# Patient Record
Sex: FEMALE | Race: BLACK OR AFRICAN AMERICAN | NOT HISPANIC OR LATINO | Employment: OTHER | URBAN - METROPOLITAN AREA
[De-identification: names, ages, dates, MRNs, and addresses within clinical notes are randomized per-mention and may not be internally consistent; named-entity substitution may affect disease eponyms.]

---

## 2017-01-11 DIAGNOSIS — R52 PAIN: ICD-10-CM

## 2017-01-11 RX ORDER — TRAMADOL HYDROCHLORIDE 50 MG/1
50 TABLET ORAL EVERY 6 HOURS PRN
Qty: 60 TABLET | Refills: 0 | Status: SHIPPED | OUTPATIENT
Start: 2017-01-11 | End: 2017-03-15 | Stop reason: SDUPTHER

## 2017-01-11 NOTE — TELEPHONE ENCOUNTER
----- Message from Liset Del Rio sent at 1/9/2017  1:33 PM CST -----  Contact: [phone] or [phone]  Pt is requesting a refill on tramadol (ULTRAM) 50 mg tablet Please call pt at your earliest convenience.  Thanks !

## 2017-01-20 DIAGNOSIS — I10 ESSENTIAL HYPERTENSION: ICD-10-CM

## 2017-01-20 RX ORDER — OLMESARTAN MEDOXOMIL, AMLODIPINE AND HYDROCHLOROTHIAZIDE TABLET 20/5/12.5 MG 20; 5; 12.5 MG/1; MG/1; MG/1
TABLET ORAL
Qty: 90 TABLET | Refills: 0 | Status: SHIPPED | OUTPATIENT
Start: 2017-01-20 | End: 2017-02-01

## 2017-01-20 RX ORDER — OLMESARTAN MEDOXOMIL / AMLODIPINE BESYLATE / HYDROCHLOROTHIAZIDE 20; 5; 12.5 MG/1; MG/1; MG/1
1 TABLET, FILM COATED ORAL DAILY
Qty: 90 TABLET | Refills: 3 | Status: SHIPPED | OUTPATIENT
Start: 2017-01-20 | End: 2017-02-01 | Stop reason: SDUPTHER

## 2017-01-20 NOTE — TELEPHONE ENCOUNTER
----- Message from Charleen Deng sent at 1/20/2017 10:39 AM CST -----  Refill: TRIBENZOR 20-5-12.5 mg Tab    Patient has an appt on February 1st. She needs a refill before then. Please send to [facility]. Thank you!

## 2017-02-01 ENCOUNTER — OFFICE VISIT (OUTPATIENT)
Dept: FAMILY MEDICINE | Facility: CLINIC | Age: 67
End: 2017-02-01
Payer: MEDICARE

## 2017-02-01 VITALS
BODY MASS INDEX: 32.94 KG/M2 | TEMPERATURE: 99 F | HEART RATE: 95 BPM | DIASTOLIC BLOOD PRESSURE: 72 MMHG | HEIGHT: 62 IN | WEIGHT: 179 LBS | SYSTOLIC BLOOD PRESSURE: 128 MMHG | OXYGEN SATURATION: 96 %

## 2017-02-01 DIAGNOSIS — F17.200 TOBACCO USE DISORDER: ICD-10-CM

## 2017-02-01 DIAGNOSIS — E21.0 PRIMARY HYPERPARATHYROIDISM: ICD-10-CM

## 2017-02-01 DIAGNOSIS — Z86.010 HISTORY OF COLONIC POLYPS: ICD-10-CM

## 2017-02-01 DIAGNOSIS — E78.5 HYPERLIPIDEMIA, UNSPECIFIED HYPERLIPIDEMIA TYPE: ICD-10-CM

## 2017-02-01 DIAGNOSIS — Z00.00 ROUTINE MEDICAL EXAM: Primary | ICD-10-CM

## 2017-02-01 DIAGNOSIS — E78.2 COMBINED HYPERLIPIDEMIA ASSOCIATED WITH TYPE 2 DIABETES MELLITUS: ICD-10-CM

## 2017-02-01 DIAGNOSIS — I10 ESSENTIAL HYPERTENSION: ICD-10-CM

## 2017-02-01 DIAGNOSIS — M81.0 OSTEOPOROSIS, POST-MENOPAUSAL: ICD-10-CM

## 2017-02-01 DIAGNOSIS — K21.9 GASTROESOPHAGEAL REFLUX DISEASE, ESOPHAGITIS PRESENCE NOT SPECIFIED: ICD-10-CM

## 2017-02-01 DIAGNOSIS — E11.9 DM TYPE 2 WITHOUT RETINOPATHY: ICD-10-CM

## 2017-02-01 DIAGNOSIS — E11.69 COMBINED HYPERLIPIDEMIA ASSOCIATED WITH TYPE 2 DIABETES MELLITUS: ICD-10-CM

## 2017-02-01 DIAGNOSIS — N18.30 CKD (CHRONIC KIDNEY DISEASE) STAGE 3, GFR 30-59 ML/MIN: ICD-10-CM

## 2017-02-01 DIAGNOSIS — J44.9 CHRONIC OBSTRUCTIVE PULMONARY DISEASE, UNSPECIFIED COPD TYPE: ICD-10-CM

## 2017-02-01 DIAGNOSIS — Z79.4 LONG-TERM INSULIN USE: ICD-10-CM

## 2017-02-01 PROBLEM — Z86.0100 HISTORY OF COLONIC POLYPS: Status: ACTIVE | Noted: 2017-02-01

## 2017-02-01 PROBLEM — K22.2 SCHATZKI'S RING: Status: ACTIVE | Noted: 2017-02-01

## 2017-02-01 PROCEDURE — 3044F HG A1C LEVEL LT 7.0%: CPT | Mod: S$GLB,,, | Performed by: INTERNAL MEDICINE

## 2017-02-01 PROCEDURE — 1157F ADVNC CARE PLAN IN RCRD: CPT | Mod: S$GLB,,, | Performed by: INTERNAL MEDICINE

## 2017-02-01 PROCEDURE — G0009 ADMIN PNEUMOCOCCAL VACCINE: HCPCS | Mod: S$GLB,,, | Performed by: INTERNAL MEDICINE

## 2017-02-01 PROCEDURE — 1159F MED LIST DOCD IN RCRD: CPT | Mod: S$GLB,,, | Performed by: INTERNAL MEDICINE

## 2017-02-01 PROCEDURE — 99214 OFFICE O/P EST MOD 30 MIN: CPT | Mod: 25,S$GLB,, | Performed by: INTERNAL MEDICINE

## 2017-02-01 PROCEDURE — 1160F RVW MEDS BY RX/DR IN RCRD: CPT | Mod: S$GLB,,, | Performed by: INTERNAL MEDICINE

## 2017-02-01 PROCEDURE — 90670 PCV13 VACCINE IM: CPT | Mod: S$GLB,,, | Performed by: INTERNAL MEDICINE

## 2017-02-01 PROCEDURE — 1126F AMNT PAIN NOTED NONE PRSNT: CPT | Mod: S$GLB,,, | Performed by: INTERNAL MEDICINE

## 2017-02-01 PROCEDURE — 3066F NEPHROPATHY DOC TX: CPT | Mod: S$GLB,,, | Performed by: INTERNAL MEDICINE

## 2017-02-01 PROCEDURE — 99397 PER PM REEVAL EST PAT 65+ YR: CPT | Mod: 25,S$GLB,, | Performed by: INTERNAL MEDICINE

## 2017-02-01 PROCEDURE — 3074F SYST BP LT 130 MM HG: CPT | Mod: S$GLB,,, | Performed by: INTERNAL MEDICINE

## 2017-02-01 PROCEDURE — 99499 UNLISTED E&M SERVICE: CPT | Mod: S$GLB,,, | Performed by: INTERNAL MEDICINE

## 2017-02-01 PROCEDURE — 99999 PR PBB SHADOW E&M-EST. PATIENT-LVL III: CPT | Mod: PBBFAC,,, | Performed by: INTERNAL MEDICINE

## 2017-02-01 PROCEDURE — 3078F DIAST BP <80 MM HG: CPT | Mod: S$GLB,,, | Performed by: INTERNAL MEDICINE

## 2017-02-01 RX ORDER — OLMESARTAN MEDOXOMIL / AMLODIPINE BESYLATE / HYDROCHLOROTHIAZIDE 20; 5; 12.5 MG/1; MG/1; MG/1
1 TABLET, FILM COATED ORAL DAILY
Qty: 90 TABLET | Refills: 3 | Status: SHIPPED | OUTPATIENT
Start: 2017-02-01 | End: 2017-08-08

## 2017-02-01 RX ORDER — OMEPRAZOLE 40 MG/1
40 CAPSULE, DELAYED RELEASE ORAL DAILY
Qty: 90 CAPSULE | Refills: 4 | Status: SHIPPED | OUTPATIENT
Start: 2017-02-01 | End: 2017-11-28 | Stop reason: SDUPTHER

## 2017-02-01 NOTE — PROGRESS NOTES
Chief complaint: Physical exam    66-year-old black female new to me whose PCP is retired.  Regarding health maintenance she is up-to-date on mammogram.  It looks like she had colon polyps in 2013 with a 5 year follow-up most likely recommended.  She continues to smoke with no plans to quit.    ROS:   CONST: weight stable. EYES: no vision change. ENT: no sore throat. CV: no chest pain w/ exertion. RESP: no shortness of breath. GI: no nausea, vomiting, diarrhea. No dysphagia. : no urinary issues. MUSCULOSKELETAL: no new myalgias or arthralgias. SKIN: no new changes. NEURO: no focal deficits. PSYCH: no new issues. ENDOCRINE: no polyuria. HEME: no lymph nodes. ALLERGY: no general pruritis.    Past Medical History   Diagnosis Date    Cataracts, bilateral     CKD (chronic kidney disease) stage 3, GFR 30-59 ml/min 12/15/2014    Combined hyperlipidemia associated with type 2 diabetes mellitus 6/7/2013    COPD (chronic obstructive pulmonary disease) 12/15/2014    Diabetes mellitus type II----with chronic kidney disease           Diabetes mellitus with renal manifestations, uncontrolled 2/1/2017    Diabetic retinopathy     Family history of stomach cancer 12/5/2013    H/O renal cell carcinoma 12/15/2015    History of colonic polyps 2/1/2017     3 polyps 7/13 ---5 yrs    History of gastroesophageal reflux (GERD)     History of urinary incontinence      s/p bladder lift-october, 2011 (at [facility])    Hyperlipidemia     Hypertension      120s/70s-80s    Nephrolithiasis     Osteoporosis, post-menopausal 3/17/2014    Primary hyperparathyroidism 10/20/2016    Schatzki's ring 2/1/2017     Dilated 2014   Prevnar 2017    Past Surgical History   Procedure Laterality Date    Bladder stone removal  2011     before bladder lift    Bladder lift  2011     done at [facility]    Cystoscopy      Nephrectomy       partial right    Cataract extraction w/  intraocular lens implant Left 06/07/2016     Dr. Vásquez     Cataract extraction w/  intraocular lens implant Right 06/21/2016     Dr. Vásquez     Social History     Social History    Marital status:      Spouse name: N/A    Number of children: N/A    Years of education: N/A     Occupational History    retired [employer]      Social History Main Topics    Smoking status: Current Every Day Smoker     Packs/day: 0.25     Years: 30.00     Types: Cigarettes    Smokeless tobacco: Never Used      Comment: 4-5 cigs/day    Alcohol use No    Drug use: Not on file    Sexual activity: Not on file     Other Topics Concern    Not on file     Social History Narrative    Lives on [facility]    Here with sister Kate [phone]             family history includes Cataracts in her mother; Colon cancer in her brother; Glaucoma in her mother; Nephrolithiasis in her sister; No Known Problems in her father, maternal aunt, maternal grandfather, maternal grandmother, maternal uncle, paternal aunt, paternal grandfather, paternal grandmother, and paternal uncle. There is no history of Anesthesia problems, Kidney cancer, Amblyopia, Blindness, Cancer, Diabetes, Hypertension, Macular degeneration, Retinal detachment, Strabismus, Stroke, or Thyroid disease.     Gen: no distress  EYES: conjunctiva clear, non-icteric, PERRL  ENT: nose clear, nasal mucosa normal, oropharynx clear and moist, teeth good  NECK:supple, thyroid non-palpable  RESP: effort is good, lungs clear  CV: heart RRR w/o murmur, gallops or rubs; no carotid bruits, no edema  GI: abdomen soft, non-distended, non-tender, no hepatosplenomegaly  MS: gait normal, no clubbing or cyanosis of the digits  SKIN: no rashes, warm to touch    Sarina was seen today for establish care.    Diagnoses and all orders for this visit:    Routine medical exam, new to me, up-to-date on mammogram and colonoscopy, work on smoking cessation                          Additional evaluation and management issues:    Additionally, patient has  numerous other medical issues to address today.  She has hypertension which appears to be under good control and needs refills of her medication.  She has diabetes which is still uncontrolled and apparently was seeing endocrine but now will be seeing me.  Her A1c in October was 6.8.  She does have chronic renal insufficiency and follows with nephrology.  We will reassess on her current medications.  She has a lot of reflux lately.  She's been taking some fish all.  Her reflux is worse at night despite use of Zantac.  We discussed reflux precautions at great length and also will try omeprazole 40 mg.  She apparently was given Protonix in the past and had sugars go over 500 and I reassured them I cannot explain how that would have worsened her sugar would apparently might of been a listed side effect and her family member present insists it was probably that medicine.  They will watch for any sugar related issues with the start of omeprazole but also reassess diet which may have changed with prior reflux issues.  She apparently is primary hyperparathyroidism and a history of hypercalcemia but all recent labs appear to be normal as well as PTH.  Vitamin D level also normal.  She apparently is on a weekly medication for many can for osteoporosis.  She has a history of COPD but no active symptoms lately.  She has hyperlipidemia and may been on Lipitor in the past with an unremembered intolerance.  We discussed benefits of statin appendectomy and she is overdue for assessment.  Her LDL is way above 100 and we might need to rediscuss statin therapy in the near future.  All these issues reviewed and patient counseled an evaluation and management of all the separate issues we based upon time counselingTotal time over 25 minutes with over 50% counseling.            Assessment and plan:    Essential hypertension, chronic and stable  -     TRIBENZOR 20-5-12.5 mg Tab; Take 1 tablet by mouth once daily.    Uncontrolled type 2  diabetes mellitus with stage 3 chronic kidney disease, with long-term current use of insulin, overdue for assessment  -     Hemoglobin A1c; Future  -     Basic metabolic panel; Future    CKD (chronic kidney disease) stage 3, GFR 30-59 ml/min, reassess and followed by nephrology, some of it may be due to the partial nephrectomy for which she is followed by urology    Combined hyperlipidemia associated with type 2 diabetes mellitus, reassess and possibly retry statin    Primary hyperparathyroidism, normal PTH    History of colonic polyps, up-to-date    Osteoporosis, post-menopausal, apparently on treatment by endocrine    DM type 2 without retinopathy    Chronic obstructive pulmonary disease, unspecified COPD type, continues to smoke    Tobacco use disorder    Gastroesophageal reflux disease, esophagitis presence not specified, uncontrolled, reflux precautions and try omeprazole    Hyperlipidemia, unspecified hyperlipidemia type  -     Lipid panel; Future    Other orders  -     Cancel: Lipid panel; Future  -     Pneumococcal Conjugate Vaccine (13 Valent) (IM)  -     Cancel: Hemoglobin A1c; Future  -     omeprazole (PRILOSEC) 40 MG capsule; Take 1 capsule (40 mg total) by mouth once daily.

## 2017-02-08 ENCOUNTER — TELEPHONE (OUTPATIENT)
Dept: FAMILY MEDICINE | Facility: CLINIC | Age: 67
End: 2017-02-08

## 2017-02-08 ENCOUNTER — LAB VISIT (OUTPATIENT)
Dept: LAB | Facility: HOSPITAL | Age: 67
End: 2017-02-08
Attending: INTERNAL MEDICINE
Payer: MEDICARE

## 2017-02-08 DIAGNOSIS — I10 ESSENTIAL HYPERTENSION: Primary | ICD-10-CM

## 2017-02-08 DIAGNOSIS — E78.5 HYPERLIPIDEMIA, UNSPECIFIED HYPERLIPIDEMIA TYPE: ICD-10-CM

## 2017-02-08 LAB
ANION GAP SERPL CALC-SCNC: 11 MMOL/L
BUN SERPL-MCNC: 15 MG/DL
CALCIUM SERPL-MCNC: 11 MG/DL
CHLORIDE SERPL-SCNC: 102 MMOL/L
CHOLEST/HDLC SERPL: 5.8 {RATIO}
CO2 SERPL-SCNC: 26 MMOL/L
CREAT SERPL-MCNC: 1.9 MG/DL
EST. GFR  (AFRICAN AMERICAN): 30.8 ML/MIN/1.73 M^2
EST. GFR  (NON AFRICAN AMERICAN): 26.7 ML/MIN/1.73 M^2
GLUCOSE SERPL-MCNC: 153 MG/DL
HDL/CHOLESTEROL RATIO: 17.2 %
HDLC SERPL-MCNC: 256 MG/DL
HDLC SERPL-MCNC: 44 MG/DL
LDLC SERPL CALC-MCNC: 180.6 MG/DL
NONHDLC SERPL-MCNC: 212 MG/DL
POTASSIUM SERPL-SCNC: 4.6 MMOL/L
SODIUM SERPL-SCNC: 139 MMOL/L
TRIGL SERPL-MCNC: 157 MG/DL

## 2017-02-08 PROCEDURE — 36415 COLL VENOUS BLD VENIPUNCTURE: CPT | Mod: PO

## 2017-02-08 PROCEDURE — 80048 BASIC METABOLIC PNL TOTAL CA: CPT

## 2017-02-08 PROCEDURE — 83036 HEMOGLOBIN GLYCOSYLATED A1C: CPT

## 2017-02-08 PROCEDURE — 80061 LIPID PANEL: CPT

## 2017-02-08 NOTE — TELEPHONE ENCOUNTER
"Patient new to me and had some recent labs    "Dr. DUNAWAY says the recent labs show that the kidney unction has really gotten worse since the last blood test.  He would like you to try to drink more water and we need to repeat this test in about 1 week.    The cholesterol also is very much higher than before and you definitely need a cholesterol pill.  If agreeable he will call in Lipitor    Repeat BMP in 1 week  "

## 2017-02-09 ENCOUNTER — OFFICE VISIT (OUTPATIENT)
Dept: OPHTHALMOLOGY | Facility: CLINIC | Age: 67
End: 2017-02-09
Payer: MEDICARE

## 2017-02-09 DIAGNOSIS — Z96.1 PSEUDOPHAKIA: ICD-10-CM

## 2017-02-09 DIAGNOSIS — I10 ESSENTIAL HYPERTENSION: ICD-10-CM

## 2017-02-09 DIAGNOSIS — H52.7 REFRACTIVE ERROR: ICD-10-CM

## 2017-02-09 DIAGNOSIS — E11.9 DM TYPE 2 WITHOUT RETINOPATHY: Primary | ICD-10-CM

## 2017-02-09 LAB
ESTIMATED AVG GLUCOSE: 157 MG/DL
HBA1C MFR BLD HPLC: 7.1 %

## 2017-02-09 PROCEDURE — 99999 PR PBB SHADOW E&M-EST. PATIENT-LVL II: CPT | Mod: PBBFAC,,, | Performed by: OPHTHALMOLOGY

## 2017-02-09 PROCEDURE — 92014 COMPRE OPH EXAM EST PT 1/>: CPT | Mod: S$GLB,,, | Performed by: OPHTHALMOLOGY

## 2017-02-09 NOTE — MR AVS SNAPSHOT
"    [facility]  [address] [place] [address]  Phone: [phone]  Fax: [phone]                  Sarina Genao   2017 1:00 PM   Office Visit    Description:  Female : 1950   Provider:  Xavier Vásquez MD   Department:  [facility]           Reason for Visit     After Cataract           Diagnoses this Visit        Comments    DM type 2 without retinopathy    -  Primary     Essential hypertension         Refractive error         Pseudophakia                To Do List           Future Appointments        Provider Department Dept Phone    3/22/2017 2:00 PM Catarina De La Torre DPM [facility] [phone]    2017 10:00 AM Bryce York II, MD [address] [facility] Endo/Diab/Metab [phone]      Goals (5 Years of Data)     None      Follow-Up and Disposition     Return in about 1 year (around 2018) for 1 yr F/U..      [facility]     [facility] -  Assistance  Registered nurses in the [facility] Center provide clinical advisement, health education, appointment booking, and other advisory services.  Call for this free service at [phone].             Medications           Message regarding Medications     Verify the changes and/or additions to your medication regime listed below are the same as discussed with your clinician today.  If any of these changes or additions are incorrect, please notify your healthcare provider.             Verify that the below list of medications is an accurate representation of the medications you are currently taking.  If none reported, the list may be blank. If incorrect, please contact your healthcare provider. Carry this list with you in case of emergency.           Current Medications     alendronate (FOSAMAX) 70 MG tablet Take 1 tablet (70 mg total) by mouth every 7 days.    BD INSULIN PEN NEEDLE UF SHORT 31 gauge x 5/16" Ndle USE AS DIRECTED    calcium carbonate (OS-LISSETTE) 500 " "mg calcium (1,250 mg) tablet Take 1 tablet (500 mg total) by mouth once daily.    cyanocobalamin, vitamin B-12, (VITAMIN B-12) 1,000 mcg TbSR Take by mouth.    DOCOSAHEXANOIC ACID/EPA (FISH OIL ORAL) Take 1,200 mg by mouth once daily.    glipiZIDE (GLUCOTROL) 10 MG tablet Take 10 mg by mouth daily with breakfast.     insulin aspart (NOVOLOG) 100 unit/mL InPn pen Inject 12 Units into the skin 3 (three) times daily with meals.    insulin glargine (LANTUS SOLOSTAR) 100 unit/mL (3 mL) InPn pen Inject 20 Units into the skin every evening.    omeprazole (PRILOSEC) 40 MG capsule Take 1 capsule (40 mg total) by mouth once daily.    oxybutynin (DITROPAN-XL) 5 MG TR24 TAKE 1 TABLET BY MOUTH ONCE DAILY    pen needle, diabetic (BD INSULIN PEN NEEDLE UF SHORT) 31 gauge x 5/16" Ndle USE AS DIRECTED    ranitidine (ZANTAC) 300 MG tablet Take 1 tablet (300 mg total) by mouth nightly.    tramadol (ULTRAM) 50 mg tablet Take 1 tablet (50 mg total) by mouth every 6 (six) hours as needed. Dr. Rosas has retired. Please make an appointment with a new provider.    TRIBENZOR 20-5-12.5 mg Tab Take 1 tablet by mouth once daily.           Clinical Reference Information           Allergies as of 2/9/2017     Metformin    Pantoprazole      Immunizations Administered on Date of Encounter - 2/9/2017     None      Smoking Cessation     If you would like to quit smoking:   You may be eligible for free services if you are a [place] resident and started smoking cigarettes before September 1, 1988.  Call the [employer]oking [employer]essation [employer] ([employer]) toll free at (399) 261-5077 or (566) 877-0244.   Call 1-800-QUIT-NOW if you do not meet the above criteria.            Language Assistance Services     ATTENTION: Language assistance services are available, free of charge. Please call [phone].      ATENCIÓN: Si habla español, tiene a brooke disposición servicios gratuitos de asistencia lingüística. Llame al [phone].     CHÚ Ý: N?u b?n nói Ti?ng Vi?t, có " các d?ch v? h? tr? ngôn ng? mi?n phí kalynh cho b?n. G?i s? [phone].         [facility] complies with applicable Federal civil rights laws and does not discriminate on the basis of race, color, national origin, age, disability, or sex.

## 2017-02-09 NOTE — PROGRESS NOTES
Subjective:       Patient ID: Sarina Genao is a 66 y.o. female.    Chief Complaint: After Cataract (After Cataract bilateral)    HPI  Review of Systems    Objective:      Physical Exam    Assessment:       1. DM type 2 without retinopathy    2. Essential hypertension    3. Refractive error    4. Pseudophakia        Plan:       DM-No NPDR OU.  HTN-No retinopathy OU.  RE-Pt wants MRx.      Control DM & HTN.  Give MRx.  RTC 1 yr.

## 2017-02-10 ENCOUNTER — TELEPHONE (OUTPATIENT)
Dept: FAMILY MEDICINE | Facility: CLINIC | Age: 67
End: 2017-02-10

## 2017-02-10 NOTE — TELEPHONE ENCOUNTER
----- Message from Charleen Deng sent at 2/9/2017  3:30 PM CST -----  Patient is returning Ms Emani's call. Please call at [phone] Thank you!

## 2017-02-10 NOTE — TELEPHONE ENCOUNTER
"Informed patient of information below. Verbalized understanding.        Venancio Mayer MD        5:49 PM   Note      Patient new to me and had some recent labs     "Dr. DUNAWAY says the recent labs show that the kidney unction has really gotten worse since the last blood test. He would like you to try to drink more water and we need to repeat this test in about 1 week.     The cholesterol also is very much higher than before and you definitely need a cholesterol pill. If agreeable he will call in Lipitor     Repeat BMP in 1 week          "

## 2017-02-20 DIAGNOSIS — N18.30 CHRONIC KIDNEY DISEASE, STAGE III (MODERATE): Primary | ICD-10-CM

## 2017-03-14 ENCOUNTER — TELEPHONE (OUTPATIENT)
Dept: FAMILY MEDICINE | Facility: CLINIC | Age: 67
End: 2017-03-14

## 2017-03-14 DIAGNOSIS — I10 ESSENTIAL HYPERTENSION: ICD-10-CM

## 2017-03-14 RX ORDER — HYDROCHLOROTHIAZIDE 12.5 MG/1
12.5 TABLET ORAL DAILY
Qty: 30 TABLET | Refills: 11 | Status: SHIPPED | OUTPATIENT
Start: 2017-03-14 | End: 2017-11-28 | Stop reason: SDUPTHER

## 2017-03-14 RX ORDER — VALSARTAN 160 MG/1
160 TABLET ORAL DAILY
Qty: 90 TABLET | Refills: 3 | Status: SHIPPED | OUTPATIENT
Start: 2017-03-14 | End: 2017-11-28 | Stop reason: SDUPTHER

## 2017-03-14 RX ORDER — TEMAZEPAM 15 MG/1
CAPSULE ORAL
Refills: 1 | COMMUNITY
Start: 2017-03-03 | End: 2017-08-08

## 2017-03-14 RX ORDER — AMLODIPINE BESYLATE 5 MG/1
5 TABLET ORAL DAILY
Qty: 30 TABLET | Refills: 11 | Status: SHIPPED | OUTPATIENT
Start: 2017-03-14 | End: 2017-11-28 | Stop reason: SDUPTHER

## 2017-03-14 NOTE — TELEPHONE ENCOUNTER
Advise patient that the 3 in one medication is not going to be covered by current insurance and will need to be split into the 3 medications inside of there next    3 separate prescriptions were sent for amlodipine, HCTZ, valsartan     take all 3 at one time and that will be the same thing.  Monitor blood pressure

## 2017-03-14 NOTE — TELEPHONE ENCOUNTER
Received form from pharmacy stating that med tribenzor is not covered please advise form in  Dr. GUME lin

## 2017-03-15 DIAGNOSIS — R52 PAIN: ICD-10-CM

## 2017-03-15 RX ORDER — TRAMADOL HYDROCHLORIDE 50 MG/1
50 TABLET ORAL EVERY 6 HOURS PRN
Qty: 60 TABLET | Refills: 0 | Status: SHIPPED | OUTPATIENT
Start: 2017-03-15 | End: 2017-05-15 | Stop reason: SDUPTHER

## 2017-03-15 NOTE — TELEPHONE ENCOUNTER
----- Message from Dona Pacheco sent at 3/15/2017  8:53 AM CDT -----  Contact: SELF  REFILL: TRAMADOL

## 2017-03-15 NOTE — TELEPHONE ENCOUNTER
Patient advised of message,    Said she know her insurance will not pay for Tribenzor she pay out of pocket for it and wishes to stay on this medication.

## 2017-03-15 NOTE — TELEPHONE ENCOUNTER
Okay but call back and advised that the 3 generic medications may be much cheaper than paying for the one med

## 2017-03-15 NOTE — TELEPHONE ENCOUNTER
Patient advised.    Wishes to stay on Tribenzor she do not to mess with her blood pressure she knows this medication works for her.

## 2017-03-20 NOTE — TELEPHONE ENCOUNTER
----- Message from Venecia Deng sent at 3/17/2017  4:17 PM CDT -----  Contact: self  Patient called to check on script please call at [phone]

## 2017-03-21 NOTE — TELEPHONE ENCOUNTER
Spoke with pharmacy was told that [facility] is not cover under patient plan medication need a PA I spoke with patient was told that a letter from you is needed patient wishes to stay on this medication don not want to split medication. Forms is on desk.

## 2017-03-21 NOTE — TELEPHONE ENCOUNTER
----- Message from Joanie Schmitt sent at 3/21/2017 10:08 AM CDT -----  Contact: Pharmacy  Pharmacy states that forms for medication have to be sent back by today.

## 2017-03-22 ENCOUNTER — OFFICE VISIT (OUTPATIENT)
Dept: PODIATRY | Facility: CLINIC | Age: 67
End: 2017-03-22
Payer: MEDICARE

## 2017-03-22 ENCOUNTER — TELEPHONE (OUTPATIENT)
Dept: FAMILY MEDICINE | Facility: CLINIC | Age: 67
End: 2017-03-22

## 2017-03-22 VITALS
DIASTOLIC BLOOD PRESSURE: 60 MMHG | WEIGHT: 179 LBS | HEIGHT: 62 IN | SYSTOLIC BLOOD PRESSURE: 120 MMHG | BODY MASS INDEX: 32.94 KG/M2

## 2017-03-22 DIAGNOSIS — B35.1 NAIL DERMATOPHYTOSIS: ICD-10-CM

## 2017-03-22 DIAGNOSIS — E11.9 COMPREHENSIVE DIABETIC FOOT EXAMINATION, TYPE 2 DM, ENCOUNTER FOR: Primary | ICD-10-CM

## 2017-03-22 DIAGNOSIS — N18.30 CHRONIC KIDNEY DISEASE, STAGE 3: ICD-10-CM

## 2017-03-22 PROCEDURE — 99214 OFFICE O/P EST MOD 30 MIN: CPT | Mod: S$GLB,,, | Performed by: PODIATRIST

## 2017-03-22 PROCEDURE — 3066F NEPHROPATHY DOC TX: CPT | Mod: S$GLB,,, | Performed by: PODIATRIST

## 2017-03-22 PROCEDURE — 3074F SYST BP LT 130 MM HG: CPT | Mod: S$GLB,,, | Performed by: PODIATRIST

## 2017-03-22 PROCEDURE — 1159F MED LIST DOCD IN RCRD: CPT | Mod: S$GLB,,, | Performed by: PODIATRIST

## 2017-03-22 PROCEDURE — 1126F AMNT PAIN NOTED NONE PRSNT: CPT | Mod: S$GLB,,, | Performed by: PODIATRIST

## 2017-03-22 PROCEDURE — 99999 PR PBB SHADOW E&M-EST. PATIENT-LVL II: CPT | Mod: PBBFAC,,, | Performed by: PODIATRIST

## 2017-03-22 PROCEDURE — 1160F RVW MEDS BY RX/DR IN RCRD: CPT | Mod: S$GLB,,, | Performed by: PODIATRIST

## 2017-03-22 PROCEDURE — 1157F ADVNC CARE PLAN IN RCRD: CPT | Mod: S$GLB,,, | Performed by: PODIATRIST

## 2017-03-22 PROCEDURE — 3078F DIAST BP <80 MM HG: CPT | Mod: S$GLB,,, | Performed by: PODIATRIST

## 2017-03-22 PROCEDURE — 3045F PR MOST RECENT HEMOGLOBIN A1C LEVEL 7.0-9.0%: CPT | Mod: S$GLB,,, | Performed by: PODIATRIST

## 2017-03-22 RX ORDER — INSULIN GLARGINE 100 [IU]/ML
INJECTION, SOLUTION SUBCUTANEOUS
Refills: 6 | COMMUNITY
Start: 2017-03-01 | End: 2017-06-01 | Stop reason: SDUPTHER

## 2017-03-22 NOTE — TELEPHONE ENCOUNTER
----- Message from Az Rodriguez sent at 3/22/2017 11:24 AM CDT -----  Contact: Mayelin/MIKE Dick states form for formula exception needs to be faxed back by Friday Mar 24th. Please contact her at [phone].    Thanks

## 2017-03-23 NOTE — PROGRESS NOTES
Subjective:      Patient ID: Sarina Genao is a 66 y.o. female.    Chief Complaint: Diabetes Mellitus (pcp Dr. Mayer 2-1-17); Diabetic Foot Exam; and Nail Care    Sarina is a 66 y.o. female who presents to the clinic for evaluation and treatment of high risk feet. Sarina has a past medical history of Cataracts, bilateral; CKD (chronic kidney disease) stage 3, GFR 30-59 ml/min (12/15/2014); Combined hyperlipidemia associated with type 2 diabetes mellitus (6/7/2013); COPD (chronic obstructive pulmonary disease) (12/15/2014); Diabetes mellitus type II; Diabetes mellitus with renal manifestations, uncontrolled (2/1/2017); Diabetic retinopathy; Family history of stomach cancer (12/5/2013); H/O renal cell carcinoma (12/15/2015); History of colonic polyps (2/1/2017); History of gastroesophageal reflux (GERD); History of urinary incontinence; Hyperlipidemia; Hypertension; Nephrolithiasis; Osteoporosis, post-menopausal (3/17/2014); Primary hyperparathyroidism (10/20/2016); and Schatzki's ring (2/1/2017). The patient's chief complaint is long, thick toenails. This patient has documented high risk feet requiring routine maintenance secondary to diabetes mellitis and those secondary complications of diabetes, as mentioned..    PCP: Venancio Mayer MD    Date Last Seen by PCP:   Chief Complaint   Patient presents with    Diabetes Mellitus     pcp Dr. Mayer 2-1-17    Diabetic Foot Exam    Nail Care     Current shoe gear: clogs     Hemoglobin A1C   Date Value Ref Range Status   02/08/2017 7.1 (H) 4.5 - 6.2 % Final     Comment:     According to ADA guidelines, hemoglobin A1C <7.0% represents  optimal control in non-pregnant diabetic patients.  Different  metrics may apply to specific populations.   Standards of Medical Care in Diabetes - 2016.  For the purpose of screening for the presence of diabetes:  <5.7%     Consistent with the absence of diabetes  5.7-6.4%  Consistent with increasing risk for  diabetes   (prediabetes)  >or=6.5%  Consistent with diabetes  Currently no consensus exists for use of hemoglobin A1C  for diagnosis of diabetes for children.     10/20/2016 6.8 (H) 4.5 - 6.2 % Final     Comment:     According to ADA guidelines, hemoglobin A1C <7.0% represents  optimal control in non-pregnant diabetic patients.  Different  metrics may apply to specific populations.   Standards of Medical Care in Diabetes - 2016.  For the purpose of screening for the presence of diabetes:  <5.7%     Consistent with the absence of diabetes  5.7-6.4%  Consistent with increasing risk for diabetes   (prediabetes)  >or=6.5%  Consistent with diabetes  Currently no consensus exists for use of hemoglobin A1C  for diagnosis of diabetes for children.     06/20/2016 7.1 (H) 4.5 - 6.2 % Final     Patient Active Problem List   Diagnosis    Diabetes mellitus type II, non insulin dependent    Combined hyperlipidemia associated with type 2 diabetes mellitus    Hypertension, benign    Urinary incontinence, urge    History of kidney cancer    Family history of stomach cancer    GERD (gastroesophageal reflux disease)    Osteoporosis, post-menopausal    Tobacco dependence    COPD (chronic obstructive pulmonary disease)    CKD (chronic kidney disease) stage 3, GFR 30-59 ml/min    H/O renal cell carcinoma    Nuclear sclerosis of both eyes    DM type 2 without retinopathy    Essential hypertension    Refractive error    Senile nuclear sclerosis    Post-operative state    Nuclear sclerosis of right eye    Primary hyperparathyroidism    Diabetes mellitus with renal manifestations, uncontrolled    History of colonic polyps    Schatzki's ring    Pseudophakia     Current Outpatient Prescriptions on File Prior to Visit   Medication Sig Dispense Refill    alendronate (FOSAMAX) 70 MG tablet Take 1 tablet (70 mg total) by mouth every 7 days. 4 tablet 11    amlodipine (NORVASC) 5 MG tablet Take 1 tablet (5 mg total) by  "mouth once daily. 30 tablet 11    BD INSULIN PEN NEEDLE UF SHORT 31 gauge x 5/16" Ndle USE AS DIRECTED 100 each 0    calcium carbonate (OS-LISSETTE) 500 mg calcium (1,250 mg) tablet Take 1 tablet (500 mg total) by mouth once daily. 30 tablet 5    cyanocobalamin, vitamin B-12, (VITAMIN B-12) 1,000 mcg TbSR Take by mouth.      DOCOSAHEXANOIC ACID/EPA (FISH OIL ORAL) Take 1,200 mg by mouth once daily.      glipiZIDE (GLUCOTROL) 10 MG tablet Take 10 mg by mouth daily with breakfast.       hydrochlorothiazide (HYDRODIURIL) 12.5 MG Tab Take 1 tablet (12.5 mg total) by mouth once daily. 30 tablet 11    insulin aspart (NOVOLOG) 100 unit/mL InPn pen Inject 12 Units into the skin 3 (three) times daily with meals. 1 Box 6    oxybutynin (DITROPAN-XL) 5 MG TR24 TAKE 1 TABLET BY MOUTH ONCE DAILY 90 tablet 0    pen needle, diabetic (BD INSULIN PEN NEEDLE UF SHORT) 31 gauge x 5/16" Ndle USE AS DIRECTED 100 each 6    ranitidine (ZANTAC) 300 MG tablet Take 1 tablet (300 mg total) by mouth nightly. 90 tablet 0    temazepam (RESTORIL) 15 mg Cap TK ONE C PO  NIGHTLY PRN  1    tramadol (ULTRAM) 50 mg tablet Take 1 tablet (50 mg total) by mouth every 6 (six) hours as needed. Dr. Rosas has retired. Please make an appointment with a new provider. 60 tablet 0    TRIBENZOR 20-5-12.5 mg Tab Take 1 tablet by mouth once daily. 90 tablet 3    valsartan (DIOVAN) 160 MG tablet Take 1 tablet (160 mg total) by mouth once daily. 90 tablet 3    omeprazole (PRILOSEC) 40 MG capsule Take 1 capsule (40 mg total) by mouth once daily. 90 capsule 4     No current facility-administered medications on file prior to visit.      Review of patient's allergies indicates:   Allergen Reactions    Metformin Diarrhea    Pantoprazole      elevated blood sugars     Past Surgical History:   Procedure Laterality Date    bladder lift  2011    [facility]    BLADDER STONE REMOVAL  2011    before bladder lift    CATARACT EXTRACTION W/  INTRAOCULAR LENS " IMPLANT Left 06/07/2016    Dr. Vásquez    CATARACT EXTRACTION W/  INTRAOCULAR LENS IMPLANT Right 06/21/2016    Dr. Vásquez    CYSTOSCOPY      NEPHRECTOMY      partial right     Family History   Problem Relation Age of Onset    Glaucoma Mother     Cataracts Mother     No Known Problems Father     Colon cancer Brother     Nephrolithiasis Sister     No Known Problems Maternal Aunt     No Known Problems Maternal Uncle     No Known Problems Paternal Aunt     No Known Problems Paternal Uncle     No Known Problems Maternal Grandmother     No Known Problems Maternal Grandfather     No Known Problems Paternal Grandmother     No Known Problems Paternal Grandfather     Anesthesia problems Neg Hx     Kidney cancer Neg Hx     Amblyopia Neg Hx     Blindness Neg Hx     Cancer Neg Hx     Diabetes Neg Hx     Hypertension Neg Hx     Macular degeneration Neg Hx     Retinal detachment Neg Hx     Strabismus Neg Hx     Stroke Neg Hx     Thyroid disease Neg Hx      Social History     Social History    Marital status:      Spouse name: N/A    Number of children: N/A    Years of education: N/A     Occupational History    retired x ray tech      Social History Main Topics    Smoking status: Current Every Day Smoker     Packs/day: 0.25     Years: 30.00     Types: Cigarettes    Smokeless tobacco: Never Used      Comment: 4-5 cigs/day    Alcohol use No    Drug use: Not on file    Sexual activity: Not on file     Other Topics Concern    Not on file     Social History Narrative    Lives on [facility]    Here with sister Kate [phone]               Review of Systems   Constitution: Negative for chills, fever and weakness.   Cardiovascular: Negative for chest pain, claudication and leg swelling.   Respiratory: Positive for cough. Negative for shortness of breath.    Skin: Positive for dry skin and nail changes. Negative for itching and rash.   Musculoskeletal: Positive for arthritis, joint  "pain and muscle cramps. Negative for falls, joint swelling and muscle weakness.   Gastrointestinal: Negative for diarrhea, nausea and vomiting.   Neurological: Negative for numbness, paresthesias and tremors.   Psychiatric/Behavioral: Negative for altered mental status and hallucinations.           Objective:       Vitals:    03/22/17 1415   BP: 120/60   Weight: 81.2 kg (179 lb)   Height: 5' 2" (1.575 m)   PainSc: 0-No pain       Physical Exam   Constitutional:   General: Pt. is well-developed, well-nourished, appears stated age, in no acute distress, alert and oriented x 3. No evidence of depression, anxiety, or agitation. Calm, cooperative, and communicative. Appropriate interactions and affect.       Cardiovascular:   Pulses:       Dorsalis pedis pulses are 1+ on the right side, and 1+ on the left side.        Posterior tibial pulses are 1+ on the right side, and 1+ on the left side.   Dorsalis pedis and posterior tibial pulses are diminished Bilaterally. Skin is atrophic   Musculoskeletal:        Right ankle: She exhibits normal range of motion and no swelling. No tenderness. Achilles tendon exhibits no pain and no defect.        Left ankle: She exhibits normal range of motion and no swelling. No tenderness. Achilles tendon exhibits no pain and no defect.        Right foot: There is normal range of motion and no tenderness.        Left foot: There is normal range of motion and no tenderness.   Adequate joint range of motion without pain, limitation, nor crepitation Bilateral feet and ankle joints. Muscle strength is 5/5 in all groups bilaterally.    Patient has hammertoes of digits 2-5 bilateral partially reducible without symptom today.    Visible and palpable bunion without pain at dorsomedial 1st metatarsal head right and left.  Hallux abducted right and left partially reducible, tracks laterally without being track bound.  No ecchymosis, erythema, edema, or cardinal signs infection or signs of trauma same " foot.     Neurological: She displays no atrophy and no tremor. No sensory deficit. She exhibits normal muscle tone.   Jennings-Peggy 5.07 monofilament is intact bilateral feet. Sharp/dull sensation is also intact Bilateral feet.     Skin: Skin is warm, dry and intact. No abrasion, no ecchymosis, no lesion and no rash noted. She is not diaphoretic. No cyanosis or erythema. No pallor. Nails show no clubbing.   Toenails 1-5 bilaterally are elongated by 2-3 mm, thickened by 2-3 mm, discolored/yellowed, dystrophic, brittle with subungual debris.      Interdigital Spaces clean, dry and without evidence of break in skin integrity   Psychiatric: She has a normal mood and affect. Her speech is normal.   Nursing note and vitals reviewed.          Assessment:       No diagnosis found.      Plan:       There are no diagnoses linked to this encounter.  I counseled the patient on her conditions, their implications and medical management.     - Shoe inspection. Diabetic Foot Education. Patient reminded of the importance of good nutrition and blood sugar control to help prevent podiatric complications of diabetes. Patient instructed on proper foot hygeine. We discussed wearing proper shoe gear, daily foot inspections, never walking without protective shoe gear.    - Discussed condition and treatment options with pt, as well as poor results with most treatments. Discussed filing nails, using antifungal topical ointment vs oral treatment options. Instructed patient on the importance of keeping fungus off of skin while treating nails. Patient instructed to use absorbent cotton socks and change them if they become sweaty; or wear an open-toe shoe or sandal. Wash the feet at least once a day with soap and water. Patient wants to try home remedies. Instructed patient that it takes time for symptoms to completely dissipate. Patient instructed to use lysol or over-the-counter antifungal powders or sprays to shoes daily and allow them to  air dry, switching shoes from every other day would be optimal. Patient is to avoid barefoot walking in  high-risk environments (public showers, gyms and locker rooms) may prevent future infections.     -  She will continue to monitor the areas daily, inspect her feet, wear protective shoe gear when ambulatory, moisturizer to maintain skin integrity and follow in this office in approximately 12 months, sooner p.r.n or as Procedure B.

## 2017-04-10 ENCOUNTER — LAB VISIT (OUTPATIENT)
Dept: LAB | Facility: HOSPITAL | Age: 67
End: 2017-04-10
Attending: INTERNAL MEDICINE
Payer: MEDICARE

## 2017-04-10 DIAGNOSIS — M81.0 OSTEOPOROSIS: ICD-10-CM

## 2017-04-10 DIAGNOSIS — N18.30 CKD (CHRONIC KIDNEY DISEASE) STAGE 3, GFR 30-59 ML/MIN: ICD-10-CM

## 2017-04-10 LAB — 25(OH)D3+25(OH)D2 SERPL-MCNC: 45 NG/ML

## 2017-04-10 PROCEDURE — 36415 COLL VENOUS BLD VENIPUNCTURE: CPT | Mod: PO

## 2017-04-10 PROCEDURE — 82306 VITAMIN D 25 HYDROXY: CPT

## 2017-04-13 ENCOUNTER — TELEPHONE (OUTPATIENT)
Dept: FAMILY MEDICINE | Facility: CLINIC | Age: 67
End: 2017-04-13

## 2017-04-13 NOTE — TELEPHONE ENCOUNTER
----- Message from Mariana Holm sent at 4/13/2017 12:50 PM CDT -----  Contact: Chary Leblanc called concerning handicap paperwork. Please advise. [phone]

## 2017-04-13 NOTE — TELEPHONE ENCOUNTER
Patient requests a handicap next    Please call and inform patient that in review of the chart we cannot find a specific reason that qualifies for the handicap tag according to the listed reasons.  What particular medical issue do you feel would qualify you.?

## 2017-04-17 ENCOUNTER — OFFICE VISIT (OUTPATIENT)
Dept: ENDOCRINOLOGY | Facility: CLINIC | Age: 67
End: 2017-04-17
Payer: MEDICARE

## 2017-04-17 VITALS
HEART RATE: 76 BPM | SYSTOLIC BLOOD PRESSURE: 130 MMHG | BODY MASS INDEX: 32.86 KG/M2 | WEIGHT: 178.56 LBS | DIASTOLIC BLOOD PRESSURE: 82 MMHG | HEIGHT: 62 IN

## 2017-04-17 DIAGNOSIS — E21.0 PRIMARY HYPERPARATHYROIDISM: ICD-10-CM

## 2017-04-17 DIAGNOSIS — M81.0 OSTEOPOROSIS, POST-MENOPAUSAL: ICD-10-CM

## 2017-04-17 DIAGNOSIS — E11.9 DIABETES MELLITUS TYPE II, NON INSULIN DEPENDENT: Primary | ICD-10-CM

## 2017-04-17 PROCEDURE — 3066F NEPHROPATHY DOC TX: CPT | Mod: S$GLB,,, | Performed by: INTERNAL MEDICINE

## 2017-04-17 PROCEDURE — 1126F AMNT PAIN NOTED NONE PRSNT: CPT | Mod: S$GLB,,, | Performed by: INTERNAL MEDICINE

## 2017-04-17 PROCEDURE — 99999 PR PBB SHADOW E&M-EST. PATIENT-LVL III: CPT | Mod: PBBFAC,,, | Performed by: INTERNAL MEDICINE

## 2017-04-17 PROCEDURE — 3045F PR MOST RECENT HEMOGLOBIN A1C LEVEL 7.0-9.0%: CPT | Mod: S$GLB,,, | Performed by: INTERNAL MEDICINE

## 2017-04-17 PROCEDURE — 99214 OFFICE O/P EST MOD 30 MIN: CPT | Mod: S$GLB,,, | Performed by: INTERNAL MEDICINE

## 2017-04-17 PROCEDURE — 3079F DIAST BP 80-89 MM HG: CPT | Mod: S$GLB,,, | Performed by: INTERNAL MEDICINE

## 2017-04-17 PROCEDURE — 1160F RVW MEDS BY RX/DR IN RCRD: CPT | Mod: S$GLB,,, | Performed by: INTERNAL MEDICINE

## 2017-04-17 PROCEDURE — 1159F MED LIST DOCD IN RCRD: CPT | Mod: S$GLB,,, | Performed by: INTERNAL MEDICINE

## 2017-04-17 PROCEDURE — 3075F SYST BP GE 130 - 139MM HG: CPT | Mod: S$GLB,,, | Performed by: INTERNAL MEDICINE

## 2017-04-17 PROCEDURE — 1157F ADVNC CARE PLAN IN RCRD: CPT | Mod: S$GLB,,, | Performed by: INTERNAL MEDICINE

## 2017-04-17 NOTE — TELEPHONE ENCOUNTER
Informed patient of information below. Verbalized understanding. Patient did not report any medical condition that would qualify her for a handicap tag.

## 2017-04-19 ENCOUNTER — TELEPHONE (OUTPATIENT)
Dept: FAMILY MEDICINE | Facility: CLINIC | Age: 67
End: 2017-04-19

## 2017-04-19 NOTE — TELEPHONE ENCOUNTER
----- Message from Jailene Brito sent at 4/19/2017  1:43 PM CDT -----  Contact: self   Pt is returning your office call. Pls call pt. Thanks...............Shraon

## 2017-04-19 NOTE — TELEPHONE ENCOUNTER
----- Message from Venecia Deng sent at 4/19/2017 11:42 AM CDT -----  Contact: Self   Patient states she saw her Endocrinologist and she would like to know if he has communicated with Dr. Mayer. Please call [phone]

## 2017-04-19 NOTE — TELEPHONE ENCOUNTER
----- Message from Joanie Hansen MA sent at 4/19/2017  2:36 PM CDT -----  Mrs. Genao called and asked us to see if you will sign handicap plate form. We do not.

## 2017-04-19 NOTE — PROGRESS NOTES
CHIEF COMPLAINT:  Diabetes.    HISTORY OF PRESENT ILLNESS:  The patient self-referred for diabetes.  She was   diagnosed with having diabetes in 2013.  She was fairly well controlled until   about a year ago.  She was put on glipizide and then over the past few months,   her diabetes has been very uncontrolled with an A1c of 14.  She was started on   NovoLog 12 units three times a day before meals and Lantus 20 units at night.    Her blood sugars are running  morning, around 170 before lunch and 170   before supper.  She denies having any significant lows.  She did have polyuria,   polydipsia, nocturia three months ago but after starting insulin, the symptoms   have subsided.  She also has osteoporosis, but denies any fractures or renal   stones and the patient does have on reviewing chemistries, she has history of   mild hypercalcemia with her most recent calcium today of 10.5 and PTH of 91.    She is due for cataract surgery tomorrow.    REVIEW OF SYSTEMS:  HEENT:  Fair eyesight.  No evidence of retinopathy on eye exam.  CARDIOPULMONARY:  Denies chest pain, cough or shortness of breath.  GASTROINTESTINAL:  Denies nausea, vomiting, diarrhea, constipation or abdominal   pain.  GENITOURINARY:  Denies dysuria, but has had some increased urination and   nocturia.  NEUROMUSCULAR:  Denies numbness, tingling or paresthesias.  All other systems reviewed and are negative.    PHYSICAL EXAMINATION:  GENERAL:  The patient is alert and cooperative, in no acute distress.  VITAL SIGNS:  Blood pressure 126/72, heart rate 78, BMI 31.46 with a weight of   172 pounds, 78 kg.  EYES:  No eye findings of Graves' disease.  No scleral icterus.  ENT:  No lesion on tongue, hard palate, soft palate or posterior pharynx.  NECK:  No thyromegaly.  No neck masses.  No tracheal deviation.  No neck vein   distention.  LYMPHATICS:  No anterior or posterior cervical adenopathy.  No supraclavicular   adenopathy.  HEART:  Normal sinus rhythm.   No murmurs.  No rubs.  No carotid bruits.  LUNGS:  Clear.  Percussion normal.  No respiratory difficulty.  ABDOMEN:  No masses, tenderness or organomegaly.  EXTREMITIES:  No clubbing, cyanosis or edema.  Monofilament is 50% intact in the   lower extremities.  MUSCULOSKELETAL:  Gait normal, stance normal.  Good muscle strength in all four   extremities.  Results for orders placed or performed in visit on 04/10/17   Vitamin D   Result Value Ref Range    Vit D, 25-Hydroxy 45 30 - 96 ng/mL     IMPRESSION:  1.  Type 2 diabetes, appears to be in better control now.  Current A1c today is 7.1.     2.  The patient appears to have primary hyperparathyroidism however she takes HCTZ and is reluctant to stop because of severe hypertension.  Also  and she has a low urine calcium may be secondary to low vit D or HCTZ. Prob not [facility].   3.osteoporosis for which she is currently taking alendronate 70 mg every week; May need to switch to Prolia if GFR worsens.  however, she does miss doses frequently.    RECOMMENDATIONS:  Continue current diabetic medication.  Nuc med parathyroid    Repeat bone density and check vitamin D level.  Treat with vit D

## 2017-04-24 ENCOUNTER — TELEPHONE (OUTPATIENT)
Dept: FAMILY MEDICINE | Facility: CLINIC | Age: 67
End: 2017-04-24

## 2017-04-24 DIAGNOSIS — E78.5 HYPERLIPIDEMIA, UNSPECIFIED HYPERLIPIDEMIA TYPE: ICD-10-CM

## 2017-04-24 DIAGNOSIS — N18.30 CKD (CHRONIC KIDNEY DISEASE) STAGE 3, GFR 30-59 ML/MIN: ICD-10-CM

## 2017-04-24 DIAGNOSIS — E21.0 PRIMARY HYPERPARATHYROIDISM: ICD-10-CM

## 2017-04-24 NOTE — TELEPHONE ENCOUNTER
Looks like there was a prior message on April 13 with this was addressed.  Please ask patient if she remembers the conversation              Note      Informed patient of information below. Verbalized understanding. Patient did not report any medical condition that would qualify her for a handicap tag.        Patient requests a handicap next     Please call and inform patient that in review of the chart we cannot find a specific reason that qualifies for the handicap tag according to the listed reasons. What particular medical issue do you feel would qualify you.?

## 2017-04-24 NOTE — TELEPHONE ENCOUNTER
Patient advised of message below     Said she saw Dr Senior(Endocrinologist) he was suppose to send you a message he told her she has COPD that qualify her for a handicap sticker patient said she need a permanent sticker also said she will only be seeing Dr Senior once a year said diabetes is under control she need you to order her A1c. Please advised.

## 2017-04-25 NOTE — TELEPHONE ENCOUNTER
Looks like needs repeat labs then f/u w me in May and we can discuss the hadicapp tag-- just having copd is not a reason for the tag. We have to document an accurate reason      Labs then appt and bring all meds. No fast but urine needed

## 2017-04-27 ENCOUNTER — LAB VISIT (OUTPATIENT)
Dept: LAB | Facility: HOSPITAL | Age: 67
End: 2017-04-27
Attending: INTERNAL MEDICINE
Payer: MEDICARE

## 2017-04-27 DIAGNOSIS — E21.0 PRIMARY HYPERPARATHYROIDISM: ICD-10-CM

## 2017-04-27 DIAGNOSIS — E78.5 HYPERLIPIDEMIA, UNSPECIFIED HYPERLIPIDEMIA TYPE: ICD-10-CM

## 2017-04-27 DIAGNOSIS — N18.30 CKD (CHRONIC KIDNEY DISEASE) STAGE 3, GFR 30-59 ML/MIN: ICD-10-CM

## 2017-04-27 LAB
ALBUMIN SERPL BCP-MCNC: 3.4 G/DL
ALP SERPL-CCNC: 119 U/L
ALT SERPL W/O P-5'-P-CCNC: 15 U/L
ANION GAP SERPL CALC-SCNC: 11 MMOL/L
AST SERPL-CCNC: 17 U/L
BASOPHILS # BLD AUTO: 0.02 K/UL
BASOPHILS NFR BLD: 0.2 %
BILIRUB SERPL-MCNC: 0.4 MG/DL
BUN SERPL-MCNC: 14 MG/DL
CALCIUM SERPL-MCNC: 10.2 MG/DL
CHLORIDE SERPL-SCNC: 107 MMOL/L
CO2 SERPL-SCNC: 25 MMOL/L
CREAT SERPL-MCNC: 1.3 MG/DL
DIFFERENTIAL METHOD: NORMAL
EOSINOPHIL # BLD AUTO: 0.1 K/UL
EOSINOPHIL NFR BLD: 1 %
ERYTHROCYTE [DISTWIDTH] IN BLOOD BY AUTOMATED COUNT: 13.7 %
EST. GFR  (AFRICAN AMERICAN): 48.7 ML/MIN/1.73 M^2
EST. GFR  (NON AFRICAN AMERICAN): 42.3 ML/MIN/1.73 M^2
GLUCOSE SERPL-MCNC: 82 MG/DL
HCT VFR BLD AUTO: 40.6 %
HGB BLD-MCNC: 13.7 G/DL
LYMPHOCYTES # BLD AUTO: 2 K/UL
LYMPHOCYTES NFR BLD: 19 %
MCH RBC QN AUTO: 28.4 PG
MCHC RBC AUTO-ENTMCNC: 33.7 %
MCV RBC AUTO: 84 FL
MONOCYTES # BLD AUTO: 0.9 K/UL
MONOCYTES NFR BLD: 8.8 %
NEUTROPHILS # BLD AUTO: 7.6 K/UL
NEUTROPHILS NFR BLD: 70.7 %
PLATELET # BLD AUTO: 274 K/UL
PMV BLD AUTO: 9.2 FL
POTASSIUM SERPL-SCNC: 3.7 MMOL/L
PROT SERPL-MCNC: 7.5 G/DL
RBC # BLD AUTO: 4.82 M/UL
SODIUM SERPL-SCNC: 143 MMOL/L
TSH SERPL DL<=0.005 MIU/L-ACNC: 0.75 UIU/ML
WBC # BLD AUTO: 10.68 K/UL

## 2017-04-27 PROCEDURE — 36415 COLL VENOUS BLD VENIPUNCTURE: CPT | Mod: PO

## 2017-04-27 PROCEDURE — 83036 HEMOGLOBIN GLYCOSYLATED A1C: CPT

## 2017-04-27 PROCEDURE — 80053 COMPREHEN METABOLIC PANEL: CPT

## 2017-04-27 PROCEDURE — 84443 ASSAY THYROID STIM HORMONE: CPT

## 2017-04-27 PROCEDURE — 85025 COMPLETE CBC W/AUTO DIFF WBC: CPT

## 2017-04-28 LAB
ESTIMATED AVG GLUCOSE: 151 MG/DL
HBA1C MFR BLD HPLC: 6.9 %

## 2017-05-05 ENCOUNTER — OFFICE VISIT (OUTPATIENT)
Dept: FAMILY MEDICINE | Facility: CLINIC | Age: 67
End: 2017-05-05
Payer: MEDICARE

## 2017-05-05 VITALS
DIASTOLIC BLOOD PRESSURE: 86 MMHG | SYSTOLIC BLOOD PRESSURE: 134 MMHG | TEMPERATURE: 98 F | OXYGEN SATURATION: 96 % | WEIGHT: 177.5 LBS | HEART RATE: 102 BPM | BODY MASS INDEX: 32.66 KG/M2 | HEIGHT: 62 IN

## 2017-05-05 DIAGNOSIS — E78.5 HYPERLIPIDEMIA, UNSPECIFIED HYPERLIPIDEMIA TYPE: ICD-10-CM

## 2017-05-05 DIAGNOSIS — I10 ESSENTIAL HYPERTENSION: ICD-10-CM

## 2017-05-05 DIAGNOSIS — L30.9 DERMATITIS: ICD-10-CM

## 2017-05-05 PROCEDURE — 3066F NEPHROPATHY DOC TX: CPT | Mod: S$GLB,,, | Performed by: INTERNAL MEDICINE

## 2017-05-05 PROCEDURE — 99214 OFFICE O/P EST MOD 30 MIN: CPT | Mod: S$GLB,,, | Performed by: INTERNAL MEDICINE

## 2017-05-05 PROCEDURE — 1157F ADVNC CARE PLAN IN RCRD: CPT | Mod: S$GLB,,, | Performed by: INTERNAL MEDICINE

## 2017-05-05 PROCEDURE — 3079F DIAST BP 80-89 MM HG: CPT | Mod: S$GLB,,, | Performed by: INTERNAL MEDICINE

## 2017-05-05 PROCEDURE — 3077F SYST BP >= 140 MM HG: CPT | Mod: S$GLB,,, | Performed by: INTERNAL MEDICINE

## 2017-05-05 PROCEDURE — 99999 PR PBB SHADOW E&M-EST. PATIENT-LVL III: CPT | Mod: PBBFAC,,, | Performed by: INTERNAL MEDICINE

## 2017-05-05 PROCEDURE — 1126F AMNT PAIN NOTED NONE PRSNT: CPT | Mod: S$GLB,,, | Performed by: INTERNAL MEDICINE

## 2017-05-05 PROCEDURE — 1160F RVW MEDS BY RX/DR IN RCRD: CPT | Mod: S$GLB,,, | Performed by: INTERNAL MEDICINE

## 2017-05-05 PROCEDURE — 1159F MED LIST DOCD IN RCRD: CPT | Mod: S$GLB,,, | Performed by: INTERNAL MEDICINE

## 2017-05-05 PROCEDURE — 3044F HG A1C LEVEL LT 7.0%: CPT | Mod: S$GLB,,, | Performed by: INTERNAL MEDICINE

## 2017-05-05 RX ORDER — KETOCONAZOLE 20 MG/G
CREAM TOPICAL DAILY
Qty: 60 G | Refills: 12 | Status: SHIPPED | OUTPATIENT
Start: 2017-05-05 | End: 2017-12-11 | Stop reason: ALTCHOICE

## 2017-05-05 RX ORDER — ROSUVASTATIN CALCIUM 20 MG/1
20 TABLET, COATED ORAL DAILY
Qty: 90 TABLET | Refills: 3 | Status: SHIPPED | OUTPATIENT
Start: 2017-05-05 | End: 2017-11-28 | Stop reason: SDUPTHER

## 2017-05-05 NOTE — PROGRESS NOTES
Chief complaint: Follow-up on labs    66-year-old black female checking in 4 minutes late despite having been called yesterday to confirm and arrive early.  She did have labs prior to this visit.  Her A1c improved from 7.1 down to 6.9.  Renal insufficiency remains about the same with a GFR of 48.  Previous LDL was 180 back February and that was not repeated.  Her prior visit we discussed that she was on Lipitor in the past and can't remember any particular intolerance.  We discussed today need to restart that.  She also had called for handicap tag.  We called not knowing her having any stated reason her to get the tag.  Apparently she has a diagnosis of COPD but does not have any limiting shortness of breath.  She has a lot of ankle and knee pain that does stop or not allowing her to walk 200 feet without stopping.  I explained this to her.  She does have some hypopigmentation on her left cheek which is different than the vitiligo that occurs in her family to a minimal degree.  She also has a red patch that slightly raised on the left deltoid that does not itch.  There is no central clearing but could well be fungal and does not appear to be a contact reaction.  We discussed application of Nizoral cream to all these areas and it may take weeks to resolve.  Counseled at length regarding all these issues as well as insulin adjustmentsTotal time over 25 minutes with over 50% counseling.    ROS:   CONST: weight stable. EYES: no vision change. ENT: no sore throat. CV: no chest pain w/ exertion. RESP: no shortness of breath. GI: no nausea, vomiting, diarrhea. No dysphagia. : no urinary issues. MUSCULOSKELETAL: no new myalgias or arthralgias. SKIN: no other new changes. NEURO: no focal deficits. PSYCH: no new issues. ENDOCRINE: no polyuria. HEME: no lymph nodes. ALLERGY: no general pruritis.    Past Medical History   Diagnosis Date    Cataracts, bilateral     CKD (chronic kidney disease) stage 3, GFR 30-59 ml/min  12/15/2014    Combined hyperlipidemia associated with type 2 diabetes mellitus 6/7/2013    COPD (chronic obstructive pulmonary disease) 12/15/2014    Diabetes mellitus type II----with chronic kidney disease           Diabetes mellitus with renal manifestations, uncontrolled 2/1/2017    Diabetic retinopathy     Family history of stomach cancer 12/5/2013    H/O renal cell carcinoma 12/15/2015    History of colonic polyps 2/1/2017     3 polyps 7/13 ---5 yrs    History of gastroesophageal reflux (GERD)     History of urinary incontinence      s/p bladder lift-october, 2011 (at [facility])    Hyperlipidemia     Hypertension      120s/70s-80s    Nephrolithiasis     Osteoporosis, post-menopausal 3/17/2014    Primary hyperparathyroidism 10/20/2016    Schatzki's ring 2/1/2017     Dilated 2014   Prevnar 2017    Past Surgical History:   Procedure Laterality Date    bladder lift  2011    done at [facility]    BLADDER STONE REMOVAL  2011    before bladder lift    CATARACT EXTRACTION W/  INTRAOCULAR LENS IMPLANT Left 06/07/2016    Dr. Vásquez    CATARACT EXTRACTION W/  INTRAOCULAR LENS IMPLANT Right 06/21/2016    Dr. Vásquez    CYSTOSCOPY      NEPHRECTOMY      partial right     Social History     Social History    Marital status:      Spouse name: N/A    Number of children: N/A    Years of education: N/A     Occupational History    retired x ray tech      Social History Main Topics    Smoking status: Current Every Day Smoker     Packs/day: 0.25     Years: 30.00     Types: Cigarettes    Smokeless tobacco: Never Used      Comment: 4-5 cigs/day    Alcohol use No    Drug use: Not on file    Sexual activity: Not on file     Other Topics Concern    Not on file     Social History Narrative    Lives on [facility]    Here with sister Kate [phone]             family history includes Cataracts in her mother; Colon cancer in her brother; Glaucoma in her mother; Nephrolithiasis in her sister; No  Known Problems in her father, maternal aunt, maternal grandfather, maternal grandmother, maternal uncle, paternal aunt, paternal grandfather, paternal grandmother, and paternal uncle. There is no history of Anesthesia problems, Kidney cancer, Amblyopia, Blindness, Cancer, Diabetes, Hypertension, Macular degeneration, Retinal detachment, Strabismus, Stroke, or Thyroid disease.     Gen: no distress  Skin examined as above.  She does not appear to have any hypopigmentation over the back or chest or abdomen    Sarina was seen today for diabetes.    Diagnoses and all orders for this visit:    Uncontrolled type 2 diabetes mellitus with stage 3 chronic kidney disease, without long-term current use of insulin, A1c improving, she is on Lantus 20 as well as 12 units with meals which she does not take if her sugars are normal.  We discussed titrating up on the Lantus 2 units per week and this may reduce the amount of mealtime shots she needs to take.  We discussed how insulin 10 promote weight gain    Essential hypertension, chronic and stable    Hyperlipidemia, unspecified hyperlipidemia type, although she does not remember having any problems with Lipitor, she may well have had some issue and so we will try Crestor instead, labs in 3 months    Dermatitis, probably fungal, Nizoral cream    Other orders  -     rosuvastatin (CRESTOR) 20 MG tablet; Take 1 tablet (20 mg total) by mouth once daily.  -     ketoconazole (NIZORAL) 2 % cream; Apply topically once daily.

## 2017-05-15 DIAGNOSIS — R52 PAIN: ICD-10-CM

## 2017-05-15 DIAGNOSIS — K21.9 GASTROESOPHAGEAL REFLUX DISEASE, ESOPHAGITIS PRESENCE NOT SPECIFIED: ICD-10-CM

## 2017-05-15 NOTE — TELEPHONE ENCOUNTER
----- Message from Leticia Kelly sent at 5/15/2017  3:51 PM CDT -----  Contact: SELF  Refill request for Tramadol  & ranitidine (ZANTAC) 300 MG tablet .     [phone]   LL

## 2017-05-16 RX ORDER — TRAMADOL HYDROCHLORIDE 50 MG/1
50 TABLET ORAL EVERY 6 HOURS PRN
Qty: 60 TABLET | Refills: 3 | Status: SHIPPED | OUTPATIENT
Start: 2017-05-16 | End: 2017-11-19 | Stop reason: SDUPTHER

## 2017-06-01 DIAGNOSIS — N39.41 URINARY INCONTINENCE, URGE: ICD-10-CM

## 2017-06-01 RX ORDER — INSULIN GLARGINE 100 [IU]/ML
INJECTION, SOLUTION SUBCUTANEOUS
Qty: 15 ML | Refills: 6 | Status: SHIPPED | OUTPATIENT
Start: 2017-06-01 | End: 2017-11-28 | Stop reason: SDUPTHER

## 2017-06-01 RX ORDER — OXYBUTYNIN CHLORIDE 5 MG/1
5 TABLET, EXTENDED RELEASE ORAL DAILY
Qty: 90 TABLET | Refills: 0 | Status: SHIPPED | OUTPATIENT
Start: 2017-06-01 | End: 2017-09-05 | Stop reason: SDUPTHER

## 2017-06-05 ENCOUNTER — LAB VISIT (OUTPATIENT)
Dept: LAB | Facility: HOSPITAL | Age: 67
End: 2017-06-05
Payer: MEDICARE

## 2017-06-05 DIAGNOSIS — N18.30 CHRONIC KIDNEY DISEASE, STAGE III (MODERATE): ICD-10-CM

## 2017-06-05 LAB
ALBUMIN SERPL BCP-MCNC: 3.4 G/DL
ANION GAP SERPL CALC-SCNC: 12 MMOL/L
BUN SERPL-MCNC: 24 MG/DL
CALCIUM SERPL-MCNC: 10.2 MG/DL
CHLORIDE SERPL-SCNC: 101 MMOL/L
CO2 SERPL-SCNC: 25 MMOL/L
CREAT SERPL-MCNC: 1.9 MG/DL
EST. GFR  (AFRICAN AMERICAN): 30.8 ML/MIN/1.73 M^2
EST. GFR  (NON AFRICAN AMERICAN): 26.7 ML/MIN/1.73 M^2
GLUCOSE SERPL-MCNC: 270 MG/DL
PHOSPHATE SERPL-MCNC: 3.9 MG/DL
POTASSIUM SERPL-SCNC: 4.1 MMOL/L
SODIUM SERPL-SCNC: 138 MMOL/L

## 2017-06-05 PROCEDURE — 36415 COLL VENOUS BLD VENIPUNCTURE: CPT | Mod: PO

## 2017-06-05 PROCEDURE — 80069 RENAL FUNCTION PANEL: CPT

## 2017-06-08 ENCOUNTER — OFFICE VISIT (OUTPATIENT)
Dept: NEPHROLOGY | Facility: CLINIC | Age: 67
End: 2017-06-08
Payer: MEDICARE

## 2017-06-08 VITALS
HEART RATE: 94 BPM | SYSTOLIC BLOOD PRESSURE: 120 MMHG | BODY MASS INDEX: 32.94 KG/M2 | DIASTOLIC BLOOD PRESSURE: 80 MMHG | WEIGHT: 179 LBS | OXYGEN SATURATION: 96 % | HEIGHT: 62 IN

## 2017-06-08 DIAGNOSIS — N18.4 CKD (CHRONIC KIDNEY DISEASE), STAGE IV: Primary | ICD-10-CM

## 2017-06-08 DIAGNOSIS — I10 ESSENTIAL HYPERTENSION: ICD-10-CM

## 2017-06-08 DIAGNOSIS — Z85.528 HX OF RENAL CELL CANCER: ICD-10-CM

## 2017-06-08 DIAGNOSIS — E11.21 DIABETIC NEPHROPATHY ASSOCIATED WITH TYPE 2 DIABETES MELLITUS: ICD-10-CM

## 2017-06-08 PROCEDURE — 99499 UNLISTED E&M SERVICE: CPT | Mod: S$GLB,,, | Performed by: INTERNAL MEDICINE

## 2017-06-08 PROCEDURE — 99999 PR PBB SHADOW E&M-EST. PATIENT-LVL IV: CPT | Mod: PBBFAC,,, | Performed by: INTERNAL MEDICINE

## 2017-06-25 RX ORDER — INSULIN ASPART 100 [IU]/ML
INJECTION, SOLUTION INTRAVENOUS; SUBCUTANEOUS
Qty: 15 ML | Refills: 0 | Status: SHIPPED | OUTPATIENT
Start: 2017-06-25 | End: 2017-08-04 | Stop reason: SDUPTHER

## 2017-06-26 RX ORDER — GLIPIZIDE 10 MG/1
TABLET ORAL
Qty: 180 TABLET | Refills: 0 | Status: SHIPPED | OUTPATIENT
Start: 2017-06-26 | End: 2017-06-27 | Stop reason: SDUPTHER

## 2017-06-28 RX ORDER — GLIPIZIDE 10 MG/1
TABLET ORAL
Qty: 180 TABLET | Refills: 0 | Status: SHIPPED | OUTPATIENT
Start: 2017-06-28 | End: 2017-08-08 | Stop reason: SDUPTHER

## 2017-07-19 ENCOUNTER — TELEPHONE (OUTPATIENT)
Dept: FAMILY MEDICINE | Facility: CLINIC | Age: 67
End: 2017-07-19

## 2017-07-19 DIAGNOSIS — N18.30 CKD (CHRONIC KIDNEY DISEASE) STAGE 3, GFR 30-59 ML/MIN: Primary | ICD-10-CM

## 2017-07-19 NOTE — TELEPHONE ENCOUNTER
----- Message from Jonas Cobb sent at 7/19/2017 10:06 AM CDT -----  Contact: self  Pt calling to request A1c lab orders.  Please call pt at  once entered.    Thanks

## 2017-08-01 ENCOUNTER — LAB VISIT (OUTPATIENT)
Dept: LAB | Facility: HOSPITAL | Age: 67
End: 2017-08-01
Attending: INTERNAL MEDICINE
Payer: MEDICARE

## 2017-08-01 DIAGNOSIS — N18.30 CKD (CHRONIC KIDNEY DISEASE) STAGE 3, GFR 30-59 ML/MIN: ICD-10-CM

## 2017-08-01 LAB
ALBUMIN SERPL BCP-MCNC: 3.6 G/DL
ALP SERPL-CCNC: 110 U/L
ALT SERPL W/O P-5'-P-CCNC: 14 U/L
ANION GAP SERPL CALC-SCNC: 10 MMOL/L
AST SERPL-CCNC: 17 U/L
BILIRUB SERPL-MCNC: 0.6 MG/DL
BUN SERPL-MCNC: 13 MG/DL
CALCIUM SERPL-MCNC: 10.3 MG/DL
CHLORIDE SERPL-SCNC: 104 MMOL/L
CHOLEST/HDLC SERPL: 3.3 {RATIO}
CO2 SERPL-SCNC: 27 MMOL/L
CREAT SERPL-MCNC: 1.5 MG/DL
EST. GFR  (AFRICAN AMERICAN): 40.7 ML/MIN/1.73 M^2
EST. GFR  (NON AFRICAN AMERICAN): 35.3 ML/MIN/1.73 M^2
ESTIMATED AVG GLUCOSE: 154 MG/DL
GLUCOSE SERPL-MCNC: 86 MG/DL
HBA1C MFR BLD HPLC: 7 %
HDL/CHOLESTEROL RATIO: 29.9 %
HDLC SERPL-MCNC: 147 MG/DL
HDLC SERPL-MCNC: 44 MG/DL
LDLC SERPL CALC-MCNC: 77.4 MG/DL
NONHDLC SERPL-MCNC: 103 MG/DL
POTASSIUM SERPL-SCNC: 4.2 MMOL/L
PROT SERPL-MCNC: 7.6 G/DL
SODIUM SERPL-SCNC: 141 MMOL/L
TRIGL SERPL-MCNC: 128 MG/DL

## 2017-08-01 PROCEDURE — 80061 LIPID PANEL: CPT

## 2017-08-01 PROCEDURE — 83036 HEMOGLOBIN GLYCOSYLATED A1C: CPT

## 2017-08-01 PROCEDURE — 80053 COMPREHEN METABOLIC PANEL: CPT

## 2017-08-01 PROCEDURE — 36415 COLL VENOUS BLD VENIPUNCTURE: CPT | Mod: PO

## 2017-08-04 RX ORDER — INSULIN ASPART 100 [IU]/ML
INJECTION, SOLUTION INTRAVENOUS; SUBCUTANEOUS
Qty: 15 ML | Refills: 0 | Status: SHIPPED | OUTPATIENT
Start: 2017-08-04 | End: 2017-09-11 | Stop reason: SDUPTHER

## 2017-08-08 ENCOUNTER — OFFICE VISIT (OUTPATIENT)
Dept: FAMILY MEDICINE | Facility: CLINIC | Age: 67
End: 2017-08-08
Payer: MEDICARE

## 2017-08-08 VITALS — WEIGHT: 180.75 LBS | BODY MASS INDEX: 33.26 KG/M2 | HEIGHT: 62 IN

## 2017-08-08 DIAGNOSIS — Z85.528 H/O RENAL CELL CARCINOMA: ICD-10-CM

## 2017-08-08 DIAGNOSIS — M79.605 PAIN IN BOTH LOWER EXTREMITIES: ICD-10-CM

## 2017-08-08 DIAGNOSIS — N18.30 CKD (CHRONIC KIDNEY DISEASE) STAGE 3, GFR 30-59 ML/MIN: ICD-10-CM

## 2017-08-08 DIAGNOSIS — R06.02 SOB (SHORTNESS OF BREATH): ICD-10-CM

## 2017-08-08 DIAGNOSIS — M47.816 LUMBAR ARTHROPATHY: ICD-10-CM

## 2017-08-08 DIAGNOSIS — M79.604 PAIN IN BOTH LOWER EXTREMITIES: ICD-10-CM

## 2017-08-08 DIAGNOSIS — I10 ESSENTIAL HYPERTENSION: ICD-10-CM

## 2017-08-08 DIAGNOSIS — D64.9 ANEMIA, UNSPECIFIED: ICD-10-CM

## 2017-08-08 DIAGNOSIS — D22.72 MELANOCYTIC NEVUS OF LEFT LOWER EXTREMITY: ICD-10-CM

## 2017-08-08 DIAGNOSIS — Z79.4 LONG-TERM INSULIN USE: ICD-10-CM

## 2017-08-08 PROCEDURE — 1159F MED LIST DOCD IN RCRD: CPT | Mod: S$GLB,,, | Performed by: INTERNAL MEDICINE

## 2017-08-08 PROCEDURE — 3045F PR MOST RECENT HEMOGLOBIN A1C LEVEL 7.0-9.0%: CPT | Mod: S$GLB,,, | Performed by: INTERNAL MEDICINE

## 2017-08-08 PROCEDURE — 3008F BODY MASS INDEX DOCD: CPT | Mod: S$GLB,,, | Performed by: INTERNAL MEDICINE

## 2017-08-08 PROCEDURE — 99499 UNLISTED E&M SERVICE: CPT | Mod: S$GLB,,, | Performed by: INTERNAL MEDICINE

## 2017-08-08 PROCEDURE — 1126F AMNT PAIN NOTED NONE PRSNT: CPT | Mod: S$GLB,,, | Performed by: INTERNAL MEDICINE

## 2017-08-08 PROCEDURE — 99999 PR PBB SHADOW E&M-EST. PATIENT-LVL III: CPT | Mod: PBBFAC,,, | Performed by: INTERNAL MEDICINE

## 2017-08-08 PROCEDURE — 3066F NEPHROPATHY DOC TX: CPT | Mod: S$GLB,,, | Performed by: INTERNAL MEDICINE

## 2017-08-08 PROCEDURE — 99215 OFFICE O/P EST HI 40 MIN: CPT | Mod: S$GLB,,, | Performed by: INTERNAL MEDICINE

## 2017-08-08 NOTE — PROGRESS NOTES
Chief complaint: Follow-up on labs    67-year-old black female here with her daughter.  Her A1c is about the same at 7.0.  Still taking the Lantus 20.  When she increased it to 25 she had some new symptoms and I reassured her none of them are associated with the Lantus.  Her legs been hurting the longer she walks.  His been ongoing a long time wasn't here be getting worse.  Further she walks of the longer she stands they will hurt.  When she stops and/or lays down it goes away.  We discussed the possibility of spinal stenosis and the need to workup and she is willing.  We discussed the benefits of treatment.  She also has libido gurgling to do seems to be more upper airway ALLERGIES and she is producing more mucus and she started the Lantus but again reassured her it is not.  This.  Continues with NovoLog 12 units 3 times per day.  She doesn't exactly describe dyspnea on exertion and definitely no chest pain with walking.  She was concerned about cardiac issues.  She has significant risk factors and is never had any cardiac testing.  It's time for an assessment of ischemia she does not feel she can walk on a treadmill due to the leg pains as above.  She does have a dark mole on the left inner leg has been there for years but would like to see dermatology for removal.  She also has a rash that still itches on the left upper deltoid and she can have dermatology assess that as well.  She was counseled at great length regarding the multitude of these issuesTotal time over 45 minutes with over 50% counseling.    ROS:   CONST: weight stable. EYES: no vision change. ENT: no sore throat. CV: no chest pain w/ exertion. RESP: no shortness of breath. GI: no nausea, vomiting, diarrhea. No dysphagia. : no urinary issues. MUSCULOSKELETAL: no new myalgias or arthralgias. SKIN: no other new changes. NEURO: no focal deficits. PSYCH: no new issues. ENDOCRINE: no polyuria. HEME: no lymph nodes. ALLERGY: no general pruritis.    Past  Medical History   Diagnosis Date    Cataracts, bilateral     CKD (chronic kidney disease) stage 3, GFR 30-59 ml/min 12/15/2014    Combined hyperlipidemia associated with type 2 diabetes mellitus 6/7/2013    COPD (chronic obstructive pulmonary disease) 12/15/2014    Diabetes mellitus type II----with chronic kidney disease           Diabetes mellitus with renal manifestations, uncontrolled 2/1/2017    Diabetic retinopathy     Family history of stomach cancer 12/5/2013    H/O renal cell carcinoma 12/15/2015    History of colonic polyps 2/1/2017     3 polyps 7/13 ---5 yrs    History of gastroesophageal reflux (GERD)     History of urinary incontinence      s/p bladder lift-october, 2011 (at [facility])    Hyperlipidemia     Hypertension      120s/70s-80s    Nephrolithiasis     Osteoporosis, post-menopausal 3/17/2014    Primary hyperparathyroidism 10/20/2016    Schatzki's ring 2/1/2017     Dilated 2014   Prevnar 2017    Past Surgical History:   Procedure Laterality Date    bladder lift  2011    done at [facility]    BLADDER STONE REMOVAL  2011    before bladder lift    CATARACT EXTRACTION W/  INTRAOCULAR LENS IMPLANT Left 06/07/2016    Dr. Vásquez    CATARACT EXTRACTION W/  INTRAOCULAR LENS IMPLANT Right 06/21/2016    Dr. Vásquez    CYSTOSCOPY      NEPHRECTOMY      partial right     Social History     Social History    Marital status:      Spouse name: N/A    Number of children: N/A    Years of education: N/A     Occupational History    retired [employer] tech      Social History Main Topics    Smoking status: Current Every Day Smoker     Packs/day: 0.25     Years: 30.00     Types: Cigarettes    Smokeless tobacco: Never Used      Comment: 4-5 cigs/day    Alcohol use No    Drug use: Unknown    Sexual activity: Not on file     Other Topics Concern    Not on file     Social History Narrative    Lives on [facility]    Here with sister Kate [phone]             family history includes  Cataracts in her mother; Colon cancer in her brother; Glaucoma in her mother; Nephrolithiasis in her sister; No Known Problems in her father, maternal aunt, maternal grandfather, maternal grandmother, maternal uncle, paternal aunt, paternal grandfather, paternal grandmother, and paternal uncle.     Gen: no distress  EYES: conjunctiva clear, non-icteric, PERRL  ENT: nose clear, nasal mucosa normal, oropharynx clear and moist, teeth good  NECK:supple, thyroid non-palpable  RESP: effort is good, lungs clear  CV: heart RRR w/o murmur, gallops or rubs; no carotid bruits, no edema  GI: abdomen soft, non-distended, non-tender, no hepatosplenomegaly  MS: gait normal, no clubbing or cyanosis of the digits  SKIN: no rashes, warm to touch      Sarina was seen today for diabetes, leg pain, mole and rash.    Diagnoses and all orders for this visit:    Uncontrolled type 2 diabetes mellitus with stage 3 chronic kidney disease, without long-term current use of insulin, again encouraged patient to increase her Lantus to 2426 units  -     NM Myocardial Perfusion Spect Multi Pharmacologic; Future  -     NM Multi Pharm Stress Cardiac Component; Future    CKD (chronic kidney disease) stage 3, GFR 30-59 ml/min, fairly stable    Essential hypertension, chronic and stable, multitude of risk factors and now having some slight cardiac symptomatology and so will work up  -     NM Myocardial Perfusion Spect Multi Pharmacologic; Future  -     NM Multi Pharm Stress Cardiac Component; Future    Anemia, unspecified, stable    Long-term insulin use    Pain in both lower extremities, suspicious for neurogenic claudication, pedal pulses +1-2 today  -     MRI Lumbar Spine Without Contrast; Future    Lumbar arthropathy  -     MRI Lumbar Spine Without Contrast; Future    H/O renal cell carcinoma, some reduced renal function    SOB (shortness of breath) slight suggestive symptoms of shortness of breath  -     NM Myocardial Perfusion Spect Multi  Pharmacologic; Future  -     NM Multi Pharm Stress Cardiac Component; Future    Melanocytic nevus of left lower extremity, appears benign and raised in could well be hyperpigmented scar tissue but also could possibly be a mole  -     Ambulatory referral to Dermatology

## 2017-08-09 ENCOUNTER — TELEPHONE (OUTPATIENT)
Dept: FAMILY MEDICINE | Facility: CLINIC | Age: 67
End: 2017-08-09

## 2017-08-15 ENCOUNTER — HOSPITAL ENCOUNTER (OUTPATIENT)
Dept: CARDIOLOGY | Facility: HOSPITAL | Age: 67
Discharge: HOME | End: 2017-08-15
Attending: INTERNAL MEDICINE
Payer: MEDICARE

## 2017-08-15 ENCOUNTER — HOSPITAL ENCOUNTER (OUTPATIENT)
Dept: RADIOLOGY | Facility: HOSPITAL | Age: 67
Discharge: HOME | End: 2017-08-15
Attending: INTERNAL MEDICINE
Payer: MEDICARE

## 2017-08-15 DIAGNOSIS — R06.02 SOB (SHORTNESS OF BREATH): ICD-10-CM

## 2017-08-15 DIAGNOSIS — I10 ESSENTIAL HYPERTENSION: ICD-10-CM

## 2017-08-15 LAB — DIASTOLIC DYSFUNCTION: NO

## 2017-08-15 PROCEDURE — 78452 HT MUSCLE IMAGE SPECT MULT: CPT | Mod: TC

## 2017-08-15 PROCEDURE — 78452 HT MUSCLE IMAGE SPECT MULT: CPT | Mod: 26,,, | Performed by: INTERNAL MEDICINE

## 2017-08-15 PROCEDURE — 93016 CV STRESS TEST SUPVJ ONLY: CPT | Mod: ,,, | Performed by: INTERNAL MEDICINE

## 2017-08-15 PROCEDURE — 63600175 PHARM REV CODE 636 W HCPCS

## 2017-08-15 PROCEDURE — 93017 CV STRESS TEST TRACING ONLY: CPT

## 2017-08-15 PROCEDURE — A9502 TC99M TETROFOSMIN: HCPCS

## 2017-08-15 PROCEDURE — 93018 CV STRESS TEST I&R ONLY: CPT | Mod: ,,, | Performed by: INTERNAL MEDICINE

## 2017-08-15 RX ORDER — REGADENOSON 0.08 MG/ML
INJECTION, SOLUTION INTRAVENOUS
Status: DISPENSED
Start: 2017-08-15 | End: 2017-08-15

## 2017-08-18 ENCOUNTER — HOSPITAL ENCOUNTER (OUTPATIENT)
Dept: RADIOLOGY | Facility: HOSPITAL | Age: 67
Discharge: HOME | End: 2017-08-18
Attending: INTERNAL MEDICINE
Payer: MEDICARE

## 2017-08-18 DIAGNOSIS — M79.605 PAIN IN BOTH LOWER EXTREMITIES: ICD-10-CM

## 2017-08-18 DIAGNOSIS — M79.604 PAIN IN BOTH LOWER EXTREMITIES: ICD-10-CM

## 2017-08-18 DIAGNOSIS — M47.816 LUMBAR ARTHROPATHY: ICD-10-CM

## 2017-08-18 PROCEDURE — 72148 MRI LUMBAR SPINE W/O DYE: CPT | Mod: TC

## 2017-08-18 PROCEDURE — 72148 MRI LUMBAR SPINE W/O DYE: CPT | Mod: 26,,, | Performed by: RADIOLOGY

## 2017-08-22 ENCOUNTER — TELEPHONE (OUTPATIENT)
Dept: FAMILY MEDICINE | Facility: CLINIC | Age: 67
End: 2017-08-22

## 2017-08-22 NOTE — TELEPHONE ENCOUNTER
Patient had a stress test and an MRI    Call    The recent nuclear stress test was all normal    The spine MRI only showed mild arthritis

## 2017-09-05 DIAGNOSIS — N39.41 URINARY INCONTINENCE, URGE: ICD-10-CM

## 2017-09-06 RX ORDER — OXYBUTYNIN CHLORIDE 5 MG/1
TABLET, EXTENDED RELEASE ORAL
Qty: 90 TABLET | Refills: 0 | Status: SHIPPED | OUTPATIENT
Start: 2017-09-06 | End: 2017-11-28 | Stop reason: SDUPTHER

## 2017-09-11 NOTE — TELEPHONE ENCOUNTER
----- Message from Joanie Schmitt sent at 9/11/2017  1:23 PM CDT -----  Contact: self  Pt calling to request a 90 day supply of NOVOLOG FLEXPEN 100 unit/mL InPn pen. Please call [phone].

## 2017-09-12 RX ORDER — INSULIN ASPART 100 [IU]/ML
INJECTION, SOLUTION INTRAVENOUS; SUBCUTANEOUS
Qty: 45 ML | Refills: 0 | Status: SHIPPED | OUTPATIENT
Start: 2017-09-12 | End: 2017-11-28 | Stop reason: SDUPTHER

## 2017-10-17 DIAGNOSIS — K21.9 GASTROESOPHAGEAL REFLUX DISEASE, ESOPHAGITIS PRESENCE NOT SPECIFIED: ICD-10-CM

## 2017-10-17 NOTE — TELEPHONE ENCOUNTER
----- Message from Jailene Brito sent at 10/17/2017  1:06 PM CDT -----  Contact: self  [phone]  Pt is requesting a refill on Ranitidine 300 mg. Pls call [facility] [phone]. Thanks......Sharon

## 2017-11-13 ENCOUNTER — TELEPHONE (OUTPATIENT)
Dept: FAMILY MEDICINE | Facility: CLINIC | Age: 67
End: 2017-11-13

## 2017-11-19 DIAGNOSIS — R52 PAIN: ICD-10-CM

## 2017-11-20 ENCOUNTER — LAB VISIT (OUTPATIENT)
Dept: LAB | Facility: HOSPITAL | Age: 67
End: 2017-11-20
Attending: INTERNAL MEDICINE
Payer: MEDICARE

## 2017-11-20 LAB
ANION GAP SERPL CALC-SCNC: 7 MMOL/L
BUN SERPL-MCNC: 16 MG/DL
CALCIUM SERPL-MCNC: 10.9 MG/DL
CHLORIDE SERPL-SCNC: 104 MMOL/L
CO2 SERPL-SCNC: 30 MMOL/L
CREAT SERPL-MCNC: 1.5 MG/DL
EST. GFR  (AFRICAN AMERICAN): 40.7 ML/MIN/1.73 M^2
EST. GFR  (NON AFRICAN AMERICAN): 35.3 ML/MIN/1.73 M^2
ESTIMATED AVG GLUCOSE: 212 MG/DL
GLUCOSE SERPL-MCNC: 87 MG/DL
HBA1C MFR BLD HPLC: 9 %
POTASSIUM SERPL-SCNC: 4 MMOL/L
SODIUM SERPL-SCNC: 141 MMOL/L

## 2017-11-20 PROCEDURE — 36415 COLL VENOUS BLD VENIPUNCTURE: CPT | Mod: PO

## 2017-11-20 PROCEDURE — 83036 HEMOGLOBIN GLYCOSYLATED A1C: CPT

## 2017-11-20 PROCEDURE — 80048 BASIC METABOLIC PNL TOTAL CA: CPT

## 2017-11-20 NOTE — TELEPHONE ENCOUNTER
----- Message from Az Rodriguez sent at 11/20/2017 12:09 PM CST -----  Contact: self  Refill request: tramadol (ULTRAM) 50 mg tablet    Sent to [facility] on [address]    Sarina [phone]    Thanks

## 2017-11-21 RX ORDER — TRAMADOL HYDROCHLORIDE 50 MG/1
TABLET ORAL
Qty: 60 TABLET | Refills: 5 | Status: SHIPPED | OUTPATIENT
Start: 2017-11-21 | End: 2018-07-30 | Stop reason: SDUPTHER

## 2017-11-28 ENCOUNTER — OFFICE VISIT (OUTPATIENT)
Dept: FAMILY MEDICINE | Facility: CLINIC | Age: 67
End: 2017-11-28
Payer: MEDICARE

## 2017-11-28 VITALS
WEIGHT: 183.63 LBS | DIASTOLIC BLOOD PRESSURE: 58 MMHG | HEIGHT: 63 IN | OXYGEN SATURATION: 95 % | SYSTOLIC BLOOD PRESSURE: 122 MMHG | TEMPERATURE: 98 F | BODY MASS INDEX: 32.54 KG/M2 | HEART RATE: 95 BPM

## 2017-11-28 DIAGNOSIS — I10 ESSENTIAL HYPERTENSION: ICD-10-CM

## 2017-11-28 DIAGNOSIS — N39.41 URINARY INCONTINENCE, URGE: ICD-10-CM

## 2017-11-28 DIAGNOSIS — I73.9 CLAUDICATION: ICD-10-CM

## 2017-11-28 DIAGNOSIS — K21.9 GASTROESOPHAGEAL REFLUX DISEASE, ESOPHAGITIS PRESENCE NOT SPECIFIED: ICD-10-CM

## 2017-11-28 DIAGNOSIS — N18.30 CKD (CHRONIC KIDNEY DISEASE) STAGE 3, GFR 30-59 ML/MIN: ICD-10-CM

## 2017-11-28 DIAGNOSIS — Z12.31 ENCOUNTER FOR SCREENING MAMMOGRAM FOR BREAST CANCER: ICD-10-CM

## 2017-11-28 DIAGNOSIS — R55 SYNCOPE, UNSPECIFIED SYNCOPE TYPE: ICD-10-CM

## 2017-11-28 PROCEDURE — 99999 PR PBB SHADOW E&M-EST. PATIENT-LVL IV: CPT | Mod: PBBFAC,,, | Performed by: INTERNAL MEDICINE

## 2017-11-28 PROCEDURE — 99215 OFFICE O/P EST HI 40 MIN: CPT | Mod: S$GLB,,, | Performed by: INTERNAL MEDICINE

## 2017-11-28 PROCEDURE — G0008 ADMIN INFLUENZA VIRUS VAC: HCPCS | Mod: S$GLB,,, | Performed by: INTERNAL MEDICINE

## 2017-11-28 PROCEDURE — 90662 IIV NO PRSV INCREASED AG IM: CPT | Mod: S$GLB,,, | Performed by: INTERNAL MEDICINE

## 2017-11-28 RX ORDER — INSULIN ASPART 100 [IU]/ML
INJECTION, SOLUTION INTRAVENOUS; SUBCUTANEOUS
Qty: 45 ML | Refills: 12 | Status: SHIPPED | OUTPATIENT
Start: 2017-11-28 | End: 2017-11-28 | Stop reason: SDUPTHER

## 2017-11-28 RX ORDER — OMEPRAZOLE 40 MG/1
40 CAPSULE, DELAYED RELEASE ORAL DAILY
Qty: 90 CAPSULE | Refills: 4 | Status: SHIPPED | OUTPATIENT
Start: 2017-11-28 | End: 2018-03-26

## 2017-11-28 RX ORDER — HYDROCHLOROTHIAZIDE 12.5 MG/1
12.5 TABLET ORAL DAILY
Qty: 90 TABLET | Refills: 3 | Status: SHIPPED | OUTPATIENT
Start: 2017-11-28 | End: 2019-01-19 | Stop reason: SDUPTHER

## 2017-11-28 RX ORDER — INSULIN GLARGINE 100 [IU]/ML
INJECTION, SOLUTION SUBCUTANEOUS
Qty: 100 ML | Refills: 6 | Status: SHIPPED | OUTPATIENT
Start: 2017-11-28 | End: 2018-10-03

## 2017-11-28 RX ORDER — ROSUVASTATIN CALCIUM 20 MG/1
20 TABLET, COATED ORAL DAILY
Qty: 90 TABLET | Refills: 3 | Status: SHIPPED | OUTPATIENT
Start: 2017-11-28 | End: 2019-01-19 | Stop reason: SDUPTHER

## 2017-11-28 RX ORDER — OXYBUTYNIN CHLORIDE 5 MG/1
TABLET, EXTENDED RELEASE ORAL
Qty: 90 TABLET | Refills: 3 | Status: SHIPPED | OUTPATIENT
Start: 2017-11-28 | End: 2019-01-19 | Stop reason: SDUPTHER

## 2017-11-28 RX ORDER — INSULIN ASPART 100 [IU]/ML
INJECTION, SOLUTION INTRAVENOUS; SUBCUTANEOUS
Qty: 45 ML | Refills: 12 | Status: SHIPPED | OUTPATIENT
Start: 2017-11-28 | End: 2018-02-07 | Stop reason: SDUPTHER

## 2017-11-28 RX ORDER — INSULIN GLARGINE 100 [IU]/ML
INJECTION, SOLUTION SUBCUTANEOUS
Qty: 100 ML | Refills: 6 | Status: SHIPPED | OUTPATIENT
Start: 2017-11-28 | End: 2017-11-28 | Stop reason: SDUPTHER

## 2017-11-28 RX ORDER — AMLODIPINE BESYLATE 5 MG/1
5 TABLET ORAL DAILY
Qty: 90 TABLET | Refills: 3 | Status: SHIPPED | OUTPATIENT
Start: 2017-11-28 | End: 2018-07-13 | Stop reason: SDUPTHER

## 2017-11-28 RX ORDER — VALSARTAN 160 MG/1
160 TABLET ORAL DAILY
Qty: 90 TABLET | Refills: 3 | Status: SHIPPED | OUTPATIENT
Start: 2017-11-28 | End: 2018-10-13

## 2017-11-28 RX ORDER — PEN NEEDLE, DIABETIC 30 GX3/16"
NEEDLE, DISPOSABLE MISCELLANEOUS
Qty: 100 EACH | Refills: 12 | Status: SHIPPED | OUTPATIENT
Start: 2017-11-28

## 2017-11-28 RX ORDER — GLIPIZIDE 10 MG/1
10 TABLET ORAL
Qty: 90 TABLET | Refills: 3 | Status: SHIPPED | OUTPATIENT
Start: 2017-11-28 | End: 2018-11-05

## 2017-11-28 NOTE — PROGRESS NOTES
Chief complaint: Follow-up on labs    67-year-old black female here with her daughter.  Her A1c back in August was 7.0 but then increased to 9.0 earlier this month.  She is on Lantus   And glipizide. Renal insufficiency is about the same.  She does report being off her diet will get back on of the we discussed increasing Lantus to 30 or 35 units.  Back in August she was eating breakfast it up and then apparently passed out.  No urinary incontinence or biting of her tongue.  It was not witnessed.  She's had no recurrence.  She did bump her head but no subsequent problems.  A nuclear stress test just on right before that in August was negative.  I reassured the family member present, possibly her niece that less likely to be cardiac.  It could well be low sugars, vagal and so forth.  We will monitor for recurrence.    Remain problem is her lower leg pain.  Very classic for claudication either neurogenic or vascular.  At the last visit pedal pulses were good but today they're very weak and nonpalpable.  We reviewed her MRI which only showed mild arthritis of this now brings us back to vascular claudication which I explained at great length of them.  We will order ABIs.      she would like her flu shot and we discussed a shingles shot which I recommend she get later.      She also wants a mammogram    All these issues reviewed at great length, numerous studies reviewed patient counseled at great length regarding all the issues below, diabetes managementTotal time over 45 minutes with over 50% counseling.    ROS:   CONST: weight stable. EYES: no vision change. ENT: no sore throat. CV: no chest pain w/ exertion. RESP: no shortness of breath. GI: no nausea, vomiting, diarrhea. No dysphagia. : no urinary issues. MUSCULOSKELETAL: no new myalgias or arthralgias. SKIN: no other new changes. NEURO: no focal deficits. PSYCH: no new issues. ENDOCRINE: no polyuria. HEME: no lymph nodes. ALLERGY: no general pruritis.    Past  Medical History   Diagnosis Date    Cataracts, bilateral     CKD (chronic kidney disease) stage 3, GFR 30-59 ml/min 12/15/2014    Combined hyperlipidemia associated with type 2 diabetes mellitus 6/7/2013    COPD (chronic obstructive pulmonary disease) 12/15/2014    Diabetes mellitus type II----with chronic kidney disease           Diabetes mellitus with renal manifestations, uncontrolled 2/1/2017    Diabetic retinopathy     Family history of stomach cancer 12/5/2013    H/O renal cell carcinoma 12/15/2015    History of colonic polyps 2/1/2017     3 polyps 7/13 ---5 yrs    History of gastroesophageal reflux (GERD)     History of urinary incontinence      s/p bladder lift-october, 2011 (at [facility])    Hyperlipidemia     Hypertension      120s/70s-80s    Nephrolithiasis     Osteoporosis, post-menopausal 3/17/2014    Primary hyperparathyroidism 10/20/2016    Schatzki's ring 2/1/2017     Dilated 2014   Prevnar 2017  Claudication, mild arthritis on MRI of the lumbar spine, possibly vascular  Syncope, August 2017  Negative nuclear stress test 8/17    Past Surgical History:   Procedure Laterality Date    bladder lift  2011    done at [facility]    BLADDER STONE REMOVAL  2011    before bladder lift    CATARACT EXTRACTION W/  INTRAOCULAR LENS IMPLANT Left 06/07/2016    Dr. Vásquez    CATARACT EXTRACTION W/  INTRAOCULAR LENS IMPLANT Right 06/21/2016    Dr. Vásquez    CYSTOSCOPY      NEPHRECTOMY      partial right     Social History     Social History    Marital status:      Spouse name: N/A    Number of children: N/A    Years of education: N/A     Occupational History    retired [employer] tech      Social History Main Topics    Smoking status: Current Every Day Smoker     Packs/day: 0.25     Years: 30.00     Types: Cigarettes    Smokeless tobacco: Never Used      Comment: 4-5 cigs/day    Alcohol use No    Drug use: Unknown    Sexual activity: Not on file     Other Topics Concern    Not on  file     Social History Narrative    Lives on [facility]    Here with sister Kate [phone]             family history includes Cataracts in her mother; Colon cancer in her brother; Glaucoma in her mother; Nephrolithiasis in her sister; No Known Problems in her father, maternal aunt, maternal grandfather, maternal grandmother, maternal uncle, paternal aunt, paternal grandfather, paternal grandmother, and paternal uncle.     Gen: no distress, pedal pulses difficult to appreciate today, no cyanosis, no pretibial edema, only slight ankle edema      Sarina was seen today for results.    Diagnoses and all orders for this visit:    Uncontrolled type 2 diabetes mellitus with stage 3 chronic kidney disease, without long-term current use of insulin, improve diet and increase insulin    CKD (chronic kidney disease) stage 3, GFR 30-59 ml/min, chronic and stable    Essential hypertension, chronic and stable, monitor for orthostasis no recurrence    Uncontrolled type 2 diabetes mellitus with stage 3 chronic kidney disease, with long-term current use of insulin  -     Discontinue: insulin aspart (NOVOLOG FLEXPEN) 100 unit/mL InPn pen; ADMINISTER 12 UNITS UNDER THE SKIN THREE TIMES DAILY WITH MEALS  -     insulin aspart (NOVOLOG FLEXPEN) 100 unit/mL InPn pen; ADMINISTER 12 UNITS UNDER THE SKIN THREE TIMES DAILY WITH MEALS    Syncope, unspecified syncope type, isolated, no history to suggest seizures, monitor for recurrence, recent ischemic assessment was negative    Urinary incontinence, urge  -     oxybutynin (DITROPAN-XL) 5 MG TR24; TAKE 1 TABLET(5 MG) BY MOUTH EVERY DAY    Gastroesophageal reflux disease, esophagitis presence not specified  -     ranitidine (ZANTAC) 300 MG tablet; Take 1 tablet (300 mg total) by mouth nightly.    Encounter for screening mammogram for breast cancer  -     Mammo Digital Screening Bilat with CAD; Future    Claudication, vascular versus neurogenic  -     US Ankle Brachial Indices Ext LTD  "WO Str; Future    Other orders  -     Influenza - High Dose (65+) (PF) (IM)  -     pen needle, diabetic (BD INSULIN PEN NEEDLE UF SHORT) 31 gauge x 5/16" Ndle; USE AS DIRECTED  -     Discontinue: LANTUS SOLOSTAR 100 unit/mL (3 mL) InPn pen; ADM 50 UNITS SC SKIN QPM  -     amLODIPine (NORVASC) 5 MG tablet; Take 1 tablet (5 mg total) by mouth once daily.  -     glipiZIDE (GLUCOTROL) 10 MG tablet; Take 1 tablet (10 mg total) by mouth daily with breakfast.  -     hydroCHLOROthiazide (HYDRODIURIL) 12.5 MG Tab; Take 1 tablet (12.5 mg total) by mouth once daily.  -     LANTUS SOLOSTAR 100 unit/mL (3 mL) InPn pen; ADM 50 UNITS SC SKIN QPM  -     omeprazole (PRILOSEC) 40 MG capsule; Take 1 capsule (40 mg total) by mouth once daily.  -     rosuvastatin (CRESTOR) 20 MG tablet; Take 1 tablet (20 mg total) by mouth once daily.  -     valsartan (DIOVAN) 160 MG tablet; Take 1 tablet (160 mg total) by mouth once daily.       "

## 2017-12-04 ENCOUNTER — HOSPITAL ENCOUNTER (OUTPATIENT)
Dept: RADIOLOGY | Facility: HOSPITAL | Age: 67
Discharge: HOME | End: 2017-12-04
Attending: INTERNAL MEDICINE
Payer: MEDICARE

## 2017-12-04 VITALS — HEIGHT: 63 IN | BODY MASS INDEX: 32.43 KG/M2 | WEIGHT: 183 LBS

## 2017-12-04 DIAGNOSIS — Z12.31 ENCOUNTER FOR SCREENING MAMMOGRAM FOR BREAST CANCER: ICD-10-CM

## 2017-12-04 PROCEDURE — 77067 SCR MAMMO BI INCL CAD: CPT | Mod: 26,,, | Performed by: RADIOLOGY

## 2017-12-04 PROCEDURE — 77063 BREAST TOMOSYNTHESIS BI: CPT | Mod: 26,,, | Performed by: RADIOLOGY

## 2017-12-04 PROCEDURE — 77067 SCR MAMMO BI INCL CAD: CPT | Mod: TC,PO

## 2017-12-05 ENCOUNTER — OFFICE VISIT (OUTPATIENT)
Dept: UROLOGY | Facility: CLINIC | Age: 67
End: 2017-12-05
Payer: MEDICARE

## 2017-12-05 VITALS
BODY MASS INDEX: 32.66 KG/M2 | DIASTOLIC BLOOD PRESSURE: 73 MMHG | SYSTOLIC BLOOD PRESSURE: 119 MMHG | HEIGHT: 63 IN | WEIGHT: 184.31 LBS | HEART RATE: 77 BPM

## 2017-12-05 DIAGNOSIS — E11.69 COMBINED HYPERLIPIDEMIA ASSOCIATED WITH TYPE 2 DIABETES MELLITUS: ICD-10-CM

## 2017-12-05 DIAGNOSIS — N39.41 URINARY INCONTINENCE, URGE: ICD-10-CM

## 2017-12-05 DIAGNOSIS — F17.200 TOBACCO DEPENDENCE: ICD-10-CM

## 2017-12-05 DIAGNOSIS — E11.9 DIABETES MELLITUS TYPE II, NON INSULIN DEPENDENT: ICD-10-CM

## 2017-12-05 DIAGNOSIS — N18.30 CKD (CHRONIC KIDNEY DISEASE) STAGE 3, GFR 30-59 ML/MIN: Primary | ICD-10-CM

## 2017-12-05 DIAGNOSIS — I10 HYPERTENSION, BENIGN: ICD-10-CM

## 2017-12-05 DIAGNOSIS — Z85.528 HISTORY OF KIDNEY CANCER: ICD-10-CM

## 2017-12-05 DIAGNOSIS — J44.9 CHRONIC OBSTRUCTIVE PULMONARY DISEASE, UNSPECIFIED COPD TYPE: ICD-10-CM

## 2017-12-05 DIAGNOSIS — E78.2 COMBINED HYPERLIPIDEMIA ASSOCIATED WITH TYPE 2 DIABETES MELLITUS: ICD-10-CM

## 2017-12-05 DIAGNOSIS — I10 ESSENTIAL HYPERTENSION: ICD-10-CM

## 2017-12-05 PROCEDURE — 99214 OFFICE O/P EST MOD 30 MIN: CPT | Mod: S$GLB,,, | Performed by: UROLOGY

## 2017-12-05 PROCEDURE — 99499 UNLISTED E&M SERVICE: CPT | Mod: S$GLB,,, | Performed by: UROLOGY

## 2017-12-05 PROCEDURE — 99999 PR PBB SHADOW E&M-EST. PATIENT-LVL III: CPT | Mod: PBBFAC,,, | Performed by: UROLOGY

## 2017-12-05 NOTE — PROGRESS NOTES
Subjective:       Patient ID: Sarina Genao is a 67 y.o. female.    Chief Complaint: Follow-up    Sarina Genao is a 67 y.o. Female with a history of T1a kidney cancer clear cell, grade 2, s/p 10/13 lap partial right.  Here for follow up.  Former [facility] patient.  Has CKD, stage 3. She has diabetes and is followed by nephrology.    CXR 11/16.  Renal ultrasound 11/16 stable.   Renal u/s showed a slight irregularity at previous resection site.  Has CKD. Has appt coming this month with Dr. Mitchell.     Has h/o of urinary urge incontinence on oxybutynin XR 5mg.     Past Medical History:    Hyperlipidemia                                                Visual impairment                                               Comment:wears glass    Diabetic retinopathy                                          Diabetes mellitus type II                                       Comment:NIDDM, a.m. glucose-120s-130s-last                A1c-7.1-6/7/13    Snoring                                                       Hypertension                                                    Comment:120s/70s-80s    History of gastroesophageal reflux (GERD)                     Renal mass, right                                             History of urinary incontinence                                 Comment:s/p bladder lift-october, 2011 (at [facility])    Nephrolithiasis                                               Cataracts, bilateral                                          Urinary tract infection                                       Vaginal infection                                               Comment:yeast    Osteoporosis, post-menopausal                   3/17/2014     Past Surgical History:    BLADDER STONE REMOVAL                            2011            Comment:before bladder lift    bladder lift                                     2011            Comment:done at [facility]    CYSTOSCOPY                                                      NEPHRECTOMY                                                      Comment:partial right    Review of patient's family history indicates:    Colon cancer                   Brother                   Anesthesia problems            Neg Hx                    Kidney cancer                  Neg Hx                    Amblyopia                      Neg Hx                    Blindness                      Neg Hx                    Cancer                         Neg Hx                    Diabetes                       Neg Hx                    Hypertension                   Neg Hx                    Macular degeneration           Neg Hx                    Retinal detachment             Neg Hx                    Strabismus                     Neg Hx                    Stroke                         Neg Hx                    Thyroid disease                Neg Hx                    Nephrolithiasis                Sister                    Glaucoma                       Mother                    Cataracts                      Mother                      Social History    Marital Status:              Spouse Name:                       Years of Education:                 Number of children:               Occupational History  Occupation          Employer            Comment               retired [employer]                          Social History Main Topics    Smoking Status: Current Every Day Smoker        Packs/Day: 1.00  Years: 30         Types: Cigarettes    Smokeless Status: Never Used                        Alcohol Use: No              Drug Use: Not on file     Sexual Activity: Not on file          Other Topics            Concern    None on file    Social History Narrative    Lives on [facility]    Here with sister Kate [phone]            Allergies:  Metformin and Pantoprazole    Medications:  Current outpatient prescriptions: blood sugar diagnostic (BLOOD GLUCOSE TEST) Strp, by Misc.(Non-Drug; Combo Route)  "route., Disp: , Rfl: ;  blood sugar diagnostic (TRUETEST TEST STRIPS) Strp, True test strips and lancets. Check blood sugar daily, Disp: 100 each, Rfl: 3;  cyanocobalamin, vitamin B-12, (VITAMIN B-12) 1,000 mcg TbSR, Take by mouth., Disp: , Rfl:   DOCOSAHEXANOIC ACID/EPA (FISH OIL ORAL), Take 1,200 mg by mouth once daily., Disp: , Rfl: ;  glipiZIDE (GLUCOTROL) 10 MG tablet, Take 1 tablet (10 mg total) by mouth daily with breakfast., Disp: 90 tablet, Rfl: 1;  insulin glargine (LANTUS SOLOSTAR) 100 unit/mL (3 mL) InPn pen, Inject 18 Units into the skin every evening., Disp: 1 Box, Rfl: 0  insulin needles, disposable, (BD INSULIN PEN NEEDLE UF SHORT) 31 X 5/16 " Ndle, Inject 1 Units into the skin once daily., Disp: 100 each, Rfl: 1;  lancets 32 gauge Misc, 1 lancet by Misc.(Non-Drug; Combo Route) route once daily., Disp: 100 each, Rfl: 1;  oxybutynin (DITROPAN-XL) 5 MG TR24, Take 1 tablet (5 mg total) by mouth once daily., Disp: 90 tablet, Rfl: 1  pravastatin (PRAVACHOL) 40 MG tablet, Take 1 tablet (40 mg total) by mouth once daily., Disp: 90 tablet, Rfl: 3;  ranitidine (ZANTAC) 300 MG tablet, Take 1 tablet (300 mg total) by mouth every evening., Disp: 90 tablet, Rfl: 1;  tramadol (ULTRAM) 50 mg tablet, Take 1 tablet (50 mg total) by mouth every 6 (six) hours as needed., Disp: 120 tablet, Rfl: 0;  TRIBENZOR 20-5-12.5 mg Tab, Take 1 tablet by mouth once daily., Disp: 90 tablet, Rfl: 3  albuterol 90 mcg/actuation inhaler, Inhale 2 puffs into the lungs every 4 (four) hours as needed for Wheezing., Disp: 1 each, Rfl: 2;  alendronate (FOSAMAX) 70 MG tablet, Take 1 tablet (70 mg total) by mouth every 7 days., Disp: 13 tablet, Rfl: 3;  calcium carbonate (OS-LISSETTE) 500 mg calcium (1,250 mg) tablet, Take 1 tablet (500 mg total) by mouth once daily., Disp: 30 tablet, Rfl: 5  fluconazole (DIFLUCAN) 150 MG Tab, One tablet day 1 and one tablet day 4, Disp: 2 tablet, Rfl: 0          Review of Systems   Constitutional: Negative for " chills, fever and unexpected weight change.   HENT: Negative for nosebleeds.    Eyes: Negative for visual disturbance.   Respiratory: Negative for chest tightness.    Cardiovascular: Negative for chest pain.   Gastrointestinal: Negative for diarrhea.   Genitourinary: Negative for dysuria, frequency, hematuria and urgency.   Musculoskeletal: Negative for myalgias.        Swelling in ankles, right greater than left.    Skin: Negative for rash.   Neurological: Negative for seizures.   Hematological: Does not bruise/bleed easily.   Psychiatric/Behavioral: Negative for behavioral problems.       Objective:      Physical Exam   Vitals reviewed.  Constitutional: She is oriented to person, place, and time. She appears well-developed and well-nourished.   HENT:   Head: Normocephalic and atraumatic.   Eyes: No scleral icterus.   Cardiovascular: Normal rate and regular rhythm.    Pulmonary/Chest: Effort normal. No respiratory distress.   Abdominal: Soft. She exhibits no mass.   Musculoskeletal: She exhibits no tenderness.   Lymphadenopathy:     She has no cervical adenopathy.   Neurological: She is alert and oriented to person, place, and time.   Skin: Skin is warm and dry. No rash noted.     Psychiatric: She has a normal mood and affect.       Assessment:       1. CKD (chronic kidney disease) stage 3, GFR 30-59 ml/min    2. Chronic obstructive pulmonary disease, unspecified COPD type    3. Tobacco dependence    4. History of kidney cancer    5. Urinary incontinence, urge    6. Hypertension, benign    7. Combined hyperlipidemia associated with type 2 diabetes mellitus    8. Diabetes mellitus type II, non insulin dependent    9. Essential hypertension        Plan:     ultrasound, cxr.   Keep follow up with Dr. Mitchell.   F/u 1 year with ultrasound, cxr, cmp.   Continue oxybutynin XR.  Discussed tobacco cessation.    I spent 25 minutes with the patient of which more than half was spent in direct consultation with the patient in  regards to our treatment and plan.

## 2017-12-07 ENCOUNTER — HOSPITAL ENCOUNTER (OUTPATIENT)
Dept: RADIOLOGY | Facility: HOSPITAL | Age: 67
Discharge: HOME | End: 2017-12-07
Attending: INTERNAL MEDICINE
Payer: MEDICARE

## 2017-12-07 DIAGNOSIS — I73.9 CLAUDICATION: ICD-10-CM

## 2017-12-07 PROCEDURE — 93922 UPR/L XTREMITY ART 2 LEVELS: CPT | Mod: 26,,, | Performed by: RADIOLOGY

## 2017-12-07 PROCEDURE — 93922 UPR/L XTREMITY ART 2 LEVELS: CPT | Mod: TC

## 2017-12-08 ENCOUNTER — TELEPHONE (OUTPATIENT)
Dept: ENDOCRINOLOGY | Facility: CLINIC | Age: 67
End: 2017-12-08

## 2017-12-08 NOTE — TELEPHONE ENCOUNTER
Called patient to schedule diabetes management appointment with Tiesha Wen NP due to high A1c. Patient stated they were not interested and will follow up with their PCP.

## 2017-12-11 ENCOUNTER — HOSPITAL ENCOUNTER (OUTPATIENT)
Dept: RADIOLOGY | Facility: HOSPITAL | Age: 67
Discharge: HOME | End: 2017-12-11
Attending: UROLOGY
Payer: MEDICARE

## 2017-12-11 ENCOUNTER — TELEPHONE (OUTPATIENT)
Dept: FAMILY MEDICINE | Facility: CLINIC | Age: 67
End: 2017-12-11

## 2017-12-11 ENCOUNTER — OFFICE VISIT (OUTPATIENT)
Dept: NEPHROLOGY | Facility: CLINIC | Age: 67
End: 2017-12-11
Payer: MEDICARE

## 2017-12-11 VITALS
DIASTOLIC BLOOD PRESSURE: 70 MMHG | HEART RATE: 85 BPM | WEIGHT: 185.19 LBS | HEIGHT: 63 IN | OXYGEN SATURATION: 98 % | BODY MASS INDEX: 32.81 KG/M2 | SYSTOLIC BLOOD PRESSURE: 122 MMHG

## 2017-12-11 DIAGNOSIS — R80.9 PROTEINURIA, UNSPECIFIED TYPE: ICD-10-CM

## 2017-12-11 DIAGNOSIS — N18.30 CHRONIC KIDNEY DISEASE (CKD), STAGE III (MODERATE): Primary | ICD-10-CM

## 2017-12-11 DIAGNOSIS — Z85.528 HISTORY OF KIDNEY CANCER: ICD-10-CM

## 2017-12-11 DIAGNOSIS — E11.21 DIABETIC NEPHROPATHY ASSOCIATED WITH TYPE 2 DIABETES MELLITUS: ICD-10-CM

## 2017-12-11 DIAGNOSIS — I10 ESSENTIAL HYPERTENSION: ICD-10-CM

## 2017-12-11 DIAGNOSIS — C64.9 MALIGNANT NEOPLASM OF KIDNEY, UNSPECIFIED LATERALITY: ICD-10-CM

## 2017-12-11 PROCEDURE — 71020 XR CHEST PA AND LATERAL: CPT | Mod: TC

## 2017-12-11 PROCEDURE — 71020 XR CHEST PA AND LATERAL: CPT | Mod: 26,,, | Performed by: RADIOLOGY

## 2017-12-11 PROCEDURE — 76770 US EXAM ABDO BACK WALL COMP: CPT | Mod: 26,,, | Performed by: INTERNAL MEDICINE

## 2017-12-11 PROCEDURE — 99213 OFFICE O/P EST LOW 20 MIN: CPT | Mod: S$GLB,,, | Performed by: INTERNAL MEDICINE

## 2017-12-11 PROCEDURE — 99999 PR PBB SHADOW E&M-EST. PATIENT-LVL IV: CPT | Mod: PBBFAC,,, | Performed by: INTERNAL MEDICINE

## 2017-12-11 PROCEDURE — 76770 US EXAM ABDO BACK WALL COMP: CPT | Mod: TC

## 2017-12-11 NOTE — TELEPHONE ENCOUNTER
----- Message from Richard Carolina sent at 12/11/2017 10:30 AM CST -----  Contact: Self/[phone]  Patient would like to speak to the staff regarding a Mammogram Order.  She states that she received a letter stating that she needed additional testing and the order wasn't placed.  Thank you.

## 2017-12-11 NOTE — PROGRESS NOTES
Patient is here today for follow up evaluation of CKD III. Last seen in renal office 6/8/17 Her most recent lab (11/10/17) Cr 1.5 mg/dl; eGFR 41 ml/min; potassium 4.0 mmol/L There is a hx of diabetes; complicated by nephropathy ; last A1c; 9.0%. She is s/p partial R nephectomy; Renal CA; Renal US today no sign of recurrence Today she c/o of ankle edema late in day; that resolves overnight    ROS;  Pleasant woman; no acute distress; oriented x3  Mood and Affect;; Appropriate  Otherwise non-contributory  Exam  HEENT Grossly Intact  CHEST; Clear P&A; no rales orrhochi  HEART; RR; S1&S2; no murmur rub gallop  ABD; BS(+) non-tender; (-)CVAT  EXT; (+) Edema; bilateral lower extremity     Impression  CKD III Stable  Hypertension Satisfactory control  ? CCB-induced edema She will discuss with Dr Mayer    Plan  Return Visit; 6 mo

## 2017-12-11 NOTE — TELEPHONE ENCOUNTER
Screening Mammogram on 12/4/17:    Impression:  No mammographic evidence of malignancy.     BI-RADS Category 1: Negative     Recommendation:  Repeat imaging is recommended for technical reasons.    Called patient.  No answer.  Left message for the patient to call the clinic.  Radiology report does not indicate if patient needs a repeat screening mammogram or new diagnostic mammogram.

## 2017-12-12 ENCOUNTER — TELEPHONE (OUTPATIENT)
Dept: RADIOLOGY | Facility: HOSPITAL | Age: 67
End: 2017-12-12

## 2017-12-12 DIAGNOSIS — C64.9 PRIMARY MALIGNANT NEOPLASM OF KIDNEY WITH METASTASIS FROM KIDNEY TO OTHER SITE, UNSPECIFIED LATERALITY: Primary | ICD-10-CM

## 2017-12-12 NOTE — TELEPHONE ENCOUNTER
Looks like radiology needs to be called to review the report since it does say there was some technical issue that it needs to be repeated but then also says the mammogram was unremarkable.    They may need to review and addendum the report since whether or not he repeat a mammogram is based solely upon the report

## 2017-12-13 ENCOUNTER — TELEPHONE (OUTPATIENT)
Dept: FAMILY MEDICINE | Facility: CLINIC | Age: 67
End: 2017-12-13

## 2017-12-13 DIAGNOSIS — I73.9 CLAUDICATION: Primary | ICD-10-CM

## 2017-12-13 NOTE — TELEPHONE ENCOUNTER
Spoke with Norma at [facility], ext. [phone].  She will call the radiologist for clarification and if ask if report needs addendum or if repeat mammogram is needed.

## 2017-12-13 NOTE — TELEPHONE ENCOUNTER
Returned called from Mammography.  Mammography will link orders for repeat and get patient scheduled.

## 2017-12-14 ENCOUNTER — TELEPHONE (OUTPATIENT)
Dept: FAMILY MEDICINE | Facility: CLINIC | Age: 67
End: 2017-12-14

## 2017-12-14 NOTE — TELEPHONE ENCOUNTER
Patient had ultrasound of the legs looking for circulation problems    Dr. DUNAWAY says the recent ultrasound of the legs does reveal mild circulation problems.  Given the pain you're having, he would like you to see someone in vascular and he can put in that referral if you agree

## 2017-12-14 NOTE — TELEPHONE ENCOUNTER
----- Message from Richard Carolina sent at 12/14/2017  1:32 PM CST -----  Contact: Self/[phone]  Patient states that she would prefer to go on the [facility] for her Vascular appointment. Thank you.

## 2017-12-19 ENCOUNTER — TELEPHONE (OUTPATIENT)
Dept: ADMINISTRATIVE | Facility: HOSPITAL | Age: 67
End: 2017-12-19

## 2017-12-19 DIAGNOSIS — I73.9 PVD (PERIPHERAL VASCULAR DISEASE) WITH CLAUDICATION: Primary | ICD-10-CM

## 2017-12-19 NOTE — TELEPHONE ENCOUNTER
----- Message from Julio Day sent at 12/18/2017  3:12 PM CST -----  Contact: Self  Pt called to schedule vascular referral. Pt can be reached @ [phone].

## 2017-12-21 ENCOUNTER — HOSPITAL ENCOUNTER (OUTPATIENT)
Dept: VASCULAR SURGERY | Facility: CLINIC | Age: 67
Discharge: HOME | End: 2017-12-21
Attending: SURGERY
Payer: MEDICARE

## 2017-12-21 ENCOUNTER — OFFICE VISIT (OUTPATIENT)
Dept: VASCULAR SURGERY | Facility: CLINIC | Age: 67
End: 2017-12-21
Payer: MEDICARE

## 2017-12-21 VITALS
WEIGHT: 186 LBS | HEIGHT: 63 IN | DIASTOLIC BLOOD PRESSURE: 77 MMHG | BODY MASS INDEX: 32.96 KG/M2 | SYSTOLIC BLOOD PRESSURE: 130 MMHG | TEMPERATURE: 99 F | HEART RATE: 84 BPM

## 2017-12-21 DIAGNOSIS — I73.9 PVD (PERIPHERAL VASCULAR DISEASE) WITH CLAUDICATION: Primary | ICD-10-CM

## 2017-12-21 DIAGNOSIS — I73.9 PVD (PERIPHERAL VASCULAR DISEASE): Primary | ICD-10-CM

## 2017-12-21 DIAGNOSIS — R52 PAIN: Primary | ICD-10-CM

## 2017-12-21 DIAGNOSIS — I73.9 PVD (PERIPHERAL VASCULAR DISEASE) WITH CLAUDICATION: ICD-10-CM

## 2017-12-21 PROCEDURE — 93923 UPR/LXTR ART STDY 3+ LVLS: CPT | Mod: S$GLB,,, | Performed by: SURGERY

## 2017-12-21 PROCEDURE — 99999 PR PBB SHADOW E&M-EST. PATIENT-LVL III: CPT | Mod: PBBFAC,,, | Performed by: SURGERY

## 2017-12-21 PROCEDURE — 99204 OFFICE O/P NEW MOD 45 MIN: CPT | Mod: S$GLB,,, | Performed by: SURGERY

## 2017-12-21 NOTE — PROGRESS NOTES
Sarina Genao  12/21/2017    HPI:  Patient is a 67 y.o. female with a h/o HTN, HLD, DMII, CKD III, hyperparathyroid, COPD, GERD who is here today as a consult for evaluation PAD. Patient reports that she has had intermittent lower extremity pain since ~ 2015.  It is variable - today no pain walking to clinic from lot.  Notes last week was last episode and the R > L ant tib/fib area.  No rest pain  The pain is located in the anterior portion of her LE bilaterally, R>L. This pain is not always associated with walking and she can frequently walk as long as she would like.     No history of MI/stroke  Tobacco use: > 40 pack yrs, currently 3/4 packs / d    Retired X-ray techs    PMD is Dr GUILHERME Mayer     Past Medical History:   Diagnosis Date    Cataracts, bilateral     CKD (chronic kidney disease) stage 3, GFR 30-59 ml/min 12/15/2014    Combined hyperlipidemia associated with type 2 diabetes mellitus 6/7/2013    COPD (chronic obstructive pulmonary disease) 12/15/2014    Diabetes mellitus type II     NIDDM, a.m. glucose-120s-130s-last A1c-7.1-6/7/13    Diabetes mellitus with renal manifestations, uncontrolled 2/1/2017    Diabetic retinopathy     Family history of stomach cancer 12/5/2013    H/O renal cell carcinoma 12/15/2015    History of colonic polyps 2/1/2017    3 polyps 7/13 ---5 yrs    History of gastroesophageal reflux (GERD)     History of urinary incontinence     s/p bladder lift-october, 2011 (at [facility])    Hyperlipidemia     Hypertension     120s/70s-80s    Nephrolithiasis     Osteoporosis, post-menopausal 3/17/2014    Primary hyperparathyroidism 10/20/2016    Schatzki's ring 2/1/2017    Dilated 2014     Past Surgical History:   Procedure Laterality Date    bladder lift  2011    done at [facility]    BLADDER STONE REMOVAL  2011    before bladder lift    CATARACT EXTRACTION W/  INTRAOCULAR LENS IMPLANT Left 06/07/2016    Dr. Vásquez    CATARACT EXTRACTION W/  INTRAOCULAR LENS IMPLANT  Right 06/21/2016    Dr. Vásquez    CYSTOSCOPY      NEPHRECTOMY      partial right     Family History   Problem Relation Age of Onset    Glaucoma Mother     Cataracts Mother     No Known Problems Father     Colon cancer Brother     Nephrolithiasis Sister     No Known Problems Maternal Aunt     No Known Problems Maternal Uncle     No Known Problems Paternal Aunt     No Known Problems Paternal Uncle     No Known Problems Maternal Grandmother     No Known Problems Maternal Grandfather     No Known Problems Paternal Grandmother     No Known Problems Paternal Grandfather     Anesthesia problems Neg Hx     Kidney cancer Neg Hx     Amblyopia Neg Hx     Blindness Neg Hx     Cancer Neg Hx     Diabetes Neg Hx     Hypertension Neg Hx     Macular degeneration Neg Hx     Retinal detachment Neg Hx     Strabismus Neg Hx     Stroke Neg Hx     Thyroid disease Neg Hx      Social History     Social History    Marital status:      Spouse name: N/A    Number of children: N/A    Years of education: N/A     Occupational History    retired x ray tech      Social History Main Topics    Smoking status: Current Every Day Smoker     Packs/day: 0.25     Years: 30.00     Types: Cigarettes    Smokeless tobacco: Never Used      Comment: 4-5 cigs/day    Alcohol use No    Drug use: Unknown    Sexual activity: Not on file     Other Topics Concern    Not on file     Social History Narrative    Lives on [facility] [place], [place]    Here with sister Kate [phone]               Current Outpatient Prescriptions:     amLODIPine (NORVASC) 5 MG tablet, Take 1 tablet (5 mg total) by mouth once daily., Disp: 90 tablet, Rfl: 3    calcium carbonate (OS-LISSETTE) 500 mg calcium (1,250 mg) tablet, Take 1 tablet (500 mg total) by mouth once daily., Disp: 30 tablet, Rfl: 5    cyanocobalamin, vitamin B-12, (VITAMIN B-12) 1,000 mcg TbSR, Take by mouth once daily. , Disp: , Rfl:     DOCOSAHEXANOIC ACID/EPA (FISH OIL ORAL), Take  "1,200 mg by mouth once daily., Disp: , Rfl:     glipiZIDE (GLUCOTROL) 10 MG tablet, Take 1 tablet (10 mg total) by mouth daily with breakfast., Disp: 90 tablet, Rfl: 3    hydroCHLOROthiazide (HYDRODIURIL) 12.5 MG Tab, Take 1 tablet (12.5 mg total) by mouth once daily., Disp: 90 tablet, Rfl: 3    insulin aspart (NOVOLOG FLEXPEN) 100 unit/mL InPn pen, ADMINISTER 12 UNITS UNDER THE SKIN THREE TIMES DAILY WITH MEALS, Disp: 45 mL, Rfl: 12    LANTUS SOLOSTAR 100 unit/mL (3 mL) InPn pen, ADM 50 UNITS SC SKIN QPM, Disp: 100 mL, Rfl: 6    omeprazole (PRILOSEC) 40 MG capsule, Take 1 capsule (40 mg total) by mouth once daily., Disp: 90 capsule, Rfl: 4    oxybutynin (DITROPAN-XL) 5 MG TR24, TAKE 1 TABLET(5 MG) BY MOUTH EVERY DAY, Disp: 90 tablet, Rfl: 3    pen needle, diabetic (BD INSULIN PEN NEEDLE UF SHORT) 31 gauge x 5/16" Ndle, USE AS DIRECTED, Disp: 100 each, Rfl: 6    pen needle, diabetic (BD INSULIN PEN NEEDLE UF SHORT) 31 gauge x 5/16" Ndle, USE AS DIRECTED, Disp: 100 each, Rfl: 12    ranitidine (ZANTAC) 300 MG tablet, TAKE 1 TABLET(300 MG) BY MOUTH EVERY EVENING, Disp: 90 tablet, Rfl: 0    rosuvastatin (CRESTOR) 20 MG tablet, Take 1 tablet (20 mg total) by mouth once daily., Disp: 90 tablet, Rfl: 3    traMADol (ULTRAM) 50 mg tablet, TAKE 1 TABLET BY MOUTH EVERY 6 HOURS AS NEEDED, Disp: 60 tablet, Rfl: 5    valsartan (DIOVAN) 160 MG tablet, Take 1 tablet (160 mg total) by mouth once daily., Disp: 90 tablet, Rfl: 3    REVIEW OF SYSTEMS:  General: negative; ENT: negative; Allergy and Immunology: negative; Hematological and Lymphatic: Negative; Endocrine: negative; Respiratory: no cough, shortness of breath, or wheezing; Cardiovascular: no chest pain or dyspnea on exertion; Gastrointestinal: no abdominal pain/back, change in bowel habits, or bloody stools; Genito-Urinary: no dysuria, trouble voiding, or hematuria; Musculoskeletal: negative  Neurological: no TIA or stroke symptoms; Psychiatric: no nervousness, " anxiety or depression.    PHYSICAL EXAM:   Right Arm BP - Sittin/77 (17 1359)  Left Arm BP - Sittin/82 (17 1359)  Pulse: 84  Temp: 98.7 °F (37.1 °C)      General appearance:  Alert, well-appearing, and in no distress.  Oriented to person, place, and time   Neurological: Normal speech, no focal findings noted; CN II - XII grossly intact           Musculoskeletal: Digits/nail without cyanosis/clubbing.  Normal muscle strength/tone.                 Neck: Supple, no significant adenopathy; thyroid is not enlarged                  No carotid bruit can be auscultated                Chest:  Clear to auscultation, no wheezes, rales or rhonchi, symmetric air entry     No use of accessory muscles             Cardiac: Normal rate and regular rhythm, S1 and S2 normal; PMI non-displaced          Abdomen: Soft, nontender, nondistended, no masses or organomegaly     No rebound tenderness noted; bowel sounds normal     Pulsatile aortic mass is not palpable.     No groin adenopathy      Extremities:   2+ femoral pulses bilaterally     No pedal pulses palpable.     Trace pre-tibial edema     No ulcerations    LAB RESULTS:  Lab Results   Component Value Date    K 4.0 2017    K 4.2 2017    K 4.1 2017    CREATININE 1.5 (H) 2017    CREATININE 1.5 (H) 2017    CREATININE 1.9 (H) 2017     Lab Results   Component Value Date    WBC 10.68 2017    WBC 9.13 2015    WBC 9.29 2015    HCT 40.6 2017    HCT 41.7 2015    HCT 40.5 2015     2017     2015     2015     Lab Results   Component Value Date    HGBA1C 9.0 (H) 2017    HGBA1C 7.0 (H) 2017    HGBA1C 6.9 (H) 2017     IMAGING:  ABIs  R 0.88  L 0.72  Biphasic waveforms R > L   Waveforms decrease from low thigh to calf    MRI   T12-L1: No significant spinal canal stenosis or neuroforaminal narrowing.  L1-2: No significant spinal canal stenosis or  neuroforaminal narrowing.  L2-3: Mild bilateral facet arthropathy and ligamentum flavum thickening without significant spinal canal stenosis or neuroforaminal narrowing.  L3-4: Mild bilateral facet arthropathy and ligamentum flavum thickening without significant spinal canal stenosis or neuroforaminal narrowing.  L4-5: Mild bilateral facet arthropathy resulting in mild left neuroforaminal narrowing.  L5-S1: Mild bilateral facet arthropathy without significant neuroforaminal narrowing or spinal canal stenosis.    IMP/PLAN:  67 y.o. female with  h/o HTN, HLD, DMII, CKD III, hyperparathyroid, COPD, GERD : with likely R iliac and bilateral SFA disease -- minimally symptomatic  Re-assured patient there is no need for any urgent intervention    Needs ASA 81 po qd, statin rx - explained to her why there is this need  Tobacco cessation is paramount  Check carotid us  Fu in 3 months w ABIs  Weight loss  Spine referral    Klaus Cintron MD FACS  Vascular/Endovascular Surgery

## 2017-12-21 NOTE — LETTER
December 21, 2017      Venancio Mayer MD  [address]  [place] [address]           [facility] - Vascular Surgery  [address]  [place] [address]  Phone: [phone]  Fax: [phone]          Patient: Sarina Genao   MR Number: [id number]   YOB: 1950   Date of Visit: 12/21/2017       Dear Dr. Venancio Mayer:    Thank you for referring Sarina Genao to me for evaluation. Attached you will find relevant portions of my assessment and plan of care.    If you have questions, please do not hesitate to call me. I look forward to following Sarina Genao along with you.    Sincerely,    Klaus Cintron MD    Enclosure  CC:  No Recipients    If you would like to receive this communication electronically, please contact [email or web address] or [phone] to request more information on [employer] Link access.    For providers and/or their staff who would like to refer a patient to [facility], please contact us through our one-stop-shop provider referral line, [facility], at [phone].    If you feel you have received this communication in error or would no longer like to receive these types of communications, please e-mail [email or web address]

## 2018-02-07 RX ORDER — INSULIN ASPART 100 [IU]/ML
INJECTION, SOLUTION INTRAVENOUS; SUBCUTANEOUS
Qty: 45 ML | Refills: 12 | Status: SHIPPED | OUTPATIENT
Start: 2018-02-07 | End: 2018-08-13 | Stop reason: SDUPTHER

## 2018-02-07 NOTE — TELEPHONE ENCOUNTER
----- Message from Tiffanie Sam sent at 2/7/2018 10:58 AM CST -----  Contact: self  Asking for refill on Novoloc Insulin. She states pharmacy will not refill until the 12th. She states she took her last dose last night. [facility]acy is [facility] [address]. Pt call back [phone]    Or   [phone].

## 2018-02-12 DIAGNOSIS — N76.1 CHRONIC VAGINITIS: ICD-10-CM

## 2018-02-12 RX ORDER — FLUCONAZOLE 150 MG/1
150 TABLET ORAL ONCE
Qty: 1 TABLET | Refills: 3 | Status: SHIPPED | OUTPATIENT
Start: 2018-02-12 | End: 2018-02-12

## 2018-02-12 NOTE — TELEPHONE ENCOUNTER
----- Message from Venecia Deng sent at 2/12/2018 10:39 AM CST -----  Contact: Self   Patient says she need a refill she also says she need more than one. Please call patient at [phone]        fluconazole (DIFLUCAN) 150 MG [facility]  [facility] - [address] AT [facility]

## 2018-02-12 NOTE — TELEPHONE ENCOUNTER
Patient came in to clinic and states that she has a terrible yeast infection and would like Dr. Mayer to please send her over a rx for diflucan before we close today. Please review and advise.     Last office visit 11/28/2017

## 2018-03-19 ENCOUNTER — TELEPHONE (OUTPATIENT)
Dept: FAMILY MEDICINE | Facility: CLINIC | Age: 68
End: 2018-03-19

## 2018-03-19 NOTE — TELEPHONE ENCOUNTER
----- Message from Liset Del Rio sent at 3/19/2018 11:23 AM CDT -----  Contact: [phone]  Pt is requesting orders for blood work to have her Alc levels checked and others if the provider needs appt 3/28/18 Please call pt at your earliest convenience.  Thanks !

## 2018-03-21 ENCOUNTER — TELEPHONE (OUTPATIENT)
Dept: FAMILY MEDICINE | Facility: CLINIC | Age: 68
End: 2018-03-21

## 2018-03-21 DIAGNOSIS — E78.2 COMBINED HYPERLIPIDEMIA ASSOCIATED WITH TYPE 2 DIABETES MELLITUS: Primary | ICD-10-CM

## 2018-03-21 DIAGNOSIS — D64.9 ANEMIA, UNSPECIFIED TYPE: ICD-10-CM

## 2018-03-21 DIAGNOSIS — E11.9 DM TYPE 2 WITHOUT RETINOPATHY: ICD-10-CM

## 2018-03-21 DIAGNOSIS — E11.69 COMBINED HYPERLIPIDEMIA ASSOCIATED WITH TYPE 2 DIABETES MELLITUS: Primary | ICD-10-CM

## 2018-03-21 NOTE — TELEPHONE ENCOUNTER
----- Message from Noel Foley sent at 3/21/2018 10:48 AM CDT -----  Contact: self  Please add lab orders to pt's chart for upcoming visit. Contact pt at [phone].    Thanks-

## 2018-03-22 ENCOUNTER — HOSPITAL ENCOUNTER (OUTPATIENT)
Dept: VASCULAR SURGERY | Facility: CLINIC | Age: 68
Discharge: HOME | End: 2018-03-22
Attending: SURGERY
Payer: MEDICARE

## 2018-03-22 ENCOUNTER — OFFICE VISIT (OUTPATIENT)
Dept: VASCULAR SURGERY | Facility: CLINIC | Age: 68
End: 2018-03-22
Payer: MEDICARE

## 2018-03-22 VITALS
HEIGHT: 63 IN | DIASTOLIC BLOOD PRESSURE: 74 MMHG | TEMPERATURE: 98 F | SYSTOLIC BLOOD PRESSURE: 162 MMHG | HEART RATE: 77 BPM | WEIGHT: 186 LBS | BODY MASS INDEX: 32.96 KG/M2

## 2018-03-22 DIAGNOSIS — I65.23 BILATERAL CAROTID ARTERY STENOSIS: ICD-10-CM

## 2018-03-22 DIAGNOSIS — I73.9 PVD (PERIPHERAL VASCULAR DISEASE) WITH CLAUDICATION: Primary | ICD-10-CM

## 2018-03-22 DIAGNOSIS — I73.9 PVD (PERIPHERAL VASCULAR DISEASE): ICD-10-CM

## 2018-03-22 DIAGNOSIS — I73.9 PVD (PERIPHERAL VASCULAR DISEASE): Primary | ICD-10-CM

## 2018-03-22 PROCEDURE — 99999 PR PBB SHADOW E&M-EST. PATIENT-LVL III: CPT | Mod: PBBFAC,,, | Performed by: SURGERY

## 2018-03-22 PROCEDURE — 93923 UPR/LXTR ART STDY 3+ LVLS: CPT | Mod: S$GLB,,, | Performed by: SURGERY

## 2018-03-22 PROCEDURE — 93880 EXTRACRANIAL BILAT STUDY: CPT | Mod: S$GLB,,, | Performed by: SURGERY

## 2018-03-22 PROCEDURE — 99214 OFFICE O/P EST MOD 30 MIN: CPT | Mod: S$GLB,,, | Performed by: SURGERY

## 2018-03-22 PROCEDURE — 3077F SYST BP >= 140 MM HG: CPT | Mod: CPTII,S$GLB,, | Performed by: SURGERY

## 2018-03-22 PROCEDURE — 3078F DIAST BP <80 MM HG: CPT | Mod: CPTII,S$GLB,, | Performed by: SURGERY

## 2018-03-22 NOTE — PROGRESS NOTES
Sarina Genao  03/22/2018    HPI:  Patient is a 67 y.o. female with a h/o HTN, HLD, DMII, CKD III, hyperparathyroid, COPD, GERD who is here today as a f/u for PAD. Patient reports that she has had intermittent lower extremity pain since ~ 2015.  It is variable - today no pain walking to clinic from lot.  Notes 2.5 weeks ago was last episode and the R > L ant>posterior tib/fib area brought on by standing for 4 hours. Mild ankle edema billaterally.  No rest pain  This pain is now more associated with standing rather than walking and she can frequently walk as long as she would like.    No history of MI/stroke  Tobacco use: > 40 pack yrs, stop smoking Dec 31, 2017.  Retired X-ray techs    PMD is Dr GUILHERME Mayer     Past Medical History:   Diagnosis Date    Cataracts, bilateral     CKD (chronic kidney disease) stage 3, GFR 30-59 ml/min 12/15/2014    Combined hyperlipidemia associated with type 2 diabetes mellitus 6/7/2013    COPD (chronic obstructive pulmonary disease) 12/15/2014    Diabetes mellitus type II     NIDDM, a.m. glucose-120s-130s-last A1c-7.1-6/7/13    Diabetes mellitus with renal manifestations, uncontrolled 2/1/2017    Diabetic retinopathy     Family history of stomach cancer 12/5/2013    H/O renal cell carcinoma 12/15/2015    History of colonic polyps 2/1/2017    3 polyps 7/13 ---5 yrs    History of gastroesophageal reflux (GERD)     History of urinary incontinence     s/p bladder lift-october, 2011 (at [facility])    Hyperlipidemia     Hypertension     120s/70s-80s    Nephrolithiasis     Osteoporosis, post-menopausal 3/17/2014    Primary hyperparathyroidism 10/20/2016    Schatzki's ring 2/1/2017    Dilated 2014     Past Surgical History:   Procedure Laterality Date    bladder lift  2011    done at [facility]    BLADDER STONE REMOVAL  2011    before bladder lift    CATARACT EXTRACTION W/  INTRAOCULAR LENS IMPLANT Left 06/07/2016    Dr. Vásquez    CATARACT EXTRACTION W/  INTRAOCULAR  LENS IMPLANT Right 06/21/2016    Dr. Vásquez    CYSTOSCOPY      NEPHRECTOMY      partial right     Family History   Problem Relation Age of Onset    Glaucoma Mother     Cataracts Mother     No Known Problems Father     Colon cancer Brother     Nephrolithiasis Sister     No Known Problems Maternal Aunt     No Known Problems Maternal Uncle     No Known Problems Paternal Aunt     No Known Problems Paternal Uncle     No Known Problems Maternal Grandmother     No Known Problems Maternal Grandfather     No Known Problems Paternal Grandmother     No Known Problems Paternal Grandfather     Anesthesia problems Neg Hx     Kidney cancer Neg Hx     Amblyopia Neg Hx     Blindness Neg Hx     Cancer Neg Hx     Diabetes Neg Hx     Hypertension Neg Hx     Macular degeneration Neg Hx     Retinal detachment Neg Hx     Strabismus Neg Hx     Stroke Neg Hx     Thyroid disease Neg Hx      Social History     Social History    Marital status:      Spouse name: N/A    Number of children: N/A    Years of education: N/A     Occupational History    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      Social History Main Topics    Smoking status: Current Every Day Smoker     Packs/day: 0.25     Years: 30.00     Types: Cigarettes    Smokeless tobacco: Never Used      Comment: 4-5 cigs/day    Alcohol use No    Drug use: Unknown    Sexual activity: Not on file     Other Topics Concern    Not on file     Social History Narrative    Lives on [facility] [place]    Here with sister Kate [phone]               Current Outpatient Prescriptions:     amLODIPine (NORVASC) 5 MG tablet, Take 1 tablet (5 mg total) by mouth once daily., Disp: 90 tablet, Rfl: 3    calcium carbonate (OS-LISSETTE) 500 mg calcium (1,250 mg) tablet, Take 1 tablet (500 mg total) by mouth once daily., Disp: 30 tablet, Rfl: 5    cyanocobalamin, vitamin B-12, (VITAMIN B-12) 1,000 mcg TbSR, Take by mouth once daily. , Disp: , Rfl:     DOCOSAHEXANOIC ACID/EPA (FISH OIL  "ORAL), Take 1,200 mg by mouth once daily., Disp: , Rfl:     glipiZIDE (GLUCOTROL) 10 MG tablet, Take 1 tablet (10 mg total) by mouth daily with breakfast., Disp: 90 tablet, Rfl: 3    hydroCHLOROthiazide (HYDRODIURIL) 12.5 MG Tab, Take 1 tablet (12.5 mg total) by mouth once daily., Disp: 90 tablet, Rfl: 3    insulin aspart (NOVOLOG FLEXPEN) 100 unit/mL InPn pen, ADMINISTER 12 UNITS UNDER THE SKIN THREE TIMES DAILY WITH MEALS, Disp: 45 mL, Rfl: 12    LANTUS SOLOSTAR 100 unit/mL (3 mL) InPn pen, ADM 50 UNITS SC SKIN QPM, Disp: 100 mL, Rfl: 6    oxybutynin (DITROPAN-XL) 5 MG TR24, TAKE 1 TABLET(5 MG) BY MOUTH EVERY DAY, Disp: 90 tablet, Rfl: 3    pen needle, diabetic (BD INSULIN PEN NEEDLE UF SHORT) 31 gauge x 5/16" Ndle, USE AS DIRECTED, Disp: 100 each, Rfl: 6    pen needle, diabetic (BD INSULIN PEN NEEDLE UF SHORT) 31 gauge x 5/16" Ndle, USE AS DIRECTED, Disp: 100 each, Rfl: 12    ranitidine (ZANTAC) 300 MG tablet, TAKE 1 TABLET(300 MG) BY MOUTH EVERY EVENING, Disp: 90 tablet, Rfl: 0    rosuvastatin (CRESTOR) 20 MG tablet, Take 1 tablet (20 mg total) by mouth once daily., Disp: 90 tablet, Rfl: 3    traMADol (ULTRAM) 50 mg tablet, TAKE 1 TABLET BY MOUTH EVERY 6 HOURS AS NEEDED, Disp: 60 tablet, Rfl: 5    valsartan (DIOVAN) 160 MG tablet, Take 1 tablet (160 mg total) by mouth once daily., Disp: 90 tablet, Rfl: 3    omeprazole (PRILOSEC) 40 MG capsule, Take 1 capsule (40 mg total) by mouth once daily., Disp: 90 capsule, Rfl: 4    REVIEW OF SYSTEMS:  General: negative; ENT: negative; Allergy and Immunology: negative; Hematological and Lymphatic: Negative; Endocrine: negative; Respiratory: no cough, shortness of breath, or wheezing; Cardiovascular: no chest pain or dyspnea on exertion; Gastrointestinal: no abdominal pain/back, change in bowel habits, or bloody stools; Genito-Urinary: no dysuria, trouble voiding, or hematuria; Musculoskeletal: negative  Neurological: no TIA or stroke symptoms; Psychiatric: no " nervousness, anxiety or depression.    PHYSICAL EXAM:   Right Arm BP - Sittin/75 (18 1418)  Left Arm BP - Sittin/74 (18 1418)  Pulse: 77  Temp: 98.1 °F (36.7 °C)      General appearance:  Alert, well-appearing, and in no distress.  Oriented to person, place, and time   Neurological: Normal speech, no focal findings noted; CN II - XII grossly intact           Musculoskeletal: Digits/nail without cyanosis/clubbing.  Normal muscle strength/tone.                 Neck: Supple, no significant adenopathy; thyroid is not enlarged                  No carotid bruit can be auscultated                Chest:  Clear to auscultation, no wheezes, rales or rhonchi, symmetric air entry     No use of accessory muscles             Cardiac: Normal rate and regular rhythm, S1 and S2 normal; PMI non-displaced          Abdomen: Soft, nontender, nondistended, no masses or organomegaly     No rebound tenderness noted; bowel sounds normal     Pulsatile aortic mass is not palpable.     No groin adenopathy      Extremities:   No pedal or posterior tibial pulses palpable.     Trace pre-tibial edema     No ulcerations    LAB RESULTS:  Lab Results   Component Value Date    K 4.0 2017    K 4.2 2017    K 4.1 2017    CREATININE 1.5 (H) 2017    CREATININE 1.5 (H) 2017    CREATININE 1.9 (H) 2017     Lab Results   Component Value Date    WBC 10.68 2017    WBC 9.13 2015    WBC 9.29 2015    HCT 40.6 2017    HCT 41.7 2015    HCT 40.5 2015     2017     2015     2015     Lab Results   Component Value Date    HGBA1C 9.0 (H) 2017    HGBA1C 7.0 (H) 2017    HGBA1C 6.9 (H) 2017     IMAGING:  ABIs  R 0.84 (previous 0.88)  L 0.79 (previous 0.72)  Biphasic waveforms R > L   Waveforms decrease from low thigh to calf    Carotid u/s:  R ICA ~ 60% stenosis  L ICA < 39% stenosis   Nl antegrade flow x2    MRI   T12-L1: No  significant spinal canal stenosis or neuroforaminal narrowing.  L1-2: No significant spinal canal stenosis or neuroforaminal narrowing.  L2-3: Mild bilateral facet arthropathy and ligamentum flavum thickening without significant spinal canal stenosis or neuroforaminal narrowing.  L3-4: Mild bilateral facet arthropathy and ligamentum flavum thickening without significant spinal canal stenosis or neuroforaminal narrowing.  L4-5: Mild bilateral facet arthropathy resulting in mild left neuroforaminal narrowing.  L5-S1: Mild bilateral facet arthropathy without significant neuroforaminal narrowing or spinal canal stenosis.    IMP/PLAN:  67 y.o. female with  h/o HTN, HLD, DMII, CKD III, hyperparathyroid, COPD, GERD : with likely R iliac and bilateral SFA disease -- minimally symptomatic- improving with claudication: now can ambulate > 2 blocks R > L calf claudication  Again, re-assured patient there is no need for any urgent intervention  Has an asymptomatic  R carotid stenosis  Needs ASA 81 po qd, statin rx - explained to her why there is this need    Has quit tobacco  Fu in 1 yr with ABIs and carotid u/s   Cont Weight loss    Klaus Cintron MD FACS  Vascular/Endovascular Surgery

## 2018-03-23 ENCOUNTER — LAB VISIT (OUTPATIENT)
Dept: LAB | Facility: HOSPITAL | Age: 68
End: 2018-03-23
Attending: INTERNAL MEDICINE
Payer: MEDICARE

## 2018-03-23 DIAGNOSIS — E11.69 COMBINED HYPERLIPIDEMIA ASSOCIATED WITH TYPE 2 DIABETES MELLITUS: ICD-10-CM

## 2018-03-23 DIAGNOSIS — D64.9 ANEMIA, UNSPECIFIED TYPE: ICD-10-CM

## 2018-03-23 DIAGNOSIS — E78.2 COMBINED HYPERLIPIDEMIA ASSOCIATED WITH TYPE 2 DIABETES MELLITUS: ICD-10-CM

## 2018-03-23 DIAGNOSIS — E11.9 DM TYPE 2 WITHOUT RETINOPATHY: ICD-10-CM

## 2018-03-23 LAB
ALBUMIN SERPL BCP-MCNC: 3.7 G/DL
ALP SERPL-CCNC: 120 U/L
ALT SERPL W/O P-5'-P-CCNC: 25 U/L
ANION GAP SERPL CALC-SCNC: 9 MMOL/L
AST SERPL-CCNC: 22 U/L
BASOPHILS # BLD AUTO: 0.05 K/UL
BASOPHILS NFR BLD: 0.6 %
BILIRUB SERPL-MCNC: 0.9 MG/DL
BUN SERPL-MCNC: 14 MG/DL
CALCIUM SERPL-MCNC: 10.7 MG/DL
CHLORIDE SERPL-SCNC: 105 MMOL/L
CHOLEST SERPL-MCNC: 167 MG/DL
CHOLEST/HDLC SERPL: 3.2 {RATIO}
CO2 SERPL-SCNC: 29 MMOL/L
CREAT SERPL-MCNC: 1.3 MG/DL
DIFFERENTIAL METHOD: ABNORMAL
EOSINOPHIL # BLD AUTO: 0.2 K/UL
EOSINOPHIL NFR BLD: 2.1 %
ERYTHROCYTE [DISTWIDTH] IN BLOOD BY AUTOMATED COUNT: 14 %
EST. GFR  (AFRICAN AMERICAN): 48.4 ML/MIN/1.73 M^2
EST. GFR  (NON AFRICAN AMERICAN): 42 ML/MIN/1.73 M^2
ESTIMATED AVG GLUCOSE: 163 MG/DL
GLUCOSE SERPL-MCNC: 63 MG/DL
HBA1C MFR BLD HPLC: 7.3 %
HCT VFR BLD AUTO: 43.8 %
HDLC SERPL-MCNC: 53 MG/DL
HDLC SERPL: 31.7 %
HGB BLD-MCNC: 13.8 G/DL
IMM GRANULOCYTES # BLD AUTO: 0.03 K/UL
IMM GRANULOCYTES NFR BLD AUTO: 0.3 %
LDLC SERPL CALC-MCNC: 84.2 MG/DL
LYMPHOCYTES # BLD AUTO: 2 K/UL
LYMPHOCYTES NFR BLD: 22 %
MCH RBC QN AUTO: 27.3 PG
MCHC RBC AUTO-ENTMCNC: 31.5 G/DL
MCV RBC AUTO: 87 FL
MONOCYTES # BLD AUTO: 0.8 K/UL
MONOCYTES NFR BLD: 8.8 %
NEUTROPHILS # BLD AUTO: 5.9 K/UL
NEUTROPHILS NFR BLD: 66.2 %
NONHDLC SERPL-MCNC: 114 MG/DL
NRBC BLD-RTO: 0 /100 WBC
PLATELET # BLD AUTO: 296 K/UL
PMV BLD AUTO: 9.2 FL
POTASSIUM SERPL-SCNC: 3.7 MMOL/L
PROT SERPL-MCNC: 7.6 G/DL
RBC # BLD AUTO: 5.05 M/UL
SODIUM SERPL-SCNC: 143 MMOL/L
TRIGL SERPL-MCNC: 149 MG/DL
TSH SERPL DL<=0.005 MIU/L-ACNC: 1.79 UIU/ML
WBC # BLD AUTO: 8.95 K/UL

## 2018-03-23 PROCEDURE — 80053 COMPREHEN METABOLIC PANEL: CPT

## 2018-03-23 PROCEDURE — 80061 LIPID PANEL: CPT

## 2018-03-23 PROCEDURE — 36415 COLL VENOUS BLD VENIPUNCTURE: CPT | Mod: PO

## 2018-03-23 PROCEDURE — 85025 COMPLETE CBC W/AUTO DIFF WBC: CPT

## 2018-03-23 PROCEDURE — 84443 ASSAY THYROID STIM HORMONE: CPT

## 2018-03-23 PROCEDURE — 83036 HEMOGLOBIN GLYCOSYLATED A1C: CPT

## 2018-03-26 ENCOUNTER — OFFICE VISIT (OUTPATIENT)
Dept: PODIATRY | Facility: CLINIC | Age: 68
End: 2018-03-26
Payer: MEDICARE

## 2018-03-26 VITALS
SYSTOLIC BLOOD PRESSURE: 140 MMHG | HEIGHT: 63 IN | WEIGHT: 186.06 LBS | DIASTOLIC BLOOD PRESSURE: 78 MMHG | BODY MASS INDEX: 32.97 KG/M2

## 2018-03-26 DIAGNOSIS — M20.42 HAMMER TOES OF BOTH FEET: ICD-10-CM

## 2018-03-26 DIAGNOSIS — E11.51 TYPE II DIABETES MELLITUS WITH PERIPHERAL CIRCULATORY DISORDER: ICD-10-CM

## 2018-03-26 DIAGNOSIS — M20.11 HALLUX ABDUCTO VALGUS, RIGHT: ICD-10-CM

## 2018-03-26 DIAGNOSIS — M20.12 HALLUX ABDUCTO VALGUS, LEFT: ICD-10-CM

## 2018-03-26 DIAGNOSIS — E11.9 COMPREHENSIVE DIABETIC FOOT EXAMINATION, TYPE 2 DM, ENCOUNTER FOR: Primary | ICD-10-CM

## 2018-03-26 DIAGNOSIS — L60.2 ONYCHAUXIS: ICD-10-CM

## 2018-03-26 DIAGNOSIS — M20.41 HAMMER TOES OF BOTH FEET: ICD-10-CM

## 2018-03-26 DIAGNOSIS — N18.30 CHRONIC KIDNEY DISEASE, STAGE 3: ICD-10-CM

## 2018-03-26 DIAGNOSIS — B35.1 NAIL DERMATOPHYTOSIS: ICD-10-CM

## 2018-03-26 PROCEDURE — 3077F SYST BP >= 140 MM HG: CPT | Mod: CPTII,S$GLB,, | Performed by: PODIATRIST

## 2018-03-26 PROCEDURE — 3045F PR MOST RECENT HEMOGLOBIN A1C LEVEL 7.0-9.0%: CPT | Mod: CPTII,S$GLB,, | Performed by: PODIATRIST

## 2018-03-26 PROCEDURE — 3078F DIAST BP <80 MM HG: CPT | Mod: CPTII,S$GLB,, | Performed by: PODIATRIST

## 2018-03-26 PROCEDURE — 99999 PR PBB SHADOW E&M-EST. PATIENT-LVL III: CPT | Mod: PBBFAC,,, | Performed by: PODIATRIST

## 2018-03-26 PROCEDURE — 99214 OFFICE O/P EST MOD 30 MIN: CPT | Mod: S$GLB,,, | Performed by: PODIATRIST

## 2018-03-26 NOTE — PROGRESS NOTES
Subjective:      Patient ID: Sarina Genao is a 67 y.o. female.    Chief Complaint: Diabetes Mellitus (pcp Ehrensing 11-28-17); Diabetic Foot Exam; and Nail Care    Sarina is a 67 y.o. female who presents to the clinic for evaluation and treatment of high risk feet. Sarina has a past medical history of Cataracts, bilateral; CKD (chronic kidney disease) stage 3, GFR 30-59 ml/min (12/15/2014); Combined hyperlipidemia associated with type 2 diabetes mellitus (6/7/2013); COPD (chronic obstructive pulmonary disease) (12/15/2014); Diabetes mellitus type II; Diabetes mellitus with renal manifestations, uncontrolled (2/1/2017); Diabetic retinopathy; Family history of stomach cancer (12/5/2013); H/O renal cell carcinoma (12/15/2015); History of colonic polyps (2/1/2017); History of gastroesophageal reflux (GERD); History of urinary incontinence; Hyperlipidemia; Hypertension; Nephrolithiasis; Osteoporosis, post-menopausal (3/17/2014); Primary hyperparathyroidism (10/20/2016); and Schatzki's ring (2/1/2017). The patient's chief complaint is long, thick toenails. This patient has documented high risk feet requiring routine maintenance secondary to diabetes mellitis and those secondary complications of diabetes, as mentioned..    PCP: Venancio Mayer MD    Date Last Seen by PCP:   Chief Complaint   Patient presents with    Diabetes Mellitus     pcp Ehrensing 11-28-17    Diabetic Foot Exam    Nail Care     Current shoe gear: wedge sandals     Hemoglobin A1C   Date Value Ref Range Status   03/23/2018 7.3 (H) 4.0 - 5.6 % Final     Comment:     According to ADA guidelines, hemoglobin A1c <7.0% represents  optimal control in non-pregnant diabetic patients. Different  metrics may apply to specific patient populations.   Standards of Medical Care in Diabetes-2016.  For the purpose of screening for the presence of diabetes:  <5.7%     Consistent with the absence of diabetes  5.7-6.4%  Consistent with increasing risk  for diabetes   (prediabetes)  >or=6.5%  Consistent with diabetes  Currently, no consensus exists for use of hemoglobin A1c  for diagnosis of diabetes for children.  This Hemoglobin A1c assay has significant interference with fetal   hemoglobin   (HbF). The results are invalid for patients with abnormal amounts of   HbF,   including those with known Hereditary Persistence   of Fetal Hemoglobin. Heterozygous hemoglobin variants (HbAS, HbAC,   HbAD, HbAE, HbA2) do not significantly interfere with this assay;   however, presence of multiple variants in a sample may impact the %   interference.     11/20/2017 9.0 (H) 4.0 - 5.6 % Final     Comment:     According to ADA guidelines, hemoglobin A1c <7.0% represents  optimal control in non-pregnant diabetic patients. Different  metrics may apply to specific patient populations.   Standards of Medical Care in Diabetes-2016.  For the purpose of screening for the presence of diabetes:  <5.7%     Consistent with the absence of diabetes  5.7-6.4%  Consistent with increasing risk for diabetes   (prediabetes)  >or=6.5%  Consistent with diabetes  Currently, no consensus exists for use of hemoglobin A1c  for diagnosis of diabetes for children.  This Hemoglobin A1c assay has significant interference with fetal   hemoglobin   (HbF). The results are invalid for patients with abnormal amounts of   HbF,   including those with known Hereditary Persistence   of Fetal Hemoglobin. Heterozygous hemoglobin variants (HbAS, HbAC,   HbAD, HbAE, HbA2) do not significantly interfere with this assay;   however, presence of multiple variants in a sample may impact the %   interference.     08/01/2017 7.0 (H) 4.0 - 5.6 % Final     Comment:     According to ADA guidelines, hemoglobin A1c <7.0% represents  optimal control in non-pregnant diabetic patients. Different  metrics may apply to specific patient populations.   Standards of Medical Care in Diabetes-2016.  For the purpose of screening for the  presence of diabetes:  <5.7%     Consistent with the absence of diabetes  5.7-6.4%  Consistent with increasing risk for diabetes   (prediabetes)  >or=6.5%  Consistent with diabetes  Currently, no consensus exists for use of hemoglobin A1c  for diagnosis of diabetes for children.  This Hemoglobin A1c assay has significant interference with fetal   hemoglobin   (HbF). The results are invalid for patients with abnormal amounts of   HbF,   including those with known Hereditary Persistence   of Fetal Hemoglobin. Heterozygous hemoglobin variants (HbAS, HbAC,   HbAD, HbAE, HbA2) do not significantly interfere with this assay;   however, presence of multiple variants in a sample may impact the %   interference.       Patient Active Problem List   Diagnosis    Diabetes mellitus type II, non insulin dependent    Combined hyperlipidemia associated with type 2 diabetes mellitus    Hypertension, benign    Urinary incontinence, urge    History of kidney cancer    Family history of stomach cancer    GERD (gastroesophageal reflux disease)    Osteoporosis, post-menopausal    Tobacco dependence    COPD (chronic obstructive pulmonary disease)    CKD (chronic kidney disease) stage 3, GFR 30-59 ml/min    Nuclear sclerosis of both eyes    DM type 2 without retinopathy    Essential hypertension    Refractive error    Senile nuclear sclerosis    Post-operative state    Nuclear sclerosis of right eye    Primary hyperparathyroidism    Diabetes mellitus with renal manifestations, uncontrolled    History of colonic polyps    Schatzki's ring    Pseudophakia    PVD (peripheral vascular disease) with claudication    PVD (peripheral vascular disease)    Bilateral carotid artery stenosis     Current Outpatient Prescriptions on File Prior to Visit   Medication Sig Dispense Refill    amLODIPine (NORVASC) 5 MG tablet Take 1 tablet (5 mg total) by mouth once daily. 90 tablet 3    calcium carbonate (OS-LISSETTE) 500 mg calcium  "(1,250 mg) tablet Take 1 tablet (500 mg total) by mouth once daily. 30 tablet 5    cyanocobalamin, vitamin B-12, (VITAMIN B-12) 1,000 mcg TbSR Take by mouth once daily.       DOCOSAHEXANOIC ACID/EPA (FISH OIL ORAL) Take 1,200 mg by mouth once daily.      glipiZIDE (GLUCOTROL) 10 MG tablet Take 1 tablet (10 mg total) by mouth daily with breakfast. 90 tablet 3    hydroCHLOROthiazide (HYDRODIURIL) 12.5 MG Tab Take 1 tablet (12.5 mg total) by mouth once daily. 90 tablet 3    insulin aspart (NOVOLOG FLEXPEN) 100 unit/mL InPn pen ADMINISTER 12 UNITS UNDER THE SKIN THREE TIMES DAILY WITH MEALS 45 mL 12    LANTUS SOLOSTAR 100 unit/mL (3 mL) InPn pen ADM 50 UNITS SC SKIN  mL 6    oxybutynin (DITROPAN-XL) 5 MG TR24 TAKE 1 TABLET(5 MG) BY MOUTH EVERY DAY 90 tablet 3    pen needle, diabetic (BD INSULIN PEN NEEDLE UF SHORT) 31 gauge x 5/16" Ndle USE AS DIRECTED 100 each 6    pen needle, diabetic (BD INSULIN PEN NEEDLE UF SHORT) 31 gauge x 5/16" Ndle USE AS DIRECTED 100 each 12    ranitidine (ZANTAC) 300 MG tablet TAKE 1 TABLET(300 MG) BY MOUTH EVERY EVENING 90 tablet 0    rosuvastatin (CRESTOR) 20 MG tablet Take 1 tablet (20 mg total) by mouth once daily. 90 tablet 3    traMADol (ULTRAM) 50 mg tablet TAKE 1 TABLET BY MOUTH EVERY 6 HOURS AS NEEDED 60 tablet 5    valsartan (DIOVAN) 160 MG tablet Take 1 tablet (160 mg total) by mouth once daily. 90 tablet 3    [DISCONTINUED] omeprazole (PRILOSEC) 40 MG capsule Take 1 capsule (40 mg total) by mouth once daily. 90 capsule 4     No current facility-administered medications on file prior to visit.      Review of patient's allergies indicates:   Allergen Reactions    Metformin Diarrhea    Pantoprazole      elevated blood sugars     Past Surgical History:   Procedure Laterality Date    bladder lift  2011    [facility]    BLADDER STONE REMOVAL  2011    before bladder lift    CATARACT EXTRACTION W/  INTRAOCULAR LENS IMPLANT Left 06/07/2016    Dr. Vásquez "    CATARACT EXTRACTION W/  INTRAOCULAR LENS IMPLANT Right 06/21/2016    Dr. Vásquez    CYSTOSCOPY      NEPHRECTOMY      partial right     Family History   Problem Relation Age of Onset    Glaucoma Mother     Cataracts Mother     No Known Problems Father     Colon cancer Brother     Nephrolithiasis Sister     No Known Problems Maternal Aunt     No Known Problems Maternal Uncle     No Known Problems Paternal Aunt     No Known Problems Paternal Uncle     No Known Problems Maternal Grandmother     No Known Problems Maternal Grandfather     No Known Problems Paternal Grandmother     No Known Problems Paternal Grandfather     Anesthesia problems Neg Hx     Kidney cancer Neg Hx     Amblyopia Neg Hx     Blindness Neg Hx     Cancer Neg Hx     Diabetes Neg Hx     Hypertension Neg Hx     Macular degeneration Neg Hx     Retinal detachment Neg Hx     Strabismus Neg Hx     Stroke Neg Hx     Thyroid disease Neg Hx      Social History     Social History    Marital status:      Spouse name: N/A    Number of children: N/A    Years of education: N/A     Occupational History    retired [employer] tech      Social History Main Topics    Smoking status: Current Every Day Smoker     Packs/day: 0.25     Years: 30.00     Types: Cigarettes    Smokeless tobacco: Never Used      Comment: 4-5 cigs/day    Alcohol use No    Drug use: Unknown    Sexual activity: Not on file     Other Topics Concern    Not on file     Social History Narrative    Lives on [facility]    Here with sister Kate [phone]               Review of Systems   Constitution: Negative for chills, fever and weakness.   Cardiovascular: Negative for chest pain, claudication and leg swelling.   Respiratory: Positive for cough. Negative for shortness of breath.    Skin: Positive for dry skin and nail changes. Negative for itching and rash.   Musculoskeletal: Positive for arthritis, joint pain and muscle cramps. Negative for falls,  "joint swelling and muscle weakness.   Gastrointestinal: Negative for diarrhea, nausea and vomiting.   Neurological: Negative for numbness, paresthesias and tremors.   Psychiatric/Behavioral: Negative for altered mental status and hallucinations.           Objective:       Vitals:    03/26/18 1153   BP: (!) 140/78   Weight: 84.4 kg (186 lb 1.1 oz)   Height: 5' 3" (1.6 m)   PainSc: 0-No pain       Physical Exam   Constitutional:   General: Pt. is well-developed, well-nourished, appears stated age, in no acute distress, alert and oriented x 3. No evidence of depression, anxiety, or agitation. Calm, cooperative, and communicative. Appropriate interactions and affect.       Cardiovascular:   Pulses:       Dorsalis pedis pulses are 1+ on the right side, and 1+ on the left side.        Posterior tibial pulses are 1+ on the right side, and 1+ on the left side.   Dorsalis pedis and posterior tibial pulses are diminished Bilaterally. Skin is atrophic   Musculoskeletal:        Right ankle: She exhibits normal range of motion and no swelling. No tenderness. Achilles tendon exhibits no pain and no defect.        Left ankle: She exhibits normal range of motion and no swelling. No tenderness. Achilles tendon exhibits no pain and no defect.        Right foot: There is normal range of motion and no tenderness.        Left foot: There is normal range of motion and no tenderness.   Adequate joint range of motion without pain, limitation, nor crepitation Bilateral feet and ankle joints. Muscle strength is 5/5 in all groups bilaterally.    Patient has hammertoes of digits 2-5 bilateral partially reducible without symptom today.    Visible and palpable bunion without pain at dorsomedial 1st metatarsal head right and left.  Hallux abducted right and left partially reducible, tracks laterally without being track bound.  No ecchymosis, erythema, edema, or cardinal signs infection or signs of trauma same foot.     Neurological: She displays " no atrophy and no tremor. No sensory deficit. She exhibits normal muscle tone.   [place]-Peggy 5.07 monofilament is intact bilateral feet. Sharp/dull sensation is also intact Bilateral feet.     Skin: Skin is warm, dry and intact. No abrasion, no ecchymosis, no lesion and no rash noted. She is not diaphoretic. No cyanosis or erythema. No pallor. Nails show no clubbing.   Toenails 1-5 bilaterally are elongated by 2-3 mm, thickened by 2-3 mm, discolored/yellowed, dystrophic, brittle with subungual debris.      Interdigital Spaces clean, dry and without evidence of break in skin integrity   Psychiatric: She has a normal mood and affect. Her speech is normal.   Nursing note and vitals reviewed.          Assessment:       Encounter Diagnoses   Name Primary?    Comprehensive diabetic foot examination, type 2 DM, encounter for Yes    Chronic kidney disease, stage 3     Nail dermatophytosis     Onychauxis     Hammer toes of both feet     Hallux abducto valgus, right     Hallux abducto valgus, left     Type II diabetes mellitus with peripheral circulatory disorder          Plan:       Sarina was seen today for diabetes mellitus, diabetic foot exam and nail care.    Diagnoses and all orders for this visit:    Comprehensive diabetic foot examination, type 2 DM, encounter for  -     DIABETIC SHOES FOR HOME USE    Chronic kidney disease, stage 3  -     DIABETIC SHOES FOR HOME USE    Nail dermatophytosis    Onychauxis    Hammer toes of both feet  -     DIABETIC SHOES FOR HOME USE    Hallux abducto valgus, right  -     DIABETIC SHOES FOR HOME USE    Hallux abducto valgus, left  -     DIABETIC SHOES FOR HOME USE    Type II diabetes mellitus with peripheral circulatory disorder      I counseled the patient on her conditions, their implications and medical management.     - Shoe inspection. Diabetic Foot Education. Patient reminded of the importance of good nutrition and blood sugar control to help prevent podiatric  complications of diabetes. Patient instructed on proper foot hygeine. We discussed wearing proper shoe gear, daily foot inspections, never walking without protective shoe gear.    - Rx diabetic shoes for protection and support    - Discussed condition and treatment options with pt, as well as poor results with most treatments. Discussed filing nails, using antifungal topical ointment vs oral treatment options. Instructed patient on the importance of keeping fungus off of skin while treating nails. Patient instructed to use absorbent cotton socks and change them if they become sweaty; or wear an open-toe shoe or sandal. Wash the feet at least once a day with soap and water. Patient wants to try home remedies. Instructed patient that it takes time for symptoms to completely dissipate. Patient instructed to use lysol or over-the-counter antifungal powders or sprays to shoes daily and allow them to air dry, switching shoes from every other day would be optimal. Patient is to avoid barefoot walking in  high-risk environments (public showers, gyms and locker rooms) may prevent future infections.     -  She will continue to monitor the areas daily, inspect her feet, wear protective shoe gear when ambulatory, moisturizer to maintain skin integrity and follow in this office in approximately 12 months, sooner p.r.n or as Procedure B.

## 2018-03-26 NOTE — PATIENT INSTRUCTIONS
Recommend lotions: eucerin, eucerin for diabetics, aquaphor, A&D ointment, gold bond for diabetics, sween, [place]'s Bees all purpose baby ointment,  urea 40 with aloe (found on amazon.com)    Shoe recommendations: (try 6pm.com, zappos.com , nordstromrack.Runivermag, or shoes.Runivermag for discounted prices) you can visit DSW shoes in [place]  or "[employer] [facility] in the [facility] (there are also several shoe brand outlets in the [facility])    Asics (GT 2000 or gel foundations), new balance stability type shoes, saucony (stabil c3),  Barry (GTS or Beast or transcend), vionic, propet (tennis shoe)    sofft brand, clarks, crocs, aerosoles, naturalizers, SAS, ecco, born, alok russo, rockports (dress shoes)    Vionic, burkenstocks, fitflops, propet (sandals)  Nike comfort thong sandals, crocs, propet (house shoes)    Nail Home remedy:  Vicks Vapor rub to nails for easier managability    Occasional soaks for 15-20 mins in luke warm water with 1 cup of listerine and 1 cup of apple cider vinegar are ok You may add several drops of oil of oregano or tea tree oil as well        Diabetes: Inspecting Your Feet  Diabetes increases your chances of developing foot problems. So inspect your feet every day. This helps you find small skin irritations before they become serious infections. If you have trouble seeing the bottoms of your feet, use a mirror or ask a family member or friend to help.     Pressure spots on the bottom of the foot are common areas where problems develop.   How to check your feet  Below are tips to help you look for foot problems. Try to check your feet at the same time each day, such as when you get out of bed in the morning:  · Check the top of each foot. The tops of toes, back of the heel, and outer edge of the foot can get a lot of rubbing from poor-fitting shoes.  · Check the bottom of each foot. Daily wear and tear often leads to problems at pressure spots.  · Check the toes and nails. Fungal infections often occur  between toes. Toenail problems can also be a sign of fungal infections or lead to breaks in the skin.  · Check your shoes, too. Loose objects inside a shoe can injure the foot. Use your hand to feel inside your shoes for things like miguel, loose stitching, or rough areas that could irritate your skin.  Warning signs  Look for any color changes in the foot. Redness with streaks can signal a severe infection, which needs immediate medical attention. Tell your doctor right away if you have any of these problems:  · Swelling, sometimes with color changes, may be a sign of poor blood flow or infection. Symptoms include tenderness and an increase in the size of your foot.  · Warm or hot areas on your feet may be signs of infection. A foot that is cold may not be getting enough blood.  · Sensations such as burning, tingling, or pins and needles can be signs of a problem. Also check for areas that may be numb.  · Hot spots are caused by friction or pressure. Look for hot spots in areas that get a lot of rubbing. Hot spots can turn into blisters, calluses, or sores.  · Cracks and sores are caused by dry or irritated skin. They are a sign that the skin is breaking down, which can lead to infection.  · Toenail problems to watch for include nails growing into the skin (ingrown toenail) and causing redness or pain. Thick, yellow, or discolored nails can signal a fungal infection.  · Drainage and odor can develop from untreated sores and ulcers. Call your doctor right away if you notice white or yellow drainage, bleeding, or unpleasant odor.   © 0638-4584 [employer]. [address] [place] [address]. All rights reserved. This information is not intended as a substitute for professional medical care. Always follow your healthcare professional's instructions.        Step-by-Step:  Inspecting Your Feet (Diabetes)    Date Last Reviewed: 10/1/2016  © 6150-2164 [employer]. [address] [place] [address]  Road, MARIELA James [address]. All rights reserved. This information is not intended as a substitute for professional medical care. Always follow your healthcare professional's instructions.

## 2018-03-28 ENCOUNTER — OFFICE VISIT (OUTPATIENT)
Dept: FAMILY MEDICINE | Facility: CLINIC | Age: 68
End: 2018-03-28
Payer: MEDICARE

## 2018-03-28 VITALS
HEART RATE: 90 BPM | BODY MASS INDEX: 34.34 KG/M2 | DIASTOLIC BLOOD PRESSURE: 70 MMHG | SYSTOLIC BLOOD PRESSURE: 124 MMHG | TEMPERATURE: 97 F | WEIGHT: 193.81 LBS | OXYGEN SATURATION: 95 % | HEIGHT: 63 IN

## 2018-03-28 DIAGNOSIS — M81.0 OSTEOPOROSIS, POST-MENOPAUSAL: ICD-10-CM

## 2018-03-28 DIAGNOSIS — N18.30 CKD (CHRONIC KIDNEY DISEASE) STAGE 3, GFR 30-59 ML/MIN: ICD-10-CM

## 2018-03-28 DIAGNOSIS — Z00.00 ROUTINE MEDICAL EXAM: Primary | ICD-10-CM

## 2018-03-28 DIAGNOSIS — I65.23 BILATERAL CAROTID ARTERY STENOSIS: ICD-10-CM

## 2018-03-28 DIAGNOSIS — E11.9 DM TYPE 2 WITHOUT RETINOPATHY: ICD-10-CM

## 2018-03-28 DIAGNOSIS — E21.0 PRIMARY HYPERPARATHYROIDISM: ICD-10-CM

## 2018-03-28 DIAGNOSIS — I73.9 PVD (PERIPHERAL VASCULAR DISEASE) WITH CLAUDICATION: ICD-10-CM

## 2018-03-28 DIAGNOSIS — E78.5 HYPERLIPIDEMIA, UNSPECIFIED HYPERLIPIDEMIA TYPE: ICD-10-CM

## 2018-03-28 DIAGNOSIS — F17.200 TOBACCO USE DISORDER: ICD-10-CM

## 2018-03-28 DIAGNOSIS — D64.9 ANEMIA, UNSPECIFIED TYPE: ICD-10-CM

## 2018-03-28 DIAGNOSIS — Z79.4 LONG-TERM INSULIN USE: ICD-10-CM

## 2018-03-28 DIAGNOSIS — I10 ESSENTIAL HYPERTENSION: ICD-10-CM

## 2018-03-28 DIAGNOSIS — K21.9 GASTROESOPHAGEAL REFLUX DISEASE, ESOPHAGITIS PRESENCE NOT SPECIFIED: ICD-10-CM

## 2018-03-28 DIAGNOSIS — M47.816 LUMBAR ARTHROPATHY: ICD-10-CM

## 2018-03-28 DIAGNOSIS — J44.9 CHRONIC OBSTRUCTIVE PULMONARY DISEASE, UNSPECIFIED COPD TYPE: ICD-10-CM

## 2018-03-28 DIAGNOSIS — Z86.010 HISTORY OF COLONIC POLYPS: ICD-10-CM

## 2018-03-28 PROCEDURE — 99397 PER PM REEVAL EST PAT 65+ YR: CPT | Mod: S$GLB,,, | Performed by: INTERNAL MEDICINE

## 2018-03-28 PROCEDURE — 3078F DIAST BP <80 MM HG: CPT | Mod: CPTII,S$GLB,, | Performed by: INTERNAL MEDICINE

## 2018-03-28 PROCEDURE — 3074F SYST BP LT 130 MM HG: CPT | Mod: CPTII,S$GLB,, | Performed by: INTERNAL MEDICINE

## 2018-03-28 PROCEDURE — 99214 OFFICE O/P EST MOD 30 MIN: CPT | Mod: 25,S$GLB,, | Performed by: INTERNAL MEDICINE

## 2018-03-28 PROCEDURE — 99999 PR PBB SHADOW E&M-EST. PATIENT-LVL III: CPT | Mod: PBBFAC,,, | Performed by: INTERNAL MEDICINE

## 2018-03-28 PROCEDURE — 99499 UNLISTED E&M SERVICE: CPT | Mod: S$GLB,,, | Performed by: INTERNAL MEDICINE

## 2018-03-28 PROCEDURE — 3045F PR MOST RECENT HEMOGLOBIN A1C LEVEL 7.0-9.0%: CPT | Mod: CPTII,S$GLB,, | Performed by: INTERNAL MEDICINE

## 2018-04-04 DIAGNOSIS — Z12.11 SCREEN FOR COLON CANCER: Primary | ICD-10-CM

## 2018-04-05 ENCOUNTER — HOSPITAL ENCOUNTER (OUTPATIENT)
Dept: RADIOLOGY | Facility: CLINIC | Age: 68
Discharge: HOME | End: 2018-04-05
Attending: INTERNAL MEDICINE
Payer: MEDICARE

## 2018-04-05 DIAGNOSIS — M81.0 OSTEOPOROSIS, POST-MENOPAUSAL: ICD-10-CM

## 2018-04-05 PROCEDURE — 77080 DXA BONE DENSITY AXIAL: CPT | Mod: TC,PO

## 2018-04-05 PROCEDURE — 77080 DXA BONE DENSITY AXIAL: CPT | Mod: 26,,, | Performed by: INTERNAL MEDICINE

## 2018-04-23 ENCOUNTER — TELEPHONE (OUTPATIENT)
Dept: FAMILY MEDICINE | Facility: CLINIC | Age: 68
End: 2018-04-23

## 2018-04-23 NOTE — TELEPHONE ENCOUNTER
Received fax from pharmacy stating that the pt stated that the  Changed her insulin Novolog from 12 units tid to 20 units tid, please advise if this is correct.

## 2018-04-24 NOTE — TELEPHONE ENCOUNTER
Last note reviewed and looks like we did NOT discuss any changed in the novolog- ask what dose she HAS been taking and just continue--we can change the script to reflect that BUT DR DUNAWAY did not make adjustments- script might have been lagging    Last A1c was better going from 9 down to 7.3 so keep doing what you have been doing and repeat labs in June- call us then to set PRIOR to an appt

## 2018-04-26 ENCOUNTER — HOSPITAL ENCOUNTER (OUTPATIENT)
Facility: HOSPITAL | Age: 68
Discharge: HOME | End: 2018-04-26
Attending: INTERNAL MEDICINE | Admitting: INTERNAL MEDICINE
Payer: MEDICARE

## 2018-04-26 ENCOUNTER — ANESTHESIA EVENT (OUTPATIENT)
Dept: ENDOSCOPY | Facility: HOSPITAL | Age: 68
End: 2018-04-26
Payer: MEDICARE

## 2018-04-26 ENCOUNTER — ANESTHESIA (OUTPATIENT)
Dept: ENDOSCOPY | Facility: HOSPITAL | Age: 68
End: 2018-04-26
Payer: MEDICARE

## 2018-04-26 ENCOUNTER — SURGERY (OUTPATIENT)
Age: 68
End: 2018-04-26

## 2018-04-26 VITALS
HEIGHT: 63 IN | WEIGHT: 192 LBS | TEMPERATURE: 98 F | SYSTOLIC BLOOD PRESSURE: 132 MMHG | DIASTOLIC BLOOD PRESSURE: 63 MMHG | RESPIRATION RATE: 18 BRPM | OXYGEN SATURATION: 98 % | HEART RATE: 76 BPM | BODY MASS INDEX: 34.02 KG/M2

## 2018-04-26 DIAGNOSIS — Z12.11 SCREEN FOR COLON CANCER: ICD-10-CM

## 2018-04-26 LAB — POCT GLUCOSE: 183 MG/DL (ref 70–110)

## 2018-04-26 PROCEDURE — D9220A PRA ANESTHESIA: Mod: CRNA,,, | Performed by: NURSE ANESTHETIST, CERTIFIED REGISTERED

## 2018-04-26 PROCEDURE — 37000009 HC ANESTHESIA EA ADD 15 MINS: Performed by: INTERNAL MEDICINE

## 2018-04-26 PROCEDURE — 25000003 PHARM REV CODE 250: Performed by: ANESTHESIOLOGY

## 2018-04-26 PROCEDURE — D9220A PRA ANESTHESIA: Mod: ANES,,, | Performed by: ANESTHESIOLOGY

## 2018-04-26 PROCEDURE — 88305 TISSUE EXAM BY PATHOLOGIST: CPT | Mod: 26,,, | Performed by: PATHOLOGY

## 2018-04-26 PROCEDURE — 27201012 HC FORCEPS, HOT/COLD, DISP: Performed by: INTERNAL MEDICINE

## 2018-04-26 PROCEDURE — 88305 TISSUE EXAM BY PATHOLOGIST: CPT | Performed by: PATHOLOGY

## 2018-04-26 PROCEDURE — 63600175 PHARM REV CODE 636 W HCPCS: Performed by: NURSE ANESTHETIST, CERTIFIED REGISTERED

## 2018-04-26 PROCEDURE — 37000008 HC ANESTHESIA 1ST 15 MINUTES: Performed by: INTERNAL MEDICINE

## 2018-04-26 PROCEDURE — 45380 COLONOSCOPY AND BIOPSY: CPT | Performed by: INTERNAL MEDICINE

## 2018-04-26 RX ORDER — SODIUM CHLORIDE 9 MG/ML
INJECTION, SOLUTION INTRAVENOUS CONTINUOUS
Status: DISCONTINUED | OUTPATIENT
Start: 2018-04-26 | End: 2018-04-26 | Stop reason: HOSPADM

## 2018-04-26 RX ORDER — LIDOCAINE HYDROCHLORIDE 10 MG/ML
1 INJECTION, SOLUTION EPIDURAL; INFILTRATION; INTRACAUDAL; PERINEURAL ONCE
Status: DISCONTINUED | OUTPATIENT
Start: 2018-04-26 | End: 2018-04-26 | Stop reason: HOSPADM

## 2018-04-26 RX ORDER — PROPOFOL 10 MG/ML
VIAL (ML) INTRAVENOUS
Status: DISCONTINUED
Start: 2018-04-26 | End: 2018-04-26 | Stop reason: HOSPADM

## 2018-04-26 RX ORDER — LIDOCAINE HCL/PF 100 MG/5ML
SYRINGE (ML) INTRAVENOUS
Status: DISCONTINUED | OUTPATIENT
Start: 2018-04-26 | End: 2018-04-26

## 2018-04-26 RX ORDER — PROPOFOL 10 MG/ML
VIAL (ML) INTRAVENOUS
Status: DISCONTINUED | OUTPATIENT
Start: 2018-04-26 | End: 2018-04-26

## 2018-04-26 RX ORDER — LIDOCAINE HYDROCHLORIDE 20 MG/ML
INJECTION, SOLUTION EPIDURAL; INFILTRATION; INTRACAUDAL; PERINEURAL
Status: DISCONTINUED
Start: 2018-04-26 | End: 2018-04-26 | Stop reason: HOSPADM

## 2018-04-26 RX ADMIN — PROPOFOL 40 MG: 10 INJECTION, EMULSION INTRAVENOUS at 09:04

## 2018-04-26 RX ADMIN — SODIUM CHLORIDE: 0.9 INJECTION, SOLUTION INTRAVENOUS at 08:04

## 2018-04-26 RX ADMIN — LIDOCAINE HYDROCHLORIDE 100 MG: 20 INJECTION, SOLUTION INTRAVENOUS at 08:04

## 2018-04-26 RX ADMIN — PROPOFOL 80 MG: 10 INJECTION, EMULSION INTRAVENOUS at 08:04

## 2018-04-26 NOTE — ANESTHESIA POSTPROCEDURE EVALUATION
"Anesthesia Post Evaluation    Patient: Sarina Genao    Procedure(s) Performed: Procedure(s) (LRB):  COLONOSCOPY (N/A)    Final Anesthesia Type: general  Patient location during evaluation: GI PACU  Patient participation: Yes- Able to Participate  Level of consciousness: awake and alert, awake and oriented  Post-procedure vital signs: reviewed and stable  Pain management: adequate  Airway patency: patent  PONV status at discharge: No PONV  Anesthetic complications: no      Cardiovascular status: blood pressure returned to baseline and hemodynamically stable  Respiratory status: unassisted, spontaneous ventilation and room air  Hydration status: euvolemic  Follow-up not needed.        Visit Vitals  BP (!) 116/59 (BP Location: Left arm, Patient Position: Lying)   Pulse 80   Temp 36.8 °C (98.2 °F) (Oral)   Resp 12   Ht 5' 3" (1.6 m)   Wt 87.1 kg (192 lb)   SpO2 100%   Breastfeeding? No   BMI 34.01 kg/m²       Pain/Graham Score: Pain Assessment Performed: Yes (4/26/2018  9:19 AM)  Presence of Pain: denies (4/26/2018  9:19 AM)  Graham Score: 10 (4/26/2018  9:19 AM)      "

## 2018-04-26 NOTE — TRANSFER OF CARE
"Anesthesia Transfer of Care Note    Patient: Sarina Genao    Procedure(s) Performed: Procedure(s) (LRB):  COLONOSCOPY (N/A)    Patient location: PACU    Anesthesia Type: general    Transport from OR: Transported from OR on room air with adequate spontaneous ventilation    Post pain: adequate analgesia    Post assessment: no apparent anesthetic complications    Post vital signs: stable    Level of consciousness: awake    Nausea/Vomiting: no nausea/vomiting    Complications: none    Transfer of care protocol was followed      Last vitals:   Visit Vitals  BP (!) 116/59 (BP Location: Left arm, Patient Position: Lying)   Pulse 80   Temp 36.8 °C (98.2 °F) (Oral)   Resp 12   Ht 5' 3" (1.6 m)   Wt 87.1 kg (192 lb)   SpO2 100%   Breastfeeding? No   BMI 34.01 kg/m²     "

## 2018-04-26 NOTE — PROVATION PATIENT INSTRUCTIONS
Discharge Summary/Instructions after an Endoscopic Procedure  Patient Name: Sarina Genao  Patient MRN: [id number]  Patient YOB: 1950  Thursday, April 26, 2018  Benjamin Zamora MD  RESTRICTIONS:  During your procedure today, you received medications for sedation.  These   medications may affect your judgment, balance and coordination.  Therefore,   for 24 hours, you have the following restrictions:   - DO NOT drive a car, operate machinery, make legal/financial decisions,   sign important papers or drink alcohol.    ACTIVITY:  The following day: return to full activity including work, except no heavy   lifting, straining or running for 3 days if polyps were removed.  DIET:  Eat and drink normally unless instructed otherwise.     TREATMENT FOR COMMON SIDE EFFECTS:  - Mild abdominal pain, nausea, belching, bloating or excessive gas:  rest,   eat lightly and use a heating pad.  - Sore Throat: treat with throat lozenges and/or gargle with warm salt   water.  - Because air was used during the procedure, expelling large amounts of air   from your rectum or belching is normal.  - If a bowel prep was taken, you may not have a bowel movement for 1-3 days.    This is normal.  SYMPTOMS TO WATCH FOR AND REPORT TO YOUR PHYSICIAN:  1. Abdominal pain or bloating, other than gas cramps.  2. Chest pain.  3. Back pain.  4. Signs of infection such as: chills or fever occurring within 24 hours   after the procedure.  5. Rectal bleeding, which would show as bright red, maroon, or black stools.   (A tablespoon of blood from the rectum is not serious, especially if   hemorrhoids are present.)  6. Vomiting.  7. Weakness or dizziness.  GO DIRECTLY TO THE NEAREST EMERGENCY ROOM IF YOU HAVE ANY OF THE FOLLOWING:      Difficulty breathing              Chills and/or fever over 101 F   Persistent vomiting and/or vomiting blood   Severe abdominal pain   Severe chest pain   Black, tarry stools   Bleeding- more than one  tablespoon   Any other symptom or condition that you feel may need urgent attention  Your doctor recommends these additional instructions:  If any biopsies were taken, your doctors clinic will contact you in 1 to 2   weeks with any results.  - Discharge patient to home.   - High fiber diet.   - Continue present medications.   - Await pathology results.   - Repeat colonoscopy in 5 years for surveillance based on pathology results.     - Return to primary care physician as previously scheduled.   - Return to GI clinic PRN.   - The findings and recommendations were discussed with the patient's family.     - Patient has a contact number available for emergencies.  The signs and   symptoms of potential delayed complications were discussed with the   patient.  Return to normal activities tomorrow.  Written discharge   instructions were provided to the patient.  For questions, problems or results please call your physician - Benjamin Zamora MD at Work:  [phone].  [facility] can be reached at [phone]     IF A COMPLICATION OR EMERGENCY SITUATION ARISES AND YOU ARE UNABLE TO REACH   YOUR PHYSICIAN - GO DIRECTLY TO THE EMERGENCY ROOM.  Benjamin Zamora MD  4/26/2018 9:19:35 AM  This report has been verified and signed electronically.

## 2018-04-26 NOTE — ANESTHESIA PREPROCEDURE EVALUATION
04/26/2018  Sarina Genao is a 67 y.o., female.    Anesthesia Evaluation    I have reviewed the Patient Summary Reports.    I have reviewed the Nursing Notes.   I have reviewed the Medications.     Review of Systems  Anesthesia Hx:  No problems with previous Anesthesia  History of prior surgery of interest to airway management or planning: (s/p colonoscopy/bladder lift/bladder stone removal) Previous anesthesia: General Denies Family Hx of Anesthesia complications.   Denies Personal Hx of Anesthesia complications.   Social:  Smoker, No Alcohol Use  Patient's occupation is retired. Tobacco Use: Active smoker of cigarette for 30 years, < 1/2 a pack per day, Smoking Cessation discussion. Denies Alcohol Use.   Hematology/Oncology:         -- Anemia: -- Denies Leukemia:   Renal Cell (Kidney)   -- Coag Disorders: Denies Bleeding Disorder:  Current/Recent Cancer. (possibly) Oncology Comments: Hx renal cell carcinoma     EENT/Dental:   Full upper/lower dentures; cataract Eyes: Visual Impairment Eye Disease: Diabetic Retinopathy    Denies ear symptoms  Denies Nasal Symptoms.  Denies Throat Symptoms  Active Dental Problems (full dentures)  Denies Jaw Problems   Cardiovascular:   Exercise tolerance: poor Hypertension hyperlipidemia  Functional Capacity good / => 4 METS, jumping jacks/walk x1 mile/day/upper arm exercises/housework/yardwork  Denies Cardiovascular Symptoms.  Denies Coronary Artery Disease.   Denies Hx of Heart Murmur.   CHD Symptoms/Signs:  Murmur As a child  Denies Deep Venous Thrombosis (DVT)  HypertensionPre-Eclampsia: 0. , Recent typical home B/P of 120/70s-80s   Pulmonary:   COPD Smoker Denies Pulmonary Symptoms.  Denies Asthma.   Acute Bronchitis:  Denies Obstructive Sleep Apnea (HERON) Possible Obstructive Sleep Apnea , (STOP/BANG) Symptoms S - Snoring (loud)  Denies Pulmonary Infection.     Renal/:   Chronic Renal Disease, CRI renal calculi  Renal Symptoms/Infections/Stones: (history of urinary incontinence)  Neoplasm/Tumor, Renal Neoplasm, Suspected Renal Cell CA.  Devices/Procedures/Surgery (s/p bladder lift/bladder stone removal). Other Renal / Gu Conditions:   Hepatic/GI:   GERD, well controlled  Esophageal / Stomach Disorders Gerd  Denies Liver Disease    Musculoskeletal:  Musculoskeletal Normal  Denies Musculoskeletal General/Symptoms  Denies Joint Disease     Neurological:  Neurology Normal   no Neuro Symptoms Denies Seizure Disorder  Denies CVA - Cerebrovasular Accident  Denies TIA - Transient Ischemic Attack    Endocrine:   Diabetes, well controlled, type 2  Diabetes, Type 2 Diabetes , Complications include Diabetic Retinopathy , controlled by oral hypoglycemics, diet. Typical AM glucose range: 120s-130s , most recent HgA1c value was 7.1 on 6/7/13.  Denies Acute Complications of Diabetes  Denies Thyroid Disease  Metabolic Disorders, Hyperlipoproteinemia, mixed hyperlipidemia  Dermatological:  Skin Normal    Psych:  Psychiatric Normal   Denies Sleep Disorder.  Denies Anxiety Disorder.   Denies Depression.   Denies Sleep Disorder.        Physical Exam  General:  Well nourished, Obesity    Airway/Jaw/Neck:  Airway Findings: Mouth Opening: Normal Tongue: Normal  Mallampati: III  Improves to II with phonation.  TM Distance: Normal, at least 6 cm  Jaw/Neck Findings:  Neck ROM: Normal ROM      Dental:  Dental Findings: Upper Dentures, Lower Dentures   Chest/Lungs:  Chest/Lungs Findings: Clear to auscultation, Normal Respiratory Rate     Heart/Vascular:  Heart Findings: Rate: Normal  Rhythm: Regular Rhythm  Sounds: Normal        Mental Status:  Mental Status Findings:  Cooperative, Alert and Oriented         Anesthesia Plan  Type of Anesthesia, risks & benefits discussed:  Anesthesia Type:  general  Patient's Preference:   Intra-op Monitoring Plan:   Intra-op Monitoring Plan Comments:   Post Op  Pain Control Plan: multimodal analgesia, IV/PO Opioids PRN and per primary service following discharge from PACU  Post Op Pain Control Plan Comments:   Induction:   IV  Beta Blocker:  Patient is not currently on a Beta-Blocker (No further documentation required).       Informed Consent: Patient understands risks and agrees with Anesthesia plan.  Questions answered. Anesthesia consent signed with patient.  ASA Score: 3     Day of Surgery Review of History & Physical:    H&P update referred to the surgeon.         Ready For Surgery From Anesthesia Perspective.

## 2018-04-26 NOTE — H&P (VIEW-ONLY)
Chief complaint: Physical exam    67-year-old black female  whose PCP is retired.  Regarding health maintenance she is up-to-date on mammogram 12/17.  It looks like she had colon polyps in 2013 with a 5 year follow-up most likely recommended.  She did quit smoking in his care and about 10 pounds over the last 6 months.  Discussed calorie intake and expenditure as well as means to reduce craving    ROS:   CONST: weight stable. EYES: no vision change. ENT: no sore throat. CV: no chest pain w/ exertion. RESP: no shortness of breath. GI: no nausea, vomiting, diarrhea. No dysphagia. : no urinary issues. MUSCULOSKELETAL: no new myalgias or arthralgias. SKIN: no new changes. NEURO: no focal deficits. PSYCH: no new issues. ENDOCRINE: no polyuria. HEME: no lymph nodes. ALLERGY: no general pruritis.    Past Medical History   Diagnosis Date    Cataracts, bilateral     CKD (chronic kidney disease) stage 3, GFR 30-59 ml/min 12/15/2014    Combined hyperlipidemia associated with type 2 diabetes mellitus 6/7/2013    COPD (chronic obstructive pulmonary disease) 12/15/2014    Diabetes mellitus type II----with chronic kidney disease           Diabetes mellitus with renal manifestations, uncontrolled 2/1/2017    Diabetic retinopathy     Family history of stomach cancer 12/5/2013    H/O renal cell carcinoma 12/15/2015    History of colonic polyps 2/1/2017     3 polyps 7/13 ---5 yrs    History of gastroesophageal reflux (GERD)     History of urinary incontinence      s/p bladder lift-october, 2011 (at [facility])    Hyperlipidemia     Hypertension      120s/70s-80s    Nephrolithiasis     Osteoporosis, post-menopausal, evaluated by Dr. York, quit Fosamax due to CKD early 2017  3/17/2014    Primary hyperparathyroidism 10/20/2016    Schatzki's ring 2/1/2017     Dilated 2014   Prevnar 2017    Past Surgical History:   Procedure Laterality Date    bladder lift  2011    done at [facility]    BLADDER STONE REMOVAL  2011     before bladder lift    CATARACT EXTRACTION W/  INTRAOCULAR LENS IMPLANT Left 06/07/2016    Dr. Vásquez    CATARACT EXTRACTION W/  INTRAOCULAR LENS IMPLANT Right 06/21/2016    Dr. Vásquez    CYSTOSCOPY      NEPHRECTOMY      partial right     Social History     Social History    Marital status:      Spouse name: N/A    Number of children: N/A    Years of education: N/A     Occupational History    retired x ray tech      Social History Main Topics    Smoking status: Current Every Day Smoker     Packs/day: 0.25     Years: 30.00     Types: Cigarettes    Smokeless tobacco: Never Used      Comment: 4-5 cigs/day    Alcohol use No    Drug use: Unknown    Sexual activity: Not on file     Other Topics Concern    Not on file     Social History Narrative    [place] [facility]    Here with sister Kate [phone]             family history includes Cataracts in her mother; Colon cancer in her brother; Glaucoma in her mother; Nephrolithiasis in her sister; No Known Problems in her father, maternal aunt, maternal grandfather, maternal grandmother, maternal uncle, paternal aunt, paternal grandfather, paternal grandmother, and paternal uncle.     Gen: no distress  EYES: conjunctiva clear, non-icteric, PERRL  ENT: nose clear, nasal mucosa normal, oropharynx clear and moist, teeth good  NECK:supple, thyroid non-palpable  RESP: effort is good, lungs clear  CV: heart RRR w/o murmur, gallops or rubs; no carotid bruits, no edema at all today even at the ankles  GI: abdomen soft, non-distended, non-tender, no hepatosplenomegaly  MS: gait normal, no clubbing or cyanosis of the digits  SKIN: no rashes, warm to touch    Sarina was seen today for establish care.    Diagnoses and all orders for this visit:    Routine medical exam, new to me, up-to-date on mammogram and due for colonoscopy, continue smoking cessation                          Additional evaluation and management issues:    Additionally, patient  has numerous other medical issues to address today.  She has hypertension which appears to be under good control and needs refills of her medication.  She does get some edema at the end of the day that is gone in the morning and I reassured her about that but she is concerned and on Norvasc 5 mg.  Blood pressure is excellent and we will discontinue and have her monitor.  She could have hurt Diovan increased if needed.  She has diabetes which is still uncontrolled and apparently was seeing endocrine but now will be seeing me.  Her A1c is 7.3.  She does have chronic renal insufficiency and follows with nephrology.  I explained her chronic kidney disease.  Her GFR is actually better.  She is on Lantus 40 units and needs to switch to Levemir due to insurance.  We will reassess on her current medications.  She has a lot of reflux lately.  She's been taking some fish all.  Her reflux is worse at night despite use of Zantac.  We discussed reflux precautions at great length and also will try omeprazole 40 mg.  She apparently was given Protonix in the past and had sugars go over 500 and I reassured them I cannot explain how that would have worsened her sugar would apparently might of been a listed side effect and her family member present insists it was probably that medicine.  They will watch for any sugar related issues with the start of omeprazole but also reassess diet which may have changed with prior reflux issues.  She apparently is primary hyperparathyroidism and a history of hypercalcemia but all recent labs appear to be normal as well as PTH.  Vitamin D level also normal.  She apparently is on a weekly medication for many can for osteoporosis.  She has a history of COPD but no active symptoms lately.  She has hyperlipidemia and may been on Lipitor in the past with an unremembered intolerance.  We discussed benefits of statin and her LDL has responded  .  All these issues reviewed and patient counseled an evaluation  and management of all the separate issues we based upon time counselingTotal time over 25 minutes with over 50% counseling.            Assessment and plan:      Essential hypertension, chronic and stable but she is concerned about the edema which I reassured her about the we will discontinue Norvasc 5 mg    Uncontrolled type 2 diabetes mellitus with stage 3 chronic kidney disease, with long-term current use of insulin, may need to increase insulin, change insulin to Levemir, discussed low sugars with glipizide etc.    CKD (chronic kidney disease) stage 3, GFR 30-59 ml/min, followed by nephrology but improving    Hyperlipidemia, unspecified hyperlipidemia type, better LDL    Long-term insulin use    Anemia, unspecified type, chronic and stable    Primary hyperparathyroidism, followed by nephrology    History of colonic polyps, due for 5 year follow-up  -     Case request GI: COLONOSCOPY    Gastroesophageal reflux disease, esophagitis presence not specified, chronic and stable    Bilateral carotid artery stenosis    PVD (peripheral vascular disease) with claudication, reviewed ABIs which have improved with aspirin and smoking cessation apparently    Tobacco use disorder    Chronic obstructive pulmonary disease, unspecified COPD type    Lumbar arthropathy    DM type 2 without retinopathy    Osteoporosis, post-menopausal, due for two-year follow-up and she has been off Fosamax for about 1 year, may be a candidate for Prolia may refer back to Dr. York after bone density  -     DXA Bone Density Spine And Hip; Future    Other orders  -     insulin detemir U-100 (LEVEMIR FLEXTOUCH) 100 unit/mL (3 mL) SubQ InPn pen; Inject 50 Units into the skin every evening.

## 2018-05-07 ENCOUNTER — OFFICE VISIT (OUTPATIENT)
Dept: OPHTHALMOLOGY | Facility: CLINIC | Age: 68
End: 2018-05-07
Payer: MEDICARE

## 2018-05-07 DIAGNOSIS — Z96.1 PSEUDOPHAKIA: ICD-10-CM

## 2018-05-07 DIAGNOSIS — H26.491 PCO (POSTERIOR CAPSULAR OPACIFICATION), RIGHT: Primary | ICD-10-CM

## 2018-05-07 DIAGNOSIS — I10 ESSENTIAL HYPERTENSION: ICD-10-CM

## 2018-05-07 DIAGNOSIS — H52.7 REFRACTIVE ERROR: ICD-10-CM

## 2018-05-07 DIAGNOSIS — E11.9 DM TYPE 2 WITHOUT RETINOPATHY: ICD-10-CM

## 2018-05-07 PROCEDURE — 92014 COMPRE OPH EXAM EST PT 1/>: CPT | Mod: S$GLB,,, | Performed by: OPHTHALMOLOGY

## 2018-05-07 PROCEDURE — 99999 PR PBB SHADOW E&M-EST. PATIENT-LVL II: CPT | Mod: PBBFAC,,, | Performed by: OPHTHALMOLOGY

## 2018-05-07 RX ORDER — ASPIRIN 81 MG/1
81 TABLET ORAL DAILY
COMMUNITY

## 2018-05-07 NOTE — PROGRESS NOTES
Subjective:       Patient ID: Sarina Genao is a 67 y.o. female.    Chief Complaint: Diabetic Eye Exam    HPI     DSL- 2/9/17     Pt states no Va change. Pt denies eye allergies, floaters, and flashes.   BSL was 132 this morning.     Eyemeds  No gtt    Hemoglobin A1C       Date                     Value               Ref Range             Status                03/23/2018               7.3 (H)             4.0 - 5.6 %           Final              Comment:    According to ADA guidelines, hemoglobin A1c <7.0% represents  optimal   control in non-pregnant diabetic patients. Different  metrics may apply to   specific patient populations.   Standards of Medical Care in   Diabetes-2016.  For the purpose of screening for the presence of   diabetes:  <5.7%     Consistent with the absence of diabetes  5.7-6.4%    Consistent with increasing risk for diabetes   (prediabetes)  >or=6.5%    Consistent with diabetes  Currently, no consensus exists for use of   hemoglobin A1c  for diagnosis of diabetes for children.  This Hemoglobin   A1c assay has significant interference with fetal   hemoglobin   (HbF).   The results are invalid for patients with abnormal amounts of   HbF,     including those with known Hereditary Persistence   of Fetal Hemoglobin.   Heterozygous hemoglobin variants (HbAS, HbAC,   HbAD, HbAE, HbA2) do not   significantly interfere with this assay;   however, presence of multiple   variants in a sample may impact the %   interference.         11/20/2017               9.0 (H)             4.0 - 5.6 %           Final              Comment:    According to ADA guidelines, hemoglobin A1c <7.0% represents  optimal   control in non-pregnant diabetic patients. Different  metrics may apply to   specific patient populations.   Standards of Medical Care in   Diabetes-2016.  For the purpose of screening for the presence of   diabetes:  <5.7%     Consistent with the absence of diabetes  5.7-6.4%    Consistent  with increasing risk for diabetes   (prediabetes)  >or=6.5%    Consistent with diabetes  Currently, no consensus exists for use of   hemoglobin A1c  for diagnosis of diabetes for children.  This Hemoglobin   A1c assay has significant interference with fetal   hemoglobin   (HbF).   The results are invalid for patients with abnormal amounts of   HbF,     including those with known Hereditary Persistence   of Fetal Hemoglobin.   Heterozygous hemoglobin variants (HbAS, HbAC,   HbAD, HbAE, HbA2) do not   significantly interfere with this assay;   however, presence of multiple   variants in a sample may impact the %   interference.         08/01/2017               7.0 (H)             4.0 - 5.6 %           Final              Comment:    According to ADA guidelines, hemoglobin A1c <7.0% represents  optimal   control in non-pregnant diabetic patients. Different  metrics may apply to   specific patient populations.   Standards of Medical Care in   Diabetes-2016.  For the purpose of screening for the presence of   diabetes:  <5.7%     Consistent with the absence of diabetes  5.7-6.4%    Consistent with increasing risk for diabetes   (prediabetes)  >or=6.5%    Consistent with diabetes  Currently, no consensus exists for use of   hemoglobin A1c  for diagnosis of diabetes for children.  This Hemoglobin   A1c assay has significant interference with fetal   hemoglobin   (HbF).   The results are invalid for patients with abnormal amounts of   HbF,     including those with known Hereditary Persistence   of Fetal Hemoglobin.   Heterozygous hemoglobin variants (HbAS, HbAC,   HbAD, HbAE, HbA2) do not   significantly interfere with this assay;   however, presence of multiple   variants in a sample may impact the %   interference.    ----------        Last edited by Dragan Colon on 5/7/2018 10:40 AM. (History)             Assessment:       1. PCO (posterior capsular opacification), right    2. DM type 2 without retinopathy    3.  Essential hypertension    4. Refractive error    5. Pseudophakia        Plan:    Early PCO OD- Not Visually Significant.   DM-No NPDR OU.  HTN-No retinopathy OU.          Control DM & HTN.  Give MRx.  RTC 1 yr.

## 2018-06-05 RX ORDER — INSULIN ASPART 100 [IU]/ML
INJECTION, SOLUTION INTRAVENOUS; SUBCUTANEOUS
Qty: 45 ML | Refills: 0 | Status: SHIPPED | OUTPATIENT
Start: 2018-06-05 | End: 2018-10-03

## 2018-06-07 ENCOUNTER — TELEPHONE (OUTPATIENT)
Dept: FAMILY MEDICINE | Facility: CLINIC | Age: 68
End: 2018-06-07

## 2018-06-07 NOTE — TELEPHONE ENCOUNTER
----- Message from Jailene Brito sent at 6/5/2018 12:24 PM CDT -----  Contact: self  [phone]  Pt is requesting a refill on Novalog insulin, she also wants the dosage changed, Pls call [facility] [phone]. Thanks............Sharon

## 2018-07-09 ENCOUNTER — TELEPHONE (OUTPATIENT)
Dept: FAMILY MEDICINE | Facility: CLINIC | Age: 68
End: 2018-07-09

## 2018-07-09 NOTE — TELEPHONE ENCOUNTER
----- Message from Tiffanie Sam sent at 7/9/2018  2:52 PM CDT -----  Contact: self  Pt is asking for orders for an A1C. Appointment 7/13/18 Please call back at [phone].

## 2018-07-10 ENCOUNTER — LAB VISIT (OUTPATIENT)
Dept: LAB | Facility: HOSPITAL | Age: 68
End: 2018-07-10
Attending: INTERNAL MEDICINE
Payer: MEDICARE

## 2018-07-10 ENCOUNTER — TELEPHONE (OUTPATIENT)
Dept: NEPHROLOGY | Facility: CLINIC | Age: 68
End: 2018-07-10

## 2018-07-10 DIAGNOSIS — N18.30 CHRONIC KIDNEY DISEASE, STAGE III (MODERATE): Primary | ICD-10-CM

## 2018-07-10 LAB
ANION GAP SERPL CALC-SCNC: 10 MMOL/L
BUN SERPL-MCNC: 14 MG/DL
CALCIUM SERPL-MCNC: 10.5 MG/DL
CHLORIDE SERPL-SCNC: 104 MMOL/L
CO2 SERPL-SCNC: 26 MMOL/L
CREAT SERPL-MCNC: 1.5 MG/DL
EST. GFR  (AFRICAN AMERICAN): 40.4 ML/MIN/1.73 M^2
EST. GFR  (NON AFRICAN AMERICAN): 35 ML/MIN/1.73 M^2
ESTIMATED AVG GLUCOSE: 186 MG/DL
GLUCOSE SERPL-MCNC: 137 MG/DL
HBA1C MFR BLD HPLC: 8.1 %
POTASSIUM SERPL-SCNC: 4 MMOL/L
SODIUM SERPL-SCNC: 140 MMOL/L

## 2018-07-10 PROCEDURE — 36415 COLL VENOUS BLD VENIPUNCTURE: CPT | Mod: PO

## 2018-07-10 PROCEDURE — 80048 BASIC METABOLIC PNL TOTAL CA: CPT

## 2018-07-10 PROCEDURE — 83036 HEMOGLOBIN GLYCOSYLATED A1C: CPT

## 2018-07-10 NOTE — TELEPHONE ENCOUNTER
Called pt to schedule her f/u appt  ----- Message from Ana Sanford sent at 7/10/2018 10:45 AM CDT -----  Contact: pt  Pt returning call  - to schedule appt-     Call  [phone]   First      If no answer try cell [phone] (M)    Thanks

## 2018-07-13 ENCOUNTER — OFFICE VISIT (OUTPATIENT)
Dept: FAMILY MEDICINE | Facility: CLINIC | Age: 68
End: 2018-07-13
Payer: MEDICARE

## 2018-07-13 VITALS
HEART RATE: 77 BPM | SYSTOLIC BLOOD PRESSURE: 124 MMHG | HEIGHT: 63 IN | DIASTOLIC BLOOD PRESSURE: 70 MMHG | OXYGEN SATURATION: 95 % | BODY MASS INDEX: 33.79 KG/M2 | TEMPERATURE: 98 F | WEIGHT: 190.69 LBS

## 2018-07-13 DIAGNOSIS — J44.9 CHRONIC OBSTRUCTIVE PULMONARY DISEASE, UNSPECIFIED COPD TYPE: ICD-10-CM

## 2018-07-13 DIAGNOSIS — I10 ESSENTIAL HYPERTENSION: ICD-10-CM

## 2018-07-13 DIAGNOSIS — N18.30 CKD (CHRONIC KIDNEY DISEASE) STAGE 3, GFR 30-59 ML/MIN: ICD-10-CM

## 2018-07-13 DIAGNOSIS — K21.9 GASTROESOPHAGEAL REFLUX DISEASE, ESOPHAGITIS PRESENCE NOT SPECIFIED: ICD-10-CM

## 2018-07-13 DIAGNOSIS — M81.0 OSTEOPOROSIS, POST-MENOPAUSAL: ICD-10-CM

## 2018-07-13 PROCEDURE — 99214 OFFICE O/P EST MOD 30 MIN: CPT | Mod: S$GLB,,, | Performed by: INTERNAL MEDICINE

## 2018-07-13 PROCEDURE — 99999 PR PBB SHADOW E&M-EST. PATIENT-LVL III: CPT | Mod: PBBFAC,,, | Performed by: INTERNAL MEDICINE

## 2018-07-13 PROCEDURE — 3074F SYST BP LT 130 MM HG: CPT | Mod: CPTII,S$GLB,, | Performed by: INTERNAL MEDICINE

## 2018-07-13 PROCEDURE — 99499 UNLISTED E&M SERVICE: CPT | Mod: S$GLB,,, | Performed by: INTERNAL MEDICINE

## 2018-07-13 PROCEDURE — 3045F PR MOST RECENT HEMOGLOBIN A1C LEVEL 7.0-9.0%: CPT | Mod: CPTII,S$GLB,, | Performed by: INTERNAL MEDICINE

## 2018-07-13 PROCEDURE — 3078F DIAST BP <80 MM HG: CPT | Mod: CPTII,S$GLB,, | Performed by: INTERNAL MEDICINE

## 2018-07-13 RX ORDER — AMLODIPINE BESYLATE 5 MG/1
5 TABLET ORAL DAILY
Qty: 90 TABLET | Refills: 3 | Status: SHIPPED | OUTPATIENT
Start: 2018-07-13 | End: 2020-03-04

## 2018-07-13 NOTE — PROGRESS NOTES
Chief complaint:discuss results    68-year-old black female  whose PCP is retired. Here with her sister.    .  She has hypertension which appears to be under good control and needs refills of her medication.  She does get some edema at the end of the day that is gone in the morning and I reassured her about that but she is concerned and on Norvasc 5 mg.  Blood pressure is excellent and we will discontinue and have her monitor.  She could have  Diovan increased if needed.  She has diabetes which is still uncontrolled and apparently was seeing endocrine but now will be seeing me.  Her A1c was 7.3 and now 8.1.  She reports taking her Lantus insulin to change to Levemir in the evening.  On her sugars are normal or below 100 she will skip the insulin altogether.  We discussed switching to morning dosing of Levemir so that she will not be fearful of taking her insulin and she should always take her insulin regardless of the sugar.  She also switched all her medications to p.m. Dosing based on recommendation of the pharmacist.  Since that time she's had some low sugars and shaking overnight and in the morning.  This is likely due to the glipizide as wellbut also consider if she takes too much NovoLog with her evening meal.  We will have her switch the glipizide to the morning and even cut the dose in halfif she continues to have issues.     She does have chronic renal insufficiency and follows with nephrology.  I explained her chronic kidney disease.  Her GFR is worse.  She is on  40 units  Levemir due to insurance.we made the switch off of Lantus at last visit.  .           She apparently is on a weekly medication  for osteoporosis??   recent done density shows worsening osteoporosis.apparently she was given a weekly medication but does not sound like she was too compliant with it.  She does have a degree of renal insufficiency which could precludeoral bisphosphonates and possibly intravenous bisphosphonate.  Perhaps she  is a candidate for prolia.  She would like to go back and see her endocrinologist Dr. York at the main campus to discuss options     She has a history of COPD but no active symptoms lately.  She has hyperlipidemia and may been on Lipitor in the past with an unremembered intolerance.  We discussed benefits of statin and her LDL has responded  .  All these issues reviewed and patient counseled an evaluation and management of all the separate issues we based upon time counselingTotal time over 25 minutes with over 50% counseling.    ROS:   CONST: weight stable. EYES: no vision change. ENT: no sore throat. CV: no chest pain w/ exertion. RESP: no shortness of breath. GI: no nausea, vomiting, diarrhea. No dysphagia. : no urinary issues. MUSCULOSKELETAL: no new myalgias or arthralgias. SKIN: no new changes. NEURO: no focal deficits. PSYCH: no new issues. ENDOCRINE: no polyuria. HEME: no lymph nodes. ALLERGY: no general pruritis.    Past Medical History   Diagnosis Date    Cataracts, bilateral     CKD (chronic kidney disease) stage 3, GFR 30-59 ml/min 12/15/2014    Combined hyperlipidemia associated with type 2 diabetes mellitus 6/7/2013    COPD (chronic obstructive pulmonary disease) 12/15/2014    Diabetes mellitus type II----with chronic kidney disease           Diabetes mellitus with renal manifestations, uncontrolled 2/1/2017    Diabetic retinopathy     Family history of stomach cancer 12/5/2013    H/O renal cell carcinoma 12/15/2015    History of colonic polyps 2/1/2017     3 polyps 7/13 ---5 yrs    History of gastroesophageal reflux (GERD)     History of urinary incontinence      s/p bladder lift-october, 2011 (at [facility])    Hyperlipidemia     Hypertension      120s/70s-80s    Nephrolithiasis     Osteoporosis, post-menopausal, evaluated by Dr. York, quit Fosamax due to CKD early 2017  3/17/2014    Primary hyperparathyroidism 10/20/2016    Schatzki's ring 2/1/2017     Dilated 2014   Prevnar  2017    Past Surgical History:   Procedure Laterality Date    [place]    BLADDER STONE REMOVAL  2011    before bladder lift    CATARACT EXTRACTION W/  INTRAOCULAR LENS IMPLANT Left 06/07/2016    Dr. Vásquez    CATARACT EXTRACTION W/  INTRAOCULAR LENS IMPLANT Right 06/21/2016    Dr. Vásquez    COLONOSCOPY N/A 4/26/2018    Procedure: COLONOSCOPY;  Surgeon: Benjamin Zamora MD;  Location: [facility];  Service: Endoscopy;  Laterality: N/A;  confirmed ss    CYSTOSCOPY      NEPHRECTOMY      partial right     Social History     Social History    Marital status:      Spouse name: N/A    Number of children: N/A    Years of education: N/A     Occupational History    retired x ray tech      Social History Main Topics    Smoking status: Current Every Day Smoker     Packs/day: 0.25     Years: 30.00     Types: Cigarettes    Smokeless tobacco: Never Used      Comment: 4-5 cigs/day    Alcohol use No    Drug use: Unknown    Sexual activity: Not on file     Other Topics Concern    Not on file     Social History Narrative    L[address] on [facility] [address]    Here with sister Kate [phone]             family history includes Cataracts in her mother; Colon cancer in her brother; Glaucoma in her mother; Nephrolithiasis in her sister; No Known Problems in her father, maternal aunt, maternal grandfather, maternal grandmother, maternal uncle, paternal aunt, paternal grandfather, paternal grandmother, and paternal uncle.     Gen: no distress   no edema today        Assessment and plan:      Sarina was seen today for results.    Diagnoses and all orders for this visit:    Uncontrolled type 2 diabetes mellitus with stage 3 chronic kidney disease, without long-term current use of insulin, uncontrolled probably due to skipping doses of basal insulin.  Plan as above with switching basal insulin to the morning and have her take it consistently.  Switch the glipizide back to the morning and  cut the dose in half if necessary and be careful not to take too much NovoLog withcertain meals.    Essential hypertension, chronic and stable    Osteoporosis, post-menopausal, worsening in probably contraindicated to take bisphosphonate  -     Cancel: Ambulatory referral to Endocrinology  -     Ambulatory referral to Endocrinology    CKD (chronic kidney disease) stage 3, GFR 30-59 ml/min    Chronic obstructive pulmonary disease, unspecified COPD type stable at present    Gastroesophageal reflux disease, esophagitis presence not specified stable at present    Other orders  -     amLODIPine (NORVASC) 5 MG tablet; Take 1 tablet (5 mg total) by mouth once daily.

## 2018-07-16 ENCOUNTER — TELEPHONE (OUTPATIENT)
Dept: FAMILY MEDICINE | Facility: CLINIC | Age: 68
End: 2018-07-16

## 2018-07-16 NOTE — TELEPHONE ENCOUNTER
I believe there was a recall from certain manufactures.    Please call the pharmacy and find out and also ask pharmacy what  she was getting and if they have supply of Diovan from other

## 2018-07-16 NOTE — TELEPHONE ENCOUNTER
----- Message from Janki Moser LPN sent at 7/16/2018  3:56 PM CDT -----  Contact: self      ----- Message -----  From: Noel Foley  Sent: 7/16/2018   3:53 PM  To: Leyla JACKMAN Staff    Pt states per pharmacy valsartan (DIOVAN) 160 MG tablet is no longer available. Please send alternate medication to [facility].

## 2018-07-17 ENCOUNTER — TELEPHONE (OUTPATIENT)
Dept: ENDOCRINOLOGY | Facility: CLINIC | Age: 68
End: 2018-07-17

## 2018-07-17 NOTE — TELEPHONE ENCOUNTER
Appointment with Dr. York scheduled as requested.  Appointment reminder letter mailed to address on file.

## 2018-07-17 NOTE — TELEPHONE ENCOUNTER
Pharmacy does have Diovan 160 mg. medication did indeed come from the manufacture that supply has a recall on. Suggested changing medication to another sartan.

## 2018-07-17 NOTE — TELEPHONE ENCOUNTER
----- Message from Margarette Joya sent at 7/17/2018 11:43 AM CDT -----  Contact: Self [phone]  PT PCP wants her to schedule with Dr. York. He wants Dr. York to prescribe an injectable medication for her bones. PT has been on the wait list since December.

## 2018-07-18 ENCOUNTER — TELEPHONE (OUTPATIENT)
Dept: FAMILY MEDICINE | Facility: CLINIC | Age: 68
End: 2018-07-18

## 2018-07-18 RX ORDER — IRBESARTAN 150 MG/1
150 TABLET ORAL DAILY
Qty: 90 TABLET | Refills: 3 | Status: SHIPPED | OUTPATIENT
Start: 2018-07-18 | End: 2019-07-17 | Stop reason: SDUPTHER

## 2018-07-18 NOTE — TELEPHONE ENCOUNTER
3rd request from pharmacy. They are having a hard time getting medication from another manufacture.      ----- Message from Jailene Brito sent at 7/18/2018  1:08 PM CDT -----  Contact: [phone]  Pt is to speak to you regarding her BP meds, I was trying to ask pt is this was a recall, pt yelled and said to tell the nurse to call her and pharmacy. Thanks.......Sharon    Spoke with patient and she reported not having any medication for 2 days. Please send new medication to pharmacy as requested.

## 2018-07-18 NOTE — TELEPHONE ENCOUNTER
----- Message from Jailene Brito sent at 7/18/2018  1:08 PM CDT -----  Contact: [phone]  Pt is to speak to you regarding her BP meds, I was trying to ask pt is this was a recall, pt yelled and said to tell the nurse to call her and pharmacy. Thanks.......Sharon

## 2018-07-18 NOTE — TELEPHONE ENCOUNTER
----- Message from Jailene Brito sent at 7/18/2018  1:45 PM CDT -----  Contact: [facility] [phone]  Pt medicine Valsartan was been recalled, requesting a script for Losartan, Irbesartan or Olmesartan, Pls call [facility] [phone]. Thanks......Sharon

## 2018-07-30 DIAGNOSIS — R52 PAIN: ICD-10-CM

## 2018-07-31 ENCOUNTER — TELEPHONE (OUTPATIENT)
Dept: FAMILY MEDICINE | Facility: CLINIC | Age: 68
End: 2018-07-31

## 2018-07-31 NOTE — TELEPHONE ENCOUNTER
----- Message from Goldie Grace sent at 7/31/2018  9:59 AM CDT -----  Contact: Self/ [phone]  Refill: [traMADol (ULTRAM) 50 mg tablet]. Please call pt with status. Thank you.    [facility] [id number] - [place]AR  [address] AT [facility] [address] [facility]  [address] [facility]  [place] [address]  Phone: [phone] Fax: [phone]

## 2018-08-01 RX ORDER — TRAMADOL HYDROCHLORIDE 50 MG/1
TABLET ORAL
Qty: 50 TABLET | Refills: 0 | Status: SHIPPED | OUTPATIENT
Start: 2018-08-01 | End: 2018-09-09 | Stop reason: SDUPTHER

## 2018-08-08 ENCOUNTER — LAB VISIT (OUTPATIENT)
Dept: LAB | Facility: HOSPITAL | Age: 68
End: 2018-08-08
Attending: INTERNAL MEDICINE
Payer: MEDICARE

## 2018-08-08 ENCOUNTER — TELEPHONE (OUTPATIENT)
Dept: FAMILY MEDICINE | Facility: CLINIC | Age: 68
End: 2018-08-08

## 2018-08-08 DIAGNOSIS — N18.30 CHRONIC KIDNEY DISEASE, STAGE III (MODERATE): ICD-10-CM

## 2018-08-08 LAB
ALBUMIN SERPL BCP-MCNC: 3.7 G/DL
ANION GAP SERPL CALC-SCNC: 9 MMOL/L
BUN SERPL-MCNC: 14 MG/DL
CALCIUM SERPL-MCNC: 10.3 MG/DL
CHLORIDE SERPL-SCNC: 100 MMOL/L
CO2 SERPL-SCNC: 27 MMOL/L
CREAT SERPL-MCNC: 1.4 MG/DL
EST. GFR  (AFRICAN AMERICAN): 43.9 ML/MIN/1.73 M^2
EST. GFR  (NON AFRICAN AMERICAN): 38.1 ML/MIN/1.73 M^2
GLUCOSE SERPL-MCNC: 220 MG/DL
PHOSPHATE SERPL-MCNC: 3 MG/DL
POTASSIUM SERPL-SCNC: 4.3 MMOL/L
SODIUM SERPL-SCNC: 136 MMOL/L

## 2018-08-08 PROCEDURE — 36415 COLL VENOUS BLD VENIPUNCTURE: CPT | Mod: PO

## 2018-08-08 PROCEDURE — 80069 RENAL FUNCTION PANEL: CPT

## 2018-08-08 NOTE — TELEPHONE ENCOUNTER
Realize patient obviously has a handicap tag but we need documentation of the specific reason and qualification for a handicap tag at this time    Please ask patient to what her perceived need for the handicap tag as and we will need to document that.    Inform patient there are only specific criteria now that qualify people for the tag.  Simply having had a handicap tag previously does not qualify 1 for renewal.    Also inform that Dr. DUNAWAY will be out until Monday

## 2018-08-08 NOTE — TELEPHONE ENCOUNTER
----- Message from Boyd Knox sent at 8/8/2018 10:40 AM CDT -----  PT STATES SHE NEED A FORM FOR HER TO RENEW HER HANDICAP LICENSE PLATE. SHE WOULD LIKE TO BE CALLED -[phone] or [phone]

## 2018-08-09 ENCOUNTER — TELEPHONE (OUTPATIENT)
Dept: FAMILY MEDICINE | Facility: CLINIC | Age: 68
End: 2018-08-09

## 2018-08-09 NOTE — TELEPHONE ENCOUNTER
Venancio Mayer MD          5:48 PM   Note      Realize patient obviously has a handicap tag but we need documentation of the specific reason and qualification for a handicap tag at this time     Please ask patient to what her perceived need for the handicap tag as and we will need to document that.     Inform patient there are only specific criteria now that qualify people for the tag.  Simply having had a handicap tag previously does not qualify 1 for renewal.     Also inform that Dr. DUNAWAY will be out until Monday

## 2018-08-09 NOTE — TELEPHONE ENCOUNTER
----- Message from Byron Jang sent at 8/9/2018  2:00 PM CDT -----  Contact: Self/[phone]  Patient returned staff call.    Thank you

## 2018-08-09 NOTE — TELEPHONE ENCOUNTER
Stated, she can not walk far distance and/or for a short period of time. Also, has a history of COPD. she said, you must remember speaking with her. You know about all her health issues. During her last visit, she requested a form for the license plate. Patient dropped form off to office.

## 2018-08-13 NOTE — TELEPHONE ENCOUNTER
----- Message from Byron Jang sent at 8/13/2018  1:40 PM CDT -----  Contact: Self/[phone]  Patient stating she take 15 units not 12 anymore    Refill:insulin aspart (NOVOLOG FLEXPEN) 100 unit/mL InPn pen.     [facility] [address] [place] [address] TIA [address]ING[address] AT [facility] [phone] (Phone)  [phone] (Fax)      Thank you

## 2018-08-14 RX ORDER — INSULIN ASPART 100 [IU]/ML
INJECTION, SOLUTION INTRAVENOUS; SUBCUTANEOUS
Qty: 45 ML | Refills: 12 | Status: SHIPPED | OUTPATIENT
Start: 2018-08-14 | End: 2019-05-16

## 2018-08-15 ENCOUNTER — OFFICE VISIT (OUTPATIENT)
Dept: NEPHROLOGY | Facility: CLINIC | Age: 68
End: 2018-08-15
Payer: MEDICARE

## 2018-08-15 ENCOUNTER — TELEPHONE (OUTPATIENT)
Dept: FAMILY MEDICINE | Facility: CLINIC | Age: 68
End: 2018-08-15

## 2018-08-15 VITALS
SYSTOLIC BLOOD PRESSURE: 130 MMHG | DIASTOLIC BLOOD PRESSURE: 70 MMHG | HEART RATE: 94 BPM | OXYGEN SATURATION: 97 % | WEIGHT: 185.63 LBS | HEIGHT: 63 IN | BODY MASS INDEX: 32.89 KG/M2

## 2018-08-15 DIAGNOSIS — C64.9 MALIGNANT NEOPLASM OF KIDNEY, UNSPECIFIED LATERALITY: ICD-10-CM

## 2018-08-15 DIAGNOSIS — N18.30 CHRONIC KIDNEY DISEASE (CKD), STAGE III (MODERATE): Primary | ICD-10-CM

## 2018-08-15 DIAGNOSIS — E11.21 DIABETIC NEPHROPATHY ASSOCIATED WITH TYPE 2 DIABETES MELLITUS: ICD-10-CM

## 2018-08-15 DIAGNOSIS — I73.9 PVD (PERIPHERAL VASCULAR DISEASE): ICD-10-CM

## 2018-08-15 DIAGNOSIS — I10 ESSENTIAL HYPERTENSION: ICD-10-CM

## 2018-08-15 PROCEDURE — 3045F PR MOST RECENT HEMOGLOBIN A1C LEVEL 7.0-9.0%: CPT | Mod: CPTII,S$GLB,, | Performed by: INTERNAL MEDICINE

## 2018-08-15 PROCEDURE — 3075F SYST BP GE 130 - 139MM HG: CPT | Mod: CPTII,S$GLB,, | Performed by: INTERNAL MEDICINE

## 2018-08-15 PROCEDURE — 3078F DIAST BP <80 MM HG: CPT | Mod: CPTII,S$GLB,, | Performed by: INTERNAL MEDICINE

## 2018-08-15 PROCEDURE — 99999 PR PBB SHADOW E&M-EST. PATIENT-LVL IV: CPT | Mod: PBBFAC,,, | Performed by: INTERNAL MEDICINE

## 2018-08-15 PROCEDURE — 99499 UNLISTED E&M SERVICE: CPT | Mod: S$GLB,,, | Performed by: INTERNAL MEDICINE

## 2018-08-15 PROCEDURE — 99213 OFFICE O/P EST LOW 20 MIN: CPT | Mod: S$GLB,,, | Performed by: INTERNAL MEDICINE

## 2018-08-15 RX ORDER — INSULIN ASPART 100 [IU]/ML
15 INJECTION, SOLUTION INTRAVENOUS; SUBCUTANEOUS
Qty: 50 ML | Refills: 12 | Status: CANCELLED | OUTPATIENT
Start: 2018-08-15

## 2018-08-15 NOTE — TELEPHONE ENCOUNTER
----- Message from Suad Retana sent at 8/15/2018  9:56 AM CDT -----  Contact: self  Pt returning call,regarding her handicap tag. Please call at [phone]

## 2018-08-16 ENCOUNTER — TELEPHONE (OUTPATIENT)
Dept: FAMILY MEDICINE | Facility: CLINIC | Age: 68
End: 2018-08-16

## 2018-08-16 NOTE — PROGRESS NOTES
Patient is sabino today for follow up evaluation of CKD III. Last seen in renal office 12/11/17. Baseline Cr 1.4-1.5 mg/dl. Her most recent lab is presented below. She is s/p partial nephrectomy (Renal Cancer) ; hypertensive and diabetic; last A1c; 8.1%. She does not have clinically significant proteinuria.  Today she has no new complaints  Lab Results   Component Value Date    K 4.3 08/08/2018    CREATININE 1.4 08/08/2018    ESTGFRAFRICA 43.9 (A) 08/08/2018    EGFRNONAA 38.1 (A) 08/08/2018       Physical Exam   Obese woman; no acute distress; oriented x 3  BP; 130/70    HEENT; Grossly Intact  CHEST; Clear P&A; no rales or rhonchi  HEART; RR; S1&S2 no murmur rub gallop  ABSD; BS(+); non-tender; (-) CVAT  EXT; (-)Edema        Impression:  1. Chronic kidney disease (CKD), stage III (moderate)    2. Diabetic nephropathy associated with type 2 diabetes mellitus    3. Essential hypertension    4. PVD (peripheral vascular disease)    5. Malignant neoplasm of kidney, unspecified laterality       Plan:  CKD III (solitary kidney) At baseline    Hypertension Satisfactory control    Diabetes At or near goal A1c    Plan  Return Visit; 6 mo

## 2018-08-16 NOTE — TELEPHONE ENCOUNTER
----- Message from Noel Foley sent at 8/15/2018  4:39 PM CDT -----  Contact: self  Pt called requesting directions changed regarding insulin aspart U-100 (NOVOLOG FLEXPEN U-100 INSULIN) 100 unit/mL InPn pen. She is requesting 15 units instead of 12 units. Luis [phone]. Pt [phone].

## 2018-09-09 DIAGNOSIS — R52 PAIN: ICD-10-CM

## 2018-09-12 RX ORDER — TRAMADOL HYDROCHLORIDE 50 MG/1
TABLET ORAL
Qty: 50 TABLET | Refills: 0 | Status: SHIPPED | OUTPATIENT
Start: 2018-09-12 | End: 2018-10-25 | Stop reason: SDUPTHER

## 2018-09-17 ENCOUNTER — INFUSION (OUTPATIENT)
Dept: INFUSION THERAPY | Facility: HOSPITAL | Age: 68
End: 2018-09-17
Attending: INTERNAL MEDICINE
Payer: MEDICARE

## 2018-09-17 VITALS
TEMPERATURE: 98 F | SYSTOLIC BLOOD PRESSURE: 129 MMHG | HEART RATE: 83 BPM | DIASTOLIC BLOOD PRESSURE: 59 MMHG | RESPIRATION RATE: 17 BRPM | OXYGEN SATURATION: 96 %

## 2018-09-17 DIAGNOSIS — M81.0 OSTEOPOROSIS, POST-MENOPAUSAL: Primary | ICD-10-CM

## 2018-09-17 PROCEDURE — 63600175 PHARM REV CODE 636 W HCPCS: Mod: JG | Performed by: INTERNAL MEDICINE

## 2018-09-17 PROCEDURE — 96372 THER/PROPH/DIAG INJ SC/IM: CPT

## 2018-09-17 RX ADMIN — DENOSUMAB 60 MG: 60 INJECTION SUBCUTANEOUS at 11:09

## 2018-09-17 NOTE — PLAN OF CARE
Problem: Patient Care Overview  Goal: Plan of Care Review  Outcome: Ongoing (interventions implemented as appropriate)  Patient received Prolia injection. Tolerated well. VSS. Received discharge instructions and verbalized understanding.

## 2018-10-01 ENCOUNTER — OFFICE VISIT (OUTPATIENT)
Dept: FAMILY MEDICINE | Facility: CLINIC | Age: 68
End: 2018-10-01
Payer: MEDICARE

## 2018-10-01 ENCOUNTER — HOSPITAL ENCOUNTER (EMERGENCY)
Facility: HOSPITAL | Age: 68
Discharge: HOME | End: 2018-10-01
Attending: EMERGENCY MEDICINE
Payer: MEDICARE

## 2018-10-01 VITALS
WEIGHT: 187.75 LBS | HEART RATE: 79 BPM | OXYGEN SATURATION: 97 % | BODY MASS INDEX: 33.27 KG/M2 | HEIGHT: 63 IN | DIASTOLIC BLOOD PRESSURE: 86 MMHG | SYSTOLIC BLOOD PRESSURE: 120 MMHG | TEMPERATURE: 98 F

## 2018-10-01 VITALS
WEIGHT: 185 LBS | RESPIRATION RATE: 17 BRPM | DIASTOLIC BLOOD PRESSURE: 78 MMHG | BODY MASS INDEX: 32.78 KG/M2 | HEART RATE: 94 BPM | TEMPERATURE: 99 F | SYSTOLIC BLOOD PRESSURE: 188 MMHG | OXYGEN SATURATION: 97 % | HEIGHT: 63 IN

## 2018-10-01 DIAGNOSIS — Z86.39 HISTORY OF DIABETES MELLITUS: ICD-10-CM

## 2018-10-01 DIAGNOSIS — L02.219 CELLULITIS AND ABSCESS OF TRUNK: Primary | ICD-10-CM

## 2018-10-01 DIAGNOSIS — I10 HYPERTENSION, BENIGN: ICD-10-CM

## 2018-10-01 DIAGNOSIS — L03.319 CELLULITIS AND ABSCESS OF TRUNK: Primary | ICD-10-CM

## 2018-10-01 DIAGNOSIS — R73.9 HYPERGLYCEMIA: ICD-10-CM

## 2018-10-01 DIAGNOSIS — N18.30 CKD (CHRONIC KIDNEY DISEASE) STAGE 3, GFR 30-59 ML/MIN: ICD-10-CM

## 2018-10-01 LAB — POCT GLUCOSE: 304 MG/DL (ref 70–110)

## 2018-10-01 PROCEDURE — 82962 GLUCOSE BLOOD TEST: CPT

## 2018-10-01 PROCEDURE — 3079F DIAST BP 80-89 MM HG: CPT | Mod: CPTII,,, | Performed by: FAMILY MEDICINE

## 2018-10-01 PROCEDURE — 1101F PT FALLS ASSESS-DOCD LE1/YR: CPT | Mod: CPTII,,, | Performed by: FAMILY MEDICINE

## 2018-10-01 PROCEDURE — 25000003 PHARM REV CODE 250: Performed by: PHYSICIAN ASSISTANT

## 2018-10-01 PROCEDURE — 90471 IMMUNIZATION ADMIN: CPT | Performed by: PHYSICIAN ASSISTANT

## 2018-10-01 PROCEDURE — 3045F PR MOST RECENT HEMOGLOBIN A1C LEVEL 7.0-9.0%: CPT | Mod: CPTII,,, | Performed by: FAMILY MEDICINE

## 2018-10-01 PROCEDURE — 3074F SYST BP LT 130 MM HG: CPT | Mod: CPTII,,, | Performed by: FAMILY MEDICINE

## 2018-10-01 PROCEDURE — 10061 I&D ABSCESS COMP/MULTIPLE: CPT

## 2018-10-01 PROCEDURE — 99999 PR PBB SHADOW E&M-EST. PATIENT-LVL III: CPT | Mod: PBBFAC,,, | Performed by: FAMILY MEDICINE

## 2018-10-01 PROCEDURE — 99214 OFFICE O/P EST MOD 30 MIN: CPT | Mod: S$PBB,,, | Performed by: FAMILY MEDICINE

## 2018-10-01 PROCEDURE — 99283 EMERGENCY DEPT VISIT LOW MDM: CPT | Mod: 25,27

## 2018-10-01 PROCEDURE — 63600175 PHARM REV CODE 636 W HCPCS: Performed by: PHYSICIAN ASSISTANT

## 2018-10-01 PROCEDURE — 99213 OFFICE O/P EST LOW 20 MIN: CPT | Mod: PBBFAC,PO,25 | Performed by: FAMILY MEDICINE

## 2018-10-01 PROCEDURE — 90715 TDAP VACCINE 7 YRS/> IM: CPT | Performed by: PHYSICIAN ASSISTANT

## 2018-10-01 RX ORDER — ACETAMINOPHEN AND CODEINE PHOSPHATE 300; 30 MG/1; MG/1
1 TABLET ORAL
Qty: 12 TABLET | Refills: 0 | Status: SHIPPED | OUTPATIENT
Start: 2018-10-01 | End: 2018-10-16

## 2018-10-01 RX ORDER — ACETAMINOPHEN 325 MG/1
650 TABLET ORAL
Status: COMPLETED | OUTPATIENT
Start: 2018-10-01 | End: 2018-10-01

## 2018-10-01 RX ORDER — CLINDAMYCIN HYDROCHLORIDE 150 MG/1
300 CAPSULE ORAL 4 TIMES DAILY
Qty: 80 CAPSULE | Refills: 0 | OUTPATIENT
Start: 2018-10-01 | End: 2018-10-15 | Stop reason: HOSPADM

## 2018-10-01 RX ORDER — CLINDAMYCIN HYDROCHLORIDE 150 MG/1
300 CAPSULE ORAL
Status: COMPLETED | OUTPATIENT
Start: 2018-10-01 | End: 2018-10-01

## 2018-10-01 RX ORDER — LIDOCAINE HYDROCHLORIDE 10 MG/ML
10 INJECTION INFILTRATION; PERINEURAL
Status: COMPLETED | OUTPATIENT
Start: 2018-10-01 | End: 2018-10-01

## 2018-10-01 RX ORDER — CEPHALEXIN 500 MG/1
500 CAPSULE ORAL EVERY 8 HOURS
Qty: 30 CAPSULE | Refills: 0 | Status: SHIPPED | OUTPATIENT
Start: 2018-10-01 | End: 2018-10-03

## 2018-10-01 RX ADMIN — LIDOCAINE HYDROCHLORIDE 10 ML: 10 INJECTION, SOLUTION INFILTRATION; PERINEURAL at 12:10

## 2018-10-01 RX ADMIN — CLINDAMYCIN HYDROCHLORIDE 300 MG: 150 CAPSULE ORAL at 12:10

## 2018-10-01 RX ADMIN — CLOSTRIDIUM TETANI TOXOID ANTIGEN (FORMALDEHYDE INACTIVATED), CORYNEBACTERIUM DIPHTHERIAE TOXOID ANTIGEN (FORMALDEHYDE INACTIVATED), BORDETELLA PERTUSSIS TOXOID ANTIGEN (GLUTARALDEHYDE INACTIVATED), BORDETELLA PERTUSSIS FILAMENTOUS HEMAGGLUTININ ANTIGEN (FORMALDEHYDE INACTIVATED), BORDETELLA PERTUSSIS PERTACTIN ANTIGEN, AND BORDETELLA PERTUSSIS FIMBRIAE 2/3 ANTIGEN 0.5 ML: 5; 2; 2.5; 5; 3; 5 INJECTION, SUSPENSION INTRAMUSCULAR at 12:10

## 2018-10-01 RX ADMIN — ACETAMINOPHEN 650 MG: 325 TABLET ORAL at 12:10

## 2018-10-01 NOTE — PROGRESS NOTES
Office Visit    Patient Name: Sarina Genao    : 1950  MRN: [id number]      Assessment/Plan:  Sarina Genao is a 68 y.o. female who presents today for :    Cellulitis and abscess of trunk  -     Ambulatory referral to General Surgery  -     cephALEXin (KEFLEX) 500 MG capsule; Take 1 capsule (500 mg total) by mouth every 8 (eight) hours.  Dispense: 30 capsule; Refill: 0  -     acetaminophen-codeine 300-30mg (TYLENOL #3) 300-30 mg Tab; Take 1 tablet by mouth every 4 to 6 hours as needed (pain).  Dispense: 12 tablet; Refill: 0  -AFVSS - no systemic Sx   -Advised keeping area dry and clean with good skin hygiene, wash all sheets/towels/clothes after each episode, dispose of razors used in area, avoid scratching affected area, may do intermittent warm water soaks for additional comfort   -Start Antibiotic, may use Tylenol PRN for pain.   -Will have pt see Gen Surge for I&D    -Advised patient to call clinic if pt has any concerns, ER precautions also provided to patient, especially if infection appears worse, or patient develops systemic Sx such as F/C/malaise/N/V.     Diabetes mellitus with renal manifestations, uncontrolled  -she has upcoming f/u appointment with Endocrine for continued management    Hypertension, benign  CKD (chronic kidney disease) stage 3, GFR 30-59 ml/min  - continue current medications   - ?advised DASH diet, portion control, regular cardiovascular exercises  - ?counseled on weight loss      Follow-up for worsening Sx. Urgent care/ED precautions provided.     This note was created by combination of typed  and Dragon dictation.  Transcription errors may be present.  If there are any questions, please contact me.      ----------------------------------------------------------------------------------------------------------------------      HPI:  Sarina is a 68 y.o. female who presents today for:    Recurrent Skin Infections        Patient Care Team:  Venancio SANTIAGO  MD Leyla as PCP - General (Internal Medicine)  Santos Murry MD (Inactive) as Consulting Physician (Urology)  This patient has multiple medical diagnoses as noted below.    This patient is new to me     Patient is here today for a skin boil underneath her L breast that has progressive gotten worse during the past week. Has become more swollen and tender to touch, associated with subjective fever. She has a h/o uncontrolled diabetes and has had a prior history of abscess requiring drainage many years ago. She denies any injury/trauma to the area.  Denies n/V/malaise/fatigue    DM - doing well on her insulin current insulin regimen, no side effects - 90-120s per daily FBG check - she has f/u with Endocrine next week for continued management of her diabetes.   No polyuria/polydipsia. No foot numbness/tingling/vision changes/hypoglycemia      Hemoglobin A1C   Date Value Ref Range Status   07/10/2018 8.1 (H) 4.0 - 5.6 % Final     Comment:     ADA Screening Guidelines:  5.7-6.4%  Consistent with prediabetes  >or=6.5%  Consistent with diabetes  High levels of fetal hemoglobin interfere with the HbA1C  assay. Heterozygous hemoglobin variants (HbS, HgC, etc)do  not significantly interfere with this assay.   However, presence of multiple variants may affect accuracy.     03/23/2018 7.3 (H) 4.0 - 5.6 % Final     Comment:     According to ADA guidelines, hemoglobin A1c <7.0% represents  optimal control in non-pregnant diabetic patients. Different  metrics may apply to specific patient populations.   Standards of Medical Care in Diabetes-2016.  For the purpose of screening for the presence of diabetes:  <5.7%     Consistent with the absence of diabetes  5.7-6.4%  Consistent with increasing risk for diabetes   (prediabetes)  >or=6.5%  Consistent with diabetes  Currently, no consensus exists for use of hemoglobin A1c  for diagnosis of diabetes for children.  This Hemoglobin A1c assay has significant interference with  fetal   hemoglobin   (HbF). The results are invalid for patients with abnormal amounts of   HbF,   including those with known Hereditary Persistence   of Fetal Hemoglobin. Heterozygous hemoglobin variants (HbAS, HbAC,   HbAD, HbAE, HbA2) do not significantly interfere with this assay;   however, presence of multiple variants in a sample may impact the %   interference.     11/20/2017 9.0 (H) 4.0 - 5.6 % Final     Comment:     According to ADA guidelines, hemoglobin A1c <7.0% represents  optimal control in non-pregnant diabetic patients. Different  metrics may apply to specific patient populations.   Standards of Medical Care in Diabetes-2016.  For the purpose of screening for the presence of diabetes:  <5.7%     Consistent with the absence of diabetes  5.7-6.4%  Consistent with increasing risk for diabetes   (prediabetes)  >or=6.5%  Consistent with diabetes  Currently, no consensus exists for use of hemoglobin A1c  for diagnosis of diabetes for children.  This Hemoglobin A1c assay has significant interference with fetal   hemoglobin   (HbF). The results are invalid for patients with abnormal amounts of   HbF,   including those with known Hereditary Persistence   of Fetal Hemoglobin. Heterozygous hemoglobin variants (HbAS, HbAC,   HbAD, HbAE, HbA2) do not significantly interfere with this assay;   however, presence of multiple variants in a sample may impact the %   interference.                   Additional ROS    No F/C/wt changes/fatigue  No dysphagia/sore throat  No CP/SOB/palpitations/swelling  No cough/wheezing  No nausea/vomiting/abd pain  No muscle aches/joint pain  No rashes  No weakness/HA/tingling/numbness  No anxiety/depression  No dysuria/hematuria      Patient Active Problem List   Diagnosis    Diabetes mellitus type II, non insulin dependent    Combined hyperlipidemia associated with type 2 diabetes mellitus    Hypertension, benign    Urinary incontinence, urge    History of kidney cancer     Family history of stomach cancer    GERD (gastroesophageal reflux disease)    Osteoporosis, post-menopausal    Tobacco dependence    COPD (chronic obstructive pulmonary disease)    CKD (chronic kidney disease) stage 3, GFR 30-59 ml/min    Nuclear sclerosis of both eyes    DM type 2 without retinopathy    Essential hypertension    Refractive error    Senile nuclear sclerosis    Post-operative state    Nuclear sclerosis of right eye    Primary hyperparathyroidism    Diabetes mellitus with renal manifestations, uncontrolled    History of colonic polyps    Schatzki's ring    Pseudophakia    PVD (peripheral vascular disease) with claudication    PVD (peripheral vascular disease)    Bilateral carotid artery stenosis    PCO (posterior capsular opacification), right       Current Medications  Medications reviewed/updated.     Current Outpatient Medications on File Prior to Visit   Medication Sig Dispense Refill    amLODIPine (NORVASC) 5 MG tablet Take 1 tablet (5 mg total) by mouth once daily. 90 tablet 3    aspirin (ECOTRIN) 81 MG EC tablet Take 81 mg by mouth once daily.      calcium carbonate (OS-LISSETTE) 500 mg calcium (1,250 mg) tablet Take 1 tablet (500 mg total) by mouth once daily. 30 tablet 5    cyanocobalamin, vitamin B-12, (VITAMIN B-12) 1,000 mcg TbSR Take by mouth once daily.       DOCOSAHEXANOIC ACID/EPA (FISH OIL ORAL) Take 1,200 mg by mouth once daily.      glipiZIDE (GLUCOTROL) 10 MG tablet Take 1 tablet (10 mg total) by mouth daily with breakfast. 90 tablet 3    hydroCHLOROthiazide (HYDRODIURIL) 12.5 MG Tab Take 1 tablet (12.5 mg total) by mouth once daily. 90 tablet 3    insulin aspart U-100 (NOVOLOG FLEXPEN U-100 INSULIN) 100 unit/mL InPn pen ADMINISTER 12 UNITS UNDER THE SKIN THREE TIMES DAILY WITH MEALS 45 mL 12    insulin detemir U-100 (LEVEMIR FLEXTOUCH) 100 unit/mL (3 mL) SubQ InPn pen Inject 50 Units into the skin every evening. 100 mL 12    irbesartan (AVAPRO) 150 MG  "tablet Take 1 tablet (150 mg total) by mouth once daily. 90 tablet 3    LANTUS SOLOSTAR 100 unit/mL (3 mL) InPn pen ADM 50 UNITS SC SKIN  mL 6    NOVOLOG FLEXPEN U-100 INSULIN 100 unit/mL InPn pen INJECT 12 UNITS UNDER THE SKIN THREE TIMES DAILY WITH MEALS 45 mL 0    oxybutynin (DITROPAN-XL) 5 MG TR24 TAKE 1 TABLET(5 MG) BY MOUTH EVERY DAY 90 tablet 3    pen needle, diabetic (BD INSULIN PEN NEEDLE UF SHORT) 31 gauge x 5/16" Ndle USE AS DIRECTED 100 each 6    pen needle, diabetic (BD INSULIN PEN NEEDLE UF SHORT) 31 gauge x 5/16" Ndle USE AS DIRECTED 100 each 12    ranitidine (ZANTAC) 300 MG tablet TAKE 1 TABLET(300 MG) BY MOUTH EVERY EVENING 90 tablet 0    rosuvastatin (CRESTOR) 20 MG tablet Take 1 tablet (20 mg total) by mouth once daily. 90 tablet 3    traMADol (ULTRAM) 50 mg tablet TAKE 1 TABLET BY MOUTH EVERY 6 HOURS AS NEEDED 50 tablet 0    valsartan (DIOVAN) 160 MG tablet Take 1 tablet (160 mg total) by mouth once daily. 90 tablet 3     No current facility-administered medications on file prior to visit.            Past Surgical History:   Procedure Laterality Date    bladder lift  2011    [facility] at [facility]    BLADDER STONE REMOVAL  2011    before bladder lift    CATARACT EXTRACTION W/  INTRAOCULAR LENS IMPLANT Left 06/07/2016    Dr. Vásquez    CATARACT EXTRACTION W/  INTRAOCULAR LENS IMPLANT Right 06/21/2016    Dr. Vásquez    COLONOSCOPY N/A 4/26/2018    Procedure: COLONOSCOPY;  Surgeon: Benjamin Zamora MD;  Location: [facility];  Service: Endoscopy;  Laterality: N/A;  confirmed ss    COLONOSCOPY N/A 4/26/2018    [facility]O    COLONOSCOPY N/A 7/12/2013    Performed by Mariposa Shah MD at [facility] ENDO    CYSTOSCOPY      EGD (ESOPHAGOGASTRODUODENOSCOPY) N/A 1/8/2014    Performed by Isaias Marinelli MD at [facility] ENDO    INSERTION-INTRAOCULAR LENS (IOL) Right 6/21/2016    [facility] by [facility]    " INSERTION-INTRAOCULAR LENS (IOL) Left 6/7/2016    [facility]    NEPHRECTOMY      partial right    NEPHRECTOMY, RADICAL Right 8/6/2013    Performed by [facility] at [facility]    PHACOEMULSIFICATION-ASPIRATION-CATARACT Right 6/21/2016    [facility]    PHACOEMULSIFICATION-ASPIRATION-CATARACT Left 6/7/2016    [facility] at [facility]    ROBOTIC NEPHRECTOMY, PARTIAL Right 8/6/2013    Performed by [facility] at [facility]       Family History   Problem Relation Age of Onset    Glaucoma Mother     Cataracts Mother     No Known Problems Father     Colon cancer Brother     Nephrolithiasis Sister     No Known Problems Maternal Aunt     No Known Problems Maternal Uncle     No Known Problems Paternal Aunt     No Known Problems Paternal Uncle     No Known Problems Maternal Grandmother     No Known Problems Maternal Grandfather     No Known Problems Paternal Grandmother     No Known Problems Paternal Grandfather     Anesthesia problems Neg Hx     Kidney cancer Neg Hx     Amblyopia Neg Hx     Blindness Neg Hx     Cancer Neg Hx     Diabetes Neg Hx     Hypertension Neg Hx     Macular degeneration Neg Hx     Retinal detachment Neg Hx     Strabismus Neg Hx     Stroke Neg Hx     Thyroid disease Neg Hx        Social History     Socioeconomic History    Marital status:      Spouse name: Not on file    Number of children: Not on file    Years of education: Not on file    Highest education level: Not on file   Social Needs    Financial resource strain: Not on file    Food insecurity - worry: Not on file    Food insecurity - inability: Not on file    Transportation needs - medical: Not on file    Transportation needs - non-medical: Not on file   Occupational History    Occupation: [employer]   Tobacco Use    Smoking status:  "Current Every Day Smoker     Packs/day: 0.25     Years: 30.00     Pack years: 7.50     Types: Cigarettes    Smokeless tobacco: Never Used    Tobacco comment: 4-5 cigs/day   Substance and Sexual Activity    Alcohol use: No     Alcohol/week: 0.0 oz    Drug use: Not on file    Sexual activity: Not on file   Other Topics Concern    Not on file   Social History Narrative    Lives on [facility]    Here with sister Kate [phone]                 Allergies   Review of patient's allergies indicates:   Allergen Reactions    Metformin Diarrhea    Pantoprazole      elevated blood sugars             Review of Systems  See HPI      Physical Exam  /86   Pulse 79   Temp 97.8 °F (36.6 °C) (Oral)   Ht 5' 3" (1.6 m)   Wt 85.2 kg (187 lb 11.6 oz)   SpO2 97%   BMI 33.25 kg/m²     GEN: NAD, well developed, pleasant, well nourished  HEENT: NCAT, PERRLA, EOMI, sclera clear, anicteric, O/P clear, MMM with no lesions  NECK: normal, supple with midline trachea, no LAD, no thyromegaly  LUNGS: CTAB, no w/r/r, no increased work of breathing   HEART: RRR, normal S1 and S2, no m/r/g, no edema  ABD: s/nt/nd, NABS  SKIN:7x5cm oval-shaped induration with central erythema located underneath the L breast, slightly warm to touch, moderately TTP.  +fluctuance, no discharge or drainage on close inspection.   PSYCH: AOx3, appropriate mood and affect  MSK: warm/well perfused, normal ROM in all extremities, no c/c/e.            "

## 2018-10-01 NOTE — ED PROVIDER NOTES
"Encounter Date: 10/1/2018    SCRIBE #1 NOTE: I, EmaniMateoCassy Geller , am scribing for, and in the presence of,  Jordi Galindo PA-C. I have scribed the following portions of the note - Other sections scribed: HPI, ROS.       History     Chief Complaint   Patient presents with    Mass     pt stated "I have a boil under my left breast." pt stated she was seen before and told she had to see a surgeon; pt stated that she has not seen surgery for it; reports its getting bigger and more painful     CC: Abscess     67 y/o female with CKD stage 3, HLD, DM type II with renal manifestations, HTN, hx of renal cell carcinoma, and primary hyperparathyroidism presents to the ED c/o x1 wk hx of acute onset abscess under L breast. The pain is severe (10/10), and symptoms have gradually worsened. The patient presented to family medicine physician, Dr. Maxx Wen, [facility] for her symptoms, where she was advised to present to the ER for an I&D. The patient states that Dr. Wen did not feel comfortable doing the I&D himself due to her DM. She was prescribed Keflex and Tylenol Codeine. The patient reports associated subjective fever 1-2 days ago. The patient reports hx of abscess to her buttocks in the past. The patient's CBG was 269 after eating lunch today. The patient's PCP is Dr. Mayer. The patient denies abdominal pain, rhinorrhea, or cough. No attempted treatment reported. No other symptoms reported.      The history is provided by the patient. No  was used.     Review of patient's allergies indicates:   Allergen Reactions    Metformin Diarrhea    Pantoprazole      elevated blood sugars     Past Medical History:   Diagnosis Date    Cataracts, bilateral     CKD (chronic kidney disease) stage 3, GFR 30-59 ml/min 12/15/2014    Combined hyperlipidemia associated with type 2 diabetes mellitus 6/7/2013    COPD (chronic obstructive pulmonary disease) 12/15/2014    Diabetes mellitus type II     NIDDM, a.m. " glucose-120s-130s-last A1c-7.1-6/7/13    Diabetes mellitus with renal manifestations, uncontrolled 2/1/2017    Diabetic retinopathy     Family history of stomach cancer 12/5/2013    H/O renal cell carcinoma 12/15/2015    History of colonic polyps 2/1/2017    3 polyps 7/13 ---5 yrs    History of gastroesophageal reflux (GERD)     History of urinary incontinence     s/[place] bladder [place]october[place]    Hyperlipidemia     Hypertension     120s/70s-80s    Nephrolithiasis     Osteoporosis, post-menopausal 3/17/2014    Primary hyperparathyroidism 10/20/2016    Schatzki's ring 2/1/2017    Dilated 2014     Past Surgical History:   Procedure Laterality Date    bladder lift  2011    [place]    BLADDER STONE REMOVAL  2011    before bladder lift    CATARACT EXTRACTION W/  INTRAOCULAR LENS IMPLANT Left 06/07/2016    Dr. Vásquez    CATARACT EXTRACTION W/  INTRAOCULAR LENS IMPLANT Right 06/21/2016    Dr. Vásquez    COLONOSCOPY N/A 4/26/2018    Procedure: COLONOSCOPY;  Surgeon: Benjamin Zamora MD;  Location: [facility];  Service: Endoscopy;  Laterality: N/A;  confirmed ss    COLONOSCOPY N/A 4/26/2018    [facility] at [facility] ENDO    COLONOSCOPY N/A 7/12/2013    Performed by Mariposa Shah MD at [facility] ENDO    CYSTOSCOPY      EGD (ESOPHAGOGASTRODUODENOSCOPY) N/A 1/8/2014    Performed by [facility]as Marinelli MD at [facility] ENDO    INSERTION-INTRAOCULAR LENS (IOL) Right 6/21/2016    [facility]LR    INSERTION-INTRAOCULAR LENS (IOL) Left 6/7/2016    Per[facility] by [facility] at [facility]    NEPHRECTOMY      partial right    NEPHRECTOMY, RADICAL Right 8/6/2013    Performed by Santos Murry MD at [facility]LR    PHACOEMULSIFICATION-ASPIRATION-CATARACT Right 6/21/2016    Performed by [facility] at [facility]    PHACOEMULSIFICATION-ASPIRATION-CATARACT Left 6/7/2016    Performed by  [facility] at [facility]    ROBOTIC NEPHRECTOMY, PARTIAL Right 8/6/2013    Performed by Santos [facility]ry MD at [facility]     Family History   Problem Relation Age of Onset    Glaucoma Mother     Cataracts Mother     No Known Problems Father     Colon cancer Brother     Nephrolithiasis Sister     No Known Problems Maternal Aunt     No Known Problems Maternal Uncle     No Known Problems Paternal Aunt     No Known Problems Paternal Uncle     No Known Problems Maternal Grandmother     No Known Problems Maternal Grandfather     No Known Problems Paternal Grandmother     No Known Problems Paternal Grandfather     Anesthesia problems Neg Hx     Kidney cancer Neg Hx     Amblyopia Neg Hx     Blindness Neg Hx     Cancer Neg Hx     Diabetes Neg Hx     Hypertension Neg Hx     Macular degeneration Neg Hx     Retinal detachment Neg Hx     Strabismus Neg Hx     Stroke Neg Hx     Thyroid disease Neg Hx      Social History     Tobacco Use    Smoking status: Current Every Day Smoker     Packs/day: 0.25     Years: 30.00     Pack years: 7.50     Types: Cigarettes    Smokeless tobacco: Never Used    Tobacco comment: 4-5 cigs/day   Substance Use Topics    Alcohol use: No     Alcohol/week: 0.0 oz    Drug use: Not on file     Review of Systems   Constitutional: Positive for fever (subjective 1-2 days ago). Negative for chills.   HENT: Negative for congestion, ear pain, rhinorrhea and sore throat.    Eyes: Negative for redness.   Respiratory: Negative for cough and shortness of breath.    Cardiovascular: Negative for chest pain.   Gastrointestinal: Negative for abdominal pain, diarrhea, nausea and vomiting.   Genitourinary: Negative for decreased urine volume, difficulty urinating, dysuria, frequency, hematuria and urgency.   Musculoskeletal: Negative for back pain and neck pain.   Skin: Negative for rash.        (+) abscess under L breast   Neurological: Negative for headaches.        Physical Exam     Initial Vitals [10/01/18 1144]   BP Pulse Resp Temp SpO2   (!) 159/77 93 17 98.3 °F (36.8 °C) 97 %      MAP       --         Physical Exam    Nursing note and vitals reviewed.  Constitutional: She appears well-developed and well-nourished. She is not diaphoretic. No distress.   HENT:   Head: Normocephalic and atraumatic.   Nose: Nose normal.   Eyes: Conjunctivae and EOM are normal. Right eye exhibits no discharge. Left eye exhibits no discharge.   Neck: Normal range of motion. No tracheal deviation present. No JVD present.   Cardiovascular: Normal rate, regular rhythm and normal heart sounds. Exam reveals no friction rub.    No murmur heard.  Pulmonary/Chest: Breath sounds normal. No stridor. No respiratory distress. She has no wheezes. She has no rhonchi. She has no rales. She exhibits no tenderness.   Abdominal: Soft. She exhibits no distension. There is no tenderness. There is no rigidity, no rebound, no guarding, no CVA tenderness, no tenderness at McBurney's point and negative Griffith's sign.       2cm fluctuant mass to the L breast crease with associated erythema. Actively draining purulent material. Focal TTP. No involvement of the breast.    Musculoskeletal: Normal range of motion.   Neurological: She is alert and oriented to person, place, and time.   Skin: Skin is warm and dry. No pallor.         ED Course   I & D - Incision and Drainage  Date/Time: 10/1/2018 3:55 PM  Location procedure was performed: [facility] EMERGENCY DEPARTMENT  Performed by: Jordi Deshpande PA-C  Authorized by: Shira Bedolla MD   Consent Done: Yes  Consent: Verbal consent obtained.  Consent given by: patient  Type: abscess  Location: L inframammary crease.  Anesthesia: local infiltration    Anesthesia:  Local Anesthetic: lidocaine 1% without epinephrine  Anesthetic total: 4 mL  Patient sedated: no  Scalpel size: 11  Incision type: single straight  Complexity: complex  Drainage: pus  Drainage amount:  copious  Wound treatment: incision,  wound left open,  deloculation,  expression of material and  wound packed  Packing material: 1/4 in gauze  Complications: No  Specimens: No  Implants: No  Patient tolerance: Patient tolerated the procedure well with no immediate complications        Labs Reviewed   POCT GLUCOSE - Abnormal; Notable for the following components:       Result Value    POCT Glucose 304 (*)     All other components within normal limits          Imaging Results    None          Medical Decision Making:   History:   Old Medical Records: I decided to obtain old medical records.  Initial Assessment:   69 yo F with abscess  ED Management:  Abscess drained in the emergency department without complication.  There is overlying cellulitis.  I doubt deep space infection.  Not septic.  No necrotizing fasciitis or involvement of the breast.  Tetanus updated. Will advise patient not  Keflex today and take clindamycin instead for better staph coverage. Pain controlled. Glucose 304 without signs or symptoms of DKA. Advising PCP follow up and packing removal in 2 days. Strict return precautions discussed. Agreeable to plan.   Other:   I have discussed this case with another health care provider.       <> Summary of the Discussion: Discussed with attending            Scribe Attestation:   Scribe #1: I performed the above scribed service and the documentation accurately describes the services I performed. I attest to the accuracy of the note.    Attending Attestation:           Physician Attestation for Scribe:  Physician Attestation Statement for Scribe #1: I, Jordi Galindo PA-C, reviewed documentation, as scribed by Blayne Geller in my presence, and it is both accurate and complete.                    Clinical Impression:   The primary encounter diagnosis was Cellulitis and abscess of trunk. Diagnoses of Hyperglycemia and History of diabetes mellitus were also pertinent to this visit.      Disposition:    Disposition: Discharged  Condition: Stable                        Jordi Deshpande PA-C  10/01/18 1553

## 2018-10-03 ENCOUNTER — OFFICE VISIT (OUTPATIENT)
Dept: FAMILY MEDICINE | Facility: CLINIC | Age: 68
End: 2018-10-03
Payer: MEDICARE

## 2018-10-03 VITALS
HEART RATE: 99 BPM | BODY MASS INDEX: 33.2 KG/M2 | SYSTOLIC BLOOD PRESSURE: 130 MMHG | DIASTOLIC BLOOD PRESSURE: 60 MMHG | TEMPERATURE: 98 F | OXYGEN SATURATION: 99 % | WEIGHT: 187.38 LBS | HEIGHT: 63 IN

## 2018-10-03 DIAGNOSIS — L02.219 CELLULITIS AND ABSCESS OF TRUNK: Primary | ICD-10-CM

## 2018-10-03 DIAGNOSIS — L03.319 CELLULITIS AND ABSCESS OF TRUNK: Primary | ICD-10-CM

## 2018-10-03 PROCEDURE — 3075F SYST BP GE 130 - 139MM HG: CPT | Mod: CPTII,,, | Performed by: NURSE PRACTITIONER

## 2018-10-03 PROCEDURE — 99999 PR PBB SHADOW E&M-EST. PATIENT-LVL V: CPT | Mod: PBBFAC,,, | Performed by: NURSE PRACTITIONER

## 2018-10-03 PROCEDURE — 1101F PT FALLS ASSESS-DOCD LE1/YR: CPT | Mod: CPTII,,, | Performed by: NURSE PRACTITIONER

## 2018-10-03 PROCEDURE — 3078F DIAST BP <80 MM HG: CPT | Mod: CPTII,,, | Performed by: NURSE PRACTITIONER

## 2018-10-03 PROCEDURE — 87070 CULTURE OTHR SPECIMN AEROBIC: CPT

## 2018-10-03 PROCEDURE — 3045F PR MOST RECENT HEMOGLOBIN A1C LEVEL 7.0-9.0%: CPT | Mod: CPTII,,, | Performed by: NURSE PRACTITIONER

## 2018-10-03 PROCEDURE — 99214 OFFICE O/P EST MOD 30 MIN: CPT | Mod: S$PBB,,, | Performed by: NURSE PRACTITIONER

## 2018-10-03 PROCEDURE — 99215 OFFICE O/P EST HI 40 MIN: CPT | Mod: PBBFAC,PO | Performed by: NURSE PRACTITIONER

## 2018-10-03 RX ORDER — HYDROCODONE BITARTRATE AND ACETAMINOPHEN 10; 325 MG/1; MG/1
1 TABLET ORAL EVERY 8 HOURS PRN
Qty: 10 TABLET | Refills: 0 | Status: SHIPPED | OUTPATIENT
Start: 2018-10-03 | End: 2018-10-16

## 2018-10-03 NOTE — PATIENT INSTRUCTIONS
Abscess (Incision & Drainage)  An abscess is sometimes called a boil. It happens when bacteria get trapped under the skin and start to grow. Pus forms inside the abscess as the body responds to the bacteria. An abscess can happen with an insect bite, ingrown hair, blocked oil gland, pimple, cyst, or puncture wound.  Your healthcare provider has drained the pus from your abscess. If the abscess pocket was large, your healthcare provider may have put in gauze packing. Your provider will need to remove it on your next visit. He or she may also replace it at that time. You may not need antibiotics to treat a simple abscess, unless the infection is spreading into the skin around the wound (cellulitis).  The wound will take about 1 to 2 weeks to heal, depending on the size of the abscess. Healthy tissue will grow from the bottom and sides of the opening until it seals over.  Home care  These tips can help your wound heal:  · The wound may drain for the first 2 days. Cover the wound with a clean dry dressing. Change the dressing if it becomes soaked with blood or pus.  · If a gauze packing was placed inside the abscess pocket, you may be told to remove it yourself. You may do this in the shower. Once the packing is removed, you should wash the area in the shower, or clean the area as directed by your provider. Continue to do this until the skin opening has closed. Make sure you wash your hands after changing the packing or cleaning the wound.  · If you were prescribed antibiotics, take them as directed until they are all gone.  · You may use acetaminophen or ibuprofen to control pain, unless another pain medicine was prescribed. If you have liver disease or ever had a stomach ulcer, talk with your doctor before using these medicines.  Follow-up care  Follow up with your healthcare provider, or as advised. If a gauze packing was put in your wound, it should be removed in 1 to 2 days. Check your wound every day for any  signs that the infection is getting worse. The signs are listed below.  When to seek medical advice  Call your healthcare provider right away if any of these occur:  · Increasing redness or swelling  · Red streaks in the skin leading away from the wound  · Increasing local pain or swelling  · Continued pus draining from the wound 2 days after treatment  · Fever of 100.4ºF (38ºC) or higher, or as directed by your healthcare provider  · Boil returns when you are at home  Date Last Reviewed: 9/1/2016  © 3641-2392 [employer]. [address] [place] [address]. All rights reserved. This information is not intended as a substitute for professional medical care. Always follow your healthcare professional's instructions.

## 2018-10-03 NOTE — PROGRESS NOTES
Subjective:       Patient ID: Sarina Genao is a 68 y.o. female.    Chief Complaint: Hospital Follow Up (ED F/U recheck packing)    Patient is a 68  year old  female new to me who presented to clinic today for ED follow up from 10/01/2018. Patient was seen in for abscess under her left breast x 1 week. Patient had an I & D and the wound was packed. Patient is here to for packing change. No culture was done at the ED so I will obtain culture today. She was prescribed Keflex and Tylenol Codeine.    Wound Check   She was originally treated 2 to 3 days ago. Previous treatment included I&D of abscess and oral antibiotics. The maximum temperature noted was less than 100.4 F. There has been colored discharge from the wound. The redness has not changed. The swelling has improved. The pain has not changed.     Review of Systems   Constitutional: Negative for chills, diaphoresis and fever.   Gastrointestinal: Negative for anorexia and change in bowel habit.   Musculoskeletal: Negative for arthralgias, joint swelling and myalgias.   Skin: Positive for color change and wound. Negative for rash.   Neurological: Negative for headaches.       Objective:      Physical Exam   Constitutional: She is oriented to person, place, and time. Vital signs are normal. She appears well-developed and well-nourished.   HENT:   Head: Normocephalic and atraumatic.   Right Ear: External ear normal.   Left Ear: External ear normal.   Nose: Nose normal.   Mouth/Throat: Oropharynx is clear and moist. No oropharyngeal exudate.   Cardiovascular: Normal rate, regular rhythm and normal heart sounds.   Pulmonary/Chest: Effort normal and breath sounds normal.   Neurological: She is alert and oriented to person, place, and time.   Skin: Skin is warm, dry and intact. Capillary refill takes less than 2 seconds. Lesion noted. There is erythema.        Psychiatric: She has a normal mood and affect.       Assessment:       1. Cellulitis and abscess of  trunk    2. Diabetes mellitus with renal manifestations, uncontrolled        Plan:       Sarina was seen today for hospital follow up.    Diagnoses and all orders for this visit:    Cellulitis and abscess of trunk  -     HYDROcodone-acetaminophen (NORCO)  mg per tablet; Take 1 tablet by mouth every 8 (eight) hours as needed for Pain.  -     CULTURE, AEROBIC  (SPECIFY SOURCE)  Wound cleaned and repacked with 1/4 guaze. Patient tolerated well.     Diabetes mellitus with renal manifestations, uncontrolled  The current medical regimen is effective;  continue present plan and medications.

## 2018-10-05 ENCOUNTER — OFFICE VISIT (OUTPATIENT)
Dept: FAMILY MEDICINE | Facility: CLINIC | Age: 68
End: 2018-10-05
Payer: MEDICARE

## 2018-10-05 VITALS
SYSTOLIC BLOOD PRESSURE: 118 MMHG | WEIGHT: 184.94 LBS | HEIGHT: 63 IN | TEMPERATURE: 98 F | DIASTOLIC BLOOD PRESSURE: 62 MMHG | HEART RATE: 79 BPM | BODY MASS INDEX: 32.77 KG/M2 | OXYGEN SATURATION: 94 %

## 2018-10-05 DIAGNOSIS — E11.9 DIABETES MELLITUS WITHOUT COMPLICATION: Primary | ICD-10-CM

## 2018-10-05 PROCEDURE — 99214 OFFICE O/P EST MOD 30 MIN: CPT | Mod: PBBFAC,PO | Performed by: NURSE PRACTITIONER

## 2018-10-05 PROCEDURE — 3078F DIAST BP <80 MM HG: CPT | Mod: CPTII,,, | Performed by: NURSE PRACTITIONER

## 2018-10-05 PROCEDURE — 3074F SYST BP LT 130 MM HG: CPT | Mod: CPTII,,, | Performed by: NURSE PRACTITIONER

## 2018-10-05 PROCEDURE — 99213 OFFICE O/P EST LOW 20 MIN: CPT | Mod: S$PBB,,, | Performed by: NURSE PRACTITIONER

## 2018-10-05 PROCEDURE — 99999 PR PBB SHADOW E&M-EST. PATIENT-LVL IV: CPT | Mod: PBBFAC,,, | Performed by: NURSE PRACTITIONER

## 2018-10-05 PROCEDURE — 1101F PT FALLS ASSESS-DOCD LE1/YR: CPT | Mod: CPTII,,, | Performed by: NURSE PRACTITIONER

## 2018-10-05 PROCEDURE — 3045F PR MOST RECENT HEMOGLOBIN A1C LEVEL 7.0-9.0%: CPT | Mod: CPTII,,, | Performed by: NURSE PRACTITIONER

## 2018-10-06 LAB — BACTERIA SPEC AEROBE CULT: NO GROWTH

## 2018-10-08 ENCOUNTER — HOSPITAL ENCOUNTER (EMERGENCY)
Facility: HOSPITAL | Age: 68
Discharge: HOME | End: 2018-10-08
Attending: EMERGENCY MEDICINE
Payer: MEDICARE

## 2018-10-08 VITALS
BODY MASS INDEX: 32.78 KG/M2 | WEIGHT: 185 LBS | TEMPERATURE: 99 F | DIASTOLIC BLOOD PRESSURE: 81 MMHG | OXYGEN SATURATION: 100 % | HEART RATE: 105 BPM | SYSTOLIC BLOOD PRESSURE: 172 MMHG | HEIGHT: 63 IN | RESPIRATION RATE: 22 BRPM

## 2018-10-08 DIAGNOSIS — R05.9 COUGH: ICD-10-CM

## 2018-10-08 DIAGNOSIS — R06.02 SOB (SHORTNESS OF BREATH): ICD-10-CM

## 2018-10-08 DIAGNOSIS — R07.9 CHEST PAIN: ICD-10-CM

## 2018-10-08 DIAGNOSIS — J44.1 ACUTE EXACERBATION OF CHRONIC OBSTRUCTIVE PULMONARY DISEASE (COPD): Primary | ICD-10-CM

## 2018-10-08 LAB
ALBUMIN SERPL BCP-MCNC: 2.9 G/DL
ALP SERPL-CCNC: 174 U/L
ALT SERPL W/O P-5'-P-CCNC: 18 U/L
ANION GAP SERPL CALC-SCNC: 10 MMOL/L
AST SERPL-CCNC: 17 U/L
B-OH-BUTYR BLD STRIP-SCNC: 0.2 MMOL/L
BASOPHILS # BLD AUTO: 0.02 K/UL
BASOPHILS NFR BLD: 0.2 %
BILIRUB SERPL-MCNC: 0.8 MG/DL
BNP SERPL-MCNC: 23 PG/ML
BUN SERPL-MCNC: 12 MG/DL
CALCIUM SERPL-MCNC: 9.8 MG/DL
CHLORIDE SERPL-SCNC: 102 MMOL/L
CO2 SERPL-SCNC: 25 MMOL/L
CREAT SERPL-MCNC: 1.3 MG/DL
DIFFERENTIAL METHOD: ABNORMAL
EOSINOPHIL # BLD AUTO: 0.1 K/UL
EOSINOPHIL NFR BLD: 0.5 %
ERYTHROCYTE [DISTWIDTH] IN BLOOD BY AUTOMATED COUNT: 13.8 %
EST. GFR  (AFRICAN AMERICAN): 48 ML/MIN/1.73 M^2
EST. GFR  (NON AFRICAN AMERICAN): 42 ML/MIN/1.73 M^2
GLUCOSE SERPL-MCNC: 372 MG/DL
HCT VFR BLD AUTO: 35.3 %
HGB BLD-MCNC: 12.1 G/DL
LYMPHOCYTES # BLD AUTO: 1 K/UL
LYMPHOCYTES NFR BLD: 7.8 %
MCH RBC QN AUTO: 28.5 PG
MCHC RBC AUTO-ENTMCNC: 34.3 G/DL
MCV RBC AUTO: 83 FL
MONOCYTES # BLD AUTO: 0.9 K/UL
MONOCYTES NFR BLD: 6.8 %
NEUTROPHILS # BLD AUTO: 10.8 K/UL
NEUTROPHILS NFR BLD: 84.7 %
PLATELET # BLD AUTO: 359 K/UL
PMV BLD AUTO: 8.6 FL
POCT GLUCOSE: 303 MG/DL (ref 70–110)
POTASSIUM SERPL-SCNC: 4.1 MMOL/L
PROT SERPL-MCNC: 8.2 G/DL
RBC # BLD AUTO: 4.24 M/UL
SODIUM SERPL-SCNC: 137 MMOL/L
TROPONIN I SERPL DL<=0.01 NG/ML-MCNC: <0.006 NG/ML
WBC # BLD AUTO: 12.75 K/UL

## 2018-10-08 PROCEDURE — 94640 AIRWAY INHALATION TREATMENT: CPT

## 2018-10-08 PROCEDURE — 99285 EMERGENCY DEPT VISIT HI MDM: CPT | Mod: 25

## 2018-10-08 PROCEDURE — 83880 ASSAY OF NATRIURETIC PEPTIDE: CPT

## 2018-10-08 PROCEDURE — 82962 GLUCOSE BLOOD TEST: CPT

## 2018-10-08 PROCEDURE — 25000242 PHARM REV CODE 250 ALT 637 W/ HCPCS: Performed by: EMERGENCY MEDICINE

## 2018-10-08 PROCEDURE — 94761 N-INVAS EAR/PLS OXIMETRY MLT: CPT

## 2018-10-08 PROCEDURE — 93005 ELECTROCARDIOGRAM TRACING: CPT

## 2018-10-08 PROCEDURE — 93010 ELECTROCARDIOGRAM REPORT: CPT | Mod: ,,, | Performed by: INTERNAL MEDICINE

## 2018-10-08 PROCEDURE — 82010 KETONE BODYS QUAN: CPT

## 2018-10-08 PROCEDURE — 84484 ASSAY OF TROPONIN QUANT: CPT

## 2018-10-08 PROCEDURE — 25000003 PHARM REV CODE 250: Performed by: EMERGENCY MEDICINE

## 2018-10-08 PROCEDURE — 85025 COMPLETE CBC W/AUTO DIFF WBC: CPT

## 2018-10-08 PROCEDURE — 80053 COMPREHEN METABOLIC PANEL: CPT

## 2018-10-08 RX ORDER — ALBUTEROL SULFATE 90 UG/1
1-2 AEROSOL, METERED RESPIRATORY (INHALATION) EVERY 6 HOURS PRN
Qty: 1 INHALER | Refills: 0 | Status: ON HOLD | OUTPATIENT
Start: 2018-10-08 | End: 2018-10-15 | Stop reason: SDUPTHER

## 2018-10-08 RX ORDER — ASPIRIN 325 MG
325 TABLET ORAL
Status: COMPLETED | OUTPATIENT
Start: 2018-10-08 | End: 2018-10-08

## 2018-10-08 RX ORDER — ALBUTEROL SULFATE 90 UG/1
1-2 AEROSOL, METERED RESPIRATORY (INHALATION) EVERY 6 HOURS PRN
Qty: 1 INHALER | Refills: 0 | Status: ON HOLD | OUTPATIENT
Start: 2018-10-08 | End: 2018-10-15 | Stop reason: HOSPADM

## 2018-10-08 RX ORDER — ALBUTEROL SULFATE 2.5 MG/.5ML
2.5 SOLUTION RESPIRATORY (INHALATION)
Status: COMPLETED | OUTPATIENT
Start: 2018-10-08 | End: 2018-10-08

## 2018-10-08 RX ORDER — PREDNISONE 20 MG/1
60 TABLET ORAL
Status: DISCONTINUED | OUTPATIENT
Start: 2018-10-08 | End: 2018-10-08 | Stop reason: HOSPADM

## 2018-10-08 RX ADMIN — ASPIRIN 325 MG ORAL TABLET 325 MG: 325 PILL ORAL at 02:10

## 2018-10-08 RX ADMIN — ALBUTEROL SULFATE 2.5 MG: 2.5 SOLUTION RESPIRATORY (INHALATION) at 02:10

## 2018-10-08 NOTE — ED TRIAGE NOTES
Pt arrived to the ED due to a fever that has been going on for the past week and increased wheezing that started today. Pt has a hx of COPD.

## 2018-10-08 NOTE — ED PROVIDER NOTES
Encounter Date: 10/8/2018     This is a 68 y.o. female complaining of cough and fever. Cough began 5 days ago. Has been on Clindamycin for the last 7 days for an abscess.    I have evaluated and conducted a medical screening exam with initial orders entered, if indicated, to expedite care. The patient will be placed in a treatment area when one is available. Care will be transferred to an alternate provider for a full assessment including but not limited to: history, physical exam, additional orders, and final disposition.    Sami Dye NP      SCRIBE #1 NOTE: IRajendra, ayad scribing for, and in the presence of,  Arnaldo Segovia MD. I have scribed the following portions of the note - Other sections scribed: HPI, ROS, PE.       History   No chief complaint on file.    CC: Chest Congestion ; Shortness of Breath    HPI:    The patient joss  67 y/o female with an extensive pmhx that's significant for COPD and DM type 2 that presents for emergent consideration of productive cough with assicated chest pain and SOB that began x2-3 days ago. She describes her pain as being R sided and also around the L side of her rib cage. The pt states that she believes there is fluid in her lungs. She is also c/o subjective fever.    PMHx: bialteral cataracts, CKD, COPD, DM type 2, diabetic retinopathy, renal cell carcinoma, colonic polyps, GERD, urinary incontinence, hyperlipidemia, HTN, nephrolithiasis, hyperparathyroidism, Schatzki's ring.       The history is provided by the patient. No  was used.     Review of patient's allergies indicates:   Allergen Reactions    Metformin Diarrhea    Pantoprazole      elevated blood sugars     Past Medical History:   Diagnosis Date    Cataracts, bilateral     CKD (chronic kidney disease) stage 3, GFR 30-59 ml/min 12/15/2014    Combined hyperlipidemia associated with type 2 diabetes mellitus 6/7/2013    COPD (chronic obstructive pulmonary disease) 12/15/2014     Diabetes mellitus type II     NIDDM, a.m. glucose-120s-130s-last A1c-7.1-6/7/13    Diabetes mellitus with renal manifestations, uncontrolled 2/1/2017    Diabetic retinopathy     Family history of stomach cancer 12/5/2013    H/O renal cell carcinoma 12/15/2015    History of colonic polyps 2/1/2017    3 polyps 7/13 ---5 yrs    History of gastroesophageal reflux (GERD)     History of urinary incontinence     s/p bladder lift-october, 2011 (at [facility])    Hyperlipidemia     Hypertension     120s/70s-80s    Nephrolithiasis     Osteoporosis, post-menopausal 3/17/2014    Primary hyperparathyroidism 10/20/2016    Schatzki's ring 2/1/2017    Dilated 2014     Past Surgical History:   Procedure Laterality Date    bladder lift  2011    done at [facility]    BLADDER STONE REMOVAL  2011    before bladder lift    CATARACT EXTRACTION W/  INTRAOCULAR LENS IMPLANT Left 06/07/2016    Dr. Vásquez    CATARACT EXTRACTION W/  INTRAOCULAR LENS IMPLANT Right 06/21/2016    Dr. Vásquez    COLONOSCOPY N/A 4/26/2018    Procedure: COLONOSCOPY;  Surgeon: Benjamin Zamora MD;  Location: [facility];  Service: Endoscopy;  Laterality: N/A;  confirmed ss    COLONOSCOPY N/A 4/26/2018    [facility]formed by [facility] at [facility]    COLONOSCOPY N/A 7/12/2013    Performed by Mariposa Shah MD at [facility]    CYSTOSCOPY      EGD (ESOPHAGOGASTRODUODENOSCOPY) N/A 1/8/2014    [facility] by [facility]aias Marinelli [facility] at [facility]    INSERTION-INTRAOCULAR LENS (IOL) Right 6/21/2016    [facility]    INSERTION-INTRAOCULAR LENS (IOL) Left 6/7/2016    [facility]    NEPHRECTOMY      partial right    NEPHRECTOMY, RADICAL Right 8/6/2013    [facility] at [facility]    PHACOEMULSIFICATION-ASPIRATION-CATARACT Right 6/21/2016    [facility]     PHACOEMULSIFICATION-ASPIRATION-CATARACT Left 6/7/2016    [facility]    ROBOTIC NEPHRECTOMY, PARTIAL Right 8/6/2013    [facility] at [facility]     Family History   Problem Relation Age of Onset    Glaucoma Mother     Cataracts Mother     No Known Problems Father     Colon cancer Brother     Nephrolithiasis Sister     No Known Problems Maternal Aunt     No Known Problems Maternal Uncle     No Known Problems Paternal Aunt     No Known Problems Paternal Uncle     No Known Problems Maternal Grandmother     No Known Problems Maternal Grandfather     No Known Problems Paternal Grandmother     No Known Problems Paternal Grandfather     Anesthesia problems Neg Hx     Kidney cancer Neg Hx     Amblyopia Neg Hx     Blindness Neg Hx     Cancer Neg Hx     Diabetes Neg Hx     Hypertension Neg Hx     Macular degeneration Neg Hx     Retinal detachment Neg Hx     Strabismus Neg Hx     Stroke Neg Hx     Thyroid disease Neg Hx      Social History     Tobacco Use    Smoking status: Current Every Day Smoker     Packs/day: 0.25     Years: 30.00     Pack years: 7.50     Types: Cigarettes    Smokeless tobacco: Never Used    Tobacco comment: 4-5 cigs/day   Substance Use Topics    Alcohol use: No     Alcohol/week: 0.0 oz    Drug use: No     Review of Systems   Constitutional: Positive for fever (subjective). Negative for appetite change.   HENT: Negative for rhinorrhea and sore throat.    Eyes: Negative for visual disturbance.   Respiratory: Positive for cough and shortness of breath.    Cardiovascular: Positive for chest pain.   Gastrointestinal: Negative for abdominal pain.   Genitourinary: Negative for dysuria.   Musculoskeletal: Negative for gait problem.   Skin: Negative for rash.   Neurological: Negative for syncope.   All other systems reviewed and are negative.      Physical Exam     Initial Vitals   BP Pulse Resp Temp SpO2   --  -- -- -- --      MAP       --         Physical Exam    Constitutional: She appears well-developed and well-nourished. She is not diaphoretic. No distress.   Cardiovascular: Normal rate, regular rhythm, normal heart sounds and intact distal pulses. Exam reveals no gallop and no friction rub.    No murmur heard.  Pulmonary/Chest: No respiratory distress. She has wheezes. She has no rhonchi. She has no rales.   Abdominal: Soft. Bowel sounds are normal. She exhibits no distension. There is no tenderness. There is no rebound and no guarding.   Psychiatric: She has a normal mood and affect. Her behavior is normal.         ED Course   Procedures  Labs Reviewed   POCT GLUCOSE MONITORING CONTINUOUS          Imaging Results    None                     Scribe Attestation:   Scribe #1: I performed the above scribed service and the documentation accurately describes the services I performed. I attest to the accuracy of the note.    Attending Attestation:           Physician Attestation for Scribe:  Physician Attestation Statement for Scribe #1: I, Arnaldo Segovia MD, reviewed documentation, as scribed by Rajendra Roland in my presence, and it is both accurate and complete.                 ED Course as of Oct 08 1555   Mon Oct 08, 2018   1553 This is Dr. Arnaldo Segovia, the attending provider  Patient comes in complaining of a cough a little bit of pain without cough and productive sputum x-ray maybe shows a little bit of a pleural effusion and this was ordered before I examined the patient on triage my exam of the patient showed that she had wheezing cardiac workup was normal EKG did not show any gross abnormalities I think she is having had a COPD exacerbation and I will send her home with some albuterol she does not want to take any steroids because of her diabetes there is no evidence of any cardiac disease  [SA]      ED Course User Index  [SA] Arnaldo Segovia MD     Clinical Impression:   Diagnoses of Cough and SOB  (shortness of breath) were pertinent to this visit.                             Arnaldo Segovia MD  10/08/18 [id number]

## 2018-10-08 NOTE — PROGRESS NOTES
This dictation has been generated using [employer] Dictation some phonetic errors may occur. Please contact author for clarification if needed.     Problem List Items Addressed This Visit     None      Visit Diagnoses     Diabetes mellitus without complication    -  Primary        Abscess continues to improve. Packed d/c sunday    No Follow-up on file.    ________________________________________________________________  ________________________________________________________________      Chief Complaint   Patient presents with    cyst under arm     History of present illness  This 68 y.o. presents today for complaint of abscess and diabetes.  Cyst on the left breast.  She did repack.  Denies any fever or chills.  Notes continued improvement.  Review of systems  Complete review of systems otherwise negative.    Past Medical History:   Diagnosis Date    Cataracts, bilateral     CKD (chronic kidney disease) stage 3, GFR 30-59 ml/min 12/15/2014    Combined hyperlipidemia associated with type 2 diabetes mellitus 6/7/2013    COPD (chronic obstructive pulmonary disease) 12/15/2014    Diabetes mellitus type II     NIDDM, a.m. glucose-120s-130s-last A1c-7.1-6/7/13    Diabetes mellitus with renal manifestations, uncontrolled 2/1/2017    Diabetic retinopathy     Family history of stomach cancer 12/5/2013    H/O renal cell carcinoma 12/15/2015    History of colonic polyps 2/1/2017    3 polyps 7/13 ---5 yrs    History of gastroesophageal reflux (GERD)     History of urinary incontinence     s/p bladder lift-october, 2011 (at [facility])    Hyperlipidemia     Hypertension     120s/70s-80s    Nephrolithiasis     Osteoporosis, post-menopausal 3/17/2014    Primary hyperparathyroidism 10/20/2016    Schatzki's ring 2/1/2017    Dilated 2014       Past Surgical History:   Procedure Laterality Date    bladder lift  2011    done at [facility]    BLADDER STONE REMOVAL  2011    before bladder lift    CATARACT EXTRACTION W/   INTRAOCULAR LENS IMPLANT Left 06/07/2016    Dr. Vásquez    CATARACT EXTRACTION W/  INTRAOCULAR LENS IMPLANT Right 06/21/2016    Dr. Vásquez    COLONOSCOPY N/A 4/26/2018    Procedure: COLONOSCOPY;  Surgeon: Benjamin Zamora MD;  Location[facility];  Service: Endoscopy;  Laterality: N/A;  confirmed ss    COLONOSCOPY N/A 4/26/2018    [facility]    COLONOSCOPY N/A 7/12/2013    [facility] by Mariposa Shah [facility] at [facility]    CYSTOSCOPY      EGD (ESOPHAGOGASTRODUODENOSCOPY) N/A 1/8/2014    [facility] by [facility]as Marinelli [facility] at [facility]    INSERTION-INTRAOCULAR LENS (IOL) Right 6/21/2016    [facility]    INSERTION-INTRAOCULAR LENS (IOL) Left 6/7/2016    [facility] at [facility]    NEPHRECTOMY      partial right    NEPHRECTOMY, RADICAL Right 8/6/2013    Performed [facility] at [facility]    PHACOEMULSIFICATION-ASPIRATION-CATARACT Right 6/21/2016    [facility] at [facility]    PHACOEMULSIFICATION-ASPIRATION-CATARACT Left 6/7/2016    [facility] at [facility]    ROBOTIC NEPHRECTOMY, PARTIAL Right 8/6/2013    Per[facility] at [facility]       Family History   Problem Relation Age of Onset    Glaucoma Mother     Cataracts Mother     No Known Problems Father     Colon cancer Brother     Nephrolithiasis Sister     No Known Problems Maternal Aunt     No Known Problems Maternal Uncle     No Known Problems Paternal Aunt     No Known Problems Paternal Uncle     No Known Problems Maternal Grandmother     No Known Problems Maternal Grandfather     No Known Problems Paternal Grandmother     No Known Problems Paternal Grandfather     Anesthesia problems Neg Hx     Kidney cancer Neg Hx     Amblyopia Neg Hx     Blindness Neg Hx     Cancer Neg Hx     Diabetes Neg Hx      Hypertension Neg Hx     Macular degeneration Neg Hx     Retinal detachment Neg Hx     Strabismus Neg Hx     Stroke Neg Hx     Thyroid disease Neg Hx        Social History     Socioeconomic History    Marital status:      Spouse name: None    Number of children: None    Years of education: None    Highest education level: None   Social Needs    Financial resource strain: None    Food insecurity - worry: None    Food insecurity - inability: None    Transportation needs - medical: None    Transportation needs - non-medical: None   Occupational History    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   Tobacco Use    Smoking status: Current Every Day Smoker     Packs/day: 0.25     Years: 30.00     Pack years: 7.50     Types: Cigarettes    Smokeless tobacco: Never Used    Tobacco comment: 4-5 cigs/day   Substance and Sexual Activity    Alcohol use: No     Alcohol/week: 0.0 oz    Drug use: No    Sexual activity: None   Other Topics Concern    None   Social History Narrative    Lives on [facility]    Here with sister Kate [phone]           Current Outpatient Medications   Medication Sig Dispense Refill    acetaminophen-codeine 300-30mg (TYLENOL #3) 300-30 mg Tab Take 1 tablet by mouth every 4 to 6 hours as needed (pain). 12 tablet 0    amLODIPine (NORVASC) 5 MG tablet Take 1 tablet (5 mg total) by mouth once daily. 90 tablet 3    aspirin (ECOTRIN) 81 MG EC tablet Take 81 mg by mouth once daily.      calcium carbonate (OS-LISSETTE) 500 mg calcium (1,250 mg) tablet Take 1 tablet (500 mg total) by mouth once daily. 30 tablet 5    clindamycin (CLEOCIN) 150 MG capsule Take 2 capsules (300 mg total) by mouth 4 (four) times daily. for 10 days 80 capsule 0    cyanocobalamin, vitamin B-12, (VITAMIN B-12) 1,000 mcg TbSR Take by mouth once daily.       DOCOSAHEXANOIC ACID/EPA (FISH OIL ORAL) Take 1,200 mg by mouth once daily.      glipiZIDE (GLUCOTROL) 10 MG tablet Take 1 tablet (10 mg total) by mouth daily  "with breakfast. 90 tablet 3    hydroCHLOROthiazide (HYDRODIURIL) 12.5 MG Tab Take 1 tablet (12.5 mg total) by mouth once daily. 90 tablet 3    HYDROcodone-acetaminophen (NORCO)  mg per tablet Take 1 tablet by mouth every 8 (eight) hours as needed for Pain. 10 tablet 0    insulin aspart U-100 (NOVOLOG FLEXPEN U-100 INSULIN) 100 unit/mL InPn pen ADMINISTER 12 UNITS UNDER THE SKIN THREE TIMES DAILY WITH MEALS 45 mL 12    insulin detemir U-100 (LEVEMIR FLEXTOUCH) 100 unit/mL (3 mL) SubQ InPn pen Inject 50 Units into the skin every evening. 100 mL 12    irbesartan (AVAPRO) 150 MG tablet Take 1 tablet (150 mg total) by mouth once daily. 90 tablet 3    oxybutynin (DITROPAN-XL) 5 MG TR24 TAKE 1 TABLET(5 MG) BY MOUTH EVERY DAY 90 tablet 3    pen needle, diabetic (BD INSULIN PEN NEEDLE UF SHORT) 31 gauge x 5/16" Ndle USE AS DIRECTED 100 each 12    ranitidine (ZANTAC) 300 MG tablet TAKE 1 TABLET(300 MG) BY MOUTH EVERY EVENING 90 tablet 0    rosuvastatin (CRESTOR) 20 MG tablet Take 1 tablet (20 mg total) by mouth once daily. 90 tablet 3    traMADol (ULTRAM) 50 mg tablet TAKE 1 TABLET BY MOUTH EVERY 6 HOURS AS NEEDED 50 tablet 0    valsartan (DIOVAN) 160 MG tablet Take 1 tablet (160 mg total) by mouth once daily. 90 tablet 3     No current facility-administered medications for this visit.        Review of patient's allergies indicates:   Allergen Reactions    Metformin Diarrhea    Pantoprazole      elevated blood sugars       Physical examination  Vitals Reviewed  Gen. Well-dressed well-nourished   Skin warm dry and intact.  No rashes noted.  Abscess on the left breast is healing nicely.  The edges of the wound are clean.  No active drainage noted.  Chest.  Respirations are even unlabored.  Lungs are clear to auscultation.  Cardiac regular rate and rhythm.  No chest wall adenopathy noted.  Neuro. Awake alert oriented x4.  Normal judgment and cognition noted.  Extremities no clubbing cyanosis or edema noted. "     Call or return to clinic prn if these symptoms worsen or fail to improve as anticipated.

## 2018-10-12 ENCOUNTER — OFFICE VISIT (OUTPATIENT)
Dept: FAMILY MEDICINE | Facility: CLINIC | Age: 68
DRG: 189 | End: 2018-10-12
Payer: MEDICARE

## 2018-10-12 ENCOUNTER — HOSPITAL ENCOUNTER (INPATIENT)
Facility: HOSPITAL | Age: 68
LOS: 3 days | Discharge: HOME | DRG: 189 | End: 2018-10-15
Attending: EMERGENCY MEDICINE | Admitting: HOSPITALIST
Payer: MEDICARE

## 2018-10-12 VITALS
HEART RATE: 100 BPM | HEIGHT: 63 IN | BODY MASS INDEX: 32.43 KG/M2 | TEMPERATURE: 100 F | OXYGEN SATURATION: 95 % | DIASTOLIC BLOOD PRESSURE: 80 MMHG | SYSTOLIC BLOOD PRESSURE: 140 MMHG | WEIGHT: 183 LBS

## 2018-10-12 DIAGNOSIS — E11.9 DM TYPE 2 WITHOUT RETINOPATHY: ICD-10-CM

## 2018-10-12 DIAGNOSIS — J81.1 PULMONARY EDEMA: ICD-10-CM

## 2018-10-12 DIAGNOSIS — R09.02 HYPOXIA: ICD-10-CM

## 2018-10-12 DIAGNOSIS — Z98.890 POST-OPERATIVE STATE: ICD-10-CM

## 2018-10-12 DIAGNOSIS — Z96.1 PSEUDOPHAKIA: ICD-10-CM

## 2018-10-12 DIAGNOSIS — J44.1 COPD EXACERBATION: ICD-10-CM

## 2018-10-12 DIAGNOSIS — N18.30 CKD (CHRONIC KIDNEY DISEASE) STAGE 3, GFR 30-59 ML/MIN: ICD-10-CM

## 2018-10-12 DIAGNOSIS — R79.81 LOW O2 SATURATION: ICD-10-CM

## 2018-10-12 DIAGNOSIS — I10 ESSENTIAL HYPERTENSION: ICD-10-CM

## 2018-10-12 DIAGNOSIS — H25.13 NUCLEAR SCLEROSIS OF BOTH EYES: ICD-10-CM

## 2018-10-12 DIAGNOSIS — R09.89 PULMONARY VASCULAR CONGESTION: Primary | ICD-10-CM

## 2018-10-12 DIAGNOSIS — I73.9 PVD (PERIPHERAL VASCULAR DISEASE): ICD-10-CM

## 2018-10-12 DIAGNOSIS — R07.1 CHEST PAIN ON BREATHING: ICD-10-CM

## 2018-10-12 DIAGNOSIS — R06.02 SHORTNESS OF BREATH: ICD-10-CM

## 2018-10-12 DIAGNOSIS — I65.23 BILATERAL CAROTID ARTERY STENOSIS: ICD-10-CM

## 2018-10-12 DIAGNOSIS — H52.7 REFRACTIVE ERROR: ICD-10-CM

## 2018-10-12 DIAGNOSIS — J18.9 COMMUNITY ACQUIRED PNEUMONIA, UNSPECIFIED LATERALITY: ICD-10-CM

## 2018-10-12 DIAGNOSIS — Z80.0 FAMILY HISTORY OF STOMACH CANCER: ICD-10-CM

## 2018-10-12 DIAGNOSIS — J96.01 ACUTE RESPIRATORY FAILURE WITH HYPOXIA: ICD-10-CM

## 2018-10-12 DIAGNOSIS — Z86.010 HISTORY OF COLONIC POLYPS: ICD-10-CM

## 2018-10-12 DIAGNOSIS — K22.2 SCHATZKI'S RING: ICD-10-CM

## 2018-10-12 DIAGNOSIS — Z79.4 TYPE 2 DIABETES MELLITUS WITH HYPERGLYCEMIA, WITH LONG-TERM CURRENT USE OF INSULIN: ICD-10-CM

## 2018-10-12 DIAGNOSIS — E11.69 COMBINED HYPERLIPIDEMIA ASSOCIATED WITH TYPE 2 DIABETES MELLITUS: ICD-10-CM

## 2018-10-12 DIAGNOSIS — R23.1 PALE: ICD-10-CM

## 2018-10-12 DIAGNOSIS — N18.30 TYPE 2 DIABETES MELLITUS WITH STAGE 3 CHRONIC KIDNEY DISEASE, WITH LONG-TERM CURRENT USE OF INSULIN: ICD-10-CM

## 2018-10-12 DIAGNOSIS — I35.9 NONRHEUMATIC AORTIC VALVE DISORDER: ICD-10-CM

## 2018-10-12 DIAGNOSIS — R07.9 RIGHT-SIDED CHEST PAIN: ICD-10-CM

## 2018-10-12 DIAGNOSIS — Z85.528 HISTORY OF KIDNEY CANCER: ICD-10-CM

## 2018-10-12 DIAGNOSIS — E11.65 TYPE 2 DIABETES MELLITUS WITH HYPERGLYCEMIA, WITH LONG-TERM CURRENT USE OF INSULIN: ICD-10-CM

## 2018-10-12 DIAGNOSIS — F17.200 TOBACCO DEPENDENCE: ICD-10-CM

## 2018-10-12 DIAGNOSIS — E11.22 TYPE 2 DIABETES MELLITUS WITH STAGE 3 CHRONIC KIDNEY DISEASE, WITH LONG-TERM CURRENT USE OF INSULIN: ICD-10-CM

## 2018-10-12 DIAGNOSIS — E78.2 COMBINED HYPERLIPIDEMIA ASSOCIATED WITH TYPE 2 DIABETES MELLITUS: ICD-10-CM

## 2018-10-12 DIAGNOSIS — Z79.4 TYPE 2 DIABETES MELLITUS WITH STAGE 3 CHRONIC KIDNEY DISEASE, WITH LONG-TERM CURRENT USE OF INSULIN: ICD-10-CM

## 2018-10-12 DIAGNOSIS — I73.9 PVD (PERIPHERAL VASCULAR DISEASE) WITH CLAUDICATION: ICD-10-CM

## 2018-10-12 DIAGNOSIS — H25.11 NUCLEAR SCLEROSIS OF RIGHT EYE: ICD-10-CM

## 2018-10-12 DIAGNOSIS — R00.0 TACHYCARDIA: ICD-10-CM

## 2018-10-12 DIAGNOSIS — H26.491 PCO (POSTERIOR CAPSULAR OPACIFICATION), RIGHT: ICD-10-CM

## 2018-10-12 DIAGNOSIS — I10 HYPERTENSION, BENIGN: ICD-10-CM

## 2018-10-12 DIAGNOSIS — M81.0 OSTEOPOROSIS, POST-MENOPAUSAL: ICD-10-CM

## 2018-10-12 DIAGNOSIS — E21.0 PRIMARY HYPERPARATHYROIDISM: ICD-10-CM

## 2018-10-12 DIAGNOSIS — N39.41 URINARY INCONTINENCE, URGE: ICD-10-CM

## 2018-10-12 DIAGNOSIS — R06.02 SHORTNESS OF BREATH: Primary | ICD-10-CM

## 2018-10-12 LAB
ALBUMIN SERPL BCP-MCNC: 2.9 G/DL
ALLENS TEST: ABNORMAL
ALLENS TEST: ABNORMAL
ALP SERPL-CCNC: 169 U/L
ALT SERPL W/O P-5'-P-CCNC: 24 U/L
ANION GAP SERPL CALC-SCNC: 13 MMOL/L
AST SERPL-CCNC: 25 U/L
BASOPHILS # BLD AUTO: 0.04 K/UL
BASOPHILS NFR BLD: 0.4 %
BILIRUB SERPL-MCNC: 0.8 MG/DL
BNP SERPL-MCNC: <10 PG/ML
BUN SERPL-MCNC: 13 MG/DL
CALCIUM SERPL-MCNC: 9.6 MG/DL
CHLORIDE SERPL-SCNC: 102 MMOL/L
CO2 SERPL-SCNC: 22 MMOL/L
CREAT SERPL-MCNC: 1.3 MG/DL
D DIMER PPP IA.FEU-MCNC: 3.83 MG/L FEU
D DIMER PPP IA.FEU-MCNC: 4.01 MG/L FEU
DIFFERENTIAL METHOD: ABNORMAL
EOSINOPHIL # BLD AUTO: 0.1 K/UL
EOSINOPHIL NFR BLD: 1.3 %
ERYTHROCYTE [DISTWIDTH] IN BLOOD BY AUTOMATED COUNT: 13.8 %
EST. GFR  (AFRICAN AMERICAN): 48 ML/MIN/1.73 M^2
EST. GFR  (NON AFRICAN AMERICAN): 41.7 ML/MIN/1.73 M^2
FLUAV AG SPEC QL IA: NEGATIVE
FLUBV AG SPEC QL IA: NEGATIVE
GLUCOSE SERPL-MCNC: 363 MG/DL
HCO3 UR-SCNC: 24.1 MMOL/L (ref 24–28)
HCO3 UR-SCNC: 29.7 MMOL/L (ref 24–28)
HCT VFR BLD AUTO: 39.5 %
HGB BLD-MCNC: 12.4 G/DL
IMM GRANULOCYTES # BLD AUTO: 0.13 K/UL
IMM GRANULOCYTES NFR BLD AUTO: 1.2 %
LACTATE SERPL-SCNC: 2.7 MMOL/L
LYMPHOCYTES # BLD AUTO: 1.1 K/UL
LYMPHOCYTES NFR BLD: 10.1 %
MCH RBC QN AUTO: 27 PG
MCHC RBC AUTO-ENTMCNC: 31.4 G/DL
MCV RBC AUTO: 86 FL
MONOCYTES # BLD AUTO: 0.6 K/UL
MONOCYTES NFR BLD: 6 %
NEUTROPHILS # BLD AUTO: 8.6 K/UL
NEUTROPHILS NFR BLD: 81 %
NRBC BLD-RTO: 0 /100 WBC
PCO2 BLDA: 28.6 MMHG (ref 35–45)
PCO2 BLDA: 42.7 MMHG (ref 35–45)
PH SMN: 7.45 [PH] (ref 7.35–7.45)
PH SMN: 7.53 [PH] (ref 7.35–7.45)
PLATELET # BLD AUTO: 341 K/UL
PMV BLD AUTO: 9.2 FL
PO2 BLDA: 28 MMHG (ref 40–60)
PO2 BLDA: 80 MMHG (ref 40–60)
POC BE: 1 MMOL/L
POC BE: 6 MMOL/L
POC SATURATED O2: 56 % (ref 95–100)
POC SATURATED O2: 97 % (ref 95–100)
POC TCO2: 25 MMOL/L (ref 24–29)
POC TCO2: 31 MMOL/L (ref 24–29)
POCT GLUCOSE: 385 MG/DL (ref 70–110)
POTASSIUM SERPL-SCNC: 4.2 MMOL/L
PROT SERPL-MCNC: 8.4 G/DL
RBC # BLD AUTO: 4.59 M/UL
SAMPLE: ABNORMAL
SAMPLE: ABNORMAL
SITE: ABNORMAL
SITE: ABNORMAL
SODIUM SERPL-SCNC: 137 MMOL/L
SPECIMEN SOURCE: NORMAL
TROPONIN I SERPL DL<=0.01 NG/ML-MCNC: <0.006 NG/ML
WBC # BLD AUTO: 10.63 K/UL

## 2018-10-12 PROCEDURE — 84484 ASSAY OF TROPONIN QUANT: CPT

## 2018-10-12 PROCEDURE — 83605 ASSAY OF LACTIC ACID: CPT

## 2018-10-12 PROCEDURE — 83036 HEMOGLOBIN GLYCOSYLATED A1C: CPT

## 2018-10-12 PROCEDURE — 80053 COMPREHEN METABOLIC PANEL: CPT

## 2018-10-12 PROCEDURE — 85379 FIBRIN DEGRADATION QUANT: CPT

## 2018-10-12 PROCEDURE — 84145 PROCALCITONIN (PCT): CPT

## 2018-10-12 PROCEDURE — 82803 BLOOD GASES ANY COMBINATION: CPT

## 2018-10-12 PROCEDURE — 87400 INFLUENZA A/B EACH AG IA: CPT

## 2018-10-12 PROCEDURE — 87040 BLOOD CULTURE FOR BACTERIA: CPT | Mod: 59

## 2018-10-12 PROCEDURE — 27000190 HC CPAP FULL FACE MASK W/VALVE

## 2018-10-12 PROCEDURE — 85379 FIBRIN DEGRADATION QUANT: CPT | Mod: 91

## 2018-10-12 PROCEDURE — 25000003 PHARM REV CODE 250: Performed by: PHYSICIAN ASSISTANT

## 2018-10-12 PROCEDURE — 63600175 PHARM REV CODE 636 W HCPCS: Performed by: PHYSICIAN ASSISTANT

## 2018-10-12 PROCEDURE — 99211 OFF/OP EST MAY X REQ PHY/QHP: CPT | Mod: S$PBB,,, | Performed by: NURSE PRACTITIONER

## 2018-10-12 PROCEDURE — 94660 CPAP INITIATION&MGMT: CPT

## 2018-10-12 PROCEDURE — 93005 ELECTROCARDIOGRAM TRACING: CPT

## 2018-10-12 PROCEDURE — 96361 HYDRATE IV INFUSION ADD-ON: CPT

## 2018-10-12 PROCEDURE — 99999 PR PBB SHADOW E&M-EST. PATIENT-LVL IV: CPT | Mod: PBBFAC,,, | Performed by: NURSE PRACTITIONER

## 2018-10-12 PROCEDURE — 82962 GLUCOSE BLOOD TEST: CPT

## 2018-10-12 PROCEDURE — 83880 ASSAY OF NATRIURETIC PEPTIDE: CPT

## 2018-10-12 PROCEDURE — 99285 EMERGENCY DEPT VISIT HI MDM: CPT | Mod: 25,27

## 2018-10-12 PROCEDURE — 85025 COMPLETE CBC W/AUTO DIFF WBC: CPT

## 2018-10-12 PROCEDURE — 25000242 PHARM REV CODE 250 ALT 637 W/ HCPCS: Performed by: PHYSICIAN ASSISTANT

## 2018-10-12 PROCEDURE — 3077F SYST BP >= 140 MM HG: CPT | Mod: CPTII,,, | Performed by: NURSE PRACTITIONER

## 2018-10-12 PROCEDURE — 96375 TX/PRO/DX INJ NEW DRUG ADDON: CPT

## 2018-10-12 PROCEDURE — 94640 AIRWAY INHALATION TREATMENT: CPT

## 2018-10-12 PROCEDURE — 96365 THER/PROPH/DIAG IV INF INIT: CPT

## 2018-10-12 PROCEDURE — 99214 OFFICE O/P EST MOD 30 MIN: CPT | Mod: PBBFAC,PO,25 | Performed by: NURSE PRACTITIONER

## 2018-10-12 PROCEDURE — 99285 EMERGENCY DEPT VISIT HI MDM: CPT | Mod: ,,, | Performed by: PHYSICIAN ASSISTANT

## 2018-10-12 PROCEDURE — 12000002 HC ACUTE/MED SURGE SEMI-PRIVATE ROOM

## 2018-10-12 PROCEDURE — 3079F DIAST BP 80-89 MM HG: CPT | Mod: CPTII,,, | Performed by: NURSE PRACTITIONER

## 2018-10-12 PROCEDURE — 25500020 PHARM REV CODE 255: Performed by: PHYSICIAN ASSISTANT

## 2018-10-12 PROCEDURE — 27000221 HC OXYGEN, UP TO 24 HOURS

## 2018-10-12 PROCEDURE — 99900035 HC TECH TIME PER 15 MIN (STAT)

## 2018-10-12 RX ORDER — IPRATROPIUM BROMIDE AND ALBUTEROL SULFATE 2.5; .5 MG/3ML; MG/3ML
3 SOLUTION RESPIRATORY (INHALATION)
Status: COMPLETED | OUTPATIENT
Start: 2018-10-12 | End: 2018-10-12

## 2018-10-12 RX ORDER — CEFTRIAXONE 1 G/1
1 INJECTION, POWDER, FOR SOLUTION INTRAMUSCULAR; INTRAVENOUS
Status: COMPLETED | OUTPATIENT
Start: 2018-10-12 | End: 2018-10-12

## 2018-10-12 RX ORDER — ACETAMINOPHEN 325 MG/1
650 TABLET ORAL
Status: COMPLETED | OUTPATIENT
Start: 2018-10-12 | End: 2018-10-12

## 2018-10-12 RX ORDER — METHYLPREDNISOLONE SOD SUCC 125 MG
125 VIAL (EA) INJECTION
Status: COMPLETED | OUTPATIENT
Start: 2018-10-12 | End: 2018-10-12

## 2018-10-12 RX ORDER — FUROSEMIDE 10 MG/ML
20 INJECTION INTRAMUSCULAR; INTRAVENOUS
Status: COMPLETED | OUTPATIENT
Start: 2018-10-12 | End: 2018-10-12

## 2018-10-12 RX ADMIN — IPRATROPIUM BROMIDE AND ALBUTEROL SULFATE 3 ML: .5; 3 SOLUTION RESPIRATORY (INHALATION) at 03:10

## 2018-10-12 RX ADMIN — METHYLPREDNISOLONE SODIUM SUCCINATE 125 MG: 125 INJECTION, POWDER, FOR SOLUTION INTRAMUSCULAR; INTRAVENOUS at 06:10

## 2018-10-12 RX ADMIN — AZITHROMYCIN MONOHYDRATE 500 MG: 500 INJECTION, POWDER, LYOPHILIZED, FOR SOLUTION INTRAVENOUS at 04:10

## 2018-10-12 RX ADMIN — CEFTRIAXONE SODIUM 1 G: 1 INJECTION, POWDER, FOR SOLUTION INTRAMUSCULAR; INTRAVENOUS at 04:10

## 2018-10-12 RX ADMIN — IPRATROPIUM BROMIDE AND ALBUTEROL SULFATE 3 ML: .5; 3 SOLUTION RESPIRATORY (INHALATION) at 02:10

## 2018-10-12 RX ADMIN — SODIUM CHLORIDE 1000 ML: 0.9 INJECTION, SOLUTION INTRAVENOUS at 03:10

## 2018-10-12 RX ADMIN — ACETAMINOPHEN 650 MG: 325 TABLET ORAL at 04:10

## 2018-10-12 RX ADMIN — IOHEXOL 100 ML: 350 INJECTION, SOLUTION INTRAVENOUS at 10:10

## 2018-10-12 RX ADMIN — FUROSEMIDE 20 MG: 10 INJECTION, SOLUTION INTRAMUSCULAR; INTRAVENOUS at 07:10

## 2018-10-12 NOTE — PROGRESS NOTES
67 yo female presents for shortness of breath. She is pale and O2 sats are 86% on room air. She reports shortness of breath has not improved since ED visit on 10/08/2018. She was given Albuterol inhaler. She received a breathing treatment in the ER. Her sister reports elevated blood glucose of over 300 and elevated temperature. Xray in the ED showed a small pleural effusion. Patient sister reports fever in ED, but there are no vitals recorded. Sister will take her to ED on [facility]. Patient in wheelchair because shortness of breath worsens with ambulating. She does have a history of COPD on problem list, but patient reports not being told about this diagnosis.

## 2018-10-12 NOTE — ED NOTES
Shortness of breath and chest pain since Monday;     Sister present.    Pain:  Rated 7/10.    Psychosocial:  Patient is calm and cooperative.  Patients insight and judgement are appropriate to situation.  Appears clean, well maintained, with clothing appropriate to environment.  No evidence of hallucinations, delusions, or psychosis.    Neuro:  Eyes open spontaneously.  Awake, alert.  Oriented x 4.  Speech clear and appropriate.  Tolerating saliva secretions well.  Able to follow commands, demonstrating ability to actively and appropriately communicate within context of current conversation.  Symmetrical facial muscles.    Airway:  Bilateral chest rise and fall.  RR labored.  Air entry patent. Hx of COPD    Circulatory:  Skin warm, dry.  Apical and radial pulses strong and regular.      Abdomen:  Abdomen soft and non distended.      Urinary:  Patient reports routine urination without pain, frequency, or urgency.

## 2018-10-12 NOTE — ED PROVIDER NOTES
Encounter Date: 10/12/2018       History     Chief Complaint   Patient presents with    Shortness of Breath     sent from [facility], last monday temp 105, states was on oxygen at clinic, placed on 2lnc in triage     67 y/o F with history of hyperlipidemia, DM2, HTN, COPD not on daily inhaler presents to the ED c/o cough, SOB, fever.  She was seen at the  ED with same complaints on 10/8 and was discharged with an albuterol inhaler.  Since then, she has been progressively worsening. She felt very SOB this morning so made an urgent care appt. In the clinic, pulse ox 86% so patient sent to the ED for evaluation. She is currently on clindamycin for cellulitis and her last dose is today. She reports fever at home of 105 F on Monday - has been having subjective fevers since but has not taken her temperature. Cough productive of white sputum with associated chest congestion. She has been having constant R sided chest pain x 2 weeks that is worse with coughing or movement. She endorses generalized myalgias. Symptoms not improved with albuterol inhaler. She has not had a flu vaccine yet this year. No sick contacts, recent travel, recent hospitalization. She denies sore throat, rhinorrhea, ear pain, abdominal pain, n/v, dysuria, lightheadedness. Quit smoking January 1, 2018.      The history is provided by the patient.     Review of patient's allergies indicates:   Allergen Reactions    Metformin Diarrhea    Pantoprazole      elevated blood sugars     Past Medical History:   Diagnosis Date    Cataracts, bilateral     CKD (chronic kidney disease) stage 3, GFR 30-59 ml/min 12/15/2014    Combined hyperlipidemia associated with type 2 diabetes mellitus 6/7/2013    COPD (chronic obstructive pulmonary disease) 12/15/2014    Diabetes mellitus type II     NIDDM, a.m. glucose-120s-130s-last A1c-7.1-6/7/13    Diabetes mellitus with renal manifestations, uncontrolled 2/1/2017    Diabetic retinopathy     Family history  of stomach cancer 12/5/2013    H/O renal cell carcinoma 12/15/2015    History of colonic polyps 2/1/2017    3 polyps 7/13 ---5 yrs    History of gastroesophageal reflux (GERD)     History of urinary incontinence     s/p bladder lift-october, 2011 (at [facility])    Hyperlipidemia     Hypertension     120s/70s-80s    Nephrolithiasis     Osteoporosis, post-menopausal 3/17/2014    Primary hyperparathyroidism 10/20/2016    Schatzki's ring 2/1/2017    Dilated 2014     Past Surgical History:   Procedure Laterality Date    bladder lift  2011    done at [facility]    BLADDER STONE REMOVAL  2011    before bladder lift    CATARACT EXTRACTION W/  INTRAOCULAR LENS IMPLANT Left 06/07/2016    Dr. Vásquez    CATARACT EXTRACTION W/  INTRAOCULAR LENS IMPLANT Right 06/21/2016    Dr. Vásquez    COLONOSCOPY N/A 4/26/2018    Procedure: COLONOSCOPY;  Surgeon: Benjamin Zamora MD;  Location: [facility];  Service: Endoscopy;  Laterality: N/A;  confirmed ss    COLONOSCOPY N/A 4/26/2018    Performed by [facility] at [facility]    COLONOSCOPY N/A 7/12/2013    Performed by Mariposa Shah MD at [facility] ENDO    CYSTOSCOPY      EGD (ESOPHAGOGASTRODUODENOSCOPY) N/A 1/8/2014    Performed by Isaias Marinelli MD at [facility] ENDO    INSERTION-INTRAOCULAR LENS (IOL) Right 6/21/2016    Performed by [facility] at [facility]    INSERTION-INTRAOCULAR LENS (IOL) Left 6/7/2016    Performed by [facility] at [facility] OR [facility]    NEPHRECTOMY      partial right    NEPHRECTOMY, RADICAL Right 8/6/2013    Performed by Santos Murry MD at [facility] OR [facility]LR    PHACOEMULSIFICATION-ASPIRATION-CATARACT Right 6/21/2016    Performed by [facility] at [facility]    PHACOEMULSIFICATION-ASPIRATION-CATARACT Left 6/7/2016    Performed [facility] at [facility]    ROBOTIC NEPHRECTOMY, PARTIAL Right 8/6/2013    Performed by [facility]ry [facility] at [facility] OR [facility]ND [facility]LR      Family History   Problem Relation Age of Onset    Glaucoma Mother     Cataracts Mother     No Known Problems Father     Colon cancer Brother     Nephrolithiasis Sister     No Known Problems Maternal Aunt     No Known Problems Maternal Uncle     No Known Problems Paternal Aunt     No Known Problems Paternal Uncle     No Known Problems Maternal Grandmother     No Known Problems Maternal Grandfather     No Known Problems Paternal Grandmother     No Known Problems Paternal Grandfather     Anesthesia problems Neg Hx     Kidney cancer Neg Hx     Amblyopia Neg Hx     Blindness Neg Hx     Cancer Neg Hx     Diabetes Neg Hx     Hypertension Neg Hx     Macular degeneration Neg Hx     Retinal detachment Neg Hx     Strabismus Neg Hx     Stroke Neg Hx     Thyroid disease Neg Hx      Social History     Tobacco Use    Smoking status: Former Smoker     Packs/day: 0.25     Years: 30.00     Pack years: 7.50     Types: Cigarettes    Smokeless tobacco: Never Used    Tobacco comment: pt. reports quitting January 2018   Substance Use Topics    Alcohol use: No     Alcohol/week: 0.0 oz    Drug use: No     Review of Systems   Constitutional: Positive for chills and fever.   HENT: Negative for congestion, ear pain, rhinorrhea, sinus pressure and sore throat.    Eyes: Negative for photophobia and visual disturbance.   Respiratory: Positive for cough, shortness of breath and wheezing.    Cardiovascular: Positive for chest pain and leg swelling.   Gastrointestinal: Positive for diarrhea. Negative for abdominal pain, constipation, nausea and vomiting.   Genitourinary: Negative for dysuria and hematuria.   Musculoskeletal: Positive for myalgias.   Skin: Negative for rash and wound.   Neurological: Negative for light-headedness, numbness and headaches.   Psychiatric/Behavioral: Negative for confusion.       Physical Exam     Initial Vitals [10/12/18 1317]   BP Pulse Resp Temp SpO2   (!) 141/87 100 (!) 24 99.5 °F  (37.5 °C) (!) 87 %      MAP       --         Physical Exam    Nursing note and vitals reviewed.  Constitutional: She appears well-developed and well-nourished. She is not diaphoretic. No distress.   HENT:   Head: Normocephalic and atraumatic.   Neck: Normal range of motion. Neck supple.   Cardiovascular: Regular rhythm and normal heart sounds. Tachycardia present.  Exam reveals no gallop and no friction rub.    No murmur heard.  No LE edema   Pulmonary/Chest: Tachypnea noted. She has wheezes. She has no rhonchi. She has no rales.   Abdominal: Soft. Bowel sounds are normal. There is no tenderness. There is no rebound and no guarding.   Musculoskeletal: Normal range of motion.   Neurological: She is alert and oriented to person, place, and time.   Skin: Skin is warm and dry. No rash noted. No erythema.   Psychiatric: She has a normal mood and affect.         ED Course   Procedures  Labs Reviewed   CBC W/ AUTO DIFFERENTIAL - Abnormal; Notable for the following components:       Result Value    MCHC 31.4 (*)     Immature Granulocytes 1.2 (*)     Gran # (ANC) 8.6 (*)     Immature Grans (Abs) 0.13 (*)     Gran% 81.0 (*)     Lymph% 10.1 (*)     All other components within normal limits    Narrative:     shared lavender tube   COMPREHENSIVE METABOLIC PANEL - Abnormal; Notable for the following components:    CO2 22 (*)     Glucose 363 (*)     Albumin 2.9 (*)     Alkaline Phosphatase 169 (*)     eGFR if  48.0 (*)     eGFR if non  41.7 (*)     All other components within normal limits    Narrative:     shared lavender tube   LACTIC ACID, PLASMA - Abnormal; Notable for the following components:    Lactate (Lactic Acid) 2.7 (*)     All other components within normal limits    Narrative:     shared lavender tube   CULTURE, BLOOD   CULTURE, BLOOD   TROPONIN I    Narrative:     shared lavender tube   B-TYPE NATRIURETIC PEPTIDE    Narrative:     shared lavender tube   INFLUENZA A AND B ANTIGEN           Imaging Results          X-Ray Chest PA And Lateral (Final result)  Result time 10/12/18 14:39:19    Final result by Paxton Morris MD (10/12/18 14:39:19)                 Impression:      Findings suggesting pulmonary edema/CHF pattern with small bilateral pleural effusions and left basilar platelike scarring versus atelectasis.      Electronically signed by: Paxton Morris MD  Date:    10/12/2018  Time:    14:39             Narrative:    EXAMINATION:  XR CHEST PA AND LATERAL    CLINICAL HISTORY:  cough, SOB, hypoxia;    TECHNIQUE:  PA and lateral views of the chest were performed.    COMPARISON:  Chest radiograph 10/08/2018    FINDINGS:  Monitoring leads overlie the chest.  Resolution is limited by body habitus with underpenetration.  Patient is slightly rotated.    Cardiac silhouette is mildly enlarged with prominence of the central pulmonary vasculature and bilateral diffuse interstitial coarsening with a perihilar and basilar predominance suggesting pulmonary edema/CHF pattern.  There are small bilateral pleural effusions.  Left basilar platelike scarring versus atelectasis.  No pneumothorax or large consolidation.  Grossly stable mediastinal contours.  No acute osseous process seen.                                 Medical Decision Making:   History:   Old Medical Records: I decided to obtain old medical records.  Old Records Summarized: records from clinic visits and records from previous admission(s).       <> Summary of Records: 10/1:  ED for cellulitis - started on clindamycin at that time (last dose today)  10/8:  ED c/o SOB, cough fever - CXR showed Patchy areas of increased attenuation at the lung bases left more than right concerning for subsegmental atelectasis or airspace disease. Discharged with albuterol inhaler.   10/12: Clinic Urgent [facility] c/o SOB, cough; pulse ox 86% on RA. Sent to the ED.       APC / Resident Notes:   69 y/o F with history of hyperlipidemia, DM2, HTN, COPD not on daily  inhaler presents to the ED c/o cough, SOB, fever. Tachycardic. Hypoxic at 87% on RA. Placed on 2L NC. Wheezes noted to lungs. No LE edema. Abdomen soft. DDx includes but is not limited to pneumonia, influenza, sepsis, fluid overload, ACS. Will get labs, CXR.     No leukocytosis. Hyperglycemia noted with glucose 363. Troponin and BNP normal. Lactic acid elevated at 2.7. Blood culture x 2 pending.    CXR shows pulmonary edema/CHF. She has never had an echo. No known history of CHF.    4:03 PM  Pulse ox 87% on 3L oxygen via nasal cannula. Increased to 5L with no improvement of sats. Placed on non-rebreather and oxygen saturation improved to 96%.     4:17 PM  Complaining of R sided chest pain that radiates to the back. Worse with deep breath. She is tachycardic and tachypneic. She states she does not feel SOB anymore and that she is taking shallow breaths because of the pain. Concern for PE. Patient is worried about getting contrast dye - was told by her nephrologist not to have contrast. GFR 48. Will order CT chest non-contrast and DVT study. D-Dimer added.    6:58 PM  Influenza in process. CT chest has been done, awaiting radiology read. Ultrasound is coming down to the ED to perform DVT study so patient can remain on NRB.     She has been given IV rocephin, azithromycin, solumedrol. Resting comfortably. SOB has improved. Requesting to eat.    Patient signed out to Caitlin Jensen PA-C pending DVT study, CT chest result, influenza. Plan to admit to internal medicine.                     Clinical Impression:   The primary encounter diagnosis was Hypoxia. Diagnoses of Tachycardia, Shortness of breath, and Right-sided chest pain were also pertinent to this visit.                             Ave Burton PA-C  10/12/18 [id number]

## 2018-10-13 PROBLEM — E11.29 TYPE 2 DIABETES MELLITUS WITH KIDNEY COMPLICATION, WITH LONG-TERM CURRENT USE OF INSULIN: Status: ACTIVE | Noted: 2018-10-13

## 2018-10-13 PROBLEM — J96.01 ACUTE RESPIRATORY FAILURE WITH HYPOXIA: Status: ACTIVE | Noted: 2018-10-13

## 2018-10-13 PROBLEM — R07.1 CHEST PAIN ON BREATHING: Status: ACTIVE | Noted: 2018-10-13

## 2018-10-13 PROBLEM — E11.65 TYPE 2 DIABETES MELLITUS WITH HYPERGLYCEMIA, WITH LONG-TERM CURRENT USE OF INSULIN: Status: ACTIVE | Noted: 2018-10-13

## 2018-10-13 PROBLEM — J44.1 COPD EXACERBATION: Status: ACTIVE | Noted: 2018-10-13

## 2018-10-13 PROBLEM — Z79.4 TYPE 2 DIABETES MELLITUS WITH KIDNEY COMPLICATION, WITH LONG-TERM CURRENT USE OF INSULIN: Status: ACTIVE | Noted: 2018-10-13

## 2018-10-13 PROBLEM — Z79.4 TYPE 2 DIABETES MELLITUS WITH HYPERGLYCEMIA, WITH LONG-TERM CURRENT USE OF INSULIN: Status: ACTIVE | Noted: 2018-10-13

## 2018-10-13 PROBLEM — J18.9 CAP (COMMUNITY ACQUIRED PNEUMONIA): Status: ACTIVE | Noted: 2018-10-13

## 2018-10-13 LAB
ANION GAP SERPL CALC-SCNC: 13 MMOL/L
ANION GAP SERPL CALC-SCNC: 14 MMOL/L
ANION GAP SERPL CALC-SCNC: 14 MMOL/L
ANION GAP SERPL CALC-SCNC: 17 MMOL/L
B-OH-BUTYR BLD STRIP-SCNC: 0 MMOL/L
BASOPHILS # BLD AUTO: 0.02 K/UL
BASOPHILS NFR BLD: 0.2 %
BUN SERPL-MCNC: 19 MG/DL
BUN SERPL-MCNC: 24 MG/DL
BUN SERPL-MCNC: 26 MG/DL
BUN SERPL-MCNC: 28 MG/DL
CALCIUM SERPL-MCNC: 9 MG/DL
CALCIUM SERPL-MCNC: 9.1 MG/DL
CALCIUM SERPL-MCNC: 9.3 MG/DL
CALCIUM SERPL-MCNC: 9.4 MG/DL
CHLORIDE SERPL-SCNC: 100 MMOL/L
CHLORIDE SERPL-SCNC: 100 MMOL/L
CHLORIDE SERPL-SCNC: 98 MMOL/L
CHLORIDE SERPL-SCNC: 99 MMOL/L
CO2 SERPL-SCNC: 21 MMOL/L
CO2 SERPL-SCNC: 22 MMOL/L
CO2 SERPL-SCNC: 24 MMOL/L
CO2 SERPL-SCNC: 26 MMOL/L
CREAT SERPL-MCNC: 1.5 MG/DL
CREAT SERPL-MCNC: 1.7 MG/DL
CREAT SERPL-MCNC: 1.8 MG/DL
CREAT SERPL-MCNC: 1.8 MG/DL
DIASTOLIC DYSFUNCTION: YES
DIFFERENTIAL METHOD: ABNORMAL
EOSINOPHIL # BLD AUTO: 0 K/UL
EOSINOPHIL NFR BLD: 0.1 %
ERYTHROCYTE [DISTWIDTH] IN BLOOD BY AUTOMATED COUNT: 14 %
EST. GFR  (AFRICAN AMERICAN): 32.4 ML/MIN/1.73 M^2
EST. GFR  (AFRICAN AMERICAN): 32.4 ML/MIN/1.73 M^2
EST. GFR  (AFRICAN AMERICAN): 34.7 ML/MIN/1.73 M^2
EST. GFR  (AFRICAN AMERICAN): 40.4 ML/MIN/1.73 M^2
EST. GFR  (NON AFRICAN AMERICAN): 28.1 ML/MIN/1.73 M^2
EST. GFR  (NON AFRICAN AMERICAN): 28.1 ML/MIN/1.73 M^2
EST. GFR  (NON AFRICAN AMERICAN): 30.1 ML/MIN/1.73 M^2
EST. GFR  (NON AFRICAN AMERICAN): 35 ML/MIN/1.73 M^2
ESTIMATED AVG GLUCOSE: 237 MG/DL
GLUCOSE SERPL-MCNC: 484 MG/DL
GLUCOSE SERPL-MCNC: 619 MG/DL
GLUCOSE SERPL-MCNC: 664 MG/DL
GLUCOSE SERPL-MCNC: 708 MG/DL
GLUCOSE SERPL-MCNC: >500 MG/DL (ref 70–110)
HBA1C MFR BLD HPLC: 9.9 %
HCT VFR BLD AUTO: 40 %
HGB BLD-MCNC: 12.7 G/DL
IMM GRANULOCYTES # BLD AUTO: 0.14 K/UL
IMM GRANULOCYTES NFR BLD AUTO: 1.2 %
LYMPHOCYTES # BLD AUTO: 0.6 K/UL
LYMPHOCYTES NFR BLD: 5.2 %
MCH RBC QN AUTO: 27.9 PG
MCHC RBC AUTO-ENTMCNC: 31.8 G/DL
MCV RBC AUTO: 88 FL
MONOCYTES # BLD AUTO: 0.3 K/UL
MONOCYTES NFR BLD: 2.1 %
NEUTROPHILS # BLD AUTO: 10.8 K/UL
NEUTROPHILS NFR BLD: 91.2 %
NRBC BLD-RTO: 0 /100 WBC
PLATELET # BLD AUTO: 381 K/UL
PMV BLD AUTO: 9.4 FL
POCT GLUCOSE: 392 MG/DL (ref 70–110)
POCT GLUCOSE: 469 MG/DL (ref 70–110)
POCT GLUCOSE: 490 MG/DL (ref 70–110)
POTASSIUM SERPL-SCNC: 3.7 MMOL/L
POTASSIUM SERPL-SCNC: 4.2 MMOL/L
POTASSIUM SERPL-SCNC: 4.4 MMOL/L
POTASSIUM SERPL-SCNC: 4.6 MMOL/L
PROCALCITONIN SERPL IA-MCNC: 0.15 NG/ML
RBC # BLD AUTO: 4.56 M/UL
RETIRED EF AND QEF - SEE NOTES: 72 (ref 55–65)
SODIUM SERPL-SCNC: 135 MMOL/L
SODIUM SERPL-SCNC: 137 MMOL/L
SODIUM SERPL-SCNC: 138 MMOL/L
SODIUM SERPL-SCNC: 138 MMOL/L
TROPONIN I SERPL DL<=0.01 NG/ML-MCNC: 0.01 NG/ML
WBC # BLD AUTO: 11.87 K/UL

## 2018-10-13 PROCEDURE — 80048 BASIC METABOLIC PNL TOTAL CA: CPT | Mod: 91

## 2018-10-13 PROCEDURE — 25000242 PHARM REV CODE 250 ALT 637 W/ HCPCS: Performed by: HOSPITALIST

## 2018-10-13 PROCEDURE — G8978 MOBILITY CURRENT STATUS: HCPCS | Mod: CJ

## 2018-10-13 PROCEDURE — 82010 KETONE BODYS QUAN: CPT

## 2018-10-13 PROCEDURE — 99232 SBSQ HOSP IP/OBS MODERATE 35: CPT | Mod: ,,, | Performed by: HOSPITALIST

## 2018-10-13 PROCEDURE — 80048 BASIC METABOLIC PNL TOTAL CA: CPT

## 2018-10-13 PROCEDURE — 25000003 PHARM REV CODE 250: Performed by: HOSPITALIST

## 2018-10-13 PROCEDURE — 20600001 HC STEP DOWN PRIVATE ROOM

## 2018-10-13 PROCEDURE — G8979 MOBILITY GOAL STATUS: HCPCS | Mod: CI

## 2018-10-13 PROCEDURE — 36415 COLL VENOUS BLD VENIPUNCTURE: CPT

## 2018-10-13 PROCEDURE — 99223 1ST HOSP IP/OBS HIGH 75: CPT | Mod: ,,, | Performed by: HOSPITALIST

## 2018-10-13 PROCEDURE — 97161 PT EVAL LOW COMPLEX 20 MIN: CPT

## 2018-10-13 PROCEDURE — 87205 SMEAR GRAM STAIN: CPT

## 2018-10-13 PROCEDURE — 87632 RESP VIRUS 6-11 TARGETS: CPT

## 2018-10-13 PROCEDURE — 94761 N-INVAS EAR/PLS OXIMETRY MLT: CPT

## 2018-10-13 PROCEDURE — 27000221 HC OXYGEN, UP TO 24 HOURS

## 2018-10-13 PROCEDURE — 84484 ASSAY OF TROPONIN QUANT: CPT

## 2018-10-13 PROCEDURE — 63600175 PHARM REV CODE 636 W HCPCS: Performed by: HOSPITALIST

## 2018-10-13 PROCEDURE — 93306 TTE W/DOPPLER COMPLETE: CPT | Mod: 26,,, | Performed by: INTERNAL MEDICINE

## 2018-10-13 PROCEDURE — 93306 TTE W/DOPPLER COMPLETE: CPT

## 2018-10-13 PROCEDURE — 85025 COMPLETE CBC W/AUTO DIFF WBC: CPT

## 2018-10-13 PROCEDURE — 94640 AIRWAY INHALATION TREATMENT: CPT

## 2018-10-13 PROCEDURE — 87070 CULTURE OTHR SPECIMN AEROBIC: CPT

## 2018-10-13 RX ORDER — SODIUM CHLORIDE 0.9 % (FLUSH) 0.9 %
5 SYRINGE (ML) INJECTION
Status: DISCONTINUED | OUTPATIENT
Start: 2018-10-13 | End: 2018-10-15 | Stop reason: HOSPADM

## 2018-10-13 RX ORDER — FUROSEMIDE 10 MG/ML
40 INJECTION INTRAMUSCULAR; INTRAVENOUS 2 TIMES DAILY
Status: DISCONTINUED | OUTPATIENT
Start: 2018-10-13 | End: 2018-10-13

## 2018-10-13 RX ORDER — AMLODIPINE BESYLATE 5 MG/1
5 TABLET ORAL DAILY
Status: DISCONTINUED | OUTPATIENT
Start: 2018-10-13 | End: 2018-10-15 | Stop reason: HOSPADM

## 2018-10-13 RX ORDER — IBUPROFEN 200 MG
24 TABLET ORAL
Status: DISCONTINUED | OUTPATIENT
Start: 2018-10-13 | End: 2018-10-15 | Stop reason: HOSPADM

## 2018-10-13 RX ORDER — INSULIN ASPART 100 [IU]/ML
10 INJECTION, SOLUTION INTRAVENOUS; SUBCUTANEOUS ONCE
Status: DISCONTINUED | OUTPATIENT
Start: 2018-10-13 | End: 2018-10-13

## 2018-10-13 RX ORDER — INSULIN ASPART 100 [IU]/ML
10 INJECTION, SOLUTION INTRAVENOUS; SUBCUTANEOUS
Status: DISCONTINUED | OUTPATIENT
Start: 2018-10-13 | End: 2018-10-14

## 2018-10-13 RX ORDER — INSULIN ASPART 100 [IU]/ML
10 INJECTION, SOLUTION INTRAVENOUS; SUBCUTANEOUS ONCE
Status: COMPLETED | OUTPATIENT
Start: 2018-10-13 | End: 2018-10-13

## 2018-10-13 RX ORDER — ENOXAPARIN SODIUM 100 MG/ML
40 INJECTION SUBCUTANEOUS EVERY 24 HOURS
Status: DISCONTINUED | OUTPATIENT
Start: 2018-10-13 | End: 2018-10-13

## 2018-10-13 RX ORDER — INSULIN ASPART 100 [IU]/ML
15 INJECTION, SOLUTION INTRAVENOUS; SUBCUTANEOUS ONCE
Status: COMPLETED | OUTPATIENT
Start: 2018-10-13 | End: 2018-10-13

## 2018-10-13 RX ORDER — INSULIN ASPART 100 [IU]/ML
7 INJECTION, SOLUTION INTRAVENOUS; SUBCUTANEOUS
Status: DISCONTINUED | OUTPATIENT
Start: 2018-10-13 | End: 2018-10-13

## 2018-10-13 RX ORDER — INSULIN ASPART 100 [IU]/ML
10 INJECTION, SOLUTION INTRAVENOUS; SUBCUTANEOUS
Status: DISCONTINUED | OUTPATIENT
Start: 2018-10-13 | End: 2018-10-13

## 2018-10-13 RX ORDER — OXYBUTYNIN CHLORIDE 5 MG/1
5 TABLET, EXTENDED RELEASE ORAL DAILY
Status: DISCONTINUED | OUTPATIENT
Start: 2018-10-13 | End: 2018-10-15 | Stop reason: HOSPADM

## 2018-10-13 RX ORDER — INSULIN ASPART 100 [IU]/ML
0-5 INJECTION, SOLUTION INTRAVENOUS; SUBCUTANEOUS
Status: DISCONTINUED | OUTPATIENT
Start: 2018-10-13 | End: 2018-10-15 | Stop reason: HOSPADM

## 2018-10-13 RX ORDER — ASPIRIN 81 MG/1
81 TABLET ORAL DAILY
Status: DISCONTINUED | OUTPATIENT
Start: 2018-10-13 | End: 2018-10-15 | Stop reason: HOSPADM

## 2018-10-13 RX ORDER — HEPARIN SODIUM 5000 [USP'U]/ML
5000 INJECTION, SOLUTION INTRAVENOUS; SUBCUTANEOUS EVERY 8 HOURS
Status: DISCONTINUED | OUTPATIENT
Start: 2018-10-13 | End: 2018-10-15 | Stop reason: HOSPADM

## 2018-10-13 RX ORDER — ONDANSETRON 4 MG/1
4 TABLET, ORALLY DISINTEGRATING ORAL EVERY 8 HOURS PRN
Status: DISCONTINUED | OUTPATIENT
Start: 2018-10-13 | End: 2018-10-15 | Stop reason: HOSPADM

## 2018-10-13 RX ORDER — INSULIN ASPART 100 [IU]/ML
5 INJECTION, SOLUTION INTRAVENOUS; SUBCUTANEOUS
Status: DISCONTINUED | OUTPATIENT
Start: 2018-10-13 | End: 2018-10-13

## 2018-10-13 RX ORDER — MOXIFLOXACIN HYDROCHLORIDE 400 MG/1
400 TABLET ORAL DAILY
Status: DISCONTINUED | OUTPATIENT
Start: 2018-10-14 | End: 2018-10-15 | Stop reason: HOSPADM

## 2018-10-13 RX ORDER — PREDNISONE 20 MG/1
40 TABLET ORAL DAILY
Status: DISCONTINUED | OUTPATIENT
Start: 2018-10-13 | End: 2018-10-13

## 2018-10-13 RX ORDER — FAMOTIDINE 20 MG/1
20 TABLET, FILM COATED ORAL EVERY 24 HOURS
Status: DISCONTINUED | OUTPATIENT
Start: 2018-10-13 | End: 2018-10-15 | Stop reason: HOSPADM

## 2018-10-13 RX ORDER — IBUPROFEN 200 MG
16 TABLET ORAL
Status: DISCONTINUED | OUTPATIENT
Start: 2018-10-13 | End: 2018-10-15 | Stop reason: HOSPADM

## 2018-10-13 RX ORDER — PREDNISONE 20 MG/1
40 TABLET ORAL DAILY
Status: DISCONTINUED | OUTPATIENT
Start: 2018-10-14 | End: 2018-10-15 | Stop reason: HOSPADM

## 2018-10-13 RX ORDER — ROSUVASTATIN CALCIUM 20 MG/1
20 TABLET, COATED ORAL DAILY
Status: DISCONTINUED | OUTPATIENT
Start: 2018-10-13 | End: 2018-10-15 | Stop reason: HOSPADM

## 2018-10-13 RX ORDER — MOXIFLOXACIN HYDROCHLORIDE 400 MG/1
400 TABLET ORAL DAILY
Status: DISCONTINUED | OUTPATIENT
Start: 2018-10-13 | End: 2018-10-13

## 2018-10-13 RX ORDER — IPRATROPIUM BROMIDE AND ALBUTEROL SULFATE 2.5; .5 MG/3ML; MG/3ML
3 SOLUTION RESPIRATORY (INHALATION) EVERY 6 HOURS
Status: DISCONTINUED | OUTPATIENT
Start: 2018-10-13 | End: 2018-10-15 | Stop reason: HOSPADM

## 2018-10-13 RX ORDER — ACETAMINOPHEN 325 MG/1
650 TABLET ORAL EVERY 4 HOURS PRN
Status: DISCONTINUED | OUTPATIENT
Start: 2018-10-13 | End: 2018-10-15 | Stop reason: HOSPADM

## 2018-10-13 RX ORDER — AMOXICILLIN 250 MG
1 CAPSULE ORAL 2 TIMES DAILY PRN
Status: DISCONTINUED | OUTPATIENT
Start: 2018-10-13 | End: 2018-10-15 | Stop reason: HOSPADM

## 2018-10-13 RX ORDER — GLUCAGON 1 MG
1 KIT INJECTION
Status: DISCONTINUED | OUTPATIENT
Start: 2018-10-13 | End: 2018-10-15 | Stop reason: HOSPADM

## 2018-10-13 RX ORDER — LABETALOL HYDROCHLORIDE 5 MG/ML
10 INJECTION, SOLUTION INTRAVENOUS EVERY 4 HOURS PRN
Status: DISCONTINUED | OUTPATIENT
Start: 2018-10-13 | End: 2018-10-15 | Stop reason: HOSPADM

## 2018-10-13 RX ORDER — RAMELTEON 8 MG/1
8 TABLET ORAL NIGHTLY PRN
Status: DISCONTINUED | OUTPATIENT
Start: 2018-10-13 | End: 2018-10-15 | Stop reason: HOSPADM

## 2018-10-13 RX ORDER — AMLODIPINE BESYLATE 5 MG/1
5 TABLET ORAL DAILY
Status: DISCONTINUED | OUTPATIENT
Start: 2018-10-13 | End: 2018-10-13

## 2018-10-13 RX ORDER — IRBESARTAN 75 MG/1
150 TABLET ORAL DAILY
Status: DISCONTINUED | OUTPATIENT
Start: 2018-10-13 | End: 2018-10-13

## 2018-10-13 RX ADMIN — INSULIN ASPART 10 UNITS: 100 INJECTION, SOLUTION INTRAVENOUS; SUBCUTANEOUS at 08:10

## 2018-10-13 RX ADMIN — SODIUM CHLORIDE 5.6 UNITS/HR: 9 INJECTION, SOLUTION INTRAVENOUS at 09:10

## 2018-10-13 RX ADMIN — IPRATROPIUM BROMIDE AND ALBUTEROL SULFATE 3 ML: .5; 3 SOLUTION RESPIRATORY (INHALATION) at 01:10

## 2018-10-13 RX ADMIN — AMLODIPINE BESYLATE 5 MG: 5 TABLET ORAL at 08:10

## 2018-10-13 RX ADMIN — INSULIN ASPART 15 UNITS: 100 INJECTION, SOLUTION INTRAVENOUS; SUBCUTANEOUS at 02:10

## 2018-10-13 RX ADMIN — IRBESARTAN 150 MG: 75 TABLET ORAL at 08:10

## 2018-10-13 RX ADMIN — PREDNISONE 40 MG: 20 TABLET ORAL at 08:10

## 2018-10-13 RX ADMIN — SODIUM CHLORIDE 2.6 UNITS/HR: 9 INJECTION, SOLUTION INTRAVENOUS at 07:10

## 2018-10-13 RX ADMIN — AMLODIPINE BESYLATE 5 MG: 5 TABLET ORAL at 01:10

## 2018-10-13 RX ADMIN — IPRATROPIUM BROMIDE AND ALBUTEROL SULFATE 3 ML: .5; 3 SOLUTION RESPIRATORY (INHALATION) at 07:10

## 2018-10-13 RX ADMIN — ROSUVASTATIN CALCIUM 20 MG: 20 TABLET, FILM COATED ORAL at 08:10

## 2018-10-13 RX ADMIN — FAMOTIDINE 20 MG: 20 TABLET ORAL at 08:10

## 2018-10-13 RX ADMIN — ASPIRIN 81 MG: 81 TABLET, COATED ORAL at 08:10

## 2018-10-13 RX ADMIN — HEPARIN SODIUM 5000 UNITS: 5000 INJECTION, SOLUTION INTRAVENOUS; SUBCUTANEOUS at 09:10

## 2018-10-13 RX ADMIN — SODIUM CHLORIDE 3.6 UNITS/HR: 9 INJECTION, SOLUTION INTRAVENOUS at 08:10

## 2018-10-13 RX ADMIN — INSULIN ASPART 15 UNITS: 100 INJECTION, SOLUTION INTRAVENOUS; SUBCUTANEOUS at 12:10

## 2018-10-13 RX ADMIN — INSULIN DETEMIR 15 UNITS: 100 INJECTION, SOLUTION SUBCUTANEOUS at 02:10

## 2018-10-13 RX ADMIN — IPRATROPIUM BROMIDE AND ALBUTEROL SULFATE 3 ML: .5; 3 SOLUTION RESPIRATORY (INHALATION) at 06:10

## 2018-10-13 RX ADMIN — SODIUM CHLORIDE 2 UNITS/HR: 9 INJECTION, SOLUTION INTRAVENOUS at 06:10

## 2018-10-13 RX ADMIN — INSULIN ASPART 15 UNITS: 100 INJECTION, SOLUTION INTRAVENOUS; SUBCUTANEOUS at 11:10

## 2018-10-13 RX ADMIN — INSULIN ASPART 7 UNITS: 100 INJECTION, SOLUTION INTRAVENOUS; SUBCUTANEOUS at 08:10

## 2018-10-13 RX ADMIN — OXYBUTYNIN CHLORIDE 5 MG: 5 TABLET, EXTENDED RELEASE ORAL at 08:10

## 2018-10-13 RX ADMIN — MOXIFLOXACIN HYDROCHLORIDE 400 MG: 400 TABLET, FILM COATED ORAL at 08:10

## 2018-10-13 RX ADMIN — INSULIN ASPART 15 UNITS: 100 INJECTION, SOLUTION INTRAVENOUS; SUBCUTANEOUS at 09:10

## 2018-10-13 RX ADMIN — INSULIN DETEMIR 35 UNITS: 100 INJECTION, SOLUTION SUBCUTANEOUS at 08:10

## 2018-10-13 RX ADMIN — FUROSEMIDE 40 MG: 10 INJECTION, SOLUTION INTRAMUSCULAR; INTRAVENOUS at 08:10

## 2018-10-13 RX ADMIN — HEPARIN SODIUM 5000 UNITS: 5000 INJECTION, SOLUTION INTRAVENOUS; SUBCUTANEOUS at 03:10

## 2018-10-13 NOTE — PROGRESS NOTES
MD Elsa notified of CBG >500. BMP pending. MD agreed to insulin gtt. Pt w/o complaint or acute distress noted. Awaiting MD orders. Will continue to monitor.

## 2018-10-13 NOTE — PLAN OF CARE
Problem: Patient Care Overview  Goal: Plan of Care Review  Outcome: Ongoing (interventions implemented as appropriate)  Plan of care reviewed. Pt remained hyperglycemic during shift. Insulin gtt initiated. CBG Q1H. PO abx and steroids continue. O2 3L NC continues sats low 90's.

## 2018-10-13 NOTE — PROGRESS NOTES
MD Elsa notified of pt's . Pt w/o complaints or acute distress. New orders noted and carried out. Will continue to monitor.

## 2018-10-13 NOTE — PROGRESS NOTES
MD Elsa notified of pt's CBG >500. Pt w/o any complaints. No acute distress noted. New orders noted and carried out. Will recheck CBG x 1 hr.

## 2018-10-13 NOTE — PLAN OF CARE
Problem: Physical Therapy Goal  Goal: Physical Therapy Goal  Goals to be met by: 10/30/2018    Patient will increase functional independence with mobility by performing:    Supine <> sit with Modified [place].  Sit <> stand transfer with Modified [place] using No Assistive Device.  Bed <> chair transfer via Stand Pivot with Modified [place] using No Assistive Device.  Gait  x 300 feet with Modified [place] using No Assistive Device to prepare for community ambulation and endurance activities.  Dynamic standing for 8 minutes with Modified [place] using No Assistive Device to prepare for functional tasks in standing.  Able to tolerate exercise for 15-20 reps with independence.      Outcome: Ongoing (interventions implemented as appropriate)    PT eval completed.  Appropriate transfer level with nursing staff: Bed <> Chair:  Stand Pivot with Supervision with 1 person.    Mary Jane Kc PT, DPT  10/13/2018  Pager: [phone]

## 2018-10-13 NOTE — PROGRESS NOTES
MD Elsa notified of pt's repeat CBG >500. Pt w/o complaints or acute distress. New orders noted and carried out. Will recheck CBG x 1 hr.

## 2018-10-13 NOTE — PLAN OF CARE
Problem: Patient Care Overview  Goal: Plan of Care Review  Outcome: Ongoing (interventions implemented as appropriate)  AAOx4. No signs of distress, unlabored breathing. Safety/ medical/ medication protocol initiated.  Today's plan of care explained. Pt has no questions/ concerns at this time. Will continue to monitor.

## 2018-10-13 NOTE — NURSING
Admit Note      10/13/2018     Patient admitted to room 351. Patient admitted to telemetry. Patient AAOx4. VSS. 3L NC. Transported via stretcher. Oriented to room. Call bell within reach. Side rails up x2. Bed lowered into lowest position.  Non skid socks on. Instructed to call for assistance getting out of bed. Will continue to monitor.

## 2018-10-13 NOTE — H&P
History and Physical   Hospital Medicine     Patient Name: Sarina Genao  MRN:  [id number]  Hospital Medicine Team: Networked reference to record PCT  Kalyani Medina MD  Date of Admission:  10/12/2018     Principal Problem:  Acute respiratory failure with hypoxia   Primary Care Physician: Venancio Mayer MD      History of Present Illness:     Ms. Sarina Genao is a 68 y.o. female with hx of COPD, former smoker with 50 pack year hx, HTN, IDT2DM uncontrolled, and CKD st 3, presented to the ED on 10/12 with worsening SOB associated with a productive cough. Symptoms first started 10/8 with Fever of 105 F with wheezing. At that time e pt was seen at [facility] ED, she receive Duo Neb and tylenol and discharged home. Since then, her SOB , BLISS, and cough have been worsening. Productive cough with white sputum. + subjective fevers.  She felt very SOB on AM of 10/13 and was seen at urgent care where pulse ox was 86% so patient was sent to the ED for evaluation. In addition, pt has been having constant R sided chest pain x 2 weeks that is worse with coughing or movement. She endorsed generalized myalgias. No sick contacts, recent travel, recent hospitalization. She denied sore throat, rhinorrhea, ear pain, chest pain, palpitations, LE edema, weight gain,lightheadedness,  abdominal pain, n/v, dysuria, lightheadedness. Quit smoking January 1, 2018    In the ED, pt was found to be afebrile, with elevated BP. No leukocytosis. Elevated D dimer. CTA negative for PE, however, imaging showing small bilateral pleural effusions and left basilar platelike scarring versus atelectasis, with pulm edema. procal negative, BNP <50. VBG with resp alkalosis. She was placed on BIPAP for a short time but have trouble tolerating. Transitioned to 6L NC --> 4L O2 NC, pH improved. Flu negative     Review of Systems   Constitutional: per HPI.   HENT: Negative for sore throat, trouble swallowing.    Eyes: Negative for  photophobia, visual disturbance.   Respiratory: Negative for cough, shortness of breath.    Cardiovascular: per HPI. .   Gastrointestinal: Negative for abdominal pain, constipation, diarrhea, nausea, vomiting.   Endocrine: Negative for cold intolerance, heat intolerance.   Genitourinary: Negative for dysuria, frequency.   Musculoskeletal: Negative for arthralgias, myalgias.   Skin: Negative for rash, wound, erythema   Neurological: Negative for dizziness, syncope, weakness, light-headedness.   Psychiatric/Behavioral: Negative for confusion, hallucinations, anxiety  All other systems reviewed and are negative.      Past Medical History:   Past Medical History:   Diagnosis Date    Cataracts, bilateral     CKD (chronic kidney disease) stage 3, GFR 30-59 ml/min 12/15/2014    Combined hyperlipidemia associated with type 2 diabetes mellitus 6/7/2013    COPD (chronic obstructive pulmonary disease) 12/15/2014    Diabetes mellitus type II     NIDDM, a.m. glucose-120s-130s-last A1c-7.1-6/7/13    Diabetes mellitus with renal manifestations, uncontrolled 2/1/2017    Diabetic retinopathy     Family history of stomach cancer 12/5/2013    Former smoker     H/O renal cell carcinoma 12/15/2015    History of colonic polyps 2/1/2017    3 polyps 7/13 ---5 yrs    History of gastroesophageal reflux (GERD)     History of urinary incontinence     s/p bladder lift-october, 2011 (at [facility])    Hyperlipidemia     Hypertension     120s/70s-80s    Nephrolithiasis     Osteoporosis, post-menopausal 3/17/2014    Primary hyperparathyroidism 10/20/2016    Schatzki's ring 2/1/2017    Dilated 2014       Past Surgical History:   Past Surgical History:   Procedure Laterality Date    bladder lift  2011    done at [facility]    BLADDER STONE REMOVAL  2011    before bladder lift    CATARACT EXTRACTION W/  INTRAOCULAR LENS IMPLANT Left 06/07/2016    Dr. Vásquez    CATARACT EXTRACTION W/  INTRAOCULAR LENS IMPLANT Right 06/21/2016      Fahad    COLONOSCOPY N/A 4/26/2018    Procedure: COLONOSCOPY;  Surgeon: Benjamin Zamora MD;  Location: [facility];  Service: Endoscopy;  Laterality: N/A;  confirmed ss    COLONOSCOPY N/A 4/26/2018    Performed by Benjamin [facility] MD at [facility] ENDO    COLONOSCOPY N/A 7/12/2013    Performed by Mariposa Shah MD at [facility] ENDO    CYSTOSCOPY      EGD (ESOPHAGOGASTRODUODENOSCOPY) N/A 1/8/2014    Performed by Isaias Marinelli MD at [facility] ENDO    INSERTION-INTRAOCULAR LENS (IOL) Right 6/21/2016    [facility]formed by [facility] at [facility]    INSERTION-INTRAOCULAR LENS (IOL) Left 6/7/2016    Performed by [facility] at [facility]    NEPHRECTOMY      partial right    NEPHRECTOMY, RADICAL Right 8/6/2013    Performed by Santos Murry [facility] at [facility]    PHACOEMULSIFICATION-ASPIRATION-CATARACT Right 6/21/2016    Performed by [facility] at [facility]    PHACOEMULSIFICATION-ASPIRATION-CATARACT Left 6/7/2016    Performed by [facility] at [facility]    ROBOTIC NEPHRECTOMY, PARTIAL Right 8/6/2013    Performed by Santos Murry MD at [facility] OR 2ND [facility]       Social History:   Social History     Tobacco Use    Smoking status: Former Smoker     Packs/day: 0.25     Years: 30.00     Pack years: 7.50     Types: Cigarettes    Smokeless tobacco: Never Used    Tobacco comment: pt. reports quitting January 2018   Substance Use Topics    Alcohol use: No     Alcohol/week: 0.0 oz    Drug use: No       Family History:   Family History   Problem Relation Age of Onset    Glaucoma Mother     Cataracts Mother     No Known Problems Father     Colon cancer Brother     Nephrolithiasis Sister     No Known Problems Maternal Aunt     No Known Problems Maternal Uncle     No Known Problems Paternal Aunt     No Known Problems Paternal Uncle     No Known Problems Maternal Grandmother     No Known Problems Maternal Grandfather     No  Known Problems Paternal Grandmother     No Known Problems Paternal Grandfather     Anesthesia problems Neg Hx     Kidney cancer Neg Hx     Amblyopia Neg Hx     Blindness Neg Hx     Cancer Neg Hx     Diabetes Neg Hx     Hypertension Neg Hx     Macular degeneration Neg Hx     Retinal detachment Neg Hx     Strabismus Neg Hx     Stroke Neg Hx     Thyroid disease Neg Hx          Medications: Scheduled Meds:   albuterol-ipratropium  3 mL Nebulization Q6H    amLODIPine  5 mg Oral Daily    aspirin  81 mg Oral Daily    enoxaparin  40 mg Subcutaneous Daily    famotidine  20 mg Oral Daily    furosemide  40 mg Intravenous BID    irbesartan  150 mg Oral Daily    moxifloxacin  400 mg Oral Daily    oxybutynin  5 mg Oral Daily    predniSONE  40 mg Oral Daily    rosuvastatin  20 mg Oral Daily     Continuous Infusions:  PRN Meds:.acetaminophen, ondansetron, ramelteon, senna-docusate 8.6-50 mg, sodium chloride 0.9%    Allergies: Patient is allergic to metformin and pantoprazole.    Physical Exam:     Vital Signs (Most Recent):  Temp: 98 °F (36.7 °C) (10/12/18 1857)  Pulse: 107 (10/13/18 0205)  Resp: (!) 25 (10/13/18 0137)  BP: (!) 143/72 (10/13/18 0112)  SpO2: 97 % (10/13/18 0137) Vital Signs Range (Last 24H):  Temp:  [98 °F (36.7 °C)-99.5 °F (37.5 °C)]   Pulse:  []   Resp:  [24-52]   BP: (124-197)/(64-97)   SpO2:  [87 %-100 %]    Body mass index is 32.42 kg/m².     Physical Exam:  Constitutional: + resp distressed,tachypnic, coughing but able to speak in full sentences with tachypnea    HENT: NC/AT, external ears normal, oropharynx clear, MMM w/o exudates.   Eyes: PERRL, EOMI, conjunctiva normal, no discharge b/l, no scleral icterus   Neck: normal ROM, supple   CV: RRR, no m/r/g, no carotid bruits, +2 peripheral pulses.  Pulmonary/Chest wall:  + resp distressed,tachypnic, coughing but able to speak in full sentences with tachypnea, + diffuse wheezes and some crackles at the bases  GI: Soft, non-tender,  non-distended, (+) BS, (+) BM   Musculoskeletal: Normal ROM, no edema, no atrophy, no tenderness throughout   Neurological: AAO x 4, CN II-XI in tact,  Skin: warm, dry, (-) erythema, (-) rash, (-)pallor  Psych: normal mood and affect, normal behavior, thought content and judgement.  Vitals reviewed.    Labs:    Cardiac Enzymes: Ejection Fractions:    Recent Labs      10/12/18   1549   TROPONINI  <0.006   BNP 23 No results found for: EF       Vent: ABG:      Oxygen Concentration (%):  [28] 28 Recent Labs   Lab  10/12/18   1958  10/12/18   2309   PH  7.534*  7.451*   PCO2  28.6*  42.7   PO2  80*  28*   HCO3  24.1  29.7*   POCSATURATED  97  56*   BE  1  6        Chemistries:   Recent Labs   Lab  10/08/18   1400  10/12/18   1549   NA  137  137   K  4.1  4.2   CL  102  102   CO2  25  22*   BUN  12  13   CREATININE  1.3  1.3   CALCIUM  9.8  9.6   PROT  8.2  8.4   BILITOT  0.8  0.8   ALKPHOS  174*  169*   ALT  18  24   AST  17  25        WBC:   Recent Labs   Lab  10/08/18   1400  10/12/18   1549   WBC  12.75*  10.63     Bands:     CBC/Anemia Labs: Coags:    Recent Labs   Lab  10/08/18   1400  10/12/18   1549   WBC  12.75*  10.63   HGB  12.1  12.4   HCT  35.3*  39.5   PLT  359*  341   MCV  83  86   RDW  13.8  13.8    No results for input(s): PT, INR, APTT in the last 168 hours.     POCT Glucose: HbA1c:    Recent Labs   Lab  10/08/18   1232  10/12/18   2141   POCTGLUCOSE  303*  385*    Hemoglobin A1C   Date Value Ref Range Status   10/12/2018 9.9 (H) 4.0 - 5.6 % Final     Comment:   07/10/2018 8.1 (H) 4.0 - 5.6 % Final     Comment:   03/23/2018 7.3 (H) 4.0 - 5.6 % Final     Comment:        Diagnostic Results:    CTA  Noting slightly limited study secondary to patient respiratory motion artifact as well as improper timing of contrast there is no filling defect within the pulmonary vascular to indicate an acute pulmonary thromboembolism.    Redemonstration of scattered band like densities, interlobular septal thickening, and  ground-glass attenuation, all with a lower lobe predominance.  These findings can be seen in pulmonary vascular congestion and subsegmental atelectasis.  Infectious/inflammatory etiology not definitively excluded.    Small bilateral pleural effusions.    Incompletely visualized postoperative changes of partial right nephrectomy      Assessment and Plan:     Ms. Sarina Genao is a 68 y.o. female hx of COPD, former smoker with 50 pack year hx, HTN, IDT2DM uncontrolled, and CKD st 3, presented to the ED on 10/12 with worsening SOB associated with a productive cough and fevers, and was found to be in hypoxic resp failure initially satting 86% on RA.    Active Hospital Problems    Diagnosis  POA    *Acute respiratory failure with hypoxia [J96.01]  Yes     Priority: 1 - High    CAP (community acquired pneumonia) [J18.9]  Yes     Priority: 1 - High    COPD exacerbation [J44.1]  Yes     Priority: 1 - High    Pulmonary vascular congestion [R09.89]  Yes     Priority: 1 - High    Chest pain on breathing [R07.1]  Yes     Priority: 3     Type 2 diabetes mellitus with kidney complication, with long-term current use of insulin [E11.29, Z79.4]  Not Applicable     Priority: 5     Type 2 diabetes mellitus with hyperglycemia, with long-term current use of insulin [E11.65, Z79.4]  Not Applicable     Priority: 5     Essential hypertension [I10]  Yes    CKD (chronic kidney disease) stage 3, GFR 30-59 ml/min [N18.3]  Yes      Resolved Hospital Problems   No resolved problems to display.     Acute respiratory failure with hypoxia  CAP   COPD exacerbation  Pulmonary vascular congestion  - ddx possibly due to several etiologies such as viral (more likely ) vs bacterial PNA/ CAP. With COPD exacerbation. With pulm edema.   - flu negative. procal negative. WBC of 10. BNP of 23. PE ruled out  - on 4L NC  - s/p Solumedrol 125mg IV x 1 in the ED. Prednisone 40mg PO daily to complete a 5 day course  - Start Moxi 400mg PO daily to  complete a 5 day course  - Duonebs q4h hrs  - Incentive spirometry  - mucinex  - check sputum cx  - check resp viral panel   - f/u blood cx  - s/p 20 IV lasix in the ED. Cont with 40 mg IV lasix BID and monitor UOP and Cr  - check 2D echo with CFD  - Titrate down supplementary O2    Chest pain on breathing  - PE ruled out, no evidence of ASC. R sided  - likely 2/2 PNA/ and lung scarring as seen on CT  - cont to monitor  - re check trop in the AM  - tele    Type 2 diabetes mellitus with hyperglycemia, with long-term current use of insulin  - A1C of 10  - inpatient start 10 u determir and 5 u aspart, + SSI    Essential hypertension  - home ome HCTZ given diuresis  - cont home irbesartan 150 mg  - cont home amlodipine 5  - as above    CKD (chronic kidney disease) stage  - monitor Cr given diuresis      Diet:  Cardiac 1500cc fl rest  GI PPx:  famotidine  DVT PPx:  lovenox  Goals of Care:  admitted    High Risk Conditions:  Patient has a condition that poses threat to life and bodily function: Severe Respiratory Distress     Signing Physician:     Kalyani Medina MD  Department of Hospital Medicine   [facility]  Pager [phone]  Spectra [phone]  10/13/2018

## 2018-10-13 NOTE — PT/OT/SLP EVAL
Physical Therapy Evaluation    Patient Name:  Sarina Genao   MRN:  [id number]  Admitting Diagnosis:  Acute respiratory failure with hypoxia   Recent Surgery: * No surgery found *      Recommendations:     Discharge Recommendations:  home   Discharge Equipment Recommendations: none   Barriers to discharge: None    Plan:     During this hospitalization, patient to be seen 3 x/week to address the above listed problems via gait training, therapeutic activities, therapeutic exercises, neuromuscular re-education  · Plan of Care Expires:  11/12/18   Plan of Care Reviewed with: patient, sibling    This Plan of care has been discussed with the patient who was involved in its development and understands and is in agreement with the identified goals and treatment plan    History:     Patient lives alone in [place], [place] in a first floor apartment with no JOSH.  Patient reports being independent mobility & with ADLs.  Patient uses DME as follows: none.    DME owned (not currently used): none.  Hand Dominance: right    Roles/Repsonsibilities:   Work: no.    Drive: yes.    Managing Medicines/Managing Home: yes.    Hobbies: none stated.  Upon discharge, patient will have assistance from sister.    Past Medical History:   Diagnosis Date    Anticoagulant long-term use     Cancer     Kidney (Right)    Cataracts, bilateral     CKD (chronic kidney disease) stage 3, GFR 30-59 ml/min 12/15/2014    Combined hyperlipidemia associated with type 2 diabetes mellitus 6/7/2013    COPD (chronic obstructive pulmonary disease) 12/15/2014    Diabetes mellitus type II     NIDDM, a.m. glucose-120s-130s-last A1c-7.1-6/7/13    Diabetes mellitus with renal manifestations, uncontrolled 2/1/2017    Diabetic retinopathy     Family history of stomach cancer 12/5/2013    Former smoker     H/O renal cell carcinoma 12/15/2015    History of colonic polyps 2/1/2017    3 polyps 7/13 ---5 yrs    History of gastroesophageal reflux (GERD)      "History of urinary incontinence     s/p bladder lift-october, 2011 (at [facility])    Hyperlipidemia     Hypertension     120s/70s-80s    Nephrolithiasis     Osteoporosis, post-menopausal 3/17/2014    Primary hyperparathyroidism 10/20/2016    Schatzki's ring 2/1/2017    Dilated 2014       Past Surgical History:   Procedure Laterality Date    bladder lift  2011    done at [facility]    BLADDER STONE REMOVAL  2011    before bladder lift    CATARACT EXTRACTION W/  INTRAOCULAR LENS IMPLANT Left 06/07/2016    Dr. Vásquez    CATARACT EXTRACTION W/  INTRAOCULAR LENS IMPLANT Right 06/21/2016    Dr. Vásquez    COLONOSCOPY N/A 4/26/2018    Procedure: COLONOSCOPY;  Surgeon: Benjamin Zamora MD;  Location: [facility];  Service: Endoscopy;  Laterality: N/A;  confirmed ss    COLONOSCOPY N/A 4/26/2018    Performed by [facility] MD at [facility] ENDO    COLONOSCOPY N/A 7/12/2013    Performed by Mariposa Shah MD at [facility] ENDO    CYSTOSCOPY      EGD (ESOPHAGOGASTRODUODENOSCOPY) N/A 1/8/2014    Performed by Isaias Marinelli MD at [facility] ENDO    INSERTION-INTRAOCULAR LENS (IOL) Right 6/21/2016    [facility]formed by [facility] at [facility]    INSERTION-INTRAOCULAR LENS (IOL) Left 6/7/2016    Performed by [facility]quez MD at [facility] OR [facility]    NEPHRECTOMY      partial right    NEPHRECTOMY, RADICAL Right 8/6/2013    Performed by Santos Murry MD at [facility] OR [facility]ND [facility]    PHACOEMULSIFICATION-ASPIRATION-CATARACT Right 6/21/2016    Performed by [facility]quez MD at [facility] OR [facility]ST [facility]    PHACOEMULSIFICATION-ASPIRATION-CATARACT Left 6/7/2016    Performed by Xavier [facility]quez MD at [facility] OR [facility]ST [facility]    ROBOTIC NEPHRECTOMY, PARTIAL Right 8/6/2013    Performed by Santos Murry MD at [facility] OR 2ND [facility]       Subjective     Communicated with RN prior to session.  Patient found seated at EOB upon PT entry to room, agreeable to evaluation.  Pt's sister present throughout session.  "I've " "never felt SOB, but I soon as I got here they put this oxygen on me."  Chief Complaint: none stated  Patient comments/goals: to return home  Pain/Comfort:  · Pain Rating 1: 0/10  · Pain Rating Post-Intervention 1: 0/10    Objective:     Patient found with: telemetry, oxygen, PureWick     General Precautions: Standard, Cardiac     Orthopedic Precautions:N/A   Braces: N/A   Respiratory Status: nasal cannula 3L  Vital Signs (Most Recent):    Temp: 98.1 °F (36.7 °C) (10/13/18 0800)  Pulse: 102 (10/13/18 1316)  Resp: 18 (10/13/18 1316)  BP: (!) 151/82 (10/13/18 1300)  SpO2: (!) 94 % (10/13/18 1316)    Exam:  · Mental Status: Patient is AxOx4 and follows all multi-step verbal commands. Pt is Alert and Cooperative during session.  · Skin Integrity: Visible skin intact  · Edema: none noted  · Sensation: Intact  · Hearing: Intact  · Vision:  Intact  · Range of Motion:  · RUE: WFL  · LUE: WFL  · RLE: WFL  · LLE: WFL  · Strength Exam:  · Upper Extremity Strength: grossly WFL  · Lower Extremity Strength: grossly WFL    Functional Mobility:  Bed Mobility:   · Scooting to HOB via supine bridge: independence  · Sit to Supine: modified independence; to right side of bed    Transfers:   · Sit <> Stand Transfer: supervision with no assistive device   · Stand <> Sit Transfer: supervision with no assistive device       Gait:  · Patient ambulated: 200ft   · Patient required: supervision  · Patient used:  No Assistive Device  · Gait Pattern observed: reciprocal gait  · Gait Deviation(s): decreased kandice  · Comments: pt with mild SOB during ambulation trial, increased RR at end of trial.    Therapeutic Activities & Exercises:   Pt requesting to use bathroom at end of session. Pt able to participate in toileting and hygiene with no assistance.    Education:  Patient provided with daily orientation and goals of this PT session. Patient and family agreed to participate in session. Patient and family aware of patient's deficits and therapy " progression. They were educated to transfer to bedside chair/bedside commode/bathroom with RN/PCT present. Encouraged patient to perform daily exercises & mobility to increase endurance and decrease effects of bedrest. Time provided for therapeutic counseling and discussion of health disposition. All questions answered to patient's and family's satisfaction, within scope of PT practice; voiced no other concerns. White board updated in patient's room, RN notified of session.    Patient left supine, with head in midline, neutral pelvis & heels floated for skin protection with all lines intact, call button in reach and RN notified.    AM-PAC 6 CLICK MOBILITY  Total Score:22     Assessment:     Sarina Genao is a 68 y.o. female admitted with a medical diagnosis of Acute respiratory failure with hypoxia.  She presents with the following impairments/functional limitations: decreased cardiorespiratory endurance.. Sarina Genao's deficits effect their ability to safely and independently participate in self-care tasks and functional mobility which increases their caregiver burden. Due to her physical therapy diagnosis of debility and deconditioning, they continue to benefit from acute PT services to address the following limitations: high fall risk, weakness, instability, and the need for supervised instruction of exercise. Sarina Genao's deficits effect their roles and responsibilities in which they were able to complete prior to admit. Education was provided to patient & family regarding importance of continued participation in therapy, patient's progress and discharge disposition. Pt would benefit from an intensive and collaborative PT/OT/SLP therapy program to improve quality of life and focus on recovery of impairments.     Problem List: impaired cardiopulmonary response to activity  Rehab Prognosis: good; patient would benefit from acute skilled PT services to address these deficits and reach  maximum level of function.      GOALS:   Multidisciplinary Problems     Physical Therapy Goals        Problem: Physical Therapy Goal    Goal Priority Disciplines Outcome Goal Variances Interventions   Physical Therapy Goal     PT, PT/OT Ongoing (interventions implemented as appropriate)     Description:  Goals to be met by: 10/30/2018    Patient will increase functional independence with mobility by performing:    Supine <> sit with Modified [place].  Sit <> stand transfer with Modified [place] using No Assistive Device.  Bed <> chair transfer via Stand Pivot with Modified [place] using No Assistive Device.  Gait  x 300 feet with Modified [place] using No Assistive Device to prepare for community ambulation and endurance activities.  Dynamic standing for 8 minutes with Modified [place] using No Assistive Device to prepare for functional tasks in standing.  Able to tolerate exercise for 15-20 reps with independence.                         Clinical Decision Making:   On examination of body system using standardized tests and measures patient presents with 1-2 elements from any of the following: body structures and functions, activity limitations, and/or participation restrictions.  Leading to a clinical presentation that is considered stable and/or uncomplicated  Evaluation Complexity:  Low- [id number]      Time Tracking:     PT Received On: 10/13/18  PT Start Time: 1418     PT Stop Time: 1439  PT Total Time (min): 21 min     Billable Minutes: Evaluation 21    Mary Jane Kc PT, DPT  10/13/2018  Pager:[phone]

## 2018-10-13 NOTE — PROGRESS NOTES
MD Elsa notified of BMP results - glucose 664 Creat 1.8. New orders noted. Will carry out and continue to monitor.

## 2018-10-13 NOTE — PROGRESS NOTES
MD Elsa notified of . New orders noted and carried out. Pt w/o complaints or acute distress noted. Will continue to monitor.

## 2018-10-13 NOTE — PROGRESS NOTES
Hospital Medicine   Progress note      Team: [facility] HOSP MED R Sharee Hunter MD   Admit Date: 10/12/2018   Hospital Day: 1  MACRINA:    Code status: Full Code   Principal Problem: Acute respiratory failure with hypoxia     Summary:  Sarina Genao is a 68 y.o female with PMHx of COPD, former smoker with 50 pack year hx, HTN, IDT2DM uncontrolled, and CKD st 3 who presented to [facility] on 10/12/2018 for worsening SOB associated with a productive cough with white sputum, subjective fevers, and generalized myalgias. Symptoms first started 10/8 with Fever of 105 F with wheezing. Pt felt very SOB on AM of 10/13 and was seen at urgent care where pulse ox was 86% so patient was sent to the ED for evaluation. In addition, pt has been having constant R sided chest pain x 2 weeks that is worse with coughing or movement. While in the ED, pt was found to be afebrile, with elevated BP. No leukocytosis. Elevated D dimer. Flu negative. CTA negative for PE, however, imaging showing small bilateral pleural effusions and left basilar platelike scarring versus atelectasis, with pulm edema. procal negative, BNP <50. VBG with resp alkalosis. She was placed on BIPAP for a short time but have trouble tolerating. Transitioned to 6L NC --> 4L O2 NC, pH improved. Pt admitted for acute respiratory failure with hypoxia from CAP (likely viral) vs COPD exacerbation vs Pulmonary vascular congestion.     Interval hx:   Pt was seen and examined at bedside. Pt continues to be on 2 L O2 via NC. HTN improved and she remained afebrile. Pt reports her SOB and cough have improved. Pt has been tolerating PO intake well without any nausea, vomiting, diarrhea, constipation, blood in stools or trouble urinating. Pt denies any fevers, chills, malaise, headaches, chest pain.    ROS (Positive in Bold, otherwise negative)  Constitutional: fever, chills, night sweats  CV: chest pain, edema, palpitations  Resp: SOB, cough, sputum production  GI: changes in appetite,  NVDC, pain, melena, hematochezia, GERD, hematemesis  : Dysuria, hematuria, urinary urgency, frequency  MSK: arthralgia/myalgia, joint swelling  Neuro/Psych: anxiety, depression    PEx   Temp:  [98 °F (36.7 °C)-99.5 °F (37.5 °C)]   Pulse:  []   Resp:  [18-52]   BP: (124-197)/(64-97)   SpO2:  [87 %-100 %]      I & O (Last 24H):     Intake/Output Summary (Last 24 hours) at 10/13/2018 0857  Last data filed at 10/13/2018 0500  Gross per 24 hour   Intake 0 ml   Output 1600 ml   Net -1600 ml       General:  female  in no acute distress. Nontoxic. Resting in bed. Cooperative.  HEENT: NCAT. PERRL. EOMI. Sclera Anicteric.  CVS: RRR. Normal S1 S2. No murmurs  Pulm: Normal respiratory effort. B/L wheezes, no rhonchi, or crackles.  Abdomen: Soft. Non-distended. No tenderness to palpation. No rebound or guarding. +BS.  Extremities: No edema. No cyanosis. Full ROM.  Neuro: Alert, oriented x 4, Spont mvt of all extremities with no focal deficits noted.  Skin: <5mm ulcer under left breast, no erythema, no drainage noted    Recent Results (from the past 24 hour(s))   Blood Culture #1 **CANNOT BE ORDERED STAT**    Collection Time: 10/12/18  3:41 PM   Result Value Ref Range    Blood Culture, Routine No Growth to date    CBC auto differential    Collection Time: 10/12/18  3:49 PM   Result Value Ref Range    WBC 10.63 3.90 - 12.70 K/uL    RBC 4.59 4.00 - 5.40 M/uL    Hemoglobin 12.4 12.0 - 16.0 g/dL    Hematocrit 39.5 37.0 - 48.5 %    MCV 86 82 - 98 fL    MCH 27.0 27.0 - 31.0 pg    MCHC 31.4 (L) 32.0 - 36.0 g/dL    RDW 13.8 11.5 - 14.5 %    Platelets 341 150 - 350 K/uL    MPV 9.2 9.2 - 12.9 fL    Immature Granulocytes 1.2 (H) 0.0 - 0.5 %    Gran # (ANC) 8.6 (H) 1.8 - 7.7 K/uL    Immature Grans (Abs) 0.13 (H) 0.00 - 0.04 K/uL    Lymph # 1.1 1.0 - 4.8 K/uL    Mono # 0.6 0.3 - 1.0 K/uL    Eos # 0.1 0.0 - 0.5 K/uL    Baso # 0.04 0.00 - 0.20 K/uL    nRBC 0 0 /100 WBC    Gran% 81.0 (H) 38.0 - 73.0 %    Lymph% 10.1 (L)  18.0 - 48.0 %    Mono% 6.0 4.0 - 15.0 %    Eosinophil% 1.3 0.0 - 8.0 %    Basophil% 0.4 0.0 - 1.9 %    Differential Method Automated    Comprehensive metabolic panel    Collection Time: 10/12/18  3:49 PM   Result Value Ref Range    Sodium 137 136 - 145 mmol/L    Potassium 4.2 3.5 - 5.1 mmol/L    Chloride 102 95 - 110 mmol/L    CO2 22 (L) 23 - 29 mmol/L    Glucose 363 (H) 70 - 110 mg/dL    BUN, Bld 13 8 - 23 mg/dL    Creatinine 1.3 0.5 - 1.4 mg/dL    Calcium 9.6 8.7 - 10.5 mg/dL    Total Protein 8.4 6.0 - 8.4 g/dL    Albumin 2.9 (L) 3.5 - 5.2 g/dL    Total Bilirubin 0.8 0.1 - 1.0 mg/dL    Alkaline Phosphatase 169 (H) 55 - 135 U/L    AST 25 10 - 40 U/L    ALT 24 10 - 44 U/L    Anion Gap 13 8 - 16 mmol/L    eGFR if African American 48.0 (A) >60 mL/min/1.73 m^2    eGFR if non  41.7 (A) >60 mL/min/1.73 m^2   Troponin I    Collection Time: 10/12/18  3:49 PM   Result Value Ref Range    Troponin I <0.006 0.000 - 0.026 ng/mL   Brain natriuretic peptide    Collection Time: 10/12/18  3:49 PM   Result Value Ref Range    BNP <10 0 - 99 pg/mL   Lactic acid, plasma    Collection Time: 10/12/18  3:49 PM   Result Value Ref Range    Lactate (Lactic Acid) 2.7 (H) 0.5 - 2.2 mmol/L   D dimer, quantitative    Collection Time: 10/12/18  3:49 PM   Result Value Ref Range    D-Dimer 3.83 (H) <0.50 mg/L FEU   Procalcitonin    Collection Time: 10/12/18  3:49 PM   Result Value Ref Range    Procalcitonin 0.15 <0.25 ng/mL   Hemoglobin A1c    Collection Time: 10/12/18  3:49 PM   Result Value Ref Range    Hemoglobin A1C 9.9 (H) 4.0 - 5.6 %    Estimated Avg Glucose 237 (H) 68 - 131 mg/dL   Blood Culture #2 **CANNOT BE ORDERED STAT**    Collection Time: 10/12/18  3:50 PM   Result Value Ref Range    Blood Culture, Routine No Growth to date    Influenza antigen    Collection Time: 10/12/18  6:39 PM   Result Value Ref Range    Influenza A Ag, EIA Negative Negative    Influenza B Ag, EIA Negative Negative    Flu A & B Source NP    ISTAT  PROCEDURE    Collection Time: 10/12/18  7:58 PM   Result Value Ref Range    POC PH 7.534 (H) 7.35 - 7.45    POC PCO2 28.6 (L) 35 - 45 mmHg    POC PO2 80 (HH) 40 - 60 mmHg    POC HCO3 24.1 24 - 28 mmol/L    POC BE 1 -2 to 2 mmol/L    POC SATURATED O2 97 95 - 100 %    POC TCO2 25 24 - 29 mmol/L    Sample VENOUS     Site Other     Allens Test N/A    D dimer, quantitative    Collection Time: 10/12/18  8:04 PM   Result Value Ref Range    D-Dimer 4.01 (H) <0.50 mg/L FEU   POCT glucose    Collection Time: 10/12/18  9:41 PM   Result Value Ref Range    POCT Glucose 385 (H) 70 - 110 mg/dL   ISTAT PROCEDURE    Collection Time: 10/12/18 11:09 PM   Result Value Ref Range    POC PH 7.451 (H) 7.35 - 7.45    POC PCO2 42.7 35 - 45 mmHg    POC PO2 28 (LL) 40 - 60 mmHg    POC HCO3 29.7 (H) 24 - 28 mmol/L    POC BE 6 -2 to 2 mmol/L    POC SATURATED O2 56 (L) 95 - 100 %    POC TCO2 31 (H) 24 - 29 mmol/L    Sample VENOUS     Site Other     Allens Test N/A    Basic Metabolic Panel (BMP)    Collection Time: 10/13/18  4:43 AM   Result Value Ref Range    Sodium 138 136 - 145 mmol/L    Potassium 4.6 3.5 - 5.1 mmol/L    Chloride 100 95 - 110 mmol/L    CO2 21 (L) 23 - 29 mmol/L    Glucose 619 (HH) 70 - 110 mg/dL    BUN, Bld 19 8 - 23 mg/dL    Creatinine 1.7 (H) 0.5 - 1.4 mg/dL    Calcium 9.4 8.7 - 10.5 mg/dL    Anion Gap 17 (H) 8 - 16 mmol/L    eGFR if African American 34.7 (A) >60 mL/min/1.73 m^2    eGFR if non  30.1 (A) >60 mL/min/1.73 m^2   CBC with Automated Differential    Collection Time: 10/13/18  4:43 AM   Result Value Ref Range    WBC 11.87 3.90 - 12.70 K/uL    RBC 4.56 4.00 - 5.40 M/uL    Hemoglobin 12.7 12.0 - 16.0 g/dL    Hematocrit 40.0 37.0 - 48.5 %    MCV 88 82 - 98 fL    MCH 27.9 27.0 - 31.0 pg    MCHC 31.8 (L) 32.0 - 36.0 g/dL    RDW 14.0 11.5 - 14.5 %    Platelets 381 (H) 150 - 350 K/uL    MPV 9.4 9.2 - 12.9 fL    Immature Granulocytes 1.2 (H) 0.0 - 0.5 %    Gran # (ANC) 10.8 (H) 1.8 - 7.7 K/uL    Immature  Grans (Abs) 0.14 (H) 0.00 - 0.04 K/uL    Lymph # 0.6 (L) 1.0 - 4.8 K/uL    Mono # 0.3 0.3 - 1.0 K/uL    Eos # 0.0 0.0 - 0.5 K/uL    Baso # 0.02 0.00 - 0.20 K/uL    nRBC 0 0 /100 WBC    Gran% 91.2 (H) 38.0 - 73.0 %    Lymph% 5.2 (L) 18.0 - 48.0 %    Mono% 2.1 (L) 4.0 - 15.0 %    Eosinophil% 0.1 0.0 - 8.0 %    Basophil% 0.2 0.0 - 1.9 %    Differential Method Automated    Troponin I    Collection Time: 10/13/18  4:43 AM   Result Value Ref Range    Troponin I 0.011 0.000 - 0.026 ng/mL       Recent Labs   Lab  10/08/18   1232  10/12/18   2141   POCTGLUCOSE  303*  385*       Hemoglobin A1C   Date Value Ref Range Status   10/12/2018 9.9 (H) 4.0 - 5.6 % Final     Comment:     ADA Screening Guidelines:  5.7-6.4%  Consistent with prediabetes  >or=6.5%  Consistent with diabetes  High levels of fetal hemoglobin interfere with the HbA1C  assay. Heterozygous hemoglobin variants (HbS, HgC, etc)do  not significantly interfere with this assay.   However, presence of multiple variants may affect accuracy.     07/10/2018 8.1 (H) 4.0 - 5.6 % Final     Comment:     ADA Screening Guidelines:  5.7-6.4%  Consistent with prediabetes  >or=6.5%  Consistent with diabetes  High levels of fetal hemoglobin interfere with the HbA1C  assay. Heterozygous hemoglobin variants (HbS, HgC, etc)do  not significantly interfere with this assay.   However, presence of multiple variants may affect accuracy.     03/23/2018 7.3 (H) 4.0 - 5.6 % Final     Comment:     According to ADA guidelines, hemoglobin A1c <7.0% represents  optimal control in non-pregnant diabetic patients. Different  metrics may apply to specific patient populations.   Standards of Medical Care in Diabetes-2016.  For the purpose of screening for the presence of diabetes:  <5.7%     Consistent with the absence of diabetes  5.7-6.4%  Consistent with increasing risk for diabetes   (prediabetes)  >or=6.5%  Consistent with diabetes  Currently, no consensus exists for use of hemoglobin A1c  for  diagnosis of diabetes for children.  This Hemoglobin A1c assay has significant interference with fetal   hemoglobin   (HbF). The results are invalid for patients with abnormal amounts of   HbF,   including those with known Hereditary Persistence   of Fetal Hemoglobin. Heterozygous hemoglobin variants (HbAS, HbAC,   HbAD, HbAE, HbA2) do not significantly interfere with this assay;   however, presence of multiple variants in a sample may impact the %   interference.          Active Hospital Problems    Diagnosis  POA    *Acute respiratory failure with hypoxia [J96.01]  Yes    CAP (community acquired pneumonia) [J18.9]  Yes    Type 2 diabetes mellitus with kidney complication, with long-term current use of insulin [E11.29, Z79.4]  Not Applicable    Type 2 diabetes mellitus with hyperglycemia, with long-term current use of insulin [E11.65, Z79.4]  Not Applicable    COPD exacerbation [J44.1]  Yes    Chest pain on breathing [R07.1]  Yes    Pulmonary vascular congestion [R09.89]  Yes    Essential hypertension [I10]  Yes    CKD (chronic kidney disease) stage 3, GFR 30-59 ml/min [N18.3]  Yes      Resolved Hospital Problems   No resolved problems to display.          Assessment and Plan for problems addressed today:      albuterol-ipratropium  3 mL Nebulization Q6H    amLODIPine  5 mg Oral Daily    aspirin  81 mg Oral Daily    enoxaparin  40 mg Subcutaneous Daily    famotidine  20 mg Oral Daily    furosemide  40 mg Intravenous BID    insulin aspart U-100  7 Units Subcutaneous TIDWM    insulin detemir U-100  35 Units Subcutaneous Daily    irbesartan  150 mg Oral Daily    moxifloxacin  400 mg Oral Daily    oxybutynin  5 mg Oral Daily    predniSONE  40 mg Oral Daily    rosuvastatin  20 mg Oral Daily     acetaminophen, dextrose 50%, dextrose 50%, glucagon (human recombinant), glucose, glucose, influenza, insulin aspart U-100, ondansetron, ramelteon, senna-docusate 8.6-50 mg, sodium chloride 0.9%    Acute  respiratory failure with hypoxia likely multifactorial from COPD exacerbation vs Pulmonary vascular congestion vs Community Acquired Pneumonia (likley viral etiology):  -CXR shows Findings suggesting pulmonary edema/CHF pattern with small bilateral pleural effusions and left basilar platelike scarring versus atelectasis.  CTA chest done, shows Small bilateral pleural effusions with mild interlobular septal thickening which can be seen in the setting of vascular congestion; additional areas of linear opacity seen predominate in the left lung which is nonspecific but can be seen in the setting of scarring or pneumonia.  -flu negative. procal negative. WBC of 10. BNP of 23. PE ruled out  -blood cultures show NGTD  -f/u sputum culture, viral PCR panel  -antibiotics with Moxifloxacin for 5 day course   -Received 125 Solumedrol in ED, will give Prednisone 40mg Daily x5d  -s/p 20 IV lasix in the ED and 40 mg IV lasix in AM  -f/u 2D echo with CFD  -Continuous pulse oximetry; titrate oxygen to keep sats above 90%  -duonebs QID  -robitussin for cough  -tylenol PRN for fevers  -IS and flutter valve    Acute Kidney Injury on CKD (chronic kidney disease) stage 3:  -Pt's creatinine today 1.7 and baseline creatinine around 1.4  -Pt did get contrast for CTA and pt reprots she has hx of contrast induced nephropathy in the past  -will hold further diuresis for now  -check BMP at 4pm today  -if Cr continues to trend up, consider nephrology consult   -avoid nephrotoxic insults, will hold lasix, ACE/ARB  -monitor I/Os  -continue to monitor BMP, replete lytes if necessary     Type 2 diabetes mellitus with hyperglycemia, with long-term current use of insulin  Steroid Induced Hyperglycemia:  -A1C of 9.9 on 10/12/18  -SSI with accuchecks qACHS  -scheduled PTA levemir at reduced dose of 45 units, novolog 10 units TIDAC  -diabetic diet    Essential hypertension  -hold PTA HCTZ and  irbesartan 150 mg due to GILLES  -cont home amlodipine  5  -labetalol IV PRN for SBP > 160    Breast Lesion:  -pt reports she has an open wound under her left breast for 2 weeks  -s/p course of antibiotics, now appears better, no erythema or drainage from it  -consult in patient wound care to properly dress wound under left breast    DVT PPx: heparin     Discharge plan and follow up: DC home once medically stable     Sharee Hunter MD  Hospital Medicine Staff  [phone] pager

## 2018-10-13 NOTE — ED PROVIDER NOTES
7:00 PM  ED Course: I, Amelia Jensen PA-C, have assumed care of this patient from Ave Burton PA-C. Patient is a 68 year old female with PMHX of HTN, HLD, DM2, GERD with Schatzki ring, CKD stage 3, COPD, hyperparathyroidism, and hx of renal cell carcinoma s/p partial nephrectomy in 2015. She presents to the ED for SOB.     At the time of signout plan was pending d-dimer, CT chest, and US lower extremity bilaterally. Anticipate admission.     Medications given in the ED:    Medications   sodium chloride 0.9% flush 5 mL (not administered)   enoxaparin injection 40 mg (not administered)   acetaminophen tablet 650 mg (not administered)   ondansetron disintegrating tablet 4 mg (not administered)   senna-docusate 8.6-50 mg per tablet 1 tablet (not administered)   ramelteon tablet 8 mg (not administered)   albuterol-ipratropium 2.5 mg-0.5 mg/3 mL nebulizer solution 3 mL (3 mLs Nebulization Given 10/13/18 0659)   moxifloxacin tablet 400 mg (not administered)   predniSONE tablet 40 mg (not administered)   aspirin EC tablet 81 mg (not administered)   rosuvastatin tablet 20 mg (not administered)   famotidine tablet 20 mg (not administered)   oxybutynin 24 hr tablet 5 mg (not administered)   furosemide injection 40 mg (not administered)   amLODIPine tablet 5 mg (5 mg Oral Given 10/13/18 0114)   irbesartan tablet 150 mg (not administered)   glucose chewable tablet 16 g (not administered)   glucose chewable tablet 24 g (not administered)   dextrose 50% injection 12.5 g (not administered)   dextrose 50% injection 25 g (not administered)   glucagon (human recombinant) injection 1 mg (not administered)   insulin aspart U-100 pen 0-5 Units (not administered)   influenza (FLUZONE HIGH-DOSE) vaccine 0.5 mL (not administered)   insulin aspart U-100 pen 7 Units (not administered)   insulin detemir U-100 pen 35 Units (not administered)   albuterol-ipratropium 2.5 mg-0.5 mg/3 mL nebulizer solution 3 mL (3 mLs Nebulization Given  10/12/18 1506)   cefTRIAXone injection 1 g (1 g Intravenous Given 10/12/18 1608)   azithromycin 500 mg in dextrose 5 % 250 mL IVPB (ready to mix system) (0 mg Intravenous Stopped 10/12/18 1710)   sodium chloride 0.9% bolus 1,000 mL (0 mLs Intravenous Stopped 10/12/18 2136)   acetaminophen tablet 650 mg (650 mg Oral Given 10/12/18 1614)   methylPREDNISolone sodium succinate injection 125 mg (125 mg Intravenous Given 10/12/18 1849)   furosemide injection 20 mg (20 mg Intravenous Given 10/12/18 1957)   omnipaque 350 iohexol 100 mL (100 mLs Intravenous Given 10/12/18 2249)     CT chest found to have Small bilateral pleural effusions with mild interlobular septal thickening which can be seen in the setting of vascular congestion.     Patient reassessed, patient is tachypnic on 15 L via non-re breather with rhonchi and wheezing. RRR. ABD soft, NTND. No edema noted. Will transition to Bipap. Will order lasix 20 mg IV. Will continue to monitor.     8:24 PM  Patient reassessed, patient resting comfortably on BiPAP in NAD with improved work of breathing. US technician at bedside performing US lower extremity veins bilaterally.     Influenza negative. US lower extremity veins bilaterally found to have No evidence of deep venous thrombosis in either lower extremity. Will continue to monitor.     9:52 PM  D-dimer results pending discussed with , Gama, whom informs their equipment is currently malfunctioning, awaiting pending results.     Elevated d-dimer 4.01. Readdressed concern of IV contrast load with patient. Patient verbalizes understanding of plan and agrees to complete CTA chest (PE study) for further evaluation of acute pulmonary embolism.     CTA chest (PE study) found to have Noting slightly limited study secondary to patient respiratory motion artifact as well as improper timing of contrast there is no filling defect within the pulmonary vascular to indicate an acute pulmonary thromboembolism.  pulmonary vascular congestion and subsegmental atelectasis.     Will admit to medicine.     Disposition: Admit    Impression: CHF/Pneumonia.     I have discussed and reviewed with my supervising physician.       mAelia Jensen PA-C  10/13/18 0760

## 2018-10-14 LAB
ANION GAP SERPL CALC-SCNC: 11 MMOL/L
BASOPHILS # BLD AUTO: 0.04 K/UL
BASOPHILS NFR BLD: 0.3 %
BUN SERPL-MCNC: 28 MG/DL
CALCIUM SERPL-MCNC: 9.3 MG/DL
CHLORIDE SERPL-SCNC: 103 MMOL/L
CO2 SERPL-SCNC: 27 MMOL/L
CREAT SERPL-MCNC: 1.4 MG/DL
DIFFERENTIAL METHOD: ABNORMAL
EOSINOPHIL # BLD AUTO: 0 K/UL
EOSINOPHIL NFR BLD: 0.3 %
ERYTHROCYTE [DISTWIDTH] IN BLOOD BY AUTOMATED COUNT: 14.1 %
EST. GFR  (AFRICAN AMERICAN): 43.9 ML/MIN/1.73 M^2
EST. GFR  (NON AFRICAN AMERICAN): 38.1 ML/MIN/1.73 M^2
GLUCOSE SERPL-MCNC: 111 MG/DL
HCT VFR BLD AUTO: 38.6 %
HGB BLD-MCNC: 11.8 G/DL
IMM GRANULOCYTES # BLD AUTO: 0.25 K/UL
IMM GRANULOCYTES NFR BLD AUTO: 1.8 %
LYMPHOCYTES # BLD AUTO: 1.5 K/UL
LYMPHOCYTES NFR BLD: 10.9 %
MCH RBC QN AUTO: 26.9 PG
MCHC RBC AUTO-ENTMCNC: 30.6 G/DL
MCV RBC AUTO: 88 FL
MONOCYTES # BLD AUTO: 0.8 K/UL
MONOCYTES NFR BLD: 5.5 %
NEUTROPHILS # BLD AUTO: 11.2 K/UL
NEUTROPHILS NFR BLD: 81.2 %
NRBC BLD-RTO: 0 /100 WBC
PLATELET # BLD AUTO: 371 K/UL
PMV BLD AUTO: 9.4 FL
POCT GLUCOSE: 120 MG/DL (ref 70–110)
POCT GLUCOSE: 132 MG/DL (ref 70–110)
POCT GLUCOSE: 145 MG/DL (ref 70–110)
POCT GLUCOSE: 156 MG/DL (ref 70–110)
POCT GLUCOSE: 182 MG/DL (ref 70–110)
POCT GLUCOSE: 196 MG/DL (ref 70–110)
POCT GLUCOSE: 215 MG/DL (ref 70–110)
POCT GLUCOSE: 225 MG/DL (ref 70–110)
POCT GLUCOSE: 237 MG/DL (ref 70–110)
POCT GLUCOSE: 248 MG/DL (ref 70–110)
POCT GLUCOSE: 252 MG/DL (ref 70–110)
POCT GLUCOSE: 344 MG/DL (ref 70–110)
POCT GLUCOSE: 356 MG/DL (ref 70–110)
POTASSIUM SERPL-SCNC: 3.1 MMOL/L
RBC # BLD AUTO: 4.39 M/UL
SODIUM SERPL-SCNC: 141 MMOL/L
WBC # BLD AUTO: 13.81 K/UL

## 2018-10-14 PROCEDURE — 85025 COMPLETE CBC W/AUTO DIFF WBC: CPT

## 2018-10-14 PROCEDURE — 94664 DEMO&/EVAL PT USE INHALER: CPT

## 2018-10-14 PROCEDURE — 25000242 PHARM REV CODE 250 ALT 637 W/ HCPCS: Performed by: HOSPITALIST

## 2018-10-14 PROCEDURE — 94640 AIRWAY INHALATION TREATMENT: CPT

## 2018-10-14 PROCEDURE — 63600175 PHARM REV CODE 636 W HCPCS: Performed by: HOSPITALIST

## 2018-10-14 PROCEDURE — 36415 COLL VENOUS BLD VENIPUNCTURE: CPT

## 2018-10-14 PROCEDURE — 25000003 PHARM REV CODE 250: Performed by: HOSPITALIST

## 2018-10-14 PROCEDURE — 27000646 HC AEROBIKA DEVICE

## 2018-10-14 PROCEDURE — 80048 BASIC METABOLIC PNL TOTAL CA: CPT

## 2018-10-14 PROCEDURE — 27000221 HC OXYGEN, UP TO 24 HOURS

## 2018-10-14 PROCEDURE — 20600001 HC STEP DOWN PRIVATE ROOM

## 2018-10-14 PROCEDURE — 99900035 HC TECH TIME PER 15 MIN (STAT)

## 2018-10-14 PROCEDURE — 99232 SBSQ HOSP IP/OBS MODERATE 35: CPT | Mod: ,,, | Performed by: HOSPITALIST

## 2018-10-14 PROCEDURE — 94799 UNLISTED PULMONARY SVC/PX: CPT

## 2018-10-14 PROCEDURE — 94761 N-INVAS EAR/PLS OXIMETRY MLT: CPT

## 2018-10-14 RX ORDER — INSULIN ASPART 100 [IU]/ML
12 INJECTION, SOLUTION INTRAVENOUS; SUBCUTANEOUS
Status: DISCONTINUED | OUTPATIENT
Start: 2018-10-14 | End: 2018-10-15 | Stop reason: HOSPADM

## 2018-10-14 RX ORDER — POTASSIUM CHLORIDE 20 MEQ/1
40 TABLET, EXTENDED RELEASE ORAL 2 TIMES DAILY
Status: COMPLETED | OUTPATIENT
Start: 2018-10-14 | End: 2018-10-15

## 2018-10-14 RX ADMIN — INSULIN ASPART 10 UNITS: 100 INJECTION, SOLUTION INTRAVENOUS; SUBCUTANEOUS at 12:10

## 2018-10-14 RX ADMIN — INSULIN ASPART 10 UNITS: 100 INJECTION, SOLUTION INTRAVENOUS; SUBCUTANEOUS at 08:10

## 2018-10-14 RX ADMIN — INSULIN DETEMIR 45 UNITS: 100 INJECTION, SOLUTION SUBCUTANEOUS at 08:10

## 2018-10-14 RX ADMIN — ROSUVASTATIN CALCIUM 20 MG: 20 TABLET, FILM COATED ORAL at 08:10

## 2018-10-14 RX ADMIN — SODIUM CHLORIDE 1.9 UNITS/HR: 9 INJECTION, SOLUTION INTRAVENOUS at 02:10

## 2018-10-14 RX ADMIN — ASPIRIN 81 MG: 81 TABLET, COATED ORAL at 08:10

## 2018-10-14 RX ADMIN — AMLODIPINE BESYLATE 5 MG: 5 TABLET ORAL at 08:10

## 2018-10-14 RX ADMIN — SODIUM CHLORIDE 2.2 UNITS/HR: 9 INJECTION, SOLUTION INTRAVENOUS at 03:10

## 2018-10-14 RX ADMIN — IPRATROPIUM BROMIDE AND ALBUTEROL SULFATE 3 ML: .5; 3 SOLUTION RESPIRATORY (INHALATION) at 12:10

## 2018-10-14 RX ADMIN — OXYBUTYNIN CHLORIDE 5 MG: 5 TABLET, EXTENDED RELEASE ORAL at 08:10

## 2018-10-14 RX ADMIN — HEPARIN SODIUM 5000 UNITS: 5000 INJECTION, SOLUTION INTRAVENOUS; SUBCUTANEOUS at 09:10

## 2018-10-14 RX ADMIN — FAMOTIDINE 20 MG: 20 TABLET ORAL at 08:10

## 2018-10-14 RX ADMIN — POTASSIUM CHLORIDE 40 MEQ: 1500 TABLET, EXTENDED RELEASE ORAL at 09:10

## 2018-10-14 RX ADMIN — SODIUM CHLORIDE 3.8 UNITS/HR: 9 INJECTION, SOLUTION INTRAVENOUS at 01:10

## 2018-10-14 RX ADMIN — PREDNISONE 40 MG: 20 TABLET ORAL at 08:10

## 2018-10-14 RX ADMIN — IPRATROPIUM BROMIDE AND ALBUTEROL SULFATE 3 ML: .5; 3 SOLUTION RESPIRATORY (INHALATION) at 07:10

## 2018-10-14 RX ADMIN — INSULIN ASPART 2 UNITS: 100 INJECTION, SOLUTION INTRAVENOUS; SUBCUTANEOUS at 04:10

## 2018-10-14 RX ADMIN — HEPARIN SODIUM 5000 UNITS: 5000 INJECTION, SOLUTION INTRAVENOUS; SUBCUTANEOUS at 05:10

## 2018-10-14 RX ADMIN — SODIUM CHLORIDE 2.8 UNITS/HR: 9 INJECTION, SOLUTION INTRAVENOUS at 12:10

## 2018-10-14 RX ADMIN — INSULIN ASPART 12 UNITS: 100 INJECTION, SOLUTION INTRAVENOUS; SUBCUTANEOUS at 04:10

## 2018-10-14 RX ADMIN — MOXIFLOXACIN HYDROCHLORIDE 400 MG: 400 TABLET, FILM COATED ORAL at 08:10

## 2018-10-14 RX ADMIN — SODIUM CHLORIDE 1.6 UNITS/HR: 9 INJECTION, SOLUTION INTRAVENOUS at 05:10

## 2018-10-14 RX ADMIN — INSULIN ASPART 2 UNITS: 100 INJECTION, SOLUTION INTRAVENOUS; SUBCUTANEOUS at 12:10

## 2018-10-14 RX ADMIN — HEPARIN SODIUM 5000 UNITS: 5000 INJECTION, SOLUTION INTRAVENOUS; SUBCUTANEOUS at 01:10

## 2018-10-14 RX ADMIN — SODIUM CHLORIDE 1 UNITS/HR: 9 INJECTION, SOLUTION INTRAVENOUS at 07:10

## 2018-10-14 RX ADMIN — IPRATROPIUM BROMIDE AND ALBUTEROL SULFATE 3 ML: .5; 3 SOLUTION RESPIRATORY (INHALATION) at 05:10

## 2018-10-14 NOTE — PROGRESS NOTES
MD notified of CBG >500. Insulin gtt adjusted per algorithm. Pt w/o complaints or acute distress. MD ordered lab draw verification. MD stated to following algorithm moving forward.

## 2018-10-14 NOTE — PLAN OF CARE
Problem: Patient Care Overview  Goal: Plan of Care Review  Outcome: Ongoing (interventions implemented as appropriate)  Plan of care reviewed with patient and family. Insulin drip discontinued per MD order, ACHS accuchecks, wean oxygen to keep sats >90%,  Pt verbalized understanding. All questions and concerns addressed today.  Pt remains free from injury and fall.  No acute events today.  See flowsheets for full assessment and vitals throughout shift

## 2018-10-14 NOTE — PLAN OF CARE
Problem: Patient Care Overview  Goal: Plan of Care Review  Outcome: Ongoing (interventions implemented as appropriate)  Pt free of falls, injury this shift. POC reviewed with pt at bedside, verbalized understanding. Insulin gtt continued, titrating with nomogram, BG checks Q1H. Cr at 2304 of 1.5. Attempted to wean O2, unsuccessful; pt remains on 3L, sating 91-92%. PO Avalox and Prednisone continued for PNA. VSS, NAD noted. Will continue to monitor.

## 2018-10-14 NOTE — NURSING
CBG > 500 with 2215 check. MARIELA Jurado notified. Ordered STAT BMP, Beta-hydroxybutrate. MARIELA Jurado stated to keep Insulin gtt infusing at current rate, 5.6 units/hr, pending BMP glucose results, follow up with results. Will implement. Will continue to monitor.

## 2018-10-14 NOTE — NURSING
Glucose 708 from BMP drawn at 1929. CBG taken at 2014 still > 500. Oliver Cisneros NP notified. Stated to follow Insulin gtt nomogram, no lab verification at this time. Stated to notify in 2 hrs if BG > 500. Will implement. Will continue to monitor.

## 2018-10-14 NOTE — NURSING
on BMP drawn at 2305. G decreased but not evident by how much d/t CBG > 500 on last 2 checks. Unable to follow nomogram. MARIELA Jurado notified, stated to continue at current rate of 5.6 units/hr, recheck finger stick at 0005 and continue from there. Will implement. Will continue to monitor.

## 2018-10-14 NOTE — PROGRESS NOTES
Hospital Medicine   Progress note      Team: [facility] HOSP MED R Sharee Hunter MD   Admit Date: 10/12/2018   Hospital Day: 2  MACRINA:    Code status: Full Code   Principal Problem: Acute respiratory failure with hypoxia     Summary:  Sarina Genao is a 68 y.o female with PMHx of COPD, former smoker with 50 pack year hx, HTN, IDT2DM uncontrolled, and CKD st 3 who presented to [facility] on 10/12/2018 for worsening SOB associated with a productive cough with white sputum, subjective fevers, and generalized myalgias. Symptoms first started 10/8 with Fever of 105 F with wheezing. Pt felt very SOB on AM of 10/13 and was seen at urgent care where pulse ox was 86% so patient was sent to the ED for evaluation. In addition, pt has been having constant R sided chest pain x 2 weeks that is worse with coughing or movement. While in the ED, pt was found to be afebrile, with elevated BP. No leukocytosis. Elevated D dimer. Flu negative. CTA negative for PE, however, imaging showing small bilateral pleural effusions and left basilar platelike scarring versus atelectasis, with pulm edema. procal negative, BNP <50. VBG with resp alkalosis. She was placed on BIPAP for a short time but have trouble tolerating. Transitioned to 6L NC --> 4L O2 NC, pH improved. Pt admitted for acute respiratory failure with hypoxia from CAP (likely viral) vs COPD exacerbation vs Pulmonary vascular congestion.     Interval hx:   Pt was seen and examined at bedside. Attempted to wean off O2 yesterday and O2 decreased from 3L to 2L overnight. Pt was also noted to be hyperglycemic with BG of 500-700 and started on insulin drip. Her BG normalized to <180. Pt was noted to be asymptomatic during this time. This morning pt reports she is feeling much better, her SOB, cough, sputum production have all improved. She was able to ambulate around the room iwthout any distress.     ROS (Positive in Bold, otherwise negative)  Constitutional: fever, chills, night sweats  CV:  chest pain, edema, palpitations  Resp: SOB, cough, sputum production  GI: changes in appetite, NVDC, pain, melena, hematochezia, GERD, hematemesis  : Dysuria, hematuria, urinary urgency, frequency  MSK: arthralgia/myalgia, joint swelling  Neuro/Psych: anxiety, depression    PEx   Temp:  [98 °F (36.7 °C)-98.6 °F (37 °C)]   Pulse:  [86-98]   Resp:  [16-20]   BP: (117-139)/(58-91)   SpO2:  [90 %-97 %]      I & O (Last 24H):     Intake/Output Summary (Last 24 hours) at 10/14/2018 1357  Last data filed at 10/14/2018 0600  Gross per 24 hour   Intake 360 ml   Output 225 ml   Net 135 ml       General:  female  in no acute distress. Nontoxic. Resting in bed. Cooperative.  HEENT: NCAT. PERRL. EOMI. Sclera Anicteric.  CVS: RRR. Normal S1 S2. No murmurs  Pulm: Normal respiratory effort. improved wheezes, no rhonchi, or crackles.  Abdomen: Soft. Non-distended. No tenderness to palpation. No rebound or guarding. +BS.  Extremities: No edema. No cyanosis. Full ROM.  Neuro: Alert, oriented x 4, Spont mvt of all extremities with no focal deficits noted.  Skin: <5mm ulcer under left breast, no erythema, no drainage noted    Recent Results (from the past 24 hour(s))   POCT glucose    Collection Time: 10/13/18  1:58 PM   Result Value Ref Range    POCT Glucose 469 (HH) 70 - 110 mg/dL   Basic metabolic panel    Collection Time: 10/13/18  3:35 PM   Result Value Ref Range    Sodium 137 136 - 145 mmol/L    Potassium 4.4 3.5 - 5.1 mmol/L    Chloride 98 95 - 110 mmol/L    CO2 26 23 - 29 mmol/L    Glucose 664 (HH) 70 - 110 mg/dL    BUN, Bld 24 (H) 8 - 23 mg/dL    Creatinine 1.8 (H) 0.5 - 1.4 mg/dL    Calcium 9.3 8.7 - 10.5 mg/dL    Anion Gap 13 8 - 16 mmol/L    eGFR if African American 32.4 (A) >60 mL/min/1.73 m^2    eGFR if non  28.1 (A) >60 mL/min/1.73 m^2   POCT glucose    Collection Time: 10/13/18  4:15 PM   Result Value Ref Range    POC Glucose >500 70 - 110 MG/DL   Basic metabolic panel    Collection  Time: 10/13/18  7:29 PM   Result Value Ref Range    Sodium 135 (L) 136 - 145 mmol/L    Potassium 4.2 3.5 - 5.1 mmol/L    Chloride 99 95 - 110 mmol/L    CO2 22 (L) 23 - 29 mmol/L    Glucose 708 (HH) 70 - 110 mg/dL    BUN, Bld 26 (H) 8 - 23 mg/dL    Creatinine 1.8 (H) 0.5 - 1.4 mg/dL    Calcium 9.0 8.7 - 10.5 mg/dL    Anion Gap 14 8 - 16 mmol/L    eGFR if African American 32.4 (A) >60 mL/min/1.73 m^2    eGFR if non  28.1 (A) >60 mL/min/1.73 m^2   Basic metabolic panel    Collection Time: 10/13/18 11:04 PM   Result Value Ref Range    Sodium 138 136 - 145 mmol/L    Potassium 3.7 3.5 - 5.1 mmol/L    Chloride 100 95 - 110 mmol/L    CO2 24 23 - 29 mmol/L    Glucose 484 (HH) 70 - 110 mg/dL    BUN, Bld 28 (H) 8 - 23 mg/dL    Creatinine 1.5 (H) 0.5 - 1.4 mg/dL    Calcium 9.1 8.7 - 10.5 mg/dL    Anion Gap 14 8 - 16 mmol/L    eGFR if African American 40.4 (A) >60 mL/min/1.73 m^2    eGFR if non African American 35.0 (A) >60 mL/min/1.73 m^2   Beta - Hydroxybutyrate, Serum    Collection Time: 10/13/18 11:05 PM   Result Value Ref Range    Beta-Hydroxybutyrate 0.0 0.0 - 0.5 mmol/L   POCT glucose    Collection Time: 10/14/18 12:09 AM   Result Value Ref Range    POCT Glucose 356 (H) 70 - 110 mg/dL   POCT glucose    Collection Time: 10/14/18  1:07 AM   Result Value Ref Range    POCT Glucose 344 (H) 70 - 110 mg/dL   POCT glucose    Collection Time: 10/14/18  2:15 AM   Result Value Ref Range    POCT Glucose 237 (H) 70 - 110 mg/dL   POCT glucose    Collection Time: 10/14/18  3:17 AM   Result Value Ref Range    POCT Glucose 225 (H) 70 - 110 mg/dL   POCT glucose    Collection Time: 10/14/18  4:19 AM   Result Value Ref Range    POCT Glucose 196 (H) 70 - 110 mg/dL   POCT glucose    Collection Time: 10/14/18  5:17 AM   Result Value Ref Range    POCT Glucose 156 (H) 70 - 110 mg/dL   POCT glucose    Collection Time: 10/14/18  6:18 AM   Result Value Ref Range    POCT Glucose 145 (H) 70 - 110 mg/dL   Basic Metabolic Panel (BMP)     Collection Time: 10/14/18  6:49 AM   Result Value Ref Range    Sodium 141 136 - 145 mmol/L    Potassium 3.1 (L) 3.5 - 5.1 mmol/L    Chloride 103 95 - 110 mmol/L    CO2 27 23 - 29 mmol/L    Glucose 111 (H) 70 - 110 mg/dL    BUN, Bld 28 (H) 8 - 23 mg/dL    Creatinine 1.4 0.5 - 1.4 mg/dL    Calcium 9.3 8.7 - 10.5 mg/dL    Anion Gap 11 8 - 16 mmol/L    eGFR if African American 43.9 (A) >60 mL/min/1.73 m^2    eGFR if non  38.1 (A) >60 mL/min/1.73 m^2   CBC with Automated Differential    Collection Time: 10/14/18  6:49 AM   Result Value Ref Range    WBC 13.81 (H) 3.90 - 12.70 K/uL    RBC 4.39 4.00 - 5.40 M/uL    Hemoglobin 11.8 (L) 12.0 - 16.0 g/dL    Hematocrit 38.6 37.0 - 48.5 %    MCV 88 82 - 98 fL    MCH 26.9 (L) 27.0 - 31.0 pg    MCHC 30.6 (L) 32.0 - 36.0 g/dL    RDW 14.1 11.5 - 14.5 %    Platelets 371 (H) 150 - 350 K/uL    MPV 9.4 9.2 - 12.9 fL    Immature Granulocytes 1.8 (H) 0.0 - 0.5 %    Gran # (ANC) 11.2 (H) 1.8 - 7.7 K/uL    Immature Grans (Abs) 0.25 (H) 0.00 - 0.04 K/uL    Lymph # 1.5 1.0 - 4.8 K/uL    Mono # 0.8 0.3 - 1.0 K/uL    Eos # 0.0 0.0 - 0.5 K/uL    Baso # 0.04 0.00 - 0.20 K/uL    nRBC 0 0 /100 WBC    Gran% 81.2 (H) 38.0 - 73.0 %    Lymph% 10.9 (L) 18.0 - 48.0 %    Mono% 5.5 4.0 - 15.0 %    Eosinophil% 0.3 0.0 - 8.0 %    Basophil% 0.3 0.0 - 1.9 %    Differential Method Automated    POCT glucose    Collection Time: 10/14/18  7:14 AM   Result Value Ref Range    POCT Glucose 120 (H) 70 - 110 mg/dL   POCT glucose    Collection Time: 10/14/18  8:54 AM   Result Value Ref Range    POCT Glucose 132 (H) 70 - 110 mg/dL   POCT glucose    Collection Time: 10/14/18 11:06 AM   Result Value Ref Range    POCT Glucose 252 (H) 70 - 110 mg/dL   POCT glucose    Collection Time: 10/14/18 12:01 PM   Result Value Ref Range    POCT Glucose 215 (H) 70 - 110 mg/dL       Recent Labs   Lab  10/14/18   0517  10/14/18   0618  10/14/18   0714  10/14/18   0854  10/14/18   1106  10/14/18   1201   POCTGLUCOSE  156*   145*  120*  132*  252*  215*       Hemoglobin A1C   Date Value Ref Range Status   10/12/2018 9.9 (H) 4.0 - 5.6 % Final     Comment:     ADA Screening Guidelines:  5.7-6.4%  Consistent with prediabetes  >or=6.5%  Consistent with diabetes  High levels of fetal hemoglobin interfere with the HbA1C  assay. Heterozygous hemoglobin variants (HbS, HgC, etc)do  not significantly interfere with this assay.   However, presence of multiple variants may affect accuracy.     07/10/2018 8.1 (H) 4.0 - 5.6 % Final     Comment:     ADA Screening Guidelines:  5.7-6.4%  Consistent with prediabetes  >or=6.5%  Consistent with diabetes  High levels of fetal hemoglobin interfere with the HbA1C  assay. Heterozygous hemoglobin variants (HbS, HgC, etc)do  not significantly interfere with this assay.   However, presence of multiple variants may affect accuracy.     03/23/2018 7.3 (H) 4.0 - 5.6 % Final     Comment:     According to ADA guidelines, hemoglobin A1c <7.0% represents  optimal control in non-pregnant diabetic patients. Different  metrics may apply to specific patient populations.   Standards of Medical Care in Diabetes-2016.  For the purpose of screening for the presence of diabetes:  <5.7%     Consistent with the absence of diabetes  5.7-6.4%  Consistent with increasing risk for diabetes   (prediabetes)  >or=6.5%  Consistent with diabetes  Currently, no consensus exists for use of hemoglobin A1c  for diagnosis of diabetes for children.  This Hemoglobin A1c assay has significant interference with fetal   hemoglobin   (HbF). The results are invalid for patients with abnormal amounts of   HbF,   including those with known Hereditary Persistence   of Fetal Hemoglobin. Heterozygous hemoglobin variants (HbAS, HbAC,   HbAD, HbAE, HbA2) do not significantly interfere with this assay;   however, presence of multiple variants in a sample may impact the %   interference.          Active Hospital Problems    Diagnosis  POA    *Acute  respiratory failure with hypoxia [J96.01]  Yes    CAP (community acquired pneumonia) [J18.9]  Yes    Type 2 diabetes mellitus with kidney complication, with long-term current use of insulin [E11.29, Z79.4]  Not Applicable    Type 2 diabetes mellitus with hyperglycemia, with long-term current use of insulin [E11.65, Z79.4]  Not Applicable    COPD exacerbation [J44.1]  Yes    Chest pain on breathing [R07.1]  Yes    Pulmonary vascular congestion [R09.89]  Yes    Essential hypertension [I10]  Yes    CKD (chronic kidney disease) stage 3, GFR 30-59 ml/min [N18.3]  Yes      Resolved Hospital Problems   No resolved problems to display.          Assessment and Plan for problems addressed today:      albuterol-ipratropium  3 mL Nebulization Q6H    amLODIPine  5 mg Oral Daily    aspirin  81 mg Oral Daily    famotidine  20 mg Oral Daily    heparin (porcine)  5,000 Units Subcutaneous Q8H    insulin aspart U-100  10 Units Subcutaneous TIDWM    insulin detemir U-100  45 Units Subcutaneous Daily    moxifloxacin  400 mg Oral Daily    oxybutynin  5 mg Oral Daily    potassium chloride  40 mEq Oral BID    predniSONE  40 mg Oral Daily    rosuvastatin  20 mg Oral Daily     acetaminophen, dextrose 50%, dextrose 50%, glucagon (human recombinant), glucose, glucose, influenza, insulin aspart U-100, labetalol, ondansetron, ramelteon, senna-docusate 8.6-50 mg, sodium chloride 0.9%    Acute respiratory failure with hypoxia likely multifactorial from COPD exacerbation vs Pulmonary vascular congestion vs Community Acquired Pneumonia (likley viral etiology):  -CXR shows Findings suggesting pulmonary edema/CHF pattern with small bilateral pleural effusions and left basilar platelike scarring versus atelectasis.  -CTA chest done, shows Small bilateral pleural effusions with mild interlobular septal thickening which can be seen in the setting of vascular congestion; additional areas of linear opacity seen predominate in the left  lung which is nonspecific but can be seen in the setting of scarring or pneumonia.  -flu negative. procal negative. WBC of 10. BNP of 23. PE ruled out  -blood cultures show NGTD  -normal maren on sputum culture  -f/u viral PCR panel  -antibiotics with Moxifloxacin for 5 day course   -Received 125 Solumedrol in ED, will give Prednisone 40mg Daily x5d  -s/p 20 IV lasix in the ED and 40 mg IV lasix in AM, lasix now held due to GILLES  -2D echo with grade 1 diastolic dysfunction, Hyperdynamic left ventricular systolic function (EF >70%).   -Continuous pulse oximetry; titrate oxygen to keep sats above 90%  -duonebs QID  -robitussin for cough  -tylenol PRN for fevers  -IS and flutter valve    Acute Kidney Injury on CKD (chronic kidney disease) stage 3:  -Pt's creatinine on 10/14 was 1.7 and baseline creatinine around 1.4  -Lasix was stopped and pt's creatinine normalized to 1.4 on 10/14/18  -Pt did get contrast for CTA and pt reprots she has hx of contrast induced nephropathy in the past  -will hold further diuresis for now  -avoid nephrotoxic insults, will hold lasix, ACE/ARB  -monitor I/Os  -continue to monitor BMP, replete lytes if necessary     Type 2 diabetes mellitus with hyperglycemia, with long-term current use of insulin  Steroid Induced Hyperglycemia:  -A1C of 9.9 on 10/12/18  -required insulin drip on 10/13 due to hyperglycemia, now resolved   -SSI with accuchecks qACHS  -scheduled PTA levemir at home dose 20 units, novolog 12 units TIDAC  -diabetic diet    Essential hypertension  -hold PTA HCTZ and  irbesartan 150 mg due to GILLES  -consider restarting once renal function normalizes   -cont home amlodipine 5  -labetalol IV PRN for SBP > 160    Breast Lesion:  -pt reports she has an open wound under her left breast for 2 weeks  -s/p course of antibiotics, now appears better, no erythema or drainage from it  -consult in patient wound care to properly dress wound under left breast    DVT PPx: heparin     Discharge plan  and follow up: DC home once medically stable     Sharee Hunter MD  Hospital Medicine Staff  [phone] pager

## 2018-10-15 VITALS
HEART RATE: 88 BPM | TEMPERATURE: 98 F | RESPIRATION RATE: 18 BRPM | SYSTOLIC BLOOD PRESSURE: 126 MMHG | WEIGHT: 176.13 LBS | OXYGEN SATURATION: 91 % | DIASTOLIC BLOOD PRESSURE: 55 MMHG | BODY MASS INDEX: 31.21 KG/M2 | HEIGHT: 63 IN

## 2018-10-15 LAB
ANION GAP SERPL CALC-SCNC: 8 MMOL/L
BACTERIA SPEC AEROBE CULT: NORMAL
BASOPHILS # BLD AUTO: 0.01 K/UL
BASOPHILS NFR BLD: 0.1 %
BUN SERPL-MCNC: 33 MG/DL
CALCIUM SERPL-MCNC: 9.3 MG/DL
CHLORIDE SERPL-SCNC: 105 MMOL/L
CO2 SERPL-SCNC: 27 MMOL/L
CREAT SERPL-MCNC: 1.4 MG/DL
DIFFERENTIAL METHOD: ABNORMAL
EOSINOPHIL # BLD AUTO: 0.1 K/UL
EOSINOPHIL NFR BLD: 0.5 %
ERYTHROCYTE [DISTWIDTH] IN BLOOD BY AUTOMATED COUNT: 14.2 %
EST. GFR  (AFRICAN AMERICAN): 43.9 ML/MIN/1.73 M^2
EST. GFR  (NON AFRICAN AMERICAN): 38.1 ML/MIN/1.73 M^2
GLUCOSE SERPL-MCNC: 185 MG/DL
GRAM STN SPEC: NORMAL
HCT VFR BLD AUTO: 35.6 %
HGB BLD-MCNC: 11.4 G/DL
IMM GRANULOCYTES # BLD AUTO: 0.18 K/UL
IMM GRANULOCYTES NFR BLD AUTO: 1.7 %
LYMPHOCYTES # BLD AUTO: 1.9 K/UL
LYMPHOCYTES NFR BLD: 18.7 %
MCH RBC QN AUTO: 27.5 PG
MCHC RBC AUTO-ENTMCNC: 32 G/DL
MCV RBC AUTO: 86 FL
MONOCYTES # BLD AUTO: 0.9 K/UL
MONOCYTES NFR BLD: 8.3 %
NEUTROPHILS # BLD AUTO: 7.3 K/UL
NEUTROPHILS NFR BLD: 70.7 %
NRBC BLD-RTO: 0 /100 WBC
PLATELET # BLD AUTO: 407 K/UL
PMV BLD AUTO: 9.5 FL
POCT GLUCOSE: 172 MG/DL (ref 70–110)
POCT GLUCOSE: 172 MG/DL (ref 70–110)
POCT GLUCOSE: >500 MG/DL (ref 70–110)
POTASSIUM SERPL-SCNC: 4.1 MMOL/L
RBC # BLD AUTO: 4.15 M/UL
SODIUM SERPL-SCNC: 140 MMOL/L
WBC # BLD AUTO: 10.29 K/UL

## 2018-10-15 PROCEDURE — 94761 N-INVAS EAR/PLS OXIMETRY MLT: CPT

## 2018-10-15 PROCEDURE — 85025 COMPLETE CBC W/AUTO DIFF WBC: CPT

## 2018-10-15 PROCEDURE — 94640 AIRWAY INHALATION TREATMENT: CPT

## 2018-10-15 PROCEDURE — 25000003 PHARM REV CODE 250: Performed by: HOSPITALIST

## 2018-10-15 PROCEDURE — 27000646 HC AEROBIKA DEVICE

## 2018-10-15 PROCEDURE — 27000221 HC OXYGEN, UP TO 24 HOURS

## 2018-10-15 PROCEDURE — 63600175 PHARM REV CODE 636 W HCPCS: Performed by: HOSPITALIST

## 2018-10-15 PROCEDURE — 99238 HOSP IP/OBS DSCHRG MGMT 30/<: CPT | Mod: ,,, | Performed by: HOSPITALIST

## 2018-10-15 PROCEDURE — 94664 DEMO&/EVAL PT USE INHALER: CPT

## 2018-10-15 PROCEDURE — 80048 BASIC METABOLIC PNL TOTAL CA: CPT

## 2018-10-15 PROCEDURE — 97165 OT EVAL LOW COMPLEX 30 MIN: CPT

## 2018-10-15 PROCEDURE — 25000242 PHARM REV CODE 250 ALT 637 W/ HCPCS: Performed by: HOSPITALIST

## 2018-10-15 PROCEDURE — 36415 COLL VENOUS BLD VENIPUNCTURE: CPT

## 2018-10-15 PROCEDURE — 99900035 HC TECH TIME PER 15 MIN (STAT)

## 2018-10-15 RX ORDER — ALBUTEROL SULFATE 90 UG/1
1-2 AEROSOL, METERED RESPIRATORY (INHALATION) EVERY 6 HOURS PRN
Qty: 1 INHALER | Refills: 3 | Status: SHIPPED | OUTPATIENT
Start: 2018-10-15 | End: 2019-11-04 | Stop reason: SDUPTHER

## 2018-10-15 RX ORDER — MOXIFLOXACIN HYDROCHLORIDE 400 MG/1
400 TABLET ORAL DAILY
Qty: 3 TABLET | Refills: 0 | Status: SHIPPED | OUTPATIENT
Start: 2018-10-16 | End: 2018-10-19

## 2018-10-15 RX ORDER — PREDNISONE 20 MG/1
40 TABLET ORAL DAILY
Qty: 6 TABLET | Refills: 0 | Status: SHIPPED | OUTPATIENT
Start: 2018-10-16 | End: 2018-10-19

## 2018-10-15 RX ORDER — IPRATROPIUM BROMIDE AND ALBUTEROL SULFATE 2.5; .5 MG/3ML; MG/3ML
3 SOLUTION RESPIRATORY (INHALATION) EVERY 6 HOURS
Qty: 1 BOX | Refills: 0 | Status: SHIPPED | OUTPATIENT
Start: 2018-10-15 | End: 2018-10-25 | Stop reason: SDUPTHER

## 2018-10-15 RX ADMIN — FAMOTIDINE 20 MG: 20 TABLET ORAL at 09:10

## 2018-10-15 RX ADMIN — PREDNISONE 40 MG: 20 TABLET ORAL at 09:10

## 2018-10-15 RX ADMIN — HEPARIN SODIUM 5000 UNITS: 5000 INJECTION, SOLUTION INTRAVENOUS; SUBCUTANEOUS at 01:10

## 2018-10-15 RX ADMIN — INSULIN DETEMIR 50 UNITS: 100 INJECTION, SOLUTION SUBCUTANEOUS at 09:10

## 2018-10-15 RX ADMIN — ASPIRIN 81 MG: 81 TABLET, COATED ORAL at 09:10

## 2018-10-15 RX ADMIN — AMLODIPINE BESYLATE 5 MG: 5 TABLET ORAL at 09:10

## 2018-10-15 RX ADMIN — INSULIN ASPART 12 UNITS: 100 INJECTION, SOLUTION INTRAVENOUS; SUBCUTANEOUS at 09:10

## 2018-10-15 RX ADMIN — IPRATROPIUM BROMIDE AND ALBUTEROL SULFATE 3 ML: .5; 3 SOLUTION RESPIRATORY (INHALATION) at 12:10

## 2018-10-15 RX ADMIN — HEPARIN SODIUM 5000 UNITS: 5000 INJECTION, SOLUTION INTRAVENOUS; SUBCUTANEOUS at 05:10

## 2018-10-15 RX ADMIN — INSULIN ASPART 12 UNITS: 100 INJECTION, SOLUTION INTRAVENOUS; SUBCUTANEOUS at 12:10

## 2018-10-15 RX ADMIN — POTASSIUM CHLORIDE 40 MEQ: 1500 TABLET, EXTENDED RELEASE ORAL at 09:10

## 2018-10-15 RX ADMIN — MOXIFLOXACIN HYDROCHLORIDE 400 MG: 400 TABLET, FILM COATED ORAL at 09:10

## 2018-10-15 RX ADMIN — ROSUVASTATIN CALCIUM 20 MG: 20 TABLET, FILM COATED ORAL at 09:10

## 2018-10-15 RX ADMIN — OXYBUTYNIN CHLORIDE 5 MG: 5 TABLET, EXTENDED RELEASE ORAL at 09:10

## 2018-10-15 RX ADMIN — IPRATROPIUM BROMIDE AND ALBUTEROL SULFATE 3 ML: .5; 3 SOLUTION RESPIRATORY (INHALATION) at 08:10

## 2018-10-15 NOTE — PLAN OF CARE
Problem: Patient Care Overview  Goal: Plan of Care Review  Outcome: Ongoing (interventions implemented as appropriate)  Pt free of falls, injury this shift. POC reviewed with pt at bedside, verbalized understanding. Weaning O2 to maintain SpO2 > 90%. PO Avalox and Prednisone continued for PNA. Continuing IS, Acapella and breathing treatments. VSS, NAD noted. Will continue to monitor.

## 2018-10-15 NOTE — PROGRESS NOTES
[facility]  Adult Nutrition  Education Note    Diet Education: Diabetes  Time Spent: 15 min  Learners: Pt      Nutrition Education provided with handouts: Meal Planning Using the Plate Method    Diabetes Medications: glipizide, insulin    Comments: Per glycemic index committee protocol, diabetes education provided for HbA1c of 9.9. Pt reports previous diet education (last appointment in May per pt) and following diabetic diet at home. Provided and explained handout detailing sources of carbohydrates, appropriate serving sizes, and the plate method for meal planning. Pt voiced understanding. All questions and concerns answered.      Follow-Up: 1x weekly    Please re-consult as needed.

## 2018-10-15 NOTE — PLAN OF CARE
PCP HANH VELA  [facility] [address]  [address]  [place]       10/15/18 0913   Discharge Assessment   Assessment Type Discharge Planning Assessment   Confirmed/corrected address and phone number on facesheet? Yes   Assessment information obtained from? Patient   Expected Length of Stay (days) 4   Communicated expected length of stay with patient/caregiver no   Prior to hospitilization cognitive status: No Deficits   Prior to hospitalization functional status: Independent   Current cognitive status: No Deficits   Current Functional Status: Independent   Lives With alone   Able to Return to Prior Arrangements yes   Is patient able to care for self after discharge? Yes   Patient's perception of discharge disposition home or selfcare   Patient currently being followed by outpatient case management? No   Patient currently receives any other outside agency services? No   Equipment Currently Used at Home none   Do you have any problems affording any of your prescribed medications? No   Is the patient taking medications as prescribed? yes   Does the patient have transportation home? Yes   Does the patient receive services at the [facility]? No   Discharge Plan A Home;Home with family;Home Health   Discharge Plan B Home;Home with family;Home Health

## 2018-10-15 NOTE — PLAN OF CARE
Problem: Occupational Therapy Goal  Goal: Occupational Therapy Goal  No acute needs, no goals set.       Outcome: Outcome(s) achieved Date Met: 10/15/18  No acute needs. Recommend d/c home once medically stable. Pt requested a TTB to prevent falls in shower being that she lives alone.     Pt mentioned in eval that she will need a scale, BP machine, glucose testing kit. Please follow up prior to d/c on this.     BREE Butcher  10/15/2018  Rehab Services

## 2018-10-15 NOTE — PROGRESS NOTES
"Consulted to see for left breast old abscess site.   Pt related history of frequent "boils" under breasts and axila. Wound had ruptured and has resloving clean red tissue yet to heal. Applied aquacel foam to site.      10/15/18 1300       Wound 10/13/18 1135 Other (comment) anterior Chest   Date First Assessed/Time First Assessed: 10/13/18 1135   Pre-existing: Yes  Primary Wound Type: Other (comment)  Side: Left  Orientation: anterior  Location: Chest   Wound Image    Wound WDL ex   Dressing Appearance Clean;Intact   Drainage Amount Scant   Drainage Characteristics/Odor Serous;No odor   Appearance Red;Smooth   Red (%), Wound Tissue Color 100 %   Periwound Area Intact;Moist   Wound Edges Open   Wound Length (cm) 0.4 cm   Wound Width (cm) 0.8 cm   Wound Depth (cm) 0.2 cm   Wound Volume (cm^3) 0.06 cm^3   Care Cleansed with:;Sterile normal saline   Dressing Applied;Foam   Nutrition   Intake (%) 25%   Safety Interventions   Patient Rounds bed in low position;bed wheels locked;call light in reach;clutter free environment maintained;ID band on;placement of personal items at bedside;toileting offered;visualized patient   Safety Promotion/Fall Prevention assistive device/personal item within reach   Safety Precautions emergency equipment at bedside   Safety Bands on Patient Fall Risk Band;Allergy Band;Limb Alert / Restricted Extremity Band   Daily Care   Activity Management activity encouraged*;activity adjusted per tolerance*;ambulated to bathroom* - L4;ambulated in room* - L4   Activity Assistance Provided independent   Positioning   Body Position positioned/repositioned independently     Arturo Zheng RN CWON  [id number]  "

## 2018-10-15 NOTE — PT/OT/SLP EVAL
Occupational Therapy   Evaluation and Discharge Note    Name: Sarina Genao  MRN: [id number]  Admitting Diagnosis:  Acute respiratory failure with hypoxia      Recommendations:     Discharge Recommendations: home  Discharge Equipment Recommendations:  none  Barriers to discharge:  Pt reports she needs a scale, BP cuff and her current glucose monitor is not working properly.     History:     Occupational Profile:  Living Environment: pt lives alone in 1st floor apartment  Previous level of function: independent with all self care  Equipment Owned:  none  Assistance upon Discharge: friend/family can assist as needed.       Past Medical History:   Diagnosis Date    Anticoagulant long-term use     Cancer     Kidney (Right)    Cataracts, bilateral     CKD (chronic kidney disease) stage 3, GFR 30-59 ml/min 12/15/2014    Combined hyperlipidemia associated with type 2 diabetes mellitus 6/7/2013    COPD (chronic obstructive pulmonary disease) 12/15/2014    Diabetes mellitus type II     NIDDM, a.m. glucose-120s-130s-last A1c-7.1-6/7/13    Diabetes mellitus with renal manifestations, uncontrolled 2/1/2017    Diabetic retinopathy     Family history of stomach cancer 12/5/2013    Former smoker     H/O renal cell carcinoma 12/15/2015    History of colonic polyps 2/1/2017    3 polyps 7/13 ---5 yrs    History of gastroesophageal reflux (GERD)     History of urinary incontinence     s/p bladder lift-october, 2011 (at [facility])    Hyperlipidemia     Hypertension     120s/70s-80s    Nephrolithiasis     Osteoporosis, post-menopausal 3/17/2014    Primary hyperparathyroidism 10/20/2016    Schatzki's ring 2/1/2017    Dilated 2014       Past Surgical History:   Procedure Laterality Date    bladder lift  2011    done at [facility]    BLADDER STONE REMOVAL  2011    before bladder lift    CATARACT EXTRACTION W/  INTRAOCULAR LENS IMPLANT Left 06/07/2016    Dr. Vásquez    CATARACT EXTRACTION W/  INTRAOCULAR LENS  IMPLANT Right 06/21/2016    Dr. Vásquez    COLONOSCOPY N/A 4/26/2018    Procedure: COLONOSCOPY;  Surgeon: Benjamin Zamora MD;  Location: [facility];  Service: Endoscopy;  Laterality: N/A;  confirmed ss    COLONOSCOPY N/A 4/26/2018    Performed by Benjamin [facility]ora [facility] at [facility] ENDO    COLONOSCOPY N/A 7/12/2013    Performed by Mariposa Shah MD at [facility] ENDO    CYSTOSCOPY      EGD (ESOPHAGOGASTRODUODENOSCOPY) N/A 1/8/2014    Performed by Isaias Marinelli MD at [facility] ENDO    INSERTION-INTRAOCULAR LENS (IOL) Right 6/21/2016    Performed by Xavier [facility]quez MD at [facility] OR 1ST FLR    INSERTION-INTRAOCULAR LENS (IOL) Left 6/7/2016    Performed by Xavier [facility]quez MD at [facility] OR 1ST FLR    NEPHRECTOMY      partial right    NEPHRECTOMY, RADICAL Right 8/6/2013    Performed by Santos Murry MD at [facility] OR 2ND FLR    PHACOEMULSIFICATION-ASPIRATION-CATARACT Right 6/21/2016    Performed by Xavier [facility]quez MD at [facility] OR 1ST FLR    PHACOEMULSIFICATION-ASPIRATION-CATARACT Left 6/7/2016    Performed by Xavier V[facility]quez MD at [facility] OR 1ST FLR    ROBOTIC NEPHRECTOMY, PARTIAL Right 8/6/2013    Performed by Santos Murry MD at [facility] OR 2ND FLR       Subjective     Chief Complaint: no complaints  Patient/Family stated goals: Pt is independent, no goals set.  Communicated with: RN prior to session.  Pain/Comfort:  · Pain Rating 1: 0/10  · Pain Rating Post-Intervention 1: 0/10  · Pain Rating Post-Intervention 2: 0/10    Patients cultural, spiritual, Moravian conflicts given the current situation: none    Objective:     Patient found with: telemetry, PureWick    General Precautions: Standard,     Orthopedic Precautions:N/A   Braces: N/A     Occupational Performance:    Bed Mobility:    · Pt found sitting up in chair    Functional Mobility/Transfers:  · Patient completed Sit <> Stand Transfer with independence  with  no assistive device   · Functional Mobility: Pt ambulated ~175 feet in  "hallway    Activities of Daily Living:  · Feeding:  independence    · Grooming: independence    · Upper Body Dressing: independence    · Lower Body Dressing: independence    · Toileting: independence      Cognitive/Visual Perceptual:  Cognitive/Psychosocial Skills:     -       Oriented to: Person, Place, Time and Situation   -       Follows Commands/attention:Follows one-step commands  Visual/Perceptual:      -Intact      Physical Exam:  Balance: -       good  Upper Extremity Range of Motion:     -       Right Upper Extremity: WFL    -       Left Upper Extremity: WFL      Upper Extremity Strength: -       Right Upper Extremity: WFL  -       Left Upper Extremity: WFL    Neurological: -       intact     Patient left up in chair with family/friend present. Pt eating lunch.    [facility] 6 Click:  [facility] Total Score: 24    Treatment & Education:  Educated on role of OT in acute care setting.  Discussed barriers to d/c home.  See top section for details. Recommended tub transfer bench for patient to allow for use of energy conservation techniques and minimize risk for falls while showering.   Education:    Assessment:     Sarina Genao is a 68 y.o. female with a medical diagnosis of Acute respiratory failure with hypoxia. At this time, patient is functioning at their prior level of function and does not require further acute OT services.     Clinical Decision Makin.  OT Low:  "Pt evaluation falls under low complexity for evaluation coding due to performance deficits noted in 1-3 areas as stated above and 0 co-morbities affecting current functional status. Data obtained from problem focused assessments. No modifications or assistance was required for completion of evaluation. Only brief occupational profile and history review completed."     Plan:     During this hospitalization, patient does not require further acute OT services.  Please re-consult if situation changes.    · Plan of Care Reviewed with: " patient    This Plan of care has been discussed with the patient who was involved in its development and understands and is in agreement with the identified goals and treatment plan    GOALS:   Multidisciplinary Problems     Occupational Therapy Goals     Not on file          Multidisciplinary Problems (Resolved)        Problem: Occupational Therapy Goal    Goal Priority Disciplines Outcome Interventions   Occupational Therapy Goal   (Resolved)     OT, PT/OT Outcome(s) achieved    Description:  No acute needs, no goals set.                         Time Tracking:     OT Date of Treatment: 10/15/18  OT Start Time: 1235  OT Stop Time: 1251  OT Total Time (min): 16 min    Billable Minutes:Evaluation 16    BREE Marina  10/15/2018

## 2018-10-15 NOTE — PLAN OF CARE
ERICKA called for Pulmonology appt spoke with Jeanne who stated that he was not able to make the appointment but he would leave a message for the pulmonary clinic to schedule

## 2018-10-16 ENCOUNTER — PATIENT OUTREACH (OUTPATIENT)
Dept: ADMINISTRATIVE | Facility: CLINIC | Age: 68
End: 2018-10-16

## 2018-10-16 NOTE — PT/OT/SLP DISCHARGE
Physical Therapy Discharge Summary    Name: Sarina Genao  MRN: [id number]   Principal Problem: Acute respiratory failure with hypoxia     Patient Discharged from acute Physical Therapy on 10/16/18.  Please refer to prior PT noted date on 10/13/18 for functional status.     Assessment:     Patient was discharged unexpectedly.  Information required to complete an accurate discharge summary is unknown.  Refer to therapy initial evaluation and last progress note for initial and most recent functional status and goal achievement.  Recommendations made may be found in medical record.    Objective:     GOALS:   Multidisciplinary Problems     Physical Therapy Goals        Problem: Physical Therapy Goal    Goal Priority Disciplines Outcome Goal Variances Interventions   Physical Therapy Goal     PT, PT/OT Ongoing (interventions implemented as appropriate)     Description:  Goals to be met by: 10/30/2018    Patient will increase functional independence with mobility by performing:    Supine <> sit with Modified Kemmerer.  Sit <> stand transfer with Modified Kemmerer using No Assistive Device.  Bed <> chair transfer via Stand Pivot with Modified Kemmerer using No Assistive Device.  Gait  x 300 feet with Modified Kemmerer using No Assistive Device to prepare for community ambulation and endurance activities.  Dynamic standing for 8 minutes with Modified Kemmerer using No Assistive Device to prepare for functional tasks in standing.  Able to tolerate exercise for 15-20 reps with independence.                        Reasons for Discontinuation of Therapy Services  Transfer to alternate level of care.      Plan:     Patient Discharged to: Home with Home Health Service.    Mary Jane Kc, PT  10/16/2018

## 2018-10-16 NOTE — PATIENT INSTRUCTIONS

## 2018-10-17 LAB
BACTERIA BLD CULT: NORMAL
BACTERIA BLD CULT: NORMAL
ENTEROVIRUS: NOT DETECTED
HUMAN BOCAVIRUS: NOT DETECTED
HUMAN CORONAVIRUS, COMMON COLD VIRUS: NOT DETECTED
INFLUENZA A - H1N1-09: NOT DETECTED
PARAINFLUENZA: NOT DETECTED
RVP - ADENOVIRUS: NOT DETECTED
RVP - HUMAN METAPNEUMOVIRUS (HMPV): NOT DETECTED
RVP - INFLUENZA A: NOT DETECTED
RVP - INFLUENZA B: NOT DETECTED
RVP - RESPIRATORY SYNCTIAL VIRUS (RSV) A: NOT DETECTED
RVP - RESPIRATORY VIRAL PANEL, SOURCE: NORMAL
RVP - RHINOVIRUS: NOT DETECTED

## 2018-10-17 NOTE — DISCHARGE SUMMARY
Discharge Summary  Hospital Medicine       Name: Sarina Genao  YOB: 1950 (Age: 68 y.o.)  Date of Admission: 10/12/2018  Date of Discharge: 10/15/2018  Attending Provider on Discharge: Sharee Hunter MD  [facility] Medicine Team: [facility] HOSP MED R  Code status: Full Code    Primary Care Provider: Venancio Mayer MD    Discharge Diagnosis:  Active Hospital Problems    Diagnosis  POA    *Acute respiratory failure with hypoxia [J96.01]  Yes    CAP (community acquired pneumonia) [J18.9]  Yes    Type 2 diabetes mellitus with kidney complication, with long-term current use of insulin [E11.29, Z79.4]  Not Applicable    Type 2 diabetes mellitus with hyperglycemia, with long-term current use of insulin [E11.65, Z79.4]  Not Applicable    COPD exacerbation [J44.1]  Yes    Chest pain on breathing [R07.1]  Yes    Pulmonary vascular congestion [R09.89]  Yes    Essential hypertension [I10]  Yes    CKD (chronic kidney disease) stage 3, GFR 30-59 ml/min [N18.3]  Yes      Resolved Hospital Problems   No resolved problems to display.       HPI Per Dr. Medina's H&P:  Ms. Sarina Genao is a 68 y.o. female with hx of COPD, former smoker with 50 pack year hx, HTN, IDT2DM uncontrolled, and CKD st 3, presented to the ED on 10/12 with worsening SOB associated with a productive cough. Symptoms first started 10/8 with Fever of 105 F with wheezing. At that time e pt was seen at [facility] ED, she receive Duo Neb and tylenol and discharged home. Since then, her SOB , BLISS, and cough have been worsening. Productive cough with white sputum. + subjective fevers.  She felt very SOB on AM of 10/13 and was seen at urgent care where pulse ox was 86% so patient was sent to the ED for evaluation. In addition, pt has been having constant R sided chest pain x 2 weeks that is worse with coughing or movement. She endorsed generalized myalgias. No sick contacts, recent travel, recent hospitalization. She denied sore  throat, rhinorrhea, ear pain, chest pain, palpitations, LE edema, weight gain,lightheadedness,  abdominal pain, n/v, dysuria, lightheadedness. Quit smoking January 1, 2018. In the ED, pt was found to be afebrile, with elevated BP. No leukocytosis. Elevated D dimer. CTA negative for PE, however, imaging showing small bilateral pleural effusions and left basilar platelike scarring versus atelectasis, with pulm edema. procal negative, BNP <50. VBG with resp alkalosis. She was placed on BIPAP for a short time but have trouble tolerating. Transitioned to 6L NC --> 4L O2 NC, pH improved. Flu negative     Hospital Course:  Sarina Genao is a 68 y.o female with PMHx of COPD, former smoker with 50 pack year hx, HTN, IDT2DM uncontrolled, and CKD st 3 who [place] [facility] worsening SOB associated with a productive cough with white sputum, subjective fevers, and generalized myalgias. Symptoms first started 10/8 with Fever of 105 F with wheezing. Pt felt very SOB on AM of 10/13 and was seen at urgent care where pulse ox was 86% so patient was sent to the ED for evaluation. In addition, pt has been having constant R sided chest pain x 2 weeks that is worse with coughing or movement. While in the ED, pt was found to be afebrile, with elevated BP. No leukocytosis. Elevated D dimer. Flu negative. CTA negative for PE, however, imaging showing small bilateral pleural effusions and left basilar platelike scarring versus atelectasis, with pulm edema. procal negative, BNP <50. VBG with resp alkalosis. She was placed on BIPAP for a short time but have trouble tolerating. Transitioned to 6L NC --> 4L O2 NC, pH improved. Pt admitted for acute respiratory failure with hypoxia from CAP (likely viral) vs COPD exacerbation vs Pulmonary vascular congestion.     Acute respiratory failure with hypoxia likely multifactorial from COPD exacerbation vs Pulmonary vascular congestion vs Community Acquired Pneumonia (likley viral  etiology):  -CXR shows Findings suggesting pulmonary edema/CHF pattern with small bilateral pleural effusions and left basilar platelike scarring versus atelectasis.  -CTA chest done, shows Small bilateral pleural effusions with mild interlobular septal thickening which can be seen in the setting of vascular congestion; additional areas of linear opacity seen predominate in the left lung which is nonspecific but can be seen in the setting of scarring or pneumonia.  -flu negative. procal negative. WBC of 10. BNP of 23. PE ruled out  -blood cultures show NGTD  -normal maren on sputum culture  -antibiotics with Moxifloxacin for 5 day course   -Received 125 Solumedrol in ED, will give Prednisone 40mg Daily x5d  -s/p 20 IV lasix in the ED and 40 mg IV lasix in AM on the day after admission. Further lasix was held due to GILLES and improvement in respiratory symptoms  -2D echo with grade 1 diastolic dysfunction, Hyperdynamic left ventricular systolic function (EF >70%).   -pt was weaned off O2  -CM on board to arrange nebulizer for home  -pulm referral placed for outpatient f/u     Acute Kidney Injury on CKD (chronic kidney disease) stage 3:  -Pt's creatinine on 10/14 was 1.7 and baseline creatinine around 1.4  -Lasix was stopped and pt's creatinine normalized to 1.4 on 10/14/18  -Pt did get contrast for CTA and pt reprots she has hx of contrast induced nephropathy in the past  -obtain BMP in 1 week after discharge      Type 2 diabetes mellitus with hyperglycemia, with long-term current use of insulin  Steroid Induced Hyperglycemia:  -A1C of 9.9 on 10/12/18  -required insulin drip on 10/13 due to hyperglycemia, which resolved and pt was switched to PTA dose of subq insulin    -diabetic diet     Breast Lesion:  -pt reports she has an open wound under her left breast for 2 weeks  -s/p course of antibiotics, now appears better, no erythema or drainage from it  -consulted in patient wound care to properly dress wound under left  breast    Labs:  Recent Labs   Lab  10/13/18   0443  10/14/18   0649  10/15/18   0453   WBC  11.87  13.81*  10.29   HGB  12.7  11.8*  11.4*   HCT  40.0  38.6  35.6*   PLT  381*  371*  407*     Recent Labs   Lab  10/13/18   2304  10/14/18   0649  10/15/18   0453   NA  138  141  140   K  3.7  3.1*  4.1   CL  100  103  105   CO2  24  27  27   BUN  28*  28*  33*   CREATININE  1.5*  1.4  1.4   GLU  484*  111*  185*   CALCIUM  9.1  9.3  9.3     Recent Labs   Lab  10/12/18   1549   ALKPHOS  169*   ALT  24   AST  25   ALBUMIN  2.9*   PROT  8.4   BILITOT  0.8      Recent Labs   Lab  10/14/18   1106  10/14/18   1201  10/14/18   1654  10/14/18   2132  10/15/18   0742  10/15/18   1225   POCTGLUCOSE  252*  215*  248*  182*  172*  172*     No results for input(s): CPK, CPKMB, MB, TROPONINI in the last 72 hours.    ROS (Positive in Bold, otherwise negative)  Constitutional: fever, chills, night sweats  CV: chest pain, edema, palpitations  Resp: SOB, cough, sputum production  GI: changes in appetite, NVDC, pain, melena, hematochezia, GERD, hematemesis  : Dysuria, hematuria, urinary urgency, frequency  MSK: arthralgia/myalgia, joint swelling  Neuro/Psych: anxiety, depression    PEx   General: no distress   Lungs: clear to ausculation anteriorly and posteriorly   Heart: regular rate and rhythm   Abdomen: normal bowel sounds, soft, no tenderness   Extremities: no edema. No clubbing or cyanosis     Procedures: CXR, CTA    Consultants: OT, PT    Discharge Medication List as of 10/15/2018  2:47 PM      START taking these medications    Details   albuterol-ipratropium (DUO-NEB) 2.5 mg-0.5 mg/3 mL nebulizer solution Take 3 mLs by nebulization every 6 (six) hours. Rescue, Starting Mon 10/15/2018, Until Tue 10/15/2019, Normal      moxifloxacin (AVELOX) 400 mg tablet Take 1 tablet (400 mg total) by mouth once daily. for 3 days, Starting Tue 10/16/2018, Until Fri 10/19/2018, Normal      predniSONE (DELTASONE) 20 MG tablet Take 2 tablets (40  mg total) by mouth once daily. for 3 days, Starting Tue 10/16/2018, Until Fri 10/19/2018, Normal         CONTINUE these medications which have NOT CHANGED    Details   amLODIPine (NORVASC) 5 MG tablet Take 1 tablet (5 mg total) by mouth once daily., Starting Fri 7/13/2018, Until Sat 7/13/2019, Normal      aspirin (ECOTRIN) 81 MG EC tablet Take 81 mg by mouth once daily., Historical Med      calcium carbonate (OS-LISSETTE) 500 mg calcium (1,250 mg) tablet Take 1 tablet (500 mg total) by mouth once daily., Starting 3/17/2014, Until Sun 6/20/21, Normal      cyanocobalamin, vitamin B-12, (VITAMIN B-12) 1,000 mcg TbSR Take by mouth once daily. , Historical Med      DOCOSAHEXANOIC ACID/EPA (FISH OIL ORAL) Take 1,200 mg by mouth once daily., Until Discontinued, Historical Med      glipiZIDE (GLUCOTROL) 10 MG tablet Take 1 tablet (10 mg total) by mouth daily with breakfast., Starting Tue 11/28/2017, Normal      hydroCHLOROthiazide (HYDRODIURIL) 12.5 MG Tab Take 1 tablet (12.5 mg total) by mouth once daily., Starting Tue 11/28/2017, Until Wed 11/28/2018, Normal      insulin detemir U-100 (LEVEMIR FLEXTOUCH) 100 unit/mL (3 mL) SubQ InPn pen Inject 50 Units into the skin every evening., Starting Wed 3/28/2018, Until Thu 3/28/2019, Normal      irbesartan (AVAPRO) 150 MG tablet Take 1 tablet (150 mg total) by mouth once daily., Starting Wed 7/18/2018, Until Thu 7/18/2019, Normal      oxybutynin (DITROPAN-XL) 5 MG TR24 TAKE 1 TABLET(5 MG) BY MOUTH EVERY DAY, Normal      ranitidine (ZANTAC) 300 MG tablet TAKE 1 TABLET(300 MG) BY MOUTH EVERY EVENING, Normal      rosuvastatin (CRESTOR) 20 MG tablet Take 1 tablet (20 mg total) by mouth once daily., Starting Tue 11/28/2017, Until Wed 11/28/2018, Normal      traMADol (ULTRAM) 50 mg tablet TAKE 1 TABLET BY MOUTH EVERY 6 HOURS AS NEEDED, Normal      HYDROcodone-acetaminophen (NORCO)  mg per tablet Take 1 tablet by mouth every 8 (eight) hours as needed for Pain., Starting Wed 10/3/2018,  "Normal      insulin aspart U-100 (NOVOLOG FLEXPEN U-100 INSULIN) 100 unit/mL InPn pen ADMINISTER 12 UNITS UNDER THE SKIN THREE TIMES DAILY WITH MEALS, Normal      pen needle, diabetic (BD INSULIN PEN NEEDLE UF SHORT) 31 gauge x 5/16" Ndle USE AS DIRECTED, Normal      acetaminophen-codeine 300-30mg (TYLENOL #3) 300-30 mg Tab Take 1 tablet by mouth every 4 to 6 hours as needed (pain)., Starting Mon 10/1/2018, Print      albuterol (PROVENTIL/VENTOLIN HFA) 90 mcg/actuation inhaler Inhale 1-2 puffs into the lungs every 6 (six) hours as needed for Wheezing. Rescue, Starting Mon 10/8/2018, Print         STOP taking these medications       clindamycin (CLEOCIN) 150 MG capsule Comments:   Reason for Stopping:               The relevant and important risks, side effects, and benefits of their medications were reviewed with patient during hospitalization and at discharge. The patient was given the opportunity to discuss and ask questions about their medications, including target symptoms, potential risks, side effects and benefits of their medications, as well as their expected prognosis if non-medication treatment options were chosen.  The patient expresses understanding of all these options and information and voluntarily consents to treatment.    Discharge Diet:diabetic diet: 1800 calorie with Fluid restriction 1500 per day    Activity: activity as tolerated    Discharge Condition: Stable    Disposition: Home or Self Care    Tests pending at the time of discharge: none      Time spent  on the discharge of the patient including review of hospital course with the patient. reviewing discharge medications and arranging follow-up care: <28 mins    Discharge examination Patient was seen and examined on the date of discharge and determined to be suitable for discharge.    Discharge plan and follow up:  It is critical that you make your follow-up appointment(s). If you are discharged on the weekend or after business hours, or if we " are unable to schedule these appointments for you for any reason, you or a family member need to call during the next business day to schedule your appointment(s).    -Follow up with you PCP, Venancio Mayer MD within 1-2 weeks as arranged with  and treatment team prior to discharge  -Take all medications as prescribed and listed above.  -Recommended Follow-up Tests: BMP in 1 week, PCP/pulm f/u, consider outpatient sleep study      - Please return to ED or call your physician if you have:         1. Fevers > 101.5 unresponsive to tylenol.       2. Abdominal pain and/or distention       3. Intractable nausea, vomiting or diarrhea       4. Inability to tolerate adequate oral intake of food       5. Neurologic changes, chest pain or shortness of breath         Future Appointments   Date Time Provider Department Center   11/9/2018  1:00 PM Jarrod Gilman MD [facility] [address]   3/18/2019 11:00 AM CHAIR 03 [facility] [place]         Sharee Hunter MD  Hospital Medicine Staff  [phone] pager

## 2018-10-17 NOTE — PHYSICIAN QUERY
PT Name: Sarina Genao  MR #: [id number]     Physician Query Form - Documentation Clarification      CDS/: Abida Mueller RN CDI             Contact information: [email or web address]    This form is a permanent document in the medical record.     Query Date: October 17, 2018    By submitting this query, we are merely seeking further clarification of documentation. Please utilize your independent clinical judgment when addressing the question(s) below.    The Medical record reflects the following:    Supporting Clinical Findings Location in Medical Record   Acute Kidney Injury on CKD (chronic kidney disease) stage 3:  -Pt's creatinine on 10/14 was 1.7 and baseline creatinine around 1.4  -Lasix was stopped and pt's creatinine normalized to 1.4 on 10/14/18  -Pt did get contrast for CTA and pt reprots she has hx of contrast induced nephropathy in the past  -obtain BMP in 1 week after discharge     Discharge summary                   s/p 20 IV lasix in the ED and 40 mg IV lasix in AM, lasix now held due to GILLES    BUN                     CR            eGFR  10/13 - 26            1.8             32.4       10/14 - 28            1.4             43.9  10/15 - 33            1.4             43.9   Hospital Medicine   10/14 207 pm    Lab results                                                                            Doctor, Please specify diagnosis or diagnoses associated with above clinical findings.    Provider Use Only      [ x  ]  Diuretics      [  ] Contrast for radiology procedure      [  ] Diuretics and Contrast for radiology procedure      [  ] Other, please specify____________________.                                                                                                               [  ] Clinically undetermined

## 2018-10-25 ENCOUNTER — OFFICE VISIT (OUTPATIENT)
Dept: FAMILY MEDICINE | Facility: CLINIC | Age: 68
End: 2018-10-25
Payer: MEDICARE

## 2018-10-25 VITALS
WEIGHT: 178.56 LBS | DIASTOLIC BLOOD PRESSURE: 78 MMHG | OXYGEN SATURATION: 92 % | SYSTOLIC BLOOD PRESSURE: 112 MMHG | BODY MASS INDEX: 31.64 KG/M2 | HEART RATE: 84 BPM | HEIGHT: 63 IN | TEMPERATURE: 99 F

## 2018-10-25 DIAGNOSIS — I10 ESSENTIAL HYPERTENSION: ICD-10-CM

## 2018-10-25 DIAGNOSIS — M81.0 OSTEOPOROSIS, POST-MENOPAUSAL: ICD-10-CM

## 2018-10-25 DIAGNOSIS — J18.9 COMMUNITY ACQUIRED PNEUMONIA, UNSPECIFIED LATERALITY: Primary | ICD-10-CM

## 2018-10-25 DIAGNOSIS — L02.219 ABSCESS, TRUNK: ICD-10-CM

## 2018-10-25 DIAGNOSIS — J44.9 CHRONIC OBSTRUCTIVE PULMONARY DISEASE, UNSPECIFIED COPD TYPE: ICD-10-CM

## 2018-10-25 DIAGNOSIS — N18.30 CKD (CHRONIC KIDNEY DISEASE) STAGE 3, GFR 30-59 ML/MIN: ICD-10-CM

## 2018-10-25 DIAGNOSIS — R52 PAIN: ICD-10-CM

## 2018-10-25 DIAGNOSIS — J96.01 ACUTE RESPIRATORY FAILURE WITH HYPOXIA: ICD-10-CM

## 2018-10-25 PROCEDURE — 99999 PR PBB SHADOW E&M-EST. PATIENT-LVL III: CPT | Mod: PBBFAC,,, | Performed by: INTERNAL MEDICINE

## 2018-10-25 PROCEDURE — 90662 IIV NO PRSV INCREASED AG IM: CPT | Mod: PBBFAC,PO

## 2018-10-25 PROCEDURE — 99495 TRANSJ CARE MGMT MOD F2F 14D: CPT | Mod: S$PBB,,, | Performed by: INTERNAL MEDICINE

## 2018-10-25 PROCEDURE — 99495 TRANSJ CARE MGMT MOD F2F 14D: CPT | Mod: PBBFAC,25,PO | Performed by: INTERNAL MEDICINE

## 2018-10-25 PROCEDURE — 99499 UNLISTED E&M SERVICE: CPT | Mod: S$GLB,,, | Performed by: INTERNAL MEDICINE

## 2018-10-25 PROCEDURE — 99213 OFFICE O/P EST LOW 20 MIN: CPT | Mod: PBBFAC,PO | Performed by: INTERNAL MEDICINE

## 2018-10-25 RX ORDER — IPRATROPIUM BROMIDE AND ALBUTEROL SULFATE 2.5; .5 MG/3ML; MG/3ML
3 SOLUTION RESPIRATORY (INHALATION) EVERY 6 HOURS PRN
Qty: 1 BOX | Refills: 12 | Status: SHIPPED | OUTPATIENT
Start: 2018-10-25 | End: 2018-11-09 | Stop reason: SDUPTHER

## 2018-10-25 RX ORDER — TRAMADOL HYDROCHLORIDE 50 MG/1
50 TABLET ORAL EVERY 6 HOURS PRN
Qty: 60 TABLET | Refills: 3 | Status: SHIPPED | OUTPATIENT
Start: 2018-10-25 | End: 2019-06-03 | Stop reason: SDUPTHER

## 2018-10-25 NOTE — PROGRESS NOTES
Chief complaint: tcc, d/c 10/15    68-year-old black female  whose PCP is retired. Here with her sister.  She is doing well.  She has been doing the nebulizer 4 times a day and explained its purpose of bronchodilation.  She is off oxygen.  She quit smoking at  a year.  She has numerous questions regarding all the details of her hospitalization.  Her echo looks normal so I do not think this is cardiac and she does need to follow up with cardiac.  She does have pulmonary follow-up.  Presumably she had pneumonia with COPD and fever.  She needs a shower chair and other equipment for which I wrote prescriptions and sent to the pharmacy..  She had abscess on her left chest.  It was incised by the emergency room.  She has a defect there.  It is about a 1 cm wide and 8 mm deep or so defect in the scan.  No induration or added I palpate around and I can't express any fluid is good granulation tissue within the hole.  We discussed local care.  She has some slight hypopigmentation around the left temple which could be tinea versicolor and we discussed antifungal shampoos.  It was recommended she see Dermatology but she never did            HPI Per Dr. Medina's H&P:  Ms. Sarina Genao is a 68 y.o. female with hx of COPD, former smoker with 50 pack year hx, HTN, IDT2DM uncontrolled, and CKD st 3, presented to the ED on 10/12 with worsening SOB associated with a productive cough. Symptoms first started 10/8 with Fever of 105 F with wheezing. At that time e pt was seen at [facility] ED, she receive Duo Neb and tylenol and discharged home. Since then, her SOB , BLISS, and cough have been worsening. Productive cough with white sputum. + subjective fevers.  She felt very SOB on AM of 10/13 and was seen at urgent care where pulse ox was 86% so patient was sent to the ED for evaluation. In addition, pt has been having constant R sided chest pain x 2 weeks that is worse with coughing or movement. She endorsed generalized myalgias.  No sick contacts, recent travel, recent hospitalization. She denied sore throat, rhinorrhea, ear pain, chest pain, palpitations, LE edema, weight gain,lightheadedness,  abdominal pain, n/v, dysuria, lightheadedness. Quit smoking January 1, 2018. In the ED, pt was found to be afebrile, with elevated BP. No leukocytosis. Elevated D dimer. CTA negative for PE, however, imaging showing small bilateral pleural effusions and left basilar platelike scarring versus atelectasis, with pulm edema. procal negative, BNP <50. VBG with resp alkalosis. She was placed on BIPAP for a short time but have trouble tolerating. Transitioned to 6L NC --> 4L O2 NC, pH improved. Flu negative      Hospital Course:  Sarina Genao is a 68 y.o female with PMHx of COPD, former smoker with 50 pack year hx, HTN, IDT2DM uncontrolled, and CKD st 3 who presented to [facility] on 10/12/2018 for worsening SOB associated with a productive cough with white sputum, subjective fevers, and generalized myalgias. Symptoms first started 10/8 with Fever of 105 F with wheezing. Pt felt very SOB on AM of 10/13 and was seen at urgent care where pulse ox was 86% so patient was sent to the ED for evaluation. In addition, pt has been having constant R sided chest pain x 2 weeks that is worse with coughing or movement. While in the ED, pt was found to be afebrile, with elevated BP. No leukocytosis. Elevated D dimer. Flu negative. CTA negative for PE, however, imaging showing small bilateral pleural effusions and left basilar platelike scarring versus atelectasis, with pulm edema. procal negative, BNP <50. VBG with resp alkalosis. She was placed on BIPAP for a short time but have trouble tolerating. Transitioned to 6L NC --> 4L O2 NC, pH improved. Pt admitted for acute respiratory failure with hypoxia from CAP (likely viral) vs COPD exacerbation vs Pulmonary vascular congestion.      Acute respiratory failure with hypoxia likely multifactorial from COPD exacerbation vs  Pulmonary vascular congestion vs Community Acquired Pneumonia (likley viral etiology):  -CXR shows Findings suggesting pulmonary edema/CHF pattern with small bilateral pleural effusions and left basilar platelike scarring versus atelectasis.  -CTA chest done, shows Small bilateral pleural effusions with mild interlobular septal thickening which can be seen in the setting of vascular congestion; additional areas of linear opacity seen predominate in the left lung which is nonspecific but can be seen in the setting of scarring or pneumonia.  -flu negative. procal negative. WBC of 10. BNP of 23. PE ruled out  -blood cultures show NGTD  -normal maren on sputum culture  -antibiotics with Moxifloxacin for 5 day course   -Received 125 Solumedrol in ED, will give Prednisone 40mg Daily x5d  -s/p 20 IV lasix in the ED and 40 mg IV lasix in AM on the day after admission. Further lasix was held due to GILLES and improvement in respiratory symptoms  -2D echo with grade 1 diastolic dysfunction, Hyperdynamic left ventricular systolic function (EF >70%).   -pt was weaned off O2  -CM on board to arrange nebulizer for home  -pulm referral placed for outpatient f/u     Acute Kidney Injury on CKD (chronic kidney disease) stage 3:  -Pt's creatinine on 10/14 was 1.7 and baseline creatinine around 1.4  -Lasix was stopped and pt's creatinine normalized to 1.4 on 10/14/18  -Pt did get contrast for CTA and pt reprots she has hx of contrast induced nephropathy in the past  -obtain BMP in 1 week after discharge      Type 2 diabetes mellitus with hyperglycemia, with long-term current use of insulin  Steroid Induced Hyperglycemia:  -A1C of 9.9 on 10/12/18  -required insulin drip on 10/13 due to hyperglycemia, which resolved and pt was switched to PTA dose of subq insulin    -diabetic diet     Breast Lesion:  -pt reports she has an open wound under her left breast for 2 weeks  -s/p course of antibiotics, now appears better, no erythema or drainage  from it  -consulted in patient wound care to properly dress wound under left breast    .  She has hypertension which appears to be under good control and needs refills of her medication.  She does get some edema at the end of the day that is gone in the morning and I reassured her about that but she is concerned and on Norvasc 5 mg.  Blood pressure is excellent and we will discontinue and have her monitor.  She could have  Diovan increased if needed.  She has diabetes which is still uncontrolled and apparently was seeing endocrine but now will be seeing me.  Her A1c was 7.3 and now 8.1.  She reports taking her Lantus insulin to change to Levemir in the evening.  On her sugars are normal or below 100 she will skip the insulin altogether.  We discussed switching to morning dosing of Levemir so that she will not be fearful of taking her insulin and she should always take her insulin regardless of the sugar.  She also switched all her medications to p.m. Dosing based on recommendation of the pharmacist.  Since that time she's had some low sugars and shaking overnight and in the morning.  This is likely due to the glipizide as wellbut also consider if she takes too much NovoLog with her evening meal.  We will have her switch the glipizide to the morning and even cut the dose in halfif she continues to have issues.     She does have chronic renal insufficiency and follows with nephrology.  I explained her chronic kidney disease.  Her GFR is worse.  She is on  40 units  Levemir due to insurance.we made the switch off of Lantus at last visit.  .      Patient also needs to make follow-up with outside Endocrine for her osteoporosis and recommend she also follow up there for her diabetes which is very much uncontrolled with an A1c of 9.9.    ROS:   CONST: weight stable. EYES: no vision change. ENT: no sore throat. CV: no chest pain w/ exertion. RESP: no shortness of breath. GI: no nausea, vomiting, diarrhea. No dysphagia. :  no urinary issues. MUSCULOSKELETAL: no new myalgias or arthralgias. SKIN: no new changes. NEURO: no focal deficits. PSYCH: no new issues. ENDOCRINE: no polyuria. HEME: no lymph nodes. ALLERGY: no general pruritis.    Past Medical History   Diagnosis Date    Cataracts, bilateral     CKD (chronic kidney disease) stage 3, GFR 30-59 ml/min 12/15/2014    Combined hyperlipidemia associated with type 2 diabetes mellitus 6/7/2013    COPD (chronic obstructive pulmonary disease) 12/15/2014    Diabetes mellitus type II----with chronic kidney disease           Diabetes mellitus with renal manifestations, uncontrolled 2/1/2017    Diabetic retinopathy     Family history of stomach cancer 12/5/2013    H/O renal cell carcinoma 12/15/2015    History of colonic polyps 2/1/2017     3 polyps 7/13 ---5 yrs    History of gastroesophageal reflux (GERD)     History of urinary incontinence      s/p bladder lift-october, 2011 (at [facility])    Hyperlipidemia     Hypertension      120s/70s-80s    Nephrolithiasis     Osteoporosis, post-menopausal, evaluated by Dr. York, quit Fosamax due to CKD early 2017  3/17/2014    Primary hyperparathyroidism 10/20/2016    Schatzki's ring 2/1/2017     Dilated 2014   Prevnar 2017    Past Surgical History:   Procedure Laterality Date    bladder lift  2011    done at [facility]    BLADDER STONE REMOVAL  2011    before bladder lift    CATARACT EXTRACTION W/  INTRAOCULAR LENS IMPLANT Left 06/07/2016    Dr. Vásquez    CATARACT EXTRACTION W/  INTRAOCULAR LENS IMPLANT Right 06/21/2016    Dr. Vásquez    COLONOSCOPY N/A 4/26/2018    Procedure: COLONOSCOPY;  Surgeon: Benjamin Zamora MD;  Location: [facility];  Service: Endoscopy;  Laterality: N/A;  confirmed ss    COLONOSCOPY N/A 4/26/2018    Performed [facility] at [facility] ENDO    COLONOSCOPY N/A 7/12/2013    Performed by Mariposa Shah MD at [facility] ENDO    CYSTOSCOPY      EGD (ESOPHAGOGASTRODUODENOSCOPY) N/A 1/8/2014     Performed by Isaias Marinelli MD at [facility] ENDO    INSERTION-INTRAOCULAR LENS (IOL) Right 6/21/2016    [facility] by [facility] at [facility] OR 1ST FLR    INSERTION-INTRAOCULAR LENS (IOL) Left 6/7/2016    Performed by Xavier [facility] at [facility] OR 1ST [facility]LR    NEPHRECTOMY      partial right    NEPHRECTOMY, RADICAL Right 8/6/2013    Performed by Santos Murry MD at [facility] OR 2ND FLR    PHACOEMULSIFICATION-ASPIRATION-CATARACT Right 6/21/2016    Performed by Xavier [facility]ásquez MD at [facility]ST [facility]    PHACOEMULSIFICATION-ASPIRATION-CATARACT Left 6/7/2016    Performed by [facility] at [facility] OR 1ST FLR    ROBOTIC NEPHRECTOMY, PARTIAL Right 8/6/2013    Performed by Santos Murry MD at [facility] OR 2ND FLR     Social History     Socioeconomic History    Marital status:      Spouse name: Not on file    Number of children: Not on file    Years of education: Not on file    Highest education level: Not on file   Social Needs    Financial resource strain: Not on file    Food insecurity - worry: Not on file    Food insecurity - inability: Not on file    Transportation needs - medical: Not on file    Transportation needs - non-medical: Not on file   Occupational History    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   Tobacco Use    Smoking status: Former Smoker     Packs/day: 0.25     Years: 30.00     Pack years: 7.50     Types: Cigarettes    Smokeless tobacco: Never Used    Tobacco comment: pt. reports quitting January 2018   Substance and Sexual Activity    Alcohol use: No     Alcohol/week: 0.0 oz    Drug use: No    Sexual activity: Not on file   Other Topics Concern    Not on file   Social History Narrative    Lives on [facility]    Here with sister Kate [phone]         family history includes Cataracts in her mother; Colon cancer in her brother; Glaucoma in her mother; Nephrolithiasis in her sister; No Known Problems in her father, maternal aunt, maternal  grandfather, maternal grandmother, maternal uncle, paternal aunt, paternal grandfather, paternal grandmother, and paternal uncle.     Gen: no distress  Respiratory effort is good lungs are clear heart regular rate and rhythm without murmurs gallops rubs.  Skin examination as above        Family and/or Caretaker present at visit?  Yes  Diagnostic tests reviewed/disposition: No diagnosic tests pending after this hospitalization.  Disease/illness education yes  Home health/community services discussion/referrals: Patient does not have home health established from hospital visit.  They do not need home health.  If needed, we will set up home health for the patient.   Establishment or re-establishment of referral orders for community resources: No other necessary community resources.   Discussion with other health care providers: No discussion with other health care providers necessary.     Assessment and plan:    Sarina was seen today for hospital follow up.    Diagnoses and all orders for this visit:    Community acquired pneumonia, unspecified laterality, doing well clinically    Acute respiratory failure with hypoxia, resolving    Chronic obstructive pulmonary disease, unspecified COPD type, follow-up with Pulmonary to assess the degree of underlying COPD, continue to use the nebulizers as needed    CKD (chronic kidney disease) stage 3, GFR 30-59 ml/min, reassured back to baseline    Uncontrolled type 2 diabetes mellitus with stage 3 chronic kidney disease, without long-term current use of insulin, recommended she at see Endocrine    Essential hypertension, chronic and stable    Abscess, trunk, local care    Pain, patient describes general arthralgias in her hands knees and general parts of her body for which she only takes 1 tramadol a day and I think that is appropriate  -     traMADol (ULTRAM) 50 mg tablet; Take 1 tablet (50 mg total) by mouth every 6 (six) hours as needed.    Osteoporosis,  post-menopausal    Other orders  -     Influenza - High Dose (65+) (PF) (IM)  -     albuterol-ipratropium (DUO-NEB) 2.5 mg-0.5 mg/3 mL nebulizer solution; Take 3 mLs by nebulization every 6 (six) hours as needed for Wheezing or Shortness of Breath. Rescue

## 2018-11-05 ENCOUNTER — LAB VISIT (OUTPATIENT)
Dept: LAB | Facility: HOSPITAL | Age: 68
End: 2018-11-05
Attending: INTERNAL MEDICINE
Payer: MEDICARE

## 2018-11-05 DIAGNOSIS — N18.30 TYPE 2 DIABETES MELLITUS WITH STAGE 3 CHRONIC KIDNEY DISEASE, WITH LONG-TERM CURRENT USE OF INSULIN: ICD-10-CM

## 2018-11-05 DIAGNOSIS — E11.22 TYPE 2 DIABETES MELLITUS WITH STAGE 3 CHRONIC KIDNEY DISEASE, WITH LONG-TERM CURRENT USE OF INSULIN: ICD-10-CM

## 2018-11-05 DIAGNOSIS — Z79.4 TYPE 2 DIABETES MELLITUS WITH STAGE 3 CHRONIC KIDNEY DISEASE, WITH LONG-TERM CURRENT USE OF INSULIN: ICD-10-CM

## 2018-11-05 LAB
ALBUMIN SERPL BCP-MCNC: 3.1 G/DL
ALP SERPL-CCNC: 135 U/L
ALT SERPL W/O P-5'-P-CCNC: 15 U/L
ANION GAP SERPL CALC-SCNC: 9 MMOL/L
AST SERPL-CCNC: 20 U/L
BILIRUB SERPL-MCNC: 0.4 MG/DL
BUN SERPL-MCNC: 10 MG/DL
CALCIUM SERPL-MCNC: 10.5 MG/DL
CHLORIDE SERPL-SCNC: 105 MMOL/L
CO2 SERPL-SCNC: 29 MMOL/L
CREAT SERPL-MCNC: 1.1 MG/DL
EST. GFR  (AFRICAN AMERICAN): 58.8 ML/MIN/1.73 M^2
EST. GFR  (NON AFRICAN AMERICAN): 51 ML/MIN/1.73 M^2
GLUCOSE SERPL-MCNC: 83 MG/DL
POTASSIUM SERPL-SCNC: 3.7 MMOL/L
PROT SERPL-MCNC: 7.4 G/DL
SODIUM SERPL-SCNC: 143 MMOL/L

## 2018-11-05 PROCEDURE — 80053 COMPREHEN METABOLIC PANEL: CPT

## 2018-11-05 PROCEDURE — 36415 COLL VENOUS BLD VENIPUNCTURE: CPT | Mod: PO

## 2018-11-06 DIAGNOSIS — J44.9 CHRONIC OBSTRUCTIVE PULMONARY DISEASE, UNSPECIFIED COPD TYPE: Primary | ICD-10-CM

## 2018-11-09 ENCOUNTER — HOSPITAL ENCOUNTER (OUTPATIENT)
Dept: PULMONOLOGY | Facility: CLINIC | Age: 68
Discharge: HOME | End: 2018-11-09
Payer: MEDICARE

## 2018-11-09 ENCOUNTER — OFFICE VISIT (OUTPATIENT)
Dept: PULMONOLOGY | Facility: CLINIC | Age: 68
End: 2018-11-09
Payer: MEDICARE

## 2018-11-09 VITALS
SYSTOLIC BLOOD PRESSURE: 118 MMHG | WEIGHT: 179 LBS | HEIGHT: 63 IN | BODY MASS INDEX: 31.71 KG/M2 | OXYGEN SATURATION: 93 % | HEART RATE: 89 BPM | DIASTOLIC BLOOD PRESSURE: 76 MMHG

## 2018-11-09 DIAGNOSIS — I50.30 (HFPEF) HEART FAILURE WITH PRESERVED EJECTION FRACTION: ICD-10-CM

## 2018-11-09 DIAGNOSIS — J44.9 CHRONIC OBSTRUCTIVE PULMONARY DISEASE, UNSPECIFIED COPD TYPE: ICD-10-CM

## 2018-11-09 DIAGNOSIS — R59.0 HILAR ADENOPATHY: ICD-10-CM

## 2018-11-09 DIAGNOSIS — J44.9 CHRONIC OBSTRUCTIVE PULMONARY DISEASE, UNSPECIFIED COPD TYPE: Primary | ICD-10-CM

## 2018-11-09 LAB
PRE FEV1 FVC: 66
PRE FEV1: 1.22
PRE FVC: 1.84
PREDICTED FEV1 FVC: 79
PREDICTED FEV1: 2.14
PREDICTED FVC: 2.74

## 2018-11-09 PROCEDURE — 99204 OFFICE O/P NEW MOD 45 MIN: CPT | Mod: GC,S$GLB,, | Performed by: HOSPITALIST

## 2018-11-09 PROCEDURE — 94010 BREATHING CAPACITY TEST: CPT | Mod: S$GLB,,, | Performed by: INTERNAL MEDICINE

## 2018-11-09 PROCEDURE — 1101F PT FALLS ASSESS-DOCD LE1/YR: CPT | Mod: CPTII,GC,S$GLB, | Performed by: HOSPITALIST

## 2018-11-09 PROCEDURE — 3074F SYST BP LT 130 MM HG: CPT | Mod: CPTII,GC,S$GLB, | Performed by: HOSPITALIST

## 2018-11-09 PROCEDURE — 3078F DIAST BP <80 MM HG: CPT | Mod: CPTII,GC,S$GLB, | Performed by: HOSPITALIST

## 2018-11-09 PROCEDURE — 99999 PR PBB SHADOW E&M-EST. PATIENT-LVL IV: CPT | Mod: PBBFAC,GC,, | Performed by: HOSPITALIST

## 2018-11-09 PROCEDURE — 94729 DIFFUSING CAPACITY: CPT | Mod: S$GLB,,, | Performed by: INTERNAL MEDICINE

## 2018-11-09 RX ORDER — IPRATROPIUM BROMIDE AND ALBUTEROL SULFATE 2.5; .5 MG/3ML; MG/3ML
3 SOLUTION RESPIRATORY (INHALATION) EVERY 6 HOURS PRN
Qty: 3 BOX | Refills: 12 | Status: SHIPPED | OUTPATIENT
Start: 2018-11-09 | End: 2019-11-04 | Stop reason: SDUPTHER

## 2018-11-09 NOTE — PROGRESS NOTES
[facility]  Initial Consultation Note    [facility]   [address]   [place] [address]    Reason for Consultation:  COPD  History of Present Illness:  68yoF with hx of COPD, CKD, HLD, DM2 presenting to establish care for COPD.  She was dx with COPD 3 years but never started on any therapy.  She was admitted to [facility] from Oct 12-15 for hypoxic respiratory failure. She reportedly had myalgias, a worsening cough, and fatigue.  She was diuresed and treated for COPD exacerbation and clinically improved.  She feels back to her respiratory baseline now.  Never been hospitalized for her COPD in the past.  Never been on Prednisone prior to this hospitalization.  Has a daily cough with sputum production.  No recent fevers, chills, nausea.  UTD on her flu and pneumonia vaccinations.  Quit smoking 11 months ago.  Does not feel limited by dyspnea, mostly limited by leg/knee pain.  Did have some pleurisy when she initially presented that has largely resolved.  Did have hilar adenopathy on her CT Chest and some portions of the effusions were either loculated or appeared to have pleural thickening.    she has a past medical history of Anticoagulant long-term use, Cancer, Cataracts, bilateral, CKD (chronic kidney disease) stage 3, GFR 30-59 ml/min, Combined hyperlipidemia associated with type 2 diabetes mellitus, COPD (chronic obstructive pulmonary disease), Diabetes mellitus type II, Diabetes mellitus with renal manifestations, uncontrolled, Diabetic retinopathy, Family history of stomach cancer, Former smoker, H/O renal cell carcinoma, History of colonic polyps, History of gastroesophageal reflux (GERD), History of urinary incontinence, Hyperlipidemia, Hypertension, Nephrolithiasis, Osteoporosis, post-menopausal, Primary hyperparathyroidism, and Schatzki's ring.    she has a past surgical history that includes Bladder stone removal (2011); bladder lift (2011); Cystoscopy;  "Nephrectomy; Cataract extraction w/  intraocular lens implant (Left, 06/07/2016); Cataract extraction w/  intraocular lens implant (Right, 06/21/2016); COLONOSCOPY (N/A, 4/26/2018); PHACOEMULSIFICATION-ASPIRATION-CATARACT (Right, 6/21/2016); INSERTION-INTRAOCULAR LENS (IOL) (Right, 6/21/2016); PHACOEMULSIFICATION-ASPIRATION-CATARACT (Left, 6/7/2016); INSERTION-INTRAOCULAR LENS (IOL) (Left, 6/7/2016); EGD (ESOPHAGOGASTRODUODENOSCOPY) (N/A, 1/8/2014); ROBOTIC NEPHRECTOMY, PARTIAL (Right, 8/6/2013); NEPHRECTOMY, RADICAL (Right, 8/6/2013); and COLONOSCOPY (N/A, 7/12/2013).    she reports that she has quit smoking. Her smoking use included cigarettes. She has a 7.50 pack-year smoking history. she has never used smokeless tobacco. She reports that she does not drink alcohol or use drugs.    she family history includes Cataracts in her mother; Colon cancer in her brother; Glaucoma in her mother; Nephrolithiasis in her sister; No Known Problems in her father, maternal aunt, maternal grandfather, maternal grandmother, maternal uncle, paternal aunt, paternal grandfather, paternal grandmother, and paternal uncle.    Allergies: As above    Current medications have been reviewed    Current pulmonary regimen :   Albuterol PRN    On examination:  /76   Pulse 89   Ht 5' 3" (1.6 m)   Wt 81.2 kg (179 lb)   SpO2 (!) 93%   BMI 31.71 kg/m²   Physical Exam  General: Awake, conversant without increased WOB  HEENT: EOMI, PERRL  CV: RRR, no MRG  Pulm: Few basilar crackles, no wheeze  Abd: Soft, NT  Ext: No c/c/e    Recent laboratory and radiographic results have been reviewed  Notable results include:   Most recent PFT: Moderate obstruction, possible restriction, moderately reduced DLCO   Most recent echocardiogram: DD, hyperdynamic  Most recent right heart catheterization: N/A  Previous biopsy results: N/A    I have personally reviewed and interpreted the following studies:  CT Chest - Scattered GGO w/ small effusions and " possible pleural thickening, notable hilar adenoapthy    Assessment and Plan:    Sarina Genao is a 68 y.o. female who was seen today for evaluation of: COPD    .  1. Chronic obstructive pulmonary disease, unspecified COPD type    2. Hilar adenopathy    3. (HFpEF) heart failure with preserved ejection fraction      1.  Appears to have had a viral syndrome possible she was also volume overloaded though the history does correlate w/ her low BNP.  GOLD C-D, difficult to get a good sense of her degree of respiratory symptomatology, has MSK disease and PAD.  Will start on LAMA/LABA therapy.  PRN Albuterol.   Pulmonary Rehab.    2. Will get repeat CT of her chest to follow up this node.  Further decisions to be based on imaging.  High risk given smoking history.    3. On PRN Lasix.  Appears euvolemic.    4. UTD on flu and pneumococcal vaccinations.    Return to clinic: 6 months  Plan was discussed with staff pulmonologist Dr. Salvatore Gilman MD  [employer] [facility] Fellow

## 2018-11-11 NOTE — PROGRESS NOTES
I have reviewed the notes, assessments, and/or procedures performed this visit, and I concur with the documentation.    Leighton Chase MD  Department of Pulmonary & Critical Care  Cell: [phone]

## 2018-11-12 ENCOUNTER — TELEPHONE (OUTPATIENT)
Dept: PULMONOLOGY | Facility: CLINIC | Age: 68
End: 2018-11-12

## 2018-11-12 DIAGNOSIS — J44.9 CHRONIC OBSTRUCTIVE PULMONARY DISEASE, UNSPECIFIED COPD TYPE: Primary | ICD-10-CM

## 2018-11-16 ENCOUNTER — HOSPITAL ENCOUNTER (OUTPATIENT)
Dept: RADIOLOGY | Facility: HOSPITAL | Age: 68
Discharge: HOME | End: 2018-11-16
Attending: HOSPITALIST
Payer: MEDICARE

## 2018-11-16 DIAGNOSIS — R59.0 HILAR ADENOPATHY: ICD-10-CM

## 2018-11-16 PROCEDURE — 71260 CT THORAX DX C+: CPT | Mod: TC

## 2018-11-16 PROCEDURE — 71260 CT THORAX DX C+: CPT | Mod: 26,,, | Performed by: RADIOLOGY

## 2018-11-16 PROCEDURE — 25500020 PHARM REV CODE 255: Performed by: HOSPITALIST

## 2018-11-16 RX ADMIN — IOHEXOL 75 ML: 350 INJECTION, SOLUTION INTRAVENOUS at 11:11

## 2018-11-20 ENCOUNTER — TELEPHONE (OUTPATIENT)
Dept: PULMONOLOGY | Facility: HOSPITAL | Age: 68
End: 2018-11-20

## 2018-11-20 NOTE — TELEPHONE ENCOUNTER
CT results reviewed.  Nodule vs LN persists.  Patient high risk.  Believe EBUS/Biju bronch reasonable and Dr. Ramires agrees.  Have informed patient of the plan going forward and she is amenable to proceeding with the aforementioned procedures.    Jarrod Gilman MD  [facility]

## 2018-11-26 ENCOUNTER — TELEPHONE (OUTPATIENT)
Dept: PHARMACY | Facility: CLINIC | Age: 68
End: 2018-11-26

## 2018-11-27 ENCOUNTER — TELEPHONE (OUTPATIENT)
Dept: PULMONOLOGY | Facility: CLINIC | Age: 68
End: 2018-11-27

## 2018-11-27 DIAGNOSIS — R91.8 HILAR MASS: Primary | ICD-10-CM

## 2018-12-04 ENCOUNTER — TELEPHONE (OUTPATIENT)
Dept: PULMONOLOGY | Facility: CLINIC | Age: 68
End: 2018-12-04

## 2018-12-04 NOTE — TELEPHONE ENCOUNTER
----- Message from Mireya Tiwari sent at 12/4/2018 11:28 AM CST -----  Contact: self [phone]  ..Needs Advice    Reason for call:        Communication Preference:phone    Additional Information:  Patient is calling in regards to what time her surgery will be for please call back to discuss

## 2018-12-04 NOTE — TELEPHONE ENCOUNTER
I spoke to the pt to go over EBUS instructions and answered pt's questions. Pt scheduled for EBUS with Dr Ramires on 12/11/18 arrival at [facility] for 8:15am. Pt verbalized understanding.

## 2018-12-10 ENCOUNTER — ANESTHESIA EVENT (OUTPATIENT)
Dept: SURGERY | Facility: HOSPITAL | Age: 68
End: 2018-12-10
Payer: MEDICARE

## 2018-12-11 ENCOUNTER — ANESTHESIA (OUTPATIENT)
Dept: SURGERY | Facility: HOSPITAL | Age: 68
End: 2018-12-11
Payer: MEDICARE

## 2018-12-11 ENCOUNTER — HOSPITAL ENCOUNTER (OUTPATIENT)
Facility: HOSPITAL | Age: 68
Discharge: HOME | End: 2018-12-11
Attending: INTERNAL MEDICINE | Admitting: INTERNAL MEDICINE
Payer: MEDICARE

## 2018-12-11 ENCOUNTER — HOSPITAL ENCOUNTER (OUTPATIENT)
Dept: RADIOLOGY | Facility: HOSPITAL | Age: 68
Discharge: HOME | End: 2018-12-11
Attending: INTERNAL MEDICINE | Admitting: INTERNAL MEDICINE
Payer: MEDICARE

## 2018-12-11 VITALS
BODY MASS INDEX: 32.78 KG/M2 | HEIGHT: 63 IN | DIASTOLIC BLOOD PRESSURE: 64 MMHG | SYSTOLIC BLOOD PRESSURE: 115 MMHG | OXYGEN SATURATION: 96 % | RESPIRATION RATE: 16 BRPM | HEART RATE: 95 BPM | TEMPERATURE: 98 F | WEIGHT: 185 LBS

## 2018-12-11 DIAGNOSIS — R91.8 HILAR MASS: ICD-10-CM

## 2018-12-11 DIAGNOSIS — R91.1 LEFT LOWER LOBE PULMONARY NODULE: ICD-10-CM

## 2018-12-11 DIAGNOSIS — F17.200 TOBACCO DEPENDENCE: Primary | ICD-10-CM

## 2018-12-11 DIAGNOSIS — R09.89 PULMONARY VASCULAR CONGESTION: ICD-10-CM

## 2018-12-11 LAB
POCT GLUCOSE: 64 MG/DL (ref 70–110)
POCT GLUCOSE: 66 MG/DL (ref 70–110)
POCT GLUCOSE: 81 MG/DL (ref 70–110)

## 2018-12-11 PROCEDURE — 36000706: Performed by: INTERNAL MEDICINE

## 2018-12-11 PROCEDURE — 88172 CYTP DX EVAL FNA 1ST EA SITE: CPT | Mod: 26,,, | Performed by: PATHOLOGY

## 2018-12-11 PROCEDURE — 71250 CT THORAX DX C-: CPT | Mod: 26,,, | Performed by: RADIOLOGY

## 2018-12-11 PROCEDURE — 25000003 PHARM REV CODE 250: Performed by: STUDENT IN AN ORGANIZED HEALTH CARE EDUCATION/TRAINING PROGRAM

## 2018-12-11 PROCEDURE — 71250 CT THORAX DX C-: CPT | Mod: TC

## 2018-12-11 PROCEDURE — 88305 TISSUE EXAM BY PATHOLOGIST: CPT | Mod: 26,,, | Performed by: PATHOLOGY

## 2018-12-11 PROCEDURE — 88173 CYTOPATH EVAL FNA REPORT: CPT | Mod: 26,,, | Performed by: PATHOLOGY

## 2018-12-11 PROCEDURE — 82962 GLUCOSE BLOOD TEST: CPT | Mod: 91 | Performed by: INTERNAL MEDICINE

## 2018-12-11 PROCEDURE — 31627 NAVIGATIONAL BRONCHOSCOPY: CPT | Mod: ,,, | Performed by: INTERNAL MEDICINE

## 2018-12-11 PROCEDURE — 37000009 HC ANESTHESIA EA ADD 15 MINS: Performed by: INTERNAL MEDICINE

## 2018-12-11 PROCEDURE — 88177 CYTP FNA EVAL EA ADDL: CPT | Mod: 59 | Performed by: PATHOLOGY

## 2018-12-11 PROCEDURE — 36000707: Performed by: INTERNAL MEDICINE

## 2018-12-11 PROCEDURE — 63600175 PHARM REV CODE 636 W HCPCS: Performed by: STUDENT IN AN ORGANIZED HEALTH CARE EDUCATION/TRAINING PROGRAM

## 2018-12-11 PROCEDURE — 88177 CYTP FNA EVAL EA ADDL: CPT | Mod: 26,,, | Performed by: PATHOLOGY

## 2018-12-11 PROCEDURE — 88305 TISSUE EXAM BY PATHOLOGIST: CPT | Performed by: PATHOLOGY

## 2018-12-11 PROCEDURE — 27201423 OPTIME MED/SURG SUP & DEVICES STERILE SUPPLY: Performed by: INTERNAL MEDICINE

## 2018-12-11 PROCEDURE — 31629 BRONCHOSCOPY/NEEDLE BX EACH: CPT | Mod: 59,LT,, | Performed by: INTERNAL MEDICINE

## 2018-12-11 PROCEDURE — 25000003 PHARM REV CODE 250: Performed by: ANESTHESIOLOGY

## 2018-12-11 PROCEDURE — 82962 GLUCOSE BLOOD TEST: CPT | Performed by: INTERNAL MEDICINE

## 2018-12-11 PROCEDURE — 71000015 HC POSTOP RECOV 1ST HR: Performed by: INTERNAL MEDICINE

## 2018-12-11 PROCEDURE — 71000044 HC DOSC ROUTINE RECOVERY FIRST HOUR: Performed by: INTERNAL MEDICINE

## 2018-12-11 PROCEDURE — 25000003 PHARM REV CODE 250: Performed by: INTERNAL MEDICINE

## 2018-12-11 PROCEDURE — 31628 BRONCHOSCOPY/LUNG BX EACH: CPT | Mod: 51,LT,, | Performed by: INTERNAL MEDICINE

## 2018-12-11 PROCEDURE — D9220A PRA ANESTHESIA: Mod: ,,, | Performed by: ANESTHESIOLOGY

## 2018-12-11 PROCEDURE — 88172 CYTP DX EVAL FNA 1ST EA SITE: CPT | Performed by: PATHOLOGY

## 2018-12-11 PROCEDURE — 31652 BRONCH EBUS SAMPLNG 1/2 NODE: CPT | Mod: ,,, | Performed by: INTERNAL MEDICINE

## 2018-12-11 PROCEDURE — 37000008 HC ANESTHESIA 1ST 15 MINUTES: Performed by: INTERNAL MEDICINE

## 2018-12-11 RX ORDER — HYDROMORPHONE HYDROCHLORIDE 1 MG/ML
0.2 INJECTION, SOLUTION INTRAMUSCULAR; INTRAVENOUS; SUBCUTANEOUS EVERY 5 MIN PRN
Status: CANCELLED | OUTPATIENT
Start: 2018-12-11

## 2018-12-11 RX ORDER — PROPOFOL 10 MG/ML
VIAL (ML) INTRAVENOUS
Status: DISCONTINUED | OUTPATIENT
Start: 2018-12-11 | End: 2018-12-11

## 2018-12-11 RX ORDER — SODIUM CHLORIDE 0.9 % (FLUSH) 0.9 %
3 SYRINGE (ML) INJECTION
Status: DISCONTINUED | OUTPATIENT
Start: 2018-12-11 | End: 2018-12-11 | Stop reason: HOSPADM

## 2018-12-11 RX ORDER — ROCURONIUM BROMIDE 10 MG/ML
INJECTION, SOLUTION INTRAVENOUS
Status: DISCONTINUED | OUTPATIENT
Start: 2018-12-11 | End: 2018-12-11

## 2018-12-11 RX ORDER — PHENYLEPHRINE HYDROCHLORIDE 10 MG/ML
INJECTION INTRAVENOUS
Status: DISCONTINUED | OUTPATIENT
Start: 2018-12-11 | End: 2018-12-11

## 2018-12-11 RX ORDER — SODIUM CHLORIDE 9 MG/ML
INJECTION, SOLUTION INTRAVENOUS CONTINUOUS PRN
Status: DISCONTINUED | OUTPATIENT
Start: 2018-12-11 | End: 2018-12-11

## 2018-12-11 RX ORDER — LIDOCAINE HYDROCHLORIDE 10 MG/ML
INJECTION, SOLUTION EPIDURAL; INFILTRATION; INTRACAUDAL; PERINEURAL
Status: DISCONTINUED | OUTPATIENT
Start: 2018-12-11 | End: 2018-12-11 | Stop reason: HOSPADM

## 2018-12-11 RX ORDER — PROPOFOL 10 MG/ML
VIAL (ML) INTRAVENOUS CONTINUOUS PRN
Status: DISCONTINUED | OUTPATIENT
Start: 2018-12-11 | End: 2018-12-11

## 2018-12-11 RX ORDER — FENTANYL CITRATE 50 UG/ML
25 INJECTION, SOLUTION INTRAMUSCULAR; INTRAVENOUS EVERY 5 MIN PRN
Status: CANCELLED | OUTPATIENT
Start: 2018-12-11

## 2018-12-11 RX ORDER — FENTANYL CITRATE 50 UG/ML
INJECTION, SOLUTION INTRAMUSCULAR; INTRAVENOUS
Status: DISCONTINUED | OUTPATIENT
Start: 2018-12-11 | End: 2018-12-11

## 2018-12-11 RX ORDER — MIDAZOLAM HYDROCHLORIDE 1 MG/ML
INJECTION, SOLUTION INTRAMUSCULAR; INTRAVENOUS
Status: DISCONTINUED | OUTPATIENT
Start: 2018-12-11 | End: 2018-12-11

## 2018-12-11 RX ADMIN — PROPOFOL 30 MG: 10 INJECTION, EMULSION INTRAVENOUS at 10:12

## 2018-12-11 RX ADMIN — SUGAMMADEX 320 MG: 100 INJECTION, SOLUTION INTRAVENOUS at 11:12

## 2018-12-11 RX ADMIN — ROCURONIUM BROMIDE 30 MG: 10 INJECTION, SOLUTION INTRAVENOUS at 10:12

## 2018-12-11 RX ADMIN — MIDAZOLAM HYDROCHLORIDE 1 MG: 1 INJECTION, SOLUTION INTRAMUSCULAR; INTRAVENOUS at 10:12

## 2018-12-11 RX ADMIN — FENTANYL CITRATE 100 MCG: 50 INJECTION, SOLUTION INTRAMUSCULAR; INTRAVENOUS at 10:12

## 2018-12-11 RX ADMIN — PHENYLEPHRINE HYDROCHLORIDE 100 MCG: 10 INJECTION INTRAVENOUS at 10:12

## 2018-12-11 RX ADMIN — PROPOFOL 100 MG: 10 INJECTION, EMULSION INTRAVENOUS at 10:12

## 2018-12-11 RX ADMIN — PROPOFOL 50 MCG/KG/MIN: 10 INJECTION, EMULSION INTRAVENOUS at 10:12

## 2018-12-11 RX ADMIN — SODIUM CHLORIDE: 0.9 INJECTION, SOLUTION INTRAVENOUS at 10:12

## 2018-12-11 NOTE — H&P
[facility]  Pulmonology  H&P    Patient Name: Sarina Genao  MRN: [id number]  Admission Date: 12/11/2018  Code Status: Full Code  Primary Care Provider: Venancio Mayer MD   Principal Problem: Left lower lobe pulmonary nodule    Subjective:     HPI: 68yoF with hx of COPD, CKD, HLD, DM2 presenting to establish care for COPD.  She was dx with COPD 3 years but never started on any therapy.  She was admitted to [facility] from Oct 12-15 for hypoxic respiratory failure. She reportedly had myalgias, a worsening cough, and fatigue.  She was diuresed and treated for COPD exacerbation and clinically improved.  She feels back to her respiratory baseline now.  Never been hospitalized for her COPD in the past.  Never been on Prednisone prior to this hospitalization.  Has a daily cough with sputum production.  No recent fevers, chills, nausea.  UTD on her flu and pneumonia vaccinations.  Quit smoking 11 months ago.  Does not feel limited by dyspnea, mostly limited by leg/knee pain.  Did have some pleurisy when she initially presented that has largely resolved.  Did have hilar adenopathy on her CT Chest and some portions of the effusions were either loculated or appeared to have pleural thickening.  Had a follow up CT with persistent LLL nodule/left hilar adenopathy.  Here for evaluation of pulmonary nodule.  She remains at her baseline.    History of nephrectomy for renal cell cancer.    Past Medical History:   Diagnosis Date    Anticoagulant long-term use     Cancer     Kidney (Right)    Cataracts, bilateral     CKD (chronic kidney disease) stage 3, GFR 30-59 ml/min 12/15/2014    Combined hyperlipidemia associated with type 2 diabetes mellitus 6/7/2013    COPD (chronic obstructive pulmonary disease) 12/15/2014    Diabetes mellitus type II     NIDDM, a.m. glucose-120s-130s-last A1c-7.1-6/7/13    Diabetes mellitus with renal manifestations, uncontrolled 2/1/2017    Diabetic retinopathy      Family history of stomach cancer 12/5/2013    Former smoker     H/O renal cell carcinoma 12/15/2015    History of colonic polyps 2/1/2017    3 polyps 7/13 ---5 yrs    History of gastroesophageal reflux (GERD)     History of urinary incontinence     s/p bladder lift-october, 2011 (at [facility])    Hyperlipidemia     Hypertension     120s/70s-80s    Nephrolithiasis     Osteoporosis, post-menopausal 3/17/2014    Primary hyperparathyroidism 10/20/2016    Schatzki's ring 2/1/2017    Dilated 2014       Past Surgical History:   Procedure Laterality Date    bladder lift  2011    done at [facility]    BLADDER STONE REMOVAL  2011    before bladder lift    CATARACT EXTRACTION W/  INTRAOCULAR LENS IMPLANT Left 06/07/2016    Dr. Vásquez    CATARACT EXTRACTION W/  INTRAOCULAR LENS IMPLANT Right 06/21/2016    Dr. Vásquez    COLONOSCOPY N/A 4/26/2018    Procedure: COLONOSCOPY;  Surgeon: Benjamin Zamora MD;  Location: [facility];  Service: Endoscopy;  Laterality: N/A;  confirmed ss    COLONOSCOPY N/A 4/26/2018    Performed by [facility]ora MD at [facility]O    COLONOSCOPY N/A 7/12/2013    Performed by Mariposa Shah MD at [facility] ENDO    CYSTOSCOPY      EGD (ESOPHAGOGASTRODUODENOSCOPY) N/A 1/8/2014    Performed by Isaias Marinelli MD at [facility] ENDO    INSERTION-INTRAOCULAR LENS (IOL) Right 6/21/2016    Performed by Xavier [facility]quez [facility] at [facility] OR 1ST FLR    INSERTION-INTRAOCULAR LENS (IOL) Left 6/7/2016    Performed by [facility]quez [facility] at [facility] OR [facility]ST FLR    NEPHRECTOMY      partial right    NEPHRECTOMY, RADICAL Right 8/6/2013    Performed by Santos Murry MD at [facility] OR 2ND FLR    PHACOEMULSIFICATION-ASPIRATION-CATARACT Right 6/21/2016    Performed by [facility]quez MD at [facility] OR 1ST FLR    PHACOEMULSIFICATION-ASPIRATION-CATARACT Left 6/7/2016    Performed by [facility]quez [facility] at [facility] OR 1ST FLR    ROBOTIC NEPHRECTOMY, PARTIAL Right 8/6/2013    Performed by Santos YATES  MD Lovely at [facility] OR [address] [place] [address]       Review of patient's allergies indicates:   Allergen Reactions    Metformin Diarrhea    Pantoprazole      elevated blood sugars       Family History     Problem Relation (Age of Onset)    Cataracts Mother    Colon cancer Brother    Glaucoma Mother    Nephrolithiasis Sister    No Known Problems Father, Maternal Aunt, Maternal Uncle, Paternal Aunt, Paternal Uncle, Maternal Grandmother, Maternal Grandfather, Paternal Grandmother, Paternal Grandfather        Tobacco Use    Smoking status: Former Smoker     Packs/day: 0.25     Years: 30.00     Pack years: 7.50     Types: Cigarettes    Smokeless tobacco: Never Used    Tobacco comment: pt. reports quitting January 2018   Substance and Sexual Activity    Alcohol use: No     Alcohol/week: 0.0 oz    Drug use: No    Sexual activity: Not on file         Review of Systems   No orthopnea.  No exercise intolerance. Mild cough.  All other ROS negative.  Objective:     Vital Signs (Most Recent):  Temp: 97.9 °F (36.6 °C) (12/11/18 0853)  Pulse: 79 (12/11/18 0853)  Resp: 20 (12/11/18 0853)  BP: 124/63 (12/11/18 0853)  SpO2: 96 % (12/11/18 0853) Vital Signs (24h Range):  Temp:  [97.9 °F (36.6 °C)] 97.9 °F (36.6 °C)  Pulse:  [79] 79  Resp:  [20] 20  SpO2:  [96 %] 96 %  BP: (124)/(63) 124/63     Weight: 83.9 kg (185 lb)  Body mass index is 32.77 kg/m².    No intake or output data in the 24 hours ending 12/11/18 0935    Physical Exam    Gen:  A and O x 3  HEENT:  Normocephalic  CV:  Normal Rate and rhythm  Chest:  CTA bilateral  Abdomen:  Soft  EXT:  No clubbing, cyanosis and edema  Vents:       Lines/Drains/Airways     Peripheral Intravenous Line                 Peripheral IV - Single Lumen 10/12/18 1400 Posterior;Right Wrist 59 days         Peripheral IV - Single Lumen 10/12/18 1400 Posterior;Right Wrist 59 days         Peripheral IV - Single Lumen 11/16/18 1116 Right Forearm 24 days         Peripheral IV - Single Lumen 12/11/18 0902 Right  Forearm less than 1 day                Significant Labs:    CBC/Anemia Profile:  No results for input(s): WBC, HGB, HCT, PLT, MCV, RDW, IRON, FERRITIN, RETIC, FOLATE, [id number], OCCULTBLOOD in the last 48 hours.     Chemistries:  No results for input(s): NA, K, CL, CO2, BUN, CREATININE, CALCIUM, ALBUMIN, PROT, BILITOT, ALKPHOS, ALT, AST, GLUCOSE, MG, PHOS in the last 48 hours.    All pertinent labs within the past 24 hours have been reviewed.    Significant Imaging:   I have reviewed all pertinent imaging results/findings within the past 24 hours.  Persistent 2.4 cm LLL nodule in the hilum.    Assessment/Plan:     Active Diagnoses:    Diagnosis Date Noted POA    PRINCIPAL PROBLEM:  Left lower lobe pulmonary nodule [R91.1] 12/11/2018 Yes      Problems Resolved During this Admission:     Planning CT for navigation bronchoscopy confirmed nodule is still present.  Will proceed with EMN bronchoscopy with staging EBUS today  I have explained the risks, benefits and alternatives of the procedure in detail.  The patient voices understanding and all questions have been answered.  The patient agrees to proceed as planned.         Ashtyn Ramires MD  Pulmonology  [facility]

## 2018-12-11 NOTE — TRANSFER OF CARE
"Anesthesia Transfer of Care Note    Patient: Sarina Genao    Procedure(s) Performed: Procedure(s) (LRB):  BRONCHOSCOPY, NAVIGATIONAL (N/A)    Patient location: PACU    Anesthesia Type: general    Transport from OR: Transported from OR on 6-10 L/min O2 by face mask with adequate spontaneous ventilation    Post pain: adequate analgesia    Post assessment: no apparent anesthetic complications    Post vital signs: stable    Level of consciousness: awake    Nausea/Vomiting: no nausea/vomiting    Complications: none    Transfer of care protocol was followed      Last vitals:   Visit Vitals  /64   Pulse 70   Temp 36 °C (96.8 °F) (Skin)   Resp 20   Ht 5' 3" (1.6 m)   Wt 83.9 kg (185 lb)   SpO2 100%   Breastfeeding? No   BMI 32.77 kg/m²     "

## 2018-12-11 NOTE — DISCHARGE INSTRUCTIONS
Discharge Instructions for Bronchoscopy    Chest X-ray completed and MD notified.    ACTIVITY LEVEL:  If you received sedation or an anesthetic, you may feel sleepy for several hours. Do not drive, operate machinery, make critical decisions, or perform activities that require coordination or balance until tomorrow morning. Please have a responsible person stay with you for at least two (2) hours after you leave the hospital.     DIET:  . Once you can drink clear liquids without coughing, you can resume your regular diet.     WHAT you may expect over the next 24 hours:  · You may experience a low grade fever.  · You may cough up streaks of blood.  · Take Tylenol as directed for comfort/fever.    Additional Instructions:  · Do not take Aspirin, ibuprofen, naproxen, or any medications containing these items for *** days after the bronchoscopy.  · If you normally take Coumadin or aspirin, you may restart on ***.     COME TO THE EMERGENCY DEPARTMENT IF:  · You cough up more than one (1) tablespoon of blood.  · You have fever over 101F (38.4C) for more than one evening.  · You experience shortness of breath that is of new onset, or that is increased from your usual baseline.  · You experience chest pain.  · You have chills.    RETURN APPOINTMENT:  Follow up as directed.

## 2018-12-11 NOTE — ANESTHESIA POSTPROCEDURE EVALUATION
"Anesthesia Post Evaluation    Patient: Sarina Genao    Procedure(s) Performed: Procedure(s) (LRB):  BRONCHOSCOPY, NAVIGATIONAL (N/A)    Final Anesthesia Type: general  Patient location during evaluation: PACU  Patient participation: Yes- Able to Participate  Level of consciousness: awake and alert  Post-procedure vital signs: reviewed and stable  Pain management: adequate  Airway patency: patent  PONV status at discharge: No PONV  Anesthetic complications: no      Cardiovascular status: hemodynamically stable  Respiratory status: unassisted  Hydration status: euvolemic  Follow-up not needed.        Visit Vitals  /64 (BP Location: Left arm, Patient Position: Lying)   Pulse 95   Temp 36.4 °C (97.5 °F)   Resp 16   Ht 5' 3" (1.6 m)   Wt 83.9 kg (185 lb)   SpO2 96%   Breastfeeding? No   BMI 32.77 kg/m²       Pain/Graham Score: Pain Rating Prior to Med Admin: 0 (12/11/2018 12:33 PM)  Graham Score: 10 (12/11/2018 12:33 PM)        "

## 2018-12-11 NOTE — DISCHARGE SUMMARY
[facility]  Pulmonology  Discharge Summary      Patient Name: Sarina Genao  MRN: [id number]  Admission Date: 12/11/2018  Hospital Length of Stay: 0 days  Discharge Date and Time:  12/11/2018 11:53 AM  Attending Physician: Ashtyn Ramires MD   Discharging Provider: Ashtyn Ramires MD  Primary Care Provider: Venancio Mayer MD    HPI: Former smoker with a history of RCC and LLL pulmonary nodule    Procedure(s) (LRB):  BRONCHOSCOPY, NAVIGATIONAL (N/A)    Indwelling Lines/Drains at Time of Discharge:   Lines/Drains/Airways          None          Hospital Course: Bronchoscopy complete.  Tolerated well.  See procedure note.  Post bronchoscopy CXR without significant PTX or complications        Significant Labs:  All pertinent labs within the past 24 hours have been reviewed.    Significant Imaging:  I have reviewed all pertinent imaging results/findings within the past 24 hours.    Pending Diagnostic Studies:     Procedure Component Value Units Date/Time    X-Ray Chest AP Single View [[id number]] Resulted:  12/11/18 1146    Order Status:  Sent Lab Status:  In process Updated:  12/11/18 1146        Final Active Diagnoses:    Diagnosis Date Noted POA    PRINCIPAL PROBLEM:  Left lower lobe pulmonary nodule [R91.1] 12/11/2018 Yes      Problems Resolved During this Admission:       Discharged Condition: stable    Disposition: Home or Self Care    Follow Up:  Follow-up Information     Call in 1 week to follow up.           Call in 1 week to follow up.               Patient Instructions:      Diet general     Medications:  Reconciled Home Medications:      Medication List      CHANGE how you take these medications    insulin aspart U-100 100 unit/mL Inpn pen  Commonly known as:  NovoLOG Flexpen U-100 Insulin  ADMINISTER 12 UNITS UNDER THE SKIN THREE TIMES DAILY WITH MEALS  What changed:    · how much to take  · when to take this  · additional instructions        CONTINUE taking these medications   "  albuterol 90 mcg/actuation inhaler  Commonly known as:  PROVENTIL/VENTOLIN HFA  Inhale 1-2 puffs into the lungs every 6 (six) hours as needed for Wheezing. Rescue     albuterol-ipratropium 2.5 mg-0.5 mg/3 mL nebulizer solution  Commonly known as:  DUO-NEB  Take 3 mLs by nebulization every 6 (six) hours as needed for Wheezing or Shortness of Breath. Rescue     amLODIPine 5 MG tablet  Commonly known as:  NORVASC  Take 1 tablet (5 mg total) by mouth once daily.     aspirin 81 MG EC tablet  Commonly known as:  ECOTRIN  Take 81 mg by mouth once daily.     calcium carbonate 500 mg calcium (1,250 mg) tablet  Commonly known as:  OS-LISSETTE  Take 1 tablet (500 mg total) by mouth once daily.     FISH OIL ORAL  Take 1,200 mg by mouth once daily.     furosemide 20 MG tablet  Commonly known as:  LASIX  Take 1 tablet (20 mg total) by mouth once daily.     glipiZIDE 5 MG tablet  Commonly known as:  GLUCOTROL  Take 1 tablet (5 mg total) by mouth 2 (two) times daily with meals.     hydroCHLOROthiazide 12.5 MG Tab  Commonly known as:  HYDRODIURIL  Take 1 tablet (12.5 mg total) by mouth once daily.     insulin degludec 200 unit/mL (3 mL) Inpn  Commonly known as:  TRESIBA FLEXTOUCH U-200  Inject 55 Units into the skin once daily.     insulin detemir U-100 100 unit/mL (3 mL) Inpn pen  Commonly known as:  LEVEMIR FLEXTOUCH  Inject 50 Units into the skin every evening.     irbesartan 150 MG tablet  Commonly known as:  AVAPRO  Take 1 tablet (150 mg total) by mouth once daily.     oxybutynin 5 MG Tr24  Commonly known as:  DITROPAN-XL  TAKE 1 TABLET(5 MG) BY MOUTH EVERY DAY     pen needle, diabetic 31 gauge x 5/16" Ndle  Commonly known as:  BD ULTRA-FINE SHORT PEN NEEDLE  USE AS DIRECTED     ranitidine 300 MG tablet  Commonly known as:  ZANTAC  TAKE 1 TABLET(300 MG) BY MOUTH EVERY EVENING     rosuvastatin 20 MG tablet  Commonly known as:  CRESTOR  Take 1 tablet (20 mg total) by mouth once daily.     semaglutide 0.25 mg or 0.5 mg(2 mg/1.5 mL) " Pnij  Commonly known as:  OZEMPIC  Inject 0.5 mg into the skin once a week.     traMADol 50 mg tablet  Commonly known as:  ULTRAM  Take 1 tablet (50 mg total) by mouth every 6 (six) hours as needed.     umeclidinium-vilanterol 62.5-25 mcg/actuation Dsdv  Commonly known as:  ANORO ELLIPTA  Inhale 1 puff into the lungs once daily. THIS SHOULD BE USED, NOT INCRUSE     VITAMIN B-12 1,000 mcg Tbsr  Generic drug:  cyanocobalamin (vitamin B-12)  Take by mouth once daily.            Ashtyn Ramires MD  Pulmonology  [facility]

## 2018-12-20 ENCOUNTER — TELEPHONE (OUTPATIENT)
Dept: PULMONOLOGY | Facility: CLINIC | Age: 68
End: 2018-12-20

## 2018-12-20 DIAGNOSIS — R91.1 LUNG NODULE: ICD-10-CM

## 2018-12-20 NOTE — TELEPHONE ENCOUNTER
Reviewed path results with patient.  Negative for malignancy.  Punctate calcifications suggestive of benign etiology.  Will follow closely with CT of chest in three months.  Patient verbanlized an understanding of the plan.  Should continue Anoro daily and nebullizer as needed.    ----- Message from Licha Riojas MA sent at 12/20/2018 12:04 PM CST -----  Contact: patient  EBUS results  ----- Message -----  From: Michelle Walsh  Sent: 12/20/2018  11:57 AM  To: [facility] Staff    Patient needs to speak with the nurse to get biopsy results from 12/11/18.  Patient's # is [phone] or cell [phone]

## 2019-01-19 DIAGNOSIS — N39.41 URINARY INCONTINENCE, URGE: ICD-10-CM

## 2019-01-19 DIAGNOSIS — K21.9 GASTROESOPHAGEAL REFLUX DISEASE, ESOPHAGITIS PRESENCE NOT SPECIFIED: ICD-10-CM

## 2019-01-20 RX ORDER — VALSARTAN 160 MG/1
TABLET ORAL
Qty: 90 TABLET | Refills: 12 | Status: SHIPPED | OUTPATIENT
Start: 2019-01-20 | End: 2019-02-07

## 2019-01-20 RX ORDER — HYDROCHLOROTHIAZIDE 12.5 MG/1
TABLET ORAL
Qty: 90 TABLET | Refills: 12 | Status: SHIPPED | OUTPATIENT
Start: 2019-01-20 | End: 2019-11-06

## 2019-01-20 RX ORDER — ROSUVASTATIN CALCIUM 20 MG/1
TABLET, COATED ORAL
Qty: 90 TABLET | Refills: 12 | Status: SHIPPED | OUTPATIENT
Start: 2019-01-20 | End: 2020-03-04

## 2019-01-20 RX ORDER — OXYBUTYNIN CHLORIDE 5 MG/1
TABLET, EXTENDED RELEASE ORAL
Qty: 90 TABLET | Refills: 12 | Status: SHIPPED | OUTPATIENT
Start: 2019-01-20 | End: 2020-03-04

## 2019-01-20 RX ORDER — AMLODIPINE BESYLATE 5 MG/1
TABLET ORAL
Qty: 90 TABLET | Refills: 12 | Status: SHIPPED | OUTPATIENT
Start: 2019-01-20 | End: 2019-02-07 | Stop reason: SDUPTHER

## 2019-01-25 DIAGNOSIS — E11.9 TYPE 2 DIABETES MELLITUS WITHOUT COMPLICATION: ICD-10-CM

## 2019-02-05 ENCOUNTER — TELEPHONE (OUTPATIENT)
Dept: FAMILY MEDICINE | Facility: CLINIC | Age: 69
End: 2019-02-05

## 2019-02-05 ENCOUNTER — LAB VISIT (OUTPATIENT)
Dept: LAB | Facility: HOSPITAL | Age: 69
End: 2019-02-05
Attending: INTERNAL MEDICINE
Payer: MEDICARE

## 2019-02-05 DIAGNOSIS — E11.9 TYPE 2 DIABETES MELLITUS WITHOUT COMPLICATION: ICD-10-CM

## 2019-02-05 DIAGNOSIS — Z12.31 BREAST CANCER SCREENING BY MAMMOGRAM: Primary | ICD-10-CM

## 2019-02-05 LAB
ESTIMATED AVG GLUCOSE: 143 MG/DL
HBA1C MFR BLD HPLC: 6.6 %

## 2019-02-05 PROCEDURE — 83036 HEMOGLOBIN GLYCOSYLATED A1C: CPT

## 2019-02-05 PROCEDURE — 36415 COLL VENOUS BLD VENIPUNCTURE: CPT | Mod: PO

## 2019-02-05 NOTE — TELEPHONE ENCOUNTER
----- Message from Georgina Reyes sent at 2/5/2019 11:36 AM CST -----  Contact: self   Please call pt at [phone] she would like a order to get put in the system for a Mammogram

## 2019-02-07 ENCOUNTER — OFFICE VISIT (OUTPATIENT)
Dept: NEPHROLOGY | Facility: CLINIC | Age: 69
End: 2019-02-07
Payer: MEDICARE

## 2019-02-07 VITALS
HEIGHT: 63 IN | SYSTOLIC BLOOD PRESSURE: 112 MMHG | DIASTOLIC BLOOD PRESSURE: 70 MMHG | HEART RATE: 97 BPM | WEIGHT: 169.06 LBS | OXYGEN SATURATION: 97 % | BODY MASS INDEX: 29.95 KG/M2

## 2019-02-07 DIAGNOSIS — N18.30 CKD (CHRONIC KIDNEY DISEASE) STAGE 3, GFR 30-59 ML/MIN: ICD-10-CM

## 2019-02-07 PROCEDURE — 99499 RISK ADDL DX/OHS AUDIT: ICD-10-PCS | Mod: S$GLB,,, | Performed by: INTERNAL MEDICINE

## 2019-02-07 PROCEDURE — 3074F SYST BP LT 130 MM HG: CPT | Mod: CPTII,GC,S$GLB, | Performed by: INTERNAL MEDICINE

## 2019-02-07 PROCEDURE — 3074F PR MOST RECENT SYSTOLIC BLOOD PRESSURE < 130 MM HG: ICD-10-PCS | Mod: CPTII,GC,S$GLB, | Performed by: INTERNAL MEDICINE

## 2019-02-07 PROCEDURE — 99214 OFFICE O/P EST MOD 30 MIN: CPT | Mod: GC,S$GLB,, | Performed by: INTERNAL MEDICINE

## 2019-02-07 PROCEDURE — 99999 PR PBB SHADOW E&M-EST. PATIENT-LVL IV: ICD-10-PCS | Mod: PBBFAC,GC,, | Performed by: INTERNAL MEDICINE

## 2019-02-07 PROCEDURE — 99499 UNLISTED E&M SERVICE: CPT | Mod: S$GLB,,, | Performed by: INTERNAL MEDICINE

## 2019-02-07 PROCEDURE — 99999 PR PBB SHADOW E&M-EST. PATIENT-LVL IV: CPT | Mod: PBBFAC,GC,, | Performed by: INTERNAL MEDICINE

## 2019-02-07 PROCEDURE — 1101F PT FALLS ASSESS-DOCD LE1/YR: CPT | Mod: CPTII,GC,S$GLB, | Performed by: INTERNAL MEDICINE

## 2019-02-07 PROCEDURE — 1101F PR PT FALLS ASSESS DOC 0-1 FALLS W/OUT INJ PAST YR: ICD-10-PCS | Mod: CPTII,GC,S$GLB, | Performed by: INTERNAL MEDICINE

## 2019-02-07 PROCEDURE — 99214 PR OFFICE/OUTPT VISIT, EST, LEVL IV, 30-39 MIN: ICD-10-PCS | Mod: GC,S$GLB,, | Performed by: INTERNAL MEDICINE

## 2019-02-07 PROCEDURE — 3078F DIAST BP <80 MM HG: CPT | Mod: CPTII,GC,S$GLB, | Performed by: INTERNAL MEDICINE

## 2019-02-07 PROCEDURE — 3078F PR MOST RECENT DIASTOLIC BLOOD PRESSURE < 80 MM HG: ICD-10-PCS | Mod: CPTII,GC,S$GLB, | Performed by: INTERNAL MEDICINE

## 2019-02-07 NOTE — PATIENT INSTRUCTIONS
1) follow up in one year with HANG and labs as ordered  2) no NSAIDs  3) PCP with labs in between hour appointment

## 2019-02-07 NOTE — PROGRESS NOTES
"Subjective:       Patient ID: Sarina Genao is a 68 y.o. Black or  female who presents for follow-up evaluation of Chronic Renal Failure    Patient presents for follow up of CKD, her most recent sCr is 1.1 (November), she has a negative UPC ratio, had a partial nephrectomy in 2016. Has history of DM and HTN, no DM retinopathy, no DM neuropathy, most recent A1c 6.6.  Most recent PTH 66.  Last renal U/S from 2016 revealed "medical renal disease" and evidence for R partial nephrectomy.      Review of Systems   Constitutional: Negative for chills, fatigue and fever.   Respiratory: Negative for chest tightness and shortness of breath.    Cardiovascular: Negative for chest pain and leg swelling.   Gastrointestinal: Negative for abdominal distention, abdominal pain, diarrhea and nausea.   Genitourinary: Negative for decreased urine volume, difficulty urinating, flank pain, frequency, hematuria and urgency.   Skin: Negative for rash.   Neurological: Negative for tremors, speech difficulty and weakness.       Objective:      Physical Exam   Constitutional: She is oriented to person, place, and time.   HENT:   Head: Normocephalic and atraumatic.   Eyes: EOM are normal.   Neck: No JVD present.   Cardiovascular: Normal rate and regular rhythm.   Pulmonary/Chest: Effort normal and breath sounds normal.   Abdominal: Soft. She exhibits no distension.   Musculoskeletal: She exhibits no edema or tenderness.   Neurological: She is alert and oriented to person, place, and time.   Skin: Skin is warm.   Psychiatric: She has a normal mood and affect. Her behavior is normal.       Assessment & Plan:       CKD (chronic kidney disease) stage 3, GFR 30-59 ml/min  1. CKD: has previously been mentioned to be related to diabetes, but think this is probably less likely in the absence of proteinuria/ significant albuminuria, in addition patient cannot recall having been told she has DM retinopathy.  May this is more related " to meds (ACE and HCTZ) and an artificial rise in sCr, also some loss in nephron mass from partial nephrectomy, either way appears to be stable and even improving over the past several labs.     Lab Results   Component Value Date    CREATININE 1.1 11/05/2018     Protein Creatinine Ratios: negative    Prot/Creat Ratio, Ur   Date Value Ref Range Status   08/08/2018 0.07 0.00 - 0.20 Final   06/05/2017 0.07 0.00 - 0.20 Final   10/06/2016 0.04 0.00 - 0.20 Final     ·   ·   Acid-Base: stable  Lab Results   Component Value Date     11/05/2018    K 3.7 11/05/2018    CO2 29 11/05/2018     2. HTN: Blood pressures, excellent control    3. Renal osteodystrophy: last PTH at 66 indicates, probably minimal MBD  Lab Results   Component Value Date    PTH 66.0 10/20/2016    CALCIUM 10.5 11/05/2018    PHOS 3.0 08/08/2018       4. Anemia: checking iron studies  Lab Results   Component Value Date    HGB 11.4 (L) 10/15/2018        5. DM:  Last HbA1C reviewed, PCP managing  Lab Results   Component Value Date    HGBA1C 6.6 (H) 02/05/2019       6. Lipid management: reviewed  Lab Results   Component Value Date    LDLCALC 84.2 03/23/2018       7. ESRD planing: no need for at this time    Follow up in one year    Patient seen with Dr Alvarado

## 2019-02-07 NOTE — ASSESSMENT & PLAN NOTE
1. CKD: has previously been mentioned to be related to diabetes, but think this is probably less likely in the absence of proteinuria/ significant albuminuria, in addition patient cannot recall having been told she has DM retinopathy.  May this is more related to meds (ACE and HCTZ) and an artificial rise in sCr, also some loss in nephron mass from partial nephrectomy, either way appears to be stable and even improving over the past several labs.     Lab Results   Component Value Date    CREATININE 1.1 11/05/2018     Protein Creatinine Ratios: negative    Prot/Creat Ratio, Ur   Date Value Ref Range Status   08/08/2018 0.07 0.00 - 0.20 Final   06/05/2017 0.07 0.00 - 0.20 Final   10/06/2016 0.04 0.00 - 0.20 Final     ·   ·   Acid-Base: stable  Lab Results   Component Value Date     11/05/2018    K 3.7 11/05/2018    CO2 29 11/05/2018     2. HTN: Blood pressures, excellent control    3. Renal osteodystrophy: last PTH at 66 indicates, probably minimal MBD  Lab Results   Component Value Date    PTH 66.0 10/20/2016    CALCIUM 10.5 11/05/2018    PHOS 3.0 08/08/2018       4. Anemia: checking iron studies  Lab Results   Component Value Date    HGB 11.4 (L) 10/15/2018        5. DM:  Last HbA1C reviewed, PCP managing  Lab Results   Component Value Date    HGBA1C 6.6 (H) 02/05/2019       6. Lipid management: reviewed  Lab Results   Component Value Date    LDLCALC 84.2 03/23/2018       7. ESRD planing: no need for at this time    Follow up in one year    Patient seen with Dr Alvarado

## 2019-02-08 DIAGNOSIS — I73.9 PVD (PERIPHERAL VASCULAR DISEASE): Primary | ICD-10-CM

## 2019-02-08 DIAGNOSIS — I65.23 BILATERAL CAROTID ARTERY STENOSIS: ICD-10-CM

## 2019-02-12 NOTE — PROGRESS NOTES
ATTENDING PHYSICIAN ATTESTATION  I have personally interviewed and examined the patient. I thoroughly reviewed the demographic, clinical, laboratorial and imaging information available in medical records. I agree with the assessment and recommendations provided by the subspecialty resident. Dr. Macias was under my supervision.

## 2019-03-18 ENCOUNTER — INFUSION (OUTPATIENT)
Dept: INFUSION THERAPY | Facility: HOSPITAL | Age: 69
End: 2019-03-18
Attending: INTERNAL MEDICINE
Payer: MEDICARE

## 2019-03-18 VITALS — RESPIRATION RATE: 17 BRPM | DIASTOLIC BLOOD PRESSURE: 63 MMHG | HEART RATE: 80 BPM | SYSTOLIC BLOOD PRESSURE: 134 MMHG

## 2019-03-18 DIAGNOSIS — M81.0 OSTEOPOROSIS, POST-MENOPAUSAL: Primary | ICD-10-CM

## 2019-03-18 LAB — CALCIUM SERPL-MCNC: 10.8 MG/DL

## 2019-03-18 PROCEDURE — 96372 THER/PROPH/DIAG INJ SC/IM: CPT

## 2019-03-18 PROCEDURE — 82310 ASSAY OF CALCIUM: CPT

## 2019-03-18 PROCEDURE — 63600175 PHARM REV CODE 636 W HCPCS: Mod: JG | Performed by: INTERNAL MEDICINE

## 2019-03-18 RX ADMIN — DENOSUMAB 60 MG: 60 INJECTION SUBCUTANEOUS at 11:03

## 2019-03-18 NOTE — PLAN OF CARE
Problem: Adult Inpatient Plan of Care  Goal: Plan of Care Review  Outcome: Ongoing (interventions implemented as appropriate)  Pt presented for Prolia injection. Stat calcium drawn; WNL. Pt tolerated injection well to R arm. Pt verified taking calcium and vitamin d supplements and denied any recent invasive dental work. VSS. Pt reported fatigue, but no other symptoms. Ambulatory into and out of unit. Distress screening tool completed. Future appt information reviewed with pt.

## 2019-03-27 ENCOUNTER — OFFICE VISIT (OUTPATIENT)
Dept: VASCULAR SURGERY | Facility: CLINIC | Age: 69
End: 2019-03-27
Payer: MEDICARE

## 2019-03-27 ENCOUNTER — HOSPITAL ENCOUNTER (OUTPATIENT)
Dept: VASCULAR SURGERY | Facility: CLINIC | Age: 69
Discharge: HOME | End: 2019-03-27
Attending: SURGERY
Payer: MEDICARE

## 2019-03-27 VITALS
BODY MASS INDEX: 30.08 KG/M2 | DIASTOLIC BLOOD PRESSURE: 61 MMHG | HEIGHT: 63 IN | TEMPERATURE: 98 F | SYSTOLIC BLOOD PRESSURE: 115 MMHG | HEART RATE: 63 BPM | WEIGHT: 169.75 LBS

## 2019-03-27 DIAGNOSIS — I65.23 BILATERAL CAROTID ARTERY STENOSIS: ICD-10-CM

## 2019-03-27 DIAGNOSIS — F17.200 TOBACCO DEPENDENCE: ICD-10-CM

## 2019-03-27 DIAGNOSIS — I65.23 ASYMPTOMATIC STENOSIS OF BOTH CAROTID ARTERIES WITHOUT INFARCTION: ICD-10-CM

## 2019-03-27 DIAGNOSIS — I73.9 PVD (PERIPHERAL VASCULAR DISEASE): ICD-10-CM

## 2019-03-27 PROCEDURE — 93880 EXTRACRANIAL BILAT STUDY: CPT | Mod: S$GLB,,, | Performed by: SURGERY

## 2019-03-27 PROCEDURE — 3078F DIAST BP <80 MM HG: CPT | Mod: CPTII,S$GLB,, | Performed by: SURGERY

## 2019-03-27 PROCEDURE — 3074F PR MOST RECENT SYSTOLIC BLOOD PRESSURE < 130 MM HG: ICD-10-PCS | Mod: CPTII,S$GLB,, | Performed by: SURGERY

## 2019-03-27 PROCEDURE — 99214 OFFICE O/P EST MOD 30 MIN: CPT | Mod: 25,S$GLB,, | Performed by: SURGERY

## 2019-03-27 PROCEDURE — 1101F PR PT FALLS ASSESS DOC 0-1 FALLS W/OUT INJ PAST YR: ICD-10-PCS | Mod: CPTII,S$GLB,, | Performed by: SURGERY

## 2019-03-27 PROCEDURE — 93923 UPR/LXTR ART STDY 3+ LVLS: CPT | Mod: S$GLB,,, | Performed by: SURGERY

## 2019-03-27 PROCEDURE — 1101F PT FALLS ASSESS-DOCD LE1/YR: CPT | Mod: CPTII,S$GLB,, | Performed by: SURGERY

## 2019-03-27 PROCEDURE — 3074F SYST BP LT 130 MM HG: CPT | Mod: CPTII,S$GLB,, | Performed by: SURGERY

## 2019-03-27 PROCEDURE — 99499 RISK ADDL DX/OHS AUDIT: ICD-10-PCS | Mod: S$GLB,,, | Performed by: SURGERY

## 2019-03-27 PROCEDURE — 93880 PR DUPLEX SCAN EXTRACRANIAL,BILAT: ICD-10-PCS | Mod: S$GLB,,, | Performed by: SURGERY

## 2019-03-27 PROCEDURE — 99999 PR PBB SHADOW E&M-EST. PATIENT-LVL III: CPT | Mod: PBBFAC,,, | Performed by: SURGERY

## 2019-03-27 PROCEDURE — 99407 PR TOBACCO USE CESSATION INTENSIVE >10 MINUTES: ICD-10-PCS | Mod: S$GLB,,, | Performed by: SURGERY

## 2019-03-27 PROCEDURE — 99499 UNLISTED E&M SERVICE: CPT | Mod: S$GLB,,, | Performed by: SURGERY

## 2019-03-27 PROCEDURE — 99999 PR PBB SHADOW E&M-EST. PATIENT-LVL III: ICD-10-PCS | Mod: PBBFAC,,, | Performed by: SURGERY

## 2019-03-27 PROCEDURE — 3078F PR MOST RECENT DIASTOLIC BLOOD PRESSURE < 80 MM HG: ICD-10-PCS | Mod: CPTII,S$GLB,, | Performed by: SURGERY

## 2019-03-27 PROCEDURE — 99214 PR OFFICE/OUTPT VISIT, EST, LEVL IV, 30-39 MIN: ICD-10-PCS | Mod: 25,S$GLB,, | Performed by: SURGERY

## 2019-03-27 PROCEDURE — 93923 PR NON-INVASIVE PHYSIOLOGIC STUDY EXTREMITY 3 LEVELS: ICD-10-PCS | Mod: S$GLB,,, | Performed by: SURGERY

## 2019-03-27 PROCEDURE — 99407 BEHAV CHNG SMOKING > 10 MIN: CPT | Mod: S$GLB,,, | Performed by: SURGERY

## 2019-03-27 NOTE — PROGRESS NOTES
Sarina Newsomealexys  03/27/2019    HPI:  Patient is a 68 y.o. female with a h/o HTN, HLD, DMII, CKD III, hyperparathyroid, COPD, GERD who is here today as a f/u for PAD. Patient reports that she has had intermittent lower extremity pain since ~ 2015.    Hospitalized for pneumonia Oct 2018    No history of MI/stroke  Tobacco use: > 40 pack yrs, ~ 5 cigs/d  Retired X-ray techs    PMD is Dr GUILHERME Mayer   Pulmonary Dr LASHANDA Ramires    Past Medical History:   Diagnosis Date    Anticoagulant long-term use     Cancer     Kidney (Right)    Cataracts, bilateral     CKD (chronic kidney disease) stage 3, GFR 30-59 ml/min 12/15/2014    Combined hyperlipidemia associated with type 2 diabetes mellitus 6/7/2013    COPD (chronic obstructive pulmonary disease) 12/15/2014    Diabetes mellitus type II     NIDDM, a.m. glucose-120s-130s-last A1c-7.1-6/7/13    Diabetes mellitus with renal manifestations, uncontrolled 2/1/2017    Diabetic retinopathy     Family history of stomach cancer 12/5/2013    Former smoker     H/O renal cell carcinoma 12/15/2015    History of colonic polyps 2/1/2017    3 polyps 7/13 ---5 yrs    History of gastroesophageal reflux (GERD)     History of urinary incontinence     s/p bladder lift-october, 2011 (at [facility])    Hyperlipidemia     Hypertension     120s/70s-80s    Nephrolithiasis     Osteoporosis, post-menopausal 3/17/2014    Primary hyperparathyroidism 10/20/2016    Schatzki's ring 2/1/2017    Dilated 2014     Past Surgical History:   Procedure Laterality Date    bladder lift  2011    done at [facility]    BLADDER STONE REMOVAL  2011    before bladder lift    BRONCHOSCOPY, NAVIGATIONAL N/A 12/11/2018    Performed by [facility]yn [facility]ires [facility] at [facility]    CATARACT EXTRACTION W/  INTRAOCULAR LENS IMPLANT Left 06/07/2016    Dr. Vásquez    CATARACT EXTRACTION W/  INTRAOCULAR LENS IMPLANT Right 06/21/2016    Dr. Vásquez    COLONOSCOPY N/A 4/26/2018    Performed by Benjamin Zamora MD at  [facility]    COLONOSCOPY N/A 7/12/2013    Performed by Mariposa Shah MD at [facility]    CYSTOSCOPY      EGD (ESOPHAGOGASTRODUODENOSCOPY) N/A 1/8/2014    Performed by Isaias Marinelli MD at [facility] ENDO    INSERTION-INTRAOCULAR LENS (IOL) Right 6/21/2016    Performed by [facility] at [facility]    INSERTION-INTRAOCULAR LENS (IOL) Left 6/7/2016    Performed by Xavier [facility]quez MD at [facility]    NEPHRECTOMY      partial right    NEPHRECTOMY, RADICAL Right 8/6/2013    Performed by Santos Murry MD at [facility]    PHACOEMULSIFICATION-ASPIRATION-CATARACT Right 6/21/2016    Performed by Xavier [facility]quez MD at [facility]    PHACOEMULSIFICATION-ASPIRATION-CATARACT Left 6/7/2016    Performed by Xavier [facility]quez [facility] at [facility]    ROBOTIC NEPHRECTOMY, PARTIAL Right 8/6/2013    Performed by Santos Murry MD at [facility] OR [facility]     Family History   Problem Relation Age of Onset    Glaucoma Mother     Cataracts Mother     No Known Problems Father     Colon cancer Brother     Nephrolithiasis Sister     No Known Problems Maternal Aunt     No Known Problems Maternal Uncle     No Known Problems Paternal Aunt     No Known Problems Paternal Uncle     No Known Problems Maternal Grandmother     No Known Problems Maternal Grandfather     No Known Problems Paternal Grandmother     No Known Problems Paternal Grandfather     Anesthesia problems Neg Hx     Kidney cancer Neg Hx     Amblyopia Neg Hx     Blindness Neg Hx     Cancer Neg Hx     Diabetes Neg Hx     Hypertension Neg Hx     Macular degeneration Neg Hx     Retinal detachment Neg Hx     Strabismus Neg Hx     Stroke Neg Hx     Thyroid disease Neg Hx      Social History     Socioeconomic History    Marital status:      Spouse name: Not on file    Number of children: Not on file    Years of education: Not on file    Highest education level: Not on file   Occupational  History    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   Social Needs    Financial resource strain: Not on file    Food insecurity:     Worry: Not on file     Inability: Not on file    Transportation needs:     Medical: Not on file     Non-medical: Not on file   Tobacco Use    Smoking status: Former Smoker     Packs/day: 0.25     Years: 30.00     Pack years: 7.50     Types: Cigarettes    Smokeless tobacco: Never Used    Tobacco comment: pt. reports quitting January 2018   Substance and Sexual Activity    Alcohol use: No     Alcohol/week: 0.0 oz    Drug use: No    Sexual activity: Not on file   Lifestyle    Physical activity:     Days per week: Not on file     Minutes per session: Not on file    Stress: Not on file   Relationships    Social connections:     Talks on phone: Not on file     Gets together: Not on file     Attends Yazidi service: Not on file     Active member of club or organization: Not on file     Attends meetings of clubs or organizations: Not on file     Relationship status: Not on file    Intimate partner violence:     Fear of current or ex partner: Not on file     Emotionally abused: Not on file     Physically abused: Not on file     Forced sexual activity: Not on file   Other Topics Concern    Not on file   Social History Narrative    Lives on [facility]    Here with sister Kate [phone]           Current Outpatient Medications:     albuterol (PROVENTIL/VENTOLIN HFA) 90 mcg/actuation inhaler, Inhale 1-2 puffs into the lungs every 6 (six) hours as needed for Wheezing. Rescue, Disp: 1 Inhaler, Rfl: 3    albuterol-ipratropium (DUO-NEB) 2.5 mg-0.5 mg/3 mL nebulizer solution, Take 3 mLs by nebulization every 6 (six) hours as needed for Wheezing or Shortness of Breath. Rescue, Disp: 3 Box, Rfl: 12    amLODIPine (NORVASC) 5 MG tablet, Take 1 tablet (5 mg total) by mouth once daily., Disp: 90 tablet, Rfl: 3    aspirin (ECOTRIN) 81 MG EC tablet, Take 81 mg by mouth once daily., Disp: , Rfl:  "    calcium carbonate (OS-LISSETTE) 500 mg calcium (1,250 mg) tablet, Take 1 tablet (500 mg total) by mouth once daily., Disp: 30 tablet, Rfl: 5    cyanocobalamin, vitamin B-12, (VITAMIN B-12) 1,000 mcg TbSR, Take by mouth once daily. , Disp: , Rfl:     DOCOSAHEXANOIC ACID/EPA (FISH OIL ORAL), Take 1,200 mg by mouth once daily., Disp: , Rfl:     furosemide (LASIX) 20 MG tablet, Take 1 tablet (20 mg total) by mouth once daily., Disp: 30 tablet, Rfl: 11    glipiZIDE (GLUCOTROL) 5 MG tablet, Take 1 tablet (5 mg total) by mouth 2 (two) times daily with meals., Disp: 60 tablet, Rfl: 11    hydroCHLOROthiazide (HYDRODIURIL) 12.5 MG Tab, TAKE 1 TABLET(12.5 MG) BY MOUTH EVERY DAY, Disp: 90 tablet, Rfl: 12    insulin aspart U-100 (NOVOLOG FLEXPEN U-100 INSULIN) 100 unit/mL InPn pen, ADMINISTER 12 UNITS UNDER THE SKIN THREE TIMES DAILY WITH MEALS (Patient taking differently: 10 Units 3 (three) times daily with meals. ADMINISTER 12 UNITS UNDER THE SKIN THREE TIMES DAILY WITH MEALS), Disp: 45 mL, Rfl: 12    insulin degludec (TRESIBA FLEXTOUCH U-200) 200 unit/mL (3 mL) InPn, Inject 55 Units into the skin once daily., Disp: 6 mL, Rfl: 8    insulin detemir U-100 (LEVEMIR FLEXTOUCH) 100 unit/mL (3 mL) SubQ InPn pen, Inject 50 Units into the skin every evening., Disp: 100 mL, Rfl: 12    irbesartan (AVAPRO) 150 MG tablet, Take 1 tablet (150 mg total) by mouth once daily., Disp: 90 tablet, Rfl: 3    oxybutynin (DITROPAN-XL) 5 MG TR24, TAKE 1 TABLET(5 MG) BY MOUTH EVERY DAY, Disp: 90 tablet, Rfl: 12    pen needle, diabetic (BD INSULIN PEN NEEDLE UF SHORT) 31 gauge x 5/16" Ndle, USE AS DIRECTED, Disp: 100 each, Rfl: 12    ranitidine (ZANTAC) 300 MG tablet, TAKE 1 TABLET(300 MG) BY MOUTH EVERY EVENING, Disp: 90 tablet, Rfl: 0    rosuvastatin (CRESTOR) 20 MG tablet, TAKE 1 TABLET(20 MG) BY MOUTH EVERY DAY, Disp: 90 tablet, Rfl: 12    semaglutide (OZEMPIC) 0.25 mg or 0.5 mg(2 mg/1.5 mL) PnJn, Inject 0.5 mg into the skin once a " week., Disp: 3 mL, Rfl: 9    traMADol (ULTRAM) 50 mg tablet, Take 1 tablet (50 mg total) by mouth every 6 (six) hours as needed., Disp: 60 tablet, Rfl: 3    umeclidinium-vilanterol (ANORO ELLIPTA) 62.5-25 mcg/actuation DsDv, Inhale 1 puff into the lungs once daily. THIS SHOULD BE USED, NOT INCRUSE, Disp: 1 each, Rfl: 3    REVIEW OF SYSTEMS:  General: negative; ENT: negative; Allergy and Immunology: negative; Hematological and Lymphatic: Negative; Endocrine: negative; Respiratory: no cough, shortness of breath, or wheezing; Cardiovascular: no chest pain or dyspnea on exertion; Gastrointestinal: no abdominal pain/back, change in bowel habits, or bloody stools; Genito-Urinary: no dysuria, trouble voiding, or hematuria; Musculoskeletal: negative  Neurological: no TIA or stroke symptoms; Psychiatric: no nervousness, anxiety or depression.    PHYSICAL EXAM:   Right Arm BP - Sittin/61 (19 1133)  Left Arm BP - Sittin/67 (19 1133)  Pulse: 63  Temp: 98.4 °F (36.9 °C)      General appearance:  Alert, well-appearing, and in no distress.  Oriented to person, place, and time   Neurological: Normal speech, no focal findings noted; CN II - XII grossly intact           Musculoskeletal: Digits/nail without cyanosis/clubbing.  Normal muscle strength/tone.                 Neck: Supple, no significant adenopathy; thyroid is not enlarged                  No carotid bruit can be auscultated                Chest:  Clear to auscultation, no wheezes, rales or rhonchi, symmetric air entry     No use of accessory muscles             Cardiac: Normal rate and regular rhythm, S1 and S2 normal; PMI non-displaced          Abdomen: Soft, nontender, nondistended, no masses or organomegaly     No rebound tenderness noted; bowel sounds normal     Pulsatile aortic mass is not palpable.     No groin adenopathy      Extremities:   No pedal or posterior tibial pulses palpable.     Trace pre-tibial edema     No ulcerations    LAB  RESULTS:  Lab Results   Component Value Date    K 3.7 11/05/2018    K 4.1 10/15/2018    K 3.1 (L) 10/14/2018    CREATININE 1.1 11/05/2018    CREATININE 1.4 10/15/2018    CREATININE 1.4 10/14/2018     Lab Results   Component Value Date    WBC 10.29 10/15/2018    WBC 13.81 (H) 10/14/2018    WBC 11.87 10/13/2018    HCT 35.6 (L) 10/15/2018    HCT 38.6 10/14/2018    HCT 40.0 10/13/2018     (H) 10/15/2018     (H) 10/14/2018     (H) 10/13/2018     Lab Results   Component Value Date    HGBA1C 6.6 (H) 02/05/2019    HGBA1C 9.9 (H) 10/12/2018    HGBA1C 8.1 (H) 07/10/2018     IMAGING:  ABIs  R 1.02 (previous 0.84; 0.88)  L 0.97 (previous 0.79; 0.72)  Biphasic waveforms R > L   Waveforms decrease from low thigh to calf    Carotid u/s:  R ICA ~ 60% stenosis;  cm/s (previous 158 cm/s); ICA/CCA 2.4 : 1  L ICA < 39% stenosis   Nl antegrade flow x2    Previous:  MRI   T12-L1: No significant spinal canal stenosis or neuroforaminal narrowing.  L1-2: No significant spinal canal stenosis or neuroforaminal narrowing.  L2-3: Mild bilateral facet arthropathy and ligamentum flavum thickening without significant spinal canal stenosis or neuroforaminal narrowing.  L3-4: Mild bilateral facet arthropathy and ligamentum flavum thickening without significant spinal canal stenosis or neuroforaminal narrowing.  L4-5: Mild bilateral facet arthropathy resulting in mild left neuroforaminal narrowing.  L5-S1: Mild bilateral facet arthropathy without significant neuroforaminal narrowing or spinal canal stenosis.    IMP/PLAN:  68 y.o. female with  h/o HTN, HLD, DMII, CKD III, hyperparathyroid, COPD, GERD : with likely R iliac and bilateral SFA disease -- minimally symptomatic- improving with claudication: now can ambulate > 2 blocks R > L calf claudication  Again, re-assured patient there is no need for any urgent intervention    Has an asymptomatic  R carotid stenosis  Stable ~60%  Cont ASA 81 po qd, statin rx - explained to  her why there is this need    Tobacco cessation important  Fu in 1 yr with carotid u/s and AAA u/s    Klaus Cintron MD FACS  Vascular/Endovascular Surgery    We discussed smoking cessation for greater than 10 minutes as well as the impact that continued smoking can have on the progression of Vascular disease. This discussion included the use of medications, patches, nicotine gum, and lifestyle modifications.

## 2019-03-28 ENCOUNTER — HOSPITAL ENCOUNTER (OUTPATIENT)
Dept: RADIOLOGY | Facility: HOSPITAL | Age: 69
Discharge: HOME | End: 2019-03-28
Attending: INTERNAL MEDICINE
Payer: MEDICARE

## 2019-03-28 ENCOUNTER — OFFICE VISIT (OUTPATIENT)
Dept: PULMONOLOGY | Facility: CLINIC | Age: 69
End: 2019-03-28
Payer: MEDICARE

## 2019-03-28 VITALS
BODY MASS INDEX: 30.23 KG/M2 | HEIGHT: 63 IN | HEART RATE: 65 BPM | WEIGHT: 170.63 LBS | OXYGEN SATURATION: 98 % | SYSTOLIC BLOOD PRESSURE: 112 MMHG | DIASTOLIC BLOOD PRESSURE: 65 MMHG

## 2019-03-28 DIAGNOSIS — I70.0 AORTIC ATHEROSCLEROSIS: ICD-10-CM

## 2019-03-28 DIAGNOSIS — J43.2 CENTRILOBULAR EMPHYSEMA: ICD-10-CM

## 2019-03-28 DIAGNOSIS — R91.1 LUNG NODULE: ICD-10-CM

## 2019-03-28 DIAGNOSIS — F17.200 TOBACCO DEPENDENCE: ICD-10-CM

## 2019-03-28 DIAGNOSIS — R91.1 LEFT LOWER LOBE PULMONARY NODULE: ICD-10-CM

## 2019-03-28 PROBLEM — J44.1 COPD EXACERBATION: Status: RESOLVED | Noted: 2018-10-13 | Resolved: 2019-03-28

## 2019-03-28 PROBLEM — J96.01 ACUTE RESPIRATORY FAILURE WITH HYPOXIA: Status: RESOLVED | Noted: 2018-10-13 | Resolved: 2019-03-28

## 2019-03-28 PROBLEM — J18.9 CAP (COMMUNITY ACQUIRED PNEUMONIA): Status: RESOLVED | Noted: 2018-10-13 | Resolved: 2019-03-28

## 2019-03-28 PROCEDURE — 3074F SYST BP LT 130 MM HG: CPT | Mod: CPTII,S$GLB,, | Performed by: INTERNAL MEDICINE

## 2019-03-28 PROCEDURE — 99499 UNLISTED E&M SERVICE: CPT | Mod: S$GLB,,, | Performed by: INTERNAL MEDICINE

## 2019-03-28 PROCEDURE — 99499 RISK ADDL DX/OHS AUDIT: ICD-10-PCS | Mod: S$GLB,,, | Performed by: INTERNAL MEDICINE

## 2019-03-28 PROCEDURE — 3078F PR MOST RECENT DIASTOLIC BLOOD PRESSURE < 80 MM HG: ICD-10-PCS | Mod: CPTII,S$GLB,, | Performed by: INTERNAL MEDICINE

## 2019-03-28 PROCEDURE — 99406 PR TOBACCO USE CESSATION INTERMEDIATE 3-10 MINUTES: ICD-10-PCS | Mod: S$GLB,,, | Performed by: INTERNAL MEDICINE

## 2019-03-28 PROCEDURE — 99999 PR PBB SHADOW E&M-EST. PATIENT-LVL III: CPT | Mod: PBBFAC,,, | Performed by: INTERNAL MEDICINE

## 2019-03-28 PROCEDURE — 3074F PR MOST RECENT SYSTOLIC BLOOD PRESSURE < 130 MM HG: ICD-10-PCS | Mod: CPTII,S$GLB,, | Performed by: INTERNAL MEDICINE

## 2019-03-28 PROCEDURE — 71250 CT THORAX DX C-: CPT | Mod: 26,,, | Performed by: RADIOLOGY

## 2019-03-28 PROCEDURE — 3078F DIAST BP <80 MM HG: CPT | Mod: CPTII,S$GLB,, | Performed by: INTERNAL MEDICINE

## 2019-03-28 PROCEDURE — 1101F PT FALLS ASSESS-DOCD LE1/YR: CPT | Mod: CPTII,S$GLB,, | Performed by: INTERNAL MEDICINE

## 2019-03-28 PROCEDURE — 99999 PR PBB SHADOW E&M-EST. PATIENT-LVL III: ICD-10-PCS | Mod: PBBFAC,,, | Performed by: INTERNAL MEDICINE

## 2019-03-28 PROCEDURE — 71250 CT CHEST WITHOUT CONTRAST: ICD-10-PCS | Mod: 26,,, | Performed by: RADIOLOGY

## 2019-03-28 PROCEDURE — 99406 BEHAV CHNG SMOKING 3-10 MIN: CPT | Mod: S$GLB,,, | Performed by: INTERNAL MEDICINE

## 2019-03-28 PROCEDURE — 99214 OFFICE O/P EST MOD 30 MIN: CPT | Mod: 25,S$GLB,, | Performed by: INTERNAL MEDICINE

## 2019-03-28 PROCEDURE — 71250 CT THORAX DX C-: CPT | Mod: TC

## 2019-03-28 PROCEDURE — 99214 PR OFFICE/OUTPT VISIT, EST, LEVL IV, 30-39 MIN: ICD-10-PCS | Mod: 25,S$GLB,, | Performed by: INTERNAL MEDICINE

## 2019-03-28 PROCEDURE — 1101F PR PT FALLS ASSESS DOC 0-1 FALLS W/OUT INJ PAST YR: ICD-10-PCS | Mod: CPTII,S$GLB,, | Performed by: INTERNAL MEDICINE

## 2019-03-28 NOTE — PROGRESS NOTES
"Subjective:       Patient ID: Sarina Genao is a 68 y.o. female.    Chief Complaint: lung nodule    68 year old current smoker with an incidental finding of a 1.8 cm pulmonary nodule.  Bronchoscopy was negative.  She is here for three month follow up.  Patient states that her BLISS is improved with anoro daily.  Rarely needs albuterol.  No BLISS currently.  No chronic cough, hemoptysis or sputum production.    Review of Systems   Constitutional: Negative for weight loss and weight gain.   HENT: Negative for trouble swallowing.    Cardiovascular: Negative for chest pain and leg swelling.   Gastrointestinal: Negative for acid reflux.   Neurological: Negative for headaches.       Objective:       Vitals:    03/28/19 1356   BP: 112/65   BP Location: Left arm   Patient Position: Sitting   Pulse: 65   SpO2: 98%   Weight: 77.4 kg (170 lb 10.2 oz)   Height: 5' 3" (1.6 m)     Physical Exam   Constitutional: She appears well-developed and well-nourished.   Cardiovascular: Normal rate.   Pulmonary/Chest: She has no wheezes. She has no rales.   Neurological: She is alert.   Psychiatric: She has a normal mood and affect.     Personal Diagnostic Review  CT of chest performed on today as compared to 10/12/2018 without contrast revealed stable 1.8 cm pulmonary nodule, emphysema, aortic atherosclerosis..  No flowsheet data found.      Assessment:       1. Aortic atherosclerosis    2. Centrilobular emphysema    3. Left lower lobe pulmonary nodule    4. Tobacco dependence        Outpatient Encounter Medications as of 3/28/2019   Medication Sig Dispense Refill    amLODIPine (NORVASC) 5 MG tablet Take 1 tablet (5 mg total) by mouth once daily. 90 tablet 3    aspirin (ECOTRIN) 81 MG EC tablet Take 81 mg by mouth once daily.      calcium carbonate (OS-LISSETTE) 500 mg calcium (1,250 mg) tablet Take 1 tablet (500 mg total) by mouth once daily. 30 tablet 5    cyanocobalamin, vitamin B-12, (VITAMIN B-12) 1,000 mcg TbSR Take by mouth once " "daily.       DOCOSAHEXANOIC ACID/EPA (FISH OIL ORAL) Take 1,200 mg by mouth once daily.      furosemide (LASIX) 20 MG tablet Take 1 tablet (20 mg total) by mouth once daily. 30 tablet 11    glipiZIDE (GLUCOTROL) 5 MG tablet Take 1 tablet (5 mg total) by mouth 2 (two) times daily with meals. 60 tablet 11    hydroCHLOROthiazide (HYDRODIURIL) 12.5 MG Tab TAKE 1 TABLET(12.5 MG) BY MOUTH EVERY DAY 90 tablet 12    insulin aspart U-100 (NOVOLOG FLEXPEN U-100 INSULIN) 100 unit/mL InPn pen ADMINISTER 12 UNITS UNDER THE SKIN THREE TIMES DAILY WITH MEALS (Patient taking differently: 10 Units 3 (three) times daily with meals. ADMINISTER 12 UNITS UNDER THE SKIN THREE TIMES DAILY WITH MEALS) 45 mL 12    insulin degludec (TRESIBA FLEXTOUCH U-200) 200 unit/mL (3 mL) InPn Inject 55 Units into the skin once daily. 6 mL 8    insulin detemir U-100 (LEVEMIR FLEXTOUCH) 100 unit/mL (3 mL) SubQ InPn pen Inject 50 Units into the skin every evening. 100 mL 12    irbesartan (AVAPRO) 150 MG tablet Take 1 tablet (150 mg total) by mouth once daily. 90 tablet 3    oxybutynin (DITROPAN-XL) 5 MG TR24 TAKE 1 TABLET(5 MG) BY MOUTH EVERY DAY 90 tablet 12    pen needle, diabetic (BD INSULIN PEN NEEDLE UF SHORT) 31 gauge x 5/16" Ndle USE AS DIRECTED 100 each 12    ranitidine (ZANTAC) 300 MG tablet TAKE 1 TABLET(300 MG) BY MOUTH EVERY EVENING 90 tablet 0    rosuvastatin (CRESTOR) 20 MG tablet TAKE 1 TABLET(20 MG) BY MOUTH EVERY DAY 90 tablet 12    semaglutide (OZEMPIC) 0.25 mg or 0.5 mg(2 mg/1.5 mL) PnIj Inject 0.5 mg into the skin once a week. 3 mL 9    traMADol (ULTRAM) 50 mg tablet Take 1 tablet (50 mg total) by mouth every 6 (six) hours as needed. 60 tablet 3    umeclidinium-vilanterol (ANORO ELLIPTA) 62.5-25 mcg/actuation DsDv Inhale 1 puff into the lungs once daily. THIS SHOULD BE USED, NOT INCRUSE 1 each 3    albuterol (PROVENTIL/VENTOLIN HFA) 90 mcg/actuation inhaler Inhale 1-2 puffs into the lungs every 6 (six) hours as needed " for Wheezing. Rescue 1 Inhaler 3    albuterol-ipratropium (DUO-NEB) 2.5 mg-0.5 mg/3 mL nebulizer solution Take 3 mLs by nebulization every 6 (six) hours as needed for Wheezing or Shortness of Breath. Rescue 3 Box 12     No facility-administered encounter medications on file as of 3/28/2019.      No orders of the defined types were placed in this encounter.    Plan:     Problem List Items Addressed This Visit     Tobacco dependence    Overview     Three minute discussion on cessation.         Centrilobular emphysema    Overview     Smoking cessation discussed.  Continue anoro daily and albuterol for rescue and prevention.         Left lower lobe pulmonary nodule    Overview     1.8 cm LLL, detected 10/2018.  Stable for 6 months.  Follow up in 6 months with CT prior.         Aortic atherosclerosis    Overview     Smoking cessation discussed.  Control DM, lipid and BP per PCP             Counseled patient on the needs and benefits of smoking cessation as related to above problems for 5 minutes.

## 2019-03-29 ENCOUNTER — TELEPHONE (OUTPATIENT)
Dept: PULMONOLOGY | Facility: CLINIC | Age: 69
End: 2019-03-29

## 2019-03-29 DIAGNOSIS — E11.9 TYPE 2 DIABETES MELLITUS WITHOUT COMPLICATION: ICD-10-CM

## 2019-03-29 NOTE — TELEPHONE ENCOUNTER
----- Message from Ashtyn Ramires MD sent at 3/28/2019  2:56 PM CDT -----  Please call Mrs. Genao and let her know that we would like to see her in 6 months with another CT prior.  Thanks,  Ashtyn

## 2019-03-29 NOTE — TELEPHONE ENCOUNTER
----- Message from Allyson De Leon sent at 3/29/2019  3:40 PM CDT -----  Contact: [phone]  Pt missed a call and would like the nurse to return their call.        Thank you!

## 2019-03-29 NOTE — TELEPHONE ENCOUNTER
I left a voicemail for Mrs. Genao to let her know that we would like to see her in 6 months with another CT prior per Dr Ramires.

## 2019-03-29 NOTE — TELEPHONE ENCOUNTER
I spoke to pt to let her know we will see her in 6mths with ct prior per Dr Ramires. Pt verbalized understanding.

## 2019-03-29 NOTE — TELEPHONE ENCOUNTER
----- Message from Allyson De Leon sent at 3/29/2019  3:40 PM CDT -----  Contact: [phone]  Pt missed a call and would like the nurse to return their call.        Thank you!

## 2019-04-17 ENCOUNTER — OFFICE VISIT (OUTPATIENT)
Dept: PODIATRY | Facility: CLINIC | Age: 69
End: 2019-04-17
Payer: MEDICARE

## 2019-04-17 VITALS
WEIGHT: 170 LBS | SYSTOLIC BLOOD PRESSURE: 130 MMHG | DIASTOLIC BLOOD PRESSURE: 72 MMHG | HEIGHT: 63 IN | BODY MASS INDEX: 30.12 KG/M2

## 2019-04-17 DIAGNOSIS — M20.42 HAMMER TOES OF BOTH FEET: ICD-10-CM

## 2019-04-17 DIAGNOSIS — E11.9 COMPREHENSIVE DIABETIC FOOT EXAMINATION, TYPE 2 DM, ENCOUNTER FOR: Primary | ICD-10-CM

## 2019-04-17 DIAGNOSIS — M20.41 HAMMER TOES OF BOTH FEET: ICD-10-CM

## 2019-04-17 DIAGNOSIS — M20.11 HALLUX ABDUCTO VALGUS, RIGHT: ICD-10-CM

## 2019-04-17 DIAGNOSIS — L60.2 ONYCHAUXIS: ICD-10-CM

## 2019-04-17 DIAGNOSIS — N18.30 CHRONIC KIDNEY DISEASE, STAGE 3: ICD-10-CM

## 2019-04-17 DIAGNOSIS — E11.51 TYPE II DIABETES MELLITUS WITH PERIPHERAL CIRCULATORY DISORDER: ICD-10-CM

## 2019-04-17 DIAGNOSIS — M20.12 HALLUX ABDUCTO VALGUS, LEFT: ICD-10-CM

## 2019-04-17 PROCEDURE — 3078F DIAST BP <80 MM HG: CPT | Mod: CPTII,S$GLB,, | Performed by: PODIATRIST

## 2019-04-17 PROCEDURE — 99214 PR OFFICE/OUTPT VISIT, EST, LEVL IV, 30-39 MIN: ICD-10-PCS | Mod: S$GLB,,, | Performed by: PODIATRIST

## 2019-04-17 PROCEDURE — 99999 PR PBB SHADOW E&M-EST. PATIENT-LVL III: ICD-10-PCS | Mod: PBBFAC,,, | Performed by: PODIATRIST

## 2019-04-17 PROCEDURE — 99499 UNLISTED E&M SERVICE: CPT | Mod: S$GLB,,, | Performed by: PODIATRIST

## 2019-04-17 PROCEDURE — 3044F PR MOST RECENT HEMOGLOBIN A1C LEVEL <7.0%: ICD-10-PCS | Mod: CPTII,S$GLB,, | Performed by: PODIATRIST

## 2019-04-17 PROCEDURE — 3075F SYST BP GE 130 - 139MM HG: CPT | Mod: CPTII,S$GLB,, | Performed by: PODIATRIST

## 2019-04-17 PROCEDURE — 3075F PR MOST RECENT SYSTOLIC BLOOD PRESS GE 130-139MM HG: ICD-10-PCS | Mod: CPTII,S$GLB,, | Performed by: PODIATRIST

## 2019-04-17 PROCEDURE — 99214 OFFICE O/P EST MOD 30 MIN: CPT | Mod: S$GLB,,, | Performed by: PODIATRIST

## 2019-04-17 PROCEDURE — 3078F PR MOST RECENT DIASTOLIC BLOOD PRESSURE < 80 MM HG: ICD-10-PCS | Mod: CPTII,S$GLB,, | Performed by: PODIATRIST

## 2019-04-17 PROCEDURE — 99499 RISK ADDL DX/OHS AUDIT: ICD-10-PCS | Mod: S$GLB,,, | Performed by: PODIATRIST

## 2019-04-17 PROCEDURE — 3044F HG A1C LEVEL LT 7.0%: CPT | Mod: CPTII,S$GLB,, | Performed by: PODIATRIST

## 2019-04-17 PROCEDURE — 1101F PR PT FALLS ASSESS DOC 0-1 FALLS W/OUT INJ PAST YR: ICD-10-PCS | Mod: CPTII,S$GLB,, | Performed by: PODIATRIST

## 2019-04-17 PROCEDURE — 99999 PR PBB SHADOW E&M-EST. PATIENT-LVL III: CPT | Mod: PBBFAC,,, | Performed by: PODIATRIST

## 2019-04-17 PROCEDURE — 1101F PT FALLS ASSESS-DOCD LE1/YR: CPT | Mod: CPTII,S$GLB,, | Performed by: PODIATRIST

## 2019-04-17 NOTE — PROGRESS NOTES
Subjective:      Patient ID: Sarina Genao is a 68 y.o. female.    Chief Complaint: Diabetes Mellitus (pcp Leyla 5-16-19); Diabetic Foot Exam; and Nail Problem    Sarina is a 68 y.o. female who presents to the clinic for evaluation and treatment of high risk feet. Sarina has a past medical history of Anticoagulant long-term use, Cancer, Cataracts, bilateral, CKD (chronic kidney disease) stage 3, GFR 30-59 ml/min (12/15/2014), Combined hyperlipidemia associated with type 2 diabetes mellitus (6/7/2013), COPD (chronic obstructive pulmonary disease) (12/15/2014), Diabetes mellitus type II, Diabetes mellitus with renal manifestations, uncontrolled (2/1/2017), Diabetic retinopathy, Family history of stomach cancer (12/5/2013), Former smoker, H/O renal cell carcinoma (12/15/2015), History of colonic polyps (2/1/2017), History of gastroesophageal reflux (GERD), History of urinary incontinence, Hyperlipidemia, Hypertension, Nephrolithiasis, Osteoporosis, post-menopausal (3/17/2014), Primary hyperparathyroidism (10/20/2016), and Schatzki's ring (2/1/2017). The patient's chief complaint is long, thick toenails. This patient has documented high risk feet requiring routine maintenance secondary to diabetes mellitis and those secondary complications of diabetes, as mentioned..    PCP: Venancio Mayer MD    Date Last Seen by PCP:   Chief Complaint   Patient presents with    Diabetes Mellitus     pcp Ehrelizabethg 5-16-19    Diabetic Foot Exam    Nail Problem     Current shoe gear: flexible tennis shoes     Hemoglobin A1C   Date Value Ref Range Status   02/05/2019 6.6 (H) 4.0 - 5.6 % Final     Comment:     ADA Screening Guidelines:  5.7-6.4%  Consistent with prediabetes  >or=6.5%  Consistent with diabetes  High levels of fetal hemoglobin interfere with the HbA1C  assay. Heterozygous hemoglobin variants (HbS, HgC, etc)do  not significantly interfere with this assay.   However, presence of multiple variants may  affect accuracy.     10/12/2018 9.9 (H) 4.0 - 5.6 % Final     Comment:     ADA Screening Guidelines:  5.7-6.4%  Consistent with prediabetes  >or=6.5%  Consistent with diabetes  High levels of fetal hemoglobin interfere with the HbA1C  assay. Heterozygous hemoglobin variants (HbS, HgC, etc)do  not significantly interfere with this assay.   However, presence of multiple variants may affect accuracy.     07/10/2018 8.1 (H) 4.0 - 5.6 % Final     Comment:     ADA Screening Guidelines:  5.7-6.4%  Consistent with prediabetes  >or=6.5%  Consistent with diabetes  High levels of fetal hemoglobin interfere with the HbA1C  assay. Heterozygous hemoglobin variants (HbS, HgC, etc)do  not significantly interfere with this assay.   However, presence of multiple variants may affect accuracy.       Patient Active Problem List   Diagnosis    Combined hyperlipidemia associated with type 2 diabetes mellitus    Hypertension, benign    Urinary incontinence, urge    History of kidney cancer    Family history of stomach cancer    GERD (gastroesophageal reflux disease)    Osteoporosis, post-menopausal    Tobacco dependence    Centrilobular emphysema    CKD (chronic kidney disease) stage 3, GFR 30-59 ml/min    Nuclear sclerosis of both eyes    DM type 2 without retinopathy    Essential hypertension    Refractive error    Senile nuclear sclerosis    Post-operative state    Nuclear sclerosis of right eye    Primary hyperparathyroidism    Diabetes mellitus with renal manifestations, uncontrolled    History of colonic polyps    Schatzki's ring    Pseudophakia    PVD (peripheral vascular disease) with claudication    PVD (peripheral vascular disease)    Bilateral carotid artery stenosis    PCO (posterior capsular opacification), right    Pulmonary vascular congestion    Type 2 diabetes mellitus with kidney complication, with long-term current use of insulin    Type 2 diabetes mellitus with hyperglycemia, with long-term  "current use of insulin    Chest pain on breathing    Left lower lobe pulmonary nodule    Asymptomatic stenosis of both carotid arteries without infarction    Aortic atherosclerosis     Current Outpatient Medications on File Prior to Visit   Medication Sig Dispense Refill    albuterol (PROVENTIL/VENTOLIN HFA) 90 mcg/actuation inhaler Inhale 1-2 puffs into the lungs every 6 (six) hours as needed for Wheezing. Rescue 1 Inhaler 3    albuterol-ipratropium (DUO-NEB) 2.5 mg-0.5 mg/3 mL nebulizer solution Take 3 mLs by nebulization every 6 (six) hours as needed for Wheezing or Shortness of Breath. Rescue 3 Box 12    amLODIPine (NORVASC) 5 MG tablet Take 1 tablet (5 mg total) by mouth once daily. 90 tablet 3    aspirin (ECOTRIN) 81 MG EC tablet Take 81 mg by mouth once daily.      calcium carbonate (OS-LISSETTE) 500 mg calcium (1,250 mg) tablet Take 1 tablet (500 mg total) by mouth once daily. 30 tablet 5    cyanocobalamin, vitamin B-12, (VITAMIN B-12) 1,000 mcg TbSR Take by mouth once daily.       DOCOSAHEXANOIC ACID/EPA (FISH OIL ORAL) Take 1,200 mg by mouth once daily.      furosemide (LASIX) 20 MG tablet Take 1 tablet (20 mg total) by mouth once daily. 30 tablet 11    glipiZIDE (GLUCOTROL) 5 MG tablet Take 1 tablet (5 mg total) by mouth 2 (two) times daily with meals. 60 tablet 11    hydroCHLOROthiazide (HYDRODIURIL) 12.5 MG Tab TAKE 1 TABLET(12.5 MG) BY MOUTH EVERY DAY 90 tablet 12    insulin degludec (TRESIBA FLEXTOUCH U-200) 200 unit/mL (3 mL) InPn Inject 55 Units into the skin once daily. 6 mL 8    irbesartan (AVAPRO) 150 MG tablet Take 1 tablet (150 mg total) by mouth once daily. 90 tablet 3    oxybutynin (DITROPAN-XL) 5 MG TR24 TAKE 1 TABLET(5 MG) BY MOUTH EVERY DAY 90 tablet 12    pen needle, diabetic (BD INSULIN PEN NEEDLE UF SHORT) 31 gauge x 5/16" Ndle USE AS DIRECTED 100 each 12    ranitidine (ZANTAC) 300 MG tablet TAKE 1 TABLET(300 MG) BY MOUTH EVERY EVENING 90 tablet 0    rosuvastatin " (CRESTOR) 20 MG tablet TAKE 1 TABLET(20 MG) BY MOUTH EVERY DAY 90 tablet 12    semaglutide (OZEMPIC) 0.25 mg or 0.5 mg(2 mg/1.5 mL) PnIj Inject 0.5 mg into the skin once a week. 3 mL 9    traMADol (ULTRAM) 50 mg tablet Take 1 tablet (50 mg total) by mouth every 6 (six) hours as needed. 60 tablet 3    umeclidinium-vilanterol (ANORO ELLIPTA) 62.5-25 mcg/actuation DsDv Inhale 1 puff into the lungs once daily. THIS SHOULD BE USED, NOT INCRUSE 1 each 3    insulin aspart U-100 (NOVOLOG FLEXPEN U-100 INSULIN) 100 unit/mL InPn pen ADMINISTER 12 UNITS UNDER THE SKIN THREE TIMES DAILY WITH MEALS (Patient taking differently: 10 Units 3 (three) times daily with meals. ADMINISTER 12 UNITS UNDER THE SKIN THREE TIMES DAILY WITH MEALS) 45 mL 12    insulin detemir U-100 (LEVEMIR FLEXTOUCH) 100 unit/mL (3 mL) SubQ InPn pen Inject 50 Units into the skin every evening. 100 mL 12     No current facility-administered medications on file prior to visit.      Review of patient's allergies indicates:   Allergen Reactions    Metformin Diarrhea    Pantoprazole      elevated blood sugars     Past Surgical History:   Procedure Laterality Date    bladder lift  2011    done at [facility]    BLADDER STONE REMOVAL  2011    before bladder lift    BRONCHOSCOPY, NAVIGATIONAL N/A 12/11/2018    Performed [facility] 2ND FLR    CATARACT EXTRACTION W/  INTRAOCULAR LENS IMPLANT Left 06/07/2016    Dr. Vásquez    CATARACT EXTRACTION W/  INTRAOCULAR LENS IMPLANT Right 06/21/2016    Dr. Vásquez    COLONOSCOPY N/A 4/26/2018    Performed by Benjamin Zamora MD at [facility]O    COLONOSCOPY N/A 7/12/2013    Performed by Mariposa Shah [facility] at [facility] ENDO    CYSTOSCOPY      EGD (ESOPHAGOGASTRODUODENOSCOPY) N/A 1/8/2014    Performed by [facility]aias Marinelli [facility] at [facility] ENDO    INSERTION-INTRAOCULAR LENS (IOL) Right 6/21/2016    [facility] by [facility] at [facility] OR 1ST FLR    INSERTION-INTRAOCULAR LENS (IOL) Left  6/7/2016    [facility]    NEPHRECTOMY      partial right    NEPHRECTOMY, RADICAL Right 8/6/2013    [facility] 2ND FLR    PHACOEMULSIFICATION-ASPIRATION-CATARACT Right 6/21/2016    [facility]ST FLR    PHACOEMULSIFICATION-ASPIRATION-CATARACT Left 6/7/2016    [facility]ST FLR    ROBOTIC NEPHRECTOMY, PARTIAL Right 8/6/2013    [facility] 2ND FLR     Family History   Problem Relation Age of Onset    Glaucoma Mother     Cataracts Mother     No Known Problems Father     Colon cancer Brother     Nephrolithiasis Sister     No Known Problems Maternal Aunt     No Known Problems Maternal Uncle     No Known Problems Paternal Aunt     No Known Problems Paternal Uncle     No Known Problems Maternal Grandmother     No Known Problems Maternal Grandfather     No Known Problems Paternal Grandmother     No Known Problems Paternal Grandfather     Anesthesia problems Neg Hx     Kidney cancer Neg Hx     Amblyopia Neg Hx     Blindness Neg Hx     Cancer Neg Hx     Diabetes Neg Hx     Hypertension Neg Hx     Macular degeneration Neg Hx     Retinal detachment Neg Hx     Strabismus Neg Hx     Stroke Neg Hx     Thyroid disease Neg Hx      Social History     Socioeconomic History    Marital status:      Spouse name: Not on file    Number of children: Not on file    Years of education: Not on file    Highest education level: Not on file   Occupational History    Occupation: [employer]   Social Needs    Financial resource strain: Not on file    Food insecurity:     Worry: Not on file     Inability: Not on file    Transportation needs:     Medical: Not on file     Non-medical: Not on file   Tobacco Use    Smoking status: Former Smoker     Packs/day: 0.25     Years: 30.00     Pack years: 7.50      "Types: Cigarettes    Smokeless tobacco: Never Used    Tobacco comment: pt. reports quitting January 2018   Substance and Sexual Activity    Alcohol use: No     Alcohol/week: 0.0 oz    Drug use: No    Sexual activity: Not on file   Lifestyle    Physical activity:     Days per week: Not on file     Minutes per session: Not on file    Stress: Not on file   Relationships    Social connections:     Talks on phone: Not on file     Gets together: Not on file     Attends Restorationism service: Not on file     Active member of club or organization: Not on file     Attends meetings of clubs or organizations: Not on file     Relationship status: Not on file   Other Topics Concern    Not on file   Social History Narrative    Lives on [facility]    Here with sister Kate [phone]           Review of Systems   Constitution: Negative for chills and fever.   Cardiovascular: Negative for chest pain, claudication and leg swelling.   Respiratory: Positive for cough. Negative for shortness of breath.    Skin: Positive for dry skin and nail changes. Negative for itching and rash.   Musculoskeletal: Positive for arthritis, joint pain and muscle cramps. Negative for falls, joint swelling and muscle weakness.   Gastrointestinal: Negative for diarrhea, nausea and vomiting.   Neurological: Negative for numbness, paresthesias, tremors and weakness.   Psychiatric/Behavioral: Negative for altered mental status and hallucinations.           Objective:       Vitals:    04/17/19 1052   BP: 130/72   Weight: 77.1 kg (170 lb)   Height: 5' 3" (1.6 m)   PainSc: 0-No pain       Physical Exam   Constitutional:   General: Pt. is well-developed, well-nourished, appears stated age, in no acute distress, alert and oriented x 3. No evidence of depression, anxiety, or agitation. Calm, cooperative, and communicative. Appropriate interactions and affect.       Cardiovascular:   Pulses:       Dorsalis pedis pulses are 1+ on the right side, and 1+ on " the left side.        Posterior tibial pulses are 1+ on the right side, and 1+ on the left side.   Dorsalis pedis and posterior tibial pulses are diminished Bilaterally. Skin is atrophic   Musculoskeletal:        Right ankle: She exhibits normal range of motion and no swelling. No tenderness. Achilles tendon exhibits no pain and no defect.        Left ankle: She exhibits normal range of motion and no swelling. No tenderness. Achilles tendon exhibits no pain and no defect.        Right foot: There is normal range of motion and no tenderness.        Left foot: There is normal range of motion and no tenderness.   Adequate joint range of motion without pain, limitation, nor crepitation Bilateral feet and ankle joints. Muscle strength is 5/5 in all groups bilaterally.    Patient has hammertoes of digits 2-5 bilateral partially reducible without symptom today.    Visible and palpable bunion without pain at dorsomedial 1st metatarsal head right and left.  Hallux abducted right and left partially reducible, tracks laterally without being track bound.  No ecchymosis, erythema, edema, or cardinal signs infection or signs of trauma same foot.     Neurological: She displays no atrophy and no tremor. No sensory deficit. She exhibits normal muscle tone.   Clarendon-Peggy 5.07 monofilament is intact bilateral feet. Sharp/dull sensation is also intact Bilateral feet.     Skin: Skin is warm, dry and intact. No abrasion, no ecchymosis, no lesion and no rash noted. She is not diaphoretic. No cyanosis or erythema. No pallor. Nails show no clubbing.   Toenails 1-5 bilaterally are elongated by 2-3 mm, thickened by 2-3 mm, discolored/yellowed, dystrophic, brittle with subungual debris.      Interdigital Spaces clean, dry and without evidence of break in skin integrity   Psychiatric: She has a normal mood and affect. Her speech is normal.   Nursing note and vitals reviewed.          Assessment:       Encounter Diagnoses   Name Primary?     Comprehensive diabetic foot examination, type 2 DM, encounter for Yes    Type II diabetes mellitus with peripheral circulatory disorder     Chronic kidney disease, stage 3     Hammer toes of both feet     Hallux abducto valgus, right     Hallux abducto valgus, left     Onychauxis          Plan:       Sarina was seen today for diabetes mellitus, diabetic foot exam and nail problem.    Diagnoses and all orders for this visit:    Comprehensive diabetic foot examination, type 2 DM, encounter for    Type II diabetes mellitus with peripheral circulatory disorder  -     DIABETIC SHOES FOR HOME USE    Chronic kidney disease, stage 3  -     DIABETIC SHOES FOR HOME USE    Hammer toes of both feet  -     DIABETIC SHOES FOR HOME USE    Hallux abducto valgus, right  -     DIABETIC SHOES FOR HOME USE    Hallux abducto valgus, left  -     DIABETIC SHOES FOR HOME USE    Onychauxis    Other orders  -     Cancel: DIABETIC SHOES FOR HOME USE      I counseled the patient on her conditions, their implications and medical management.     - Shoe inspection. Diabetic Foot Education. Patient reminded of the importance of good nutrition and blood sugar control to help prevent podiatric complications of diabetes. Patient instructed on proper foot hygeine. We discussed wearing proper shoe gear, daily foot inspections, never walking without protective shoe gear.    - Rx diabetic shoes for protection and support    - Discussed condition and treatment options with pt, as well as poor results with most treatments. Discussed filing nails, using antifungal topical ointment vs oral treatment options. Instructed patient on the importance of keeping fungus off of skin while treating nails. Patient instructed to use absorbent cotton socks and change them if they become sweaty; or wear an open-toe shoe or sandal. Wash the feet at least once a day with soap and water. Patient wants to try home remedies. Instructed patient that it takes time  for symptoms to completely dissipate. Patient instructed to use lysol or over-the-counter antifungal powders or sprays to shoes daily and allow them to air dry, switching shoes from every other day would be optimal. Patient is to avoid barefoot walking in  high-risk environments (public showers, gyms and locker rooms) may prevent future infections.     -  She will continue to monitor the areas daily, inspect her feet, wear protective shoe gear when ambulatory, moisturizer to maintain skin integrity and follow in this office in approximately 12 months, sooner p.r.n or as Procedure B.

## 2019-04-17 NOTE — PATIENT INSTRUCTIONS
Recommend lotions: eucerin, eucerin for diabetics, aquaphor, A&D ointment, gold bond for diabetics, sween, Barstow's Bees all purpose baby ointment,  urea 40 with aloe (found on amazon.com)    Shoe recommendations: (try 6pm.com, zappos.com , nordstromrack.Pond5, or shoes.[email or web address] for discounted prices) you can visit DSW shoes in [place]  or [facility] in the [facility] (there are also several shoe brand outlets in the [facility] [place]    Asics (GT 2000 or gel foundations), new balance stability type shoes, saucony (stabil c3),  Barry (GTS or Beast or transcend), propet (tennis shoe)    Sofgunjan Telles (women) Jerry&Nacho (men), clarks, crocs, aerosoles, naturalizers, SAS, ecco, born, alok russo, rockports (dress shoes)    Vionic, burkenstocks, fitflops, propet (sandals)  Nike comfort thong sandals, crocs, propet (house shoes)    Nail Home remedy:  Vicks Vapor rub to nails for easier manageability      Diabetes: Inspecting Your Feet  Diabetes increases your chances of developing foot problems. So inspect your feet every day. This helps you find small skin irritations before they become serious infections. If you have trouble seeing the bottoms of your feet, use a mirror or ask a family member or friend to help.     Pressure spots on the bottom of the foot are common areas where problems develop.   How to check your feet  Below are tips to help you look for foot problems. Try to check your feet at the same time each day, such as when you get out of bed in the morning:  · Check the top of each foot. The tops of toes, back of the heel, and outer edge of the foot can get a lot of rubbing from poor-fitting shoes.  · Check the bottom of each foot. Daily wear and tear often leads to problems at pressure spots.  · Check the toes and nails. Fungal infections often occur between toes. Toenail problems can also be a sign of fungal infections or lead to breaks in the skin.  · Check your shoes, too. Loose objects inside a shoe can injure the  foot. Use your hand to feel inside your shoes for things like miguel, loose stitching, or rough areas that could irritate your skin.  Warning signs  Look for any color changes in the foot. Redness with streaks can signal a severe infection, which needs immediate medical attention. Tell your doctor right away if you have any of these problems:  · Swelling, sometimes with color changes, may be a sign of poor blood flow or infection. Symptoms include tenderness and an increase in the size of your foot.  · Warm or hot areas on your feet may be signs of infection. A foot that is cold may not be getting enough blood.  · Sensations such as burning, tingling, or pins and needles can be signs of a problem. Also check for areas that may be numb.  · Hot spots are caused by friction or pressure. Look for hot spots in areas that get a lot of rubbing. Hot spots can turn into blisters, calluses, or sores.  · Cracks and sores are caused by dry or irritated skin. They are a sign that the skin is breaking down, which can lead to infection.  · Toenail problems to watch for include nails growing into the skin (ingrown toenail) and causing redness or pain. Thick, yellow, or discolored nails can signal a fungal infection.  · Drainage and odor can develop from untreated sores and ulcers. Call your doctor right away if you notice white or yellow drainage, bleeding, or unpleasant odor.   © 1065-1491 [employer]. [address] [place] [address]. All rights reserved. This information is not intended as a substitute for professional medical care. Always follow your healthcare professional's instructions.        Step-by-Step:  Inspecting Your Feet (Diabetes)    Date Last Reviewed: 10/1/2016  © 2092-3441 [employer]. [address] [place] [address]. All rights reserved. This information is not intended as a substitute for professional medical care. Always follow your healthcare professional's  instructions.

## 2019-05-16 ENCOUNTER — OFFICE VISIT (OUTPATIENT)
Dept: FAMILY MEDICINE | Facility: CLINIC | Age: 69
End: 2019-05-16
Payer: MEDICARE

## 2019-05-16 VITALS
OXYGEN SATURATION: 96 % | HEIGHT: 63 IN | HEART RATE: 92 BPM | WEIGHT: 165.38 LBS | TEMPERATURE: 99 F | SYSTOLIC BLOOD PRESSURE: 120 MMHG | DIASTOLIC BLOOD PRESSURE: 62 MMHG | BODY MASS INDEX: 29.3 KG/M2

## 2019-05-16 DIAGNOSIS — N18.30 TYPE 2 DIABETES MELLITUS WITH STAGE 3 CHRONIC KIDNEY DISEASE, WITH LONG-TERM CURRENT USE OF INSULIN: ICD-10-CM

## 2019-05-16 DIAGNOSIS — Z79.4 LONG-TERM INSULIN USE: ICD-10-CM

## 2019-05-16 DIAGNOSIS — E55.9 VITAMIN D DEFICIENCY DISEASE: ICD-10-CM

## 2019-05-16 DIAGNOSIS — E11.9 DM TYPE 2 WITHOUT RETINOPATHY: ICD-10-CM

## 2019-05-16 DIAGNOSIS — I10 ESSENTIAL HYPERTENSION: ICD-10-CM

## 2019-05-16 DIAGNOSIS — M81.0 OSTEOPOROSIS, POST-MENOPAUSAL: ICD-10-CM

## 2019-05-16 DIAGNOSIS — Z12.31 ENCOUNTER FOR SCREENING MAMMOGRAM FOR BREAST CANCER: ICD-10-CM

## 2019-05-16 DIAGNOSIS — Z79.4 TYPE 2 DIABETES MELLITUS WITH HYPERGLYCEMIA, WITH LONG-TERM CURRENT USE OF INSULIN: ICD-10-CM

## 2019-05-16 DIAGNOSIS — J43.2 CENTRILOBULAR EMPHYSEMA: ICD-10-CM

## 2019-05-16 DIAGNOSIS — E11.65 TYPE 2 DIABETES MELLITUS WITH HYPERGLYCEMIA, WITH LONG-TERM CURRENT USE OF INSULIN: ICD-10-CM

## 2019-05-16 DIAGNOSIS — N18.30 CKD (CHRONIC KIDNEY DISEASE) STAGE 3, GFR 30-59 ML/MIN: ICD-10-CM

## 2019-05-16 DIAGNOSIS — E11.22 TYPE 2 DIABETES MELLITUS WITH STAGE 3 CHRONIC KIDNEY DISEASE, WITH LONG-TERM CURRENT USE OF INSULIN: ICD-10-CM

## 2019-05-16 DIAGNOSIS — Z79.4 TYPE 2 DIABETES MELLITUS WITH STAGE 3 CHRONIC KIDNEY DISEASE, WITH LONG-TERM CURRENT USE OF INSULIN: ICD-10-CM

## 2019-05-16 DIAGNOSIS — M47.816 LUMBAR ARTHROPATHY: ICD-10-CM

## 2019-05-16 DIAGNOSIS — I70.0 AORTIC ATHEROSCLEROSIS: ICD-10-CM

## 2019-05-16 DIAGNOSIS — E21.0 PRIMARY HYPERPARATHYROIDISM: ICD-10-CM

## 2019-05-16 DIAGNOSIS — I73.9 PVD (PERIPHERAL VASCULAR DISEASE) WITH CLAUDICATION: ICD-10-CM

## 2019-05-16 DIAGNOSIS — D64.9 ANEMIA, UNSPECIFIED TYPE: ICD-10-CM

## 2019-05-16 DIAGNOSIS — E78.5 HYPERLIPIDEMIA, UNSPECIFIED HYPERLIPIDEMIA TYPE: ICD-10-CM

## 2019-05-16 DIAGNOSIS — K21.9 GASTROESOPHAGEAL REFLUX DISEASE, ESOPHAGITIS PRESENCE NOT SPECIFIED: ICD-10-CM

## 2019-05-16 DIAGNOSIS — Z86.010 HISTORY OF COLONIC POLYPS: ICD-10-CM

## 2019-05-16 DIAGNOSIS — I73.9 PVD (PERIPHERAL VASCULAR DISEASE): ICD-10-CM

## 2019-05-16 DIAGNOSIS — Z00.00 ROUTINE MEDICAL EXAM: Primary | ICD-10-CM

## 2019-05-16 DIAGNOSIS — J44.9 CHRONIC OBSTRUCTIVE PULMONARY DISEASE, UNSPECIFIED COPD TYPE: ICD-10-CM

## 2019-05-16 PROCEDURE — 99499 RISK ADDL DX/OHS AUDIT: ICD-10-PCS | Mod: S$GLB,,, | Performed by: INTERNAL MEDICINE

## 2019-05-16 PROCEDURE — 3074F PR MOST RECENT SYSTOLIC BLOOD PRESSURE < 130 MM HG: ICD-10-PCS | Mod: CPTII,S$GLB,, | Performed by: INTERNAL MEDICINE

## 2019-05-16 PROCEDURE — 99214 OFFICE O/P EST MOD 30 MIN: CPT | Mod: S$GLB,,, | Performed by: INTERNAL MEDICINE

## 2019-05-16 PROCEDURE — 99214 PR OFFICE/OUTPT VISIT, EST, LEVL IV, 30-39 MIN: ICD-10-PCS | Mod: S$GLB,,, | Performed by: INTERNAL MEDICINE

## 2019-05-16 PROCEDURE — 99499 UNLISTED E&M SERVICE: CPT | Mod: S$GLB,,, | Performed by: INTERNAL MEDICINE

## 2019-05-16 PROCEDURE — 3078F PR MOST RECENT DIASTOLIC BLOOD PRESSURE < 80 MM HG: ICD-10-PCS | Mod: CPTII,S$GLB,, | Performed by: INTERNAL MEDICINE

## 2019-05-16 PROCEDURE — 3074F SYST BP LT 130 MM HG: CPT | Mod: CPTII,S$GLB,, | Performed by: INTERNAL MEDICINE

## 2019-05-16 PROCEDURE — 3044F PR MOST RECENT HEMOGLOBIN A1C LEVEL <7.0%: ICD-10-PCS | Mod: CPTII,S$GLB,, | Performed by: INTERNAL MEDICINE

## 2019-05-16 PROCEDURE — 1101F PR PT FALLS ASSESS DOC 0-1 FALLS W/OUT INJ PAST YR: ICD-10-PCS | Mod: CPTII,S$GLB,, | Performed by: INTERNAL MEDICINE

## 2019-05-16 PROCEDURE — 99999 PR PBB SHADOW E&M-EST. PATIENT-LVL IV: CPT | Mod: PBBFAC,,, | Performed by: INTERNAL MEDICINE

## 2019-05-16 PROCEDURE — 1101F PT FALLS ASSESS-DOCD LE1/YR: CPT | Mod: CPTII,S$GLB,, | Performed by: INTERNAL MEDICINE

## 2019-05-16 PROCEDURE — 99999 PR PBB SHADOW E&M-EST. PATIENT-LVL IV: ICD-10-PCS | Mod: PBBFAC,,, | Performed by: INTERNAL MEDICINE

## 2019-05-16 PROCEDURE — 3078F DIAST BP <80 MM HG: CPT | Mod: CPTII,S$GLB,, | Performed by: INTERNAL MEDICINE

## 2019-05-16 PROCEDURE — 3044F HG A1C LEVEL LT 7.0%: CPT | Mod: CPTII,S$GLB,, | Performed by: INTERNAL MEDICINE

## 2019-05-22 ENCOUNTER — HOSPITAL ENCOUNTER (OUTPATIENT)
Dept: RADIOLOGY | Facility: HOSPITAL | Age: 69
Discharge: HOME | End: 2019-05-22
Attending: INTERNAL MEDICINE
Payer: MEDICARE

## 2019-05-22 DIAGNOSIS — Z12.31 BREAST CANCER SCREENING BY MAMMOGRAM: ICD-10-CM

## 2019-05-22 PROCEDURE — 77067 SCR MAMMO BI INCL CAD: CPT | Mod: 26,,, | Performed by: RADIOLOGY

## 2019-05-22 PROCEDURE — 77063 BREAST TOMOSYNTHESIS BI: CPT | Mod: 26,,, | Performed by: RADIOLOGY

## 2019-05-22 PROCEDURE — 77063 MAMMO DIGITAL SCREENING BILAT WITH TOMOSYNTHESIS_CAD: ICD-10-PCS | Mod: 26,,, | Performed by: RADIOLOGY

## 2019-05-22 PROCEDURE — 77067 SCR MAMMO BI INCL CAD: CPT | Mod: TC,PO

## 2019-05-22 PROCEDURE — 77067 MAMMO DIGITAL SCREENING BILAT WITH TOMOSYNTHESIS_CAD: ICD-10-PCS | Mod: 26,,, | Performed by: RADIOLOGY

## 2019-05-23 ENCOUNTER — TELEPHONE (OUTPATIENT)
Dept: FAMILY MEDICINE | Facility: CLINIC | Age: 69
End: 2019-05-23

## 2019-05-23 NOTE — TELEPHONE ENCOUNTER
Please call with lab results.    Dr. DUNAWAY says all the recent lab work looks good.  Vitamin-D level is normal, thyroid is normal.  A1c sugar test is very good at 5.8.

## 2019-06-03 DIAGNOSIS — R52 PAIN: ICD-10-CM

## 2019-06-04 NOTE — TELEPHONE ENCOUNTER
----- Message from Frances Haney sent at 6/4/2019  8:57 AM CDT -----  Contact: pt  .Type: RX Refill Request    Who Called: pt     Refill or New Rx:refill    RX Name and Strength: traMADol (ULTRAM) 50 mg tablet    Preferred Pharmacy with phone number:.  [facility] [id number] [facility] [address] [facility]  [address] [facility] [place] [address]  Phone: [phone] Fax: [phone]    Local or Mail Order:local    Would the patient rather a call back or a response via [facility]? Call back    Best Call Back Number:[phone]    Additional Information:

## 2019-06-04 NOTE — TELEPHONE ENCOUNTER
----- Message from Amie Lorenzana sent at 6/4/2019  4:31 PM CDT -----  Contact: Self   Type: Patient Call Back    Who called: self     What is the request in detail: Patient is asking to speak with the office in ref to her medication not being filled. She wants a call back today.     Can the clinic reply by [facility]?  Call   Would the patient rather a call back or a response via [facility]?  Call     Best call back number: [phone]

## 2019-06-05 NOTE — TELEPHONE ENCOUNTER
----- Message from Sammie Stuart sent at 6/5/2019 10:38 AM CDT -----  Contact: self [phone]  .Type:  Patient Returning Call    Who Called:  Self     Who Left Message for Patient: Lyndsey Byers    Does the patient know what this is regarding?: medication    Would the patient rather a call back or a response via [facility]? Call back    Best Call Back Number: [phone]

## 2019-06-09 RX ORDER — TRAMADOL HYDROCHLORIDE 50 MG/1
TABLET ORAL
Qty: 60 TABLET | Refills: 3 | Status: SHIPPED | OUTPATIENT
Start: 2019-06-09 | End: 2019-10-23 | Stop reason: SDUPTHER

## 2019-06-13 ENCOUNTER — OFFICE VISIT (OUTPATIENT)
Dept: OPHTHALMOLOGY | Facility: CLINIC | Age: 69
End: 2019-06-13
Payer: MEDICARE

## 2019-06-13 DIAGNOSIS — H52.7 REFRACTIVE ERROR: ICD-10-CM

## 2019-06-13 DIAGNOSIS — Z96.1 PSEUDOPHAKIA: ICD-10-CM

## 2019-06-13 DIAGNOSIS — I10 ESSENTIAL HYPERTENSION: ICD-10-CM

## 2019-06-13 DIAGNOSIS — E11.9 DM TYPE 2 WITHOUT RETINOPATHY: ICD-10-CM

## 2019-06-13 DIAGNOSIS — H26.491 PCO (POSTERIOR CAPSULAR OPACIFICATION), RIGHT: Primary | ICD-10-CM

## 2019-06-13 LAB
LEFT EYE DM RETINOPATHY: NEGATIVE
RIGHT EYE DM RETINOPATHY: NEGATIVE

## 2019-06-13 PROCEDURE — 92014 COMPRE OPH EXAM EST PT 1/>: CPT | Mod: S$GLB,,, | Performed by: OPHTHALMOLOGY

## 2019-06-13 PROCEDURE — 99999 PR PBB SHADOW E&M-EST. PATIENT-LVL I: CPT | Mod: PBBFAC,,, | Performed by: OPHTHALMOLOGY

## 2019-06-13 PROCEDURE — 99999 PR PBB SHADOW E&M-EST. PATIENT-LVL I: ICD-10-PCS | Mod: PBBFAC,,, | Performed by: OPHTHALMOLOGY

## 2019-06-13 PROCEDURE — 92014 PR EYE EXAM, EST PATIENT,COMPREHESV: ICD-10-PCS | Mod: S$GLB,,, | Performed by: OPHTHALMOLOGY

## 2019-06-13 NOTE — PROGRESS NOTES
Subjective:       Patient ID: Sarina Genao is a 68 y.o. female.    Chief Complaint: Diabetic Eye Exam    HPI     dls 5-7-18    PCO OD  DM WO RETINOPATHY  ESSENTIAL HYPERTENSION  PCIOL   REFRACTIVE ERROR    Glasses- bifocal  Contacts- none    No noticeable vision change. Only gets blurred vision if she lets her   sugars get to high  No pain   No flashes or floaters    No eye drops    Last edited by Cici Brown on 6/13/2019 10:17 AM. (History)             Assessment:       1. PCO (posterior capsular opacification), right    2. DM type 2 without retinopathy    3. Essential hypertension    4. Refractive error    5. Pseudophakia        Plan:       Early PCO OD- Not Visually Significant.  DM-No NPDR OU.  HTN-No retinopathy OU.  RE-Pt wants MRx.        Control DM & HTN.  Give MRx.  RTC 1 yr.

## 2019-06-29 NOTE — TELEPHONE ENCOUNTER
----- Message from Mireya Tiwari sent at 11/12/2018  8:56 AM CST -----  Contact: self [phone]  ..Needs Advice    Reason for call:        Communication Preference:phone     Additional Information:  Pt states she was suppose to have a ct scan also she states she having a problem getting her medication states she don't know the name of the medication    Paged Dr. Avila regarding patient's uncontrolled pain.

## 2019-07-17 RX ORDER — IRBESARTAN 150 MG/1
TABLET ORAL
Qty: 90 TABLET | Refills: 2 | Status: SHIPPED | OUTPATIENT
Start: 2019-07-17 | End: 2019-11-06

## 2019-07-26 RX ORDER — IRBESARTAN AND HYDROCHLOROTHIAZIDE 150; 12.5 MG/1; MG/1
TABLET, FILM COATED ORAL
Qty: 90 TABLET | Refills: 0 | Status: SHIPPED | OUTPATIENT
Start: 2019-07-26 | End: 2019-10-23 | Stop reason: SDUPTHER

## 2019-07-26 NOTE — TELEPHONE ENCOUNTER
----- Message from Goldie Garcia sent at 7/26/2019 11:47 AM CDT -----  Contact: pt  Type: Patient Call Back    Who called:pt    What is the request in detail:pt is requesting that her pharmacy be changed in the system to [facility] pharmacy [address] [place] [address]. Call pt    Can the clinic reply by [facility]?    Would the patient rather a call back or a response via [facility]? Call back    Best call back number:[phone]    Additional Information:

## 2019-08-08 ENCOUNTER — TELEPHONE (OUTPATIENT)
Dept: PULMONOLOGY | Facility: CLINIC | Age: 69
End: 2019-08-08

## 2019-08-08 DIAGNOSIS — R91.1 LUNG NODULE: Primary | ICD-10-CM

## 2019-08-08 NOTE — TELEPHONE ENCOUNTER
----- Message from Licha Riojas MA sent at 8/8/2019 11:44 AM CDT -----  Contact:    Pt  [phone]  Dr Ramires,  Pt f/u with on 11/4/19. Do you need imaging? If so, can you please put in order?  Thanks, Licha  ----- Message -----  From: Ellie Tang  Sent: 8/8/2019  10:46 AM  To: [facility] Staff    Needs Advice    Reason for call:        Communication Preference:  Phone     Additional Information:   Pt  Requesting orders to have x-ray on her lungs prior to her appt with the .   Give the pt a call back to schedule

## 2019-08-09 NOTE — TELEPHONE ENCOUNTER
Patient was informed that currently at the moment there aren't any appointments available at the moment that I could offer to her to follow up with Dr Ramires before 11/04. Patient was advised that she has also been placed onto Dr. Ramires wait list in case there are any cancellations on her schedule. Patient verbalized that she understand.

## 2019-08-09 NOTE — TELEPHONE ENCOUNTER
----- Message from Mireya Tiwari sent at 8/9/2019  1:54 PM CDT -----  Contact: self [phone]  .Needs Advice    Reason for call:        Communication Preference:phone     Additional Information:pt would like to know if she can see dr javier before November please call back to discuss

## 2019-09-06 DIAGNOSIS — E11.9 TYPE 2 DIABETES MELLITUS WITHOUT COMPLICATION: ICD-10-CM

## 2019-09-19 ENCOUNTER — INFUSION (OUTPATIENT)
Dept: INFUSION THERAPY | Facility: HOSPITAL | Age: 69
End: 2019-09-19
Attending: INTERNAL MEDICINE
Payer: MEDICARE

## 2019-09-19 VITALS
RESPIRATION RATE: 17 BRPM | TEMPERATURE: 98 F | HEART RATE: 84 BPM | SYSTOLIC BLOOD PRESSURE: 129 MMHG | OXYGEN SATURATION: 95 % | DIASTOLIC BLOOD PRESSURE: 60 MMHG

## 2019-09-19 DIAGNOSIS — M81.0 OSTEOPOROSIS, POST-MENOPAUSAL: Primary | ICD-10-CM

## 2019-09-19 LAB
ANION GAP SERPL CALC-SCNC: 10 MMOL/L (ref 8–16)
BUN SERPL-MCNC: 8 MG/DL (ref 8–23)
CALCIUM SERPL-MCNC: 9.6 MG/DL (ref 8.7–10.5)
CHLORIDE SERPL-SCNC: 103 MMOL/L (ref 95–110)
CO2 SERPL-SCNC: 28 MMOL/L (ref 23–29)
CREAT SERPL-MCNC: 0.9 MG/DL (ref 0.5–1.4)
EST. GFR  (AFRICAN AMERICAN): >60 ML/MIN/1.73 M^2
EST. GFR  (NON AFRICAN AMERICAN): >60 ML/MIN/1.73 M^2
GLUCOSE SERPL-MCNC: 105 MG/DL (ref 70–110)
POTASSIUM SERPL-SCNC: 3.3 MMOL/L (ref 3.5–5.1)
SODIUM SERPL-SCNC: 141 MMOL/L (ref 136–145)

## 2019-09-19 PROCEDURE — 96372 THER/PROPH/DIAG INJ SC/IM: CPT

## 2019-09-19 PROCEDURE — 63600175 PHARM REV CODE 636 W HCPCS: Mod: JG | Performed by: INTERNAL MEDICINE

## 2019-09-19 PROCEDURE — 36415 COLL VENOUS BLD VENIPUNCTURE: CPT

## 2019-09-19 PROCEDURE — 80048 BASIC METABOLIC PNL TOTAL CA: CPT

## 2019-09-19 RX ADMIN — DENOSUMAB 60 MG: 60 INJECTION SUBCUTANEOUS at 11:09

## 2019-09-19 NOTE — PLAN OF CARE
Problem: Adult Inpatient Plan of Care  Goal: Patient-Specific Goal (Individualization)  Outcome: Ongoing (interventions implemented as appropriate)  Pt tolerated peripheral lab draw and Prolia injection without issue. VSS. AVS given. Pt d.c home in stable condition with instructions to return 3/20/20. In interim, pt knows to call clinic with any issues.

## 2019-10-23 DIAGNOSIS — R52 PAIN: ICD-10-CM

## 2019-10-25 RX ORDER — TRAMADOL HYDROCHLORIDE 50 MG/1
TABLET ORAL
Qty: 60 TABLET | Refills: 3 | Status: SHIPPED | OUTPATIENT
Start: 2019-10-25 | End: 2020-02-14

## 2019-10-25 RX ORDER — IRBESARTAN AND HYDROCHLOROTHIAZIDE 150; 12.5 MG/1; MG/1
TABLET, FILM COATED ORAL
Qty: 90 TABLET | Refills: 0 | Status: SHIPPED | OUTPATIENT
Start: 2019-10-25 | End: 2020-01-31

## 2019-10-30 ENCOUNTER — PATIENT OUTREACH (OUTPATIENT)
Dept: ADMINISTRATIVE | Facility: OTHER | Age: 69
End: 2019-10-30

## 2019-11-04 ENCOUNTER — TELEPHONE (OUTPATIENT)
Dept: PULMONOLOGY | Facility: CLINIC | Age: 69
End: 2019-11-04

## 2019-11-04 ENCOUNTER — OFFICE VISIT (OUTPATIENT)
Dept: PULMONOLOGY | Facility: CLINIC | Age: 69
End: 2019-11-04
Payer: MEDICARE

## 2019-11-04 ENCOUNTER — HOSPITAL ENCOUNTER (OUTPATIENT)
Dept: RADIOLOGY | Facility: HOSPITAL | Age: 69
Discharge: HOME | End: 2019-11-04
Attending: INTERNAL MEDICINE
Payer: MEDICARE

## 2019-11-04 VITALS
BODY MASS INDEX: 28.95 KG/M2 | WEIGHT: 163.38 LBS | HEART RATE: 79 BPM | OXYGEN SATURATION: 96 % | HEIGHT: 63 IN | SYSTOLIC BLOOD PRESSURE: 132 MMHG | DIASTOLIC BLOOD PRESSURE: 69 MMHG

## 2019-11-04 DIAGNOSIS — F17.200 SMOKER: ICD-10-CM

## 2019-11-04 DIAGNOSIS — J43.2 CENTRILOBULAR EMPHYSEMA: Primary | ICD-10-CM

## 2019-11-04 DIAGNOSIS — R91.1 LEFT LOWER LOBE PULMONARY NODULE: ICD-10-CM

## 2019-11-04 DIAGNOSIS — R91.1 LUNG NODULE: ICD-10-CM

## 2019-11-04 PROCEDURE — 99214 PR OFFICE/OUTPT VISIT, EST, LEVL IV, 30-39 MIN: ICD-10-PCS | Mod: S$GLB,,, | Performed by: INTERNAL MEDICINE

## 2019-11-04 PROCEDURE — 3075F SYST BP GE 130 - 139MM HG: CPT | Mod: CPTII,S$GLB,, | Performed by: INTERNAL MEDICINE

## 2019-11-04 PROCEDURE — 1101F PR PT FALLS ASSESS DOC 0-1 FALLS W/OUT INJ PAST YR: ICD-10-PCS | Mod: CPTII,S$GLB,, | Performed by: INTERNAL MEDICINE

## 2019-11-04 PROCEDURE — 3075F PR MOST RECENT SYSTOLIC BLOOD PRESS GE 130-139MM HG: ICD-10-PCS | Mod: CPTII,S$GLB,, | Performed by: INTERNAL MEDICINE

## 2019-11-04 PROCEDURE — 3078F DIAST BP <80 MM HG: CPT | Mod: CPTII,S$GLB,, | Performed by: INTERNAL MEDICINE

## 2019-11-04 PROCEDURE — 71250 CT THORAX DX C-: CPT | Mod: 26,,, | Performed by: RADIOLOGY

## 2019-11-04 PROCEDURE — 71250 CT CHEST WITHOUT CONTRAST: ICD-10-PCS | Mod: 26,,, | Performed by: RADIOLOGY

## 2019-11-04 PROCEDURE — 99999 PR PBB SHADOW E&M-EST. PATIENT-LVL V: ICD-10-PCS | Mod: PBBFAC,,, | Performed by: INTERNAL MEDICINE

## 2019-11-04 PROCEDURE — 99214 OFFICE O/P EST MOD 30 MIN: CPT | Mod: S$GLB,,, | Performed by: INTERNAL MEDICINE

## 2019-11-04 PROCEDURE — 1101F PT FALLS ASSESS-DOCD LE1/YR: CPT | Mod: CPTII,S$GLB,, | Performed by: INTERNAL MEDICINE

## 2019-11-04 PROCEDURE — 99999 PR PBB SHADOW E&M-EST. PATIENT-LVL V: CPT | Mod: PBBFAC,,, | Performed by: INTERNAL MEDICINE

## 2019-11-04 PROCEDURE — 3078F PR MOST RECENT DIASTOLIC BLOOD PRESSURE < 80 MM HG: ICD-10-PCS | Mod: CPTII,S$GLB,, | Performed by: INTERNAL MEDICINE

## 2019-11-04 PROCEDURE — 71250 CT THORAX DX C-: CPT | Mod: TC

## 2019-11-04 RX ORDER — ALBUTEROL SULFATE 90 UG/1
1-2 AEROSOL, METERED RESPIRATORY (INHALATION) EVERY 6 HOURS PRN
Qty: 1 INHALER | Refills: 3 | Status: SHIPPED | OUTPATIENT
Start: 2019-11-04 | End: 2020-11-05 | Stop reason: SDUPTHER

## 2019-11-04 RX ORDER — IPRATROPIUM BROMIDE AND ALBUTEROL SULFATE 2.5; .5 MG/3ML; MG/3ML
3 SOLUTION RESPIRATORY (INHALATION) EVERY 6 HOURS PRN
Qty: 3 BOX | Refills: 12 | Status: SHIPPED | OUTPATIENT
Start: 2019-11-04 | End: 2021-05-12

## 2019-11-04 NOTE — PROGRESS NOTES
"Subjective:       Patient ID: Sarina Genao is a 69 y.o. female.    Chief Complaint: Pulmonary Nodules and COPD    69 year old with a history of LLL nodule and emphysema.  Doing well.  Chronic cough with mild phlegm.  No hemoptysis.  Does not take anoro daily for control.  Rarely uses albuterol.  Has dysphagia and trouble swallowing.    LLL nodule has had navigation without an alternative diagnosis. HEre today for follow up.  Review of Systems    Objective:       Vitals:    11/04/19 1033   BP: 132/69   BP Location: Right arm   Patient Position: Sitting   Pulse: 79   SpO2: 96%   Weight: 74.1 kg (163 lb 5.8 oz)   Height: 5' 3" (1.6 m)     Physical Exam   Constitutional: She is oriented to person, place, and time. She appears well-developed and well-nourished.   Cardiovascular: Normal rate and regular rhythm.   Pulmonary/Chest: She has no wheezes. She has no rales.   Musculoskeletal: Normal range of motion.   Lymphadenopathy: No supraclavicular adenopathy is present.     She has no cervical adenopathy.   Neurological: She is alert and oriented to person, place, and time.   Psychiatric: She has a normal mood and affect.        Personal Diagnostic Review  CT of chest performed on 11/4/2019 without contrast revealed Stable left hilar soft tissue opacity, similar in size and appearance to multiple prior exams dating back to 10/12/2018.    -Stable 0.5 cm pulmonary nodule within the left upper lobe compared to prior CT 10/12/2018.    We recommend continued surveillance with repeat noncontrast chest CT in October 2020.    2.  Moderate to severe emphysema.  Stable 2.5 cm bulla in the medial aspect of the apical segment of the right upper lobe..  No flowsheet data found.      Assessment:       1. Centrilobular emphysema    2. Smoker    3. Left lower lobe pulmonary nodule        Outpatient Encounter Medications as of 11/4/2019   Medication Sig Dispense Refill    albuterol (PROVENTIL/VENTOLIN HFA) 90 mcg/actuation inhaler " "Inhale 1-2 puffs into the lungs every 6 (six) hours as needed for Wheezing. Rescue 1 Inhaler 3    albuterol-ipratropium (DUO-NEB) 2.5 mg-0.5 mg/3 mL nebulizer solution Take 3 mLs by nebulization every 6 (six) hours as needed for Wheezing or Shortness of Breath. Rescue 3 Box 12    amLODIPine (NORVASC) 5 MG tablet Take 1 tablet (5 mg total) by mouth once daily. 90 tablet 3    aspirin (ECOTRIN) 81 MG EC tablet Take 81 mg by mouth once daily.      calcium carbonate (OS-LISSETTE) 500 mg calcium (1,250 mg) tablet Take 1 tablet (500 mg total) by mouth once daily. 30 tablet 5    cyanocobalamin, vitamin B-12, (VITAMIN B-12) 1,000 mcg TbSR Take by mouth once daily.       DOCOSAHEXANOIC ACID/EPA (FISH OIL ORAL) Take 1,200 mg by mouth once daily.      FLUAD [id number], 65 YR UP,,PF, 45 mcg (15 mcg x 3)/0.5 mL Syrg inject .5 milliliter intramuscularly as directed  0    furosemide (LASIX) 20 MG tablet Take 1 tablet (20 mg total) by mouth once daily. 30 tablet 11    glipiZIDE (GLUCOTROL) 5 MG tablet Take 1 tablet (5 mg total) by mouth 2 (two) times daily with meals. 60 tablet 11    hydroCHLOROthiazide (HYDRODIURIL) 12.5 MG Tab TAKE 1 TABLET(12.5 MG) BY MOUTH EVERY DAY 90 tablet 12    insulin degludec (TRESIBA FLEXTOUCH U-200) 200 unit/mL (3 mL) InPn Inject 55 Units into the skin once daily. 6 mL 8    irbesartan (AVAPRO) 150 MG tablet TAKE 1 TABLET(150 MG) BY MOUTH EVERY DAY 90 tablet 2    irbesartan-hydrochlorothiazide (AVALIDE) 150-12.5 mg per tablet TAKE ONE TABLET BY MOUTH ONCE A DAY 90 tablet 0    oxybutynin (DITROPAN-XL) 5 MG TR24 TAKE 1 TABLET(5 MG) BY MOUTH EVERY DAY 90 tablet 12    pen needle, diabetic (BD INSULIN PEN NEEDLE UF SHORT) 31 gauge x 5/16" Ndle USE AS DIRECTED 100 each 12    ranitidine (ZANTAC) 300 MG tablet TAKE 1 TABLET(300 MG) BY MOUTH EVERY EVENING 90 tablet 0    rosuvastatin (CRESTOR) 20 MG tablet TAKE 1 TABLET(20 MG) BY MOUTH EVERY DAY 90 tablet 12    semaglutide (OZEMPIC) 0.25 mg or 0.5 mg(2 " mg/1.5 mL) PnIj Inject 0.5 mg into the skin once a week. 3 mL 9    traMADol (ULTRAM) 50 mg tablet TAKE ONE TABLET BY MOUTH EVERY 6 HOURS AS NEEDED 60 tablet 3    umeclidinium-vilanterol (ANORO ELLIPTA) 62.5-25 mcg/actuation DsDv Inhale 1 puff into the lungs once daily. THIS SHOULD BE USED, NOT INCRUSE 3 each 3    [DISCONTINUED] albuterol (PROVENTIL/VENTOLIN HFA) 90 mcg/actuation inhaler Inhale 1-2 puffs into the lungs every 6 (six) hours as needed for Wheezing. Rescue (Patient taking differently: Inhale 1-2 puffs into the lungs every 6 (six) hours as needed for Wheezing. Rescue) 1 Inhaler 3    [DISCONTINUED] albuterol-ipratropium (DUO-NEB) 2.5 mg-0.5 mg/3 mL nebulizer solution Take 3 mLs by nebulization every 6 (six) hours as needed for Wheezing or Shortness of Breath. Rescue (Patient taking differently: Take 3 mLs by nebulization every 6 (six) hours as needed for Wheezing or Shortness of Breath. Rescue) 3 Box 12    [DISCONTINUED] umeclidinium-vilanterol (ANORO ELLIPTA) 62.5-25 mcg/actuation DsDv Inhale 1 puff into the lungs once daily. THIS SHOULD BE USED, NOT INCRUSE 1 each 3    insulin detemir U-100 (LEVEMIR FLEXTOUCH) 100 unit/mL (3 mL) SubQ InPn pen Inject 50 Units into the skin every evening. 100 mL 12     No facility-administered encounter medications on file as of 11/4/2019.      No orders of the defined types were placed in this encounter.    Plan:     Problem List Items Addressed This Visit     Centrilobular emphysema - Primary    Overview     Smoking cessation discussed.  Continue anoro daily and albuterol for rescue and prevention.         Relevant Medications    umeclidinium-vilanterol (ANORO ELLIPTA) 62.5-25 mcg/actuation DsDv    albuterol-ipratropium (DUO-NEB) 2.5 mg-0.5 mg/3 mL nebulizer solution    albuterol (PROVENTIL/VENTOLIN HFA) 90 mcg/actuation inhaler    Left lower lobe pulmonary nodule    Overview     1.8 cm LLL, detected 10/2018.  Stable for 12 months.  Follow up in one year with CT  prior.         Smoker    Overview     Has restarted smoking.  Encouraged to quit.

## 2019-11-04 NOTE — TELEPHONE ENCOUNTER
I spoke to Ms Herman to let her know that nodule is stable per Dr Ramires. Patient verbalized understanding.

## 2019-11-04 NOTE — TELEPHONE ENCOUNTER
----- Message from Ashtyn Ramires MD sent at 11/4/2019 12:32 PM CST -----  Please call patient and let her know that nodule is stable  Thanks,  Ashtyn

## 2019-11-06 ENCOUNTER — OFFICE VISIT (OUTPATIENT)
Dept: FAMILY MEDICINE | Facility: CLINIC | Age: 69
End: 2019-11-06
Payer: MEDICARE

## 2019-11-06 VITALS
SYSTOLIC BLOOD PRESSURE: 132 MMHG | OXYGEN SATURATION: 96 % | TEMPERATURE: 99 F | BODY MASS INDEX: 28.64 KG/M2 | WEIGHT: 161.63 LBS | HEIGHT: 63 IN | DIASTOLIC BLOOD PRESSURE: 68 MMHG | HEART RATE: 87 BPM

## 2019-11-06 DIAGNOSIS — M81.0 OSTEOPOROSIS, POST-MENOPAUSAL: ICD-10-CM

## 2019-11-06 DIAGNOSIS — I10 ESSENTIAL HYPERTENSION: ICD-10-CM

## 2019-11-06 DIAGNOSIS — M18.10 OSTEOARTHRITIS OF THUMB, UNSPECIFIED LATERALITY: Primary | ICD-10-CM

## 2019-11-06 DIAGNOSIS — K21.9 GASTROESOPHAGEAL REFLUX DISEASE, ESOPHAGITIS PRESENCE NOT SPECIFIED: ICD-10-CM

## 2019-11-06 DIAGNOSIS — Z79.4 LONG-TERM INSULIN USE: ICD-10-CM

## 2019-11-06 DIAGNOSIS — N18.30 CKD (CHRONIC KIDNEY DISEASE) STAGE 3, GFR 30-59 ML/MIN: ICD-10-CM

## 2019-11-06 PROCEDURE — 1101F PT FALLS ASSESS-DOCD LE1/YR: CPT | Mod: CPTII,S$GLB,, | Performed by: INTERNAL MEDICINE

## 2019-11-06 PROCEDURE — 3075F SYST BP GE 130 - 139MM HG: CPT | Mod: CPTII,S$GLB,, | Performed by: INTERNAL MEDICINE

## 2019-11-06 PROCEDURE — 99499 UNLISTED E&M SERVICE: CPT | Mod: S$GLB,,, | Performed by: INTERNAL MEDICINE

## 2019-11-06 PROCEDURE — 3044F HG A1C LEVEL LT 7.0%: CPT | Mod: CPTII,S$GLB,, | Performed by: INTERNAL MEDICINE

## 2019-11-06 PROCEDURE — 3044F PR MOST RECENT HEMOGLOBIN A1C LEVEL <7.0%: ICD-10-PCS | Mod: CPTII,S$GLB,, | Performed by: INTERNAL MEDICINE

## 2019-11-06 PROCEDURE — 99499 RISK ADDL DX/OHS AUDIT: ICD-10-PCS | Mod: S$GLB,,, | Performed by: INTERNAL MEDICINE

## 2019-11-06 PROCEDURE — 99999 PR PBB SHADOW E&M-EST. PATIENT-LVL IV: CPT | Mod: PBBFAC,,, | Performed by: INTERNAL MEDICINE

## 2019-11-06 PROCEDURE — 99214 OFFICE O/P EST MOD 30 MIN: CPT | Mod: S$GLB,,, | Performed by: INTERNAL MEDICINE

## 2019-11-06 PROCEDURE — 1101F PR PT FALLS ASSESS DOC 0-1 FALLS W/OUT INJ PAST YR: ICD-10-PCS | Mod: CPTII,S$GLB,, | Performed by: INTERNAL MEDICINE

## 2019-11-06 PROCEDURE — 3078F PR MOST RECENT DIASTOLIC BLOOD PRESSURE < 80 MM HG: ICD-10-PCS | Mod: CPTII,S$GLB,, | Performed by: INTERNAL MEDICINE

## 2019-11-06 PROCEDURE — 99214 PR OFFICE/OUTPT VISIT, EST, LEVL IV, 30-39 MIN: ICD-10-PCS | Mod: S$GLB,,, | Performed by: INTERNAL MEDICINE

## 2019-11-06 PROCEDURE — 3078F DIAST BP <80 MM HG: CPT | Mod: CPTII,S$GLB,, | Performed by: INTERNAL MEDICINE

## 2019-11-06 PROCEDURE — 99999 PR PBB SHADOW E&M-EST. PATIENT-LVL IV: ICD-10-PCS | Mod: PBBFAC,,, | Performed by: INTERNAL MEDICINE

## 2019-11-06 PROCEDURE — 3075F PR MOST RECENT SYSTOLIC BLOOD PRESS GE 130-139MM HG: ICD-10-PCS | Mod: CPTII,S$GLB,, | Performed by: INTERNAL MEDICINE

## 2019-11-06 RX ORDER — DICLOFENAC SODIUM 10 MG/G
2 GEL TOPICAL 4 TIMES DAILY
Qty: 4 TUBE | Refills: 12 | Status: SHIPPED | OUTPATIENT
Start: 2019-11-06 | End: 2021-05-12

## 2019-11-06 RX ORDER — FLUCONAZOLE 150 MG/1
150 TABLET ORAL DAILY
Qty: 1 TABLET | Refills: 0 | Status: SHIPPED | OUTPATIENT
Start: 2019-11-06 | End: 2019-11-07

## 2019-11-06 NOTE — PROGRESS NOTES
Chief complaint:  Discussed arthritis    69-year-old black female  whose PCP is retired.  Regarding health maintenance she is up-to-date on mammogram 5/19.  .  It looks like she had colon polyps in 2013, 1 polyp 4/18--5 yrs.  She did quit smoking.  Discussed calorie intake and expenditure as well as means to reduce wt    Patient thought her osteoporosis might be causing a lot of joint pains but reassured her the osteoporosis is under treatment.  Her primary complaint is pain in both thumbs.  She has more pain on the right than the left and rates it moderate with use.  She has visible arthritis of all the joints of the thumb but the rest of her hands actually appears very normal without much significant arthritis in the fingers or wrist.  She use to work at the [employer].  She does not have an established orthopedic.  We did discuss that there various treatments for her thumb arthritis.  If severe she can get cortisone injections and that may be surgical treatment as well in the future.  We discussed possible referral to occupational medicine but she really does not have much stiffness in the morning and she would like to defer.  We discussed using glucosamine and chondroitin.  We reviewed her labs and so forth.Total time over 25 minutes with over 50% counseling.  She is followed by Endocrine and had her labs outside the system but does report significant improvement with an A1c of 5.6.      4/18  Impression      Osteoporosis of total hip, femoral neck and lumbar spine.  Decrease in hip (-4.7%) and increase in lumbar spine (5%) since prior study.         ROS:   CONST: weight stable. EYES: no vision change. ENT: no sore throat. CV: no chest pain w/ exertion. RESP: no shortness of breath. GI: no nausea, vomiting, diarrhea. No dysphagia. : no urinary issues. MUSCULOSKELETAL: no new myalgias or arthralgias. SKIN: no new changes. NEURO: no focal deficits. PSYCH: no new issues. ENDOCRINE: no polyuria. HEME: no  lymph nodes. ALLERGY: no general pruritis.    Past Medical History   Diagnosis Date    Cataracts, bilateral     CKD (chronic kidney disease) stage 3, GFR 30-59 ml/min 12/15/2014    Combined hyperlipidemia associated with type 2 diabetes mellitus 6/7/2013    COPD (chronic obstructive pulmonary disease) 12/15/2014    Diabetes mellitus type II----with chronic kidney disease           Diabetes mellitus with renal manifestations, uncontrolled 2/1/2017    Diabetic retinopathy     Family history of stomach cancer 12/5/2013    H/O renal cell carcinoma 12/15/2015    History of colonic polyps 2/1/2017     3 polyps 7/13 ---1 polyp 4/18--5 yrs    History of gastroesophageal reflux (GERD)     History of urinary incontinence      s/p bladder lift-october, 2011 (at [facility])    Hyperlipidemia     Hypertension      120s/70s-80s    Nephrolithiasis     Osteoporosis, post-menopausal, evaluated by Dr. York, quit Fosamax due to CKD early 2017  3/17/2014    Primary hyperparathyroidism 10/20/2016    Schatzki's ring 2/1/2017     Dilated 2014   Prevnar 2017    Past Surgical History:   Procedure Laterality Date    bladder lift  2011    done at [facility]    BLADDER STONE REMOVAL  2011    before bladder lift    CATARACT EXTRACTION W/  INTRAOCULAR LENS IMPLANT Left 06/07/2016    Dr. Vásquez    CATARACT EXTRACTION W/  INTRAOCULAR LENS IMPLANT Right 06/21/2016    Dr. Vásquez    COLONOSCOPY N/A 4/26/2018    Procedure: COLONOSCOPY;  Surgeon: Benjamin Zamora MD;  Location: [facility];  Service: Endoscopy;  Laterality: N/A;  confirmed ss    CYSTOSCOPY      HYSTERECTOMY      NAVIGATIONAL BRONCHOSCOPY N/A 12/11/2018    Procedure: BRONCHOSCOPY, NAVIGATIONAL;  Surgeon: Ashtyn Ramires MD;  Location: [address];  Service: Pulmonary;  Laterality: N/A;    NEPHRECTOMY      partial right     Social History     Socioeconomic History    Marital status:      Spouse name: Not on file    Number of children: Not on file     Years of education: Not on file    Highest education level: Not on file   Occupational History    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   Social Needs    Financial resource strain: Not on file    Food insecurity:     Worry: Not on file     Inability: Not on file    Transportation needs:     Medical: Not on file     Non-medical: Not on file   Tobacco Use    Smoking status: Former Smoker     Packs/day: 0.25     Years: 30.00     Pack years: 7.50     Types: Cigarettes    Smokeless tobacco: Never Used    Tobacco comment: pt. reports quitting January 2018   Substance and Sexual Activity    Alcohol use: No     Alcohol/week: 0.0 standard drinks    Drug use: No    Sexual activity: Not on file   Lifestyle    Physical activity:     Days per week: Not on file     Minutes per session: Not on file    Stress: Not on file   Relationships    Social connections:     Talks on phone: Not on file     Gets together: Not on file     Attends Restorationism service: Not on file     Active member of club or organization: Not on file     Attends meetings of clubs or organizations: Not on file     Relationship status: Not on file   Other Topics Concern    Not on file   Social History Narrative    [place] [facility] [place] [place]    Here with sister Kate [phone]         family history includes Cataracts in her mother; Colon cancer in her brother; Glaucoma in her mother; Nephrolithiasis in her sister; No Known Problems in her father, maternal aunt, maternal grandfather, maternal grandmother, maternal uncle, paternal aunt, paternal grandfather, paternal grandmother, and paternal uncle.     Gen: no distress  Musculoskeletal:  The thumbs have diffuse arthritis with reproducible crepitus and pain. No tendinitis.  No trigger fingers.  Fingers MCPs and wrists are all full range of motion of both hands without any defects or pain or synovitis.  Skin no rashes, warm to touch.    Sarina was seen today for diabetes.    Diagnoses and all orders for  this visit:    Osteoarthritis of thumb, unspecified laterality, bilateral, we can only expect this to worsen and start to limit her function, likely good time to establish with the hand orthopedic, discussed on a treatment options as above.  Will avoid anti-inflammatories given her history of renal insufficiency we reviewed the Nephrology notes which obviously show avoiding anti-inflammatories pill will try some diclofenac gel if approved by insurance, glucosamine and chondroitin, referral to Orthopedics and so forth.  -     Ambulatory referral/consult to Orthopedics; Future    Uncontrolled type 2 diabetes mellitus with stage 3 chronic kidney disease, without long-term current use of insulin, apparently improving, patient will get us copies of records    CKD (chronic kidney disease) stage 3, GFR 30-59 ml/min, last renal function actually normalized    Gastroesophageal reflux disease, esophagitis presence not specified, chronic and stable    Long-term insulin use, managed by Endocrine and we corrected her medication list    Osteoporosis, post-menopausal, now on Prolia    Essential hypertension, chronic and stable    Other orders  -     diclofenac sodium (VOLTAREN) 1 % Gel; Apply 2 g topically 4 (four) times daily.  -     fluconazole (DIFLUCAN) 150 MG Tab; Take 1 tablet (150 mg total) by mouth once daily. for 1 day, patient request one in case she gets a yeast infection

## 2019-12-11 ENCOUNTER — TELEPHONE (OUTPATIENT)
Dept: NEPHROLOGY | Facility: CLINIC | Age: 69
End: 2019-12-11

## 2019-12-11 NOTE — TELEPHONE ENCOUNTER
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 Please call     Called pt made appt

## 2019-12-17 ENCOUNTER — PATIENT OUTREACH (OUTPATIENT)
Dept: ADMINISTRATIVE | Facility: OTHER | Age: 69
End: 2019-12-17

## 2019-12-19 DIAGNOSIS — M79.642 PAIN IN BOTH HANDS: Primary | ICD-10-CM

## 2019-12-19 DIAGNOSIS — M79.641 PAIN IN BOTH HANDS: Primary | ICD-10-CM

## 2019-12-20 ENCOUNTER — OFFICE VISIT (OUTPATIENT)
Dept: ORTHOPEDICS | Facility: CLINIC | Age: 69
End: 2019-12-20
Payer: MEDICARE

## 2019-12-20 ENCOUNTER — APPOINTMENT (OUTPATIENT)
Dept: RADIOLOGY | Facility: HOSPITAL | Age: 69
End: 2019-12-20
Attending: ORTHOPAEDIC SURGERY
Payer: MEDICARE

## 2019-12-20 VITALS
WEIGHT: 164.25 LBS | DIASTOLIC BLOOD PRESSURE: 79 MMHG | HEART RATE: 84 BPM | SYSTOLIC BLOOD PRESSURE: 124 MMHG | BODY MASS INDEX: 29.1 KG/M2 | HEIGHT: 63 IN

## 2019-12-20 DIAGNOSIS — M18.10 OSTEOARTHRITIS OF THUMB, UNSPECIFIED LATERALITY: ICD-10-CM

## 2019-12-20 DIAGNOSIS — M65.4 DE QUERVAIN'S TENOSYNOVITIS, BILATERAL: ICD-10-CM

## 2019-12-20 DIAGNOSIS — M79.642 PAIN IN BOTH HANDS: ICD-10-CM

## 2019-12-20 DIAGNOSIS — M79.641 PAIN IN BOTH HANDS: ICD-10-CM

## 2019-12-20 PROCEDURE — 99203 OFFICE O/P NEW LOW 30 MIN: CPT | Mod: S$GLB,,, | Performed by: ORTHOPAEDIC SURGERY

## 2019-12-20 PROCEDURE — 99999 PR PBB SHADOW E&M-EST. PATIENT-LVL IV: CPT | Mod: PBBFAC,,, | Performed by: ORTHOPAEDIC SURGERY

## 2019-12-20 PROCEDURE — 1159F PR MEDICATION LIST DOCUMENTED IN MEDICAL RECORD: ICD-10-PCS | Mod: S$GLB,,, | Performed by: ORTHOPAEDIC SURGERY

## 2019-12-20 PROCEDURE — 73130 X-RAY EXAM OF HAND: CPT | Mod: 50,TC,FY,PN

## 2019-12-20 PROCEDURE — 1126F AMNT PAIN NOTED NONE PRSNT: CPT | Mod: S$GLB,,, | Performed by: ORTHOPAEDIC SURGERY

## 2019-12-20 PROCEDURE — 3074F SYST BP LT 130 MM HG: CPT | Mod: CPTII,S$GLB,, | Performed by: ORTHOPAEDIC SURGERY

## 2019-12-20 PROCEDURE — 99203 PR OFFICE/OUTPT VISIT, NEW, LEVL III, 30-44 MIN: ICD-10-PCS | Mod: S$GLB,,, | Performed by: ORTHOPAEDIC SURGERY

## 2019-12-20 PROCEDURE — 73130 X-RAY EXAM OF HAND: CPT | Mod: 26,50,, | Performed by: RADIOLOGY

## 2019-12-20 PROCEDURE — 3078F PR MOST RECENT DIASTOLIC BLOOD PRESSURE < 80 MM HG: ICD-10-PCS | Mod: CPTII,S$GLB,, | Performed by: ORTHOPAEDIC SURGERY

## 2019-12-20 PROCEDURE — 3074F PR MOST RECENT SYSTOLIC BLOOD PRESSURE < 130 MM HG: ICD-10-PCS | Mod: CPTII,S$GLB,, | Performed by: ORTHOPAEDIC SURGERY

## 2019-12-20 PROCEDURE — 1159F MED LIST DOCD IN RCRD: CPT | Mod: S$GLB,,, | Performed by: ORTHOPAEDIC SURGERY

## 2019-12-20 PROCEDURE — 1101F PR PT FALLS ASSESS DOC 0-1 FALLS W/OUT INJ PAST YR: ICD-10-PCS | Mod: CPTII,S$GLB,, | Performed by: ORTHOPAEDIC SURGERY

## 2019-12-20 PROCEDURE — 99999 PR PBB SHADOW E&M-EST. PATIENT-LVL IV: ICD-10-PCS | Mod: PBBFAC,,, | Performed by: ORTHOPAEDIC SURGERY

## 2019-12-20 PROCEDURE — 73130 XR HAND COMPLETE 3 VIEWS BILATERAL: ICD-10-PCS | Mod: 26,50,, | Performed by: RADIOLOGY

## 2019-12-20 PROCEDURE — 1126F PR PAIN SEVERITY QUANTIFIED, NO PAIN PRESENT: ICD-10-PCS | Mod: S$GLB,,, | Performed by: ORTHOPAEDIC SURGERY

## 2019-12-20 PROCEDURE — 3078F DIAST BP <80 MM HG: CPT | Mod: CPTII,S$GLB,, | Performed by: ORTHOPAEDIC SURGERY

## 2019-12-20 PROCEDURE — 1101F PT FALLS ASSESS-DOCD LE1/YR: CPT | Mod: CPTII,S$GLB,, | Performed by: ORTHOPAEDIC SURGERY

## 2019-12-20 NOTE — PROGRESS NOTES
Chief Complaint   Patient presents with    Finger Pain     left thumb, right thumb       This patient was seen in consultation at the request of Dr. Venancio Mayer     HPI: Sarina Genao is a 69 y.o. female who presents today complaining of Finger Pain (left thumb, right thumb)     Duration of symptoms:  About 6 weeks  Trauma or new activity: no  Pain is constant  Aggravating factors: gripping, motion of the hands   Relieving factors: rest, meds as below  Radicular symptoms: no numbness, paresthesias   Associated symptoms:  swelling and limited range of motion.    Prior treatment:  Tylenol arthritis 8 h helps. No help with turmeric tablets.  Dr Mayer prescribed voltaren gel and glucosamine chondrotin  Pain does interfere with activities of daily living .  Localizes pain to first dorsal compartment, thumb IP joints     This is the extent of the patient's complaints at this time.     Hand dominance: Right     Occupation: Retired Radiology tech, worked at [employer]     Review of Systems   All other systems reviewed and are negative.        Review of patient's allergies indicates:   Allergen Reactions    Metformin Diarrhea    Pantoprazole      elevated blood sugars         Current Outpatient Medications:     albuterol (PROVENTIL/VENTOLIN HFA) 90 mcg/actuation inhaler, Inhale 1-2 puffs into the lungs every 6 (six) hours as needed for Wheezing. Rescue, Disp: 1 Inhaler, Rfl: 3    albuterol-ipratropium (DUO-NEB) 2.5 mg-0.5 mg/3 mL nebulizer solution, Take 3 mLs by nebulization every 6 (six) hours as needed for Wheezing or Shortness of Breath. Rescue, Disp: 3 Box, Rfl: 12    aspirin (ECOTRIN) 81 MG EC tablet, Take 81 mg by mouth once daily., Disp: , Rfl:     calcium carbonate (OS-LISSETTE) 500 mg calcium (1,250 mg) tablet, Take 1 tablet (500 mg total) by mouth once daily., Disp: 30 tablet, Rfl: 5    cyanocobalamin, vitamin B-12, (VITAMIN B-12) 1,000 mcg TbSR, Take by mouth once daily. ,  "Disp: , Rfl:     diclofenac sodium (VOLTAREN) 1 % Gel, Apply 2 g topically 4 (four) times daily., Disp: 4 Tube, Rfl: 12    DOCOSAHEXANOIC ACID/EPA (FISH OIL ORAL), Take 1,200 mg by mouth once daily., Disp: , Rfl:     FLUAD [id number], 65 YR UP,,PF, 45 mcg (15 mcg x 3)/0.5 mL Syrg, inject .5 milliliter intramuscularly as directed, Disp: , Rfl: 0    irbesartan-hydrochlorothiazide (AVALIDE) 150-12.5 mg per tablet, TAKE ONE TABLET BY MOUTH ONCE A DAY, Disp: 90 tablet, Rfl: 0    oxybutynin (DITROPAN-XL) 5 MG TR24, TAKE 1 TABLET(5 MG) BY MOUTH EVERY DAY, Disp: 90 tablet, Rfl: 12    pen needle, diabetic (BD INSULIN PEN NEEDLE UF SHORT) 31 gauge x 5/16" Ndle, USE AS DIRECTED, Disp: 100 each, Rfl: 12    ranitidine (ZANTAC) 300 MG tablet, TAKE 1 TABLET(300 MG) BY MOUTH EVERY EVENING, Disp: 90 tablet, Rfl: 0    rosuvastatin (CRESTOR) 20 MG tablet, TAKE 1 TABLET(20 MG) BY MOUTH EVERY DAY, Disp: 90 tablet, Rfl: 12    traMADol (ULTRAM) 50 mg tablet, TAKE ONE TABLET BY MOUTH EVERY 6 HOURS AS NEEDED, Disp: 60 tablet, Rfl: 3    umeclidinium-vilanterol (ANORO ELLIPTA) 62.5-25 mcg/actuation DsDv, Inhale 1 puff into the lungs once daily. THIS SHOULD BE USED, NOT INCRUSE, Disp: 3 each, Rfl: 3    amLODIPine (NORVASC) 5 MG tablet, Take 1 tablet (5 mg total) by mouth once daily., Disp: 90 tablet, Rfl: 3    furosemide (LASIX) 20 MG tablet, Take 1 tablet (20 mg total) by mouth once daily., Disp: 30 tablet, Rfl: 11    glipiZIDE (GLUCOTROL) 5 MG tablet, Take 1 tablet (5 mg total) by mouth 2 (two) times daily with meals., Disp: 60 tablet, Rfl: 11    Past Medical History:   Diagnosis Date    Anticoagulant long-term use     Cancer     Kidney (Right)    Cataracts, bilateral     CKD (chronic kidney disease) stage 3, GFR 30-59 ml/min 12/15/2014    Combined hyperlipidemia associated with type 2 diabetes mellitus 6/7/2013    COPD (chronic obstructive pulmonary disease) 12/15/2014    Diabetes mellitus type II     NIDDM, a.m. " glucose-120s-130s-last A1c-7.1-6/7/13    Diabetes mellitus with renal manifestations, uncontrolled 2/1/2017    Diabetic retinopathy     Family history of stomach cancer 12/5/2013    Former smoker     H/O renal cell carcinoma 12/15/2015    History of colonic polyps 2/1/2017    3 polyps 7/13 ---5 yrs    History of gastroesophageal reflux (GERD)     History of urinary incontinence     s/p bladder lift-october, 2011 (at [facility])    Hyperlipidemia     Hypertension     120s/70s-80s    Nephrolithiasis     Osteoarthritis of thumb 12/20/2019    Osteoporosis, post-menopausal 3/17/2014    Primary hyperparathyroidism 10/20/2016    Schatzki's ring 2/1/2017    Dilated 2014       Patient Active Problem List   Diagnosis    Combined hyperlipidemia associated with type 2 diabetes mellitus    Hypertension, benign    Urinary incontinence, urge    History of kidney cancer    Family history of stomach cancer    GERD (gastroesophageal reflux disease)    Osteoporosis, post-menopausal    Tobacco dependence    Centrilobular emphysema    CKD (chronic kidney disease) stage 3, GFR 30-59 ml/min    Nuclear sclerosis of both eyes    DM type 2 without retinopathy    Essential hypertension    Refractive error    Senile nuclear sclerosis    Post-operative state    Nuclear sclerosis of right eye    Primary hyperparathyroidism    Diabetes mellitus with renal manifestations, uncontrolled    History of colonic polyps    Schatzki's ring    Pseudophakia    PVD (peripheral vascular disease) with claudication    PVD (peripheral vascular disease)    Bilateral carotid artery stenosis    PCO (posterior capsular opacification), right    Pulmonary vascular congestion    Type 2 diabetes mellitus with kidney complication, with long-term current use of insulin    Type 2 diabetes mellitus with hyperglycemia, with long-term current use of insulin    Chest pain on breathing    Left lower lobe pulmonary nodule     Asymptomatic stenosis of both carotid arteries without infarction    Aortic atherosclerosis    Smoker    Osteoarthritis of thumb       Past Surgical History:   Procedure Laterality Date    bladder [facility]  2011    [facility]    BLADDER STONE REMOVAL  2011    before bladder lift    CATARACT EXTRACTION W/  INTRAOCULAR LENS IMPLANT Left 06/07/2016    Dr. Vásquez    CATARACT EXTRACTION W/  INTRAOCULAR LENS IMPLANT Right 06/21/2016    Dr. Vásquez    COLONOSCOPY N/A 4/26/2018    Procedure: COLONOSCOPY;  Surgeon: Benjamin Zamora MD;  Location[facility];  Service: Endoscopy;  Laterality: N/A;  confirmed ss    CYSTOSCOPY      HYSTERECTOMY      NAVIGATIONAL BRONCHOSCOPY N/A 12/11/2018    Procedure: BRONCHOSCOPY, NAVIGATIONAL;  Surgeon: Ashtyn Ramires MD;  Location: [address];  Service: Pulmonary;  Laterality: N/A;    NEPHRECTOMY      partial right       Social History     Tobacco Use    Smoking status: Former Smoker     Packs/day: 0.25     Years: 30.00     Pack years: 7.50     Types: Cigarettes    Smokeless tobacco: Never Used    Tobacco comment: pt. reports quitting January 2018   Substance Use Topics    Alcohol use: No     Alcohol/week: 0.0 standard drinks    Drug use: No       Family History   Problem Relation Age of Onset    Glaucoma Mother     Cataracts Mother     No Known Problems Father     Colon cancer Brother     Nephrolithiasis Sister     No Known Problems Maternal Aunt     No Known Problems Maternal Uncle     No Known Problems Paternal Aunt     No Known Problems Paternal Uncle     No Known Problems Maternal Grandmother     No Known Problems Maternal Grandfather     No Known Problems Paternal Grandmother     No Known Problems Paternal Grandfather     Anesthesia problems Neg Hx     Kidney cancer Neg Hx     Amblyopia Neg Hx     Blindness Neg Hx     Cancer Neg Hx     Diabetes Neg Hx     Hypertension Neg Hx     Macular degeneration Neg Hx     Retinal detachment Neg  "Hx     Strabismus Neg Hx     Stroke Neg Hx     Thyroid disease Neg Hx        Physical Exam:   Vitals:    12/20/19 1001   BP: 124/79   Pulse: 84   Weight: 74.5 kg (164 lb 3.9 oz)   Height: 5' 3" (1.6 m)   PainSc: 0-No pain   PainLoc: Finger       General: Weight: 74.5 kg (164 lb 3.9 oz) Body mass index is 29.09 kg/m².   Patient is alert, awake and oriented to time, place and person. Mood and affect are appropriate.  Patient does not appear to be in any distress, denies any constitutional symptoms and appears stated age.   HEENT:  Pupils are equal and round, sclera are not injected. External examination of ears and nose reveals no abnormalities. Cranial nerves II-X are grossly intact  Skin:  no rashes, abrasions or open wounds on the affected extremity   Resp:  No respiratory distress or audible wheezing   CV: 2+  pulses, all extremities warm and well perfused   Left and Right Hand    Swelling of multiple IP joints consistent with OA  Positive Finkelstein  Negative grind   LTSI m/u/r  2+ RP  + EPL, IO, FDS, FDP     Imaging: 3 views bilateral hands show degenerative changes at multiple IP joints    I personally reviewed and interpreted the patient's imaging obtained today in clinic     Assessment: 69 y.o. female with bilateral hand OA, bilateral deQuervain tenosynovitis     I explained my diagnostic impression and the reasoning behind it in detail, using layman's terms.  Models and/or pictures were used to help in the explanation.    Plan:   - Paraffin bath on low   - thumb spica splints with activity   - Continue voltaren gel per PCP  - Can consider injection in the future If pain worsens     - Return to clinic PRN if symptoms worsen or fail to improve.    All questions were answered in detail. The patient is in full agreement with the treatment plan and will proceed accordingly.    A note notifying Dr. Venancio Mayer of my findings was sent via the electronic medical record     This note was created by " combination of typed  and M-Modal dictation. Transcription and phonetic errors may be present.  If there are any questions, please contact me.

## 2019-12-20 NOTE — LETTER
December 20, 2019      Venancio Mayer MD  [address] [place] [address]           [facility]  [address] [place] [address]  Phone: [phone]          Patient: Sarina Genao   MR Number: [id number]   YOB: 1950   Date of Visit: 12/20/2019       Dear Dr. Venancio Mayer:    Thank you for referring Sarina Genao to me for evaluation. Attached you will find relevant portions of my assessment and plan of care.    If you have questions, please do not hesitate to call me. I look forward to following Sarina Genao along with you.    Sincerely,    Leonor Morelos MD    Enclosure  CC:  No Recipients    If you would like to receive this communication electronically, please contact [email or web address] or [phone] to request more information on [facility] access.    For providers and/or their staff who would like to refer a patient to [facility], please contact us through our one-stop-shop provider referral line, [facility], at [phone].    If you feel you have received this communication in error or would no longer like to receive these types of communications, please e-mail [email or web address]

## 2020-01-30 ENCOUNTER — TELEPHONE (OUTPATIENT)
Dept: FAMILY MEDICINE | Facility: CLINIC | Age: 70
End: 2020-01-30

## 2020-01-30 NOTE — TELEPHONE ENCOUNTER
----- Message from Nuria Rodriguez sent at 1/30/2020 10:35 AM CST -----  Contact: LES VELA  Name of Who is Calling: LES VELA      What is the request in detail: Would like an order for an A1c test, she has an appointment at the lab tomorrow and needs that tested. Please advise.      Can the clinic reply by [facility]: No      What Number to Call Back if not in [facility]: [phone]

## 2020-01-31 ENCOUNTER — LAB VISIT (OUTPATIENT)
Dept: LAB | Facility: HOSPITAL | Age: 70
End: 2020-01-31
Attending: INTERNAL MEDICINE
Payer: MEDICARE

## 2020-01-31 DIAGNOSIS — N18.30 CKD (CHRONIC KIDNEY DISEASE) STAGE 3, GFR 30-59 ML/MIN: ICD-10-CM

## 2020-01-31 LAB
AMORPH CRY UR QL COMP ASSIST: ABNORMAL
BACTERIA #/AREA URNS AUTO: ABNORMAL /HPF
BILIRUB UR QL STRIP: NEGATIVE
CAOX CRY UR QL COMP ASSIST: ABNORMAL
CLARITY UR REFRACT.AUTO: ABNORMAL
COLOR UR AUTO: YELLOW
CREAT UR-MCNC: 319 MG/DL (ref 15–325)
GLUCOSE UR QL STRIP: NEGATIVE
HGB UR QL STRIP: NEGATIVE
HYALINE CASTS UR QL AUTO: 4 /LPF
KETONES UR QL STRIP: NEGATIVE
LEUKOCYTE ESTERASE UR QL STRIP: NEGATIVE
MICROSCOPIC COMMENT: ABNORMAL
NITRITE UR QL STRIP: NEGATIVE
PH UR STRIP: 5 [PH] (ref 5–8)
PROT UR QL STRIP: NEGATIVE
PROT UR-MCNC: 14 MG/DL (ref 0–15)
PROT/CREAT UR: 0.04 MG/G{CREAT} (ref 0–0.2)
RBC #/AREA URNS AUTO: 0 /HPF (ref 0–4)
SP GR UR STRIP: 1.02 (ref 1–1.03)
SQUAMOUS #/AREA URNS AUTO: 2 /HPF
URN SPEC COLLECT METH UR: ABNORMAL
WBC #/AREA URNS AUTO: 0 /HPF (ref 0–5)

## 2020-01-31 PROCEDURE — 81001 URINALYSIS AUTO W/SCOPE: CPT

## 2020-01-31 PROCEDURE — 82570 ASSAY OF URINE CREATININE: CPT

## 2020-01-31 RX ORDER — IRBESARTAN AND HYDROCHLOROTHIAZIDE 150; 12.5 MG/1; MG/1
TABLET, FILM COATED ORAL
Qty: 90 TABLET | Refills: 0 | Status: SHIPPED | OUTPATIENT
Start: 2020-01-31 | End: 2020-05-04 | Stop reason: SDUPTHER

## 2020-02-03 ENCOUNTER — TELEPHONE (OUTPATIENT)
Dept: VASCULAR SURGERY | Facility: CLINIC | Age: 70
End: 2020-02-03

## 2020-02-03 DIAGNOSIS — I65.23 BILATERAL CAROTID ARTERY STENOSIS: Primary | ICD-10-CM

## 2020-02-03 DIAGNOSIS — I70.0 AORTIC ATHEROSCLEROSIS: ICD-10-CM

## 2020-02-03 NOTE — TELEPHONE ENCOUNTER
Spoke with pt and appt scheduled  ----- Message from Hiwot Salgado sent at 2/3/2020 11:06 AM CST -----  Contact: Pt   Reason: Pt calling to schedule appt for march     Communication: [phone]

## 2020-02-04 ENCOUNTER — OFFICE VISIT (OUTPATIENT)
Dept: NEPHROLOGY | Facility: CLINIC | Age: 70
End: 2020-02-04
Payer: MEDICARE

## 2020-02-04 VITALS
WEIGHT: 158.94 LBS | DIASTOLIC BLOOD PRESSURE: 70 MMHG | SYSTOLIC BLOOD PRESSURE: 130 MMHG | HEIGHT: 63 IN | OXYGEN SATURATION: 97 % | HEART RATE: 88 BPM | BODY MASS INDEX: 28.16 KG/M2

## 2020-02-04 DIAGNOSIS — N18.30 CKD (CHRONIC KIDNEY DISEASE) STAGE 3, GFR 30-59 ML/MIN: Primary | ICD-10-CM

## 2020-02-04 PROCEDURE — 99214 PR OFFICE/OUTPT VISIT, EST, LEVL IV, 30-39 MIN: ICD-10-PCS | Mod: S$GLB,,, | Performed by: INTERNAL MEDICINE

## 2020-02-04 PROCEDURE — 3078F DIAST BP <80 MM HG: CPT | Mod: CPTII,S$GLB,, | Performed by: INTERNAL MEDICINE

## 2020-02-04 PROCEDURE — 1159F PR MEDICATION LIST DOCUMENTED IN MEDICAL RECORD: ICD-10-PCS | Mod: S$GLB,,, | Performed by: INTERNAL MEDICINE

## 2020-02-04 PROCEDURE — 1101F PR PT FALLS ASSESS DOC 0-1 FALLS W/OUT INJ PAST YR: ICD-10-PCS | Mod: CPTII,S$GLB,, | Performed by: INTERNAL MEDICINE

## 2020-02-04 PROCEDURE — 1101F PT FALLS ASSESS-DOCD LE1/YR: CPT | Mod: CPTII,S$GLB,, | Performed by: INTERNAL MEDICINE

## 2020-02-04 PROCEDURE — 3078F PR MOST RECENT DIASTOLIC BLOOD PRESSURE < 80 MM HG: ICD-10-PCS | Mod: CPTII,S$GLB,, | Performed by: INTERNAL MEDICINE

## 2020-02-04 PROCEDURE — 1159F MED LIST DOCD IN RCRD: CPT | Mod: S$GLB,,, | Performed by: INTERNAL MEDICINE

## 2020-02-04 PROCEDURE — 99999 PR PBB SHADOW E&M-EST. PATIENT-LVL II: CPT | Mod: PBBFAC,,, | Performed by: INTERNAL MEDICINE

## 2020-02-04 PROCEDURE — 1126F AMNT PAIN NOTED NONE PRSNT: CPT | Mod: S$GLB,,, | Performed by: INTERNAL MEDICINE

## 2020-02-04 PROCEDURE — 3075F PR MOST RECENT SYSTOLIC BLOOD PRESS GE 130-139MM HG: ICD-10-PCS | Mod: CPTII,S$GLB,, | Performed by: INTERNAL MEDICINE

## 2020-02-04 PROCEDURE — 99499 RISK ADDL DX/OHS AUDIT: ICD-10-PCS | Mod: S$GLB,,, | Performed by: INTERNAL MEDICINE

## 2020-02-04 PROCEDURE — 3075F SYST BP GE 130 - 139MM HG: CPT | Mod: CPTII,S$GLB,, | Performed by: INTERNAL MEDICINE

## 2020-02-04 PROCEDURE — 99499 UNLISTED E&M SERVICE: CPT | Mod: S$GLB,,, | Performed by: INTERNAL MEDICINE

## 2020-02-04 PROCEDURE — 99999 PR PBB SHADOW E&M-EST. PATIENT-LVL II: ICD-10-PCS | Mod: PBBFAC,,, | Performed by: INTERNAL MEDICINE

## 2020-02-04 PROCEDURE — 99214 OFFICE O/P EST MOD 30 MIN: CPT | Mod: S$GLB,,, | Performed by: INTERNAL MEDICINE

## 2020-02-04 PROCEDURE — 1126F PR PAIN SEVERITY QUANTIFIED, NO PAIN PRESENT: ICD-10-PCS | Mod: S$GLB,,, | Performed by: INTERNAL MEDICINE

## 2020-02-04 NOTE — PROGRESS NOTES
Subjective:       Patient ID: Sarina Genao is a 69 y.o. Black or  female who presents for re-evaluation of progression of Chronic Kidney Disease    HPI Ms. Genao is a 69 year old woman with medical history of diabetes, hypertension, COPD, partial right nephrectomy (renal cell carcinoma), presenting for evaluation of chronic kidney disease.  Patient last seen in Nephrology clinic February 2019 by Dr. Macias/Jenny (previously followed by Dr. Mitchell since 2015), this is her first visit with me.  Patient reports only moderate fluid intake, denies any NSAID use.  She otherwise denies any fever, chest pain, shortness of breath, abdominal pain, diarrhea, dysuria/hematuria.     Review of Systems   Constitutional: Negative for appetite change, fatigue and fever.   Respiratory: Negative for chest tightness and shortness of breath.    Cardiovascular: Negative for chest pain and leg swelling.   Gastrointestinal: Negative for abdominal pain, constipation, diarrhea, nausea and vomiting.   Genitourinary: Negative for difficulty urinating, dysuria, flank pain, frequency, hematuria and urgency.   Musculoskeletal: Negative for arthralgias, joint swelling and myalgias.   Skin: Negative for rash and wound.   Neurological: Negative for dizziness, weakness and light-headedness.   All other systems reviewed and are negative.      Objective:      Physical Exam   Constitutional: She appears well-developed and well-nourished.   Cardiovascular: Normal rate, regular rhythm and normal heart sounds. Exam reveals no gallop and no friction rub.   No murmur heard.  Pulmonary/Chest: Effort normal and breath sounds normal. No respiratory distress. She has no wheezes. She has no rales.   Abdominal: Soft. Bowel sounds are normal. There is no tenderness.   Musculoskeletal: She exhibits no edema.   Neurological: She is alert.   Skin: Skin is warm and dry. No rash noted. No erythema.   Psychiatric: She has a normal mood and  affect.   Vitals reviewed.      Assessment:       1. CKD (chronic kidney disease) stage 3, GFR 30-59 ml/min        Plan:     Ms. Genao is a 69 year old woman with medical history of diabetes, hypertension, COPD, partial right nephrectomy (renal cell carcinoma), presenting for evaluation of chronic kidney disease.  Patient creatinine has mainly been 1.3-1.4mg/dL since 2013 consistent with CKD stage III; creatinine has been variable, though recently as low as 0.9mg/dL, suspect labile due to inadequate fluid intake.  Encouraged fluid intake, stressed importance of blood pressure/glycemic control to prevent any further progression of kidney disease, patient voiced understanding.    - Anemia: Hgb at goal, no indication for erythropoetin therapy  - Bone/mineral metabolism: patient with secondary hyperparathyroidism, PTH at goal for stage CKD    Return to clinic in 6 months pending renal panel within 3-4months, then with renal/heme panel, iron/TIBC/ferritin, urinalysis/culture, urine protein/creatinine ratio, PTH/VitD prior to next visit

## 2020-02-05 LAB — HBA1C MFR BLD: 6 % (ref 4–5.6)

## 2020-02-14 DIAGNOSIS — R52 PAIN: ICD-10-CM

## 2020-02-14 RX ORDER — TRAMADOL HYDROCHLORIDE 50 MG/1
TABLET ORAL
Qty: 60 TABLET | Refills: 3 | Status: SHIPPED | OUTPATIENT
Start: 2020-02-14 | End: 2020-04-13

## 2020-03-03 DIAGNOSIS — N39.41 URINARY INCONTINENCE, URGE: ICD-10-CM

## 2020-03-04 RX ORDER — ROSUVASTATIN CALCIUM 20 MG/1
TABLET, COATED ORAL
Qty: 90 TABLET | Refills: 12 | Status: SHIPPED | OUTPATIENT
Start: 2020-03-04 | End: 2021-04-26

## 2020-03-04 RX ORDER — OXYBUTYNIN CHLORIDE 5 MG/1
TABLET, EXTENDED RELEASE ORAL
Qty: 90 TABLET | Refills: 10 | Status: SHIPPED | OUTPATIENT
Start: 2020-03-04 | End: 2021-04-26

## 2020-03-04 RX ORDER — AMLODIPINE BESYLATE 5 MG/1
TABLET ORAL
Qty: 90 TABLET | Refills: 3 | Status: SHIPPED | OUTPATIENT
Start: 2020-03-04 | End: 2020-05-18 | Stop reason: SDUPTHER

## 2020-04-11 DIAGNOSIS — R52 PAIN: ICD-10-CM

## 2020-04-13 RX ORDER — TRAMADOL HYDROCHLORIDE 50 MG/1
TABLET ORAL
Qty: 60 TABLET | Refills: 3 | Status: SHIPPED | OUTPATIENT
Start: 2020-04-13 | End: 2020-05-18 | Stop reason: SDUPTHER

## 2020-04-23 DIAGNOSIS — Z13.9 SCREENING FOR CONDITION: Primary | ICD-10-CM

## 2020-04-23 NOTE — PROGRESS NOTES
04/23/2020      In an effort to protect our immunocompromised patients from potential exposure to COVID-19, [facility] will now require all patients receiving an infusion, an injection, and/or radiation therapy to be tested for COVID-19 prior to their appointment.  All patients currently under treatment will be tested immediately, and patients initiating new treatment cycles or with one-time appointments (injections, transfusions, etc.) must be tested within 72 hours of their appointment.     Placed COVID-19 test order for patient.  A member of our team is to contact the patient in the near future to explain this process and the rationale behind it, to ask the COVID-19 screening questions, and to get the patient scheduled for their COVID-19 test.     The above was completed in accordance with instructions and guidelines set forth by [facility].     Signed,    Lorenzo Dong, GLORY     Date:  04/23/2020

## 2020-04-27 ENCOUNTER — LAB VISIT (OUTPATIENT)
Dept: INFUSION THERAPY | Facility: HOSPITAL | Age: 70
End: 2020-04-27
Attending: INTERNAL MEDICINE
Payer: MEDICARE

## 2020-04-27 DIAGNOSIS — Z13.9 SCREENING FOR CONDITION: ICD-10-CM

## 2020-04-27 PROCEDURE — U0002 COVID-19 LAB TEST NON-CDC: HCPCS

## 2020-04-28 LAB — SARS-COV-2 RNA RESP QL NAA+PROBE: NOT DETECTED

## 2020-04-28 NOTE — PROGRESS NOTES
To:  Nancy Regalado, RN, Ca Love, RN, & Tori Ribeiro, RN:    Please phone this patient to let her know that her COVID-19 test came back negative and that, based on that result, she can proceed with her appointment(s) as indicated by her treatment provider(s).  If my assistance is needed, don't hesitate to reach out.      Thanks,  Lorenzo Dong, DNP, APRN, FNP-BC, AOCNP  [facility], 3rd Floor  [facility] [address], [place]  [address]  [phone]

## 2020-04-29 ENCOUNTER — INFUSION (OUTPATIENT)
Dept: INFUSION THERAPY | Facility: HOSPITAL | Age: 70
End: 2020-04-29
Attending: INTERNAL MEDICINE
Payer: MEDICARE

## 2020-04-29 VITALS
SYSTOLIC BLOOD PRESSURE: 129 MMHG | TEMPERATURE: 99 F | OXYGEN SATURATION: 99 % | DIASTOLIC BLOOD PRESSURE: 71 MMHG | HEART RATE: 96 BPM | RESPIRATION RATE: 18 BRPM

## 2020-04-29 DIAGNOSIS — M81.0 OSTEOPOROSIS, POST-MENOPAUSAL: Primary | ICD-10-CM

## 2020-04-29 PROCEDURE — 63600175 PHARM REV CODE 636 W HCPCS: Mod: JG | Performed by: INTERNAL MEDICINE

## 2020-04-29 PROCEDURE — 96372 THER/PROPH/DIAG INJ SC/IM: CPT

## 2020-04-29 RX ADMIN — DENOSUMAB 60 MG: 60 INJECTION SUBCUTANEOUS at 12:04

## 2020-05-04 RX ORDER — IRBESARTAN AND HYDROCHLOROTHIAZIDE 150; 12.5 MG/1; MG/1
1 TABLET, FILM COATED ORAL DAILY
Qty: 90 TABLET | Refills: 0 | Status: SHIPPED | OUTPATIENT
Start: 2020-05-04 | End: 2020-05-18 | Stop reason: SDUPTHER

## 2020-05-04 NOTE — TELEPHONE ENCOUNTER
----- Message from Sammie Sade sent at 5/4/2020  1:46 PM CDT -----  Contact: self : [phone]  .Type: RX Refill Request    Who Called:  Self     Have you contacted your pharmacy: yes     Refill or New Rx: refill     RX Name and Strength:irbesartan-hydrochlorothiazide (AVALIDE) 150-12.5 mg per tablet    Is this a 30 day or 90 day RX: 90 day    Preferred Pharmacy with phone number: [facility] - [place] [address] [address] [facility] [place] [address]  Phone: [phone] Fax: [phone]    Local or Mail Order: local    Would the patient rather a call back or a response via [facility]?  Call back     Best Call Back Number: [phone]

## 2020-05-06 DIAGNOSIS — M81.0 OSTEOPOROSIS, POST-MENOPAUSAL: Primary | ICD-10-CM

## 2020-05-13 ENCOUNTER — LAB VISIT (OUTPATIENT)
Dept: LAB | Facility: HOSPITAL | Age: 70
End: 2020-05-13
Attending: INTERNAL MEDICINE
Payer: MEDICARE

## 2020-05-13 DIAGNOSIS — N18.30 CKD (CHRONIC KIDNEY DISEASE) STAGE 3, GFR 30-59 ML/MIN: ICD-10-CM

## 2020-05-13 LAB
ALBUMIN SERPL BCP-MCNC: 3.4 G/DL (ref 3.5–5.2)
ANION GAP SERPL CALC-SCNC: 8 MMOL/L (ref 8–16)
BUN SERPL-MCNC: 13 MG/DL (ref 8–23)
CALCIUM SERPL-MCNC: 10.4 MG/DL (ref 8.7–10.5)
CHLORIDE SERPL-SCNC: 104 MMOL/L (ref 95–110)
CO2 SERPL-SCNC: 27 MMOL/L (ref 23–29)
CREAT SERPL-MCNC: 1.2 MG/DL (ref 0.5–1.4)
EST. GFR  (AFRICAN AMERICAN): 52.5 ML/MIN/1.73 M^2
EST. GFR  (NON AFRICAN AMERICAN): 45.6 ML/MIN/1.73 M^2
GLUCOSE SERPL-MCNC: 140 MG/DL (ref 70–110)
PHOSPHATE SERPL-MCNC: 3 MG/DL (ref 2.7–4.5)
POTASSIUM SERPL-SCNC: 4 MMOL/L (ref 3.5–5.1)
SODIUM SERPL-SCNC: 139 MMOL/L (ref 136–145)

## 2020-05-13 PROCEDURE — 36415 COLL VENOUS BLD VENIPUNCTURE: CPT | Mod: PO

## 2020-05-13 PROCEDURE — 80069 RENAL FUNCTION PANEL: CPT

## 2020-05-18 ENCOUNTER — OFFICE VISIT (OUTPATIENT)
Dept: FAMILY MEDICINE | Facility: CLINIC | Age: 70
End: 2020-05-18
Payer: MEDICARE

## 2020-05-18 VITALS
SYSTOLIC BLOOD PRESSURE: 132 MMHG | OXYGEN SATURATION: 97 % | HEIGHT: 63 IN | DIASTOLIC BLOOD PRESSURE: 70 MMHG | HEART RATE: 87 BPM | WEIGHT: 156.94 LBS | BODY MASS INDEX: 27.81 KG/M2 | TEMPERATURE: 98 F

## 2020-05-18 DIAGNOSIS — E78.5 HYPERLIPIDEMIA, UNSPECIFIED HYPERLIPIDEMIA TYPE: ICD-10-CM

## 2020-05-18 DIAGNOSIS — I50.30 HEART FAILURE WITH PRESERVED EJECTION FRACTION, UNSPECIFIED HF CHRONICITY: ICD-10-CM

## 2020-05-18 DIAGNOSIS — E11.22 TYPE 2 DIABETES MELLITUS WITH STAGE 3 CHRONIC KIDNEY DISEASE, WITH LONG-TERM CURRENT USE OF INSULIN: ICD-10-CM

## 2020-05-18 DIAGNOSIS — E55.9 VITAMIN D DEFICIENCY DISEASE: ICD-10-CM

## 2020-05-18 DIAGNOSIS — J44.9 CHRONIC OBSTRUCTIVE PULMONARY DISEASE, UNSPECIFIED COPD TYPE: ICD-10-CM

## 2020-05-18 DIAGNOSIS — N18.4 CKD (CHRONIC KIDNEY DISEASE), STAGE IV: ICD-10-CM

## 2020-05-18 DIAGNOSIS — R52 PAIN: ICD-10-CM

## 2020-05-18 DIAGNOSIS — I70.0 AORTIC ATHEROSCLEROSIS: ICD-10-CM

## 2020-05-18 DIAGNOSIS — C64.9 MALIGNANT NEOPLASM OF KIDNEY, UNSPECIFIED LATERALITY: ICD-10-CM

## 2020-05-18 DIAGNOSIS — M46.99 UNSPECIFIED INFLAMMATORY SPONDYLOPATHY, MULTIPLE SITES IN SPINE: ICD-10-CM

## 2020-05-18 DIAGNOSIS — M47.816 LUMBAR ARTHROPATHY: ICD-10-CM

## 2020-05-18 DIAGNOSIS — K21.9 GASTROESOPHAGEAL REFLUX DISEASE, ESOPHAGITIS PRESENCE NOT SPECIFIED: ICD-10-CM

## 2020-05-18 DIAGNOSIS — Z12.31 ENCOUNTER FOR SCREENING MAMMOGRAM FOR BREAST CANCER: ICD-10-CM

## 2020-05-18 DIAGNOSIS — I73.9 PVD (PERIPHERAL VASCULAR DISEASE): ICD-10-CM

## 2020-05-18 DIAGNOSIS — I73.9 PVD (PERIPHERAL VASCULAR DISEASE) WITH CLAUDICATION: ICD-10-CM

## 2020-05-18 DIAGNOSIS — E11.65 TYPE 2 DIABETES MELLITUS WITH HYPERGLYCEMIA, WITH LONG-TERM CURRENT USE OF INSULIN: ICD-10-CM

## 2020-05-18 DIAGNOSIS — I10 ESSENTIAL HYPERTENSION: ICD-10-CM

## 2020-05-18 DIAGNOSIS — D64.9 ANEMIA, UNSPECIFIED TYPE: ICD-10-CM

## 2020-05-18 DIAGNOSIS — M81.0 OSTEOPOROSIS, POST-MENOPAUSAL: ICD-10-CM

## 2020-05-18 DIAGNOSIS — E21.0 PRIMARY HYPERPARATHYROIDISM: ICD-10-CM

## 2020-05-18 DIAGNOSIS — I65.23 BILATERAL CAROTID ARTERY STENOSIS: ICD-10-CM

## 2020-05-18 DIAGNOSIS — Z79.4 TYPE 2 DIABETES MELLITUS WITH STAGE 3 CHRONIC KIDNEY DISEASE, WITH LONG-TERM CURRENT USE OF INSULIN: ICD-10-CM

## 2020-05-18 DIAGNOSIS — Z86.010 HISTORY OF COLONIC POLYPS: ICD-10-CM

## 2020-05-18 DIAGNOSIS — Z79.4 TYPE 2 DIABETES MELLITUS WITH HYPERGLYCEMIA, WITH LONG-TERM CURRENT USE OF INSULIN: ICD-10-CM

## 2020-05-18 DIAGNOSIS — Z79.4 LONG-TERM INSULIN USE: ICD-10-CM

## 2020-05-18 DIAGNOSIS — N18.30 TYPE 2 DIABETES MELLITUS WITH STAGE 3 CHRONIC KIDNEY DISEASE, WITH LONG-TERM CURRENT USE OF INSULIN: ICD-10-CM

## 2020-05-18 DIAGNOSIS — Z00.00 ROUTINE MEDICAL EXAM: Primary | ICD-10-CM

## 2020-05-18 DIAGNOSIS — J43.2 CENTRILOBULAR EMPHYSEMA: ICD-10-CM

## 2020-05-18 DIAGNOSIS — E11.9 DM TYPE 2 WITHOUT RETINOPATHY: ICD-10-CM

## 2020-05-18 DIAGNOSIS — N18.30 CKD (CHRONIC KIDNEY DISEASE) STAGE 3, GFR 30-59 ML/MIN: ICD-10-CM

## 2020-05-18 PROCEDURE — 99499 RISK ADDL DX/OHS AUDIT: ICD-10-PCS | Mod: S$GLB,,, | Performed by: INTERNAL MEDICINE

## 2020-05-18 PROCEDURE — 3075F SYST BP GE 130 - 139MM HG: CPT | Mod: CPTII,S$GLB,, | Performed by: INTERNAL MEDICINE

## 2020-05-18 PROCEDURE — 1159F PR MEDICATION LIST DOCUMENTED IN MEDICAL RECORD: ICD-10-PCS | Mod: S$GLB,,, | Performed by: INTERNAL MEDICINE

## 2020-05-18 PROCEDURE — 1101F PT FALLS ASSESS-DOCD LE1/YR: CPT | Mod: CPTII,S$GLB,, | Performed by: INTERNAL MEDICINE

## 2020-05-18 PROCEDURE — 1126F PR PAIN SEVERITY QUANTIFIED, NO PAIN PRESENT: ICD-10-PCS | Mod: S$GLB,,, | Performed by: INTERNAL MEDICINE

## 2020-05-18 PROCEDURE — 3044F HG A1C LEVEL LT 7.0%: CPT | Mod: CPTII,S$GLB,, | Performed by: INTERNAL MEDICINE

## 2020-05-18 PROCEDURE — 3078F DIAST BP <80 MM HG: CPT | Mod: CPTII,S$GLB,, | Performed by: INTERNAL MEDICINE

## 2020-05-18 PROCEDURE — 99214 OFFICE O/P EST MOD 30 MIN: CPT | Mod: 25,S$GLB,, | Performed by: INTERNAL MEDICINE

## 2020-05-18 PROCEDURE — 99999 PR PBB SHADOW E&M-EST. PATIENT-LVL IV: ICD-10-PCS | Mod: PBBFAC,,, | Performed by: INTERNAL MEDICINE

## 2020-05-18 PROCEDURE — 3075F PR MOST RECENT SYSTOLIC BLOOD PRESS GE 130-139MM HG: ICD-10-PCS | Mod: CPTII,S$GLB,, | Performed by: INTERNAL MEDICINE

## 2020-05-18 PROCEDURE — 99499 UNLISTED E&M SERVICE: CPT | Mod: S$GLB,,, | Performed by: INTERNAL MEDICINE

## 2020-05-18 PROCEDURE — 3078F PR MOST RECENT DIASTOLIC BLOOD PRESSURE < 80 MM HG: ICD-10-PCS | Mod: CPTII,S$GLB,, | Performed by: INTERNAL MEDICINE

## 2020-05-18 PROCEDURE — 1159F MED LIST DOCD IN RCRD: CPT | Mod: S$GLB,,, | Performed by: INTERNAL MEDICINE

## 2020-05-18 PROCEDURE — 99999 PR PBB SHADOW E&M-EST. PATIENT-LVL IV: CPT | Mod: PBBFAC,,, | Performed by: INTERNAL MEDICINE

## 2020-05-18 PROCEDURE — 1101F PR PT FALLS ASSESS DOC 0-1 FALLS W/OUT INJ PAST YR: ICD-10-PCS | Mod: CPTII,S$GLB,, | Performed by: INTERNAL MEDICINE

## 2020-05-18 PROCEDURE — 1126F AMNT PAIN NOTED NONE PRSNT: CPT | Mod: S$GLB,,, | Performed by: INTERNAL MEDICINE

## 2020-05-18 PROCEDURE — 3044F PR MOST RECENT HEMOGLOBIN A1C LEVEL <7.0%: ICD-10-PCS | Mod: CPTII,S$GLB,, | Performed by: INTERNAL MEDICINE

## 2020-05-18 PROCEDURE — 99214 PR OFFICE/OUTPT VISIT, EST, LEVL IV, 30-39 MIN: ICD-10-PCS | Mod: 25,S$GLB,, | Performed by: INTERNAL MEDICINE

## 2020-05-18 RX ORDER — TRAMADOL HYDROCHLORIDE 50 MG/1
50 TABLET ORAL EVERY 6 HOURS PRN
Qty: 120 TABLET | Refills: 3 | Status: SHIPPED | OUTPATIENT
Start: 2020-05-18 | End: 2020-09-10

## 2020-05-18 RX ORDER — AMLODIPINE BESYLATE 5 MG/1
5 TABLET ORAL DAILY
Qty: 90 TABLET | Refills: 3 | Status: SHIPPED | OUTPATIENT
Start: 2020-05-18 | End: 2021-08-01

## 2020-05-18 RX ORDER — FAMOTIDINE 40 MG/1
40 TABLET, FILM COATED ORAL DAILY
Qty: 30 TABLET | Refills: 11 | Status: SHIPPED | OUTPATIENT
Start: 2020-05-18 | End: 2020-11-05 | Stop reason: ALTCHOICE

## 2020-05-18 RX ORDER — SEMAGLUTIDE 1.34 MG/ML
INJECTION, SOLUTION SUBCUTANEOUS
COMMUNITY
Start: 2020-04-06 | End: 2021-05-12

## 2020-05-18 RX ORDER — INSULIN DEGLUDEC 200 U/ML
INJECTION, SOLUTION SUBCUTANEOUS
COMMUNITY
Start: 2020-03-03 | End: 2021-05-04

## 2020-05-18 RX ORDER — IRBESARTAN AND HYDROCHLOROTHIAZIDE 150; 12.5 MG/1; MG/1
1 TABLET, FILM COATED ORAL DAILY
Qty: 90 TABLET | Refills: 0 | Status: SHIPPED | OUTPATIENT
Start: 2020-05-18 | End: 2020-11-15

## 2020-05-18 NOTE — PROGRESS NOTES
Chief complaint: Physical exam, patient checked in but then could not be found for over 40 min    69-year-old black female  whose PCP is retired.  Regarding health maintenance she is up-to-date on mammogram 5/19. .  It looks like she had colon polyps in 2013, 1 polyp 4/18--5 yrs.  She did quit smoking.  Discussed calorie intake and expenditure as well as means to reduce wt    ROS:   CONST: weight stable. EYES: no vision change. ENT: no sore throat. CV: no chest pain w/ exertion. RESP: no shortness of breath. GI: no nausea, vomiting, diarrhea. No dysphagia. : no urinary issues. MUSCULOSKELETAL: no new myalgias or arthralgias. SKIN: no new changes. NEURO: no focal deficits. PSYCH: no new issues. ENDOCRINE: no polyuria. HEME: no lymph nodes. ALLERGY: no general pruritis.    Past Medical History   Diagnosis Date    Cataracts, bilateral     CKD (chronic kidney disease) stage 3, GFR 30-59 ml/min 12/15/2014    Combined hyperlipidemia associated with type 2 diabetes mellitus 6/7/2013    COPD (chronic obstructive pulmonary disease) 12/15/2014    Diabetes mellitus type II----with chronic kidney disease           Diabetes mellitus with renal manifestations, uncontrolled 2/1/2017    Diabetic retinopathy     Family history of stomach cancer 12/5/2013    H/O renal cell carcinoma 12/15/2015    History of colonic polyps 2/1/2017     3 polyps 7/13 ---1 polyp 4/18--5 yrs    History of gastroesophageal reflux (GERD)     History of urinary incontinence      s/p bladder lift-october, 2011 (at [facility])    Hyperlipidemia     Hypertension      120s/70s-80s    Nephrolithiasis     Osteoporosis, post-menopausal, evaluated by Dr. York, quit Fosamax due to CKD early 2017  3/17/2014    Primary hyperparathyroidism 10/20/2016    Schatzki's ring 2/1/2017     Dilated 2014   Prevnar 2017    Past Surgical History:   Procedure Laterality Date    bladder lift  2011    done at [facility]    BLADDER STONE REMOVAL  2011    before  bladder lift    CATARACT EXTRACTION W/  INTRAOCULAR LENS IMPLANT Left 06/07/2016    Dr. Vásquez    CATARACT EXTRACTION W/  INTRAOCULAR LENS IMPLANT Right 06/21/2016    Dr. Vásquez    COLONOSCOPY N/A 4/26/2018    Procedure: COLONOSCOPY;  Surgeon: Benjamin Zamora MD;  Location: [facility];  Service: Endoscopy;  Laterality: N/A;  confirmed ss    CYSTOSCOPY      HYSTERECTOMY      NAVIGATIONAL BRONCHOSCOPY N/A 12/11/2018    Procedure: BRONCHOSCOPY, NAVIGATIONAL;  Surgeon: Ashtyn Ramires MD;  Location: [address];  Service: Pulmonary;  Laterality: N/A;    NEPHRECTOMY      partial right     Social History     Socioeconomic History    Marital status:      Spouse name: Not on file    Number of children: Not on file    Years of education: Not on file    Highest education level: Not on file   Occupational History    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   Social Needs    Financial resource strain: Not on file    Food insecurity:     Worry: Not on file     Inability: Not on file    Transportation needs:     Medical: Not on file     Non-medical: Not on file   Tobacco Use    Smoking status: Former Smoker     Packs/day: 0.25     Years: 30.00     Pack years: 7.50     Types: Cigarettes    Smokeless tobacco: Never Used    Tobacco comment: pt. reports quitting January 2018   Substance and Sexual Activity    Alcohol use: No     Alcohol/week: 0.0 standard drinks    Drug use: No    Sexual activity: Not on file   Lifestyle    Physical activity:     Days per week: Not on file     Minutes per session: Not on file    Stress: Not on file   Relationships    Social connections:     Talks on phone: Not on file     Gets together: Not on file     Attends Advent service: Not on file     Active member of club or organization: Not on file     Attends meetings of clubs or organizations: Not on file     Relationship status: Not on file   Other Topics Concern    Not on file   Social History Narrative    Lives on  [facility]    Here with sister Kate [phone]         family history includes Cataracts in her mother; Colon cancer in her brother; Glaucoma in her mother; Nephrolithiasis in her sister; No Known Problems in her father, maternal aunt, maternal grandfather, maternal grandmother, maternal uncle, paternal aunt, paternal grandfather, paternal grandmother, and paternal uncle.     Gen: no distress  EYES: conjunctiva clear, non-icteric, PERRL  ENT: nose clear, nasal mucosa normal, oropharynx clear and moist, teeth good  NECK:supple, thyroid non-palpable  RESP: effort is good, lungs clear  CV: heart RRR w/o murmur, gallops or rubs; no carotid bruits, no edema at all today even at the ankles  GI: abdomen soft, non-distended, non-tender, no hepatosplenomegaly  MS: gait normal, no clubbing or cyanosis of the digits  SKIN: no rashes, warm to touch    Sarina was seen today for establish care.    Diagnoses and all orders for this visit:    Routine medical exam,due for mammogram and UTD on colonoscopy, continue smoking cessation                          Additional evaluation and management issues:    Additionally, patient has numerous other medical issues to address today.  She has hypertension which appears to be under good control and needs refills of her medication.  She does get some edema at the end of the day that is gone in the morning and I reassured her about that but she is concerned and on Norvasc 5 mg.  Blood pressure is excellent and we will discontinue and have her monitor.  She was on Diovan but due to availability issues we had switched her to irbesartan..  She has diabetes which is still uncontrolled and apparently back to seeing Endocrine and her most recent A1c she says was 5.6.  She still on insulin and the Ozempic injection but off of the glyburide which I would agree with based upon her age and overall good control..  Her A1c was 7.3 then 6.6 3 months ago..  She does have chronic renal insufficiency  and follows with nephrology.  I explained her chronic kidney disease.  Her GFR is actually better.       We discussed new ways of more frequent monitoring without finger checked and she had initially wanted that.  Her diabetes is now under control.  She still follows with Endocrine.  She initially wanted the new meter was then said she would go back to her regular meter and I will send that prescription to her insurance company     She gets occasional reflux and was occasionally using Zantac 300.  She would like a change to Pepcid to use on occasion and that was sent.    She apparently is primary hyperparathyroidism and a history of hypercalcemia but all recent labs appear to be normal as well as PTH.  Vitamin D level also normal.  She apparently was on a weekly medication  for osteoporosis but d/c 2017 for CKD?  Bone density from 1 year ago reviewed noting slight decline in the hip     She has a history of COPD but no active symptoms lately.  She has hyperlipidemia and may been on Lipitor in the past with an unremembered intolerance.  We discussed benefits of statin and her LDL has responded to crestor .  All these issues reviewed and patient counseled an evaluation and management of all the separate issues we based upon time counselingTotal time over 25 minutes with over 50% counseling.    4/18  Impression      Osteoporosis of total hip, femoral neck and lumbar spine.  Decrease in hip (-4.7%) and increase in lumbar spine (5%) since prior study.           Assessment and plan:              Uncontrolled type 2 diabetes mellitus with stage 3 chronic kidney disease, without long-term current use of insulin, apparently under treatment of Endocrine and improved    CKD (chronic kidney disease) stage 3, GFR 30-59 ml/min, chronic and stable    Gastroesophageal reflux disease, esophagitis presence not specified, change Zantac to Pepcid    Long-term insulin use    Osteoporosis, post-menopausal, followed by  Endocrine    Essential hypertension, chronic and stable    History of colonic polyps, up-to-date    Chronic obstructive pulmonary disease, unspecified COPD type, chronic and stable    Diabetes mellitus with renal manifestations, uncontrolled    Uncontrolled type 2 diabetes mellitus with stage 3 chronic kidney disease, with long-term current use of insulin    Primary hyperparathyroidism, followed by Endocrine    Anemia, unspecified type, chronic and stable    Hyperlipidemia, unspecified hyperlipidemia type    PVD (peripheral vascular disease) with claudication    Lumbar arthropathy    DM type 2 without retinopathy    Type 2 diabetes mellitus with hyperglycemia, with long-term current use of insulin    Type 2 diabetes mellitus with stage 3 chronic kidney disease, with long-term current use of insulin    Aortic atherosclerosis    PVD (peripheral vascular disease)    Centrilobular emphysema    Vitamin D deficiency disease    Bilateral carotid artery stenosis    Malignant neoplasm of kidney, unspecified laterality    Unspecified inflammatory spondylopathy, multiple sites in spine    Heart failure with preserved ejection fraction, unspecified HF chronicity    CKD (chronic kidney disease), stage IV    Pain, patient reports that due to house work she does have to take 3-4 tramadol per day.  Her son use to do a lot of the housework but now she is isolated.  We discussed potential addiction of tramadol and she should try to use it the least possible and also possibly supplement some Tylenol.  She also needs to apply heat and do some stretching prior to house work  -     traMADoL (ULTRAM) 50 mg tablet; Take 1 tablet (50 mg total) by mouth every 6 (six) hours as needed.    Other orders  -     Cancel: Hemoglobin A1C; Future  -     Cancel: Lipid Panel; Future  -     famotidine (PEPCID) 40 MG tablet; Take 1 tablet (40 mg total) by mouth once daily.  -     irbesartan-hydrochlorothiazide (AVALIDE) 150-12.5 mg per tablet; Take 1  tablet by mouth once daily.  -     amLODIPine (NORVASC) 5 MG tablet; Take 1 tablet (5 mg total) by mouth once daily.

## 2020-05-19 ENCOUNTER — TELEPHONE (OUTPATIENT)
Dept: FAMILY MEDICINE | Facility: CLINIC | Age: 70
End: 2020-05-19

## 2020-05-19 DIAGNOSIS — R52 PAIN: ICD-10-CM

## 2020-05-19 NOTE — TELEPHONE ENCOUNTER
"Spoke with pt. Regarding medication refills sent to Pharmacy om 5/18/2020. Also spoke with Amalia at the pharmacy and she stated, " pt. Picked up 90 day supply of all medication on May 9/2020, these new prescriptions will sit on her profile until ready to be refilled.  "

## 2020-05-19 NOTE — TELEPHONE ENCOUNTER
----- Message from Dallas Hammond sent at 5/19/2020  1:00 PM CDT -----  Contact: Sarina [phone]   Type: RX Refill Request    Who Called: Sarina    Refill or New Rx:refill     RX Name and Strength: irbesartan-hydrochlorothiazide (AVALIDE) 150-12.5 mg per tablet, amLODIPine (NORVASC) 5 MG tablet, traMADoL (ULTRAM) 50 mg tablet    Is this a 30 day or 90 day RX:30 day     Preferred Pharmacy with phone number: [facility] [place] - [phone]    Would the patient rather a call back or a response via [facility]? Call back     Best Call Back Number:[phone]

## 2020-06-02 ENCOUNTER — HOSPITAL ENCOUNTER (OUTPATIENT)
Dept: RADIOLOGY | Facility: HOSPITAL | Age: 70
Discharge: HOME | End: 2020-06-02
Attending: INTERNAL MEDICINE
Payer: MEDICARE

## 2020-06-02 DIAGNOSIS — Z12.31 ENCOUNTER FOR SCREENING MAMMOGRAM FOR BREAST CANCER: ICD-10-CM

## 2020-06-02 PROCEDURE — 77067 MAMMO DIGITAL SCREENING BILAT WITH TOMOSYNTHESIS_CAD: ICD-10-PCS | Mod: 26,,, | Performed by: RADIOLOGY

## 2020-06-02 PROCEDURE — 77063 BREAST TOMOSYNTHESIS BI: CPT | Mod: 26,,, | Performed by: RADIOLOGY

## 2020-06-02 PROCEDURE — 77067 SCR MAMMO BI INCL CAD: CPT | Mod: 26,,, | Performed by: RADIOLOGY

## 2020-06-02 PROCEDURE — 77063 MAMMO DIGITAL SCREENING BILAT WITH TOMOSYNTHESIS_CAD: ICD-10-PCS | Mod: 26,,, | Performed by: RADIOLOGY

## 2020-06-02 PROCEDURE — 77067 SCR MAMMO BI INCL CAD: CPT | Mod: TC,PO

## 2020-06-05 ENCOUNTER — HOSPITAL ENCOUNTER (OUTPATIENT)
Dept: RADIOLOGY | Facility: HOSPITAL | Age: 70
Discharge: HOME | End: 2020-06-05
Attending: INTERNAL MEDICINE
Payer: MEDICARE

## 2020-06-05 DIAGNOSIS — M81.0 OSTEOPOROSIS, POST-MENOPAUSAL: ICD-10-CM

## 2020-06-05 PROCEDURE — 77080 DEXA BONE DENSITY SPINE HIP: ICD-10-PCS | Mod: 26,,, | Performed by: RADIOLOGY

## 2020-06-05 PROCEDURE — 77080 DXA BONE DENSITY AXIAL: CPT | Mod: TC

## 2020-06-05 PROCEDURE — 77080 DXA BONE DENSITY AXIAL: CPT | Mod: 26,,, | Performed by: RADIOLOGY

## 2020-06-08 ENCOUNTER — PATIENT OUTREACH (OUTPATIENT)
Dept: ADMINISTRATIVE | Facility: OTHER | Age: 70
End: 2020-06-08

## 2020-06-10 ENCOUNTER — HOSPITAL ENCOUNTER (OUTPATIENT)
Dept: VASCULAR SURGERY | Facility: CLINIC | Age: 70
Discharge: HOME | End: 2020-06-10
Attending: SURGERY
Payer: MEDICARE

## 2020-06-10 ENCOUNTER — OFFICE VISIT (OUTPATIENT)
Dept: VASCULAR SURGERY | Facility: CLINIC | Age: 70
End: 2020-06-10
Payer: MEDICARE

## 2020-06-10 VITALS
BODY MASS INDEX: 27.48 KG/M2 | HEIGHT: 64 IN | WEIGHT: 160.94 LBS | TEMPERATURE: 99 F | HEART RATE: 73 BPM | DIASTOLIC BLOOD PRESSURE: 69 MMHG | SYSTOLIC BLOOD PRESSURE: 136 MMHG

## 2020-06-10 DIAGNOSIS — I65.23 BILATERAL CAROTID ARTERY STENOSIS: ICD-10-CM

## 2020-06-10 DIAGNOSIS — I70.0 AORTIC ATHEROSCLEROSIS: ICD-10-CM

## 2020-06-10 DIAGNOSIS — I65.29 CAROTID ARTERY STENOSIS: ICD-10-CM

## 2020-06-10 DIAGNOSIS — F17.200 TOBACCO DEPENDENCE: ICD-10-CM

## 2020-06-10 PROCEDURE — 3078F PR MOST RECENT DIASTOLIC BLOOD PRESSURE < 80 MM HG: ICD-10-PCS | Mod: CPTII,S$GLB,, | Performed by: SURGERY

## 2020-06-10 PROCEDURE — 3075F PR MOST RECENT SYSTOLIC BLOOD PRESS GE 130-139MM HG: ICD-10-PCS | Mod: CPTII,S$GLB,, | Performed by: SURGERY

## 2020-06-10 PROCEDURE — 99499 RISK ADDL DX/OHS AUDIT: ICD-10-PCS | Mod: S$GLB,,, | Performed by: SURGERY

## 2020-06-10 PROCEDURE — 99407 PR TOBACCO USE CESSATION INTENSIVE >10 MINUTES: ICD-10-PCS | Mod: S$GLB,,, | Performed by: SURGERY

## 2020-06-10 PROCEDURE — 99499 UNLISTED E&M SERVICE: CPT | Mod: S$GLB,,, | Performed by: SURGERY

## 2020-06-10 PROCEDURE — 99214 OFFICE O/P EST MOD 30 MIN: CPT | Mod: 25,S$GLB,, | Performed by: SURGERY

## 2020-06-10 PROCEDURE — 93978 VASCULAR STUDY: CPT | Mod: S$GLB,,, | Performed by: SURGERY

## 2020-06-10 PROCEDURE — 99999 PR PBB SHADOW E&M-EST. PATIENT-LVL IV: ICD-10-PCS | Mod: PBBFAC,,, | Performed by: SURGERY

## 2020-06-10 PROCEDURE — 93880 EXTRACRANIAL BILAT STUDY: CPT | Mod: S$GLB,,, | Performed by: SURGERY

## 2020-06-10 PROCEDURE — 99214 PR OFFICE/OUTPT VISIT, EST, LEVL IV, 30-39 MIN: ICD-10-PCS | Mod: 25,S$GLB,, | Performed by: SURGERY

## 2020-06-10 PROCEDURE — 1126F AMNT PAIN NOTED NONE PRSNT: CPT | Mod: S$GLB,,, | Performed by: SURGERY

## 2020-06-10 PROCEDURE — 1101F PR PT FALLS ASSESS DOC 0-1 FALLS W/OUT INJ PAST YR: ICD-10-PCS | Mod: CPTII,S$GLB,, | Performed by: SURGERY

## 2020-06-10 PROCEDURE — 1159F MED LIST DOCD IN RCRD: CPT | Mod: S$GLB,,, | Performed by: SURGERY

## 2020-06-10 PROCEDURE — 99999 PR PBB SHADOW E&M-EST. PATIENT-LVL IV: CPT | Mod: PBBFAC,,, | Performed by: SURGERY

## 2020-06-10 PROCEDURE — 93880 PR DUPLEX SCAN EXTRACRANIAL,BILAT: ICD-10-PCS | Mod: S$GLB,,, | Performed by: SURGERY

## 2020-06-10 PROCEDURE — 1126F PR PAIN SEVERITY QUANTIFIED, NO PAIN PRESENT: ICD-10-PCS | Mod: S$GLB,,, | Performed by: SURGERY

## 2020-06-10 PROCEDURE — 99407 BEHAV CHNG SMOKING > 10 MIN: CPT | Mod: S$GLB,,, | Performed by: SURGERY

## 2020-06-10 PROCEDURE — 93978 PR DUPLEX LARGE VESSEL(S),COMPLETE: ICD-10-PCS | Mod: S$GLB,,, | Performed by: SURGERY

## 2020-06-10 PROCEDURE — 1159F PR MEDICATION LIST DOCUMENTED IN MEDICAL RECORD: ICD-10-PCS | Mod: S$GLB,,, | Performed by: SURGERY

## 2020-06-10 PROCEDURE — 1101F PT FALLS ASSESS-DOCD LE1/YR: CPT | Mod: CPTII,S$GLB,, | Performed by: SURGERY

## 2020-06-10 PROCEDURE — 3078F DIAST BP <80 MM HG: CPT | Mod: CPTII,S$GLB,, | Performed by: SURGERY

## 2020-06-10 PROCEDURE — 3075F SYST BP GE 130 - 139MM HG: CPT | Mod: CPTII,S$GLB,, | Performed by: SURGERY

## 2020-06-10 NOTE — PROGRESS NOTES
Sarina Genao  06/10/2020    HPI:  Patient is a 69 y.o. female with a h/o HTN, HLD, DMII, CKD III, hyperparathyroid, COPD, GERD who is here today as a f/u for PAD. Patient reports that she has had intermittent lower extremity pain since ~ 2015.    Hospitalized for pneumonia Oct 2018. She reports no recent hospitalizations. She is followed by endocrinologist, last A1c 5.8 on insulin. Last seen by nephrologist on 2/4/20, baseline Cr 1.3-1.4. She reports being active lately, she denies weakness or claudication. She is taking a daily ASA. No headaches, chest pain, palpitations or dyspnea with activity. She reports a resolution in her claudication upon starting ASA.     No history of MI/stroke  Tobacco use: > 40 pack yrs, ~ 5 cigs/d. Previously stopped, started back at time of COVID.   Retired X-ray techs    PMD is Dr GUILHERME Mayer   Pulmonary Dr LASHANDA Ramires    Past Medical History:   Diagnosis Date    Anticoagulant long-term use     Cancer     Kidney (Right)    Cataracts, bilateral     CKD (chronic kidney disease) stage 3, GFR 30-59 ml/min 12/15/2014    Combined hyperlipidemia associated with type 2 diabetes mellitus 6/7/2013    COPD (chronic obstructive pulmonary disease) 12/15/2014    Diabetes mellitus type II     NIDDM, a.m. glucose-120s-130s-last A1c-7.1-6/7/13    Diabetes mellitus with renal manifestations, uncontrolled 2/1/2017    Diabetic retinopathy     Family history of stomach cancer 12/5/2013    Former smoker     H/O renal cell carcinoma 12/15/2015    History of colonic polyps 2/1/2017    3 polyps 7/13 ---5 yrs    History of gastroesophageal reflux (GERD)     History of urinary incontinence     s/p bladder lift-october, 2011 (at [facility])    Hyperlipidemia     Hypertension     120s/70s-80s    Nephrolithiasis     Osteoarthritis of thumb 12/20/2019    Osteoporosis, post-menopausal 3/17/2014    Primary hyperparathyroidism 10/20/2016    Schatzki's ring 2/1/2017    Dilated 2014     Past  Surgical History:   Procedure Laterality Date    bladder lift  2011    [facility] at [facility]    BLADDER STONE REMOVAL  2011    before bladder lift    CATARACT EXTRACTION W/  INTRAOCULAR LENS IMPLANT Left 06/07/2016    Dr. Vásquez    CATARACT EXTRACTION W/  INTRAOCULAR LENS IMPLANT Right 06/21/2016    Dr. Vásquez    COLONOSCOPY N/A 4/26/2018    Procedure: COLONOSCOPY;  Surgeon: Benjamin Zamora MD;  Location: [facility];  Service: Endoscopy;  Laterality: N/A;  confirmed ss    CYSTOSCOPY      NAVIGATIONAL BRONCHOSCOPY N/A 12/11/2018    Procedure: BRONCHOSCOPY, NAVIGATIONAL;  Surgeon: Ashtyn Ramires MD;  Location: [address];  Service: Pulmonary;  Laterality: N/A;    NEPHRECTOMY      partial right     Family History   Problem Relation Age of Onset    Glaucoma Mother     Cataracts Mother     No Known Problems Father     Colon cancer Brother     Nephrolithiasis Sister     No Known Problems Maternal Aunt     No Known Problems Maternal Uncle     No Known Problems Paternal Aunt     No Known Problems Paternal Uncle     No Known Problems Maternal Grandmother     No Known Problems Maternal Grandfather     No Known Problems Paternal Grandmother     No Known Problems Paternal Grandfather     Anesthesia problems Neg Hx     Kidney cancer Neg Hx     Amblyopia Neg Hx     Blindness Neg Hx     Cancer Neg Hx     Diabetes Neg Hx     Hypertension Neg Hx     Macular degeneration Neg Hx     Retinal detachment Neg Hx     Strabismus Neg Hx     Stroke Neg Hx     Thyroid disease Neg Hx      Social History     Socioeconomic History    Marital status:      Spouse name: Not on file    Number of children: Not on file    Years of education: Not on file    Highest education level: Not on file   Occupational History    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   Social Needs    Financial resource strain: Not on file    Food insecurity:     Worry: Not on file     Inability: Not on file    Transportation needs:      Medical: Not on file     Non-medical: Not on file   Tobacco Use    Smoking status: Former Smoker     Packs/day: 0.25     Years: 30.00     Pack years: 7.50     Types: Cigarettes    Smokeless tobacco: Never Used    Tobacco comment: pt. reports quitting January 2018   Substance and Sexual Activity    Alcohol use: No     Alcohol/week: 0.0 standard drinks    Drug use: No    Sexual activity: Not on file   Lifestyle    Physical activity:     Days per week: Not on file     Minutes per session: Not on file    Stress: Not on file   Relationships    Social connections:     Talks on phone: Not on file     Gets together: Not on file     Attends Jehovah's witness service: Not on file     Active member of club or organization: Not on file     Attends meetings of clubs or organizations: Not on file     Relationship status: Not on file   Other Topics Concern    Not on file   Social History Narrative    Lives on [facility]    Here with sister Kate [phone]           Current Outpatient Medications:     albuterol (PROVENTIL/VENTOLIN HFA) 90 mcg/actuation inhaler, Inhale 1-2 puffs into the lungs every 6 (six) hours as needed for Wheezing. Rescue, Disp: 1 Inhaler, Rfl: 3    albuterol-ipratropium (DUO-NEB) 2.5 mg-0.5 mg/3 mL nebulizer solution, Take 3 mLs by nebulization every 6 (six) hours as needed for Wheezing or Shortness of Breath. Rescue, Disp: 3 Box, Rfl: 12    amLODIPine (NORVASC) 5 MG tablet, Take 1 tablet (5 mg total) by mouth once daily., Disp: 90 tablet, Rfl: 3    aspirin (ECOTRIN) 81 MG EC tablet, Take 81 mg by mouth once daily., Disp: , Rfl:     calcium carbonate (OS-LISSETTE) 500 mg calcium (1,250 mg) tablet, Take 1 tablet (500 mg total) by mouth once daily., Disp: 30 tablet, Rfl: 5    cyanocobalamin, vitamin B-12, (VITAMIN B-12) 1,000 mcg TbSR, Take by mouth once daily. , Disp: , Rfl:     diclofenac sodium (VOLTAREN) 1 % Gel, Apply 2 g topically 4 (four) times daily., Disp: 4 Tube, Rfl: 12    DOCOSAHEXANOIC  "ACID/EPA (FISH OIL ORAL), Take 1,200 mg by mouth once daily., Disp: , Rfl:     famotidine (PEPCID) 40 MG tablet, Take 1 tablet (40 mg total) by mouth once daily., Disp: 30 tablet, Rfl: 11    FLUAD [id number], 65 YR UP,,PF, 45 mcg (15 mcg x 3)/0.5 mL Syrg, inject .5 milliliter intramuscularly as directed, Disp: , Rfl: 0    irbesartan-hydrochlorothiazide (AVALIDE) 150-12.5 mg per tablet, Take 1 tablet by mouth once daily., Disp: 90 tablet, Rfl: 0    oxybutynin (DITROPAN-XL) 5 MG TR24, TAKE ONE TABLET BY MOUTH ONCE A DAY, Disp: 90 tablet, Rfl: 10    OZEMPIC 0.25 mg or 0.5 mg(2 mg/1.5 mL) PnIj, inject 0.5 milligram into THE SKIN once a week, Disp: , Rfl:     pen needle, diabetic (BD INSULIN PEN NEEDLE UF SHORT) 31 gauge x 5/16" Ndle, USE AS DIRECTED, Disp: 100 each, Rfl: 12    ranitidine (ZANTAC) 300 MG tablet, TAKE ONE TABLET BY MOUTH EVERY night, Disp: 90 tablet, Rfl: 11    rosuvastatin (CRESTOR) 20 MG tablet, TAKE ONE TABLET BY MOUTH ONCE A DAY, Disp: 90 tablet, Rfl: 12    traMADoL (ULTRAM) 50 mg tablet, Take 1 tablet (50 mg total) by mouth every 6 (six) hours as needed., Disp: 120 tablet, Rfl: 3    TRESIBA FLEXTOUCH U-200 200 unit/mL (3 mL) InPn, INJECT 55 UNITS SUBCUTANEOUSLY ONCE A DAY, Disp: , Rfl:     umeclidinium-vilanterol (ANORO ELLIPTA) 62.5-25 mcg/actuation DsDv, Inhale 1 puff into the lungs once daily. THIS SHOULD BE USED, NOT INCRUSE, Disp: 3 each, Rfl: 3    furosemide (LASIX) 20 MG tablet, Take 1 tablet (20 mg total) by mouth once daily., Disp: 30 tablet, Rfl: 11    REVIEW OF SYSTEMS:  General: negative; ENT: negative; Allergy and Immunology: negative; Hematological and Lymphatic: Negative; Endocrine: negative; Respiratory: no cough, shortness of breath, or wheezing; Cardiovascular: no chest pain or dyspnea on exertion; Gastrointestinal: no abdominal pain/back, change in bowel habits, or bloody stools; Genito-Urinary: no dysuria, trouble voiding, or hematuria; Musculoskeletal: " negative  Neurological: no TIA or stroke symptoms; Psychiatric: no nervousness, anxiety or depression.    PHYSICAL EXAM:   Right Arm BP - Sittin/72 (06/10/20 1107)  Left Arm BP - Sittin/69 (06/10/20 1107)  Pulse: 73  Temp: 98.6 °F (37 °C)      General appearance:  Alert, well-appearing, and in no distress.  Oriented to person, place, and time   Neurological: Normal speech, no focal findings noted; CN II - XII grossly intact           Musculoskeletal: Digits/nail without cyanosis/clubbing.  Normal muscle strength/tone.                 Neck: Supple, no significant adenopathy; thyroid is not enlarged                  No carotid bruit can be auscultated                Chest:  Clear to auscultation, no wheezes, rales or rhonchi, symmetric air entry     No use of accessory muscles             Cardiac: Normal rate and regular rhythm, S1 and S2 normal; PMI non-displaced          Abdomen: Soft, nontender, nondistended, no masses or organomegaly     No rebound tenderness noted; bowel sounds normal     Pulsatile aortic mass is not palpable.     No groin adenopathy      Extremities:   No pedal or posterior tibial pulses palpable.     Trace pre-tibial edema     No ulcerations    LAB RESULTS:  Lab Results   Component Value Date    K 4.0 2020    K 3.9 2020    K 3.3 (L) 2019    CREATININE 1.2 2020    CREATININE 1.3 2020    CREATININE 0.9 2019     Lab Results   Component Value Date    WBC 15.44 (H) 2020    WBC 12.89 (H) 2019    WBC 10.29 10/15/2018    HCT 44.4 2020    HCT 41.1 2019    HCT 35.6 (L) 10/15/2018     (H) 2020     (H) 2019     (H) 10/15/2018     Lab Results   Component Value Date    HGBA1C 5.8 (H) 2019    HGBA1C 6.6 (H) 2019    HGBA1C 9.9 (H) 10/12/2018     IMAGING:  Carotid u/s (6/10/20)  R ICA ~ 60% stenosis;  cm/s (previous 158 cm/s); ICA/CCA 2.2/1.5 (previous 2.4 : 1)  L ICA < 39% stenosis  Nl  antegrade flow x2    AAA u/s (6/10/20)     Aorta mid: 2.1 diam AP    ABIs (3/27/20)  R 1.02 (previous 0.84; 0.88)  L 0.97 (previous 0.79; 0.72)  Biphasic waveforms R > L   Waveforms decrease from low thigh to calf    Previous:  MRI   T12-L1: No significant spinal canal stenosis or neuroforaminal narrowing.  L1-2: No significant spinal canal stenosis or neuroforaminal narrowing.  L2-3: Mild bilateral facet arthropathy and ligamentum flavum thickening without significant spinal canal stenosis or neuroforaminal narrowing.  L3-4: Mild bilateral facet arthropathy and ligamentum flavum thickening without significant spinal canal stenosis or neuroforaminal narrowing.  L4-5: Mild bilateral facet arthropathy resulting in mild left neuroforaminal narrowing.  L5-S1: Mild bilateral facet arthropathy without significant neuroforaminal narrowing or spinal canal stenosis.    IMP/PLAN:  69 y.o. female with  h/o HTN, HLD, DMII, CKD III, hyperparathyroid, COPD, GERD : with likely R iliac and bilateral SFA disease -- minimally symptomatic- improving with claudication: now can ambulate > 2 blocks R > L calf claudication. She denies claudication with activity at present. Re-assured patient there is no need for any urgent intervention. She has asymptomatic  R carotid stenosis  Stable ~60% from last visit 1 year prior.     Re-assured, no AAA    Continue ASA 81 po qd, statin.   Tobacco cessation important   Fu in 1 yr with carotid u/s and ABIs    Klaus Cintron MD FACS  Vascular/Endovascular Surgery    We discussed smoking cessation for greater than 10 minutes as well as the impact that continued smoking can have on the progression of Vascular disease. This discussion included the use of medications, patches, nicotine gum, and lifestyle modifications.

## 2020-06-17 ENCOUNTER — PATIENT OUTREACH (OUTPATIENT)
Dept: ADMINISTRATIVE | Facility: HOSPITAL | Age: 70
End: 2020-06-17

## 2020-09-02 ENCOUNTER — TELEPHONE (OUTPATIENT)
Dept: FAMILY MEDICINE | Facility: CLINIC | Age: 70
End: 2020-09-02

## 2020-09-02 NOTE — TELEPHONE ENCOUNTER
----- Message from Luz Marina Hightower sent at 9/2/2020 11:50 AM CDT -----  Contact: Patient [phone]  Type: Patient Call Back    Who called: Patient    What is the request in detail: Would like to get a call back once the flu shots are in. Please call.     Would the patient rather a call back or a response via [facility]?  Call back    Best call back number: [phone]

## 2020-09-03 DIAGNOSIS — N18.30 CKD (CHRONIC KIDNEY DISEASE) STAGE 3, GFR 30-59 ML/MIN: Primary | ICD-10-CM

## 2020-09-09 NOTE — TELEPHONE ENCOUNTER
Chief Complaint   Patient presents with    Neck Pain     Stiff Neck     Health Maintenance   Topic Date Due    MAMMOGRAM  1979    HIV Screening  10/15/1992    Annual Physical  10/15/1995    Cervical Cancer Screening  10/15/1998    Influenza Vaccine  07/01/2020    BMI: Adult  09/09/2021    DTaP,Tdap,and Td Vaccines (3 - Td) 02/13/2025    Pneumococcal Vaccine: Pediatrics (0 to 5 Years) and At-Risk Patients (6 to 59 Years)  Aged Out    HIB Vaccine  Aged Out    Hepatitis B Vaccine  Aged Out    IPV Vaccine  Aged Out    Hepatitis A Vaccine  Aged Out    Meningococcal ACWY Vaccine  Aged Out    HPV Vaccine  Aged Out     Assessment/Plan:    Educated pt about side effects of tramadol including addiction, abuse etc  Advised pt to stop tramadol  Give naproxen  SE educated pt  Take it with food  Give flexeril  SE educated pt  Do not drive while on flexeril  Advised pt to RTO for PE with labs  Diagnoses and all orders for this visit:    Muscle spasm  -     cyclobenzaprine (FLEXERIL) 10 mg tablet; Take 1 tablet (10 mg total) by mouth 3 (three) times a day as needed for muscle spasms  -     naproxen (NAPROSYN) 500 mg tablet; Take 1 tablet (500 mg total) by mouth 2 (two) times a day with meals    Neck pain    Encounter for preventive care  -     CBC and differential; Future  -     Comprehensive metabolic panel; Future  -     Lipid Panel with Direct LDL reflex; Future  -     TSH, 3rd generation with Free T4 reflex; Future          Subjective:      Patient ID: Claudean Punch is a 41 y o  female  HPI    Pt is here with her son  C/o left neck pain for 4 days  Pt states 4 days ago she woke up with severe neck pain  She could not move her neck  Pt states she saw a lump of left neck  Pt denies numbness/tingling  Denies fever, SOB, CP, n/v/abd pain  A little better today  She tried OTC ibuprofen and leftover tramadol but no help  She tried ice but no help       The following portions of the Please advise   patient's history were reviewed and updated as appropriate: allergies, current medications, past family history, past medical history, past social history, past surgical history and problem list     Review of Systems   Constitutional: Negative for appetite change, chills and fever  HENT: Negative for congestion, ear pain, sinus pain and sore throat  Eyes: Negative for discharge and itching  Respiratory: Negative for apnea, cough, chest tightness, shortness of breath and wheezing  Cardiovascular: Negative for chest pain, palpitations and leg swelling  Gastrointestinal: Negative for abdominal pain, anal bleeding, constipation, diarrhea, nausea and vomiting  Endocrine: Negative for cold intolerance, heat intolerance and polyuria  Genitourinary: Negative for difficulty urinating and dysuria  Musculoskeletal: Positive for neck pain  Negative for arthralgias, back pain and myalgias  Skin: Negative for rash  Neurological: Negative for dizziness and headaches  Psychiatric/Behavioral: Negative for agitation  Objective:      /78 (BP Location: Left arm, Patient Position: Sitting, Cuff Size: Adult)   Pulse 96   Temp 98 °F (36 7 °C) (Oral)   Resp 14   Ht 5' 5" (1 651 m)   Wt 57 2 kg (126 lb 3 2 oz)   SpO2 99%   BMI 21 00 kg/m²          Physical Exam  Constitutional:       General: She is not in acute distress  Appearance: She is well-developed  HENT:      Head: Normocephalic  Eyes:      General:         Right eye: No discharge  Left eye: No discharge  Conjunctiva/sclera: Conjunctivae normal    Neck:      Musculoskeletal: Normal range of motion  Thyroid: No thyromegaly  Cardiovascular:      Rate and Rhythm: Normal rate and regular rhythm  Heart sounds: Normal heart sounds  No murmur  No friction rub  No gallop  Pulmonary:      Effort: Pulmonary effort is normal  No respiratory distress  Breath sounds: Normal breath sounds  No wheezing or rales  Chest:      Chest wall: No tenderness  Abdominal:      General: Bowel sounds are normal  There is no distension  Palpations: Abdomen is soft  There is no mass  Tenderness: There is no abdominal tenderness  There is no guarding or rebound  Musculoskeletal:         General: Tenderness present  No swelling or deformity  Comments: Neck ROM limited because of pain  No swollen or erythema   Lymphadenopathy:      Cervical: No cervical adenopathy  Neurological:      Mental Status: She is alert

## 2020-09-14 ENCOUNTER — LAB VISIT (OUTPATIENT)
Dept: LAB | Facility: HOSPITAL | Age: 70
End: 2020-09-14
Attending: INTERNAL MEDICINE
Payer: MEDICARE

## 2020-09-14 DIAGNOSIS — E11.9 TYPE 2 DIABETES MELLITUS WITHOUT COMPLICATION: ICD-10-CM

## 2020-09-14 LAB
ESTIMATED AVG GLUCOSE: 134 MG/DL (ref 68–131)
HBA1C MFR BLD HPLC: 6.3 % (ref 4–5.6)

## 2020-09-14 PROCEDURE — 83036 HEMOGLOBIN GLYCOSYLATED A1C: CPT

## 2020-09-14 PROCEDURE — 36415 COLL VENOUS BLD VENIPUNCTURE: CPT | Mod: PO

## 2020-09-16 ENCOUNTER — PATIENT OUTREACH (OUTPATIENT)
Dept: ADMINISTRATIVE | Facility: HOSPITAL | Age: 70
End: 2020-09-16

## 2020-09-25 DIAGNOSIS — Z79.4 TYPE 2 DIABETES MELLITUS WITH KIDNEY COMPLICATION, WITH LONG-TERM CURRENT USE OF INSULIN: ICD-10-CM

## 2020-09-25 DIAGNOSIS — E11.29 TYPE 2 DIABETES MELLITUS WITH KIDNEY COMPLICATION, WITH LONG-TERM CURRENT USE OF INSULIN: ICD-10-CM

## 2020-09-29 ENCOUNTER — PATIENT OUTREACH (OUTPATIENT)
Dept: ADMINISTRATIVE | Facility: OTHER | Age: 70
End: 2020-09-29

## 2020-09-29 ENCOUNTER — TELEPHONE (OUTPATIENT)
Dept: PULMONOLOGY | Facility: CLINIC | Age: 70
End: 2020-09-29

## 2020-09-29 NOTE — TELEPHONE ENCOUNTER
----- Message from Shai Torres sent at 9/28/2020  1:57 PM CDT -----  Contact: pt @ [phone]  Pt calling to schedule recall dated 11/03/20. Nothing available this year or next. I was not sure if schedule is private.

## 2020-09-29 NOTE — TELEPHONE ENCOUNTER
Spoke with patient, informed her that I have received her message. I advised patient that Dr Ramires schedule for November is not open at this time however, staff will contact her in regards to scheduling her a appointment with Dr Ramires once schedule opens. Patient verbalized that she understand.

## 2020-09-30 ENCOUNTER — OFFICE VISIT (OUTPATIENT)
Dept: NEPHROLOGY | Facility: CLINIC | Age: 70
End: 2020-09-30
Payer: MEDICARE

## 2020-09-30 ENCOUNTER — OFFICE VISIT (OUTPATIENT)
Dept: FAMILY MEDICINE | Facility: CLINIC | Age: 70
End: 2020-09-30
Payer: MEDICARE

## 2020-09-30 VITALS
BODY MASS INDEX: 29.29 KG/M2 | OXYGEN SATURATION: 98 % | TEMPERATURE: 98 F | WEIGHT: 170.63 LBS | SYSTOLIC BLOOD PRESSURE: 126 MMHG | DIASTOLIC BLOOD PRESSURE: 62 MMHG | HEART RATE: 82 BPM

## 2020-09-30 VITALS
DIASTOLIC BLOOD PRESSURE: 62 MMHG | HEIGHT: 64 IN | WEIGHT: 170.63 LBS | HEART RATE: 82 BPM | BODY MASS INDEX: 29.13 KG/M2 | OXYGEN SATURATION: 98 % | SYSTOLIC BLOOD PRESSURE: 126 MMHG

## 2020-09-30 DIAGNOSIS — N18.30 CKD (CHRONIC KIDNEY DISEASE) STAGE 3, GFR 30-59 ML/MIN: ICD-10-CM

## 2020-09-30 DIAGNOSIS — N18.31 STAGE 3A CHRONIC KIDNEY DISEASE: Primary | ICD-10-CM

## 2020-09-30 DIAGNOSIS — L98.9 SKIN LESION: Primary | ICD-10-CM

## 2020-09-30 DIAGNOSIS — I10 ESSENTIAL HYPERTENSION: ICD-10-CM

## 2020-09-30 DIAGNOSIS — J44.9 CHRONIC OBSTRUCTIVE PULMONARY DISEASE, UNSPECIFIED COPD TYPE: ICD-10-CM

## 2020-09-30 DIAGNOSIS — Z79.4 LONG-TERM INSULIN USE: ICD-10-CM

## 2020-09-30 PROCEDURE — 3044F PR MOST RECENT HEMOGLOBIN A1C LEVEL <7.0%: ICD-10-PCS | Mod: CPTII,S$GLB,, | Performed by: INTERNAL MEDICINE

## 2020-09-30 PROCEDURE — 3078F PR MOST RECENT DIASTOLIC BLOOD PRESSURE < 80 MM HG: ICD-10-PCS | Mod: CPTII,S$GLB,, | Performed by: INTERNAL MEDICINE

## 2020-09-30 PROCEDURE — 99214 OFFICE O/P EST MOD 30 MIN: CPT | Mod: S$GLB,,, | Performed by: INTERNAL MEDICINE

## 2020-09-30 PROCEDURE — 1101F PR PT FALLS ASSESS DOC 0-1 FALLS W/OUT INJ PAST YR: ICD-10-PCS | Mod: CPTII,S$GLB,, | Performed by: INTERNAL MEDICINE

## 2020-09-30 PROCEDURE — 3074F PR MOST RECENT SYSTOLIC BLOOD PRESSURE < 130 MM HG: ICD-10-PCS | Mod: CPTII,S$GLB,, | Performed by: INTERNAL MEDICINE

## 2020-09-30 PROCEDURE — 99214 PR OFFICE/OUTPT VISIT, EST, LEVL IV, 30-39 MIN: ICD-10-PCS | Mod: S$GLB,,, | Performed by: INTERNAL MEDICINE

## 2020-09-30 PROCEDURE — 3078F DIAST BP <80 MM HG: CPT | Mod: CPTII,S$GLB,, | Performed by: INTERNAL MEDICINE

## 2020-09-30 PROCEDURE — 1159F MED LIST DOCD IN RCRD: CPT | Mod: S$GLB,,, | Performed by: INTERNAL MEDICINE

## 2020-09-30 PROCEDURE — 3044F HG A1C LEVEL LT 7.0%: CPT | Mod: CPTII,S$GLB,, | Performed by: INTERNAL MEDICINE

## 2020-09-30 PROCEDURE — 1159F PR MEDICATION LIST DOCUMENTED IN MEDICAL RECORD: ICD-10-PCS | Mod: S$GLB,,, | Performed by: INTERNAL MEDICINE

## 2020-09-30 PROCEDURE — 99999 PR PBB SHADOW E&M-EST. PATIENT-LVL III: ICD-10-PCS | Mod: PBBFAC,,, | Performed by: INTERNAL MEDICINE

## 2020-09-30 PROCEDURE — 1126F AMNT PAIN NOTED NONE PRSNT: CPT | Mod: S$GLB,,, | Performed by: INTERNAL MEDICINE

## 2020-09-30 PROCEDURE — 1101F PT FALLS ASSESS-DOCD LE1/YR: CPT | Mod: CPTII,S$GLB,, | Performed by: INTERNAL MEDICINE

## 2020-09-30 PROCEDURE — 3074F SYST BP LT 130 MM HG: CPT | Mod: CPTII,S$GLB,, | Performed by: INTERNAL MEDICINE

## 2020-09-30 PROCEDURE — 99999 PR PBB SHADOW E&M-EST. PATIENT-LVL IV: CPT | Mod: PBBFAC,,, | Performed by: INTERNAL MEDICINE

## 2020-09-30 PROCEDURE — 99999 PR PBB SHADOW E&M-EST. PATIENT-LVL III: CPT | Mod: PBBFAC,,, | Performed by: INTERNAL MEDICINE

## 2020-09-30 PROCEDURE — 3008F BODY MASS INDEX DOCD: CPT | Mod: CPTII,S$GLB,, | Performed by: INTERNAL MEDICINE

## 2020-09-30 PROCEDURE — 3008F PR BODY MASS INDEX (BMI) DOCUMENTED: ICD-10-PCS | Mod: CPTII,S$GLB,, | Performed by: INTERNAL MEDICINE

## 2020-09-30 PROCEDURE — 1126F PR PAIN SEVERITY QUANTIFIED, NO PAIN PRESENT: ICD-10-PCS | Mod: S$GLB,,, | Performed by: INTERNAL MEDICINE

## 2020-09-30 PROCEDURE — 99999 PR PBB SHADOW E&M-EST. PATIENT-LVL IV: ICD-10-PCS | Mod: PBBFAC,,, | Performed by: INTERNAL MEDICINE

## 2020-09-30 NOTE — PROGRESS NOTES
Chief complaint:  Follow-up on A1c and check year        70-year-old black female  whose PCP is retired.  Regarding health maintenance she is up-to-date on mammogram 5/19. .  It looks like she had colon polyps in 2013, 1 polyp 4/18--5 yrs.  She did quit smoking.  She follows with Endocrine but they have not done an A1c and a while or are also using an outside computer.  Her A1c improved from 6.3 down to six.  She has gained weight and is eating bad.  She tried to cut down on smoking but is still eating more due to social isolation.    She has hypertension which appears to be under good control and needs refills of her medication.  She does get some edema at the end of the day that is gone in the morning and I reassured her about that but she is concerned and on Norvasc 5 mg.  Blood pressure is excellent and we will discontinue and have her monitor.  She was on Diovan but due to availability issues we had switched her to irbesartan..  She has diabetes which is still uncontrolled and apparently back to seeing Endocrine and her most recent A1c she says was 6.3.  She still on insulin and the Ozempic injection but off of the glyburide which I would agree with based upon her age and overall good control..  Her A1c was 7.3 then 6.6, 6.3       She does have chronic renal insufficiency and follows with nephrology.  I explained her chronic kidney disease.  Her GFR is actually WORSE.  She has an appointment after my appointment with nephrology today.  I gave her copies of her labs.    She does have a brownish otherwise asymptomatic mole on the [place]gins of the left ear.  She did try to pop it about a month ago with needle and only a little bit of blood came out.  Would never sounds like it was infected or anything.  It does appear to be a soft small symmetrical round dark brown mole.  There is an overlying white dot that may be scar tissue from where she tried to poke it.  His completely mobile and nontender and not firm, it is  soft.  Even tried to squeeze it and no fluid her anything could be expressed.  I really think it is just a brown mole and she is open and willing to monitor clinically.  We discussed all the more worrisome signs or symptoms but this mold does not exhibit any of those.  I do not think it is assist.         She gets occasional reflux and was occasionally using Zantac 300.  She  changed to Pepcid to use on occasion and that was sent.    She apparently is primary hyperparathyroidism and a history of hypercalcemia but all recent labs appear to be normal as well as PTH.  Vitamin D level also normal.  She apparently was on a weekly medication  for osteoporosis but d/c 2017 for CKD?  Bone density from 1 year ago reviewed noting slight decline in the hip     She has a history of COPD but no active symptoms lately.  She has hyperlipidemia and may been on Lipitor in the past with an unremembered intolerance.  We discussed benefits of statin and her LDL has responded to crestor .  All these issues reviewed and patient counseled an evaluation and management of all the separate issues we based upon time counselingTotal time over 25 minutes with over 50% counseling.    4/18  Impression      Osteoporosis of total hip, femoral neck and lumbar spine.  Decrease in hip (-4.7%) and increase in lumbar spine (5%) since prior study.         ROS:   CONST: weight stable. EYES: no vision change. ENT: no sore throat. CV: no chest pain w/ exertion. RESP: no shortness of breath. GI: no nausea, vomiting, diarrhea. No dysphagia. : no urinary issues. MUSCULOSKELETAL: no new myalgias or arthralgias. SKIN: no new changes. NEURO: no focal deficits. PSYCH: no new issues. ENDOCRINE: no polyuria. HEME: no lymph nodes. ALLERGY: no general pruritis.    Past Medical History   Diagnosis Date    Cataracts, bilateral     CKD (chronic kidney disease) stage 3, GFR 30-59 ml/min 12/15/2014    Combined hyperlipidemia associated with type 2 diabetes mellitus  6/7/2013    COPD (chronic obstructive pulmonary disease) 12/15/2014    Diabetes mellitus type II----with chronic kidney disease           Diabetes mellitus with renal manifestations, uncontrolled 2/1/2017    Diabetic retinopathy     Family history of stomach cancer 12/5/2013    H/O renal cell carcinoma 12/15/2015    History of colonic polyps 2/1/2017     3 polyps 7/13 ---1 polyp 4/18--5 yrs    History of gastroesophageal reflux (GERD)     History of urinary incontinence      s/p bladder lift-october, 2011 (at [facility])    Hyperlipidemia     Hypertension      120s/70s-80s    Nephrolithiasis     Osteoporosis, post-menopausal, evaluated by Dr. York, quit Fosamax due to CKD early 2017  3/17/2014    Primary hyperparathyroidism 10/20/2016    Schatzki's ring 2/1/2017     Dilated 2014   Prevnar 2017    Past Surgical History:   Procedure Laterality Date    bladder lift  2011    done at [facility]    BLADDER STONE REMOVAL  2011    before bladder lift    CATARACT EXTRACTION W/  INTRAOCULAR LENS IMPLANT Left 06/07/2016    Dr. Vásquez    CATARACT EXTRACTION W/  INTRAOCULAR LENS IMPLANT Right 06/21/2016    Dr. Vásquez    COLONOSCOPY N/A 4/26/2018    Procedure: COLONOSCOPY;  Surgeon: Benjamin Zamora MD;  Location: [facility];  Service: Endoscopy;  Laterality: N/A;  confirmed ss    CYSTOSCOPY      NAVIGATIONAL BRONCHOSCOPY N/A 12/11/2018    Procedure: BRONCHOSCOPY, NAVIGATIONAL;  Surgeon: Ashtyn Ramires MD;  Location: [address];  Service: Pulmonary;  Laterality: N/A;    NEPHRECTOMY      partial right     Social History     Socioeconomic History    Marital status:      Spouse name: Not on file    Number of children: Not on file    Years of education: Not on file    Highest education level: Not on file   Occupational History    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   Social Needs    Financial resource strain: Not on file    Food insecurity     Worry: Not on file     Inability: Not on file     Transportation needs     Medical: Not on file     Non-medical: Not on file   Tobacco Use    Smoking status: Former Smoker     Packs/day: 0.25     Years: 30.00     Pack years: 7.50     Types: Cigarettes    Smokeless tobacco: Never Used    Tobacco comment: pt. reports quitting January 2018   Substance and Sexual Activity    Alcohol use: No     Alcohol/week: 0.0 standard drinks    Drug use: No    Sexual activity: Not on file   Lifestyle    Physical activity     Days per week: Not on file     Minutes per session: Not on file    Stress: Not on file   Relationships    Social connections     Talks on phone: Not on file     Gets together: Not on file     Attends Restorationist service: Not on file     Active member of club or organization: Not on file     Attends meetings of clubs or organizations: Not on file     Relationship status: Not on file   Other Topics Concern    Not on file   Social History Narrative    Lives on [facility]    Here with sister Kate [phone]         family history includes Cataracts in her mother; Colon cancer in her brother; Glaucoma in her mother; Nephrolithiasis in her sister; No Known Problems in her father, maternal aunt, maternal grandfather, maternal grandmother, maternal uncle, paternal aunt, paternal grandfather, paternal grandmother, and paternal uncle.     Gen: no distress  Skin examined as above, smells of smoke but otherwise pleasant            Assessment and plan:    Sarina was seen today for results.    Diagnoses and all orders for this visit:    Skin lesion, looks like a benign mole, we will follow clinically    Uncontrolled type 2 diabetes mellitus with stage 3 chronic kidney disease, without long-term current use of insulin, slight increase, followed by Endocrine, she is on Tresiba and Ozempic.  She has gained some weight during the pandemic    CKD (chronic kidney disease) stage 3, GFR 30-59 ml/min, followed by Nephrology, copies of labs given    Essential  hypertension, chronic and stable    Long-term insulin use    Chronic obstructive pulmonary disease, unspecified COPD type, continues to smoke and again encouraged her to keep reducing.  Counseled regarding all these issues aboveTotal time over 25 minutes with over 50% counseling.

## 2020-10-01 NOTE — PROGRESS NOTES
Subjective:       Patient ID: Sarina Genao is a 70 y.o. Black or  female who presents for follow up of Chronic Kidney Disease    HPI Ms. Genao is a 70 year old woman with medical history of diabetes, hypertension, COPD, partial right nephrectomy (renal cell carcinoma), presenting for follow up of chronic kidney disease.  Patient reports more adequate daily fluid intake, denies any NSAID use.  She otherwise denies any fever, chest pain, shortness of breath, abdominal pain, diarrhea, dysuria/hematuria.     Review of Systems   Constitutional: Negative for appetite change, fatigue and fever.   Respiratory: Negative for chest tightness and shortness of breath.    Cardiovascular: Negative for chest pain and leg swelling.   Gastrointestinal: Negative for abdominal pain, constipation, diarrhea, nausea and vomiting.   Genitourinary: Negative for difficulty urinating, dysuria, flank pain, frequency, hematuria and urgency.   Musculoskeletal: Negative for arthralgias, joint swelling and myalgias.   Skin: Negative for rash and wound.   Neurological: Negative for dizziness, weakness and light-headedness.   All other systems reviewed and are negative.      Objective:      Physical Exam  Vitals signs reviewed.   Constitutional:       Appearance: She is well-developed.   Pulmonary:      Effort: Pulmonary effort is normal. No respiratory distress.   Musculoskeletal:         General: No swelling.   Neurological:      Mental Status: She is alert.         Assessment:       1. Stage 3a chronic kidney disease        Plan:     Ms. Genao is a 70 year old woman with medical history of diabetes, hypertension, COPD, partial right nephrectomy (renal cell carcinoma), presenting for follow up of chronic kidney disease.  Patient creatinine has mainly been 1.3-1.4mg/dL since 2013 consistent with CKD stage III; creatinine has been variable, though recently as low as 0.9mg/dL and as high as 1.5mg/dL, suspect labile due  to inadequate fluid intake.  Encouraged fluid intake, stressed importance of blood pressure/glycemic control to prevent any further progression of kidney disease, patient voiced understanding.    - Anemia: Hgb at goal, no indication for erythropoetin therapy  - Bone/mineral metabolism: patient with secondary hyperparathyroidism, PTH at goal for stage CKD    Return to clinic in 6 months pending renal panel next month, then with renal/heme panel, iron/TIBC/ferritin, urinalysis/culture, urine protein/creatinine ratio, PTH/VitD prior to next visit

## 2020-10-09 DIAGNOSIS — E11.29 TYPE 2 DIABETES MELLITUS WITH KIDNEY COMPLICATION, WITH LONG-TERM CURRENT USE OF INSULIN: ICD-10-CM

## 2020-10-09 DIAGNOSIS — Z79.4 TYPE 2 DIABETES MELLITUS WITH KIDNEY COMPLICATION, WITH LONG-TERM CURRENT USE OF INSULIN: ICD-10-CM

## 2020-10-13 ENCOUNTER — TELEPHONE (OUTPATIENT)
Dept: PULMONOLOGY | Facility: CLINIC | Age: 70
End: 2020-10-13

## 2020-10-13 NOTE — TELEPHONE ENCOUNTER
I called Mrs Porter back to let her know that I cannot offer a appointment today with Dr Rmaires. Tomorrow I will give her message to her nurse Licha. Liset Alvarado

## 2020-10-13 NOTE — TELEPHONE ENCOUNTER
----- Message from Israel Deng sent at 10/13/2020  2:43 PM CDT -----  Contact: pt  Pt is calling in regards to being schedule for her yearly check up (I tried to schedule pt but nothing was available)    Call back:  [phone]

## 2020-10-23 DIAGNOSIS — Z79.4 TYPE 2 DIABETES MELLITUS WITH KIDNEY COMPLICATION, WITH LONG-TERM CURRENT USE OF INSULIN: ICD-10-CM

## 2020-10-23 DIAGNOSIS — E11.9 TYPE 2 DIABETES MELLITUS WITHOUT COMPLICATION: ICD-10-CM

## 2020-10-23 DIAGNOSIS — E11.29 TYPE 2 DIABETES MELLITUS WITH KIDNEY COMPLICATION, WITH LONG-TERM CURRENT USE OF INSULIN: ICD-10-CM

## 2020-10-30 ENCOUNTER — LAB VISIT (OUTPATIENT)
Dept: LAB | Facility: HOSPITAL | Age: 70
End: 2020-10-30
Attending: INTERNAL MEDICINE
Payer: MEDICARE

## 2020-10-30 DIAGNOSIS — N18.31 STAGE 3A CHRONIC KIDNEY DISEASE: ICD-10-CM

## 2020-10-30 LAB
ALBUMIN SERPL BCP-MCNC: 4 G/DL (ref 3.5–5.2)
ANION GAP SERPL CALC-SCNC: 11 MMOL/L (ref 8–16)
BUN SERPL-MCNC: 11 MG/DL (ref 8–23)
CALCIUM SERPL-MCNC: 11 MG/DL (ref 8.7–10.5)
CHLORIDE SERPL-SCNC: 101 MMOL/L (ref 95–110)
CO2 SERPL-SCNC: 28 MMOL/L (ref 23–29)
CREAT SERPL-MCNC: 1.2 MG/DL (ref 0.5–1.4)
EST. GFR  (AFRICAN AMERICAN): 52.2 ML/MIN/1.73 M^2
EST. GFR  (NON AFRICAN AMERICAN): 45.3 ML/MIN/1.73 M^2
GLUCOSE SERPL-MCNC: 127 MG/DL (ref 70–110)
PHOSPHATE SERPL-MCNC: 3 MG/DL (ref 2.7–4.5)
POTASSIUM SERPL-SCNC: 4.2 MMOL/L (ref 3.5–5.1)
SODIUM SERPL-SCNC: 140 MMOL/L (ref 136–145)

## 2020-10-30 PROCEDURE — 80069 RENAL FUNCTION PANEL: CPT

## 2020-10-30 PROCEDURE — 36415 COLL VENOUS BLD VENIPUNCTURE: CPT | Mod: PO

## 2020-11-03 ENCOUNTER — PATIENT OUTREACH (OUTPATIENT)
Dept: ADMINISTRATIVE | Facility: OTHER | Age: 70
End: 2020-11-03

## 2020-11-05 ENCOUNTER — OFFICE VISIT (OUTPATIENT)
Dept: PULMONOLOGY | Facility: CLINIC | Age: 70
End: 2020-11-05
Payer: MEDICARE

## 2020-11-05 ENCOUNTER — HOSPITAL ENCOUNTER (OUTPATIENT)
Dept: RADIOLOGY | Facility: HOSPITAL | Age: 70
Discharge: HOME | End: 2020-11-05
Attending: INTERNAL MEDICINE
Payer: MEDICARE

## 2020-11-05 VITALS
OXYGEN SATURATION: 97 % | HEIGHT: 64 IN | HEART RATE: 77 BPM | BODY MASS INDEX: 29.17 KG/M2 | SYSTOLIC BLOOD PRESSURE: 140 MMHG | DIASTOLIC BLOOD PRESSURE: 71 MMHG | WEIGHT: 170.88 LBS

## 2020-11-05 DIAGNOSIS — R91.1 LEFT LOWER LOBE PULMONARY NODULE: ICD-10-CM

## 2020-11-05 DIAGNOSIS — R91.1 LUNG NODULE: ICD-10-CM

## 2020-11-05 DIAGNOSIS — J43.2 CENTRILOBULAR EMPHYSEMA: Primary | ICD-10-CM

## 2020-11-05 DIAGNOSIS — R91.1 SOLITARY PULMONARY NODULE: ICD-10-CM

## 2020-11-05 PROCEDURE — 71250 CT THORAX DX C-: CPT | Mod: TC

## 2020-11-05 PROCEDURE — 3008F BODY MASS INDEX DOCD: CPT | Mod: CPTII,S$GLB,, | Performed by: INTERNAL MEDICINE

## 2020-11-05 PROCEDURE — 99214 PR OFFICE/OUTPT VISIT, EST, LEVL IV, 30-39 MIN: ICD-10-PCS | Mod: S$GLB,,, | Performed by: INTERNAL MEDICINE

## 2020-11-05 PROCEDURE — 71250 CT THORAX DX C-: CPT | Mod: 26,,, | Performed by: RADIOLOGY

## 2020-11-05 PROCEDURE — 1101F PR PT FALLS ASSESS DOC 0-1 FALLS W/OUT INJ PAST YR: ICD-10-PCS | Mod: CPTII,S$GLB,, | Performed by: INTERNAL MEDICINE

## 2020-11-05 PROCEDURE — 3008F PR BODY MASS INDEX (BMI) DOCUMENTED: ICD-10-PCS | Mod: CPTII,S$GLB,, | Performed by: INTERNAL MEDICINE

## 2020-11-05 PROCEDURE — 1159F MED LIST DOCD IN RCRD: CPT | Mod: S$GLB,,, | Performed by: INTERNAL MEDICINE

## 2020-11-05 PROCEDURE — 1126F PR PAIN SEVERITY QUANTIFIED, NO PAIN PRESENT: ICD-10-PCS | Mod: S$GLB,,, | Performed by: INTERNAL MEDICINE

## 2020-11-05 PROCEDURE — 1101F PT FALLS ASSESS-DOCD LE1/YR: CPT | Mod: CPTII,S$GLB,, | Performed by: INTERNAL MEDICINE

## 2020-11-05 PROCEDURE — 99999 PR PBB SHADOW E&M-EST. PATIENT-LVL IV: CPT | Mod: PBBFAC,,, | Performed by: INTERNAL MEDICINE

## 2020-11-05 PROCEDURE — 3077F SYST BP >= 140 MM HG: CPT | Mod: CPTII,S$GLB,, | Performed by: INTERNAL MEDICINE

## 2020-11-05 PROCEDURE — 3078F DIAST BP <80 MM HG: CPT | Mod: CPTII,S$GLB,, | Performed by: INTERNAL MEDICINE

## 2020-11-05 PROCEDURE — 99214 OFFICE O/P EST MOD 30 MIN: CPT | Mod: S$GLB,,, | Performed by: INTERNAL MEDICINE

## 2020-11-05 PROCEDURE — 1159F PR MEDICATION LIST DOCUMENTED IN MEDICAL RECORD: ICD-10-PCS | Mod: S$GLB,,, | Performed by: INTERNAL MEDICINE

## 2020-11-05 PROCEDURE — 99999 PR PBB SHADOW E&M-EST. PATIENT-LVL IV: ICD-10-PCS | Mod: PBBFAC,,, | Performed by: INTERNAL MEDICINE

## 2020-11-05 PROCEDURE — 71250 CT CHEST WITHOUT CONTRAST: ICD-10-PCS | Mod: 26,,, | Performed by: RADIOLOGY

## 2020-11-05 PROCEDURE — 3077F PR MOST RECENT SYSTOLIC BLOOD PRESSURE >= 140 MM HG: ICD-10-PCS | Mod: CPTII,S$GLB,, | Performed by: INTERNAL MEDICINE

## 2020-11-05 PROCEDURE — 1126F AMNT PAIN NOTED NONE PRSNT: CPT | Mod: S$GLB,,, | Performed by: INTERNAL MEDICINE

## 2020-11-05 PROCEDURE — 3078F PR MOST RECENT DIASTOLIC BLOOD PRESSURE < 80 MM HG: ICD-10-PCS | Mod: CPTII,S$GLB,, | Performed by: INTERNAL MEDICINE

## 2020-11-05 RX ORDER — INFLUENZA A VIRUS A/MICHIGAN/45/2015 X-275 (H1N1) ANTIGEN (FORMALDEHYDE INACTIVATED), INFLUENZA A VIRUS A/SINGAPORE/INFIMH-16-0019/2016 IVR-186 (H3N2) ANTIGEN (FORMALDEHYDE INACTIVATED), INFLUENZA B VIRUS B/PHUKET/3073/2013 ANTIGEN (FORMALDEHYDE INACTIVATED), AND INFLUENZA B VIRUS B/MARYLAND/15/2016 BX-69A ANTIGEN (FORMALDEHYDE INACTIVATED) 60; 60; 60; 60 UG/.7ML; UG/.7ML; UG/.7ML; UG/.7ML
INJECTION, SUSPENSION INTRAMUSCULAR
COMMUNITY
Start: 2020-09-14

## 2020-11-05 RX ORDER — ALBUTEROL SULFATE 90 UG/1
1-2 AEROSOL, METERED RESPIRATORY (INHALATION) EVERY 6 HOURS PRN
Qty: 54 G | Refills: 3 | Status: SHIPPED | OUTPATIENT
Start: 2020-11-05 | End: 2021-05-12

## 2020-11-05 RX ORDER — GLIPIZIDE 10 MG/1
10 TABLET ORAL 2 TIMES DAILY
COMMUNITY
Start: 2020-10-26 | End: 2021-05-04

## 2020-11-05 NOTE — PROGRESS NOTES
"Subjective:       Patient ID: Sarina Genao is a 70 y.o. female.    Chief Complaint: Emphysema    70 year old smoker (down to 3 cigs/day) with emphysema.  No exercise limitations.  No cough.  No sputum production.  Never hemoptysis.  Here for annual follow up.  CT with LLL nodule.  Began exercising this month and is not limited.      Review of Systems   Constitutional: Negative for fever.   HENT: Negative for trouble swallowing.    Eyes: Negative for itching.   Respiratory: Negative for hemoptysis.    Cardiovascular: Negative for chest pain and leg swelling.   Genitourinary: Negative for difficulty urinating.   Endocrine: Negative for cold intolerance and heat intolerance.    Musculoskeletal: Negative for arthralgias.   Gastrointestinal: Negative for acid reflux.   Neurological: Negative for headaches.   Hematological: Negative for adenopathy.   Psychiatric/Behavioral: Negative for confusion.       Past medical and surgical history reviewed.  Social and family history reviewed.  Allergies and medications reviewed.  Objective:       Vitals:    11/05/20 1302   BP: (!) 140/71   BP Location: Left arm   Patient Position: Sitting   Pulse: 77   SpO2: 97%   Weight: 77.5 kg (170 lb 13.7 oz)   Height: 5' 4" (1.626 m)     Physical Exam   Constitutional: She is oriented to person, place, and time. She appears well-developed and well-nourished.   HENT:   Head: Normocephalic.   Cardiovascular: Normal rate.   Pulmonary/Chest: Normal expansion, hyperinflation, symmetric chest wall expansion, effort normal and breath sounds normal. She has no wheezes. She has no rales.   Musculoskeletal: Normal range of motion.         General: No edema.   Lymphadenopathy:     She has no cervical adenopathy.   Neurological: She is alert and oriented to person, place, and time.   Skin: Skin is warm and dry.   Psychiatric: She has a normal mood and affect.        Personal Diagnostic Review  CT of chest performed on today as compared to previous " "without contrast revealed stable bilateral nodules, particularly in the LLL (unofficial results).  No flowsheet data found.      Assessment:       1. Centrilobular emphysema    2. Left lower lobe pulmonary nodule    3. Solitary pulmonary nodule        Outpatient Encounter Medications as of 11/5/2020   Medication Sig Dispense Refill    albuterol (PROVENTIL/VENTOLIN HFA) 90 mcg/actuation inhaler Inhale 1-2 puffs into the lungs every 6 (six) hours as needed for Wheezing. Rescue 54 g 3    amLODIPine (NORVASC) 5 MG tablet Take 1 tablet (5 mg total) by mouth once daily. 90 tablet 3    aspirin (ECOTRIN) 81 MG EC tablet Take 81 mg by mouth once daily.      calcium carbonate (OS-LISSETTE) 500 mg calcium (1,250 mg) tablet Take 1 tablet (500 mg total) by mouth once daily. 30 tablet 5    cyanocobalamin, vitamin B-12, (VITAMIN B-12) 1,000 mcg TbSR Take by mouth once daily.       diclofenac sodium (VOLTAREN) 1 % Gel Apply 2 g topically 4 (four) times daily. 4 Tube 12    DOCOSAHEXANOIC ACID/EPA (FISH OIL ORAL) Take 1,200 mg by mouth once daily.      FLUAD 9801-8370, 65 YR UP,,PF, 45 mcg (15 mcg x 3)/0.5 mL Syrg inject .5 milliliter intramuscularly as directed  0    FLUZONE HIGHDOSE QUAD 20-21  mcg/0.7 mL Syrg inj .7 milliliter intramuscularly as directed      glipiZIDE (GLUCOTROL) 10 MG tablet Take 10 mg by mouth 2 (two) times daily.      irbesartan-hydrochlorothiazide (AVALIDE) 150-12.5 mg per tablet Take 1 tablet by mouth once daily. 90 tablet 0    oxybutynin (DITROPAN-XL) 5 MG TR24 TAKE ONE TABLET BY MOUTH ONCE A DAY 90 tablet 10    OZEMPIC 0.25 mg or 0.5 mg(2 mg/1.5 mL) PnIj inject 0.5 milligram into THE SKIN once a week      pen needle, diabetic (BD INSULIN PEN NEEDLE UF SHORT) 31 gauge x 5/16" Ndle USE AS DIRECTED 100 each 12    ranitidine (ZANTAC) 300 MG tablet TAKE ONE TABLET BY MOUTH EVERY night 90 tablet 11    rosuvastatin (CRESTOR) 20 MG tablet TAKE ONE TABLET BY MOUTH ONCE A DAY 90 tablet 12    " traMADoL (ULTRAM) 50 mg tablet TAKE ONE TABLET BY MOUTH EVERY 6 HOURS AS NEEDED 120 tablet 3    umeclidinium-vilanteroL (ANORO ELLIPTA) 62.5-25 mcg/actuation DsDv Inhale 1 puff into the lungs once daily. THIS SHOULD BE USED, NOT INCRUSE 3 each 3    [DISCONTINUED] albuterol (PROVENTIL/VENTOLIN HFA) 90 mcg/actuation inhaler Inhale 1-2 puffs into the lungs every 6 (six) hours as needed for Wheezing. Rescue 1 Inhaler 3    [DISCONTINUED] famotidine (PEPCID) 40 MG tablet Take 1 tablet (40 mg total) by mouth once daily. 30 tablet 11    [DISCONTINUED] umeclidinium-vilanterol (ANORO ELLIPTA) 62.5-25 mcg/actuation DsDv Inhale 1 puff into the lungs once daily. THIS SHOULD BE USED, NOT INCRUSE 3 each 3    albuterol-ipratropium (DUO-NEB) 2.5 mg-0.5 mg/3 mL nebulizer solution Take 3 mLs by nebulization every 6 (six) hours as needed for Wheezing or Shortness of Breath. Rescue 3 Box 12    furosemide (LASIX) 20 MG tablet Take 1 tablet (20 mg total) by mouth once daily. 30 tablet 11    TRESIBA FLEXTOUCH U-200 200 unit/mL (3 mL) InPn INJECT 55 UNITS SUBCUTANEOUSLY ONCE A DAY       No facility-administered encounter medications on file as of 11/5/2020.      Orders Placed This Encounter   Procedures    CT Chest Without Contrast     Standing Status:   Future     Standing Expiration Date:   11/5/2021     Plan:     Problem List Items Addressed This Visit     Centrilobular emphysema - Primary    Overview     Smoking cessation discussed.  Continue anoro daily and albuterol for rescue and prevention.         Relevant Medications    albuterol (PROVENTIL/VENTOLIN HFA) 90 mcg/actuation inhaler    umeclidinium-vilanteroL (ANORO ELLIPTA) 62.5-25 mcg/actuation DsDv    Left lower lobe pulmonary nodule    Overview     Overall, it appears stable.  Will follow up in 6 months to ensure stability.         Relevant Orders    CT Chest Without Contrast      Other Visit Diagnoses     Solitary pulmonary nodule        Relevant Orders    CT Chest  Without Contrast

## 2020-11-15 RX ORDER — IRBESARTAN AND HYDROCHLOROTHIAZIDE 150; 12.5 MG/1; MG/1
TABLET, FILM COATED ORAL
Qty: 90 TABLET | Refills: 12 | Status: SHIPPED | OUTPATIENT
Start: 2020-11-15 | End: 2022-02-22 | Stop reason: SDUPTHER

## 2020-12-04 DIAGNOSIS — Z79.4 TYPE 2 DIABETES MELLITUS WITH KIDNEY COMPLICATION, WITH LONG-TERM CURRENT USE OF INSULIN: ICD-10-CM

## 2020-12-04 DIAGNOSIS — E11.29 TYPE 2 DIABETES MELLITUS WITH KIDNEY COMPLICATION, WITH LONG-TERM CURRENT USE OF INSULIN: ICD-10-CM

## 2020-12-07 DIAGNOSIS — R52 PAIN: ICD-10-CM

## 2020-12-11 RX ORDER — TRAMADOL HYDROCHLORIDE 50 MG/1
50 TABLET ORAL EVERY 6 HOURS PRN
Qty: 120 TABLET | Refills: 3 | Status: SHIPPED | OUTPATIENT
Start: 2020-12-11 | End: 2021-03-26

## 2021-02-08 ENCOUNTER — TELEPHONE (OUTPATIENT)
Dept: FAMILY MEDICINE | Facility: CLINIC | Age: 71
End: 2021-02-08

## 2021-02-10 ENCOUNTER — TELEPHONE (OUTPATIENT)
Dept: FAMILY MEDICINE | Facility: CLINIC | Age: 71
End: 2021-02-10

## 2021-02-10 DIAGNOSIS — E55.9 VITAMIN D DEFICIENCY DISEASE: ICD-10-CM

## 2021-02-13 ENCOUNTER — LAB VISIT (OUTPATIENT)
Dept: LAB | Facility: HOSPITAL | Age: 71
End: 2021-02-13
Attending: INTERNAL MEDICINE
Payer: MEDICARE

## 2021-02-13 LAB
ALBUMIN SERPL BCP-MCNC: 3.7 G/DL (ref 3.5–5.2)
ALP SERPL-CCNC: 87 U/L (ref 55–135)
ALT SERPL W/O P-5'-P-CCNC: 15 U/L (ref 10–44)
ANION GAP SERPL CALC-SCNC: 10 MMOL/L (ref 8–16)
AST SERPL-CCNC: 17 U/L (ref 10–40)
BASOPHILS # BLD AUTO: 0.02 K/UL (ref 0–0.2)
BASOPHILS NFR BLD: 0.2 % (ref 0–1.9)
BILIRUB SERPL-MCNC: 0.8 MG/DL (ref 0.1–1)
BUN SERPL-MCNC: 12 MG/DL (ref 8–23)
CALCIUM SERPL-MCNC: 9.9 MG/DL (ref 8.7–10.5)
CHLORIDE SERPL-SCNC: 104 MMOL/L (ref 95–110)
CHOLEST SERPL-MCNC: 139 MG/DL (ref 120–199)
CHOLEST/HDLC SERPL: 3.4 {RATIO} (ref 2–5)
CO2 SERPL-SCNC: 27 MMOL/L (ref 23–29)
CREAT SERPL-MCNC: 1.2 MG/DL (ref 0.5–1.4)
DIFFERENTIAL METHOD: NORMAL
EOSINOPHIL # BLD AUTO: 0.1 K/UL (ref 0–0.5)
EOSINOPHIL NFR BLD: 1.1 % (ref 0–8)
ERYTHROCYTE [DISTWIDTH] IN BLOOD BY AUTOMATED COUNT: 13.5 % (ref 11.5–14.5)
EST. GFR  (AFRICAN AMERICAN): 52 ML/MIN/1.73 M^2
EST. GFR  (NON AFRICAN AMERICAN): 45 ML/MIN/1.73 M^2
ESTIMATED AVG GLUCOSE: 151 MG/DL (ref 68–131)
GLUCOSE SERPL-MCNC: 97 MG/DL (ref 70–110)
HBA1C MFR BLD: 6.9 % (ref 4–5.6)
HCT VFR BLD AUTO: 45.3 % (ref 37–48.5)
HDLC SERPL-MCNC: 41 MG/DL (ref 40–75)
HDLC SERPL: 29.5 % (ref 20–50)
HGB BLD-MCNC: 14.7 G/DL (ref 12–16)
IMM GRANULOCYTES # BLD AUTO: 0.03 K/UL (ref 0–0.04)
IMM GRANULOCYTES NFR BLD AUTO: 0.3 % (ref 0–0.5)
LDLC SERPL CALC-MCNC: 66.2 MG/DL (ref 63–159)
LYMPHOCYTES # BLD AUTO: 2.5 K/UL (ref 1–4.8)
LYMPHOCYTES NFR BLD: 22.2 % (ref 18–48)
MCH RBC QN AUTO: 28.8 PG (ref 27–31)
MCHC RBC AUTO-ENTMCNC: 32.5 G/DL (ref 32–36)
MCV RBC AUTO: 89 FL (ref 82–98)
MONOCYTES # BLD AUTO: 0.9 K/UL (ref 0.3–1)
MONOCYTES NFR BLD: 8.4 % (ref 4–15)
NEUTROPHILS # BLD AUTO: 7.5 K/UL (ref 1.8–7.7)
NEUTROPHILS NFR BLD: 67.8 % (ref 38–73)
NONHDLC SERPL-MCNC: 98 MG/DL
NRBC BLD-RTO: 0 /100 WBC
PLATELET # BLD AUTO: 218 K/UL (ref 150–350)
PMV BLD AUTO: 9.8 FL (ref 9.2–12.9)
POTASSIUM SERPL-SCNC: 3.8 MMOL/L (ref 3.5–5.1)
PROT SERPL-MCNC: 7.4 G/DL (ref 6–8.4)
RBC # BLD AUTO: 5.11 M/UL (ref 4–5.4)
SODIUM SERPL-SCNC: 141 MMOL/L (ref 136–145)
TRIGL SERPL-MCNC: 159 MG/DL (ref 30–150)
TSH SERPL DL<=0.005 MIU/L-ACNC: 1.78 UIU/ML (ref 0.4–4)
WBC # BLD AUTO: 11.07 K/UL (ref 3.9–12.7)

## 2021-02-13 PROCEDURE — 80061 LIPID PANEL: CPT

## 2021-02-13 PROCEDURE — 85025 COMPLETE CBC W/AUTO DIFF WBC: CPT

## 2021-02-13 PROCEDURE — 83036 HEMOGLOBIN GLYCOSYLATED A1C: CPT

## 2021-02-13 PROCEDURE — 84443 ASSAY THYROID STIM HORMONE: CPT

## 2021-02-13 PROCEDURE — 36415 COLL VENOUS BLD VENIPUNCTURE: CPT | Mod: PO

## 2021-02-13 PROCEDURE — 80053 COMPREHEN METABOLIC PANEL: CPT

## 2021-02-26 ENCOUNTER — TELEPHONE (OUTPATIENT)
Dept: FAMILY MEDICINE | Facility: CLINIC | Age: 71
End: 2021-02-26

## 2021-04-21 ENCOUNTER — TELEPHONE (OUTPATIENT)
Dept: FAMILY MEDICINE | Facility: CLINIC | Age: 71
End: 2021-04-21

## 2021-04-22 ENCOUNTER — TELEPHONE (OUTPATIENT)
Dept: FAMILY MEDICINE | Facility: CLINIC | Age: 71
End: 2021-04-22

## 2021-04-22 DIAGNOSIS — M81.0 OSTEOPOROSIS, POST-MENOPAUSAL: Primary | ICD-10-CM

## 2021-04-23 ENCOUNTER — TELEPHONE (OUTPATIENT)
Dept: FAMILY MEDICINE | Facility: CLINIC | Age: 71
End: 2021-04-23

## 2021-04-27 DIAGNOSIS — I73.9 PVD (PERIPHERAL VASCULAR DISEASE) WITH CLAUDICATION: ICD-10-CM

## 2021-04-27 DIAGNOSIS — I65.23 BILATERAL CAROTID ARTERY STENOSIS: Primary | ICD-10-CM

## 2021-05-04 ENCOUNTER — OFFICE VISIT (OUTPATIENT)
Dept: FAMILY MEDICINE | Facility: CLINIC | Age: 71
End: 2021-05-04
Payer: MEDICARE

## 2021-05-04 VITALS
SYSTOLIC BLOOD PRESSURE: 124 MMHG | DIASTOLIC BLOOD PRESSURE: 62 MMHG | BODY MASS INDEX: 27.13 KG/M2 | HEIGHT: 64 IN | WEIGHT: 158.94 LBS | OXYGEN SATURATION: 97 % | TEMPERATURE: 98 F | HEART RATE: 87 BPM

## 2021-05-04 DIAGNOSIS — I10 ESSENTIAL HYPERTENSION: ICD-10-CM

## 2021-05-04 DIAGNOSIS — Z79.4 LONG-TERM INSULIN USE: ICD-10-CM

## 2021-05-04 DIAGNOSIS — Z79.4 TYPE 2 DIABETES MELLITUS WITH STAGE 3A CHRONIC KIDNEY DISEASE, WITH LONG-TERM CURRENT USE OF INSULIN: ICD-10-CM

## 2021-05-04 DIAGNOSIS — J43.2 CENTRILOBULAR EMPHYSEMA: ICD-10-CM

## 2021-05-04 DIAGNOSIS — E55.9 VITAMIN D DEFICIENCY DISEASE: ICD-10-CM

## 2021-05-04 DIAGNOSIS — E78.5 HYPERLIPIDEMIA, UNSPECIFIED HYPERLIPIDEMIA TYPE: ICD-10-CM

## 2021-05-04 DIAGNOSIS — M81.0 OSTEOPOROSIS, POST-MENOPAUSAL: ICD-10-CM

## 2021-05-04 DIAGNOSIS — M46.99 UNSPECIFIED INFLAMMATORY SPONDYLOPATHY, MULTIPLE SITES IN SPINE: ICD-10-CM

## 2021-05-04 DIAGNOSIS — I50.30 HEART FAILURE WITH PRESERVED EJECTION FRACTION, UNSPECIFIED HF CHRONICITY: ICD-10-CM

## 2021-05-04 DIAGNOSIS — M47.816 LUMBAR ARTHROPATHY: ICD-10-CM

## 2021-05-04 DIAGNOSIS — I70.0 AORTIC ATHEROSCLEROSIS: ICD-10-CM

## 2021-05-04 DIAGNOSIS — Z79.4 TYPE 2 DIABETES MELLITUS WITH HYPERGLYCEMIA, WITH LONG-TERM CURRENT USE OF INSULIN: ICD-10-CM

## 2021-05-04 DIAGNOSIS — E11.9 DM TYPE 2 WITHOUT RETINOPATHY: ICD-10-CM

## 2021-05-04 DIAGNOSIS — E11.65 TYPE 2 DIABETES MELLITUS WITH HYPERGLYCEMIA, WITH LONG-TERM CURRENT USE OF INSULIN: ICD-10-CM

## 2021-05-04 DIAGNOSIS — Z00.00 ROUTINE MEDICAL EXAM: Primary | ICD-10-CM

## 2021-05-04 DIAGNOSIS — E21.0 PRIMARY HYPERPARATHYROIDISM: ICD-10-CM

## 2021-05-04 DIAGNOSIS — N18.31 TYPE 2 DIABETES MELLITUS WITH STAGE 3A CHRONIC KIDNEY DISEASE, WITH LONG-TERM CURRENT USE OF INSULIN: ICD-10-CM

## 2021-05-04 DIAGNOSIS — D35.1 PARATHYROID ADENOMA: ICD-10-CM

## 2021-05-04 DIAGNOSIS — D64.9 ANEMIA, UNSPECIFIED TYPE: ICD-10-CM

## 2021-05-04 DIAGNOSIS — Z86.010 HISTORY OF COLONIC POLYPS: ICD-10-CM

## 2021-05-04 DIAGNOSIS — I65.23 BILATERAL CAROTID ARTERY STENOSIS: ICD-10-CM

## 2021-05-04 DIAGNOSIS — J44.9 CHRONIC OBSTRUCTIVE PULMONARY DISEASE, UNSPECIFIED COPD TYPE: ICD-10-CM

## 2021-05-04 DIAGNOSIS — E11.22 TYPE 2 DIABETES MELLITUS WITH STAGE 3A CHRONIC KIDNEY DISEASE, WITH LONG-TERM CURRENT USE OF INSULIN: ICD-10-CM

## 2021-05-04 DIAGNOSIS — I73.9 PVD (PERIPHERAL VASCULAR DISEASE): ICD-10-CM

## 2021-05-04 DIAGNOSIS — N18.31 STAGE 3A CHRONIC KIDNEY DISEASE: ICD-10-CM

## 2021-05-04 DIAGNOSIS — I73.9 PVD (PERIPHERAL VASCULAR DISEASE) WITH CLAUDICATION: ICD-10-CM

## 2021-05-04 DIAGNOSIS — K21.9 GASTROESOPHAGEAL REFLUX DISEASE, UNSPECIFIED WHETHER ESOPHAGITIS PRESENT: ICD-10-CM

## 2021-05-04 PROBLEM — N18.4 CKD (CHRONIC KIDNEY DISEASE), STAGE IV: Status: RESOLVED | Noted: 2020-05-18 | Resolved: 2021-05-04

## 2021-05-04 PROBLEM — C64.9 MALIGNANT NEOPLASM OF KIDNEY: Status: RESOLVED | Noted: 2020-05-18 | Resolved: 2021-05-04

## 2021-05-04 PROCEDURE — 1101F PR PT FALLS ASSESS DOC 0-1 FALLS W/OUT INJ PAST YR: ICD-10-PCS | Mod: CPTII,S$GLB,, | Performed by: INTERNAL MEDICINE

## 2021-05-04 PROCEDURE — 99499 UNLISTED E&M SERVICE: CPT | Mod: S$GLB,,, | Performed by: INTERNAL MEDICINE

## 2021-05-04 PROCEDURE — 3288F FALL RISK ASSESSMENT DOCD: CPT | Mod: CPTII,S$GLB,, | Performed by: INTERNAL MEDICINE

## 2021-05-04 PROCEDURE — 3008F PR BODY MASS INDEX (BMI) DOCUMENTED: ICD-10-PCS | Mod: CPTII,S$GLB,, | Performed by: INTERNAL MEDICINE

## 2021-05-04 PROCEDURE — 3008F BODY MASS INDEX DOCD: CPT | Mod: CPTII,S$GLB,, | Performed by: INTERNAL MEDICINE

## 2021-05-04 PROCEDURE — 99214 PR OFFICE/OUTPT VISIT, EST, LEVL IV, 30-39 MIN: ICD-10-PCS | Mod: 25,S$GLB,, | Performed by: INTERNAL MEDICINE

## 2021-05-04 PROCEDURE — 1159F MED LIST DOCD IN RCRD: CPT | Mod: S$GLB,,, | Performed by: INTERNAL MEDICINE

## 2021-05-04 PROCEDURE — 1159F PR MEDICATION LIST DOCUMENTED IN MEDICAL RECORD: ICD-10-PCS | Mod: S$GLB,,, | Performed by: INTERNAL MEDICINE

## 2021-05-04 PROCEDURE — 1101F PT FALLS ASSESS-DOCD LE1/YR: CPT | Mod: CPTII,S$GLB,, | Performed by: INTERNAL MEDICINE

## 2021-05-04 PROCEDURE — 99999 PR PBB SHADOW E&M-EST. PATIENT-LVL III: CPT | Mod: PBBFAC,,, | Performed by: INTERNAL MEDICINE

## 2021-05-04 PROCEDURE — 99999 PR PBB SHADOW E&M-EST. PATIENT-LVL III: ICD-10-PCS | Mod: PBBFAC,,, | Performed by: INTERNAL MEDICINE

## 2021-05-04 PROCEDURE — 99499 RISK ADDL DX/OHS AUDIT: ICD-10-PCS | Mod: S$GLB,,, | Performed by: INTERNAL MEDICINE

## 2021-05-04 PROCEDURE — 99214 OFFICE O/P EST MOD 30 MIN: CPT | Mod: 25,S$GLB,, | Performed by: INTERNAL MEDICINE

## 2021-05-04 PROCEDURE — 3288F PR FALLS RISK ASSESSMENT DOCUMENTED: ICD-10-PCS | Mod: CPTII,S$GLB,, | Performed by: INTERNAL MEDICINE

## 2021-05-04 RX ORDER — OMEPRAZOLE 40 MG/1
40 CAPSULE, DELAYED RELEASE ORAL DAILY
Qty: 90 CAPSULE | Refills: 4 | Status: SHIPPED | OUTPATIENT
Start: 2021-05-04 | End: 2022-09-07

## 2021-05-12 ENCOUNTER — OFFICE VISIT (OUTPATIENT)
Dept: ENDOCRINOLOGY | Facility: CLINIC | Age: 71
End: 2021-05-12
Payer: MEDICARE

## 2021-05-12 VITALS
DIASTOLIC BLOOD PRESSURE: 88 MMHG | SYSTOLIC BLOOD PRESSURE: 153 MMHG | WEIGHT: 160.69 LBS | BODY MASS INDEX: 28.47 KG/M2 | HEIGHT: 63 IN | HEART RATE: 82 BPM | TEMPERATURE: 99 F

## 2021-05-12 DIAGNOSIS — E78.2 COMBINED HYPERLIPIDEMIA ASSOCIATED WITH TYPE 2 DIABETES MELLITUS: ICD-10-CM

## 2021-05-12 DIAGNOSIS — E11.69 COMBINED HYPERLIPIDEMIA ASSOCIATED WITH TYPE 2 DIABETES MELLITUS: ICD-10-CM

## 2021-05-12 DIAGNOSIS — M81.0 OSTEOPOROSIS, POST-MENOPAUSAL: Primary | ICD-10-CM

## 2021-05-12 DIAGNOSIS — E11.59 CONTROLLED TYPE 2 DIABETES MELLITUS WITH OTHER CIRCULATORY COMPLICATION, WITHOUT LONG-TERM CURRENT USE OF INSULIN: ICD-10-CM

## 2021-05-12 DIAGNOSIS — N18.31 STAGE 3A CHRONIC KIDNEY DISEASE: ICD-10-CM

## 2021-05-12 DIAGNOSIS — I10 ESSENTIAL HYPERTENSION: ICD-10-CM

## 2021-05-12 DIAGNOSIS — F17.200 TOBACCO DEPENDENCE: ICD-10-CM

## 2021-05-12 DIAGNOSIS — I10 HYPERTENSION, BENIGN: ICD-10-CM

## 2021-05-12 PROCEDURE — 99999 PR PBB SHADOW E&M-EST. PATIENT-LVL IV: CPT | Mod: PBBFAC,,, | Performed by: HOSPITALIST

## 2021-05-12 PROCEDURE — 1126F AMNT PAIN NOTED NONE PRSNT: CPT | Mod: S$GLB,,, | Performed by: HOSPITALIST

## 2021-05-12 PROCEDURE — 1126F PR PAIN SEVERITY QUANTIFIED, NO PAIN PRESENT: ICD-10-PCS | Mod: S$GLB,,, | Performed by: HOSPITALIST

## 2021-05-12 PROCEDURE — 1159F PR MEDICATION LIST DOCUMENTED IN MEDICAL RECORD: ICD-10-PCS | Mod: S$GLB,,, | Performed by: HOSPITALIST

## 2021-05-12 PROCEDURE — 99999 PR PBB SHADOW E&M-EST. PATIENT-LVL IV: ICD-10-PCS | Mod: PBBFAC,,, | Performed by: HOSPITALIST

## 2021-05-12 PROCEDURE — 3008F PR BODY MASS INDEX (BMI) DOCUMENTED: ICD-10-PCS | Mod: CPTII,S$GLB,, | Performed by: HOSPITALIST

## 2021-05-12 PROCEDURE — 99204 PR OFFICE/OUTPT VISIT, NEW, LEVL IV, 45-59 MIN: ICD-10-PCS | Mod: S$GLB,,, | Performed by: HOSPITALIST

## 2021-05-12 PROCEDURE — 1159F MED LIST DOCD IN RCRD: CPT | Mod: S$GLB,,, | Performed by: HOSPITALIST

## 2021-05-12 PROCEDURE — 3008F BODY MASS INDEX DOCD: CPT | Mod: CPTII,S$GLB,, | Performed by: HOSPITALIST

## 2021-05-12 PROCEDURE — 99204 OFFICE O/P NEW MOD 45 MIN: CPT | Mod: S$GLB,,, | Performed by: HOSPITALIST

## 2021-05-12 RX ORDER — SEMAGLUTIDE 1.34 MG/ML
1 INJECTION, SOLUTION SUBCUTANEOUS WEEKLY
Qty: 3 PEN | Refills: 4 | Status: SHIPPED | OUTPATIENT
Start: 2021-05-12 | End: 2021-09-20

## 2021-05-12 RX ORDER — FLUCONAZOLE 150 MG/1
150 TABLET ORAL DAILY PRN
Qty: 2 TABLET | Refills: 0 | Status: SHIPPED | OUTPATIENT
Start: 2021-05-12 | End: 2021-05-13

## 2021-05-12 RX ORDER — SEMAGLUTIDE 1.34 MG/ML
1 INJECTION, SOLUTION SUBCUTANEOUS
COMMUNITY
Start: 2021-03-02 | End: 2021-05-12

## 2021-05-12 RX ORDER — CHOLECALCIFEROL (VITAMIN D3) 25 MCG
1000 TABLET ORAL DAILY
COMMUNITY

## 2021-05-14 ENCOUNTER — INFUSION (OUTPATIENT)
Dept: INFUSION THERAPY | Facility: HOSPITAL | Age: 71
End: 2021-05-14
Attending: HOSPITALIST
Payer: MEDICARE

## 2021-05-14 VITALS
HEART RATE: 95 BPM | SYSTOLIC BLOOD PRESSURE: 155 MMHG | OXYGEN SATURATION: 96 % | DIASTOLIC BLOOD PRESSURE: 71 MMHG | TEMPERATURE: 98 F | RESPIRATION RATE: 16 BRPM

## 2021-05-14 DIAGNOSIS — M81.0 OSTEOPOROSIS, POST-MENOPAUSAL: Primary | ICD-10-CM

## 2021-05-14 PROCEDURE — 63600175 PHARM REV CODE 636 W HCPCS: Mod: JG | Performed by: HOSPITALIST

## 2021-05-14 PROCEDURE — 96372 THER/PROPH/DIAG INJ SC/IM: CPT

## 2021-05-14 RX ADMIN — DENOSUMAB 60 MG: 60 INJECTION SUBCUTANEOUS at 12:05

## 2021-06-10 ENCOUNTER — TELEPHONE (OUTPATIENT)
Dept: FAMILY MEDICINE | Facility: CLINIC | Age: 71
End: 2021-06-10

## 2021-06-10 DIAGNOSIS — Z12.31 ENCOUNTER FOR SCREENING MAMMOGRAM FOR BREAST CANCER: Primary | ICD-10-CM

## 2021-06-17 ENCOUNTER — HOSPITAL ENCOUNTER (OUTPATIENT)
Dept: RADIOLOGY | Facility: HOSPITAL | Age: 71
Discharge: HOME | End: 2021-06-17
Attending: INTERNAL MEDICINE
Payer: MEDICARE

## 2021-06-17 DIAGNOSIS — Z12.31 ENCOUNTER FOR SCREENING MAMMOGRAM FOR BREAST CANCER: ICD-10-CM

## 2021-06-17 PROCEDURE — 77067 MAMMO DIGITAL SCREENING BILAT WITH TOMO: ICD-10-PCS | Mod: 26,,, | Performed by: RADIOLOGY

## 2021-06-17 PROCEDURE — 77067 SCR MAMMO BI INCL CAD: CPT | Mod: 26,,, | Performed by: RADIOLOGY

## 2021-06-17 PROCEDURE — 77067 SCR MAMMO BI INCL CAD: CPT | Mod: TC,PO

## 2021-06-17 PROCEDURE — 77063 BREAST TOMOSYNTHESIS BI: CPT | Mod: 26,,, | Performed by: RADIOLOGY

## 2021-06-17 PROCEDURE — 77063 MAMMO DIGITAL SCREENING BILAT WITH TOMO: ICD-10-PCS | Mod: 26,,, | Performed by: RADIOLOGY

## 2021-06-30 ENCOUNTER — HOSPITAL ENCOUNTER (OUTPATIENT)
Dept: VASCULAR SURGERY | Facility: CLINIC | Age: 71
Discharge: HOME | End: 2021-06-30
Attending: SURGERY
Payer: MEDICARE

## 2021-06-30 ENCOUNTER — OFFICE VISIT (OUTPATIENT)
Dept: VASCULAR SURGERY | Facility: CLINIC | Age: 71
End: 2021-06-30
Payer: MEDICARE

## 2021-06-30 VITALS
DIASTOLIC BLOOD PRESSURE: 68 MMHG | HEART RATE: 68 BPM | TEMPERATURE: 99 F | BODY MASS INDEX: 27.34 KG/M2 | HEIGHT: 63 IN | WEIGHT: 154.31 LBS | SYSTOLIC BLOOD PRESSURE: 160 MMHG

## 2021-06-30 DIAGNOSIS — F17.200 SMOKER: ICD-10-CM

## 2021-06-30 DIAGNOSIS — I73.9 PVD (PERIPHERAL VASCULAR DISEASE) WITH CLAUDICATION: ICD-10-CM

## 2021-06-30 DIAGNOSIS — I65.23 BILATERAL CAROTID ARTERY STENOSIS: ICD-10-CM

## 2021-06-30 DIAGNOSIS — I65.23 BILATERAL CAROTID ARTERY STENOSIS: Primary | ICD-10-CM

## 2021-06-30 PROCEDURE — 99407 BEHAV CHNG SMOKING > 10 MIN: CPT | Mod: S$GLB,,, | Performed by: SURGERY

## 2021-06-30 PROCEDURE — 1126F AMNT PAIN NOTED NONE PRSNT: CPT | Mod: S$GLB,,, | Performed by: SURGERY

## 2021-06-30 PROCEDURE — 3008F PR BODY MASS INDEX (BMI) DOCUMENTED: ICD-10-PCS | Mod: CPTII,S$GLB,, | Performed by: SURGERY

## 2021-06-30 PROCEDURE — 93880 PR DUPLEX SCAN EXTRACRANIAL,BILAT: ICD-10-PCS | Mod: S$GLB,,, | Performed by: SURGERY

## 2021-06-30 PROCEDURE — 1126F PR PAIN SEVERITY QUANTIFIED, NO PAIN PRESENT: ICD-10-PCS | Mod: S$GLB,,, | Performed by: SURGERY

## 2021-06-30 PROCEDURE — 3288F PR FALLS RISK ASSESSMENT DOCUMENTED: ICD-10-PCS | Mod: CPTII,S$GLB,, | Performed by: SURGERY

## 2021-06-30 PROCEDURE — 1159F MED LIST DOCD IN RCRD: CPT | Mod: S$GLB,,, | Performed by: SURGERY

## 2021-06-30 PROCEDURE — 3008F BODY MASS INDEX DOCD: CPT | Mod: CPTII,S$GLB,, | Performed by: SURGERY

## 2021-06-30 PROCEDURE — 1159F PR MEDICATION LIST DOCUMENTED IN MEDICAL RECORD: ICD-10-PCS | Mod: S$GLB,,, | Performed by: SURGERY

## 2021-06-30 PROCEDURE — 99999 PR PBB SHADOW E&M-EST. PATIENT-LVL III: ICD-10-PCS | Mod: PBBFAC,,, | Performed by: SURGERY

## 2021-06-30 PROCEDURE — 93923 PR NON-INVASIVE PHYSIOLOGIC STUDY EXTREMITY 3 LEVELS: ICD-10-PCS | Mod: S$GLB,,, | Performed by: SURGERY

## 2021-06-30 PROCEDURE — 93880 EXTRACRANIAL BILAT STUDY: CPT | Mod: S$GLB,,, | Performed by: SURGERY

## 2021-06-30 PROCEDURE — 93923 UPR/LXTR ART STDY 3+ LVLS: CPT | Mod: S$GLB,,, | Performed by: SURGERY

## 2021-06-30 PROCEDURE — 99214 OFFICE O/P EST MOD 30 MIN: CPT | Mod: 25,S$GLB,, | Performed by: SURGERY

## 2021-06-30 PROCEDURE — 99999 PR PBB SHADOW E&M-EST. PATIENT-LVL III: CPT | Mod: PBBFAC,,, | Performed by: SURGERY

## 2021-06-30 PROCEDURE — 1101F PT FALLS ASSESS-DOCD LE1/YR: CPT | Mod: CPTII,S$GLB,, | Performed by: SURGERY

## 2021-06-30 PROCEDURE — 1101F PR PT FALLS ASSESS DOC 0-1 FALLS W/OUT INJ PAST YR: ICD-10-PCS | Mod: CPTII,S$GLB,, | Performed by: SURGERY

## 2021-06-30 PROCEDURE — 99214 PR OFFICE/OUTPT VISIT, EST, LEVL IV, 30-39 MIN: ICD-10-PCS | Mod: 25,S$GLB,, | Performed by: SURGERY

## 2021-06-30 PROCEDURE — 99407 PR TOBACCO USE CESSATION INTENSIVE >10 MINUTES: ICD-10-PCS | Mod: S$GLB,,, | Performed by: SURGERY

## 2021-06-30 PROCEDURE — 3288F FALL RISK ASSESSMENT DOCD: CPT | Mod: CPTII,S$GLB,, | Performed by: SURGERY

## 2021-06-30 RX ORDER — FAMOTIDINE 40 MG/1
40 TABLET, FILM COATED ORAL DAILY
COMMUNITY
Start: 2021-05-02 | End: 2021-07-13

## 2021-07-13 DIAGNOSIS — K21.9 GASTROESOPHAGEAL REFLUX DISEASE, UNSPECIFIED WHETHER ESOPHAGITIS PRESENT: Primary | ICD-10-CM

## 2021-07-13 RX ORDER — FAMOTIDINE 40 MG/1
TABLET, FILM COATED ORAL
Qty: 90 TABLET | Refills: 0 | Status: SHIPPED | OUTPATIENT
Start: 2021-07-13 | End: 2022-01-04

## 2021-08-06 ENCOUNTER — LAB VISIT (OUTPATIENT)
Dept: LAB | Facility: HOSPITAL | Age: 71
End: 2021-08-06
Attending: HOSPITALIST
Payer: MEDICARE

## 2021-08-06 DIAGNOSIS — E11.59 CONTROLLED TYPE 2 DIABETES MELLITUS WITH OTHER CIRCULATORY COMPLICATION, WITHOUT LONG-TERM CURRENT USE OF INSULIN: ICD-10-CM

## 2021-08-06 LAB
ALBUMIN SERPL BCP-MCNC: 3.7 G/DL (ref 3.5–5.2)
ALP SERPL-CCNC: 94 U/L (ref 55–135)
ALT SERPL W/O P-5'-P-CCNC: 13 U/L (ref 10–44)
ANION GAP SERPL CALC-SCNC: 10 MMOL/L (ref 8–16)
AST SERPL-CCNC: 14 U/L (ref 10–40)
BILIRUB SERPL-MCNC: 0.7 MG/DL (ref 0.1–1)
BUN SERPL-MCNC: 10 MG/DL (ref 8–23)
CALCIUM SERPL-MCNC: 10.2 MG/DL (ref 8.7–10.5)
CHLORIDE SERPL-SCNC: 103 MMOL/L (ref 95–110)
CO2 SERPL-SCNC: 24 MMOL/L (ref 23–29)
CREAT SERPL-MCNC: 1.3 MG/DL (ref 0.5–1.4)
EST. GFR  (AFRICAN AMERICAN): 47 ML/MIN/1.73 M^2
EST. GFR  (NON AFRICAN AMERICAN): 41 ML/MIN/1.73 M^2
ESTIMATED AVG GLUCOSE: 137 MG/DL (ref 68–131)
GLUCOSE SERPL-MCNC: 156 MG/DL (ref 70–110)
HBA1C MFR BLD: 6.4 % (ref 4–5.6)
POTASSIUM SERPL-SCNC: 3.5 MMOL/L (ref 3.5–5.1)
PROT SERPL-MCNC: 7.4 G/DL (ref 6–8.4)
SODIUM SERPL-SCNC: 137 MMOL/L (ref 136–145)

## 2021-08-06 PROCEDURE — 36415 COLL VENOUS BLD VENIPUNCTURE: CPT | Mod: PO | Performed by: HOSPITALIST

## 2021-08-06 PROCEDURE — 80053 COMPREHEN METABOLIC PANEL: CPT | Performed by: HOSPITALIST

## 2021-08-06 PROCEDURE — 83036 HEMOGLOBIN GLYCOSYLATED A1C: CPT | Performed by: HOSPITALIST

## 2021-08-10 ENCOUNTER — PATIENT OUTREACH (OUTPATIENT)
Dept: ADMINISTRATIVE | Facility: OTHER | Age: 71
End: 2021-08-10

## 2021-08-11 ENCOUNTER — TELEPHONE (OUTPATIENT)
Dept: PULMONOLOGY | Facility: CLINIC | Age: 71
End: 2021-08-11

## 2021-08-18 ENCOUNTER — OFFICE VISIT (OUTPATIENT)
Dept: NEPHROLOGY | Facility: CLINIC | Age: 71
End: 2021-08-18
Payer: MEDICARE

## 2021-08-18 VITALS
HEIGHT: 63 IN | SYSTOLIC BLOOD PRESSURE: 138 MMHG | WEIGHT: 148.13 LBS | BODY MASS INDEX: 26.25 KG/M2 | DIASTOLIC BLOOD PRESSURE: 80 MMHG

## 2021-08-18 DIAGNOSIS — N18.31 STAGE 3A CHRONIC KIDNEY DISEASE: Primary | ICD-10-CM

## 2021-08-18 PROCEDURE — 3044F HG A1C LEVEL LT 7.0%: CPT | Mod: CPTII,S$GLB,, | Performed by: INTERNAL MEDICINE

## 2021-08-18 PROCEDURE — 3079F PR MOST RECENT DIASTOLIC BLOOD PRESSURE 80-89 MM HG: ICD-10-PCS | Mod: CPTII,S$GLB,, | Performed by: INTERNAL MEDICINE

## 2021-08-18 PROCEDURE — 3075F SYST BP GE 130 - 139MM HG: CPT | Mod: CPTII,S$GLB,, | Performed by: INTERNAL MEDICINE

## 2021-08-18 PROCEDURE — 3044F PR MOST RECENT HEMOGLOBIN A1C LEVEL <7.0%: ICD-10-PCS | Mod: CPTII,S$GLB,, | Performed by: INTERNAL MEDICINE

## 2021-08-18 PROCEDURE — 1101F PT FALLS ASSESS-DOCD LE1/YR: CPT | Mod: CPTII,S$GLB,, | Performed by: INTERNAL MEDICINE

## 2021-08-18 PROCEDURE — 3008F BODY MASS INDEX DOCD: CPT | Mod: CPTII,S$GLB,, | Performed by: INTERNAL MEDICINE

## 2021-08-18 PROCEDURE — 99213 PR OFFICE/OUTPT VISIT, EST, LEVL III, 20-29 MIN: ICD-10-PCS | Mod: S$GLB,,, | Performed by: INTERNAL MEDICINE

## 2021-08-18 PROCEDURE — 1159F PR MEDICATION LIST DOCUMENTED IN MEDICAL RECORD: ICD-10-PCS | Mod: CPTII,S$GLB,, | Performed by: INTERNAL MEDICINE

## 2021-08-18 PROCEDURE — 99999 PR PBB SHADOW E&M-EST. PATIENT-LVL III: ICD-10-PCS | Mod: PBBFAC,,, | Performed by: INTERNAL MEDICINE

## 2021-08-18 PROCEDURE — 1126F PR PAIN SEVERITY QUANTIFIED, NO PAIN PRESENT: ICD-10-PCS | Mod: CPTII,S$GLB,, | Performed by: INTERNAL MEDICINE

## 2021-08-18 PROCEDURE — 3288F FALL RISK ASSESSMENT DOCD: CPT | Mod: CPTII,S$GLB,, | Performed by: INTERNAL MEDICINE

## 2021-08-18 PROCEDURE — 99999 PR PBB SHADOW E&M-EST. PATIENT-LVL III: CPT | Mod: PBBFAC,,, | Performed by: INTERNAL MEDICINE

## 2021-08-18 PROCEDURE — 99213 OFFICE O/P EST LOW 20 MIN: CPT | Mod: S$GLB,,, | Performed by: INTERNAL MEDICINE

## 2021-08-18 PROCEDURE — 3008F PR BODY MASS INDEX (BMI) DOCUMENTED: ICD-10-PCS | Mod: CPTII,S$GLB,, | Performed by: INTERNAL MEDICINE

## 2021-08-18 PROCEDURE — 3288F PR FALLS RISK ASSESSMENT DOCUMENTED: ICD-10-PCS | Mod: CPTII,S$GLB,, | Performed by: INTERNAL MEDICINE

## 2021-08-18 PROCEDURE — 1101F PR PT FALLS ASSESS DOC 0-1 FALLS W/OUT INJ PAST YR: ICD-10-PCS | Mod: CPTII,S$GLB,, | Performed by: INTERNAL MEDICINE

## 2021-08-18 PROCEDURE — 1159F MED LIST DOCD IN RCRD: CPT | Mod: CPTII,S$GLB,, | Performed by: INTERNAL MEDICINE

## 2021-08-18 PROCEDURE — 1126F AMNT PAIN NOTED NONE PRSNT: CPT | Mod: CPTII,S$GLB,, | Performed by: INTERNAL MEDICINE

## 2021-08-18 PROCEDURE — 3075F PR MOST RECENT SYSTOLIC BLOOD PRESS GE 130-139MM HG: ICD-10-PCS | Mod: CPTII,S$GLB,, | Performed by: INTERNAL MEDICINE

## 2021-08-18 PROCEDURE — 3079F DIAST BP 80-89 MM HG: CPT | Mod: CPTII,S$GLB,, | Performed by: INTERNAL MEDICINE

## 2021-08-25 ENCOUNTER — TELEPHONE (OUTPATIENT)
Dept: FAMILY MEDICINE | Facility: CLINIC | Age: 71
End: 2021-08-25

## 2021-08-25 DIAGNOSIS — R52 PAIN: ICD-10-CM

## 2021-08-25 RX ORDER — TRAMADOL HYDROCHLORIDE 50 MG/1
50 TABLET ORAL EVERY 6 HOURS PRN
Qty: 120 TABLET | Refills: 3 | Status: CANCELLED | OUTPATIENT
Start: 2021-08-25

## 2021-09-20 ENCOUNTER — OFFICE VISIT (OUTPATIENT)
Dept: ENDOCRINOLOGY | Facility: CLINIC | Age: 71
End: 2021-09-20
Payer: MEDICARE

## 2021-09-20 VITALS
WEIGHT: 145.94 LBS | HEART RATE: 78 BPM | BODY MASS INDEX: 24.32 KG/M2 | DIASTOLIC BLOOD PRESSURE: 64 MMHG | SYSTOLIC BLOOD PRESSURE: 128 MMHG | TEMPERATURE: 99 F | HEIGHT: 65 IN

## 2021-09-20 DIAGNOSIS — M81.0 OSTEOPOROSIS, POST-MENOPAUSAL: Primary | ICD-10-CM

## 2021-09-20 DIAGNOSIS — N18.31 STAGE 3A CHRONIC KIDNEY DISEASE: ICD-10-CM

## 2021-09-20 DIAGNOSIS — E11.69 COMBINED HYPERLIPIDEMIA ASSOCIATED WITH TYPE 2 DIABETES MELLITUS: ICD-10-CM

## 2021-09-20 DIAGNOSIS — E78.2 COMBINED HYPERLIPIDEMIA ASSOCIATED WITH TYPE 2 DIABETES MELLITUS: ICD-10-CM

## 2021-09-20 DIAGNOSIS — E11.22 TYPE 2 DIABETES MELLITUS WITH STAGE 3A CHRONIC KIDNEY DISEASE, WITHOUT LONG-TERM CURRENT USE OF INSULIN: ICD-10-CM

## 2021-09-20 DIAGNOSIS — E11.59 CONTROLLED TYPE 2 DIABETES MELLITUS WITH OTHER CIRCULATORY COMPLICATION, WITHOUT LONG-TERM CURRENT USE OF INSULIN: ICD-10-CM

## 2021-09-20 DIAGNOSIS — F17.200 TOBACCO DEPENDENCE: ICD-10-CM

## 2021-09-20 DIAGNOSIS — N18.31 TYPE 2 DIABETES MELLITUS WITH STAGE 3A CHRONIC KIDNEY DISEASE, WITHOUT LONG-TERM CURRENT USE OF INSULIN: ICD-10-CM

## 2021-09-20 PROBLEM — Z79.4 TYPE 2 DIABETES MELLITUS WITH HYPERGLYCEMIA, WITH LONG-TERM CURRENT USE OF INSULIN: Status: RESOLVED | Noted: 2018-10-13 | Resolved: 2021-09-20

## 2021-09-20 PROBLEM — E11.65 TYPE 2 DIABETES MELLITUS WITH HYPERGLYCEMIA, WITH LONG-TERM CURRENT USE OF INSULIN: Status: RESOLVED | Noted: 2018-10-13 | Resolved: 2021-09-20

## 2021-09-20 PROCEDURE — 99214 OFFICE O/P EST MOD 30 MIN: CPT | Mod: S$GLB,,, | Performed by: HOSPITALIST

## 2021-09-20 PROCEDURE — 3074F PR MOST RECENT SYSTOLIC BLOOD PRESSURE < 130 MM HG: ICD-10-PCS | Mod: CPTII,S$GLB,, | Performed by: HOSPITALIST

## 2021-09-20 PROCEDURE — 3008F PR BODY MASS INDEX (BMI) DOCUMENTED: ICD-10-PCS | Mod: CPTII,S$GLB,, | Performed by: HOSPITALIST

## 2021-09-20 PROCEDURE — 1160F PR REVIEW ALL MEDS BY PRESCRIBER/CLIN PHARMACIST DOCUMENTED: ICD-10-PCS | Mod: CPTII,S$GLB,, | Performed by: HOSPITALIST

## 2021-09-20 PROCEDURE — 1126F PR PAIN SEVERITY QUANTIFIED, NO PAIN PRESENT: ICD-10-PCS | Mod: CPTII,S$GLB,, | Performed by: HOSPITALIST

## 2021-09-20 PROCEDURE — 1126F AMNT PAIN NOTED NONE PRSNT: CPT | Mod: CPTII,S$GLB,, | Performed by: HOSPITALIST

## 2021-09-20 PROCEDURE — 3044F PR MOST RECENT HEMOGLOBIN A1C LEVEL <7.0%: ICD-10-PCS | Mod: CPTII,S$GLB,, | Performed by: HOSPITALIST

## 2021-09-20 PROCEDURE — 1160F RVW MEDS BY RX/DR IN RCRD: CPT | Mod: CPTII,S$GLB,, | Performed by: HOSPITALIST

## 2021-09-20 PROCEDURE — 99214 PR OFFICE/OUTPT VISIT, EST, LEVL IV, 30-39 MIN: ICD-10-PCS | Mod: S$GLB,,, | Performed by: HOSPITALIST

## 2021-09-20 PROCEDURE — 3074F SYST BP LT 130 MM HG: CPT | Mod: CPTII,S$GLB,, | Performed by: HOSPITALIST

## 2021-09-20 PROCEDURE — 3078F DIAST BP <80 MM HG: CPT | Mod: CPTII,S$GLB,, | Performed by: HOSPITALIST

## 2021-09-20 PROCEDURE — 3066F NEPHROPATHY DOC TX: CPT | Mod: CPTII,S$GLB,, | Performed by: HOSPITALIST

## 2021-09-20 PROCEDURE — 3044F HG A1C LEVEL LT 7.0%: CPT | Mod: CPTII,S$GLB,, | Performed by: HOSPITALIST

## 2021-09-20 PROCEDURE — 1159F PR MEDICATION LIST DOCUMENTED IN MEDICAL RECORD: ICD-10-PCS | Mod: CPTII,S$GLB,, | Performed by: HOSPITALIST

## 2021-09-20 PROCEDURE — 3008F BODY MASS INDEX DOCD: CPT | Mod: CPTII,S$GLB,, | Performed by: HOSPITALIST

## 2021-09-20 PROCEDURE — 99999 PR PBB SHADOW E&M-EST. PATIENT-LVL IV: ICD-10-PCS | Mod: PBBFAC,,, | Performed by: HOSPITALIST

## 2021-09-20 PROCEDURE — 99999 PR PBB SHADOW E&M-EST. PATIENT-LVL IV: CPT | Mod: PBBFAC,,, | Performed by: HOSPITALIST

## 2021-09-20 PROCEDURE — 1159F MED LIST DOCD IN RCRD: CPT | Mod: CPTII,S$GLB,, | Performed by: HOSPITALIST

## 2021-09-20 PROCEDURE — 3066F PR DOCUMENTATION OF TREATMENT FOR NEPHROPATHY: ICD-10-PCS | Mod: CPTII,S$GLB,, | Performed by: HOSPITALIST

## 2021-09-20 PROCEDURE — 3078F PR MOST RECENT DIASTOLIC BLOOD PRESSURE < 80 MM HG: ICD-10-PCS | Mod: CPTII,S$GLB,, | Performed by: HOSPITALIST

## 2021-09-20 RX ORDER — SEMAGLUTIDE 1.34 MG/ML
1 INJECTION, SOLUTION SUBCUTANEOUS WEEKLY
Qty: 3 PEN | Refills: 4 | Status: SHIPPED | OUTPATIENT
Start: 2021-09-20 | End: 2022-03-21 | Stop reason: SDUPTHER

## 2021-09-24 ENCOUNTER — TELEPHONE (OUTPATIENT)
Dept: PULMONOLOGY | Facility: CLINIC | Age: 71
End: 2021-09-24

## 2021-09-29 ENCOUNTER — PATIENT OUTREACH (OUTPATIENT)
Dept: ADMINISTRATIVE | Facility: OTHER | Age: 71
End: 2021-09-29

## 2021-09-30 ENCOUNTER — HOSPITAL ENCOUNTER (OUTPATIENT)
Dept: RADIOLOGY | Facility: HOSPITAL | Age: 71
Discharge: HOME | End: 2021-09-30
Attending: INTERNAL MEDICINE
Payer: MEDICARE

## 2021-09-30 ENCOUNTER — OFFICE VISIT (OUTPATIENT)
Dept: PULMONOLOGY | Facility: CLINIC | Age: 71
End: 2021-09-30
Payer: MEDICARE

## 2021-09-30 VITALS
HEART RATE: 68 BPM | DIASTOLIC BLOOD PRESSURE: 72 MMHG | BODY MASS INDEX: 25.01 KG/M2 | SYSTOLIC BLOOD PRESSURE: 120 MMHG | OXYGEN SATURATION: 99 % | WEIGHT: 150.13 LBS | HEIGHT: 65 IN

## 2021-09-30 DIAGNOSIS — R91.1 LEFT LOWER LOBE PULMONARY NODULE: ICD-10-CM

## 2021-09-30 DIAGNOSIS — J43.2 CENTRILOBULAR EMPHYSEMA: ICD-10-CM

## 2021-09-30 DIAGNOSIS — R91.1 SOLITARY PULMONARY NODULE: ICD-10-CM

## 2021-09-30 DIAGNOSIS — F17.213 CIGARETTE NICOTINE DEPENDENCE WITH WITHDRAWAL: Primary | ICD-10-CM

## 2021-09-30 PROCEDURE — 99999 PR PBB SHADOW E&M-EST. PATIENT-LVL IV: CPT | Mod: PBBFAC,,, | Performed by: INTERNAL MEDICINE

## 2021-09-30 PROCEDURE — 99214 PR OFFICE/OUTPT VISIT, EST, LEVL IV, 30-39 MIN: ICD-10-PCS | Mod: 25,GC,S$GLB, | Performed by: INTERNAL MEDICINE

## 2021-09-30 PROCEDURE — 99406 PR TOBACCO USE CESSATION INTERMEDIATE 3-10 MINUTES: ICD-10-PCS | Mod: S$GLB,,, | Performed by: INTERNAL MEDICINE

## 2021-09-30 PROCEDURE — 99406 BEHAV CHNG SMOKING 3-10 MIN: CPT | Mod: S$GLB,,, | Performed by: INTERNAL MEDICINE

## 2021-09-30 PROCEDURE — 71250 CT CHEST WITHOUT CONTRAST: ICD-10-PCS | Mod: 26,,, | Performed by: RADIOLOGY

## 2021-09-30 PROCEDURE — 71250 CT THORAX DX C-: CPT | Mod: TC

## 2021-09-30 PROCEDURE — 71250 CT THORAX DX C-: CPT | Mod: 26,,, | Performed by: RADIOLOGY

## 2021-09-30 PROCEDURE — 99214 OFFICE O/P EST MOD 30 MIN: CPT | Mod: 25,GC,S$GLB, | Performed by: INTERNAL MEDICINE

## 2021-09-30 PROCEDURE — 99999 PR PBB SHADOW E&M-EST. PATIENT-LVL IV: ICD-10-PCS | Mod: PBBFAC,,, | Performed by: INTERNAL MEDICINE

## 2021-09-30 RX ORDER — VARENICLINE TARTRATE 1 MG/1
1 TABLET, FILM COATED ORAL 2 TIMES DAILY
Qty: 60 TABLET | Refills: 2 | Status: SHIPPED | OUTPATIENT
Start: 2021-09-30 | End: 2023-08-15

## 2021-09-30 RX ORDER — GLIPIZIDE 10 MG/1
10 TABLET ORAL 2 TIMES DAILY
COMMUNITY
Start: 2021-07-30 | End: 2022-06-06 | Stop reason: SDUPTHER

## 2021-09-30 RX ORDER — ALBUTEROL SULFATE 90 UG/1
2 AEROSOL, METERED RESPIRATORY (INHALATION) EVERY 6 HOURS PRN
Qty: 18 G | Refills: 11 | Status: SHIPPED | OUTPATIENT
Start: 2021-09-30 | End: 2023-08-15

## 2021-09-30 RX ORDER — VARENICLINE TARTRATE 0.5 (11)-1
KIT ORAL
Qty: 1 PACKAGE | Refills: 0 | Status: SHIPPED | OUTPATIENT
Start: 2021-09-30 | End: 2023-08-15

## 2021-10-06 ENCOUNTER — TELEPHONE (OUTPATIENT)
Dept: SMOKING CESSATION | Facility: CLINIC | Age: 71
End: 2021-10-06

## 2021-10-07 ENCOUNTER — TELEPHONE (OUTPATIENT)
Dept: PULMONOLOGY | Facility: CLINIC | Age: 71
End: 2021-10-07

## 2021-10-15 ENCOUNTER — PES CALL (OUTPATIENT)
Dept: ADMINISTRATIVE | Facility: CLINIC | Age: 71
End: 2021-10-15

## 2021-10-26 ENCOUNTER — TELEPHONE (OUTPATIENT)
Dept: FAMILY MEDICINE | Facility: CLINIC | Age: 71
End: 2021-10-26

## 2021-10-26 ENCOUNTER — CLINICAL SUPPORT (OUTPATIENT)
Dept: FAMILY MEDICINE | Facility: CLINIC | Age: 71
End: 2021-10-26
Payer: MEDICARE

## 2021-10-26 DIAGNOSIS — Z23 FLU VACCINE NEED: Primary | ICD-10-CM

## 2021-10-26 PROCEDURE — G0008 FLU VACCINE - QUADRIVALENT - ADJUVANTED: ICD-10-PCS | Mod: S$GLB,,, | Performed by: INTERNAL MEDICINE

## 2021-10-26 PROCEDURE — 90694 FLU VACCINE - QUADRIVALENT - ADJUVANTED: ICD-10-PCS | Mod: S$GLB,,, | Performed by: INTERNAL MEDICINE

## 2021-10-26 PROCEDURE — G0008 ADMIN INFLUENZA VIRUS VAC: HCPCS | Mod: S$GLB,,, | Performed by: INTERNAL MEDICINE

## 2021-10-26 PROCEDURE — 90694 VACC AIIV4 NO PRSRV 0.5ML IM: CPT | Mod: S$GLB,,, | Performed by: INTERNAL MEDICINE

## 2021-10-26 PROCEDURE — 99499 UNLISTED E&M SERVICE: CPT | Mod: S$GLB,,, | Performed by: INTERNAL MEDICINE

## 2021-10-26 PROCEDURE — 99499 NO LOS: ICD-10-PCS | Mod: S$GLB,,, | Performed by: INTERNAL MEDICINE

## 2021-11-08 ENCOUNTER — LAB VISIT (OUTPATIENT)
Dept: LAB | Facility: HOSPITAL | Age: 71
End: 2021-11-08
Attending: HOSPITALIST
Payer: MEDICARE

## 2021-11-08 DIAGNOSIS — E11.59 CONTROLLED TYPE 2 DIABETES MELLITUS WITH OTHER CIRCULATORY COMPLICATION, WITHOUT LONG-TERM CURRENT USE OF INSULIN: ICD-10-CM

## 2021-11-08 DIAGNOSIS — N18.31 STAGE 3A CHRONIC KIDNEY DISEASE: ICD-10-CM

## 2021-11-08 DIAGNOSIS — M81.0 OSTEOPOROSIS, POST-MENOPAUSAL: ICD-10-CM

## 2021-11-08 DIAGNOSIS — N18.31 TYPE 2 DIABETES MELLITUS WITH STAGE 3A CHRONIC KIDNEY DISEASE, WITHOUT LONG-TERM CURRENT USE OF INSULIN: ICD-10-CM

## 2021-11-08 DIAGNOSIS — E11.22 TYPE 2 DIABETES MELLITUS WITH STAGE 3A CHRONIC KIDNEY DISEASE, WITHOUT LONG-TERM CURRENT USE OF INSULIN: ICD-10-CM

## 2021-11-08 LAB
25(OH)D3+25(OH)D2 SERPL-MCNC: 64 NG/ML (ref 30–96)
ALBUMIN SERPL BCP-MCNC: 3.6 G/DL (ref 3.5–5.2)
ALBUMIN SERPL BCP-MCNC: 3.6 G/DL (ref 3.5–5.2)
ALP SERPL-CCNC: 88 U/L (ref 55–135)
ALT SERPL W/O P-5'-P-CCNC: 12 U/L (ref 10–44)
ANION GAP SERPL CALC-SCNC: 7 MMOL/L (ref 8–16)
ANION GAP SERPL CALC-SCNC: 7 MMOL/L (ref 8–16)
AST SERPL-CCNC: 14 U/L (ref 10–40)
BILIRUB SERPL-MCNC: 1 MG/DL (ref 0.1–1)
BUN SERPL-MCNC: 6 MG/DL (ref 8–23)
BUN SERPL-MCNC: 6 MG/DL (ref 8–23)
CALCIUM SERPL-MCNC: 10.2 MG/DL (ref 8.7–10.5)
CALCIUM SERPL-MCNC: 10.2 MG/DL (ref 8.7–10.5)
CHLORIDE SERPL-SCNC: 109 MMOL/L (ref 95–110)
CHLORIDE SERPL-SCNC: 109 MMOL/L (ref 95–110)
CO2 SERPL-SCNC: 29 MMOL/L (ref 23–29)
CO2 SERPL-SCNC: 29 MMOL/L (ref 23–29)
CREAT SERPL-MCNC: 1.1 MG/DL (ref 0.5–1.4)
CREAT SERPL-MCNC: 1.1 MG/DL (ref 0.5–1.4)
EST. GFR  (AFRICAN AMERICAN): 57.6 ML/MIN/1.73 M^2
EST. GFR  (AFRICAN AMERICAN): 57.6 ML/MIN/1.73 M^2
EST. GFR  (NON AFRICAN AMERICAN): 49.9 ML/MIN/1.73 M^2
EST. GFR  (NON AFRICAN AMERICAN): 49.9 ML/MIN/1.73 M^2
ESTIMATED AVG GLUCOSE: 123 MG/DL (ref 68–131)
GLUCOSE SERPL-MCNC: 159 MG/DL (ref 70–110)
GLUCOSE SERPL-MCNC: 159 MG/DL (ref 70–110)
HBA1C MFR BLD: 5.9 % (ref 4–5.6)
PHOSPHATE SERPL-MCNC: 2.9 MG/DL (ref 2.7–4.5)
POTASSIUM SERPL-SCNC: 3.9 MMOL/L (ref 3.5–5.1)
POTASSIUM SERPL-SCNC: 3.9 MMOL/L (ref 3.5–5.1)
PROT SERPL-MCNC: 7.3 G/DL (ref 6–8.4)
SODIUM SERPL-SCNC: 145 MMOL/L (ref 136–145)
SODIUM SERPL-SCNC: 145 MMOL/L (ref 136–145)

## 2021-11-08 PROCEDURE — 83036 HEMOGLOBIN GLYCOSYLATED A1C: CPT | Performed by: HOSPITALIST

## 2021-11-08 PROCEDURE — 84100 ASSAY OF PHOSPHORUS: CPT | Performed by: HOSPITALIST

## 2021-11-08 PROCEDURE — 82306 VITAMIN D 25 HYDROXY: CPT | Performed by: HOSPITALIST

## 2021-11-08 PROCEDURE — 36415 COLL VENOUS BLD VENIPUNCTURE: CPT | Mod: PO | Performed by: HOSPITALIST

## 2021-11-08 PROCEDURE — 80053 COMPREHEN METABOLIC PANEL: CPT | Performed by: HOSPITALIST

## 2021-11-09 ENCOUNTER — TELEPHONE (OUTPATIENT)
Dept: ENDOCRINOLOGY | Facility: CLINIC | Age: 71
End: 2021-11-09
Payer: MEDICARE

## 2021-11-15 ENCOUNTER — INFUSION (OUTPATIENT)
Dept: INFUSION THERAPY | Facility: HOSPITAL | Age: 71
End: 2021-11-15
Attending: HOSPITALIST
Payer: MEDICARE

## 2021-11-15 VITALS
RESPIRATION RATE: 16 BRPM | HEART RATE: 67 BPM | SYSTOLIC BLOOD PRESSURE: 193 MMHG | TEMPERATURE: 99 F | OXYGEN SATURATION: 96 % | DIASTOLIC BLOOD PRESSURE: 79 MMHG

## 2021-11-15 DIAGNOSIS — M81.0 OSTEOPOROSIS, POST-MENOPAUSAL: Primary | ICD-10-CM

## 2021-11-15 PROCEDURE — 96372 THER/PROPH/DIAG INJ SC/IM: CPT

## 2021-11-15 PROCEDURE — 63600175 PHARM REV CODE 636 W HCPCS: Mod: JG | Performed by: HOSPITALIST

## 2021-11-15 RX ADMIN — DENOSUMAB 60 MG: 60 INJECTION SUBCUTANEOUS at 11:11

## 2021-11-16 ENCOUNTER — OFFICE VISIT (OUTPATIENT)
Dept: OPTOMETRY | Facility: CLINIC | Age: 71
End: 2021-11-16
Payer: MEDICARE

## 2021-11-16 DIAGNOSIS — H52.223 REGULAR ASTIGMATISM OF BOTH EYES: ICD-10-CM

## 2021-11-16 DIAGNOSIS — Z96.1 PSEUDOPHAKIA OF BOTH EYES: ICD-10-CM

## 2021-11-16 DIAGNOSIS — Z13.5 GLAUCOMA SCREENING: ICD-10-CM

## 2021-11-16 DIAGNOSIS — E11.9 DIABETES MELLITUS TYPE 2 WITHOUT RETINOPATHY: Primary | ICD-10-CM

## 2021-11-16 PROCEDURE — 99499 RISK ADDL DX/OHS AUDIT: ICD-10-PCS | Mod: S$GLB,,, | Performed by: OPTOMETRIST

## 2021-11-16 PROCEDURE — 1101F PT FALLS ASSESS-DOCD LE1/YR: CPT | Mod: CPTII,S$GLB,, | Performed by: OPTOMETRIST

## 2021-11-16 PROCEDURE — 1159F PR MEDICATION LIST DOCUMENTED IN MEDICAL RECORD: ICD-10-PCS | Mod: CPTII,S$GLB,, | Performed by: OPTOMETRIST

## 2021-11-16 PROCEDURE — 3066F NEPHROPATHY DOC TX: CPT | Mod: CPTII,S$GLB,, | Performed by: OPTOMETRIST

## 2021-11-16 PROCEDURE — 3288F FALL RISK ASSESSMENT DOCD: CPT | Mod: CPTII,S$GLB,, | Performed by: OPTOMETRIST

## 2021-11-16 PROCEDURE — 92004 PR EYE EXAM, NEW PATIENT,COMPREHESV: ICD-10-PCS | Mod: S$GLB,,, | Performed by: OPTOMETRIST

## 2021-11-16 PROCEDURE — 3044F PR MOST RECENT HEMOGLOBIN A1C LEVEL <7.0%: ICD-10-PCS | Mod: CPTII,S$GLB,, | Performed by: OPTOMETRIST

## 2021-11-16 PROCEDURE — 3288F PR FALLS RISK ASSESSMENT DOCUMENTED: ICD-10-PCS | Mod: CPTII,S$GLB,, | Performed by: OPTOMETRIST

## 2021-11-16 PROCEDURE — 1126F PR PAIN SEVERITY QUANTIFIED, NO PAIN PRESENT: ICD-10-PCS | Mod: CPTII,S$GLB,, | Performed by: OPTOMETRIST

## 2021-11-16 PROCEDURE — 92004 COMPRE OPH EXAM NEW PT 1/>: CPT | Mod: S$GLB,,, | Performed by: OPTOMETRIST

## 2021-11-16 PROCEDURE — 99999 PR PBB SHADOW E&M-EST. PATIENT-LVL I: CPT | Mod: PBBFAC,,, | Performed by: OPTOMETRIST

## 2021-11-16 PROCEDURE — 99499 UNLISTED E&M SERVICE: CPT | Mod: S$GLB,,, | Performed by: OPTOMETRIST

## 2021-11-16 PROCEDURE — 1126F AMNT PAIN NOTED NONE PRSNT: CPT | Mod: CPTII,S$GLB,, | Performed by: OPTOMETRIST

## 2021-11-16 PROCEDURE — 2023F DILAT RTA XM W/O RTNOPTHY: CPT | Mod: CPTII,S$GLB,, | Performed by: OPTOMETRIST

## 2021-11-16 PROCEDURE — 1101F PR PT FALLS ASSESS DOC 0-1 FALLS W/OUT INJ PAST YR: ICD-10-PCS | Mod: CPTII,S$GLB,, | Performed by: OPTOMETRIST

## 2021-11-16 PROCEDURE — 3044F HG A1C LEVEL LT 7.0%: CPT | Mod: CPTII,S$GLB,, | Performed by: OPTOMETRIST

## 2021-11-16 PROCEDURE — 99999 PR PBB SHADOW E&M-EST. PATIENT-LVL I: ICD-10-PCS | Mod: PBBFAC,,, | Performed by: OPTOMETRIST

## 2021-11-16 PROCEDURE — 2023F PR DILATED RETINAL EXAM W/O EVID OF RETINOPATHY: ICD-10-PCS | Mod: CPTII,S$GLB,, | Performed by: OPTOMETRIST

## 2021-11-16 PROCEDURE — 3066F PR DOCUMENTATION OF TREATMENT FOR NEPHROPATHY: ICD-10-PCS | Mod: CPTII,S$GLB,, | Performed by: OPTOMETRIST

## 2021-11-16 PROCEDURE — 1159F MED LIST DOCD IN RCRD: CPT | Mod: CPTII,S$GLB,, | Performed by: OPTOMETRIST

## 2021-11-17 ENCOUNTER — PES CALL (OUTPATIENT)
Dept: ADMINISTRATIVE | Facility: CLINIC | Age: 71
End: 2021-11-17
Payer: MEDICARE

## 2021-12-23 ENCOUNTER — PES CALL (OUTPATIENT)
Dept: ADMINISTRATIVE | Facility: CLINIC | Age: 71
End: 2021-12-23
Payer: MEDICARE

## 2021-12-28 ENCOUNTER — PES CALL (OUTPATIENT)
Dept: ADMINISTRATIVE | Facility: CLINIC | Age: 71
End: 2021-12-28
Payer: MEDICARE

## 2022-01-04 DIAGNOSIS — K21.9 GASTROESOPHAGEAL REFLUX DISEASE, UNSPECIFIED WHETHER ESOPHAGITIS PRESENT: ICD-10-CM

## 2022-01-04 RX ORDER — FAMOTIDINE 40 MG/1
TABLET, FILM COATED ORAL
Qty: 90 TABLET | Refills: 0 | Status: SHIPPED | OUTPATIENT
Start: 2022-01-04

## 2022-01-04 NOTE — TELEPHONE ENCOUNTER
Care Due:                  Date            Visit Type   Department     Provider  --------------------------------------------------------------------------------                                             [facility]                                           MED/ INTERNAL  Venancio Barnett  Last Visit: 05-      None         MED/ PEDS      [place]  Next Visit: None Scheduled  None         None Found                                                            Last  Test          Frequency    Reason                     Performed    Due Date  --------------------------------------------------------------------------------    Lipid Panel.  12 months..  rosuvastatin.............  Not Found    Overdue    Powered by [employer]. Reference number: [id number].   1/04/2022 1:14:10 PM CST

## 2022-01-19 ENCOUNTER — PES CALL (OUTPATIENT)
Dept: ADMINISTRATIVE | Facility: CLINIC | Age: 72
End: 2022-01-19
Payer: MEDICARE

## 2022-01-28 ENCOUNTER — TELEPHONE (OUTPATIENT)
Dept: ENDOCRINOLOGY | Facility: CLINIC | Age: 72
End: 2022-01-28
Payer: MEDICARE

## 2022-01-28 DIAGNOSIS — N18.31 TYPE 2 DIABETES MELLITUS WITH STAGE 3A CHRONIC KIDNEY DISEASE, WITHOUT LONG-TERM CURRENT USE OF INSULIN: Primary | ICD-10-CM

## 2022-01-28 DIAGNOSIS — E11.22 TYPE 2 DIABETES MELLITUS WITH STAGE 3A CHRONIC KIDNEY DISEASE, WITHOUT LONG-TERM CURRENT USE OF INSULIN: Primary | ICD-10-CM

## 2022-01-28 NOTE — TELEPHONE ENCOUNTER
Rt patient call and na lvm , please enter lab orders . Will contact patient and schedule once placed.

## 2022-01-28 NOTE — TELEPHONE ENCOUNTER
----- Message from Sharmaine Stuart sent at 1/28/2022 12:37 PM CST -----  Regarding: self  .Type: Lab    Caller is requesting to schedule their Lab appointment prior to annual appointment.    Order is not listed in EPIC.  Please enter order and contact patient to schedule.    Name of Caller: self     Preferred Date and Time of Labs:    Date of [facility] Appointment: 3/21/22    Where would they like the lab performed? [place]     Would the patient rather a call back or a response via [facility]? Call     Best Call Back Number: .[phone]

## 2022-03-21 ENCOUNTER — OFFICE VISIT (OUTPATIENT)
Dept: ENDOCRINOLOGY | Facility: CLINIC | Age: 72
End: 2022-03-21
Payer: MEDICARE

## 2022-03-21 VITALS
SYSTOLIC BLOOD PRESSURE: 117 MMHG | HEART RATE: 86 BPM | BODY MASS INDEX: 23.05 KG/M2 | DIASTOLIC BLOOD PRESSURE: 69 MMHG | WEIGHT: 136.38 LBS | TEMPERATURE: 99 F

## 2022-03-21 DIAGNOSIS — N18.31 CHRONIC KIDNEY DISEASE, STAGE 3A: ICD-10-CM

## 2022-03-21 DIAGNOSIS — E78.2 COMBINED HYPERLIPIDEMIA ASSOCIATED WITH TYPE 2 DIABETES MELLITUS: ICD-10-CM

## 2022-03-21 DIAGNOSIS — N18.31 STAGE 3A CHRONIC KIDNEY DISEASE: ICD-10-CM

## 2022-03-21 DIAGNOSIS — E11.22 TYPE 2 DIABETES MELLITUS WITH STAGE 3A CHRONIC KIDNEY DISEASE, WITHOUT LONG-TERM CURRENT USE OF INSULIN: Primary | ICD-10-CM

## 2022-03-21 DIAGNOSIS — E11.59 CONTROLLED TYPE 2 DIABETES MELLITUS WITH OTHER CIRCULATORY COMPLICATION, WITHOUT LONG-TERM CURRENT USE OF INSULIN: ICD-10-CM

## 2022-03-21 DIAGNOSIS — E11.69 COMBINED HYPERLIPIDEMIA ASSOCIATED WITH TYPE 2 DIABETES MELLITUS: ICD-10-CM

## 2022-03-21 DIAGNOSIS — N18.31 TYPE 2 DIABETES MELLITUS WITH STAGE 3A CHRONIC KIDNEY DISEASE, WITHOUT LONG-TERM CURRENT USE OF INSULIN: Primary | ICD-10-CM

## 2022-03-21 DIAGNOSIS — M81.0 OSTEOPOROSIS, POST-MENOPAUSAL: ICD-10-CM

## 2022-03-21 PROCEDURE — 3072F LOW RISK FOR RETINOPATHY: CPT | Mod: CPTII,S$GLB,, | Performed by: HOSPITALIST

## 2022-03-21 PROCEDURE — 3074F SYST BP LT 130 MM HG: CPT | Mod: CPTII,S$GLB,, | Performed by: HOSPITALIST

## 2022-03-21 PROCEDURE — 1126F AMNT PAIN NOTED NONE PRSNT: CPT | Mod: CPTII,S$GLB,, | Performed by: HOSPITALIST

## 2022-03-21 PROCEDURE — 1160F RVW MEDS BY RX/DR IN RCRD: CPT | Mod: CPTII,S$GLB,, | Performed by: HOSPITALIST

## 2022-03-21 PROCEDURE — 3288F PR FALLS RISK ASSESSMENT DOCUMENTED: ICD-10-PCS | Mod: CPTII,S$GLB,, | Performed by: HOSPITALIST

## 2022-03-21 PROCEDURE — 3008F BODY MASS INDEX DOCD: CPT | Mod: CPTII,S$GLB,, | Performed by: HOSPITALIST

## 2022-03-21 PROCEDURE — 1101F PT FALLS ASSESS-DOCD LE1/YR: CPT | Mod: CPTII,S$GLB,, | Performed by: HOSPITALIST

## 2022-03-21 PROCEDURE — 1159F MED LIST DOCD IN RCRD: CPT | Mod: CPTII,S$GLB,, | Performed by: HOSPITALIST

## 2022-03-21 PROCEDURE — 3008F PR BODY MASS INDEX (BMI) DOCUMENTED: ICD-10-PCS | Mod: CPTII,S$GLB,, | Performed by: HOSPITALIST

## 2022-03-21 PROCEDURE — 1160F PR REVIEW ALL MEDS BY PRESCRIBER/CLIN PHARMACIST DOCUMENTED: ICD-10-PCS | Mod: CPTII,S$GLB,, | Performed by: HOSPITALIST

## 2022-03-21 PROCEDURE — 99999 PR PBB SHADOW E&M-EST. PATIENT-LVL V: CPT | Mod: PBBFAC,,, | Performed by: HOSPITALIST

## 2022-03-21 PROCEDURE — 1101F PR PT FALLS ASSESS DOC 0-1 FALLS W/OUT INJ PAST YR: ICD-10-PCS | Mod: CPTII,S$GLB,, | Performed by: HOSPITALIST

## 2022-03-21 PROCEDURE — 3072F PR LOW RISK FOR RETINOPATHY: ICD-10-PCS | Mod: CPTII,S$GLB,, | Performed by: HOSPITALIST

## 2022-03-21 PROCEDURE — 99214 OFFICE O/P EST MOD 30 MIN: CPT | Mod: S$GLB,,, | Performed by: HOSPITALIST

## 2022-03-21 PROCEDURE — 1126F PR PAIN SEVERITY QUANTIFIED, NO PAIN PRESENT: ICD-10-PCS | Mod: CPTII,S$GLB,, | Performed by: HOSPITALIST

## 2022-03-21 PROCEDURE — 99214 PR OFFICE/OUTPT VISIT, EST, LEVL IV, 30-39 MIN: ICD-10-PCS | Mod: S$GLB,,, | Performed by: HOSPITALIST

## 2022-03-21 PROCEDURE — 3078F PR MOST RECENT DIASTOLIC BLOOD PRESSURE < 80 MM HG: ICD-10-PCS | Mod: CPTII,S$GLB,, | Performed by: HOSPITALIST

## 2022-03-21 PROCEDURE — 3288F FALL RISK ASSESSMENT DOCD: CPT | Mod: CPTII,S$GLB,, | Performed by: HOSPITALIST

## 2022-03-21 PROCEDURE — 1159F PR MEDICATION LIST DOCUMENTED IN MEDICAL RECORD: ICD-10-PCS | Mod: CPTII,S$GLB,, | Performed by: HOSPITALIST

## 2022-03-21 PROCEDURE — 3078F DIAST BP <80 MM HG: CPT | Mod: CPTII,S$GLB,, | Performed by: HOSPITALIST

## 2022-03-21 PROCEDURE — 3074F PR MOST RECENT SYSTOLIC BLOOD PRESSURE < 130 MM HG: ICD-10-PCS | Mod: CPTII,S$GLB,, | Performed by: HOSPITALIST

## 2022-03-21 PROCEDURE — 99999 PR PBB SHADOW E&M-EST. PATIENT-LVL V: ICD-10-PCS | Mod: PBBFAC,,, | Performed by: HOSPITALIST

## 2022-03-21 RX ORDER — SEMAGLUTIDE 1.34 MG/ML
1 INJECTION, SOLUTION SUBCUTANEOUS WEEKLY
Qty: 3 PEN | Refills: 4 | Status: ON HOLD | OUTPATIENT
Start: 2022-03-21 | End: 2022-12-27

## 2022-03-21 NOTE — ASSESSMENT & PLAN NOTE
- management of long-term osteoporosis  - On Prolia therapy>> next dose 5/22  - DXA personal reviewed by me, improvement in bone density- Next DXA:  6/5/2022  - continue vitamin-D and calcium supplement  - Fall precautions/Exercise: advised   - Prolia order renewed, scheduled every 6 months>> 11/2022  - consider holiday in the future, will discuss this at the next office visit pending bone density results    Risk of osteonecrosis of the jaw (DISCUSSED) with bisphosphonates and denosumab, with incidence estimated from 1-10/792628 treated patients. The incidence of ONJ is higher in patients undergoing invasive dental surgery (excludes routine cleaning, fillings or root canals).   Dental health: Advised dental work should ideally be completed prior to initiation of treatment, follow up with dentist/oral surgeon advised.

## 2022-03-21 NOTE — PROGRESS NOTES
Subjective:      Patient ID: Sairna Genao is a 71 y.o. female presented to [facility] on 3/21/2022.  Chief Complaint:  Osteoporosis      History of Present Illness: Sarina Genao is a 71 y.o. female here for management of osteoporosis  Other significant past medical history:  Hypertension, hyperlipidemia      Interval hx: Here for follow up, s/p Prolia tolerated it well  Recent falls? No, Recent fractures? no  DM is good controlled,  On Ozempic  PRN glipizide      With regards to Osteoporosis, diagnosis on DXA in 2018  On prolia, started 9/17/2018 to 11/2020>> not in our system, unable to get new Prolia from her endocrinologist due to cost  Currently: Last dose: 5/14/21> 11/15/21>> next dose 5/16/22    Bone density is improving  4/2018> compare to 6/5/2020>> next: 6/5/2022  Vit D: 1000iu daily, on MVN    Current diarrhea or h/o malabsorption? no  Height loss (>2 inches)? no  Family hx of Osteoporosis: mother, possibly sister  History of Cancer? Kindey cancer, nephrectomy, no issue  Malignancy involving bone (active or history)? no  Prior radiation treatment? no    Post-menopausal? Age?: 50s  No hysterectomy  Tobacco use ?  Yes, just restarted due to covid stress  EtOH use? no (<2 drinks a day in female, <3 drinks a day for male)  Weight bearing exercise?   Yes, walking 30 mins, 3x, need to restart again   Fall Precautions? yes, get out the chair without using their arms    Dental work planned? No, dentures    Bone density:  Review  6/5/20   The bone mineral density within the lumbar spine measured from L1 through L4 is equal to 1.044 g/cm squared with a T score of -1.2 and a Z score of -0.4.  The left femoral neck bone mineral density is equal to 0.708 g/cm squared with a T score of -2.4 and a Z score of -1.6.  The right femoral neck bone mineral density is equal to 0.700 g/cm squared with a T score of -2.4 and a Z score of -1.7.  The left total hip bone mineral density is equal to  0.706 g/cm squared with a T score of -2.4 and a Z score of -1.9.  The right total hip bone mineral density is equal to 0.706 g/cm squared with a T score of -2.4 and a Z score of -1.9.  There is a 7.5% risk of a major osteoporotic fracture and a 2.9% risk of hip fracture in the next 10 years (FRAX).  Impression: Osteopenia.       Lab work reviewed  Lab Results   Component Value Date    PTH 73.0 01/31/2020    PTH 70.0 05/22/2019    PTH 66.0 10/20/2016    DCRNUQAB03PS 64 11/08/2021    RPCROKCC80KX 51 01/31/2020    NAXOECIE09OE 56 05/22/2019    CALCIUM 10.2 11/08/2021    CALCIUM 10.2 11/08/2021    CALCIUM 10.2 08/06/2021    PHOS 2.9 11/08/2021    PHOS 3.0 10/30/2020    PHOS 2.9 09/14/2020    ALKPHOS 88 11/08/2021    ALKPHOS 94 08/06/2021    ALKPHOS 87 02/13/2021    TSH 1.780 02/13/2021    LABCALC 6.9 06/24/2016    RFZVUOG37BLL 3 (L) 06/24/2016       With regards to Diabetes Mellitus Type 2  Known diabetic complications: nephropathy  Cardiovascular risk factors: advanced age (older than 55 for men, 65 for women), diabetes mellitus, dyslipidemia, sedentary lifestyle and smoking/ tobacco exposure  Diagnosed w/ DM: in 2007    Glucose 119 this AM, Staying below 135  Need to see Dr De La Torre for toe nail trim soon    Currently:    Ozempic 1mg Qweekly, cost is not an issue   Glipizide PRN (1-2x  A month)  Previous meds:              Was on MDI insulin  Home glucose checks: checks 1x a day, Logs reviewed/Unavailable: oral recall: 112  Weight trend: stable  Diabetes Education:  no  Diabetes Related Hospitalization: no  Hx of pancreatitis, hx of thyroid cancer: no  Family history of diabetes: yes       Eye exam current (within one year): yes, DR: no  Reports cuts or ulcers on feet: no, has podiatrist    Statin: Taking  ACE/ARB: Taking    Diabetes Management Status: Reviewed     A1C Trend  Lab Results   Component Value Date    HGBA1C 5.9 (H) 11/08/2021    HGBA1C 6.4 (H) 08/06/2021    HGBA1C 6.9 (H) 02/13/2021       Lab Results    Component Value Date    MICALBCREAT 7.7 05/22/2019     No results found for: NCDDZOQM05  No results found for: TTGIGA    No results found for: CPEPTIDE, GLUTAMICACID, ISLETCELLANT, FRUCTOSAMINE     Screening or Prevention Patient's value Goal Complete/Controlled?   Lipid profile : 02/13/2021 Annually Yes   LDL control Lab Results   Component Value Date    LDLCALC 66.2 02/13/2021    Annually/Less than 100 mg/dl  Yes   Nephropathy screening Lab Results   Component Value Date    LABMICR 28.0 05/22/2019     Lab Results   Component Value Date    PROTEINUA 1+ (A) 09/14/2020    Annually Yes   Blood pressure BP Readings from Last 1 Encounters:   03/21/22 117/69    Less than 140/90 No   Dilated retinal exam : 11/16/2021 Annually Yes   Foot exam   : 04/17/2019 Annually No      Reviewed past surgical, medical, family, social history and updated as appropriate.    Review of Systems: see HPI above    Objective:   /69 (BP Location: Right arm)   Pulse 86   Temp 98.6 °F (37 °C) (Oral)   Wt 61.9 kg (136 lb 6.4 oz)   BMI 23.05 kg/m²     Body mass index is 23.05 kg/m².  Vital signs reviewed    Physical Exam  Vitals and nursing note reviewed.   Constitutional:       General: She is not in acute distress.     Appearance: Normal appearance. She is well-developed.   Neck:      Thyroid: No thyromegaly.   Cardiovascular:      Rate and Rhythm: Normal rate.      Heart sounds: Normal heart sounds.   Pulmonary:      Effort: Pulmonary effort is normal. No respiratory distress.   Abdominal:      Tenderness: There is no abdominal tenderness.   Musculoskeletal:         General: Normal range of motion.      Cervical back: Neck supple.   Skin:     General: Skin is warm.      Findings: No erythema.   Neurological:      Mental Status: She is alert and oriented to person, place, and time.         Lab Reviewed:   Lab Results   Component Value Date    HGBA1C 5.9 (H) 11/08/2021       Lab Results   Component Value Date    CHOL 139 02/13/2021     HDL 41 02/13/2021    LDLCALC 66.2 02/13/2021    TRIG 159 (H) 02/13/2021    CHOLHDL 29.5 02/13/2021       Lab Results   Component Value Date     11/08/2021     11/08/2021    K 3.9 11/08/2021    K 3.9 11/08/2021     11/08/2021     11/08/2021    CO2 29 11/08/2021    CO2 29 11/08/2021     (H) 11/08/2021     (H) 11/08/2021    BUN 6 (L) 11/08/2021    BUN 6 (L) 11/08/2021    CREATININE 1.1 11/08/2021    CREATININE 1.1 11/08/2021    CALCIUM 10.2 11/08/2021    CALCIUM 10.2 11/08/2021    PROT 7.3 11/08/2021    ALBUMIN 3.6 11/08/2021    ALBUMIN 3.6 11/08/2021    BILITOT 1.0 11/08/2021    ALKPHOS 88 11/08/2021    AST 14 11/08/2021    ALT 12 11/08/2021    ANIONGAP 7 (L) 11/08/2021    ANIONGAP 7 (L) 11/08/2021    ESTGFRAFRICA 57.6 (A) 11/08/2021    ESTGFRAFRICA 57.6 (A) 11/08/2021    EGFRNONAA 49.9 (A) 11/08/2021    EGFRNONAA 49.9 (A) 11/08/2021    TSH 1.780 02/13/2021        Lab Results   Component Value Date    PTH 73.0 01/31/2020    PTH 70.0 05/22/2019    PTH 66.0 10/20/2016    ZWVYCTOB83LX 64 11/08/2021    VTLPXKWX57OX 51 01/31/2020    QAENYSJA56DB 56 05/22/2019    CALCIUM 10.2 11/08/2021    CALCIUM 10.2 11/08/2021    CALCIUM 10.2 08/06/2021    PHOS 2.9 11/08/2021    PHOS 3.0 10/30/2020    PHOS 2.9 09/14/2020    ALKPHOS 88 11/08/2021    ALKPHOS 94 08/06/2021    ALKPHOS 87 02/13/2021    TSH 1.780 02/13/2021    LABCALC 6.9 06/24/2016    UCRHXUC18ZDP 3 (L) 06/24/2016       Assessment     1. Type 2 diabetes mellitus with stage 3a chronic kidney disease, without long-term current use of insulin     2. Stage 3a chronic kidney disease  OZEMPIC 1 mg/dose (4 mg/3 mL)   3. Controlled type 2 diabetes mellitus with other circulatory complication, without long-term current use of insulin  OZEMPIC 1 mg/dose (4 mg/3 mL)    Vitamin D    Hemoglobin A1C    Comprehensive Metabolic Panel    Comprehensive Metabolic Panel   4. Osteoporosis, post-menopausal  DXA Bone Density Spine And Hip   5. Combined  hyperlipidemia associated with type 2 diabetes mellitus     6. Chronic kidney disease, stage 3a   Vitamin D        Plan     Osteoporosis, post-menopausal  - management of long-term osteoporosis  - On Prolia therapy>> next dose 5/22  - DXA personal reviewed by me, improvement in bone density- Next DXA:  6/5/2022  - continue vitamin-D and calcium supplement  - Fall precautions/Exercise: advised   - Prolia order renewed, scheduled every 6 months>> 11/2022  - consider holiday in the future, will discuss this at the next office visit pending bone density results    Risk of osteonecrosis of the jaw (DISCUSSED) with bisphosphonates and denosumab, with incidence estimated from 1-10/372317 treated patients. The incidence of ONJ is higher in patients undergoing invasive dental surgery (excludes routine cleaning, fillings or root canals).   Dental health: Advised dental work should ideally be completed prior to initiation of treatment, follow up with dentist/oral surgeon advised.       Type 2 diabetes mellitus with chronic kidney disease, without long-term current use of insulin  - Diabetes is at goal, given current A1c, goal A1C for patient is 7%  - Diabetic supplies/medications: refilled this visit  - Continue reinforcement dietary modification, portion size control, decreasing carbohydrates intake  - continue Ozempic 1 mg Q weekly injection  - discussed with patient to use glipizide sparingly due to risk of hypoglycemia  - A1C in 6 mo  - Pt will see optometry and podiatry soon    CKD (chronic kidney disease) stage 3, GFR 30-59 ml/min  - Renal function reviewed on lab work today, stable   - continue routine monitoring        Advised patient to follow up with PCP for routine health maintenance care.   RTC in 6 month     Naveed Patel M.D  Endocrinology  [facility]  3/21/2022      Disclaimer: This note has been generated using voice-recognition software. There may be typographical errors that have been  missed during proof-reading.

## 2022-05-03 ENCOUNTER — LAB VISIT (OUTPATIENT)
Dept: LAB | Facility: HOSPITAL | Age: 72
End: 2022-05-03
Attending: HOSPITALIST
Payer: MEDICARE

## 2022-05-03 DIAGNOSIS — E11.59 CONTROLLED TYPE 2 DIABETES MELLITUS WITH OTHER CIRCULATORY COMPLICATION, WITHOUT LONG-TERM CURRENT USE OF INSULIN: ICD-10-CM

## 2022-05-03 DIAGNOSIS — N18.31 CHRONIC KIDNEY DISEASE, STAGE 3A: ICD-10-CM

## 2022-05-03 LAB
25(OH)D3+25(OH)D2 SERPL-MCNC: 63 NG/ML (ref 30–96)
ALBUMIN SERPL BCP-MCNC: 3.7 G/DL (ref 3.5–5.2)
ALP SERPL-CCNC: 87 U/L (ref 55–135)
ALT SERPL W/O P-5'-P-CCNC: 7 U/L (ref 10–44)
ANION GAP SERPL CALC-SCNC: 11 MMOL/L (ref 8–16)
AST SERPL-CCNC: 11 U/L (ref 10–40)
BILIRUB SERPL-MCNC: 0.9 MG/DL (ref 0.1–1)
BUN SERPL-MCNC: 15 MG/DL (ref 8–23)
CALCIUM SERPL-MCNC: 10.5 MG/DL (ref 8.7–10.5)
CHLORIDE SERPL-SCNC: 101 MMOL/L (ref 95–110)
CO2 SERPL-SCNC: 26 MMOL/L (ref 23–29)
CREAT SERPL-MCNC: 1.2 MG/DL (ref 0.5–1.4)
EST. GFR  (AFRICAN AMERICAN): 51.8 ML/MIN/1.73 M^2
EST. GFR  (NON AFRICAN AMERICAN): 44.9 ML/MIN/1.73 M^2
ESTIMATED AVG GLUCOSE: 114 MG/DL (ref 68–131)
GLUCOSE SERPL-MCNC: 167 MG/DL (ref 70–110)
HBA1C MFR BLD: 5.6 % (ref 4–5.6)
POTASSIUM SERPL-SCNC: 3.6 MMOL/L (ref 3.5–5.1)
PROT SERPL-MCNC: 7.4 G/DL (ref 6–8.4)
SODIUM SERPL-SCNC: 138 MMOL/L (ref 136–145)

## 2022-05-03 PROCEDURE — 82306 VITAMIN D 25 HYDROXY: CPT | Performed by: HOSPITALIST

## 2022-05-03 PROCEDURE — 36415 COLL VENOUS BLD VENIPUNCTURE: CPT | Mod: PO | Performed by: HOSPITALIST

## 2022-05-03 PROCEDURE — 80053 COMPREHEN METABOLIC PANEL: CPT | Performed by: HOSPITALIST

## 2022-05-03 PROCEDURE — 83036 HEMOGLOBIN GLYCOSYLATED A1C: CPT | Performed by: HOSPITALIST

## 2022-05-16 ENCOUNTER — INFUSION (OUTPATIENT)
Dept: INFUSION THERAPY | Facility: HOSPITAL | Age: 72
End: 2022-05-16
Attending: HOSPITALIST
Payer: MEDICARE

## 2022-05-16 VITALS
RESPIRATION RATE: 16 BRPM | DIASTOLIC BLOOD PRESSURE: 68 MMHG | HEART RATE: 63 BPM | SYSTOLIC BLOOD PRESSURE: 154 MMHG | OXYGEN SATURATION: 97 % | TEMPERATURE: 98 F

## 2022-05-16 DIAGNOSIS — M81.0 OSTEOPOROSIS, POST-MENOPAUSAL: Primary | ICD-10-CM

## 2022-05-16 PROCEDURE — 63600175 PHARM REV CODE 636 W HCPCS: Mod: JG | Performed by: HOSPITALIST

## 2022-05-16 PROCEDURE — 96372 THER/PROPH/DIAG INJ SC/IM: CPT

## 2022-05-16 RX ADMIN — DENOSUMAB 60 MG: 60 INJECTION SUBCUTANEOUS at 12:05

## 2022-06-06 ENCOUNTER — OFFICE VISIT (OUTPATIENT)
Dept: FAMILY MEDICINE | Facility: CLINIC | Age: 72
End: 2022-06-06
Payer: MEDICARE

## 2022-06-06 VITALS
HEART RATE: 85 BPM | DIASTOLIC BLOOD PRESSURE: 64 MMHG | WEIGHT: 139.31 LBS | HEIGHT: 65 IN | BODY MASS INDEX: 23.21 KG/M2 | OXYGEN SATURATION: 97 % | TEMPERATURE: 98 F | SYSTOLIC BLOOD PRESSURE: 118 MMHG

## 2022-06-06 DIAGNOSIS — D64.9 ANEMIA, UNSPECIFIED TYPE: ICD-10-CM

## 2022-06-06 DIAGNOSIS — E11.65 TYPE 2 DIABETES MELLITUS WITH HYPERGLYCEMIA, WITH LONG-TERM CURRENT USE OF INSULIN: ICD-10-CM

## 2022-06-06 DIAGNOSIS — M81.0 OSTEOPOROSIS, POST-MENOPAUSAL: ICD-10-CM

## 2022-06-06 DIAGNOSIS — N18.31 STAGE 3A CHRONIC KIDNEY DISEASE: ICD-10-CM

## 2022-06-06 DIAGNOSIS — I10 ESSENTIAL HYPERTENSION: ICD-10-CM

## 2022-06-06 DIAGNOSIS — I50.30 HEART FAILURE WITH PRESERVED EJECTION FRACTION, UNSPECIFIED HF CHRONICITY: ICD-10-CM

## 2022-06-06 DIAGNOSIS — I65.23 BILATERAL CAROTID ARTERY STENOSIS: ICD-10-CM

## 2022-06-06 DIAGNOSIS — I70.0 AORTIC ATHEROSCLEROSIS: ICD-10-CM

## 2022-06-06 DIAGNOSIS — J44.9 CHRONIC OBSTRUCTIVE PULMONARY DISEASE, UNSPECIFIED COPD TYPE: ICD-10-CM

## 2022-06-06 DIAGNOSIS — N18.31 TYPE 2 DIABETES MELLITUS WITH STAGE 3A CHRONIC KIDNEY DISEASE, WITH LONG-TERM CURRENT USE OF INSULIN: ICD-10-CM

## 2022-06-06 DIAGNOSIS — Z86.010 HISTORY OF COLONIC POLYPS: ICD-10-CM

## 2022-06-06 DIAGNOSIS — M46.99 UNSPECIFIED INFLAMMATORY SPONDYLOPATHY, MULTIPLE SITES IN SPINE: ICD-10-CM

## 2022-06-06 DIAGNOSIS — Z79.4 TYPE 2 DIABETES MELLITUS WITH HYPERGLYCEMIA, WITH LONG-TERM CURRENT USE OF INSULIN: ICD-10-CM

## 2022-06-06 DIAGNOSIS — E21.0 PRIMARY HYPERPARATHYROIDISM: ICD-10-CM

## 2022-06-06 DIAGNOSIS — J43.2 CENTRILOBULAR EMPHYSEMA: ICD-10-CM

## 2022-06-06 DIAGNOSIS — Z00.00 ROUTINE MEDICAL EXAM: Primary | ICD-10-CM

## 2022-06-06 DIAGNOSIS — Z12.31 ENCOUNTER FOR SCREENING MAMMOGRAM FOR BREAST CANCER: ICD-10-CM

## 2022-06-06 DIAGNOSIS — Z79.4 TYPE 2 DIABETES MELLITUS WITH STAGE 3A CHRONIC KIDNEY DISEASE, WITH LONG-TERM CURRENT USE OF INSULIN: ICD-10-CM

## 2022-06-06 DIAGNOSIS — N39.41 URINARY INCONTINENCE, URGE: ICD-10-CM

## 2022-06-06 DIAGNOSIS — E11.9 DM TYPE 2 WITHOUT RETINOPATHY: ICD-10-CM

## 2022-06-06 DIAGNOSIS — M47.816 LUMBAR ARTHROPATHY: ICD-10-CM

## 2022-06-06 DIAGNOSIS — I73.9 PVD (PERIPHERAL VASCULAR DISEASE): ICD-10-CM

## 2022-06-06 DIAGNOSIS — E11.22 TYPE 2 DIABETES MELLITUS WITH STAGE 3A CHRONIC KIDNEY DISEASE, WITH LONG-TERM CURRENT USE OF INSULIN: ICD-10-CM

## 2022-06-06 DIAGNOSIS — E78.5 HYPERLIPIDEMIA, UNSPECIFIED HYPERLIPIDEMIA TYPE: ICD-10-CM

## 2022-06-06 DIAGNOSIS — I73.9 PVD (PERIPHERAL VASCULAR DISEASE) WITH CLAUDICATION: ICD-10-CM

## 2022-06-06 DIAGNOSIS — F17.200 SMOKER: ICD-10-CM

## 2022-06-06 DIAGNOSIS — Z79.4 LONG-TERM INSULIN USE: ICD-10-CM

## 2022-06-06 PROCEDURE — 3072F PR LOW RISK FOR RETINOPATHY: ICD-10-PCS | Mod: CPTII,S$GLB,, | Performed by: INTERNAL MEDICINE

## 2022-06-06 PROCEDURE — 1101F PT FALLS ASSESS-DOCD LE1/YR: CPT | Mod: CPTII,S$GLB,, | Performed by: INTERNAL MEDICINE

## 2022-06-06 PROCEDURE — 99397 PR PREVENTIVE VISIT,EST,65 & OVER: ICD-10-PCS | Mod: GY,S$GLB,, | Performed by: INTERNAL MEDICINE

## 2022-06-06 PROCEDURE — 3078F DIAST BP <80 MM HG: CPT | Mod: CPTII,S$GLB,, | Performed by: INTERNAL MEDICINE

## 2022-06-06 PROCEDURE — 3288F FALL RISK ASSESSMENT DOCD: CPT | Mod: CPTII,S$GLB,, | Performed by: INTERNAL MEDICINE

## 2022-06-06 PROCEDURE — 99999 PR PBB SHADOW E&M-EST. PATIENT-LVL IV: CPT | Mod: PBBFAC,,, | Performed by: INTERNAL MEDICINE

## 2022-06-06 PROCEDURE — 3074F SYST BP LT 130 MM HG: CPT | Mod: CPTII,S$GLB,, | Performed by: INTERNAL MEDICINE

## 2022-06-06 PROCEDURE — 3008F BODY MASS INDEX DOCD: CPT | Mod: CPTII,S$GLB,, | Performed by: INTERNAL MEDICINE

## 2022-06-06 PROCEDURE — 3044F PR MOST RECENT HEMOGLOBIN A1C LEVEL <7.0%: ICD-10-PCS | Mod: CPTII,S$GLB,, | Performed by: INTERNAL MEDICINE

## 2022-06-06 PROCEDURE — 3044F HG A1C LEVEL LT 7.0%: CPT | Mod: CPTII,S$GLB,, | Performed by: INTERNAL MEDICINE

## 2022-06-06 PROCEDURE — 1126F PR PAIN SEVERITY QUANTIFIED, NO PAIN PRESENT: ICD-10-PCS | Mod: CPTII,S$GLB,, | Performed by: INTERNAL MEDICINE

## 2022-06-06 PROCEDURE — 1159F PR MEDICATION LIST DOCUMENTED IN MEDICAL RECORD: ICD-10-PCS | Mod: CPTII,S$GLB,, | Performed by: INTERNAL MEDICINE

## 2022-06-06 PROCEDURE — 3288F PR FALLS RISK ASSESSMENT DOCUMENTED: ICD-10-PCS | Mod: CPTII,S$GLB,, | Performed by: INTERNAL MEDICINE

## 2022-06-06 PROCEDURE — 99499 RISK ADDL DX/OHS AUDIT: ICD-10-PCS | Mod: S$GLB,,, | Performed by: INTERNAL MEDICINE

## 2022-06-06 PROCEDURE — 1159F MED LIST DOCD IN RCRD: CPT | Mod: CPTII,S$GLB,, | Performed by: INTERNAL MEDICINE

## 2022-06-06 PROCEDURE — 99499 UNLISTED E&M SERVICE: CPT | Mod: S$GLB,,, | Performed by: INTERNAL MEDICINE

## 2022-06-06 PROCEDURE — 1126F AMNT PAIN NOTED NONE PRSNT: CPT | Mod: CPTII,S$GLB,, | Performed by: INTERNAL MEDICINE

## 2022-06-06 PROCEDURE — 1101F PR PT FALLS ASSESS DOC 0-1 FALLS W/OUT INJ PAST YR: ICD-10-PCS | Mod: CPTII,S$GLB,, | Performed by: INTERNAL MEDICINE

## 2022-06-06 PROCEDURE — 3078F PR MOST RECENT DIASTOLIC BLOOD PRESSURE < 80 MM HG: ICD-10-PCS | Mod: CPTII,S$GLB,, | Performed by: INTERNAL MEDICINE

## 2022-06-06 PROCEDURE — 99999 PR PBB SHADOW E&M-EST. PATIENT-LVL IV: ICD-10-PCS | Mod: PBBFAC,,, | Performed by: INTERNAL MEDICINE

## 2022-06-06 PROCEDURE — 3008F PR BODY MASS INDEX (BMI) DOCUMENTED: ICD-10-PCS | Mod: CPTII,S$GLB,, | Performed by: INTERNAL MEDICINE

## 2022-06-06 PROCEDURE — 3072F LOW RISK FOR RETINOPATHY: CPT | Mod: CPTII,S$GLB,, | Performed by: INTERNAL MEDICINE

## 2022-06-06 PROCEDURE — 3074F PR MOST RECENT SYSTOLIC BLOOD PRESSURE < 130 MM HG: ICD-10-PCS | Mod: CPTII,S$GLB,, | Performed by: INTERNAL MEDICINE

## 2022-06-06 PROCEDURE — 99397 PER PM REEVAL EST PAT 65+ YR: CPT | Mod: GY,S$GLB,, | Performed by: INTERNAL MEDICINE

## 2022-06-06 RX ORDER — GLIPIZIDE 10 MG/1
TABLET ORAL
Qty: 30 TABLET | Refills: 3
Start: 2022-06-06 | End: 2023-02-06

## 2022-06-06 RX ORDER — AMLODIPINE BESYLATE 5 MG/1
5 TABLET ORAL DAILY
Qty: 90 TABLET | Refills: 3 | Status: SHIPPED | OUTPATIENT
Start: 2022-06-06 | End: 2023-09-14

## 2022-06-06 NOTE — TELEPHONE ENCOUNTER
Care Due:                  Date            Visit Type   Department     Provider  --------------------------------------------------------------------------------                                EP [facility]                              PRIMARY      MED/ INTERNAL  Venancio Barnett  Last Visit: 05-      CARE ([facility])   MED/ PEDS      Ehrensing  Next Visit: None Scheduled  None         None Found                                                            Last  Test          Frequency    Reason                     Performed    Due Date  --------------------------------------------------------------------------------    Office Visit  12 months..  irbesartan-hydrochlorothi  05- 04-                             azide, rosuvastatin......    Lipid Panel.  12 months..  rosuvastatin.............  02- 02-    Health [facility] Embedded Care Gaps. Reference number: [id number]. 6/06/2022   10:53:11 AM CDT

## 2022-06-06 NOTE — PROGRESS NOTES
Chief complaint: Physical exam,         71-year-old black female  whose PCP is retired.  Regarding health maintenance she is up-to-date on mammogram 6/21 She prefers to do a mammogram every two years.  It looks like she had colon polyps in 2013, 1 polyp 4/18--5 yrs.  She did quit smoking but then with the pandemic she resume.  She was able to stop on her own in the past.  We discussed the smoking cessation clinic but she was able to do it on her own in the past so she defers..    ROS:   CONST: weight stable. EYES: no vision change. ENT: no sore throat. CV: no chest pain w/ exertion. RESP: no shortness of breath. GI: no nausea, vomiting, diarrhea. No dysphagia. : no urinary issues. MUSCULOSKELETAL: no new myalgias or arthralgias. SKIN: no new changes. NEURO: no focal deficits. PSYCH: no new issues. ENDOCRINE: no polyuria. HEME: no lymph nodes. ALLERGY: no general pruritis.    Past Medical History   Diagnosis Date    Cataracts, bilateral     CKD (chronic kidney disease) stage 3, GFR 30-59 ml/min 12/15/2014    Combined hyperlipidemia associated with type 2 diabetes mellitus 6/7/2013    COPD (chronic obstructive pulmonary disease) 12/15/2014    Diabetes mellitus type II----with chronic kidney disease           Diabetes mellitus with renal manifestations, uncontrolled 2/1/2017    Diabetic retinopathy     Family history of stomach cancer 12/5/2013    H/O renal cell carcinoma 12/15/2015    History of colonic polyps 2/1/2017     3 polyps 7/13 ---1 polyp 4/18--5 yrs    History of gastroesophageal reflux (GERD)     History of urinary incontinence      s/p bladder [facility]-october[facility] ([facility]    Hyperlipidemia     Hypertension      120s/70s-80s    Nephrolithiasis     Osteoporosis, post-menopausal, evaluated by Dr. York, quit Fosamax due to CKD early 2017  3/17/2014    Primary hyperparathyroidism 10/20/2016    Schatzki's ring 2/1/2017     Dilated 2014   Prevnar 2017    Past Surgical History:   Procedure  Laterality Date    bladder lift  2011    [facility] at [facility]    BLADDER STONE REMOVAL  2011    before bladder lift    CATARACT EXTRACTION W/  INTRAOCULAR LENS IMPLANT Left 06/07/2016    Dr. Vásquez    CATARACT EXTRACTION W/  INTRAOCULAR LENS IMPLANT Right 06/21/2016    Dr. Vásquez    COLONOSCOPY N/A 4/26/2018    Procedure: COLONOSCOPY;  Surgeon: Benjamin Zamora MD;  Location: [facility];  Service: Endoscopy;  Laterality: N/A;  confirmed ss    CYSTOSCOPY      NAVIGATIONAL BRONCHOSCOPY N/A 12/11/2018    Procedure: BRONCHOSCOPY, NAVIGATIONAL;  Surgeon: Ashtyn Ramires MD;  Location: [facility] OR [address] [place] [address];  Service: Pulmonary;  Laterality: N/A;    NEPHRECTOMY      partial right     Social History     Socioeconomic History    Marital status:    Occupational History    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   Tobacco Use    Smoking status: Current Some Day Smoker     Packs/day: 0.25     Years: 30.00     Pack years: 7.50     Types: Cigarettes    Smokeless tobacco: Never Used    Tobacco comment: pt. reports quitting January 2018   Substance and Sexual Activity    Alcohol use: No     Alcohol/week: 0.0 standard drinks    Drug use: No   Social History Narrative    Lives on [facility]    Here with sister Kate [phone]         family history includes Cataracts in her mother; Colon cancer in her brother; Glaucoma in her mother; Nephrolithiasis in her sister; No Known Problems in her father, maternal aunt, maternal grandfather, maternal grandmother, maternal uncle, paternal aunt, paternal grandfather, paternal grandmother, and paternal uncle.     Gen: no distress  EYES: conjunctiva clear, non-icteric, PERRL  ENT: nose clear, nasal mucosa normal, oropharynx clear and moist, teeth good  NECK:supple, thyroid non-palpable  RESP: effort is good, lungs clear  CV: heart RRR w/o murmur, gallops or rubs; no carotid bruits, no edema at all today even at the ankles  GI: abdomen soft, non-distended, non-tender, no  hepatosplenomegaly  MS: gait normal, no clubbing or cyanosis of the digits  SKIN: no rashes, warm to touch    Sarina was seen today for establish care.    Diagnoses and all orders for this visit:    Routine medical exam,due for mammogram but wants to wait one more year and UTD on colonoscopy, work on smoking cessation                                                      Additional evaluation and management issues:    Additionally, patient has numerous other medical issues to address today.  She has hypertension which appears to be under good control and needs refills of her medication.  She does get some edema at the end of the day that is gone in the morning and I reassured her about that but she is concerned and on Norvasc 5 mg.  Blood pressure is excellent and we will discontinue and have her monitor.  She was on Diovan but due to availability issues we had switched her to irbesartan/HCTZ..     Regarding her chronic pain she does use 1 tramadol daily usually taking it in the middle of the morning and that allows her to exercise and carry on her daily functions.  Her use appears to be appropriate     She has diabetes which is still uncontrolled and apparently back to seeing Endocrine and her most recent A1c she says was 5.6, 6.3, 6.9, 6.4, 5.6.  She still on insulin and the Ozempic injection but off of the glyburide which I would agree with based upon her age and overall good control...  She is not taking metformin or glipizide or glimepiride so I took those off the list.  She says her sugars are running better.  She does take glipizide maybe once a month when she eats poorly.    She does have chronic renal insufficiency and follows with nephrology.  I explained her chronic kidney disease.  Her GFR is actually better.       Regarding osteoporosis.  Her prior local endocrinologist had some changes in the office and now she may have to pay over 200 dollars for her part of the infusion at the office per previously  she was getting an at the infusion center.  I have other patients with similar issues.  She is [facility] for her Prolia     She gets occasional reflux and was occasionally using zantac and will send omeprazole    She apparently is primary hyperparathyroidism and a history of hypercalcemia but all recent labs appear to be normal as well as PTH.  Thyroid ultrasound from 2016 did have a parathyroid adenoma that Endocrine may need to reassess      Vitamin D level also normal.  She apparently was on a weekly medication  for osteoporosis but d/c 2017 for CKD?  Bone density from prior reviewed noting slight decline in the hip     She has a history of COPD but no active symptoms lately.      She has hyperlipidemia and may been on Lipitor in the past with an unremembered intolerance.  We discussed benefits of statin and her LDL has responded to crestor .      All these issues reviewed and patient counseled an evaluation and management of all the separate issues we based upon medical decision making as below    4/18  Impression      Osteoporosis of total hip, femoral neck and lumbar spine.  Decrease in hip (-4.7%) and increase in lumbar spine (5%) since prior study.           Assessment and plan:          Type 2 diabetes mellitus with hyperglycemia, with long-term current use of insulin, chronic and stable    Type 2 diabetes mellitus with stage 3a chronic kidney disease, with long-term current use of insulin    DM type 2 without retinopathy    Essential hypertension, chronic and stable    Primary hyperparathyroidism, chronic and stable    Stage 3a chronic kidney disease, chronic and stable    Osteoporosis, post-menopausal, manage per Endocrine    Chronic obstructive pulmonary disease, unspecified COPD type    Long-term insulin use    Anemia, unspecified type, resolved previously    Hyperlipidemia, unspecified hyperlipidemia type, chronic and stable    PVD (peripheral vascular disease) with  claudication, patient reports seeing vascular put on aspirin and as long as she take it she can do her walking without claudication    Aortic atherosclerosis    PVD (peripheral vascular disease)    Centrilobular emphysema, continues to smoke a little bit    Lumbar arthropathy    Bilateral carotid artery stenosis    Unspecified inflammatory spondylopathy, multiple sites in spine    Heart failure with preserved ejection fraction, unspecified HF chronicity    Smoker    Other orders  -     amLODIPine (NORVASC) 5 MG tablet; Take 1 tablet (5 mg total) by mouth once daily.  -     glipiZIDE (GLUCOTROL) 10 MG tablet; 1 prn when eats too much carbs, about 1x a mo

## 2022-06-08 RX ORDER — ROSUVASTATIN CALCIUM 20 MG/1
TABLET, COATED ORAL
Qty: 90 TABLET | Refills: 12 | Status: SHIPPED | OUTPATIENT
Start: 2022-06-08 | End: 2023-09-29

## 2022-06-08 RX ORDER — OXYBUTYNIN CHLORIDE 5 MG/1
TABLET, EXTENDED RELEASE ORAL
Qty: 90 TABLET | Refills: 12 | Status: SHIPPED | OUTPATIENT
Start: 2022-06-08 | End: 2023-07-12

## 2022-06-09 ENCOUNTER — HOSPITAL ENCOUNTER (OUTPATIENT)
Dept: RADIOLOGY | Facility: CLINIC | Age: 72
Discharge: HOME | End: 2022-06-09
Attending: HOSPITALIST
Payer: MEDICARE

## 2022-06-09 DIAGNOSIS — M81.0 OSTEOPOROSIS, POST-MENOPAUSAL: ICD-10-CM

## 2022-06-09 PROCEDURE — 77080 DXA BONE DENSITY AXIAL: CPT | Mod: TC,PO

## 2022-06-09 PROCEDURE — 77080 DEXA BONE DENSITY SPINE HIP: ICD-10-PCS | Mod: 26,,, | Performed by: INTERNAL MEDICINE

## 2022-06-09 PROCEDURE — 77080 DXA BONE DENSITY AXIAL: CPT | Mod: 26,,, | Performed by: INTERNAL MEDICINE

## 2022-08-05 DIAGNOSIS — R52 PAIN: ICD-10-CM

## 2022-08-05 RX ORDER — TRAMADOL HYDROCHLORIDE 50 MG/1
50 TABLET ORAL EVERY 6 HOURS PRN
Qty: 120 TABLET | Refills: 3 | Status: SHIPPED | OUTPATIENT
Start: 2022-08-05 | End: 2022-11-10

## 2022-08-05 NOTE — TELEPHONE ENCOUNTER
Care Due:                  Date            Visit Type   Department     Provider  --------------------------------------------------------------------------------                                EP [facility]                              PRIMARY      MED/ INTERNAL  Venancio Barnett  Last Visit: 06-      CARE ([facility])   MED/ PEDS      Ehrensing  Next Visit: None Scheduled  None         None Found                                                            Last  Test          Frequency    Reason                     Performed    Due Date  --------------------------------------------------------------------------------    HBA1C.......  6 months...  glipiZIDE................  05-   10-    Lipid Panel.  12 months..  rosuvastatin.............  02- 02-    Health [facility] Embedded Care Gaps. Reference number: [id number]. 8/05/2022   11:58:13 AM CDT

## 2022-08-05 NOTE — TELEPHONE ENCOUNTER
----- Message from Sammie Stuart sent at 8/5/2022 11:46 AM CDT -----  Regarding: self .[phone]  .Type: RX Refill Request    Who Called: self     Have you contacted your pharmacy: yes     Refill or New Rx: refill     RX Name and Strength: traMADoL (ULTRAM) 50 mg tablet    Preferred Pharmacy with phone number .  [facility] [place] [address] [facility] [facility] [address]  Phone: [phone] Fax: [phone]    Local or Mail Order: local    Would the patient rather a call back or a response via [facility]? Call back     Best Call Back Number .[phone]

## 2022-08-09 ENCOUNTER — TELEPHONE (OUTPATIENT)
Dept: PULMONOLOGY | Facility: HOSPITAL | Age: 72
End: 2022-08-09
Payer: MEDICARE

## 2022-08-09 DIAGNOSIS — R91.1 SOLITARY PULMONARY NODULE: ICD-10-CM

## 2022-08-09 NOTE — TELEPHONE ENCOUNTER
----- Message from Licha Riojas MA sent at 8/9/2022  9:35 AM CDT -----  Dr Ramires: Can you please put in a ct scan? Thanks, Licha

## 2022-09-07 ENCOUNTER — OFFICE VISIT (OUTPATIENT)
Dept: NEPHROLOGY | Facility: CLINIC | Age: 72
End: 2022-09-07
Payer: MEDICARE

## 2022-09-07 VITALS
WEIGHT: 136.69 LBS | BODY MASS INDEX: 23.34 KG/M2 | SYSTOLIC BLOOD PRESSURE: 142 MMHG | HEIGHT: 64 IN | DIASTOLIC BLOOD PRESSURE: 80 MMHG

## 2022-09-07 DIAGNOSIS — I10 PRIMARY HYPERTENSION: ICD-10-CM

## 2022-09-07 DIAGNOSIS — N18.31 STAGE 3A CHRONIC KIDNEY DISEASE: Primary | ICD-10-CM

## 2022-09-07 DIAGNOSIS — E11.9 TYPE 2 DIABETES MELLITUS WITHOUT COMPLICATION: ICD-10-CM

## 2022-09-07 PROCEDURE — 3066F NEPHROPATHY DOC TX: CPT | Mod: CPTII,S$GLB,, | Performed by: INTERNAL MEDICINE

## 2022-09-07 PROCEDURE — 1159F MED LIST DOCD IN RCRD: CPT | Mod: CPTII,S$GLB,, | Performed by: INTERNAL MEDICINE

## 2022-09-07 PROCEDURE — 3077F PR MOST RECENT SYSTOLIC BLOOD PRESSURE >= 140 MM HG: ICD-10-PCS | Mod: CPTII,S$GLB,, | Performed by: INTERNAL MEDICINE

## 2022-09-07 PROCEDURE — 3044F PR MOST RECENT HEMOGLOBIN A1C LEVEL <7.0%: ICD-10-PCS | Mod: CPTII,S$GLB,, | Performed by: INTERNAL MEDICINE

## 2022-09-07 PROCEDURE — 1159F PR MEDICATION LIST DOCUMENTED IN MEDICAL RECORD: ICD-10-PCS | Mod: CPTII,S$GLB,, | Performed by: INTERNAL MEDICINE

## 2022-09-07 PROCEDURE — 3072F PR LOW RISK FOR RETINOPATHY: ICD-10-PCS | Mod: CPTII,S$GLB,, | Performed by: INTERNAL MEDICINE

## 2022-09-07 PROCEDURE — 99999 PR PBB SHADOW E&M-EST. PATIENT-LVL III: CPT | Mod: PBBFAC,,, | Performed by: INTERNAL MEDICINE

## 2022-09-07 PROCEDURE — 99499 UNLISTED E&M SERVICE: CPT | Mod: S$GLB,,, | Performed by: INTERNAL MEDICINE

## 2022-09-07 PROCEDURE — 3008F PR BODY MASS INDEX (BMI) DOCUMENTED: ICD-10-PCS | Mod: CPTII,S$GLB,, | Performed by: INTERNAL MEDICINE

## 2022-09-07 PROCEDURE — 3008F BODY MASS INDEX DOCD: CPT | Mod: CPTII,S$GLB,, | Performed by: INTERNAL MEDICINE

## 2022-09-07 PROCEDURE — 3288F FALL RISK ASSESSMENT DOCD: CPT | Mod: CPTII,S$GLB,, | Performed by: INTERNAL MEDICINE

## 2022-09-07 PROCEDURE — 3079F DIAST BP 80-89 MM HG: CPT | Mod: CPTII,S$GLB,, | Performed by: INTERNAL MEDICINE

## 2022-09-07 PROCEDURE — 99999 PR PBB SHADOW E&M-EST. PATIENT-LVL III: ICD-10-PCS | Mod: PBBFAC,,, | Performed by: INTERNAL MEDICINE

## 2022-09-07 PROCEDURE — 3079F PR MOST RECENT DIASTOLIC BLOOD PRESSURE 80-89 MM HG: ICD-10-PCS | Mod: CPTII,S$GLB,, | Performed by: INTERNAL MEDICINE

## 2022-09-07 PROCEDURE — 1101F PR PT FALLS ASSESS DOC 0-1 FALLS W/OUT INJ PAST YR: ICD-10-PCS | Mod: CPTII,S$GLB,, | Performed by: INTERNAL MEDICINE

## 2022-09-07 PROCEDURE — 3288F PR FALLS RISK ASSESSMENT DOCUMENTED: ICD-10-PCS | Mod: CPTII,S$GLB,, | Performed by: INTERNAL MEDICINE

## 2022-09-07 PROCEDURE — 3072F LOW RISK FOR RETINOPATHY: CPT | Mod: CPTII,S$GLB,, | Performed by: INTERNAL MEDICINE

## 2022-09-07 PROCEDURE — 1126F PR PAIN SEVERITY QUANTIFIED, NO PAIN PRESENT: ICD-10-PCS | Mod: CPTII,S$GLB,, | Performed by: INTERNAL MEDICINE

## 2022-09-07 PROCEDURE — 1101F PT FALLS ASSESS-DOCD LE1/YR: CPT | Mod: CPTII,S$GLB,, | Performed by: INTERNAL MEDICINE

## 2022-09-07 PROCEDURE — 3044F HG A1C LEVEL LT 7.0%: CPT | Mod: CPTII,S$GLB,, | Performed by: INTERNAL MEDICINE

## 2022-09-07 PROCEDURE — 99215 OFFICE O/P EST HI 40 MIN: CPT | Mod: S$GLB,,, | Performed by: INTERNAL MEDICINE

## 2022-09-07 PROCEDURE — 99215 PR OFFICE/OUTPT VISIT, EST, LEVL V, 40-54 MIN: ICD-10-PCS | Mod: S$GLB,,, | Performed by: INTERNAL MEDICINE

## 2022-09-07 PROCEDURE — 3077F SYST BP >= 140 MM HG: CPT | Mod: CPTII,S$GLB,, | Performed by: INTERNAL MEDICINE

## 2022-09-07 PROCEDURE — 1126F AMNT PAIN NOTED NONE PRSNT: CPT | Mod: CPTII,S$GLB,, | Performed by: INTERNAL MEDICINE

## 2022-09-07 PROCEDURE — 99499 RISK ADDL DX/OHS AUDIT: ICD-10-PCS | Mod: S$GLB,,, | Performed by: INTERNAL MEDICINE

## 2022-09-07 PROCEDURE — 3066F PR DOCUMENTATION OF TREATMENT FOR NEPHROPATHY: ICD-10-PCS | Mod: CPTII,S$GLB,, | Performed by: INTERNAL MEDICINE

## 2022-09-07 NOTE — PROGRESS NOTES
CHIEF COMPLAINT/HPI: Sarina is a 72 y.o. woman with DM, HTN,COPD, partial Rt nephrectomy  (renal cell carcinoma) 2014 , GERD, presenting for follow up of chronic kidney disease.      Past Medical History:   Diagnosis Date    Anticoagulant long-term use     Cancer     Kidney (Right)    Cataracts, bilateral     CKD (chronic kidney disease) stage 3, GFR 30-59 ml/min 12/15/2014    Colon polyps     Combined hyperlipidemia associated with type 2 diabetes mellitus 6/7/2013    COPD (chronic obstructive pulmonary disease) 12/15/2014    Diabetes mellitus type II     NIDDM, a.m. glucose-120s-130s-last A1c-7.1-6/7/13    Diabetes mellitus with renal manifestations, uncontrolled 2/1/2017    Diabetic retinopathy     Family history of stomach cancer 12/5/2013    Former smoker     H/O renal cell carcinoma 12/15/2015    History of colonic polyps 2/1/2017    3 polyps 7/13 ---5 yrs    History of gastroesophageal reflux (GERD)     History of urinary incontinence     s/p bladder lift-october, 2011 (at [facility])    Hyperlipidemia     Hypertension     120s/70s-80s    Nephrolithiasis     Osteoarthritis of thumb 12/20/2019    Osteoporosis, post-menopausal 3/17/2014    Primary hyperparathyroidism 10/20/2016    Schatzki's ring 2/1/2017    Dilated 2014       Sarina reports that she has been smoking cigarettes. She has a 7.50 pack-year smoking history. She has never used smokeless tobacco. She reports that she does not drink alcohol and does not use drugs.    Family History   Problem Relation Age of Onset    Glaucoma Mother     Cataracts Mother     No Known Problems Father     Colon cancer Brother     Nephrolithiasis Sister     No Known Problems Maternal Aunt     No Known Problems Maternal Uncle     No Known Problems Paternal Aunt     No Known Problems Paternal Uncle     No Known Problems Maternal Grandmother     No Known Problems Maternal Grandfather     No Known Problems Paternal Grandmother     No Known Problems Paternal Grandfather      Anesthesia problems Neg Hx     Kidney cancer Neg Hx     Amblyopia Neg Hx     Blindness Neg Hx     Cancer Neg Hx     Diabetes Neg Hx     Hypertension Neg Hx     Macular degeneration Neg Hx     Retinal detachment Neg Hx     Strabismus Neg Hx     Stroke Neg Hx     Thyroid disease Neg Hx        ROS:    Review of Systems   Constitutional:  Negative for activity change, appetite change, chills, fever and unexpected weight change.   HENT:  Negative for congestion, ear discharge, hearing loss, nosebleeds, sore throat and tinnitus.    Eyes:  Negative for photophobia, pain, redness and visual disturbance.   Respiratory:  Negative for cough, chest tightness, shortness of breath and wheezing.    Cardiovascular:  Negative for chest pain, palpitations and leg swelling.   Gastrointestinal:  Negative for abdominal distention, constipation, diarrhea, nausea and vomiting.   Endocrine: Negative for cold intolerance, heat intolerance, polydipsia and polyuria.   Genitourinary:  Negative for decreased urine volume, difficulty urinating, dysuria, flank pain and hematuria.   Musculoskeletal:  Negative for arthralgias, gait problem, joint swelling and neck stiffness.   Skin:  Negative for rash.   Neurological:  Negative for dizziness, tremors, weakness, numbness and headaches.   Psychiatric/Behavioral:  Negative for confusion and sleep disturbance.        PE:    There were no vitals filed for this visit.    Physical Exam  Constitutional:       General: She is not in acute distress.     Appearance: Normal appearance. She is normal weight.   HENT:      Head: Normocephalic and atraumatic.      Nose: Nose normal.      Mouth/Throat:      Mouth: Mucous membranes are moist.      Pharynx: Oropharynx is clear. No oropharyngeal exudate or posterior oropharyngeal erythema.   Eyes:      Extraocular Movements: Extraocular movements intact.      Conjunctiva/sclera: Conjunctivae normal.      Pupils: Pupils are equal, round, and reactive to light.    Cardiovascular:      Rate and Rhythm: Normal rate and regular rhythm.      Pulses: Normal pulses.      Heart sounds: Normal heart sounds. No murmur heard.    No friction rub. No gallop.   Pulmonary:      Effort: Pulmonary effort is normal. No respiratory distress.      Breath sounds: Normal breath sounds. No stridor. No wheezing or rales.   Abdominal:      General: Abdomen is flat. Bowel sounds are normal.      Palpations: Abdomen is soft.      Tenderness: There is no abdominal tenderness. There is no guarding or rebound.   Musculoskeletal:         General: No swelling. Normal range of motion.      Cervical back: Normal range of motion and neck supple.      Right lower leg: No edema.      Left lower leg: No edema.   Skin:     General: Skin is warm.      Coloration: Skin is not jaundiced or pale.      Findings: No lesion.   Neurological:      General: No focal deficit present.      Mental Status: She is alert and oriented to person, place, and time.      Motor: No weakness.   Psychiatric:         Mood and Affect: Mood normal.         Impression/Plan:    No diagnosis found.  # CKD3a :  due to HTN , DM and partial nephrectomy ,  -counseled on BP and glycemic control  -counseled on hydration and avoiding NSAID's  -labs        Lab Results   Component Value Date    CREATININE 1.2 05/03/2022     Protein Creatinine Ratios:    Prot/Creat Ratio, Urine   Date Value Ref Range Status   09/14/2020 0.06 0.00 - 0.20 Final   01/31/2020 0.04 0.00 - 0.20 Final   08/08/2018 0.07 0.00 - 0.20 Final       Acid-Base:   Lab Results   Component Value Date     05/03/2022    K 3.6 05/03/2022    CO2 26 05/03/2022     #HTN: Blood pressures , mildly elevated, however patient ( who was also a patient today at 1pm). Stated that BP at home is always normal . Will keep monitoring .    # Renal osteodystrophy:   Lab Results   Component Value Date    PTH 73.0 01/31/2020    CALCIUM 10.5 05/03/2022    PHOS 2.9 11/08/2021       # Anemia:   Lab  Results   Component Value Date    HGB 14.7 02/13/2021        # DM:  Last HbA1C   Lab Results   Component Value Date    HGBA1C 5.6 05/03/2022       # Lipid management:   Lab Results   Component Value Date    LDLCALC 66.2 02/13/2021       # ESRD planing: none     Follow up in 4-6m

## 2022-09-12 ENCOUNTER — PATIENT OUTREACH (OUTPATIENT)
Dept: ADMINISTRATIVE | Facility: HOSPITAL | Age: 72
End: 2022-09-12
Payer: MEDICARE

## 2022-09-29 ENCOUNTER — HOSPITAL ENCOUNTER (OUTPATIENT)
Dept: RADIOLOGY | Facility: HOSPITAL | Age: 72
Discharge: HOME | End: 2022-09-29
Attending: INTERNAL MEDICINE
Payer: MEDICARE

## 2022-09-29 ENCOUNTER — OFFICE VISIT (OUTPATIENT)
Dept: PULMONOLOGY | Facility: CLINIC | Age: 72
End: 2022-09-29
Payer: MEDICARE

## 2022-09-29 VITALS
SYSTOLIC BLOOD PRESSURE: 126 MMHG | HEIGHT: 64 IN | DIASTOLIC BLOOD PRESSURE: 70 MMHG | BODY MASS INDEX: 23.37 KG/M2 | HEART RATE: 66 BPM | WEIGHT: 136.88 LBS | OXYGEN SATURATION: 99 %

## 2022-09-29 DIAGNOSIS — R91.1 SOLITARY PULMONARY NODULE: ICD-10-CM

## 2022-09-29 DIAGNOSIS — R91.1 RIGHT LOWER LOBE PULMONARY NODULE: ICD-10-CM

## 2022-09-29 DIAGNOSIS — R91.1 NODULE OF LOWER LOBE OF RIGHT LUNG: Primary | ICD-10-CM

## 2022-09-29 DIAGNOSIS — J43.2 CENTRILOBULAR EMPHYSEMA: ICD-10-CM

## 2022-09-29 PROCEDURE — 3288F PR FALLS RISK ASSESSMENT DOCUMENTED: ICD-10-PCS | Mod: CPTII,S$GLB,, | Performed by: INTERNAL MEDICINE

## 2022-09-29 PROCEDURE — 3044F PR MOST RECENT HEMOGLOBIN A1C LEVEL <7.0%: ICD-10-PCS | Mod: CPTII,S$GLB,, | Performed by: INTERNAL MEDICINE

## 2022-09-29 PROCEDURE — 3066F PR DOCUMENTATION OF TREATMENT FOR NEPHROPATHY: ICD-10-PCS | Mod: CPTII,S$GLB,, | Performed by: INTERNAL MEDICINE

## 2022-09-29 PROCEDURE — 3072F LOW RISK FOR RETINOPATHY: CPT | Mod: CPTII,S$GLB,, | Performed by: INTERNAL MEDICINE

## 2022-09-29 PROCEDURE — 1160F RVW MEDS BY RX/DR IN RCRD: CPT | Mod: CPTII,S$GLB,, | Performed by: INTERNAL MEDICINE

## 2022-09-29 PROCEDURE — 1126F AMNT PAIN NOTED NONE PRSNT: CPT | Mod: CPTII,S$GLB,, | Performed by: INTERNAL MEDICINE

## 2022-09-29 PROCEDURE — 3078F PR MOST RECENT DIASTOLIC BLOOD PRESSURE < 80 MM HG: ICD-10-PCS | Mod: CPTII,S$GLB,, | Performed by: INTERNAL MEDICINE

## 2022-09-29 PROCEDURE — 3044F HG A1C LEVEL LT 7.0%: CPT | Mod: CPTII,S$GLB,, | Performed by: INTERNAL MEDICINE

## 2022-09-29 PROCEDURE — 71250 CT THORAX DX C-: CPT | Mod: 26,,, | Performed by: RADIOLOGY

## 2022-09-29 PROCEDURE — 3074F PR MOST RECENT SYSTOLIC BLOOD PRESSURE < 130 MM HG: ICD-10-PCS | Mod: CPTII,S$GLB,, | Performed by: INTERNAL MEDICINE

## 2022-09-29 PROCEDURE — 1126F PR PAIN SEVERITY QUANTIFIED, NO PAIN PRESENT: ICD-10-PCS | Mod: CPTII,S$GLB,, | Performed by: INTERNAL MEDICINE

## 2022-09-29 PROCEDURE — 99214 PR OFFICE/OUTPT VISIT, EST, LEVL IV, 30-39 MIN: ICD-10-PCS | Mod: S$GLB,,, | Performed by: INTERNAL MEDICINE

## 2022-09-29 PROCEDURE — 71250 CT CHEST WITHOUT CONTRAST: ICD-10-PCS | Mod: 26,,, | Performed by: RADIOLOGY

## 2022-09-29 PROCEDURE — 3008F PR BODY MASS INDEX (BMI) DOCUMENTED: ICD-10-PCS | Mod: CPTII,S$GLB,, | Performed by: INTERNAL MEDICINE

## 2022-09-29 PROCEDURE — 71250 CT THORAX DX C-: CPT | Mod: TC

## 2022-09-29 PROCEDURE — 3078F DIAST BP <80 MM HG: CPT | Mod: CPTII,S$GLB,, | Performed by: INTERNAL MEDICINE

## 2022-09-29 PROCEDURE — 99999 PR PBB SHADOW E&M-EST. PATIENT-LVL V: CPT | Mod: PBBFAC,,, | Performed by: INTERNAL MEDICINE

## 2022-09-29 PROCEDURE — 3066F NEPHROPATHY DOC TX: CPT | Mod: CPTII,S$GLB,, | Performed by: INTERNAL MEDICINE

## 2022-09-29 PROCEDURE — 3074F SYST BP LT 130 MM HG: CPT | Mod: CPTII,S$GLB,, | Performed by: INTERNAL MEDICINE

## 2022-09-29 PROCEDURE — 3008F BODY MASS INDEX DOCD: CPT | Mod: CPTII,S$GLB,, | Performed by: INTERNAL MEDICINE

## 2022-09-29 PROCEDURE — 1160F PR REVIEW ALL MEDS BY PRESCRIBER/CLIN PHARMACIST DOCUMENTED: ICD-10-PCS | Mod: CPTII,S$GLB,, | Performed by: INTERNAL MEDICINE

## 2022-09-29 PROCEDURE — 99214 OFFICE O/P EST MOD 30 MIN: CPT | Mod: S$GLB,,, | Performed by: INTERNAL MEDICINE

## 2022-09-29 PROCEDURE — 1159F MED LIST DOCD IN RCRD: CPT | Mod: CPTII,S$GLB,, | Performed by: INTERNAL MEDICINE

## 2022-09-29 PROCEDURE — 1101F PT FALLS ASSESS-DOCD LE1/YR: CPT | Mod: CPTII,S$GLB,, | Performed by: INTERNAL MEDICINE

## 2022-09-29 PROCEDURE — 3072F PR LOW RISK FOR RETINOPATHY: ICD-10-PCS | Mod: CPTII,S$GLB,, | Performed by: INTERNAL MEDICINE

## 2022-09-29 PROCEDURE — 3288F FALL RISK ASSESSMENT DOCD: CPT | Mod: CPTII,S$GLB,, | Performed by: INTERNAL MEDICINE

## 2022-09-29 PROCEDURE — 1159F PR MEDICATION LIST DOCUMENTED IN MEDICAL RECORD: ICD-10-PCS | Mod: CPTII,S$GLB,, | Performed by: INTERNAL MEDICINE

## 2022-09-29 PROCEDURE — 99999 PR PBB SHADOW E&M-EST. PATIENT-LVL V: ICD-10-PCS | Mod: PBBFAC,,, | Performed by: INTERNAL MEDICINE

## 2022-09-29 PROCEDURE — 1101F PR PT FALLS ASSESS DOC 0-1 FALLS W/OUT INJ PAST YR: ICD-10-PCS | Mod: CPTII,S$GLB,, | Performed by: INTERNAL MEDICINE

## 2022-09-30 NOTE — PROGRESS NOTES
"Subjective:       Patient ID: Sarina Genao is a 72 y.o. female.    Chief Complaint: COPD and Pulmonary Nodules    72 year old with a history of copd and is a current smoker for which she has yearly imaging.  Previously had a RLL nodule that was reported as stable, now growing.  Patient states that she has changed her diet due to DM and has intentional weight loss.  Denies chronic respiratory symptoms.      Review of Systems  otherwise negative.   Objective:      Vitals:    09/29/22 1032   BP: 126/70   BP Location: Right arm   Patient Position: Sitting   BP Method: Medium (Manual)   Pulse: 66   SpO2: 99%   Weight: 62.1 kg (136 lb 14.5 oz)   Height: 5' 4" (1.626 m)      Physical Exam   Constitutional: She is oriented to person, place, and time. She appears well-developed and well-nourished.   HENT:   Head: Normocephalic.   Cardiovascular: Normal rate and regular rhythm.   Pulmonary/Chest: Normal expansion and symmetric chest wall expansion.   Lymphadenopathy: No supraclavicular adenopathy is present.   Neurological: She is alert and oriented to person, place, and time.   Skin: Skin is warm.   Personal Diagnostic Review  CT of chest without contrast with emphysema and stable nodules on left.    Enlarging RLL nodule 0.7--->1.5 cm.    No flowsheet data found.      Assessment:       1. Nodule of lower lobe of right lung    2. Centrilobular emphysema          Outpatient Encounter Medications as of 9/29/2022   Medication Sig Dispense Refill    albuterol (PROVENTIL/VENTOLIN HFA) 90 mcg/actuation inhaler Inhale 2 puffs into the lungs every 6 (six) hours as needed for Wheezing or Shortness of Breath (rescue and prevention). 18 g 11    albuterol-ipratropium (DUO-NEB) 2.5 mg-0.5 mg/3 mL nebulizer solution INHALE content of ONE vial BY MOUTH via nebulizer EVERY 6 HOURS AS NEEDED FOR WHEEZING OR shortness of breath. rescue 270 mL 12    amLODIPine (NORVASC) 5 MG tablet Take 1 tablet (5 mg total) by mouth once daily. 90 " "tablet 3    aspirin (ECOTRIN) 81 MG EC tablet Take 81 mg by mouth once daily.      cyanocobalamin, vitamin B-12, 1,000 mcg TbSR Take by mouth once daily.       DOCOSAHEXANOIC ACID/EPA (FISH OIL ORAL) Take 1,200 mg by mouth once daily.      famotidine (PEPCID) 40 MG tablet TAKE ONE TABLET BY MOUTH ONCE A DAY 90 tablet 0    glipiZIDE (GLUCOTROL) 10 MG tablet 1 prn when eats too much carbs, about 1x a mo 30 tablet 3    irbesartan-hydrochlorothiazide (AVALIDE) 150-12.5 mg per tablet TAKE ONE TABLET BY MOUTH ONCE A DAY 90 tablet 12    oxybutynin (DITROPAN-XL) 5 MG TR24 TAKE ONE TABLET BY MOUTH ONCE A DAY 90 tablet 12    OZEMPIC 1 mg/dose (4 mg/3 mL) Inject 1 mg into the skin once a week. 90 days supply 3 pen 4    pen needle, diabetic (BD INSULIN PEN NEEDLE UF SHORT) 31 gauge x 5/16" Ndle USE AS DIRECTED 100 each 12    rosuvastatin (CRESTOR) 20 MG tablet TAKE ONE TABLET BY MOUTH ONCE A DAY 90 tablet 12    sars-cov-2, covid-19, (MODERNA COVID-19) 100 mcg/0.5 ml injection       traMADoL (ULTRAM) 50 mg tablet Take 1 tablet (50 mg total) by mouth every 6 (six) hours as needed. 120 tablet 3    varenicline (CHANTIX STARTING MONTH BOX) 0.5 mg (11)- 1 mg (42) tablet Take one 0.5mg tab by mouth once daily X3 days,then increase to one 0.5mg tab twice daily X4 days,then increase to one 1mg tab twice daily 1 Package 0    varenicline (CHANTIX) 1 mg Tab Take 1 tablet (1 mg total) by mouth 2 (two) times daily. 60 tablet 2    vitamin D (VITAMIN D3) 1000 units Tab Take 1,000 Units by mouth once daily.      [DISCONTINUED] umeclidinium-vilanteroL (ANORO ELLIPTA) 62.5-25 mcg/actuation DsDv Inhale 1 puff into the lungs once daily. THIS SHOULD BE USED, NOT INCRUSE 3 each 3    FLUAD 5243-5129, 65 YR UP,,PF, 45 mcg (15 mcg x 3)/0.5 mL Syrg inject .5 milliliter intramuscularly as directed  0    FLUZONE HIGHDOSE QUAD 20-21  mcg/0.7 mL Syrg inj .7 milliliter intramuscularly as directed      omeprazole (PRILOSEC) 40 MG capsule Take 1 capsule " (40 mg total) by mouth once daily. 90 capsule 4    umeclidinium-vilanteroL (ANORO ELLIPTA) 62.5-25 mcg/actuation DsDv Inhale 1 puff into the lungs once daily. THIS SHOULD BE USED, NOT INCRUSE 3 each 3     No facility-administered encounter medications on file as of 9/29/2022.     Orders Placed This Encounter   Procedures    Full code     Standing Status:   Standing     Number of Occurrences:   1    Pulse Oximetry Q4H     Standing Status:   Standing     Number of Occurrences:   21    Case Request Operating Room: ROBOTIC BRONCHOSCOPY     Standing Status:   Standing     Number of Occurrences:   1     Order Specific Question:   Medical Necessity:     Answer:   Medically Urgent [101]     Order Specific Question:   CPT Code:     Answer:   NV BRONCH W/ EBUS, DIAG OR THERA INTERVENTION PERIPHERAL LESION(S), INCL GUID, ADD ON CODE [84656]     Order Specific Question:   CPT Code:     Answer:   NV BRONCH W/ EBUS, SAMPLING 3 OR MORE NODES, INCL GUIDE [39270]     Order Specific Question:   CPT Code:     Answer:   NV BRONCHOSCOPY,TRANSBRON ASPIR BX [10130]     Order Specific Question:   Post-Procedure Disposition:     Answer:   Amb Surgery/DOSC [2]     Order Specific Question:   Is an on-site pathologist required for this procedure?     Answer:   Frozen Section     Plan:     Problem List Items Addressed This Visit       Centrilobular emphysema    Overview     Smoking cessation discussed.  Continue anoro daily and albuterol for rescue and prevention.         Relevant Medications    umeclidinium-vilanteroL (ANORO ELLIPTA) 62.5-25 mcg/actuation DsDv     Other Visit Diagnoses       Nodule of lower lobe of right lung    -  Primary    Relevant Orders    Pulse Oximetry Q4H    Case Request Operating Room: ROBOTIC BRONCHOSCOPY (Completed)    Full code

## 2022-09-30 NOTE — H&P (VIEW-ONLY)
"Subjective:       Patient ID: Sarina Genao is a 72 y.o. female.    Chief Complaint: COPD and Pulmonary Nodules    72 year old with a history of copd and is a current smoker for which she has yearly imaging.  Previously had a RLL nodule that was reported as stable, now growing.  Patient states that she has changed her diet due to DM and has intentional weight loss.  Denies chronic respiratory symptoms.      Review of Systems  otherwise negative.   Objective:      Vitals:    09/29/22 1032   BP: 126/70   BP Location: Right arm   Patient Position: Sitting   BP Method: Medium (Manual)   Pulse: 66   SpO2: 99%   Weight: 62.1 kg (136 lb 14.5 oz)   Height: 5' 4" (1.626 m)      Physical Exam   Constitutional: She is oriented to person, place, and time. She appears well-developed and well-nourished.   HENT:   Head: Normocephalic.   Cardiovascular: Normal rate and regular rhythm.   Pulmonary/Chest: Normal expansion and symmetric chest wall expansion.   Lymphadenopathy: No supraclavicular adenopathy is present.   Neurological: She is alert and oriented to person, place, and time.   Skin: Skin is warm.   Personal Diagnostic Review  CT of chest without contrast with emphysema and stable nodules on left.    Enlarging RLL nodule 0.7--->1.5 cm.    No flowsheet data found.      Assessment:       1. Nodule of lower lobe of right lung    2. Centrilobular emphysema          Outpatient Encounter Medications as of 9/29/2022   Medication Sig Dispense Refill    albuterol (PROVENTIL/VENTOLIN HFA) 90 mcg/actuation inhaler Inhale 2 puffs into the lungs every 6 (six) hours as needed for Wheezing or Shortness of Breath (rescue and prevention). 18 g 11    albuterol-ipratropium (DUO-NEB) 2.5 mg-0.5 mg/3 mL nebulizer solution INHALE content of ONE vial BY MOUTH via nebulizer EVERY 6 HOURS AS NEEDED FOR WHEEZING OR shortness of breath. rescue 270 mL 12    amLODIPine (NORVASC) 5 MG tablet Take 1 tablet (5 mg total) by mouth once daily. 90 " "tablet 3    aspirin (ECOTRIN) 81 MG EC tablet Take 81 mg by mouth once daily.      cyanocobalamin, vitamin B-12, 1,000 mcg TbSR Take by mouth once daily.       DOCOSAHEXANOIC ACID/EPA (FISH OIL ORAL) Take 1,200 mg by mouth once daily.      famotidine (PEPCID) 40 MG tablet TAKE ONE TABLET BY MOUTH ONCE A DAY 90 tablet 0    glipiZIDE (GLUCOTROL) 10 MG tablet 1 prn when eats too much carbs, about 1x a mo 30 tablet 3    irbesartan-hydrochlorothiazide (AVALIDE) 150-12.5 mg per tablet TAKE ONE TABLET BY MOUTH ONCE A DAY 90 tablet 12    oxybutynin (DITROPAN-XL) 5 MG TR24 TAKE ONE TABLET BY MOUTH ONCE A DAY 90 tablet 12    OZEMPIC 1 mg/dose (4 mg/3 mL) Inject 1 mg into the skin once a week. 90 days supply 3 pen 4    pen needle, diabetic (BD INSULIN PEN NEEDLE UF SHORT) 31 gauge x 5/16" Ndle USE AS DIRECTED 100 each 12    rosuvastatin (CRESTOR) 20 MG tablet TAKE ONE TABLET BY MOUTH ONCE A DAY 90 tablet 12    sars-cov-2, covid-19, (MODERNA COVID-19) 100 mcg/0.5 ml injection       traMADoL (ULTRAM) 50 mg tablet Take 1 tablet (50 mg total) by mouth every 6 (six) hours as needed. 120 tablet 3    varenicline (CHANTIX STARTING MONTH BOX) 0.5 mg (11)- 1 mg (42) tablet Take one 0.5mg tab by mouth once daily X3 days,then increase to one 0.5mg tab twice daily X4 days,then increase to one 1mg tab twice daily 1 Package 0    varenicline (CHANTIX) 1 mg Tab Take 1 tablet (1 mg total) by mouth 2 (two) times daily. 60 tablet 2    vitamin D (VITAMIN D3) 1000 units Tab Take 1,000 Units by mouth once daily.      [DISCONTINUED] umeclidinium-vilanteroL (ANORO ELLIPTA) 62.5-25 mcg/actuation DsDv Inhale 1 puff into the lungs once daily. THIS SHOULD BE USED, NOT INCRUSE 3 each 3    FLUAD 0516-3200, 65 YR UP,,PF, 45 mcg (15 mcg x 3)/0.5 mL Syrg inject .5 milliliter intramuscularly as directed  0    FLUZONE HIGHDOSE QUAD 20-21  mcg/0.7 mL Syrg inj .7 milliliter intramuscularly as directed      omeprazole (PRILOSEC) 40 MG capsule Take 1 capsule " (40 mg total) by mouth once daily. 90 capsule 4    umeclidinium-vilanteroL (ANORO ELLIPTA) 62.5-25 mcg/actuation DsDv Inhale 1 puff into the lungs once daily. THIS SHOULD BE USED, NOT INCRUSE 3 each 3     No facility-administered encounter medications on file as of 9/29/2022.     Orders Placed This Encounter   Procedures    Full code     Standing Status:   Standing     Number of Occurrences:   1    Pulse Oximetry Q4H     Standing Status:   Standing     Number of Occurrences:   21    Case Request Operating Room: ROBOTIC BRONCHOSCOPY     Standing Status:   Standing     Number of Occurrences:   1     Order Specific Question:   Medical Necessity:     Answer:   Medically Urgent [101]     Order Specific Question:   CPT Code:     Answer:   ME BRONCH W/ EBUS, DIAG OR THERA INTERVENTION PERIPHERAL LESION(S), INCL GUID, ADD ON CODE [90329]     Order Specific Question:   CPT Code:     Answer:   ME BRONCH W/ EBUS, SAMPLING 3 OR MORE NODES, INCL GUIDE [45681]     Order Specific Question:   CPT Code:     Answer:   ME BRONCHOSCOPY,TRANSBRON ASPIR BX [04751]     Order Specific Question:   Post-Procedure Disposition:     Answer:   Amb Surgery/DOSC [2]     Order Specific Question:   Is an on-site pathologist required for this procedure?     Answer:   [place]zen Section     Plan:     Problem List Items Addressed This Visit       Centrilobular emphysema    Overview     Smoking cessation discussed.  Continue anoro daily and albuterol for rescue and prevention.         Relevant Medications    umeclidinium-vilanteroL (ANORO ELLIPTA) 62.5-25 mcg/actuation DsDv     Other Visit Diagnoses       Nodule of lower lobe of right lung    -  Primary    Relevant Orders    Pulse Oximetry Q4H    Case Request Operating Room: ROBOTIC BRONCHOSCOPY (Completed)    Full code

## 2022-10-21 ENCOUNTER — TELEPHONE (OUTPATIENT)
Dept: PULMONOLOGY | Facility: CLINIC | Age: 72
End: 2022-10-21
Payer: MEDICARE

## 2022-10-21 NOTE — TELEPHONE ENCOUNTER
LVM for patient to return my call. I was calling with arrival time to [facility] on 10/25/22 with Dr Ramires. Patient to call back to discuss.

## 2022-10-24 ENCOUNTER — TELEPHONE (OUTPATIENT)
Dept: PULMONOLOGY | Facility: CLINIC | Age: 72
End: 2022-10-24
Payer: MEDICARE

## 2022-10-24 NOTE — TELEPHONE ENCOUNTER
----- Message from Caitlin Blankenship sent at 10/24/2022  8:20 AM CDT -----  Regarding: PT returning call please call  Contact: PT  [phone]   Sarina Genao MRN [id number] returning a call from Friday     Please call at your convenience

## 2022-10-24 NOTE — TELEPHONE ENCOUNTER
I spoke with patient to let her know arrival time to [facility] tomorrow for 5:30am with Dr Ramires. Patient confirmed and verbalized understanding.

## 2022-10-25 ENCOUNTER — HOSPITAL ENCOUNTER (OUTPATIENT)
Facility: HOSPITAL | Age: 72
Discharge: HOME | End: 2022-10-25
Attending: INTERNAL MEDICINE | Admitting: INTERNAL MEDICINE
Payer: MEDICARE

## 2022-10-25 ENCOUNTER — ANESTHESIA EVENT (OUTPATIENT)
Dept: SURGERY | Facility: HOSPITAL | Age: 72
End: 2022-10-25
Payer: MEDICARE

## 2022-10-25 ENCOUNTER — ANESTHESIA (OUTPATIENT)
Dept: SURGERY | Facility: HOSPITAL | Age: 72
End: 2022-10-25
Payer: MEDICARE

## 2022-10-25 VITALS
HEART RATE: 80 BPM | DIASTOLIC BLOOD PRESSURE: 68 MMHG | HEIGHT: 64 IN | SYSTOLIC BLOOD PRESSURE: 140 MMHG | RESPIRATION RATE: 20 BRPM | TEMPERATURE: 98 F | WEIGHT: 135 LBS | BODY MASS INDEX: 23.05 KG/M2 | OXYGEN SATURATION: 96 %

## 2022-10-25 DIAGNOSIS — R91.1 NODULE OF LOWER LOBE OF RIGHT LUNG: ICD-10-CM

## 2022-10-25 DIAGNOSIS — R91.1 RIGHT LOWER LOBE PULMONARY NODULE: Primary | ICD-10-CM

## 2022-10-25 LAB
POCT GLUCOSE: 71 MG/DL (ref 70–110)
POCT GLUCOSE: 86 MG/DL (ref 70–110)

## 2022-10-25 PROCEDURE — 88112 PR  CYTOPATH, CELL ENHANCE TECH: ICD-10-PCS | Mod: 26,59,, | Performed by: STUDENT IN AN ORGANIZED HEALTH CARE EDUCATION/TRAINING PROGRAM

## 2022-10-25 PROCEDURE — 31628 BRONCHOSCOPY/LUNG BX EACH: CPT | Mod: 51,RT,, | Performed by: INTERNAL MEDICINE

## 2022-10-25 PROCEDURE — 88360 TUMOR IMMUNOHISTOCHEM/MANUAL: CPT | Performed by: STUDENT IN AN ORGANIZED HEALTH CARE EDUCATION/TRAINING PROGRAM

## 2022-10-25 PROCEDURE — D9220A PRA ANESTHESIA: ICD-10-PCS | Mod: ANES,,, | Performed by: ANESTHESIOLOGY

## 2022-10-25 PROCEDURE — 71000015 HC POSTOP RECOV 1ST HR: Performed by: INTERNAL MEDICINE

## 2022-10-25 PROCEDURE — 88112 CYTOPATH CELL ENHANCE TECH: CPT | Mod: 26,59,, | Performed by: STUDENT IN AN ORGANIZED HEALTH CARE EDUCATION/TRAINING PROGRAM

## 2022-10-25 PROCEDURE — 88341 IMHCHEM/IMCYTCHM EA ADD ANTB: CPT | Performed by: STUDENT IN AN ORGANIZED HEALTH CARE EDUCATION/TRAINING PROGRAM

## 2022-10-25 PROCEDURE — 88342 IMHCHEM/IMCYTCHM 1ST ANTB: CPT | Performed by: STUDENT IN AN ORGANIZED HEALTH CARE EDUCATION/TRAINING PROGRAM

## 2022-10-25 PROCEDURE — 27201423 OPTIME MED/SURG SUP & DEVICES STERILE SUPPLY: Performed by: INTERNAL MEDICINE

## 2022-10-25 PROCEDURE — 88305 TISSUE EXAM BY PATHOLOGIST: ICD-10-PCS | Mod: 26,,, | Performed by: STUDENT IN AN ORGANIZED HEALTH CARE EDUCATION/TRAINING PROGRAM

## 2022-10-25 PROCEDURE — 37000008 HC ANESTHESIA 1ST 15 MINUTES: Performed by: INTERNAL MEDICINE

## 2022-10-25 PROCEDURE — 31627 NAVIGATIONAL BRONCHOSCOPY: CPT | Mod: ,,, | Performed by: INTERNAL MEDICINE

## 2022-10-25 PROCEDURE — 63600175 PHARM REV CODE 636 W HCPCS: Performed by: NURSE ANESTHETIST, CERTIFIED REGISTERED

## 2022-10-25 PROCEDURE — 88341 IMHCHEM/IMCYTCHM EA ADD ANTB: CPT | Mod: 26,,, | Performed by: STUDENT IN AN ORGANIZED HEALTH CARE EDUCATION/TRAINING PROGRAM

## 2022-10-25 PROCEDURE — 25000242 PHARM REV CODE 250 ALT 637 W/ HCPCS: Performed by: INTERNAL MEDICINE

## 2022-10-25 PROCEDURE — 31623 PR BRONCHOSCOPY,DIAGNOSTIC W BRUSH: ICD-10-PCS | Mod: 51,RT,, | Performed by: INTERNAL MEDICINE

## 2022-10-25 PROCEDURE — 88172 PR  EVALUATION OF FNA SMEAR TO DETERMINE ADEQUACY, FIRST EVAL: ICD-10-PCS | Mod: 26,,, | Performed by: STUDENT IN AN ORGANIZED HEALTH CARE EDUCATION/TRAINING PROGRAM

## 2022-10-25 PROCEDURE — 88342 IMHCHEM/IMCYTCHM 1ST ANTB: CPT | Mod: 26,,, | Performed by: STUDENT IN AN ORGANIZED HEALTH CARE EDUCATION/TRAINING PROGRAM

## 2022-10-25 PROCEDURE — 82962 GLUCOSE BLOOD TEST: CPT | Performed by: INTERNAL MEDICINE

## 2022-10-25 PROCEDURE — 88305 TISSUE EXAM BY PATHOLOGIST: CPT | Performed by: STUDENT IN AN ORGANIZED HEALTH CARE EDUCATION/TRAINING PROGRAM

## 2022-10-25 PROCEDURE — 88173 CYTOPATH EVAL FNA REPORT: CPT | Mod: 59 | Performed by: STUDENT IN AN ORGANIZED HEALTH CARE EDUCATION/TRAINING PROGRAM

## 2022-10-25 PROCEDURE — 88305 TISSUE EXAM BY PATHOLOGIST: CPT | Mod: 26,,, | Performed by: STUDENT IN AN ORGANIZED HEALTH CARE EDUCATION/TRAINING PROGRAM

## 2022-10-25 PROCEDURE — 88172 CYTP DX EVAL FNA 1ST EA SITE: CPT | Mod: 26,,, | Performed by: STUDENT IN AN ORGANIZED HEALTH CARE EDUCATION/TRAINING PROGRAM

## 2022-10-25 PROCEDURE — 31624 PR BRONCHOSCOPY,DIAG2STIC W LAVAGE: ICD-10-PCS | Mod: 51,RT,, | Performed by: INTERNAL MEDICINE

## 2022-10-25 PROCEDURE — 31629 PR BRONCHOSCOPY,TRANSBRON ASPIR BX: ICD-10-PCS | Mod: 59,RT,, | Performed by: INTERNAL MEDICINE

## 2022-10-25 PROCEDURE — D9220A PRA ANESTHESIA: Mod: CRNA,,, | Performed by: NURSE ANESTHETIST, CERTIFIED REGISTERED

## 2022-10-25 PROCEDURE — 88177 PR  EVALUATION OF FNA SMEAR TO DETERMINE ADEQUACY, EA ADD EVAL: ICD-10-PCS | Mod: 26,,, | Performed by: STUDENT IN AN ORGANIZED HEALTH CARE EDUCATION/TRAINING PROGRAM

## 2022-10-25 PROCEDURE — 31624 DX BRONCHOSCOPE/LAVAGE: CPT | Mod: 51,RT,, | Performed by: INTERNAL MEDICINE

## 2022-10-25 PROCEDURE — 94640 AIRWAY INHALATION TREATMENT: CPT

## 2022-10-25 PROCEDURE — 31654 BRONCH EBUS IVNTJ PERPH LES: CPT | Mod: ,,, | Performed by: INTERNAL MEDICINE

## 2022-10-25 PROCEDURE — C1726 CATH, BAL DIL, NON-VASCULAR: HCPCS | Performed by: INTERNAL MEDICINE

## 2022-10-25 PROCEDURE — 36000709 HC OR TIME LEV III EA ADD 15 MIN: Performed by: INTERNAL MEDICINE

## 2022-10-25 PROCEDURE — 81445 SO NEO GSAP 5-50DNA/DNA&RNA: CPT | Performed by: STUDENT IN AN ORGANIZED HEALTH CARE EDUCATION/TRAINING PROGRAM

## 2022-10-25 PROCEDURE — D9220A PRA ANESTHESIA: Mod: ANES,,, | Performed by: ANESTHESIOLOGY

## 2022-10-25 PROCEDURE — 31627 PR BRONCHOSCOPY,COMPUTER ASSIST/IMAGE-GUIDED NAVIGATION: ICD-10-PCS | Mod: ,,, | Performed by: INTERNAL MEDICINE

## 2022-10-25 PROCEDURE — 37000009 HC ANESTHESIA EA ADD 15 MINS: Performed by: INTERNAL MEDICINE

## 2022-10-25 PROCEDURE — 88173 CYTOPATH EVAL FNA REPORT: CPT | Mod: 26,,, | Performed by: STUDENT IN AN ORGANIZED HEALTH CARE EDUCATION/TRAINING PROGRAM

## 2022-10-25 PROCEDURE — 88112 CYTOPATH CELL ENHANCE TECH: CPT | Mod: 59 | Performed by: STUDENT IN AN ORGANIZED HEALTH CARE EDUCATION/TRAINING PROGRAM

## 2022-10-25 PROCEDURE — 31652 BRONCH EBUS SAMPLNG 1/2 NODE: CPT | Mod: ,,, | Performed by: INTERNAL MEDICINE

## 2022-10-25 PROCEDURE — 31629 BRONCHOSCOPY/NEEDLE BX EACH: CPT | Mod: 59,RT,, | Performed by: INTERNAL MEDICINE

## 2022-10-25 PROCEDURE — 88172 CYTP DX EVAL FNA 1ST EA SITE: CPT | Performed by: STUDENT IN AN ORGANIZED HEALTH CARE EDUCATION/TRAINING PROGRAM

## 2022-10-25 PROCEDURE — D9220A PRA ANESTHESIA: ICD-10-PCS | Mod: CRNA,,, | Performed by: NURSE ANESTHETIST, CERTIFIED REGISTERED

## 2022-10-25 PROCEDURE — 88177 CYTP FNA EVAL EA ADDL: CPT | Performed by: STUDENT IN AN ORGANIZED HEALTH CARE EDUCATION/TRAINING PROGRAM

## 2022-10-25 PROCEDURE — 25000003 PHARM REV CODE 250: Performed by: NURSE ANESTHETIST, CERTIFIED REGISTERED

## 2022-10-25 PROCEDURE — 88177 CYTP FNA EVAL EA ADDL: CPT | Mod: 26,,, | Performed by: STUDENT IN AN ORGANIZED HEALTH CARE EDUCATION/TRAINING PROGRAM

## 2022-10-25 PROCEDURE — 88342 CHG IMMUNOCYTOCHEMISTRY: ICD-10-PCS | Mod: 26,,, | Performed by: STUDENT IN AN ORGANIZED HEALTH CARE EDUCATION/TRAINING PROGRAM

## 2022-10-25 PROCEDURE — 82962 GLUCOSE BLOOD TEST: CPT | Mod: 91 | Performed by: INTERNAL MEDICINE

## 2022-10-25 PROCEDURE — 31628 PR BRONCHOSCOPY,TRANSBRONCH BIOPSY: ICD-10-PCS | Mod: 51,RT,, | Performed by: INTERNAL MEDICINE

## 2022-10-25 PROCEDURE — 31623 DX BRONCHOSCOPE/BRUSH: CPT | Mod: 51,RT,, | Performed by: INTERNAL MEDICINE

## 2022-10-25 PROCEDURE — 31652 PR BRONCH W/ EBUS, SAMPLING 1 OR 2 NODES, INCL GUIDE: ICD-10-PCS | Mod: ,,, | Performed by: INTERNAL MEDICINE

## 2022-10-25 PROCEDURE — 88381 MICRODISSECTION MANUAL: CPT | Performed by: STUDENT IN AN ORGANIZED HEALTH CARE EDUCATION/TRAINING PROGRAM

## 2022-10-25 PROCEDURE — 36000708 HC OR TIME LEV III 1ST 15 MIN: Performed by: INTERNAL MEDICINE

## 2022-10-25 PROCEDURE — 94761 N-INVAS EAR/PLS OXIMETRY MLT: CPT

## 2022-10-25 PROCEDURE — 71000044 HC DOSC ROUTINE RECOVERY FIRST HOUR: Performed by: INTERNAL MEDICINE

## 2022-10-25 PROCEDURE — 88341 PR IHC OR ICC EACH ADD'L SINGLE ANTIBODY  STAINPR: ICD-10-PCS | Mod: 26,,, | Performed by: STUDENT IN AN ORGANIZED HEALTH CARE EDUCATION/TRAINING PROGRAM

## 2022-10-25 PROCEDURE — 88173 PR  INTERPRETATION OF FNA SMEAR: ICD-10-PCS | Mod: 26,,, | Performed by: STUDENT IN AN ORGANIZED HEALTH CARE EDUCATION/TRAINING PROGRAM

## 2022-10-25 PROCEDURE — 31654 PR BRONCH W/ EBUS, DIAG OR THERA INTERVENTION PERIPHERAL LESION(S), INCL GUID, ADD ON CODE: ICD-10-PCS | Mod: ,,, | Performed by: INTERNAL MEDICINE

## 2022-10-25 RX ORDER — FENTANYL CITRATE 50 UG/ML
INJECTION, SOLUTION INTRAMUSCULAR; INTRAVENOUS
Status: DISCONTINUED | OUTPATIENT
Start: 2022-10-25 | End: 2022-10-25

## 2022-10-25 RX ORDER — ALBUTEROL SULFATE 2.5 MG/.5ML
2.5 SOLUTION RESPIRATORY (INHALATION) EVERY 4 HOURS PRN
Status: DISCONTINUED | OUTPATIENT
Start: 2022-10-25 | End: 2022-10-25 | Stop reason: HOSPADM

## 2022-10-25 RX ORDER — ONDANSETRON 2 MG/ML
INJECTION INTRAMUSCULAR; INTRAVENOUS
Status: DISCONTINUED | OUTPATIENT
Start: 2022-10-25 | End: 2022-10-25

## 2022-10-25 RX ORDER — PROPOFOL 10 MG/ML
VIAL (ML) INTRAVENOUS CONTINUOUS PRN
Status: DISCONTINUED | OUTPATIENT
Start: 2022-10-25 | End: 2022-10-25

## 2022-10-25 RX ORDER — HALOPERIDOL 5 MG/ML
0.5 INJECTION INTRAMUSCULAR EVERY 10 MIN PRN
Status: DISCONTINUED | OUTPATIENT
Start: 2022-10-25 | End: 2022-10-25 | Stop reason: HOSPADM

## 2022-10-25 RX ORDER — PHENYLEPHRINE HYDROCHLORIDE 10 MG/ML
INJECTION INTRAVENOUS
Status: DISCONTINUED | OUTPATIENT
Start: 2022-10-25 | End: 2022-10-25

## 2022-10-25 RX ORDER — DEXAMETHASONE SODIUM PHOSPHATE 4 MG/ML
INJECTION, SOLUTION INTRA-ARTICULAR; INTRALESIONAL; INTRAMUSCULAR; INTRAVENOUS; SOFT TISSUE
Status: DISCONTINUED | OUTPATIENT
Start: 2022-10-25 | End: 2022-10-25

## 2022-10-25 RX ORDER — MIDAZOLAM HYDROCHLORIDE 1 MG/ML
INJECTION, SOLUTION INTRAMUSCULAR; INTRAVENOUS
Status: DISCONTINUED | OUTPATIENT
Start: 2022-10-25 | End: 2022-10-25

## 2022-10-25 RX ORDER — ROCURONIUM BROMIDE 10 MG/ML
INJECTION, SOLUTION INTRAVENOUS
Status: DISCONTINUED | OUTPATIENT
Start: 2022-10-25 | End: 2022-10-25

## 2022-10-25 RX ORDER — FENTANYL CITRATE 50 UG/ML
25 INJECTION, SOLUTION INTRAMUSCULAR; INTRAVENOUS EVERY 5 MIN PRN
Status: DISCONTINUED | OUTPATIENT
Start: 2022-10-25 | End: 2022-10-25 | Stop reason: HOSPADM

## 2022-10-25 RX ORDER — LIDOCAINE HYDROCHLORIDE 20 MG/ML
INJECTION, SOLUTION EPIDURAL; INFILTRATION; INTRACAUDAL; PERINEURAL
Status: DISCONTINUED | OUTPATIENT
Start: 2022-10-25 | End: 2022-10-25

## 2022-10-25 RX ORDER — PROPOFOL 10 MG/ML
VIAL (ML) INTRAVENOUS
Status: DISCONTINUED | OUTPATIENT
Start: 2022-10-25 | End: 2022-10-25

## 2022-10-25 RX ORDER — SUCCINYLCHOLINE CHLORIDE 20 MG/ML
INJECTION INTRAMUSCULAR; INTRAVENOUS
Status: DISCONTINUED | OUTPATIENT
Start: 2022-10-25 | End: 2022-10-25

## 2022-10-25 RX ADMIN — ROCURONIUM BROMIDE 25 MG: 10 INJECTION INTRAVENOUS at 07:10

## 2022-10-25 RX ADMIN — FENTANYL CITRATE 50 MCG: 50 INJECTION, SOLUTION INTRAMUSCULAR; INTRAVENOUS at 07:10

## 2022-10-25 RX ADMIN — DEXAMETHASONE SODIUM PHOSPHATE 4 MG: 4 INJECTION, SOLUTION INTRAMUSCULAR; INTRAVENOUS at 07:10

## 2022-10-25 RX ADMIN — ALBUTEROL SULFATE 2.5 MG: 2.5 SOLUTION RESPIRATORY (INHALATION) at 07:10

## 2022-10-25 RX ADMIN — PHENYLEPHRINE HYDROCHLORIDE 100 MCG: 10 INJECTION INTRAVENOUS at 07:10

## 2022-10-25 RX ADMIN — LIDOCAINE HYDROCHLORIDE 60 MG: 20 INJECTION, SOLUTION EPIDURAL; INFILTRATION; INTRACAUDAL; PERINEURAL at 07:10

## 2022-10-25 RX ADMIN — PROPOFOL 100 MG: 10 INJECTION, EMULSION INTRAVENOUS at 07:10

## 2022-10-25 RX ADMIN — ROCURONIUM BROMIDE 5 MG: 10 INJECTION INTRAVENOUS at 07:10

## 2022-10-25 RX ADMIN — PROPOFOL 50 MG: 10 INJECTION, EMULSION INTRAVENOUS at 07:10

## 2022-10-25 RX ADMIN — SODIUM CHLORIDE: 0.9 INJECTION, SOLUTION INTRAVENOUS at 07:10

## 2022-10-25 RX ADMIN — SUCCINYLCHOLINE CHLORIDE 120 MG: 20 INJECTION, SOLUTION INTRAMUSCULAR; INTRAVENOUS at 07:10

## 2022-10-25 RX ADMIN — ROCURONIUM BROMIDE 20 MG: 10 INJECTION INTRAVENOUS at 07:10

## 2022-10-25 RX ADMIN — MIDAZOLAM HYDROCHLORIDE 1 MG: 1 INJECTION, SOLUTION INTRAMUSCULAR; INTRAVENOUS at 07:10

## 2022-10-25 RX ADMIN — PHENYLEPHRINE HYDROCHLORIDE 100 MCG: 10 INJECTION INTRAVENOUS at 08:10

## 2022-10-25 RX ADMIN — Medication 150 MCG/KG/MIN: at 07:10

## 2022-10-25 RX ADMIN — ONDANSETRON 4 MG: 2 INJECTION INTRAMUSCULAR; INTRAVENOUS at 07:10

## 2022-10-25 RX ADMIN — SUGAMMADEX 200 MG: 100 INJECTION, SOLUTION INTRAVENOUS at 08:10

## 2022-10-25 NOTE — TRANSFER OF CARE
"Anesthesia Transfer of Care Note    Patient: Sarina Genao    Procedure(s) Performed: Procedure(s) (LRB):  ROBOTIC BRONCHOSCOPY (N/A)    Patient location: [facility]    Anesthesia Type: general    Transport from OR: Transported from OR on 6-10 L/min O2 by face mask with adequate spontaneous ventilation    Post pain: adequate analgesia    Post assessment: no apparent anesthetic complications    Post vital signs: stable    Level of consciousness: awake, alert and oriented    Nausea/Vomiting: no nausea/vomiting    Complications: none    Transfer of care protocol was followed      Last vitals:   Visit Vitals  BP (!) 141/68 (BP Location: Right arm, Patient Position: Lying)   Pulse 92   Temp 36.7 °C (98.1 °F) (Temporal)   Resp 20   Ht 5' 4" (1.626 m)   Wt 61.2 kg (135 lb)   SpO2 96%   Breastfeeding No   BMI 23.17 kg/m²     "

## 2022-10-25 NOTE — INTERVAL H&P NOTE
The patient has been examined and the H&P has been reviewed:    I concur with the findings and no changes have occurred since H&P was written.    Anesthesia/Surgery risks, benefits and alternative options discussed and understood by patient/family.          Enlarging RLL nodule with plan for robotic bronchoscopy.  During EBUS, will also evaluate Left infrahilar region, previous diagnostic bronch in 2018 was negative for malignancy and reportedly remained relatively stable over the years.

## 2022-10-25 NOTE — ANESTHESIA PREPROCEDURE EVALUATION
10/25/2022  Sarina Genao is a 72 y.o., female.      Pre-op Assessment    I have reviewed the Patient Summary Reports.     I have reviewed the Nursing Notes. I have reviewed the NPO Status.   I have reviewed the Medications.     Review of Systems  Anesthesia Hx:  No problems with previous Anesthesia  History of prior surgery of interest to airway management or planning:  Denies Personal Hx of Anesthesia complications.   Social:  Smoker    Hematology/Oncology:  Hematology Normal       -- Cancer in past history:    EENT/Dental:EENT/Dental Normal   Cardiovascular:   Hypertension  Denies Angina. CHF hyperlipidemia  Denies BLISS. ECG has been reviewed.    Pulmonary:   COPD Denies Shortness of breath. Right lower lobe pulmonary nodule   Renal/:   Chronic Renal Disease, CKD    Hepatic/GI:   GERD, well controlled Schatzki's ring   Musculoskeletal:   Arthritis     Neurological:  Neurology Normal    Endocrine:   Diabetes, type 2    Dermatological:  Skin Normal    Psych:  Psychiatric Normal           Physical Exam  General: Well nourished, Cooperative, Alert and Oriented    Airway:  Mallampati: III / II  Mouth Opening: Normal  TM Distance: Normal  Tongue: Normal  Neck ROM: Normal ROM    Dental:  Dentures    Chest/Lungs:  Clear to auscultation, Normal Respiratory Rate    Heart:  Rate: Normal  Rhythm: Regular Rhythm  Sounds: Normal        Anesthesia Plan  Type of Anesthesia, risks & benefits discussed:    Anesthesia Type: Gen ETT  Intra-op Monitoring Plan: Standard ASA Monitors  Post Op Pain Control Plan: multimodal analgesia and IV/PO Opioids PRN  Induction:  IV  Airway Plan: Direct, Post-Induction  Informed Consent: Informed consent signed with the Patient and all parties understand the risks and agree with anesthesia plan.  All questions answered.   ASA Score: 3  Day of Surgery Review of History & Physical: H&P  Update referred to the surgeon/provider.    Ready For Surgery From Anesthesia Perspective.     .

## 2022-10-25 NOTE — PLAN OF CARE
X1 attempt to notify respiratory of albuterol treatment. Charge Nurse notified of multiple attempts.

## 2022-10-25 NOTE — ANESTHESIA PROCEDURE NOTES
Intubation    Date/Time: 10/25/2022 7:18 AM  Performed by: Shira Wallace CRNA  Authorized by: Kayla Guadalupe MD     Intubation:     Induction:  Intravenous    Intubated:  Postinduction    Mask Ventilation:  Easy mask    Attempts:  1    Attempted By:  Student    Method of Intubation:  Direct    Blade:  Eugene 3    Laryngeal View Grade: Grade I - full view of cords      Difficult Airway Encountered?: No      Complications:  None    Airway Device:  Oral endotracheal tube    Airway Device Size:  8.5    Style/Cuff Inflation:  Cuffed    Tube secured:  20    Secured at:  The lips    Placement Verified By:  Capnometry and Fiber optic visualization    Complicating Factors:  None    Findings Post-Intubation:  Atraumatic/condition of teeth unchanged and BS equal bilateral

## 2022-10-25 NOTE — PLAN OF CARE
Patient discharged to home with all belongings. Alert and oriented. Denied pain or discomfort. Vital signs stable. Discharge instructions reviewed at the bedside.

## 2022-10-26 NOTE — ANESTHESIA POSTPROCEDURE EVALUATION
Anesthesia Post Evaluation    Patient: Sarina Genao    Procedure(s) Performed: Procedure(s) (LRB):  ROBOTIC BRONCHOSCOPY (N/A)    Final Anesthesia Type: general      Patient location during evaluation: [facility]  Patient participation: Yes- Able to Participate  Level of consciousness: awake and alert and oriented  Post-procedure vital signs: reviewed and stable  Pain management: adequate  Airway patency: patent    PONV status at discharge: No PONV  Anesthetic complications: no      Cardiovascular status: blood pressure returned to baseline and hemodynamically stable  Respiratory status: unassisted, spontaneous ventilation and room air  Hydration status: euvolemic  Follow-up not needed.          Vitals Value Taken Time   /68 10/25/22 0930   Temp 36.7 °C (98.1 °F) 10/25/22 0900   Pulse 80 10/25/22 0930   Resp 20 10/25/22 0930   SpO2 96 % 10/25/22 0930         No case tracking events are documented in the log.      Pain/Graham Score: Graham Score: 10 (10/25/2022  9:15 AM)

## 2022-11-01 ENCOUNTER — TELEPHONE (OUTPATIENT)
Dept: ENDOCRINOLOGY | Facility: CLINIC | Age: 72
End: 2022-11-01
Payer: MEDICARE

## 2022-11-01 DIAGNOSIS — M81.0 OSTEOPOROSIS, POST-MENOPAUSAL: Primary | ICD-10-CM

## 2022-11-01 NOTE — TELEPHONE ENCOUNTER
----- Message from Susan Franklin sent at 11/1/2022  2:57 PM CDT -----  Type: Patient Call Back    Who called: Self     What is the request in detail: Asking for orders for blood work     Can the clinic reply by [facility]? NO     Would the patient rather a call back or a response via [facility]? Call     Best call back number: [phone]

## 2022-11-02 ENCOUNTER — PATIENT OUTREACH (OUTPATIENT)
Dept: ADMINISTRATIVE | Facility: HOSPITAL | Age: 72
End: 2022-11-02
Payer: MEDICARE

## 2022-11-02 DIAGNOSIS — E11.9 TYPE 2 DIABETES MELLITUS WITHOUT COMPLICATION, WITHOUT LONG-TERM CURRENT USE OF INSULIN: Primary | ICD-10-CM

## 2022-11-04 ENCOUNTER — LAB VISIT (OUTPATIENT)
Dept: LAB | Facility: HOSPITAL | Age: 72
End: 2022-11-04
Attending: HOSPITALIST
Payer: MEDICARE

## 2022-11-04 DIAGNOSIS — E11.9 TYPE 2 DIABETES MELLITUS WITHOUT COMPLICATION, WITHOUT LONG-TERM CURRENT USE OF INSULIN: ICD-10-CM

## 2022-11-04 DIAGNOSIS — E11.59 CONTROLLED TYPE 2 DIABETES MELLITUS WITH OTHER CIRCULATORY COMPLICATION, WITHOUT LONG-TERM CURRENT USE OF INSULIN: ICD-10-CM

## 2022-11-04 LAB
ALBUMIN SERPL BCP-MCNC: 3.6 G/DL (ref 3.5–5.2)
ALP SERPL-CCNC: 100 U/L (ref 55–135)
ALT SERPL W/O P-5'-P-CCNC: 8 U/L (ref 10–44)
ANION GAP SERPL CALC-SCNC: 9 MMOL/L (ref 8–16)
AST SERPL-CCNC: 13 U/L (ref 10–40)
BILIRUB SERPL-MCNC: 0.8 MG/DL (ref 0.1–1)
BUN SERPL-MCNC: 14 MG/DL (ref 8–23)
CALCIUM SERPL-MCNC: 11.3 MG/DL (ref 8.7–10.5)
CHLORIDE SERPL-SCNC: 102 MMOL/L (ref 95–110)
CO2 SERPL-SCNC: 28 MMOL/L (ref 23–29)
CREAT SERPL-MCNC: 1.3 MG/DL (ref 0.5–1.4)
EST. GFR  (NO RACE VARIABLE): 43.1 ML/MIN/1.73 M^2
ESTIMATED AVG GLUCOSE: 114 MG/DL (ref 68–131)
GLUCOSE SERPL-MCNC: 65 MG/DL (ref 70–110)
HBA1C MFR BLD: 5.6 % (ref 4–5.6)
POTASSIUM SERPL-SCNC: 3.5 MMOL/L (ref 3.5–5.1)
PROT SERPL-MCNC: 7.5 G/DL (ref 6–8.4)
SODIUM SERPL-SCNC: 139 MMOL/L (ref 136–145)

## 2022-11-04 PROCEDURE — 80053 COMPREHEN METABOLIC PANEL: CPT | Performed by: HOSPITALIST

## 2022-11-04 PROCEDURE — 36415 COLL VENOUS BLD VENIPUNCTURE: CPT | Mod: PO | Performed by: HOSPITALIST

## 2022-11-04 PROCEDURE — 83036 HEMOGLOBIN GLYCOSYLATED A1C: CPT | Performed by: INTERNAL MEDICINE

## 2022-11-05 ENCOUNTER — TELEPHONE (OUTPATIENT)
Dept: PULMONOLOGY | Facility: CLINIC | Age: 72
End: 2022-11-05
Payer: MEDICARE

## 2022-11-05 DIAGNOSIS — C34.90 MALIGNANT NEOPLASM OF UNSPECIFIED PART OF UNSPECIFIED BRONCHUS OR LUNG: Primary | ICD-10-CM

## 2022-11-05 NOTE — TELEPHONE ENCOUNTER
Patinet with a history of emphysema and an enlarging RLL nodule. Bronchoscopy positive for squamous cell cancer on the right.and left hilum which is stable was sampled again and is negative.  Called and notified of results.  Will need PET and PFTs to determine next best step.  [place] [facility] [place].

## 2022-11-07 ENCOUNTER — TELEPHONE (OUTPATIENT)
Dept: PULMONOLOGY | Facility: CLINIC | Age: 72
End: 2022-11-07
Payer: MEDICARE

## 2022-11-07 NOTE — TELEPHONE ENCOUNTER
I spoke with patient to schedule pet scan and pft's on 11/25/22 at 8:30am and 10:15am. Appointment mailed. Patient confirmed and verbalized understanding.

## 2022-11-14 ENCOUNTER — TELEPHONE (OUTPATIENT)
Dept: PULMONOLOGY | Facility: CLINIC | Age: 72
End: 2022-11-14
Payer: MEDICARE

## 2022-11-14 ENCOUNTER — INFUSION (OUTPATIENT)
Dept: INFUSION THERAPY | Facility: HOSPITAL | Age: 72
End: 2022-11-14
Attending: HOSPITALIST
Payer: MEDICARE

## 2022-11-14 ENCOUNTER — TELEPHONE (OUTPATIENT)
Dept: ENDOCRINOLOGY | Facility: CLINIC | Age: 72
End: 2022-11-14
Payer: MEDICARE

## 2022-11-14 VITALS
HEART RATE: 72 BPM | TEMPERATURE: 98 F | DIASTOLIC BLOOD PRESSURE: 58 MMHG | SYSTOLIC BLOOD PRESSURE: 118 MMHG | RESPIRATION RATE: 18 BRPM | OXYGEN SATURATION: 100 %

## 2022-11-14 DIAGNOSIS — M81.0 OSTEOPOROSIS, POST-MENOPAUSAL: Primary | ICD-10-CM

## 2022-11-14 PROCEDURE — 96372 THER/PROPH/DIAG INJ SC/IM: CPT

## 2022-11-14 PROCEDURE — 63600175 PHARM REV CODE 636 W HCPCS: Mod: JG | Performed by: HOSPITALIST

## 2022-11-14 RX ADMIN — DENOSUMAB 60 MG: 60 INJECTION SUBCUTANEOUS at 01:11

## 2022-11-14 NOTE — TELEPHONE ENCOUNTER
----- Message from Annie Centeno sent at 11/14/2022  8:26 AM CST -----  Sarina Genao calling regarding needing a call back to discuss the PET scan because she was told that she can not have dye in her system it may effect her kidneys, call back to advise [phone]

## 2022-11-14 NOTE — PLAN OF CARE
Patient arrived to unit for Prolia 60mg. Patient reported having a bronchoscopy that resulted  positive for squamous cell cancer on the right. Patient denied any SOB, chest pain or dizziness. Prolia injection tolerated well. Patient denied any side effects from the Prolia injection and reported that the injection have improved her bone strength. AVS and appointment schedule reviewed with patient. Patient verbalized understanding. Discharged off unit in no signs of acute distress.

## 2022-11-14 NOTE — TELEPHONE ENCOUNTER
----- Message from Livia Sawant sent at 11/14/2022  9:16 AM CST -----  Type: Patient Call Back    Who called:Self    What is the request in detail: Pt is requesting a call back    Can the clinic reply by [facility]? no    Would the patient rather a call back or a response via [facility]? Call back    Best call back number:[phone]    Additional Information:

## 2022-11-14 NOTE — TELEPHONE ENCOUNTER
I spoke with patient. Ms Genao was calling to see if it is safe for her to have PET scan. Patient stated in 2019 she had a ct scan with contrast and had to stay in hospital for 24hrs due to needing an infusion to flush out dye. She was told she can not have dye due to kidney function. I let her know I would send a message for review and to advise. Patient verbalized understanding.       Patient scheduled for PET scan on 11/25/22 at 8:30am.

## 2022-11-14 NOTE — TELEPHONE ENCOUNTER
Pt called saying she missed BP check with our office. Informed pt that was supposed to be done at [facility] with the nurse. Offered to reschedule it pt said she will see when she can do it. Understanding verbalized

## 2022-11-15 ENCOUNTER — TELEPHONE (OUTPATIENT)
Dept: PULMONOLOGY | Facility: CLINIC | Age: 72
End: 2022-11-15
Payer: MEDICARE

## 2022-11-15 NOTE — TELEPHONE ENCOUNTER
I spoke with patient to let her know dye is different for pet scan than ct contrast per Dr Ramires. Patient verbalized understanding.

## 2022-11-16 ENCOUNTER — DOCUMENTATION ONLY (OUTPATIENT)
Dept: CARDIOTHORACIC SURGERY | Facility: CLINIC | Age: 72
End: 2022-11-16
Payer: MEDICARE

## 2022-11-16 ENCOUNTER — TELEPHONE (OUTPATIENT)
Dept: PULMONOLOGY | Facility: CLINIC | Age: 72
End: 2022-11-16
Payer: MEDICARE

## 2022-11-16 DIAGNOSIS — C34.91 SQUAMOUS CELL LUNG CANCER, RIGHT: Primary | ICD-10-CM

## 2022-11-16 NOTE — PATIENT CARE CONFERENCE
[facility]      THORACIC MULTIDISCIPLINARY TUMOR BOARD  PATIENT REVIEW FORM  ________________________________________________________________________    CLINIC #: [id number]  DATE: 11/16/2022    Diagnosis:   NSCLC    PRESENTER:   Dr. Ramires    PATIENT SUMMARY:   72 y.o. current smoker with COPD, DM2, HLD, HTN, primary hyperparathyroidism, and h/o of renal cell carcinoma (2015).  Previously had a RLL nodule that was reported as stable, now growing. Underwent navigational bronchoscopy 2018 with Dr. Ramires, path negative for malignancy. CT chest 09/2021 reveals unchanged RLL, RITA, and left perihilar nodules dating back to 11/2019. Repeat CT chest 09/29/22 showed interval enlargement of right lower lobe nodule, 1.5 cm (previously 0.7 cm).  Other nodules appear stable; the latter include a 2.3 cm left infrahilar nodule. Robotic bronchoscopy 10/25/22, path: RLL positive for squamous cell carcinoma, RITA negative for malignancy.     BOARD RECOMMENDATIONS:   Multidisciplinary clinic with referrals to thoracic surgery and radiation oncology for further evaluation and discussion of respective management strategies.     CONSULT NEEDED:     [x] Surgery    [] Hem/Onc    [x] Rad/Onc    [] Dietary                 [] Social Service    [] Psychology       [] Pulmonology    Clinical Stage: Tumor 0 Node(s) 0 Metastasis 0  Pathologic Stage: Tumor 0 Node(s) 0 Metastasis 0    GROUP STAGE:     [] O    [] 1A    [] IB    [] IIA    [] IIB     [] IIIA     [] IIIB     [] IIIC    [] IV                               [] Local recurrence     [] Regional recurrence     [] Distant recurrence                   [x] NSCLC     [] SCLC     Tumor type: Squamous Cell Carcinoma    Unstageable:      [] Yes     [] No  Metastatic site(s):          [x] Kelly[employer] Treatment Guidelines reviewed and care planned is consistent with guidelines.         (i.e., NCCN, NCI, PD, ACO, AUA, etc.)    PRESENTATION AT CANCER CONFERENCE:         [] Prospective    []  Retrospective     [] Follow-Up          [] Eligible for clinical trial

## 2022-11-18 ENCOUNTER — TELEPHONE (OUTPATIENT)
Dept: CARDIOTHORACIC SURGERY | Facility: CLINIC | Age: 72
End: 2022-11-18
Payer: MEDICARE

## 2022-11-18 NOTE — TELEPHONE ENCOUNTER
Spoke with Ms. Genao while she was driving.  Discussed scheduled appointments with Thoracic Surgery and Rad/Onc.  Appointment slip mailed.  Will callback to discuss when patient is not driving.

## 2022-11-25 ENCOUNTER — HOSPITAL ENCOUNTER (OUTPATIENT)
Dept: PULMONOLOGY | Facility: CLINIC | Age: 72
Discharge: HOME | End: 2022-11-25
Payer: MEDICARE

## 2022-11-25 ENCOUNTER — HOSPITAL ENCOUNTER (OUTPATIENT)
Dept: RADIOLOGY | Facility: HOSPITAL | Age: 72
Discharge: HOME | End: 2022-11-25
Attending: INTERNAL MEDICINE
Payer: MEDICARE

## 2022-11-25 DIAGNOSIS — C34.90 MALIGNANT NEOPLASM OF UNSPECIFIED PART OF UNSPECIFIED BRONCHUS OR LUNG: ICD-10-CM

## 2022-11-25 LAB — POCT GLUCOSE: 122 MG/DL (ref 70–110)

## 2022-11-25 PROCEDURE — 94729 DIFFUSING CAPACITY: CPT | Mod: S$GLB,,, | Performed by: INTERNAL MEDICINE

## 2022-11-25 PROCEDURE — 94060 EVALUATION OF WHEEZING: CPT | Mod: S$GLB,,, | Performed by: INTERNAL MEDICINE

## 2022-11-25 PROCEDURE — 94729 PR C02/MEMBANE DIFFUSE CAPACITY: ICD-10-PCS | Mod: S$GLB,,, | Performed by: INTERNAL MEDICINE

## 2022-11-25 PROCEDURE — 78815 PET IMAGE W/CT SKULL-THIGH: CPT | Mod: TC,PI

## 2022-11-25 PROCEDURE — 78815 NM PET CT ROUTINE: ICD-10-PCS | Mod: 26,PS,, | Performed by: STUDENT IN AN ORGANIZED HEALTH CARE EDUCATION/TRAINING PROGRAM

## 2022-11-25 PROCEDURE — 78815 PET IMAGE W/CT SKULL-THIGH: CPT | Mod: 26,PS,, | Performed by: STUDENT IN AN ORGANIZED HEALTH CARE EDUCATION/TRAINING PROGRAM

## 2022-11-25 PROCEDURE — 94060 PR EVAL OF BRONCHOSPASM: ICD-10-PCS | Mod: S$GLB,,, | Performed by: INTERNAL MEDICINE

## 2022-11-25 NOTE — PROGRESS NOTES
History & Physical    SUBJECTIVE:     History of Present Illness:  Mrs. Genao is a 72 y.o. female current smoker with CKD3, COPD, DM2, HLD, HTN, primary hyperparathyroidism, and h/o of renal cell carcinoma (2015) here today for evaluation of RLL NSCLC.  Previously had a RLL nodule that was reported as stable, now growing. Underwent navigational bronchoscopy 2018 with dede Rasmussen negative for malignancy. CT chest 09/2021 reveals unchanged RLL, RITA, and left perihilar nodules dating back to 11/2019. Repeat CT chest 09/29/22 showed interval enlargement of right lower lobe nodule, 1.5 cm (previously 0.7 cm).  Other nodules appear stable; the latter include a 2.3 cm left infrahilar nodule. Robotic bronchoscopy 10/25/22, path: RLL positive for squamous cell carcinoma, RITA negative for malignancy. Discussed at tumor board, 11/16/22, which recommended follow up with thoracic surgery and radiation oncology for further evaluation and discussion of respective management strategies.     PSH: bladder lift, navigational bronchoscopy, robotic bronchoscopy, colonoscopy, cystoscopy, nephrectomy  Occupation: retired; formerly X-Ray technician (x30 years)    No chief complaint on file.      Review of patient's allergies indicates:   Allergen Reactions    Metformin Diarrhea    Pantoprazole      elevated blood sugars       Current Outpatient Medications   Medication Sig Dispense Refill    albuterol (PROVENTIL/VENTOLIN HFA) 90 mcg/actuation inhaler Inhale 2 puffs into the lungs every 6 (six) hours as needed for Wheezing or Shortness of Breath (rescue and prevention). 18 g 11    albuterol-ipratropium (DUO-NEB) 2.5 mg-0.5 mg/3 mL nebulizer solution INHALE content of ONE vial BY MOUTH via nebulizer EVERY 6 HOURS AS NEEDED FOR WHEEZING OR shortness of breath. rescue 270 mL 12    amLODIPine (NORVASC) 5 MG tablet Take 1 tablet (5 mg total) by mouth once daily. 90 tablet 3    aspirin (ECOTRIN) 81 MG EC tablet Take 81 mg by mouth once  "daily.      cyanocobalamin, vitamin B-12, 1,000 mcg TbSR Take by mouth once daily.       DOCOSAHEXANOIC ACID/EPA (FISH OIL ORAL) Take 1,200 mg by mouth once daily.      famotidine (PEPCID) 40 MG tablet TAKE ONE TABLET BY MOUTH ONCE A DAY 90 tablet 0    FLUAD 5854-0406, 65 YR UP,,PF, 45 mcg (15 mcg x 3)/0.5 mL Syrg inject .5 milliliter intramuscularly as directed  0    FLUZONE HIGHDOSE QUAD 20-21  mcg/0.7 mL Syrg inj .7 milliliter intramuscularly as directed      glipiZIDE (GLUCOTROL) 10 MG tablet 1 prn when eats too much carbs, about 1x a mo 30 tablet 3    irbesartan-hydrochlorothiazide (AVALIDE) 150-12.5 mg per tablet TAKE ONE TABLET BY MOUTH ONCE A DAY 90 tablet 12    omeprazole (PRILOSEC) 40 MG capsule Take 1 capsule (40 mg total) by mouth once daily. 90 capsule 4    oxybutynin (DITROPAN-XL) 5 MG TR24 TAKE ONE TABLET BY MOUTH ONCE A DAY 90 tablet 12    OZEMPIC 1 mg/dose (4 mg/3 mL) Inject 1 mg into the skin once a week. 90 days supply 3 pen 4    pen needle, diabetic (BD INSULIN PEN NEEDLE UF SHORT) 31 gauge x 5/16" Ndle USE AS DIRECTED 100 each 12    rosuvastatin (CRESTOR) 20 MG tablet TAKE ONE TABLET BY MOUTH ONCE A DAY 90 tablet 12    sars-cov-2, covid-19, (MODERNA COVID-19) 100 mcg/0.5 ml injection       traMADoL (ULTRAM) 50 mg tablet TAKE ONE TABLET BY MOUTH EVERY 6 HOURS AS NEEDED 120 tablet 3    umeclidinium-vilanteroL (ANORO ELLIPTA) 62.5-25 mcg/actuation DsDv Inhale 1 puff into the lungs once daily. THIS SHOULD BE USED, NOT INCRUSE 3 each 3    varenicline (CHANTIX STARTING MONTH BOX) 0.5 mg (11)- 1 mg (42) tablet Take one 0.5mg tab by mouth once daily X3 days,then increase to one 0.5mg tab twice daily X4 days,then increase to one 1mg tab twice daily 1 Package 0    varenicline (CHANTIX) 1 mg Tab Take 1 tablet (1 mg total) by mouth 2 (two) times daily. 60 tablet 2    vitamin D (VITAMIN D3) 1000 units Tab Take 1,000 Units by mouth once daily.       No current facility-administered medications for this " visit.     Facility-Administered Medications Ordered in Other Visits   Medication Dose Route Frequency Provider Last Rate Last Admin    barium sulfate (READI-CAT) suspension 450 mL  450 mL Oral Once Ashtyn Ramires MD           Past Medical History:   Diagnosis Date    Anticoagulant long-term use     Cancer     Kidney (Right)    Cataracts, bilateral     CKD (chronic kidney disease) stage 3, GFR 30-59 ml/min 12/15/2014    Colon polyps     Combined hyperlipidemia associated with type 2 diabetes mellitus 6/7/2013    COPD (chronic obstructive pulmonary disease) 12/15/2014    Diabetes mellitus type II     NIDDM, a.m. glucose-120s-130s-last A1c-7.1-6/7/13    Diabetes mellitus with renal manifestations, uncontrolled 2/1/2017    Diabetic retinopathy     Family history of stomach cancer 12/5/2013    Former smoker     H/O renal cell carcinoma 12/15/2015    History of colonic polyps 2/1/2017    3 polyps 7/13 ---5 yrs    History of gastroesophageal reflux (GERD)     History of urinary incontinence     s/p bladder lift-october, 2011 (at [facility])    Hyperlipidemia     Hypertension     120s/70s-80s    Nephrolithiasis     Osteoarthritis of thumb 12/20/2019    Osteoporosis, post-menopausal 3/17/2014    Primary hyperparathyroidism 10/20/2016    Schatzki's ring 2/1/2017    Dilated 2014     Past Surgical History:   Procedure Laterality Date    bladder lift  2011    done at [facility]    BLADDER STONE REMOVAL  2011    before bladder lift    CATARACT EXTRACTION W/  INTRAOCULAR LENS IMPLANT Left 06/07/2016    Dr. Vásquez    CATARACT EXTRACTION W/  INTRAOCULAR LENS IMPLANT Right 06/21/2016    Dr. Vásquez    COLONOSCOPY N/A 4/26/2018    Procedure: COLONOSCOPY;  Surgeon: Benjamin Zamora MD;  Location: [facility];  Service: Endoscopy;  Laterality: N/A;  confirmed ss    CYSTOSCOPY      NAVIGATIONAL BRONCHOSCOPY N/A 12/11/2018    Procedure: BRONCHOSCOPY, NAVIGATIONAL;  Surgeon: Ashtyn Ramires MD;  Location: [address];  Service:  Pulmonary;  Laterality: N/A;    NEPHRECTOMY      partial right    ROBOTIC BRONCHOSCOPY N/A 10/25/2022    Procedure: ROBOTIC BRONCHOSCOPY;  Surgeon: Ashtyn Ramires MD;  Location: [facility] OR [address] [place] [address];  Service: Pulmonary;  Laterality: N/A;     Family History   Problem Relation Age of Onset    Glaucoma Mother     Cataracts Mother     No Known Problems Father     Colon cancer Brother     Nephrolithiasis Sister     No Known Problems Maternal Aunt     No Known Problems Maternal Uncle     No Known Problems Paternal Aunt     No Known Problems Paternal Uncle     No Known Problems Maternal Grandmother     No Known Problems Maternal Grandfather     No Known Problems Paternal Grandmother     No Known Problems Paternal Grandfather     Anesthesia problems Neg Hx     Kidney cancer Neg Hx     Amblyopia Neg Hx     Blindness Neg Hx     Cancer Neg Hx     Diabetes Neg Hx     Hypertension Neg Hx     Macular degeneration Neg Hx     Retinal detachment Neg Hx     Strabismus Neg Hx     Stroke Neg Hx     Thyroid disease Neg Hx      Social History     Tobacco Use    Smoking status: Every Day     Packs/day: 0.25     Years: 30.00     Pack years: 7.50     Types: Cigarettes    Smokeless tobacco: Never    Tobacco comments:     pt. reports quitting January 2018   Substance Use Topics    Alcohol use: No     Alcohol/week: 0.0 standard drinks    Drug use: No        Review of Systems:  Review of Systems   Constitutional: Negative.    HENT: Negative.     Eyes: Negative.    Respiratory: Negative.     Cardiovascular: Negative.    Gastrointestinal: Negative.    Genitourinary: Negative.    Musculoskeletal: Negative.    Skin: Negative.    Neurological: Negative.    Hematological: Negative.    Psychiatric/Behavioral: Negative.       OBJECTIVE:       Physical Exam:  Physical Exam  Constitutional:       Appearance: Normal appearance.   Eyes:      Extraocular Movements: Extraocular movements intact.      Pupils: Pupils are equal, round, and reactive to light.    Cardiovascular:      Rate and Rhythm: Normal rate and regular rhythm.      Pulses: Normal pulses.   Pulmonary:      Effort: Pulmonary effort is normal.      Breath sounds: Normal breath sounds.   Abdominal:      General: Abdomen is flat.      Palpations: Abdomen is soft.   Skin:     General: Skin is warm and dry.   Neurological:      General: No focal deficit present.      Mental Status: She is alert and oriented to person, place, and time. Mental status is at baseline.   Psychiatric:         Mood and Affect: Mood normal.         Behavior: Behavior normal.         Thought Content: Thought content normal.         Diagnostic Results:    CT chest without contrast - 09/21/21:   Upper lung zone predominant centrilobular and paraseptal emphysematous changes.  Redemonstration of irregular somewhat lobulated soft tissue nodule in the left lower lobe abutting the infrahilar vessels which is been present since at least 10/12/2018.  On today's exam, this measures 2.2 x 1.6 cm and appears unchanged from 11/05/2020 (measuring 2.1 x 1.6 cm at that time).  0.5 cm nodule in the anterior left upper lobe (series 4, image 191) stable dating back to 10/12/2018 CT.  0.7 cm right lower lobe questionable nodule versus vascular structure.  Unchanged since 11/04/2019 CT.   Few additional scattered stable pulmonary micro nodules.  Pleura: No pleural fluid or pneumothorax. No pleural calcification or hyalinized pleural plaque.    CT chest without contrast - 09/29/22:  I reviewed the images  Interval enlargement of right lower lobe nodule.  Other nodules appear stable; the latter include a 2.3 cm left infrahilar nodule.    PET - 11/25/22:  I reviewed the images  Hypermetabolic 1.5 cm nodule in the right lower lobe of the lung concerning for primary neoplasm.  No hypermetabolic lymphadenopathy.  Round soft tissue structure within the anterior abdominal wall measuring up to 2.3 cm with mild radiotracer uptake.      PFTs - 11/29/22: FEV1 1.55  89.4% DLCO 6.43 32%      ASSESSMENT/PLAN:     Mrs. Genao is a 72 y.o. female current smoker with COPD, DM2, HLD, HTN, primary hyperparathyroidism, and h/o of renal cell carcinoma (2015) here today for evaluation of RLL NSCLC.  RLL primary lung squamous cell carcinoma  Current every day smoker    PLAN:Plan   Smoking cessation discussed to avoid perioperative complication  Scheduled robotic right lower lobectomy with lymphadenectomy, 12/19/22  Consents obtained.   Order nuclear cardiac stress for preoperative workup

## 2022-11-25 NOTE — H&P (VIEW-ONLY)
History & Physical    SUBJECTIVE:     History of Present Illness:  Mrs. Genao is a 72 y.o. female current smoker with CKD3, COPD, DM2, HLD, HTN, primary hyperparathyroidism, and h/o of renal cell carcinoma (2015) here today for evaluation of RLL NSCLC.  Previously had a RLL nodule that was reported as stable, now growing. Underwent navigational bronchoscopy 2018 with dede Rasmussen negative for malignancy. CT chest 09/2021 reveals unchanged RLL, RITA, and left perihilar nodules dating back to 11/2019. Repeat CT chest 09/29/22 showed interval enlargement of right lower lobe nodule, 1.5 cm (previously 0.7 cm).  Other nodules appear stable; the latter include a 2.3 cm left infrahilar nodule. Robotic bronchoscopy 10/25/22, path: RLL positive for squamous cell carcinoma, RITA negative for malignancy. Discussed at tumor board, 11/16/22, which recommended follow up with thoracic surgery and radiation oncology for further evaluation and discussion of respective management strategies.     PSH: bladder lift, navigational bronchoscopy, robotic bronchoscopy, colonoscopy, cystoscopy, nephrectomy  Occupation: retired; formerly X-Ray technician (x30 years)    No chief complaint on file.      Review of patient's allergies indicates:   Allergen Reactions    Metformin Diarrhea    Pantoprazole      elevated blood sugars       Current Outpatient Medications   Medication Sig Dispense Refill    albuterol (PROVENTIL/VENTOLIN HFA) 90 mcg/actuation inhaler Inhale 2 puffs into the lungs every 6 (six) hours as needed for Wheezing or Shortness of Breath (rescue and prevention). 18 g 11    albuterol-ipratropium (DUO-NEB) 2.5 mg-0.5 mg/3 mL nebulizer solution INHALE content of ONE vial BY MOUTH via nebulizer EVERY 6 HOURS AS NEEDED FOR WHEEZING OR shortness of breath. rescue 270 mL 12    amLODIPine (NORVASC) 5 MG tablet Take 1 tablet (5 mg total) by mouth once daily. 90 tablet 3    aspirin (ECOTRIN) 81 MG EC tablet Take 81 mg by mouth once  "daily.      cyanocobalamin, vitamin B-12, 1,000 mcg TbSR Take by mouth once daily.       DOCOSAHEXANOIC ACID/EPA (FISH OIL ORAL) Take 1,200 mg by mouth once daily.      famotidine (PEPCID) 40 MG tablet TAKE ONE TABLET BY MOUTH ONCE A DAY 90 tablet 0    FLUAD 9502-0100, 65 YR UP,,PF, 45 mcg (15 mcg x 3)/0.5 mL Syrg inject .5 milliliter intramuscularly as directed  0    FLUZONE HIGHDOSE QUAD 20-21  mcg/0.7 mL Syrg inj .7 milliliter intramuscularly as directed      glipiZIDE (GLUCOTROL) 10 MG tablet 1 prn when eats too much carbs, about 1x a mo 30 tablet 3    irbesartan-hydrochlorothiazide (AVALIDE) 150-12.5 mg per tablet TAKE ONE TABLET BY MOUTH ONCE A DAY 90 tablet 12    omeprazole (PRILOSEC) 40 MG capsule Take 1 capsule (40 mg total) by mouth once daily. 90 capsule 4    oxybutynin (DITROPAN-XL) 5 MG TR24 TAKE ONE TABLET BY MOUTH ONCE A DAY 90 tablet 12    OZEMPIC 1 mg/dose (4 mg/3 mL) Inject 1 mg into the skin once a week. 90 days supply 3 pen 4    pen needle, diabetic (BD INSULIN PEN NEEDLE UF SHORT) 31 gauge x 5/16" Ndle USE AS DIRECTED 100 each 12    rosuvastatin (CRESTOR) 20 MG tablet TAKE ONE TABLET BY MOUTH ONCE A DAY 90 tablet 12    sars-cov-2, covid-19, (MODERNA COVID-19) 100 mcg/0.5 ml injection       traMADoL (ULTRAM) 50 mg tablet TAKE ONE TABLET BY MOUTH EVERY 6 HOURS AS NEEDED 120 tablet 3    umeclidinium-vilanteroL (ANORO ELLIPTA) 62.5-25 mcg/actuation DsDv Inhale 1 puff into the lungs once daily. THIS SHOULD BE USED, NOT INCRUSE 3 each 3    varenicline (CHANTIX STARTING MONTH BOX) 0.5 mg (11)- 1 mg (42) tablet Take one 0.5mg tab by mouth once daily X3 days,then increase to one 0.5mg tab twice daily X4 days,then increase to one 1mg tab twice daily 1 Package 0    varenicline (CHANTIX) 1 mg Tab Take 1 tablet (1 mg total) by mouth 2 (two) times daily. 60 tablet 2    vitamin D (VITAMIN D3) 1000 units Tab Take 1,000 Units by mouth once daily.       No current facility-administered medications for this " visit.     Facility-Administered Medications Ordered in Other Visits   Medication Dose Route Frequency Provider Last Rate Last Admin    barium sulfate (READI-CAT) suspension 450 mL  450 mL Oral Once Ashtyn Ramires MD           Past Medical History:   Diagnosis Date    Anticoagulant long-term use     Cancer     Kidney (Right)    Cataracts, bilateral     CKD (chronic kidney disease) stage 3, GFR 30-59 ml/min 12/15/2014    Colon polyps     Combined hyperlipidemia associated with type 2 diabetes mellitus 6/7/2013    COPD (chronic obstructive pulmonary disease) 12/15/2014    Diabetes mellitus type II     NIDDM, a.m. glucose-120s-130s-last A1c-7.1-6/7/13    Diabetes mellitus with renal manifestations, uncontrolled 2/1/2017    Diabetic retinopathy     Family history of stomach cancer 12/5/2013    Former smoker     H/O renal cell carcinoma 12/15/2015    History of colonic polyps 2/1/2017    3 polyps 7/13 ---5 yrs    History of gastroesophageal reflux (GERD)     History of urinary incontinence     s/p bladder lift-october, 2011 (at [facility] [place] [facility])    Hyperlipidemia     Hypertension     120s/70s-80s    Nephrolithiasis     Osteoarthritis of thumb 12/20/2019    Osteoporosis, post-menopausal 3/17/2014    Primary hyperparathyroidism 10/20/2016    Schatzki's ring 2/1/2017    Dilated 2014     Past Surgical History:   Procedure Laterality Date    bladder lift  2011    done at [facility] [place] [facility]    BLADDER STONE REMOVAL  2011    before bladder lift    CATARACT EXTRACTION W/  INTRAOCULAR LENS IMPLANT Left 06/07/2016    Dr. Vásquez    CATARACT EXTRACTION W/  INTRAOCULAR LENS IMPLANT Right 06/21/2016    Dr. Vásquez    COLONOSCOPY N/A 4/26/2018    Procedure: COLONOSCOPY;  Surgeon: Benjamin Zamora MD;  [facility]: [facility];  Service: Endoscopy;  Laterality: N/A;  confirmed ss    CYSTOSCOPY      NAVIGATIONAL BRONCHOSCOPY N/A 12/11/2018    Procedure: BRONCHOSCOPY, NAVIGATIONAL;  Surgeon: Ashtyn Ramires MD;  Location: [address];  Service:  Pulmonary;  Laterality: N/A;    NEPHRECTOMY      partial right    ROBOTIC BRONCHOSCOPY N/A 10/25/2022    Procedure: ROBOTIC BRONCHOSCOPY;  Surgeon: Ashtyn Ramires MD;  Location: [facility] OR [address] [place] [address];  Service: Pulmonary;  Laterality: N/A;     Family History   Problem Relation Age of Onset    Glaucoma Mother     Cataracts Mother     No Known Problems Father     Colon cancer Brother     Nephrolithiasis Sister     No Known Problems Maternal Aunt     No Known Problems Maternal Uncle     No Known Problems Paternal Aunt     No Known Problems Paternal Uncle     No Known Problems Maternal Grandmother     No Known Problems Maternal Grandfather     No Known Problems Paternal Grandmother     No Known Problems Paternal Grandfather     Anesthesia problems Neg Hx     Kidney cancer Neg Hx     Amblyopia Neg Hx     Blindness Neg Hx     Cancer Neg Hx     Diabetes Neg Hx     Hypertension Neg Hx     Macular degeneration Neg Hx     Retinal detachment Neg Hx     Strabismus Neg Hx     Stroke Neg Hx     Thyroid disease Neg Hx      Social History     Tobacco Use    Smoking status: Every Day     Packs/day: 0.25     Years: 30.00     Pack years: 7.50     Types: Cigarettes    Smokeless tobacco: Never    Tobacco comments:     pt. reports quitting January 2018   Substance Use Topics    Alcohol use: No     Alcohol/week: 0.0 standard drinks    Drug use: No        Review of Systems:  Review of Systems   Constitutional: Negative.    HENT: Negative.     Eyes: Negative.    Respiratory: Negative.     Cardiovascular: Negative.    Gastrointestinal: Negative.    Genitourinary: Negative.    Musculoskeletal: Negative.    Skin: Negative.    Neurological: Negative.    Hematological: Negative.    Psychiatric/Behavioral: Negative.       OBJECTIVE:       Physical Exam:  Physical Exam  Constitutional:       Appearance: Normal appearance.   Eyes:      Extraocular Movements: Extraocular movements intact.      Pupils: Pupils are equal, round, and reactive to light.    Cardiovascular:      Rate and Rhythm: Normal rate and regular rhythm.      Pulses: Normal pulses.   Pulmonary:      Effort: Pulmonary effort is normal.      Breath sounds: Normal breath sounds.   Abdominal:      General: Abdomen is flat.      Palpations: Abdomen is soft.   Skin:     General: Skin is warm and dry.   Neurological:      General: No focal deficit present.      Mental Status: She is alert and oriented to person, place, and time. Mental status is at baseline.   Psychiatric:         Mood and Affect: Mood normal.         Behavior: Behavior normal.         Thought Content: Thought content normal.         Diagnostic Results:    CT chest without contrast - 09/21/21:   Upper lung zone predominant centrilobular and paraseptal emphysematous changes.  Redemonstration of irregular somewhat lobulated soft tissue nodule in the left lower lobe abutting the infrahilar vessels which is been present since at least 10/12/2018.  On today's exam, this measures 2.2 x 1.6 cm and appears unchanged from 11/05/2020 (measuring 2.1 x 1.6 cm at that time).  0.5 cm nodule in the anterior left upper lobe (series 4, image 191) stable dating back to 10/12/2018 CT.  0.7 cm right lower lobe questionable nodule versus vascular structure.  Unchanged since 11/04/2019 CT.   Few additional scattered stable pulmonary micro nodules.  Pleura: No pleural fluid or pneumothorax. No pleural calcification or hyalinized pleural plaque.    CT chest without contrast - 09/29/22:  I reviewed the images  Interval enlargement of right lower lobe nodule.  Other nodules appear stable; the latter include a 2.3 cm left infrahilar nodule.    PET - 11/25/22:  I reviewed the images  Hypermetabolic 1.5 cm nodule in the right lower lobe of the lung concerning for primary neoplasm.  No hypermetabolic lymphadenopathy.  Round soft tissue structure within the anterior abdominal wall measuring up to 2.3 cm with mild radiotracer uptake.      PFTs - 11/29/22: FEV1 1.55  89.4% DLCO 6.43 32%      ASSESSMENT/PLAN:     Mrs. Genao is a 72 y.o. female current smoker with COPD, DM2, HLD, HTN, primary hyperparathyroidism, and h/o of renal cell carcinoma (2015) here today for evaluation of RLL NSCLC.  RLL primary lung squamous cell carcinoma  Current every day smoker    PLAN:Plan   Smoking cessation discussed to avoid perioperative complication  Scheduled robotic right lower lobectomy with lymphadenectomy, 12/19/22  Consents obtained.   Order nuclear cardiac stress for preoperative workup

## 2022-11-28 ENCOUNTER — TELEPHONE (OUTPATIENT)
Dept: PULMONOLOGY | Facility: CLINIC | Age: 72
End: 2022-11-28
Payer: MEDICARE

## 2022-11-28 NOTE — TELEPHONE ENCOUNTER
Spoke to patient.  She is now taking her long acting controller daily for emphysema.  The nodule in the anterior abdominal wall that is hypermetabolic likely represents injection sight for insulin.  She understands that if surgery (preferred by patient) is not an option for treatment, than radiation is also very effective.  [place] [facility] [place] [facility] [place]  I will always remain her pulmonologist, but can only diagnose cancer.  I have referred her to my partners who can treat her.

## 2022-11-28 NOTE — TELEPHONE ENCOUNTER
I spoke with patient. Patient stated she would like to speak to Dr Ramires before seeing oncologist on Wednesday. I let her know I would send a message to Dr Blas for review. Patient stated her cell # is [phone] and verbalized understanding.

## 2022-11-28 NOTE — TELEPHONE ENCOUNTER
----- Message from Rose Ramirez sent at 11/28/2022 11:25 AM CST -----  Regarding: Pt Advice  Pt is Requesting a call back Regarding  Questions and Concerns from Diagnosis Please call to discuss further.      [phone]

## 2022-11-29 DIAGNOSIS — R52 PAIN: ICD-10-CM

## 2022-11-29 LAB
DLCO SINGLE BREATH LLN: 14.34
DLCO SINGLE BREATH PRE REF: 32 %
DLCO SINGLE BREATH REF: 20.08
DLCOC SBVA LLN: 2.72
DLCOC SBVA REF: 4.21
DLCOC SINGLE BREATH LLN: 14.34
DLCOC SINGLE BREATH REF: 20.08
DLCOCSBVAULN: 5.69
DLCOCSINGLEBREATHULN: 25.81
DLCOSINGLEBREATHULN: 25.81
DLCOVA LLN: 2.72
DLCOVA PRE REF: 56.5 %
DLCOVA PRE: 2.38 ML/(MIN*MMHG*L) (ref 2.72–5.69)
DLCOVA REF: 4.21
DLCOVAULN: 5.69
FEF 25 75 LLN: 0.53
FEF 25 75 PRE REF: 66.2 %
FEF 25 75 REF: 1.49
FET100 CHG: 3 %
FEV05 LLN: 0.74
FEV05 REF: 1.6
FEV1 CHG: 2.1 %
FEV1 FVC LLN: 65
FEV1 FVC PRE REF: 90.4 %
FEV1 FVC REF: 78
FEV1 LLN: 1.19
FEV1 PRE REF: 89.4 %
FEV1 REF: 1.74
FEV1 VOL CHG: 0.03
FVC CHG: 5.2 %
FVC LLN: 1.55
FVC PRE REF: 98.1 %
FVC REF: 2.24
FVC VOL CHG: 0.12
IVC PRE: 1.9 L (ref 1.55–2.96)
IVC SINGLE BREATH LLN: 1.55
IVC SINGLE BREATH PRE REF: 84.9 %
IVC SINGLE BREATH REF: 2.24
IVCSINGLEBREATHULN: 2.96
PEF LLN: 2.39
PEF PRE REF: 76.8 %
PEF REF: 4.34
PHYSICIAN COMMENT: ABNORMAL
POST FEF 25 75: 0.92 L/S (ref 0.53–2.99)
POST FET 100: 7.26 SEC
POST FEV1 FVC: 68.55 % (ref 64.65–89.89)
POST FEV1: 1.58 L (ref 1.19–2.26)
POST FEV5: 1.26 L (ref 0.74–2.45)
POST FVC: 2.31 L (ref 1.55–2.96)
POST PEF: 3.86 L/S (ref 2.39–6.28)
PRE DLCO: 6.43 ML/(MIN*MMHG) (ref 14.34–25.81)
PRE FEF 25 75: 0.98 L/S (ref 0.53–2.99)
PRE FET 100: 7.05 SEC
PRE FEV05 REF: 76.6 %
PRE FEV1 FVC: 70.7 % (ref 64.65–89.89)
PRE FEV1: 1.55 L (ref 1.19–2.26)
PRE FEV5: 1.22 L (ref 0.74–2.45)
PRE FVC: 2.19 L (ref 1.55–2.96)
PRE PEF: 3.33 L/S (ref 2.39–6.28)
VA PRE: 2.7 L (ref 4.62–4.62)
VA SINGLE BREATH LLN: 4.62
VA SINGLE BREATH PRE REF: 58.5 %
VA SINGLE BREATH REF: 4.62
VASINGLEBREATHULN: 4.62

## 2022-11-29 NOTE — TELEPHONE ENCOUNTER
----- Message from Susan Franklin sent at 11/29/2022  2:58 PM CST -----  Type: RX Refill Request    Who Called: Self     Have you contacted your pharmacy:Yes     Refill or New Rx:Refill    RX Name and Strength:traMADoL (ULTRAM) 50 mg tablet 120 tablet     Preferred Pharmacy with phone number:[facility] - [place] - [address]   Phone: [phone]  Fax: [phone]    Local or Mail Order:Local     Would the patient rather a call back or a response via [facility]? Call     Best Call Back Number: [phone]

## 2022-11-29 NOTE — TELEPHONE ENCOUNTER
Patient requesting a medication refill, phjarmacy verified and medication pended. See previous message.

## 2022-11-30 ENCOUNTER — HOSPITAL ENCOUNTER (OUTPATIENT)
Dept: PULMONOLOGY | Facility: CLINIC | Age: 72
Discharge: HOME | End: 2022-11-30
Payer: MEDICARE

## 2022-11-30 ENCOUNTER — OFFICE VISIT (OUTPATIENT)
Dept: CARDIOTHORACIC SURGERY | Facility: CLINIC | Age: 72
End: 2022-11-30
Payer: MEDICARE

## 2022-11-30 VITALS
SYSTOLIC BLOOD PRESSURE: 146 MMHG | WEIGHT: 145.5 LBS | OXYGEN SATURATION: 97 % | BODY MASS INDEX: 25.52 KG/M2 | HEIGHT: 63 IN | BODY MASS INDEX: 25.78 KG/M2 | HEIGHT: 63 IN | HEART RATE: 61 BPM | WEIGHT: 144 LBS | DIASTOLIC BLOOD PRESSURE: 72 MMHG

## 2022-11-30 DIAGNOSIS — C34.91 SQUAMOUS CELL LUNG CANCER, RIGHT: ICD-10-CM

## 2022-11-30 DIAGNOSIS — N18.31 CHRONIC KIDNEY DISEASE, STAGE 3A: ICD-10-CM

## 2022-11-30 DIAGNOSIS — I12.9 HYPERTENSIVE CHRONIC KIDNEY DISEASE WITH STAGE 1 THROUGH STAGE 4 CHRONIC KIDNEY DISEASE, OR UNSPECIFIED CHRONIC KIDNEY DISEASE: ICD-10-CM

## 2022-11-30 DIAGNOSIS — C34.91 SQUAMOUS CELL LUNG CANCER, RIGHT: Primary | ICD-10-CM

## 2022-11-30 DIAGNOSIS — F17.200 CURRENT EVERY DAY SMOKER: ICD-10-CM

## 2022-11-30 PROCEDURE — 3072F LOW RISK FOR RETINOPATHY: CPT | Mod: CPTII,S$GLB,, | Performed by: THORACIC SURGERY (CARDIOTHORACIC VASCULAR SURGERY)

## 2022-11-30 PROCEDURE — 1126F AMNT PAIN NOTED NONE PRSNT: CPT | Mod: CPTII,S$GLB,, | Performed by: THORACIC SURGERY (CARDIOTHORACIC VASCULAR SURGERY)

## 2022-11-30 PROCEDURE — 3078F DIAST BP <80 MM HG: CPT | Mod: CPTII,S$GLB,, | Performed by: THORACIC SURGERY (CARDIOTHORACIC VASCULAR SURGERY)

## 2022-11-30 PROCEDURE — 3078F PR MOST RECENT DIASTOLIC BLOOD PRESSURE < 80 MM HG: ICD-10-PCS | Mod: CPTII,S$GLB,, | Performed by: THORACIC SURGERY (CARDIOTHORACIC VASCULAR SURGERY)

## 2022-11-30 PROCEDURE — 3066F PR DOCUMENTATION OF TREATMENT FOR NEPHROPATHY: ICD-10-PCS | Mod: CPTII,S$GLB,, | Performed by: THORACIC SURGERY (CARDIOTHORACIC VASCULAR SURGERY)

## 2022-11-30 PROCEDURE — 3044F PR MOST RECENT HEMOGLOBIN A1C LEVEL <7.0%: ICD-10-PCS | Mod: CPTII,S$GLB,, | Performed by: THORACIC SURGERY (CARDIOTHORACIC VASCULAR SURGERY)

## 2022-11-30 PROCEDURE — 3061F PR NEG MICROALBUMINURIA RESULT DOCUMENTED/REVIEW: ICD-10-PCS | Mod: CPTII,S$GLB,, | Performed by: THORACIC SURGERY (CARDIOTHORACIC VASCULAR SURGERY)

## 2022-11-30 PROCEDURE — 3008F PR BODY MASS INDEX (BMI) DOCUMENTED: ICD-10-PCS | Mod: CPTII,S$GLB,, | Performed by: THORACIC SURGERY (CARDIOTHORACIC VASCULAR SURGERY)

## 2022-11-30 PROCEDURE — 99999 PR PBB SHADOW E&M-EST. PATIENT-LVL V: ICD-10-PCS | Mod: PBBFAC,,, | Performed by: THORACIC SURGERY (CARDIOTHORACIC VASCULAR SURGERY)

## 2022-11-30 PROCEDURE — 1126F PR PAIN SEVERITY QUANTIFIED, NO PAIN PRESENT: ICD-10-PCS | Mod: CPTII,S$GLB,, | Performed by: THORACIC SURGERY (CARDIOTHORACIC VASCULAR SURGERY)

## 2022-11-30 PROCEDURE — 1101F PR PT FALLS ASSESS DOC 0-1 FALLS W/OUT INJ PAST YR: ICD-10-PCS | Mod: CPTII,S$GLB,, | Performed by: THORACIC SURGERY (CARDIOTHORACIC VASCULAR SURGERY)

## 2022-11-30 PROCEDURE — 3077F SYST BP >= 140 MM HG: CPT | Mod: CPTII,S$GLB,, | Performed by: THORACIC SURGERY (CARDIOTHORACIC VASCULAR SURGERY)

## 2022-11-30 PROCEDURE — 3288F FALL RISK ASSESSMENT DOCD: CPT | Mod: CPTII,S$GLB,, | Performed by: THORACIC SURGERY (CARDIOTHORACIC VASCULAR SURGERY)

## 2022-11-30 PROCEDURE — 3072F PR LOW RISK FOR RETINOPATHY: ICD-10-PCS | Mod: CPTII,S$GLB,, | Performed by: THORACIC SURGERY (CARDIOTHORACIC VASCULAR SURGERY)

## 2022-11-30 PROCEDURE — 99999 PR PBB SHADOW E&M-EST. PATIENT-LVL V: CPT | Mod: PBBFAC,,, | Performed by: THORACIC SURGERY (CARDIOTHORACIC VASCULAR SURGERY)

## 2022-11-30 PROCEDURE — 3288F PR FALLS RISK ASSESSMENT DOCUMENTED: ICD-10-PCS | Mod: CPTII,S$GLB,, | Performed by: THORACIC SURGERY (CARDIOTHORACIC VASCULAR SURGERY)

## 2022-11-30 PROCEDURE — 3077F PR MOST RECENT SYSTOLIC BLOOD PRESSURE >= 140 MM HG: ICD-10-PCS | Mod: CPTII,S$GLB,, | Performed by: THORACIC SURGERY (CARDIOTHORACIC VASCULAR SURGERY)

## 2022-11-30 PROCEDURE — 99205 PR OFFICE/OUTPT VISIT, NEW, LEVL V, 60-74 MIN: ICD-10-PCS | Mod: S$GLB,,, | Performed by: THORACIC SURGERY (CARDIOTHORACIC VASCULAR SURGERY)

## 2022-11-30 PROCEDURE — 1101F PT FALLS ASSESS-DOCD LE1/YR: CPT | Mod: CPTII,S$GLB,, | Performed by: THORACIC SURGERY (CARDIOTHORACIC VASCULAR SURGERY)

## 2022-11-30 PROCEDURE — 3008F BODY MASS INDEX DOCD: CPT | Mod: CPTII,S$GLB,, | Performed by: THORACIC SURGERY (CARDIOTHORACIC VASCULAR SURGERY)

## 2022-11-30 PROCEDURE — 94618 PULMONARY STRESS TESTING: CPT | Mod: S$GLB,,, | Performed by: INTERNAL MEDICINE

## 2022-11-30 PROCEDURE — 3044F HG A1C LEVEL LT 7.0%: CPT | Mod: CPTII,S$GLB,, | Performed by: THORACIC SURGERY (CARDIOTHORACIC VASCULAR SURGERY)

## 2022-11-30 PROCEDURE — 3066F NEPHROPATHY DOC TX: CPT | Mod: CPTII,S$GLB,, | Performed by: THORACIC SURGERY (CARDIOTHORACIC VASCULAR SURGERY)

## 2022-11-30 PROCEDURE — 94618 PULMONARY STRESS TESTING: ICD-10-PCS | Mod: S$GLB,,, | Performed by: INTERNAL MEDICINE

## 2022-11-30 PROCEDURE — 3061F NEG MICROALBUMINURIA REV: CPT | Mod: CPTII,S$GLB,, | Performed by: THORACIC SURGERY (CARDIOTHORACIC VASCULAR SURGERY)

## 2022-11-30 PROCEDURE — 99205 OFFICE O/P NEW HI 60 MIN: CPT | Mod: S$GLB,,, | Performed by: THORACIC SURGERY (CARDIOTHORACIC VASCULAR SURGERY)

## 2022-11-30 NOTE — PROCEDURES
Sarina Genao is a 72 y.o.  female patient, who presents for a 6 minute walk test ordered by MD Ike.  The diagnosis is COPD/Emphysema; Pre-operative Evaluation.  The patient's BMI is 25.5 kg/m2.  Predicted distance (lower limit of normal) is 287.46 meters.      Test Results:    The test was completed without stopping.  The total time walked was 360 seconds.  During walking, the patient reported:  No complaints.  The patient used no assistive devices during testing.     11/30/2022---------Distance: 335.28 meters (1100 feet)     O2 Sat % Supplemental Oxygen Heart Rate Blood Pressure Matthew Scale   Pre-exercise  (Resting) 98 % Room Air 84 bpm 173/77 mmHg 0   During Exercise 95 % Room Air 89 bpm 189/81 mmHg 0   Post-exercise  (Recovery) 97 % Room Air  77 bpm       Recovery Time: 75 seconds    Performing nurse/tech: Estopinal RRT      PREVIOUS STUDY:   The patient has not had a previous study.      CLINICAL INTERPRETATION:  Six minute walk distance is 335.28 meters (1100 feet) with no dyspnea.  During exercise, there was no significant desaturation while breathing room air.  Both blood pressure and heart rate remained stable with walking.  Hypertension was present prior to exercise.  The patient did not report non-pulmonary symptoms during exercise.  No previous study performed.  Based upon age and body mass index, exercise capacity is normal.

## 2022-11-30 NOTE — TELEPHONE ENCOUNTER
----- Message from Amelia Sidhu sent at 11/30/2022  2:40 PM CST -----  Regarding: Refill Request  Type: RX Refill Request      Who Called: Self       Refill or New Rx: refill       RX Name and Strength: traMADoL (ULTRAM) 50 mg tablet        Preferred Pharmacy with phone number: [facility] - [place] [id number] [place], [place] [address] [address] [place] [id number]  Phone: [phone] Fax: [phone]          Would the patient rather a call back or a response via [facility]? Call       Best Call Back Number: .[phone]          Additional Information: She said the pharmacy still doesn't have it.

## 2022-11-30 NOTE — TELEPHONE ENCOUNTER
No new care gaps identified.  [facility] Embedded Care Gaps. Reference number: [id number]. 11/30/2022   2:59:38 PM CST

## 2022-11-30 NOTE — TELEPHONE ENCOUNTER
Please always review the prior refill issues related to a refill request to make sure it is not already been done     Looks like earlier this month I sent refills of the tramadol specifically with three refills and it does look like it was received by the pharmacy so may need to call the pharmacy and absolutely let patient know that we sent the refill already earlier this month with three refills so this might be a pharmacy issue, not an issue with the clinic        Disp Refills Start End DEONDRE   traMADoL (ULTRAM) 50 mg tablet 120 tablet 3 11/10/2022  No   Sig: TAKE ONE TABLET BY MOUTH EVERY 6 HOURS AS NEEDED   Sent to pharmacy as: traMADoL (ULTRAM) 50 mg tablet   Class: Normal   Order: [id number]   Date/Time Signed: 11/10/2022 13:05       E-Prescribing Status: Receipt confirmed by pharmacy (11/10/2022  1:11 PM CST)

## 2022-12-01 ENCOUNTER — TELEPHONE (OUTPATIENT)
Dept: FAMILY MEDICINE | Facility: CLINIC | Age: 72
End: 2022-12-01
Payer: MEDICARE

## 2022-12-01 RX ORDER — TRAMADOL HYDROCHLORIDE 50 MG/1
TABLET ORAL
Qty: 120 TABLET | Refills: 3 | Status: SHIPPED | OUTPATIENT
Start: 2022-12-01 | End: 2023-02-22

## 2022-12-01 NOTE — TELEPHONE ENCOUNTER
----- Message from Mariposa Muller sent at 11/30/2022  4:50 PM CST -----  Type: Patient Call Back    Who called: self     What is the request in detail: pt asking for a call back today     Can the clinic reply by [facility]?    Would the patient rather a call back or a response via [facility]? =    Best call back number: [phone] (home)       Additional Information:

## 2022-12-09 LAB
ADEQUACY: ABNORMAL
COMMENT: ABNORMAL
FINAL PATHOLOGIC DIAGNOSIS: ABNORMAL
Lab: ABNORMAL
MICROSCOPIC EXAM: ABNORMAL
SUPPLEMENTAL DIAGNOSIS: ABNORMAL

## 2022-12-15 ENCOUNTER — TELEPHONE (OUTPATIENT)
Dept: CARDIOLOGY | Facility: HOSPITAL | Age: 72
End: 2022-12-15
Payer: MEDICARE

## 2022-12-16 ENCOUNTER — PATIENT OUTREACH (OUTPATIENT)
Dept: ADMINISTRATIVE | Facility: HOSPITAL | Age: 72
End: 2022-12-16
Payer: MEDICARE

## 2022-12-19 ENCOUNTER — HOSPITAL ENCOUNTER (OUTPATIENT)
Dept: CARDIOLOGY | Facility: HOSPITAL | Age: 72
Discharge: HOME | End: 2022-12-19
Payer: MEDICARE

## 2022-12-19 VITALS
DIASTOLIC BLOOD PRESSURE: 89 MMHG | HEART RATE: 71 BPM | WEIGHT: 145 LBS | HEIGHT: 63 IN | SYSTOLIC BLOOD PRESSURE: 159 MMHG | BODY MASS INDEX: 25.69 KG/M2

## 2022-12-19 DIAGNOSIS — N18.31 CHRONIC KIDNEY DISEASE, STAGE 3A: ICD-10-CM

## 2022-12-19 DIAGNOSIS — C34.91 SQUAMOUS CELL LUNG CANCER, RIGHT: ICD-10-CM

## 2022-12-19 DIAGNOSIS — I12.9 HYPERTENSIVE CHRONIC KIDNEY DISEASE WITH STAGE 1 THROUGH STAGE 4 CHRONIC KIDNEY DISEASE, OR UNSPECIFIED CHRONIC KIDNEY DISEASE: ICD-10-CM

## 2022-12-19 LAB
CV PHARM DOSE: 0.4 MG
CV STRESS BASE HR: 71 BPM
DIASTOLIC BLOOD PRESSURE: 89 MMHG
EJECTION FRACTION- HIGH: 59 %
END DIASTOLIC INDEX-HIGH: 155 ML/M2
END DIASTOLIC INDEX-LOW: 91 ML/M2
END SYSTOLIC INDEX-HIGH: 78 ML/M2
END SYSTOLIC INDEX-LOW: 40 ML/M2
OHS CV CPX 85 PERCENT MAX PREDICTED HEART RATE MALE: 120
OHS CV CPX MAX PREDICTED HEART RATE: 141
OHS CV CPX PATIENT IS FEMALE: 1
OHS CV CPX PATIENT IS MALE: 0
OHS CV CPX PEAK DIASTOLIC BLOOD PRESSURE: 101 MMHG
OHS CV CPX PEAK HEAR RATE: 91 BPM
OHS CV CPX PEAK RATE PRESSURE PRODUCT: NORMAL
OHS CV CPX PEAK SYSTOLIC BLOOD PRESSURE: 176 MMHG
OHS CV CPX PERCENT MAX PREDICTED HEART RATE ACHIEVED: 65
OHS CV CPX RATE PRESSURE PRODUCT PRESENTING: NORMAL
RETIRED EF AND QEF - SEE NOTES: 47 %
SYSTOLIC BLOOD PRESSURE: 159 MMHG

## 2022-12-19 PROCEDURE — 93016 NUCLEAR STRESS - CARDIOLOGY INTERPRETED (CUPID ONLY): ICD-10-PCS | Mod: ,,, | Performed by: INTERNAL MEDICINE

## 2022-12-19 PROCEDURE — 93018 NUCLEAR STRESS - CARDIOLOGY INTERPRETED (CUPID ONLY): ICD-10-PCS | Mod: ,,, | Performed by: INTERNAL MEDICINE

## 2022-12-19 PROCEDURE — 63600175 PHARM REV CODE 636 W HCPCS

## 2022-12-19 PROCEDURE — 93017 CV STRESS TEST TRACING ONLY: CPT

## 2022-12-19 PROCEDURE — 93018 CV STRESS TEST I&R ONLY: CPT | Mod: ,,, | Performed by: INTERNAL MEDICINE

## 2022-12-19 PROCEDURE — 93016 CV STRESS TEST SUPVJ ONLY: CPT | Mod: ,,, | Performed by: INTERNAL MEDICINE

## 2022-12-19 PROCEDURE — 78452 HT MUSCLE IMAGE SPECT MULT: CPT | Mod: 26,,, | Performed by: INTERNAL MEDICINE

## 2022-12-19 PROCEDURE — 78452 NUCLEAR STRESS - CARDIOLOGY INTERPRETED (CUPID ONLY): ICD-10-PCS | Mod: 26,,, | Performed by: INTERNAL MEDICINE

## 2022-12-19 RX ORDER — REGADENOSON 0.08 MG/ML
0.4 INJECTION, SOLUTION INTRAVENOUS ONCE
Status: COMPLETED | OUTPATIENT
Start: 2022-12-19 | End: 2022-12-19

## 2022-12-19 RX ADMIN — REGADENOSON 0.4 MG: 0.08 INJECTION, SOLUTION INTRAVENOUS at 08:12

## 2022-12-23 ENCOUNTER — TELEPHONE (OUTPATIENT)
Dept: CARDIOTHORACIC SURGERY | Facility: CLINIC | Age: 72
End: 2022-12-23
Payer: MEDICARE

## 2022-12-23 ENCOUNTER — ANESTHESIA EVENT (OUTPATIENT)
Dept: SURGERY | Facility: HOSPITAL | Age: 72
DRG: 168 | End: 2022-12-23
Payer: MEDICARE

## 2022-12-23 NOTE — ANESTHESIA PREPROCEDURE EVALUATION
[facility]  Anesthesia Pre-Operative Evaluation         Patient Name/: Sarina Genao, 1950  MRN: [id number]    SUBJECTIVE:     Pre-operative evaluation for Procedure(s) (LRB):  XI ROBOTIC RATS,WITH LOBECTOMY,LUNG (Right)  LYMPHADENECTOMY (Right)     2022    Sarina Genao is a 72 y.o. female w/ a significant PMHx of CKD3, COPD, DM2, HLD, HTN, primary hyperparathyroidism, and h/o of renal cell carcinoma () and RLL NSCLC.     Patient now presents for the above procedure(s).      Prev airway:    Induction:  Intravenous    Intubated:  Postinduction    Mask Ventilation:  Easy mask    Attempts:  1    Attempted By:  Student    Method of Intubation:  Direct    Blade:  Eugene 3    Laryngeal View Grade: Grade I - full view of cords      Difficult Airway Encountered?: No      Complications:  None    Airway Device:  Oral endotracheal tube    Airway Device Size:  8.5    Style/Cuff Inflation:  Cuffed    Tube secured:  20    Secured at:  The lips    Placement Verified By:  Capnometry and Fiber optic visualization    Complicating Factors:  None    Patient Active Problem List   Diagnosis    Combined hyperlipidemia associated with type 2 diabetes mellitus    Hypertension, benign    Urinary incontinence, urge    History of kidney cancer    Family history of stomach cancer    GERD (gastroesophageal reflux disease)    Osteoporosis, post-menopausal    Cigarette nicotine dependence with withdrawal    Centrilobular emphysema    CKD (chronic kidney disease) stage 3, GFR 30-59 ml/min    Nuclear sclerosis of both eyes    DM type 2 without retinopathy    Essential hypertension    Refractive error    Senile nuclear sclerosis    Post-operative state    Nuclear sclerosis of right eye    Diabetes mellitus with renal manifestations, uncontrolled    History of colonic polyps    Schatzki's ring    Pseudophakia    PVD (peripheral vascular disease) with claudication    PVD  (peripheral vascular disease)    Bilateral carotid artery stenosis    PCO (posterior capsular opacification), right    Pulmonary vascular congestion    Type 2 diabetes mellitus with chronic kidney disease, without long-term current use of insulin    Chest pain on breathing    Right lower lobe pulmonary nodule    Carotid artery stenosis    Aortic atherosclerosis    Smoker    Osteoarthritis of thumb    De Quervain's tenosynovitis, bilateral    Unspecified inflammatory spondylopathy, multiple sites in spine    Heart failure with preserved ejection fraction    Malignant neoplasm of unspecified part of unspecified bronchus or lung    Squamous cell lung cancer, right       Review of patient's allergies indicates:   Allergen Reactions    Metformin Diarrhea    Pantoprazole      elevated blood sugars       Current Inpatient Medications:       No current facility-administered medications on file prior to encounter.     Current Outpatient Medications on File Prior to Encounter   Medication Sig Dispense Refill    albuterol (PROVENTIL/VENTOLIN HFA) 90 mcg/actuation inhaler Inhale 2 puffs into the lungs every 6 (six) hours as needed for Wheezing or Shortness of Breath (rescue and prevention). 18 g 11    albuterol-ipratropium (DUO-NEB) 2.5 mg-0.5 mg/3 mL nebulizer solution INHALE content of ONE vial BY MOUTH via nebulizer EVERY 6 HOURS AS NEEDED FOR WHEEZING OR shortness of breath. rescue 270 mL 12    amLODIPine (NORVASC) 5 MG tablet Take 1 tablet (5 mg total) by mouth once daily. 90 tablet 3    aspirin (ECOTRIN) 81 MG EC tablet Take 81 mg by mouth once daily.      cyanocobalamin, vitamin B-12, 1,000 mcg TbSR Take by mouth once daily.       DOCOSAHEXANOIC ACID/EPA (FISH OIL ORAL) Take 1,200 mg by mouth once daily.      famotidine (PEPCID) 40 MG tablet TAKE ONE TABLET BY MOUTH ONCE A DAY 90 tablet 0    FLUAD [id number], 65 YR UP,,PF, 45 mcg (15 mcg x 3)/0.5 mL Syrg inject .5 milliliter intramuscularly as  "directed  0    FLUZONE HIGHDOSE QUAD 20-21  mcg/0.7 mL Syrg inj .7 milliliter intramuscularly as directed      glipiZIDE (GLUCOTROL) 10 MG tablet 1 prn when eats too much carbs, about 1x a mo 30 tablet 3    irbesartan-hydrochlorothiazide (AVALIDE) 150-12.5 mg per tablet TAKE ONE TABLET BY MOUTH ONCE A DAY 90 tablet 12    omeprazole (PRILOSEC) 40 MG capsule Take 1 capsule (40 mg total) by mouth once daily. 90 capsule 4    oxybutynin (DITROPAN-XL) 5 MG TR24 TAKE ONE TABLET BY MOUTH ONCE A DAY 90 tablet 12    OZEMPIC 1 mg/dose (4 mg/3 mL) Inject 1 mg into the skin once a week. 90 days supply 3 pen 4    pen needle, diabetic (BD INSULIN PEN NEEDLE UF SHORT) 31 gauge x 5/16" Ndle USE AS DIRECTED 100 each 12    rosuvastatin (CRESTOR) 20 MG tablet TAKE ONE TABLET BY MOUTH ONCE A DAY 90 tablet 12    sars-cov-2, covid-19, (MODERNA COVID-19) 100 mcg/0.5 ml injection       traMADoL (ULTRAM) 50 mg tablet TAKE ONE TABLET BY MOUTH EVERY 6 HOURS AS NEEDED 120 tablet 3    umeclidinium-vilanteroL (ANORO ELLIPTA) 62.5-25 mcg/actuation DsDv Inhale 1 puff into the lungs once daily. THIS SHOULD BE USED, NOT INCRUSE 3 each 3    varenicline (CHANTIX STARTING MONTH BOX) 0.5 mg (11)- 1 mg (42) tablet Take one 0.5mg tab by mouth once daily X3 days,then increase to one 0.5mg tab twice daily X4 days,then increase to one 1mg tab twice daily 1 Package 0    varenicline (CHANTIX) 1 mg Tab Take 1 tablet (1 mg total) by mouth 2 (two) times daily. 60 tablet 2    vitamin D (VITAMIN D3) 1000 units Tab Take 1,000 Units by mouth once daily.         Past Surgical History:   Procedure Laterality Date    bladder [facility]  2011    [facility]    BLADDER STONE REMOVAL  2011    before bladder lift    CATARACT EXTRACTION W/  INTRAOCULAR LENS IMPLANT Left 06/07/2016    Dr. Vásquez    CATARACT EXTRACTION W/  INTRAOCULAR LENS IMPLANT Right 06/21/2016    Dr. Vásquez    COLONOSCOPY N/A 4/26/2018    Procedure: COLONOSCOPY;  Surgeon: Benjamin" RAY Zamora MD;  Location: [facility];  Service: Endoscopy;  Laterality: N/A;  confirmed ss    CYSTOSCOPY      NAVIGATIONAL BRONCHOSCOPY N/A 12/11/2018    Procedure: BRONCHOSCOPY, NAVIGATIONAL;  Surgeon: Ashtyn Ramires MD;  Location: [facility] OR [address] [place] [address];  Service: Pulmonary;  Laterality: N/A;    NEPHRECTOMY      partial right    ROBOTIC BRONCHOSCOPY N/A 10/25/2022    Procedure: ROBOTIC BRONCHOSCOPY;  Surgeon: Ashtyn Ramires MD;  [facility];  Service: Pulmonary;  Laterality: N/A;       Social History:  Tobacco Use: High Risk    Smoking Tobacco Use: Every Day    Smokeless Tobacco Use: Never    Passive Exposure: Not on file       Alcohol Use: Not on file       OBJECTIVE:     Vital Signs Range:  BMI Readings from Last 1 Encounters:   12/19/22 25.69 kg/m²               Significant Labs:        Component Value Date/Time    WBC 11.07 02/13/2021 0946    HGB 14.7 02/13/2021 0946    HCT 45.3 02/13/2021 0946    HCT 39 08/06/2013 1302     02/13/2021 0946     11/04/2022 1146    K 3.5 11/04/2022 1146     11/04/2022 1146    CO2 28 11/04/2022 1146    GLU 65 (L) 11/04/2022 1146    BUN 14 11/04/2022 1146    CREATININE 1.3 11/04/2022 1146    MG 2.0 08/07/2013 0420    PHOS 2.9 11/08/2021 1115    CALCIUM 11.3 (H) 11/04/2022 1146    ALBUMIN 3.6 11/04/2022 1146    PROT 7.5 11/04/2022 1146    ALKPHOS 100 11/04/2022 1146    BILITOT 0.8 11/04/2022 1146    AST 13 11/04/2022 1146    ALT 8 (L) 11/04/2022 1146    HGBA1C 5.6 11/04/2022 1146    HGBA1C 6.0 (H) 02/05/2020 0000        Please see Results Review for additional labs.     Diagnostic Studies: No relevant studies.    EKG:   Results for orders placed or performed during the hospital encounter of 10/08/18   EKG 12-lead (Shortness of Breath) Age > 50    Collection Time: 10/08/18 12:37 PM    Narrative    Test Reason : R06.02,  Blood Pressure : / mmHG  Vent. Rate : 105 BPM     Atrial Rate : 105 BPM     P-R Int : 164 ms          QRS Dur : 094 ms      QT Int :  354 ms       P-R-T Axes : 053 037 057 degrees     QTc Int : 467 ms    Sinus tachycardia  Possible Anterior infarct (cited on or before 08-OCT-2018)  Abnormal ECG  When compared with ECG of 15-AUG-2017 10:06,  Significant changes have occurred  Confirmed by Israel Judd MD ([id number]) on 10/8/2018 8:09:16 PM    Referred By: LATRICE MCKEON           Confirmed By:Israel Judd MD       ECHO:    Nuclear Stess (SPECT)  Abnormal myocardial perfusion scan.    There is a moderate intensity, moderate sized, reversible perfusion abnormality that is consistent with ischemia in the mid to apical lateral wall(s) in the typical distribution of the D1 territory.    There are no other significant perfusion abnormalities.    The visually estimated ejection fraction is normal at rest and normal during stress.    There is normal wall motion at rest and post stress.    LV cavity size is normal at rest and normal at stress.    The ECG portion of the study is negative for ischemia.    The patient reported no chest pain during the stress test.    There were no arrhythmias during stress.    When compared to the previous study from 8/15/2017, there is now a reversible defect in the D1 distribuition.      ASSESSMENT/PLAN:         Pre-op Assessment    I have reviewed the Patient Summary Reports.     I have reviewed the Nursing Notes. I have reviewed the NPO Status.   I have reviewed the Medications.     Review of Systems  Anesthesia Hx:  No problems with previous Anesthesia  History of prior surgery of interest to airway management or planning:  Denies Personal Hx of Anesthesia complications.   Social:  Smoker    Hematology/Oncology:  Hematology Normal       -- Cancer in past history:    EENT/Dental:EENT/Dental Normal   Cardiovascular:   Exercise tolerance: good Hypertension  Denies Angina. CHF hyperlipidemia  Denies BLISS. ECG has been reviewed. Pt reports being able to climb 2 flights of stairs or walk 3-4 blocks on level ground  without a problem.    Pulmonary:   COPD Denies Shortness of breath. Right lower lobe pulmonary nodule   Renal/:   Chronic Renal Disease, CKD    Hepatic/GI:   GERD, well controlled Schatzki's ring   Musculoskeletal:   Arthritis     Neurological:  Neurology Normal    Endocrine:   Diabetes, type 2    Dermatological:  Skin Normal    Psych:  Psychiatric Normal           Physical Exam  General: Well nourished, Cooperative, Alert and Oriented    Airway:  Mallampati: III / II  Mouth Opening: Normal  TM Distance: Normal  Tongue: Normal  Neck ROM: Normal ROM    Dental:  Dentures, Edentulous    Chest/Lungs:  Normal Respiratory Rate    Heart:  Rate: Normal        Anesthesia Plan  Type of Anesthesia, risks & benefits discussed:    Anesthesia Type: Gen ETT  Intra-op Monitoring Plan: Standard ASA Monitors and Art Line  Post Op Pain Control Plan: multimodal analgesia and IV/PO Opioids PRN  Induction:  IV  Airway Plan: Direct, Post-Induction  Informed Consent: Informed consent signed with the Patient and all parties understand the risks and agree with anesthesia plan.  All questions answered. Patient consented to blood products? Yes  ASA Score: 3  Day of Surgery Review of History & Physical: H&P Update referred to the surgeon/provider.    Ready For Surgery From Anesthesia Perspective.     .

## 2022-12-27 ENCOUNTER — ANESTHESIA (OUTPATIENT)
Dept: SURGERY | Facility: HOSPITAL | Age: 72
DRG: 168 | End: 2022-12-27
Payer: MEDICARE

## 2022-12-27 ENCOUNTER — HOSPITAL ENCOUNTER (INPATIENT)
Facility: HOSPITAL | Age: 72
LOS: 1 days | Discharge: HOME | DRG: 168 | End: 2022-12-28
Attending: THORACIC SURGERY (CARDIOTHORACIC VASCULAR SURGERY) | Admitting: THORACIC SURGERY (CARDIOTHORACIC VASCULAR SURGERY)
Payer: MEDICARE

## 2022-12-27 DIAGNOSIS — C34.91 NSCLC OF RIGHT LUNG: ICD-10-CM

## 2022-12-27 DIAGNOSIS — C34.91 SQUAMOUS CELL LUNG CANCER, RIGHT: Primary | ICD-10-CM

## 2022-12-27 DIAGNOSIS — C34.90 MALIGNANT NEOPLASM OF UNSPECIFIED PART OF UNSPECIFIED BRONCHUS OR LUNG: ICD-10-CM

## 2022-12-27 LAB
ABO + RH BLD: NORMAL
BLD GP AB SCN CELLS X3 SERPL QL: NORMAL
GLUCOSE SERPL-MCNC: 104 MG/DL (ref 70–110)
GLUCOSE SERPL-MCNC: 123 MG/DL (ref 70–110)
GLUCOSE SERPL-MCNC: 141 MG/DL (ref 70–110)
HCO3 UR-SCNC: 23.9 MMOL/L (ref 24–28)
HCO3 UR-SCNC: 24.5 MMOL/L (ref 24–28)
HCO3 UR-SCNC: 26.6 MMOL/L (ref 24–28)
HCT VFR BLD CALC: 33 %PCV (ref 36–54)
HCT VFR BLD CALC: 34 %PCV (ref 36–54)
HCT VFR BLD CALC: 36 %PCV (ref 36–54)
PCO2 BLDA: 44.2 MMHG (ref 35–45)
PCO2 BLDA: 52.9 MMHG (ref 35–45)
PCO2 BLDA: 56.2 MMHG (ref 35–45)
PH SMN: 7.24 [PH] (ref 7.35–7.45)
PH SMN: 7.31 [PH] (ref 7.35–7.45)
PH SMN: 7.35 [PH] (ref 7.35–7.45)
PO2 BLDA: 209 MMHG (ref 80–100)
PO2 BLDA: 54 MMHG (ref 80–100)
PO2 BLDA: 78 MMHG (ref 80–100)
POC BE: -1 MMOL/L
POC BE: -4 MMOL/L
POC BE: 0 MMOL/L
POC IONIZED CALCIUM: 1.16 MMOL/L (ref 1.06–1.42)
POC IONIZED CALCIUM: 1.24 MMOL/L (ref 1.06–1.42)
POC IONIZED CALCIUM: 1.25 MMOL/L (ref 1.06–1.42)
POC SATURATED O2: 100 % (ref 95–100)
POC SATURATED O2: 86 % (ref 95–100)
POC SATURATED O2: 92 % (ref 95–100)
POC TCO2: 26 MMOL/L (ref 23–27)
POC TCO2: 26 MMOL/L (ref 23–27)
POC TCO2: 28 MMOL/L (ref 23–27)
POCT GLUCOSE: 116 MG/DL (ref 70–110)
POCT GLUCOSE: 131 MG/DL (ref 70–110)
POTASSIUM BLD-SCNC: 2.8 MMOL/L (ref 3.5–5.1)
POTASSIUM BLD-SCNC: 3.1 MMOL/L (ref 3.5–5.1)
POTASSIUM BLD-SCNC: 5.2 MMOL/L (ref 3.5–5.1)
SAMPLE: ABNORMAL
SODIUM BLD-SCNC: 140 MMOL/L (ref 136–145)
SODIUM BLD-SCNC: 143 MMOL/L (ref 136–145)
SODIUM BLD-SCNC: 145 MMOL/L (ref 136–145)

## 2022-12-27 PROCEDURE — C9290 INJ, BUPIVACAINE LIPOSOME: HCPCS | Performed by: THORACIC SURGERY (CARDIOTHORACIC VASCULAR SURGERY)

## 2022-12-27 PROCEDURE — C1729 CATH, DRAINAGE: HCPCS | Performed by: THORACIC SURGERY (CARDIOTHORACIC VASCULAR SURGERY)

## 2022-12-27 PROCEDURE — 25000003 PHARM REV CODE 250: Performed by: PHYSICIAN ASSISTANT

## 2022-12-27 PROCEDURE — 99900035 HC TECH TIME PER 15 MIN (STAT)

## 2022-12-27 PROCEDURE — 36000711: Performed by: THORACIC SURGERY (CARDIOTHORACIC VASCULAR SURGERY)

## 2022-12-27 PROCEDURE — 82962 GLUCOSE BLOOD TEST: CPT | Performed by: THORACIC SURGERY (CARDIOTHORACIC VASCULAR SURGERY)

## 2022-12-27 PROCEDURE — 37000008 HC ANESTHESIA 1ST 15 MINUTES: Performed by: THORACIC SURGERY (CARDIOTHORACIC VASCULAR SURGERY)

## 2022-12-27 PROCEDURE — 36415 COLL VENOUS BLD VENIPUNCTURE: CPT | Performed by: THORACIC SURGERY (CARDIOTHORACIC VASCULAR SURGERY)

## 2022-12-27 PROCEDURE — 25000003 PHARM REV CODE 250: Performed by: ANESTHESIOLOGY

## 2022-12-27 PROCEDURE — 27201423 OPTIME MED/SURG SUP & DEVICES STERILE SUPPLY: Performed by: THORACIC SURGERY (CARDIOTHORACIC VASCULAR SURGERY)

## 2022-12-27 PROCEDURE — 36620 INSERTION CATHETER ARTERY: CPT | Mod: 59,,, | Performed by: ANESTHESIOLOGY

## 2022-12-27 PROCEDURE — D9220A PRA ANESTHESIA: Mod: CRNA,,, | Performed by: NURSE ANESTHETIST, CERTIFIED REGISTERED

## 2022-12-27 PROCEDURE — 32601 PR THORACOSCOPY,DX NO BX: ICD-10-PCS | Mod: RT,,, | Performed by: THORACIC SURGERY (CARDIOTHORACIC VASCULAR SURGERY)

## 2022-12-27 PROCEDURE — 63600175 PHARM REV CODE 636 W HCPCS: Performed by: NURSE ANESTHETIST, CERTIFIED REGISTERED

## 2022-12-27 PROCEDURE — 25000003 PHARM REV CODE 250: Performed by: NURSE ANESTHETIST, CERTIFIED REGISTERED

## 2022-12-27 PROCEDURE — 71000015 HC POSTOP RECOV 1ST HR: Performed by: THORACIC SURGERY (CARDIOTHORACIC VASCULAR SURGERY)

## 2022-12-27 PROCEDURE — 32601 THORACOSCOPY DIAGNOSTIC: CPT | Mod: AS,RT,, | Performed by: PHYSICIAN ASSISTANT

## 2022-12-27 PROCEDURE — 94761 N-INVAS EAR/PLS OXIMETRY MLT: CPT

## 2022-12-27 PROCEDURE — 71000033 HC RECOVERY, INTIAL HOUR: Performed by: THORACIC SURGERY (CARDIOTHORACIC VASCULAR SURGERY)

## 2022-12-27 PROCEDURE — 27000221 HC OXYGEN, UP TO 24 HOURS

## 2022-12-27 PROCEDURE — 63600175 PHARM REV CODE 636 W HCPCS: Performed by: THORACIC SURGERY (CARDIOTHORACIC VASCULAR SURGERY)

## 2022-12-27 PROCEDURE — D9220A PRA ANESTHESIA: Mod: ANES,,, | Performed by: ANESTHESIOLOGY

## 2022-12-27 PROCEDURE — 36620 PR INSERT CATH,ART,PERCUT,SHORTTERM: ICD-10-PCS | Mod: 59,,, | Performed by: ANESTHESIOLOGY

## 2022-12-27 PROCEDURE — D9220A PRA ANESTHESIA: ICD-10-PCS | Mod: CRNA,,, | Performed by: NURSE ANESTHETIST, CERTIFIED REGISTERED

## 2022-12-27 PROCEDURE — 25000003 PHARM REV CODE 250: Performed by: STUDENT IN AN ORGANIZED HEALTH CARE EDUCATION/TRAINING PROGRAM

## 2022-12-27 PROCEDURE — 37000009 HC ANESTHESIA EA ADD 15 MINS: Performed by: THORACIC SURGERY (CARDIOTHORACIC VASCULAR SURGERY)

## 2022-12-27 PROCEDURE — 32601 THORACOSCOPY DIAGNOSTIC: CPT | Mod: RT,,, | Performed by: THORACIC SURGERY (CARDIOTHORACIC VASCULAR SURGERY)

## 2022-12-27 PROCEDURE — 63600175 PHARM REV CODE 636 W HCPCS: Performed by: PHYSICIAN ASSISTANT

## 2022-12-27 PROCEDURE — 32601 PR THORACOSCOPY,DX NO BX: ICD-10-PCS | Mod: AS,RT,, | Performed by: PHYSICIAN ASSISTANT

## 2022-12-27 PROCEDURE — 20600001 HC STEP DOWN PRIVATE ROOM

## 2022-12-27 PROCEDURE — 63600175 PHARM REV CODE 636 W HCPCS: Performed by: STUDENT IN AN ORGANIZED HEALTH CARE EDUCATION/TRAINING PROGRAM

## 2022-12-27 PROCEDURE — 36000710: Performed by: THORACIC SURGERY (CARDIOTHORACIC VASCULAR SURGERY)

## 2022-12-27 PROCEDURE — D9220A PRA ANESTHESIA: ICD-10-PCS | Mod: ANES,,, | Performed by: ANESTHESIOLOGY

## 2022-12-27 PROCEDURE — 86900 BLOOD TYPING SEROLOGIC ABO: CPT | Performed by: PHYSICIAN ASSISTANT

## 2022-12-27 RX ORDER — IBUPROFEN 200 MG
24 TABLET ORAL
Status: DISCONTINUED | OUTPATIENT
Start: 2022-12-27 | End: 2022-12-28 | Stop reason: HOSPADM

## 2022-12-27 RX ORDER — AMOXICILLIN 250 MG
1 CAPSULE ORAL 2 TIMES DAILY
Status: DISCONTINUED | OUTPATIENT
Start: 2022-12-27 | End: 2022-12-28 | Stop reason: HOSPADM

## 2022-12-27 RX ORDER — GLUCAGON 1 MG
1 KIT INJECTION
Status: DISCONTINUED | OUTPATIENT
Start: 2022-12-27 | End: 2022-12-28 | Stop reason: HOSPADM

## 2022-12-27 RX ORDER — GABAPENTIN 300 MG/1
300 CAPSULE ORAL NIGHTLY
Status: DISCONTINUED | OUTPATIENT
Start: 2022-12-27 | End: 2022-12-28 | Stop reason: HOSPADM

## 2022-12-27 RX ORDER — PROPOFOL 10 MG/ML
VIAL (ML) INTRAVENOUS
Status: DISCONTINUED | OUTPATIENT
Start: 2022-12-27 | End: 2022-12-27

## 2022-12-27 RX ORDER — ENOXAPARIN SODIUM 100 MG/ML
40 INJECTION SUBCUTANEOUS DAILY
Status: DISCONTINUED | OUTPATIENT
Start: 2022-12-28 | End: 2022-12-28 | Stop reason: HOSPADM

## 2022-12-27 RX ORDER — PHENYLEPHRINE HYDROCHLORIDE 10 MG/ML
INJECTION INTRAVENOUS
Status: DISCONTINUED | OUTPATIENT
Start: 2022-12-27 | End: 2022-12-27

## 2022-12-27 RX ORDER — ROCURONIUM BROMIDE 10 MG/ML
INJECTION, SOLUTION INTRAVENOUS
Status: DISCONTINUED | OUTPATIENT
Start: 2022-12-27 | End: 2022-12-27

## 2022-12-27 RX ORDER — SODIUM CHLORIDE 0.9 % (FLUSH) 0.9 %
10 SYRINGE (ML) INJECTION
Status: DISCONTINUED | OUTPATIENT
Start: 2022-12-27 | End: 2022-12-27

## 2022-12-27 RX ORDER — CEFAZOLIN SODIUM 1 G/3ML
INJECTION, POWDER, FOR SOLUTION INTRAMUSCULAR; INTRAVENOUS
Status: DISCONTINUED | OUTPATIENT
Start: 2022-12-27 | End: 2022-12-27

## 2022-12-27 RX ORDER — HYDROCHLOROTHIAZIDE 12.5 MG/1
12.5 TABLET ORAL DAILY
Status: DISCONTINUED | OUTPATIENT
Start: 2022-12-28 | End: 2022-12-28 | Stop reason: HOSPADM

## 2022-12-27 RX ORDER — ONDANSETRON 8 MG/1
8 TABLET, ORALLY DISINTEGRATING ORAL EVERY 8 HOURS PRN
Status: DISCONTINUED | OUTPATIENT
Start: 2022-12-27 | End: 2022-12-28 | Stop reason: HOSPADM

## 2022-12-27 RX ORDER — HALOPERIDOL 5 MG/ML
0.5 INJECTION INTRAMUSCULAR EVERY 10 MIN PRN
Status: DISCONTINUED | OUTPATIENT
Start: 2022-12-27 | End: 2022-12-27 | Stop reason: HOSPADM

## 2022-12-27 RX ORDER — ACETAMINOPHEN 325 MG/1
650 TABLET ORAL EVERY 4 HOURS PRN
Status: DISCONTINUED | OUTPATIENT
Start: 2022-12-27 | End: 2022-12-28 | Stop reason: HOSPADM

## 2022-12-27 RX ORDER — OXYCODONE HYDROCHLORIDE 10 MG/1
10 TABLET ORAL EVERY 4 HOURS PRN
Status: DISCONTINUED | OUTPATIENT
Start: 2022-12-27 | End: 2022-12-28 | Stop reason: HOSPADM

## 2022-12-27 RX ORDER — KETAMINE HCL IN 0.9 % NACL 50 MG/5 ML
SYRINGE (ML) INTRAVENOUS
Status: DISCONTINUED | OUTPATIENT
Start: 2022-12-27 | End: 2022-12-27

## 2022-12-27 RX ORDER — HYDROMORPHONE HYDROCHLORIDE 1 MG/ML
0.2 INJECTION, SOLUTION INTRAMUSCULAR; INTRAVENOUS; SUBCUTANEOUS EVERY 5 MIN PRN
Status: DISCONTINUED | OUTPATIENT
Start: 2022-12-27 | End: 2022-12-27 | Stop reason: HOSPADM

## 2022-12-27 RX ORDER — INSULIN ASPART 100 [IU]/ML
1-10 INJECTION, SOLUTION INTRAVENOUS; SUBCUTANEOUS
Status: DISCONTINUED | OUTPATIENT
Start: 2022-12-27 | End: 2022-12-28 | Stop reason: HOSPADM

## 2022-12-27 RX ORDER — OXYBUTYNIN CHLORIDE 5 MG/1
5 TABLET, EXTENDED RELEASE ORAL DAILY
Status: DISCONTINUED | OUTPATIENT
Start: 2022-12-28 | End: 2022-12-28 | Stop reason: HOSPADM

## 2022-12-27 RX ORDER — IPRATROPIUM BROMIDE AND ALBUTEROL SULFATE 2.5; .5 MG/3ML; MG/3ML
3 SOLUTION RESPIRATORY (INHALATION)
Status: DISCONTINUED | OUTPATIENT
Start: 2022-12-27 | End: 2022-12-28

## 2022-12-27 RX ORDER — OXYCODONE HYDROCHLORIDE 5 MG/1
5 TABLET ORAL EVERY 4 HOURS PRN
Status: DISCONTINUED | OUTPATIENT
Start: 2022-12-27 | End: 2022-12-28 | Stop reason: HOSPADM

## 2022-12-27 RX ORDER — LIDOCAINE HYDROCHLORIDE 10 MG/ML
1 INJECTION, SOLUTION EPIDURAL; INFILTRATION; INTRACAUDAL; PERINEURAL ONCE
Status: DISCONTINUED | OUTPATIENT
Start: 2022-12-27 | End: 2022-12-27

## 2022-12-27 RX ORDER — ACETAMINOPHEN 500 MG
1000 TABLET ORAL
Status: COMPLETED | OUTPATIENT
Start: 2022-12-27 | End: 2022-12-27

## 2022-12-27 RX ORDER — ACETAMINOPHEN 500 MG
1000 TABLET ORAL
Status: DISCONTINUED | OUTPATIENT
Start: 2022-12-27 | End: 2022-12-27

## 2022-12-27 RX ORDER — ONDANSETRON 2 MG/ML
INJECTION INTRAMUSCULAR; INTRAVENOUS
Status: DISCONTINUED | OUTPATIENT
Start: 2022-12-27 | End: 2022-12-27

## 2022-12-27 RX ORDER — BISACODYL 10 MG
10 SUPPOSITORY, RECTAL RECTAL DAILY PRN
Status: DISCONTINUED | OUTPATIENT
Start: 2022-12-27 | End: 2022-12-28 | Stop reason: HOSPADM

## 2022-12-27 RX ORDER — POLYETHYLENE GLYCOL 3350 17 G/17G
17 POWDER, FOR SOLUTION ORAL DAILY
Status: DISCONTINUED | OUTPATIENT
Start: 2022-12-28 | End: 2022-12-28 | Stop reason: HOSPADM

## 2022-12-27 RX ORDER — PHENYLEPHRINE HCL IN 0.9% NACL 1 MG/10 ML
SYRINGE (ML) INTRAVENOUS
Status: DISCONTINUED | OUTPATIENT
Start: 2022-12-27 | End: 2022-12-27

## 2022-12-27 RX ORDER — DEXAMETHASONE SODIUM PHOSPHATE 4 MG/ML
INJECTION, SOLUTION INTRA-ARTICULAR; INTRALESIONAL; INTRAMUSCULAR; INTRAVENOUS; SOFT TISSUE
Status: DISCONTINUED | OUTPATIENT
Start: 2022-12-27 | End: 2022-12-27

## 2022-12-27 RX ORDER — FENTANYL CITRATE 50 UG/ML
INJECTION, SOLUTION INTRAMUSCULAR; INTRAVENOUS
Status: DISCONTINUED | OUTPATIENT
Start: 2022-12-27 | End: 2022-12-27

## 2022-12-27 RX ORDER — ATORVASTATIN CALCIUM 40 MG/1
40 TABLET, FILM COATED ORAL DAILY
Status: DISCONTINUED | OUTPATIENT
Start: 2022-12-28 | End: 2022-12-28 | Stop reason: HOSPADM

## 2022-12-27 RX ORDER — IBUPROFEN 200 MG
16 TABLET ORAL
Status: DISCONTINUED | OUTPATIENT
Start: 2022-12-27 | End: 2022-12-28 | Stop reason: HOSPADM

## 2022-12-27 RX ORDER — POTASSIUM CHLORIDE 14.9 MG/ML
INJECTION INTRAVENOUS CONTINUOUS PRN
Status: DISCONTINUED | OUTPATIENT
Start: 2022-12-27 | End: 2022-12-27

## 2022-12-27 RX ORDER — FAMOTIDINE 20 MG/1
40 TABLET, FILM COATED ORAL DAILY
Status: DISCONTINUED | OUTPATIENT
Start: 2022-12-28 | End: 2022-12-28 | Stop reason: HOSPADM

## 2022-12-27 RX ORDER — CELECOXIB 200 MG/1
400 CAPSULE ORAL
Status: COMPLETED | OUTPATIENT
Start: 2022-12-27 | End: 2022-12-27

## 2022-12-27 RX ORDER — AMLODIPINE BESYLATE 5 MG/1
5 TABLET ORAL DAILY
Status: DISCONTINUED | OUTPATIENT
Start: 2022-12-28 | End: 2022-12-28 | Stop reason: HOSPADM

## 2022-12-27 RX ORDER — ACETAMINOPHEN 325 MG/1
650 TABLET ORAL EVERY 8 HOURS
Status: DISCONTINUED | OUTPATIENT
Start: 2022-12-27 | End: 2022-12-28 | Stop reason: HOSPADM

## 2022-12-27 RX ORDER — LIDOCAINE HYDROCHLORIDE 20 MG/ML
INJECTION, SOLUTION EPIDURAL; INFILTRATION; INTRACAUDAL; PERINEURAL
Status: DISCONTINUED | OUTPATIENT
Start: 2022-12-27 | End: 2022-12-27

## 2022-12-27 RX ADMIN — GABAPENTIN 400 MG: 300 CAPSULE ORAL at 11:12

## 2022-12-27 RX ADMIN — SUGAMMADEX 200 MG: 100 INJECTION, SOLUTION INTRAVENOUS at 04:12

## 2022-12-27 RX ADMIN — POTASSIUM CHLORIDE: 14.9 INJECTION INTRAVENOUS at 03:12

## 2022-12-27 RX ADMIN — GABAPENTIN 300 MG: 300 CAPSULE ORAL at 09:12

## 2022-12-27 RX ADMIN — ACETAMINOPHEN 650 MG: 325 TABLET ORAL at 10:12

## 2022-12-27 RX ADMIN — CEFAZOLIN 2 G: 2 INJECTION, POWDER, FOR SOLUTION INTRAMUSCULAR; INTRAVENOUS at 02:12

## 2022-12-27 RX ADMIN — Medication 100 MCG: at 03:12

## 2022-12-27 RX ADMIN — CEFAZOLIN 2 G: 2 INJECTION, POWDER, FOR SOLUTION INTRAMUSCULAR; INTRAVENOUS at 10:12

## 2022-12-27 RX ADMIN — DEXAMETHASONE SODIUM PHOSPHATE 4 MG: 4 INJECTION INTRA-ARTICULAR; INTRALESIONAL; INTRAMUSCULAR; INTRAVENOUS; SOFT TISSUE at 02:12

## 2022-12-27 RX ADMIN — ONDANSETRON 4 MG: 2 INJECTION INTRAMUSCULAR; INTRAVENOUS at 03:12

## 2022-12-27 RX ADMIN — LIDOCAINE HYDROCHLORIDE 80 MG: 20 INJECTION, SOLUTION EPIDURAL; INFILTRATION; INTRACAUDAL; PERINEURAL at 01:12

## 2022-12-27 RX ADMIN — CELECOXIB 400 MG: 200 CAPSULE ORAL at 11:12

## 2022-12-27 RX ADMIN — INSULIN ASPART 2 UNITS: 100 INJECTION, SOLUTION INTRAVENOUS; SUBCUTANEOUS at 10:12

## 2022-12-27 RX ADMIN — ROCURONIUM BROMIDE 20 MG: 10 INJECTION INTRAVENOUS at 02:12

## 2022-12-27 RX ADMIN — PROPOFOL 120 MG: 10 INJECTION, EMULSION INTRAVENOUS at 01:12

## 2022-12-27 RX ADMIN — PHENYLEPHRINE HYDROCHLORIDE 150 MCG: 10 INJECTION INTRAVENOUS at 03:12

## 2022-12-27 RX ADMIN — ACETAMINOPHEN 1000 MG: 500 TABLET ORAL at 11:12

## 2022-12-27 RX ADMIN — Medication 100 MCG: at 04:12

## 2022-12-27 RX ADMIN — PROPOFOL 40 MG: 10 INJECTION, EMULSION INTRAVENOUS at 01:12

## 2022-12-27 RX ADMIN — SODIUM CHLORIDE, SODIUM GLUCONATE, SODIUM ACETATE, POTASSIUM CHLORIDE, MAGNESIUM CHLORIDE, SODIUM PHOSPHATE, DIBASIC, AND POTASSIUM PHOSPHATE: .53; .5; .37; .037; .03; .012; .00082 INJECTION, SOLUTION INTRAVENOUS at 01:12

## 2022-12-27 RX ADMIN — ROCURONIUM BROMIDE 50 MG: 10 INJECTION INTRAVENOUS at 01:12

## 2022-12-27 RX ADMIN — FENTANYL CITRATE 50 MCG: 50 INJECTION INTRAMUSCULAR; INTRAVENOUS at 02:12

## 2022-12-27 RX ADMIN — FENTANYL CITRATE 50 MCG: 50 INJECTION INTRAMUSCULAR; INTRAVENOUS at 01:12

## 2022-12-27 RX ADMIN — SODIUM CHLORIDE: 0.9 INJECTION, SOLUTION INTRAVENOUS at 01:12

## 2022-12-27 RX ADMIN — Medication 20 MG: at 02:12

## 2022-12-27 NOTE — BRIEF OP NOTE
[facility] - [facility] ([facility])  Surgery Department  Operative Note    SUMMARY     Date of Procedure: 12/27/2022     Procedure: Procedure(s):  VATS (VIDEO-ASSISTED THORACOSCOPIC SURGERY)  BLOCK, NERVE, INTERCOSTAL, 2 OR MORE  LYSIS, ADHESIONS     Surgeon(s) and Role:     * Paxton Cannon MD - Primary     * Fatou Morocho PA-C - Assisting        Pre-Operative Diagnosis: Squamous cell lung cancer, right [C34.91]    Post-Operative Diagnosis: Post-Op Diagnosis Codes:     * Squamous cell lung cancer, right [C34.91]    Anesthesia: General    Description of Technical Procedures: right VATS lysis of adhesions. Intercostal nerve block. 24 fr sam. Resection aborted 2/2 poor toleration of single lung anesthesia     Estimated Blood Loss (EBL): * No values recorded between 12/27/2022  2:49 PM and 12/27/2022  4:26 PM *           Implants: * No implants in log *    Specimens:   Specimen (24h ago, onward)      None                    Condition: Good    Disposition: PACU - hemodynamically stable.    Attestation: A qualified resident physician was not available.

## 2022-12-27 NOTE — ANESTHESIA PROCEDURE NOTES
Arterial    Diagnosis: Right Lung Mass    Patient location during procedure: done in OR    Staffing  Authorizing Provider: Neha Lui MD  Performing Provider: Rome Mast DO    Anesthesiologist was present at the time of the procedure.    Preanesthetic Checklist  Completed: patient identified, IV checked, site marked, risks and benefits discussed, surgical consent, monitors and equipment checked, pre-op evaluation, timeout performed and anesthesia consent givenArterial  Skin Prep: chlorhexidine gluconate  Orientation: right  Location: radial    Catheter placement by Anatomical landmarks. Heme positive aspiration all ports. Insertion Attempts: 1  Assessment  Dressing: secured with tape and tegaderm  Patient: Tolerated well

## 2022-12-27 NOTE — INTERVAL H&P NOTE
The patient has been examined and the H&P has been reviewed: Nuclear stress test reviewed.     I concur with the findings and changes have been noted since the H&P was written: Nuclear stress test reviewed    Surgery risks, benefits and alternative options discussed and understood by patient/family.          There are no hospital problems to display for this patient.

## 2022-12-27 NOTE — OP NOTE
Date of Surgery: 12/27/2022  Preoperative Diagnosis:  Right lower lobe squamous cell cancer  Postoperative Diagnosis: Same  Procedure:  Right VATS, adhesiolysis, intercostal nerve block 2 or more intercostal levels  Surgeon: Paxton Cannon MD  First Assistant: Fatou Morocho PA-C  Anesthesia: GETA, 6 level intercostal nerve block using Exparel/0.5% Marcaine/saline  EBL: <5 mL    Surgery in Detail:     The patient has a diagnosis of right lower lobe squamous cell cancer and smoking-related emphysema.  The patient was met in consultation and surgery versus SBRT was presented.  The patient declined a consultation with radiation therapy and stated that she wanted to undergo surgery despite our concerns about her underlying lung function.  Anatomic lobectomy is indicated based upon the tumor size, histology, and location.  The plan was to establish ports and perform a robotic right lower lobectomy.    The patient was taken to the operating room placed supine and identified.  General anesthesia was established with double-lumen tube insertion.  Tube positioning was confirmed fiberoptically.  The patient was positioned in a left lateral decubitus position all pressure points were padded.  Single lung anesthesia was instituted.  An 8 mm port was placed in the 8th interspace posterior axillary line.  I immediately encountered diffuse adhesions.  Carbon dioxide insufflation was administered.  I used the robotic scope to bluntly create a space to accommodate a 2nd 8 mm port.  I used a combination of electrocautery and blunt dissection along the 8th interspace to allow placement of additional robotic ports.  A 12 mm assistant port was placed in the 10th interspace anterior axillary line.  A 6 level intercostal nerve block was performed.  I engaged in a thoracoscopic lysis of adhesions to create a space to accommodate the robotic instruments.  During the course of dissection the patient developed hypercarbia.  The CO2  insufflation was discontinued.  An arterial blood gas showed hypoxemia and hypercarbia with a paO2 54 and paCO2 44.  The FiO2 was increased from a level of 80% to 100% and the double-lumen tube positioning checked.  I engaged in an additional lysis of adhesions and repeat ABG showed only marginal improvement and the paO2 to 78 but progressive hypercarbia to 56.  Given the expected surgery duration due to adhesion formation, the progressive hypercarbia and the need for right lower lobectomy in the presence of severe upper lobe predominant emphysema, I aborted the surgery.  I had concern that the patient would poorly tolerate right lower lobectomy and would either require continuous oxygen and/or experience a severe adverse effect on her quality of life.  A #24 Fr Errol drain was inserted and secured with silk suture.  All ports were removed.  Each port site was hemostatic.  All port sites were closed in 2 layers using absorbable suture.  A sterile dressing was applied.    Attending attestation:  I was present for and either directly assisted with or performed the critical and key portions of the procedure.     First assistant attestation: Fatou Morocho PA-C functioned as a bedside 1st assistant. There was no qualified resident available to function as a bedside first assistant.

## 2022-12-27 NOTE — ANESTHESIA POSTPROCEDURE EVALUATION
Anesthesia Post Evaluation    Patient: Sarina Genao    Procedure(s) Performed: Procedure(s):  VATS (VIDEO-ASSISTED THORACOSCOPIC SURGERY)  BLOCK, NERVE, INTERCOSTAL, 2 OR MORE  LYSIS, ADHESIONS    Final Anesthesia Type: general      Patient location during evaluation: PACU  Patient participation: Yes- Able to Participate  Level of consciousness: awake and alert  Post-procedure vital signs: reviewed and stable  Pain management: adequate  Airway patency: patent    PONV status at discharge: No PONV  Anesthetic complications: no      Cardiovascular status: blood pressure returned to baseline  Respiratory status: unassisted  Hydration status: euvolemic  Follow-up not needed.          Vitals Value Taken Time   /73 12/27/22 1647   Temp 36.7 °C (98 °F) 12/27/22 1635   Pulse 57 12/27/22 1656   Resp 12 12/27/22 1656   SpO2 98 % 12/27/22 1656   Vitals shown include unvalidated device data.      No case tracking events are documented in the log.      Pain/Graham Score: Pain Rating Prior to Med Admin: 0 (12/27/2022 11:12 AM)  Graham Score: 10 (12/27/2022  4:35 PM)

## 2022-12-27 NOTE — TRANSFER OF CARE
"Anesthesia Transfer of Care Note    Patient: Sarina Genao    Procedure(s) Performed: Procedure(s):  VATS (VIDEO-ASSISTED THORACOSCOPIC SURGERY)  BLOCK, NERVE, INTERCOSTAL, 2 OR MORE  LYSIS, ADHESIONS    Patient location: PACU    Anesthesia Type: general    Transport from OR: Transported from OR on 6-10 L/min O2 by face mask with adequate spontaneous ventilation    Post pain: adequate analgesia    Post assessment: no apparent anesthetic complications    Level of consciousness: awake    Nausea/Vomiting: no nausea/vomiting    Complications: none    Transfer of care protocol was followed      Last vitals:   Visit Vitals  /65 (BP Location: Right arm, Patient Position: Lying)   Pulse (!) 59   Temp 36.7 °C (98 °F) (Temporal)   Resp 16   Ht 5' 3" (1.6 m)   Wt 61.2 kg (135 lb)   SpO2 100%   Breastfeeding No   BMI 23.91 kg/m²     "

## 2022-12-27 NOTE — NURSING TRANSFER
Nursing Transfer Note      12/27/2022     Reason patient is being transferred: post op    Transfer To: 1045    Transfer via stretcher    Transfer with cardiac monitoring    Transported by pct/RN    Medicines sent: none    Any special needs or follow-up needed: routine    Chart send with patient: Yes    Notified: daughter    Patient reassessed at: 1700

## 2022-12-27 NOTE — ANESTHESIA PROCEDURE NOTES
Intubation    Date/Time: 12/27/2022 1:49 PM  Performed by: Rome Mast DO  Authorized by: Neha Lui MD     Intubation:     Induction:  Intravenous    Intubated:  Postinduction    Mask Ventilation:  Easy with oral airway    Attempts:  1    Attempted By:  Resident anesthesiologist    Method of Intubation:  Video laryngoscopy and fiberoptic    Blade:  Eugene 3    Laryngeal View Grade: Grade I - full view of cords      Difficult Airway Encountered?: No      Complications:  None    Airway Device:  Double lumen tube left    Airway Device Size:  35F    Style/Cuff Inflation:  Cuffed (inflated to minimal occlusive pressure)    Placement Verified By:  Capnometry and Fiber optic visualization    Complicating Factors:  None    Findings Post-Intubation:  BS equal bilateral and atraumatic/condition of teeth unchanged

## 2022-12-27 NOTE — BRIEF OP NOTE
Certification of Assistant at Surgery       Surgery Date: 12/27/2022     Participating Surgeons:  Surgeon(s) and Role:     * Paxton Cannon MD - Primary     * Fatou Morocho PA-C - Assisting    Procedures:  Procedure(s):  VATS (VIDEO-ASSISTED THORACOSCOPIC SURGERY)  BLOCK, NERVE, INTERCOSTAL, 2 OR MORE  LYSIS, ADHESIONS    Assistant Surgeon's Certification of Necessity:  I understand that section 1842 (b) (6) (d) of the Social Security Act generally prohibits Medicare Part B reasonable charge payment for the services of assistants at surgery in teaching hospitals when qualified residents are available to furnish such services. I certify that the services for which payment is claimed were medically necessary, and that no qualified resident was available to perform the services. I further understand that these services are subject to post-payment review by the Medicare carrier.      Fatou Morocho PA-C    12/27/2022  4:28 PM

## 2022-12-28 VITALS
SYSTOLIC BLOOD PRESSURE: 143 MMHG | BODY MASS INDEX: 23.92 KG/M2 | OXYGEN SATURATION: 92 % | HEART RATE: 70 BPM | HEIGHT: 63 IN | WEIGHT: 135 LBS | TEMPERATURE: 97 F | DIASTOLIC BLOOD PRESSURE: 63 MMHG | RESPIRATION RATE: 18 BRPM

## 2022-12-28 DIAGNOSIS — C34.91 SQUAMOUS CELL LUNG CANCER, RIGHT: ICD-10-CM

## 2022-12-28 DIAGNOSIS — R91.1 RIGHT LOWER LOBE PULMONARY NODULE: Primary | ICD-10-CM

## 2022-12-28 LAB
POCT GLUCOSE: 126 MG/DL (ref 70–110)
POCT GLUCOSE: 131 MG/DL (ref 70–110)
POCT GLUCOSE: 190 MG/DL (ref 70–110)
POCT GLUCOSE: 226 MG/DL (ref 70–110)
POCT GLUCOSE: 296 MG/DL (ref 70–110)

## 2022-12-28 PROCEDURE — 63600175 PHARM REV CODE 636 W HCPCS: Performed by: PHYSICIAN ASSISTANT

## 2022-12-28 PROCEDURE — 25000003 PHARM REV CODE 250: Performed by: PHYSICIAN ASSISTANT

## 2022-12-28 RX ORDER — GABAPENTIN 300 MG/1
300 CAPSULE ORAL NIGHTLY
Qty: 5 CAPSULE | Refills: 0 | Status: SHIPPED | OUTPATIENT
Start: 2022-12-28 | End: 2023-01-02

## 2022-12-28 RX ORDER — ACETAMINOPHEN 500 MG
1000 TABLET ORAL EVERY 8 HOURS
Refills: 0 | COMMUNITY
Start: 2022-12-28 | End: 2023-01-02

## 2022-12-28 RX ORDER — OXYCODONE HYDROCHLORIDE 5 MG/1
TABLET ORAL
Qty: 20 TABLET | Refills: 0 | Status: SHIPPED | OUTPATIENT
Start: 2022-12-28 | End: 2023-08-15

## 2022-12-28 RX ADMIN — FAMOTIDINE 40 MG: 20 TABLET ORAL at 09:12

## 2022-12-28 RX ADMIN — ATORVASTATIN CALCIUM 40 MG: 40 TABLET, FILM COATED ORAL at 09:12

## 2022-12-28 RX ADMIN — AMLODIPINE BESYLATE 5 MG: 5 TABLET ORAL at 09:12

## 2022-12-28 RX ADMIN — INSULIN ASPART 2 UNITS: 100 INJECTION, SOLUTION INTRAVENOUS; SUBCUTANEOUS at 12:12

## 2022-12-28 RX ADMIN — ENOXAPARIN SODIUM 40 MG: 40 INJECTION SUBCUTANEOUS at 09:12

## 2022-12-28 RX ADMIN — OXYBUTYNIN CHLORIDE 5 MG: 5 TABLET, EXTENDED RELEASE ORAL at 09:12

## 2022-12-28 RX ADMIN — POLYETHYLENE GLYCOL 3350 17 G: 17 POWDER, FOR SOLUTION ORAL at 09:12

## 2022-12-28 RX ADMIN — CEFAZOLIN 2 G: 2 INJECTION, POWDER, FOR SOLUTION INTRAMUSCULAR; INTRAVENOUS at 06:12

## 2022-12-28 RX ADMIN — HYDROCHLOROTHIAZIDE 12.5 MG: 12.5 TABLET ORAL at 09:12

## 2022-12-28 RX ADMIN — SENNOSIDES AND DOCUSATE SODIUM 1 TABLET: 50; 8.6 TABLET ORAL at 09:12

## 2022-12-28 NOTE — HPI
Mrs. Genao is a 72 y.o. female current smoker with CKD3, COPD, DM2, HLD, HTN, primary hyperparathyroidism, and h/o of renal cell carcinoma (2015) here today for evaluation of RLL NSCLC.  Previously had a RLL nodule that was reported as stable, now growing. Underwent navigational bronchoscopy 2018 with dede Rasmussen negative for malignancy. CT chest 09/2021 reveals unchanged RLL, RITA, and left perihilar nodules dating back to 11/2019. Repeat CT chest 09/29/22 showed interval enlargement of right lower lobe nodule, 1.5 cm (previously 0.7 cm).  Other nodules appear stable; the latter include a 2.3 cm left infrahilar nodule. Robotic bronchoscopy 10/25/22, path: RLL positive for squamous cell carcinoma, RITA negative for malignancy. Discussed at tumor board, 11/16/22, which recommended follow up with thoracic surgery and radiation oncology for further evaluation and discussion of respective management strategies.      PSH: bladder lift, navigational bronchoscopy, robotic bronchoscopy, colonoscopy, cystoscopy, nephrectomy  Occupation: retired; formerly X-Ray technician (x30 years)

## 2022-12-28 NOTE — PROGRESS NOTES
Fletcher Dill - [facility]  Thoracic Surgery  Progress Note    Subjective:     History of Present Illness:  Mrs. Genao is a 72 y.o. female current smoker with CKD3, COPD, DM2, HLD, HTN, primary hyperparathyroidism, and h/o of renal cell carcinoma (2015) here today for evaluation of RLL NSCLC.  Previously had a RLL nodule that was reported as stable, now growing. Underwent navigational bronchoscopy 2018 with dede Rasmussen negative for malignancy. CT chest 09/2021 reveals unchanged RLL, RITA, and left perihilar nodules dating back to 11/2019. Repeat CT chest 09/29/22 showed interval enlargement of right lower lobe nodule, 1.5 cm (previously 0.7 cm).  Other nodules appear stable; the latter include a 2.3 cm left infrahilar nodule. Robotic bronchoscopy 10/25/22, path: RLL positive for squamous cell carcinoma, RITA negative for malignancy. Discussed at tumor board, 11/16/22, which recommended follow up with thoracic surgery and radiation oncology for further evaluation and discussion of respective management strategies.      PSH: bladder lift, navigational bronchoscopy, robotic bronchoscopy, colonoscopy, cystoscopy, nephrectomy  Occupation: retired; formerly X-Ray technician (x30 years)         Post-Op Info:  Procedure(s):  VATS (VIDEO-ASSISTED THORACOSCOPIC SURGERY)  BLOCK, NERVE, INTERCOSTAL, 2 OR MORE  LYSIS, ADHESIONS   1 Day Post-Op     Interval History: NAEON. CT 70 ml output overnight. CT on suction. On 2L NC satting 97%. Pain controlled.     Medications:  Continuous Infusions:  Scheduled Meds:   acetaminophen  650 mg Oral Q8H    amLODIPine  5 mg Oral Daily    atorvastatin  40 mg Oral Daily    enoxaparin  40 mg Subcutaneous Daily    famotidine  40 mg Oral Daily    gabapentin  300 mg Oral QHS    hydroCHLOROthiazide  12.5 mg Oral Daily    oxybutynin  5 mg Oral Daily    polyethylene glycol  17 g Oral Daily    senna-docusate 8.6-50 mg  1 tablet Oral BID     PRN Meds:acetaminophen, bisacodyL, dextrose 10%, dextrose  10%, glucagon (human recombinant), glucose, glucose, insulin aspart U-100, ondansetron, oxyCODONE, oxyCODONE     Review of patient's allergies indicates:   Allergen Reactions    Metformin Diarrhea    Pantoprazole      elevated blood sugars     Objective:     Vital Signs (Most Recent):  Temp: 96 °F (35.6 °C) (12/28/22 0805)  Pulse: 70 (12/28/22 0805)  Resp: 16 (12/28/22 0805)  BP: (!) 148/68 (12/28/22 0805)  SpO2: 100 % (12/28/22 0805)   Vital Signs (24h Range):  Temp:  [96 °F (35.6 °C)-98.4 °F (36.9 °C)] 96 °F (35.6 °C)  Pulse:  [54-80] 70  Resp:  [16-24] 16  SpO2:  [90 %-100 %] 100 %  BP: (118-175)/(59-88) 148/68     Intake/Output - Last 3 Shifts         12/26 0700  12/27 0659 12/27 0700  12/28 0659 12/28 0700  12/29 0659    IV Piggyback  1300     Total Intake(mL/kg)  1300 (21.2)     Urine (mL/kg/hr)  0     Blood  10     Chest Tube  70     Total Output  80     Net  +1220            Urine Occurrence  3 x             SpO2: 100 %       Physical Exam  Constitutional:       Appearance: Normal appearance. She is normal weight.   Eyes:      Extraocular Movements: Extraocular movements intact.      Pupils: Pupils are equal, round, and reactive to light.   Cardiovascular:      Rate and Rhythm: Normal rate and regular rhythm.      Pulses: Normal pulses.   Pulmonary:      Effort: Pulmonary effort is normal.      Breath sounds: Normal breath sounds.   Abdominal:      General: Abdomen is flat.      Palpations: Abdomen is soft.   Skin:     General: Skin is warm and dry.      Comments: CT in place on right side.    Neurological:      General: No focal deficit present.      Mental Status: She is alert and oriented to person, place, and time. Mental status is at baseline.   Psychiatric:         Mood and Affect: Mood normal.         Behavior: Behavior normal.         Thought Content: Thought content normal.       Significant Labs:  All pertinent labs from the last 24 hours have been reviewed.    Significant Diagnostics:  CXR: I  have reviewed all pertinent results/findings within the past 24 hours    VTE Risk Mitigation (From admission, onward)           Ordered     enoxaparin injection 40 mg  Daily         12/27/22 1627     IP VTE HIGH RISK PATIENT  Once         12/27/22 1627     Place SARAHI hose  Until discontinued         12/27/22 1627     Place sequential compression device  Until discontinued         12/27/22 1627                  Assessment/Plan:     * Squamous cell lung cancer, right  Mrs. Genao is a 72 y.o. female current smoker with CKD3, COPD, DM2, HLD, HTN, primary hyperparathyroidism, and  h/o of renal cell carcinoma (2015) who is POD1 s/p right VATS with adhesiolysis.     - CT clamp trial this morning. Pending CXR, possible pull CT this afternoon.   - Wean O2 as tolerated, O2 goal >= 88%  - MM pain management  - PRN pain meds for breakthrough pain  - DVT ppx  - IS use   - OOB, ambulate as tolerated        Gama Lehman PACHON  Thoracic Surgery  [facility]

## 2022-12-28 NOTE — ADDENDUM NOTE
Addendum  created 12/27/22 2245 by Maria E Hawkins, CRNA    Flowsheet accepted, Intraprocedure Event deleted, Intraprocedure Event edited, Intraprocedure Meds edited

## 2022-12-28 NOTE — PLAN OF CARE
Fletcher Abreuy Mateo GIS[facility]  Initial Discharge Assessment       Primary Care Provider: Venancio Mayer MD    Admission Diagnosis: Squamous cell lung cancer, right [C34.91]  NSCLC of right lung [C34.91]    Admission Date: 12/27/2022  Expected Discharge Date: 12/30/2022    Discharge Barriers Identified: None    Payor: [employer]ICARE / Plan: [employer]URE HE[employer] / Product Type: Medicare Advantage /     Extended Emergency Contact Information  Primary Emergency Contact: Carolyn Chase  Address: [address] [place], [place] [address] [address] [facility]  Home Phone: [phone]  Mobile Phone: [phone]  Relation: Sister    Discharge Plan A: Home with family  Discharge Plan B: Home with family, [facility] [place] - [address]  [facility] [address]  Phone: [phone] Fax: [phone]        Transferred from: home    Past Medical History:   Diagnosis Date    Anticoagulant long-term use     Cancer     Kidney (Right)    Cataracts, bilateral     CKD (chronic kidney disease) stage 3, GFR 30-59 ml/min 12/15/2014    Colon polyps     Combined hyperlipidemia associated with type 2 diabetes mellitus 6/7/2013    COPD (chronic obstructive pulmonary disease) 12/15/2014    Diabetes mellitus type II     NIDDM, a.m. glucose-120s-130s-last A1c-7.1-6/7/13    Diabetes mellitus with renal manifestations, uncontrolled 2/1/2017    Diabetic retinopathy     Family history of stomach cancer 12/5/2013    Former smoker     H/O renal cell carcinoma 12/15/2015    History of colonic polyps 2/1/2017    3 polyps 7/13 ---5 yrs    History of gastroesophageal reflux (GERD)     History of urinary incontinence     s/p bladder lift-october, 2011 (at [facility])    Hyperlipidemia     Hypertension     120s/70s-80s    Nephrolithiasis     Osteoarthritis of thumb 12/20/2019    Osteoporosis, post-menopausal 3/17/2014    Primary hyperparathyroidism 10/20/2016    Schatzki's ring 2/1/2017     Dilated 2014         CM met with patient and Carolyn Chase (sister) [phone] in room for Discharge Planning Assessment.  Patient is able to answer questions.  Per patient, she lives alone in a single story house with 2 step(s) to enter.   Per patient, the patient was independent with ADLS and used no dme for ambulation.  Patient will have assistance from her sister upon discharge.   Discharge Planning Booklet given to patient/family and discussed.  All questions addressed.  CM will follow for needs.      Initial Assessment (most recent)       Adult Discharge Assessment - 12/28/22 1258          Discharge Assessment    Assessment Type Discharge Planning Assessment     Confirmed/corrected address, phone number and insurance Yes     Confirmed Demographics Correct on Facesheet     Source of Information patient     Communicated MACRINA with patient/caregiver Yes     Reason For Admission Squamous cell lung cancer, right     People in Home alone     Facility Arrived From: Home     Do you expect to return to your current living situation? Yes     Do you have help at home or someone to help you manage your care at home? Yes     Who are your caregiver(s) and their phone number(s)? Carolyn Chase (sister) [phone]     Prior to hospitilization cognitive status: Alert/Oriented     Current cognitive status: Alert/Oriented     Walking or Climbing Stairs --   independent    Dressing/Bathing --   Independent    Home Accessibility stairs to enter home     Number of Stairs, Main Entrance two     Home Layout Able to live on 1st floor     Readmission within 30 days? No     Patient currently being followed by outpatient case management? No     Do you currently have service(s) that help you manage your care at home? No     Do you take prescription medications? Yes     Do you have prescription coverage? Yes     Coverage [employer]     Do you have any problems affording any of your prescribed medications? No     Is the  patient taking medications as prescribed? yes     Who is going to help you get home at discharge? Carolyn Chase (sister) [phone]     How do you get to doctors appointments? car, drives self;family or friend will provide     Are you on dialysis? No     Do you take coumadin? No     Discharge Plan A Home with family     Discharge Plan B Home with family;Home Health     DME Needed Upon Discharge  none     Discharge Plan discussed with: Patient     Discharge Barriers Identified None                          Margarette Georges RN, CCRN-K, [facility]  Neuro-Critical Care   [phone]

## 2022-12-28 NOTE — NURSING
Pt arrived from PACU Aox4. VSS. No signs of respiratory distress on RA. CT to wall suction. Pt remained injury free through shift. Will continue POC.

## 2022-12-28 NOTE — ASSESSMENT & PLAN NOTE
Mrs. Genao is a 72 y.o. female current smoker with CKD3, COPD, DM2, HLD, HTN, primary hyperparathyroidism, and  h/o of renal cell carcinoma (2015) who is POD1 s/p right VATS with adhesiolysis.     - CT clamp trial this morning. Pending CXR, possible pull CT this afternoon.   - Wean O2 as tolerated, O2 goal >= 88%  - MM pain management  - PRN pain meds for breakthrough pain  - DVT ppx  - IS use   - OOB, ambulate as tolerated

## 2022-12-28 NOTE — SUBJECTIVE & OBJECTIVE
Interval History: NAEON. CT 70 ml output overnight. CT on suction. On 2L NC satting 97%. Pain controlled.     Medications:  Continuous Infusions:  Scheduled Meds:   acetaminophen  650 mg Oral Q8H    amLODIPine  5 mg Oral Daily    atorvastatin  40 mg Oral Daily    enoxaparin  40 mg Subcutaneous Daily    famotidine  40 mg Oral Daily    gabapentin  300 mg Oral QHS    hydroCHLOROthiazide  12.5 mg Oral Daily    oxybutynin  5 mg Oral Daily    polyethylene glycol  17 g Oral Daily    senna-docusate 8.6-50 mg  1 tablet Oral BID     PRN Meds:acetaminophen, bisacodyL, dextrose 10%, dextrose 10%, glucagon (human recombinant), glucose, glucose, insulin aspart U-100, ondansetron, oxyCODONE, oxyCODONE     Review of patient's allergies indicates:   Allergen Reactions    Metformin Diarrhea    Pantoprazole      elevated blood sugars     Objective:     Vital Signs (Most Recent):  Temp: 96 °F (35.6 °C) (12/28/22 0805)  Pulse: 70 (12/28/22 0805)  Resp: 16 (12/28/22 0805)  BP: (!) 148/68 (12/28/22 0805)  SpO2: 100 % (12/28/22 0805)   Vital Signs (24h Range):  Temp:  [96 °F (35.6 °C)-98.4 °F (36.9 °C)] 96 °F (35.6 °C)  Pulse:  [54-80] 70  Resp:  [16-24] 16  SpO2:  [90 %-100 %] 100 %  BP: (118-175)/(59-88) 148/68     Intake/Output - Last 3 Shifts         12/26 0700 12/27 0659 12/27 0700  12/28 0659 12/28 0700 12/29 0659    IV Piggyback  1300     Total Intake(mL/kg)  1300 (21.2)     Urine (mL/kg/hr)  0     Blood  10     Chest Tube  70     Total Output  80     Net  +1220            Urine Occurrence  3 x             SpO2: 100 %       Physical Exam  Constitutional:       Appearance: Normal appearance. She is normal weight.   Eyes:      Extraocular Movements: Extraocular movements intact.      Pupils: Pupils are equal, round, and reactive to light.   Cardiovascular:      Rate and Rhythm: Normal rate and regular rhythm.      Pulses: Normal pulses.   Pulmonary:      Effort: Pulmonary effort is normal.      Breath sounds: Normal breath sounds.    Abdominal:      General: Abdomen is flat.      Palpations: Abdomen is soft.   Skin:     General: Skin is warm and dry.      Comments: CT in place on right side.    Neurological:      General: No focal deficit present.      Mental Status: She is alert and oriented to person, place, and time. Mental status is at baseline.   Psychiatric:         Mood and Affect: Mood normal.         Behavior: Behavior normal.         Thought Content: Thought content normal.       Significant Labs:  All pertinent labs from the last 24 hours have been reviewed.    Significant Diagnostics:  CXR: I have reviewed all pertinent results/findings within the past 24 hours    VTE Risk Mitigation (From admission, onward)           Ordered     enoxaparin injection 40 mg  Daily         12/27/22 1627     IP VTE HIGH RISK PATIENT  Once         12/27/22 1627     Place SARAHI hose  Until discontinued         12/27/22 1627     Place sequential compression device  Until discontinued         12/27/22 1627

## 2022-12-28 NOTE — DISCHARGE SUMMARY
[facility]letcher sofia [facility]  Thoracic Surgery  Discharge Summary    Patient Name: Sarina Genao  MRN: [id number]  Admission Date: 12/27/2022  Hospital Length of Stay: 1 days  Discharge Date and Time:  12/28/2022 4:40 PM  Attending Physician: Paxton Cannon MD   Discharging Provider: Anat Barkley MD  Primary Care Provider: Venancio Mayer MD    HPI:   Mrs. Genao is a 72 y.o. female current smoker with CKD3, COPD, DM2, HLD, HTN, primary hyperparathyroidism, and h/o of renal cell carcinoma (2015) here today for evaluation of RLL NSCLC.  Previously had a RLL nodule that was reported as stable, now growing. Underwent navigational bronchoscopy 2018 with dede Rasmussen negative for malignancy. CT chest 09/2021 reveals unchanged RLL, RITA, and left perihilar nodules dating back to 11/2019. Repeat CT chest 09/29/22 showed interval enlargement of right lower lobe nodule, 1.5 cm (previously 0.7 cm).  Other nodules appear stable; the latter include a 2.3 cm left infrahilar nodule. Robotic bronchoscopy 10/25/22, path: RLL positive for squamous cell carcinoma, RITA negative for malignancy. Discussed at tumor board, 11/16/22, which recommended follow up with thoracic surgery and radiation oncology for further evaluation and discussion of respective management strategies.      PSH: bladder lift, navigational bronchoscopy, robotic bronchoscopy, colonoscopy, cystoscopy, nephrectomy  Occupation: retired; formerly X-Ray technician (x30 years)         Procedure(s):  VATS (VIDEO-ASSISTED THORACOSCOPIC SURGERY)  BLOCK, NERVE, INTERCOSTAL, 2 OR MORE  LYSIS, ADHESIONS      Hospital Course: Ms. Genao presented for the above procedure. She tolerated this well and was transferred to PACU for recovery from anesthesia before being moved to the floor for further observation. She was started on a CLD initially which was advanced appropriately. Her chest tube was able to be removed on POD#1 and she was weaned off of supplemental O2. She was  able to be discharged on POD#1. At that time, she was tolerating a diabetic diet, ambulating, voiding spontaneously, and had adequate pain control. Discharge instructions were provided along with a recommended time for follow up.        Goals of Care Treatment Preferences:  Code Status: Full Code          Significant Diagnostic Studies: Labs: All labs within the past 24 hours have been reviewed    Pending Diagnostic Studies:     Procedure Component Value Units Date/Time    X-Ray Chest AP Portable [[id number]] Resulted: 12/28/22 1132    Order Status: Sent Lab Status: In process Updated: 12/28/22 1256        Final Active Diagnoses:    Diagnosis Date Noted POA    PRINCIPAL PROBLEM:  Squamous cell lung cancer, right [C34.91] 11/30/2022 Yes      Problems Resolved During this Admission:      Discharged Condition: good    Disposition: Home or Self Care    Follow Up:   Follow-up Information     Xavier Deng MD Follow up in 2 week(s).    Specialty: Radiation Oncology  Contact information:  [address]  [facility] [address]  [phone]             Paxton Cannon MD Follow up in 2 week(s).    Specialty: Cardiothoracic Surgery  Why: For post operative check up  Contact information:  [address]  [facility] [address]  [phone]                       Patient Instructions:      No driving until:   Order Comments: No longer taking narcotic pain meds     Notify your health care provider if you experience any of the following:  temperature >100.4     Notify your health care provider if you experience any of the following:  persistent nausea and vomiting or diarrhea     Notify your health care provider if you experience any of the following:  severe uncontrolled pain     Notify your health care provider if you experience any of the following:  redness, tenderness, or signs of infection (pain, swelling, redness, odor or green/yellow discharge around incision site)     Notify your health care provider if you  experience any of the following:  difficulty breathing or increased cough     Notify your health care provider if you experience any of the following:  severe persistent headache     Notify your health care provider if you experience any of the following:  worsening rash     Notify your health care provider if you experience any of the following:  persistent dizziness, light-headedness, or visual disturbances     Notify your health care provider if you experience any of the following:  increased confusion or weakness     Remove dressing in 24 hours   Order Comments: Okay to shower tomorrow. Please do not soak in water such as a bath, hot tub, or pool for 2 weeks  Your incision is covered with white tape (steri-strips). To shower, let soapy water run over the incision and strips and pat dry. The strips will fall off on their own over the next 1-2 weeks.    Okay to place gauze and tape over chest tube site if having drainage     Medications:  Reconciled Home Medications:      Medication List      START taking these medications    acetaminophen 500 MG tablet  Commonly known as: TYLENOL  Take 2 tablets (1,000 mg total) by mouth every 8 (eight) hours. for 5 days     gabapentin 300 MG capsule  Commonly known as: NEURONTIN  Take 1 capsule (300 mg total) by mouth every evening. for 5 days     oxyCODONE 5 MG immediate release tablet  Commonly known as: ROXICODONE  Take one tablet (5 mg) every 6 hours as needed for pain. Do not take Tramadol if taking oxycodone.        CHANGE how you take these medications    OZEMPIC 1 mg/dose (4 mg/3 mL)  Generic drug: semaglutide  INJECT ONE MG SUBCUTANEOUSLY once every week  What changed: See the new instructions.        CONTINUE taking these medications    albuterol 90 mcg/actuation inhaler  Commonly known as: PROVENTIL/VENTOLIN HFA  Inhale 2 puffs into the lungs every 6 (six) hours as needed for Wheezing or Shortness of Breath (rescue and prevention).     albuterol-ipratropium 2.5 mg-0.5  "mg/3 mL nebulizer solution  Commonly known as: DUO-NEB  INHALE content of ONE vial BY MOUTH via nebulizer EVERY 6 HOURS AS NEEDED FOR WHEEZING OR shortness of breath. rescue     amLODIPine 5 MG tablet  Commonly known as: NORVASC  Take 1 tablet (5 mg total) by mouth once daily.     aspirin 81 MG EC tablet  Commonly known as: ECOTRIN  Take 81 mg by mouth once daily.     * CHANTIX STARTING MONTH BOX 0.5 mg (11)- 1 mg (42) tablet  Generic drug: varenicline  Take one 0.5mg tab by mouth once daily X3 days,then increase to one 0.5mg tab twice daily X4 days,then increase to one 1mg tab twice daily     * varenicline 1 mg Tab  Commonly known as: CHANTIX  Take 1 tablet (1 mg total) by mouth 2 (two) times daily.     cyanocobalamin (vitamin B-12) 1,000 mcg Tbsr  Take by mouth once daily.     famotidine 40 MG tablet  Commonly known as: PEPCID  TAKE ONE TABLET BY MOUTH ONCE A DAY     FISH OIL ORAL  Take 1,200 mg by mouth once daily.     FLUAD 5910-1360 (65 YR UP)(PF) 45 mcg (15 mcg x 3)/0.5 mL Syrg  Generic drug: flu vac 2019 [id number](PF)  inject .5 milliliter intramuscularly as directed     FLUZONE HIGHDOSE QUAD 20-21  mcg/0.7 mL Syrg  Generic drug: flu vacc fl7672-42(65yr up)-PF  inj .7 milliliter intramuscularly as directed     glipiZIDE 10 MG tablet  Commonly known as: GLUCOTROL  1 prn when eats too much carbs, about 1x a mo     irbesartan-hydrochlorothiazide 150-12.5 mg per tablet  Commonly known as: AVALIDE  TAKE ONE TABLET BY MOUTH ONCE A DAY     omeprazole 40 MG capsule  Commonly known as: PRILOSEC  Take 1 capsule (40 mg total) by mouth once daily.     oxybutynin 5 MG Tr24  Commonly known as: DITROPAN-XL  TAKE ONE TABLET BY MOUTH ONCE A DAY     pen needle, diabetic 31 gauge x 5/16" Ndle  Commonly known as: BD ULTRA-FINE SHORT PEN NEEDLE  USE AS DIRECTED     rosuvastatin 20 MG tablet  Commonly known as: CRESTOR  TAKE ONE TABLET BY MOUTH ONCE A DAY     sars-cov-2 (covid-19) 100 mcg/0.5 ml injection  Commonly known " as: MODERNA COVID-19     traMADoL 50 mg tablet  Commonly known as: ULTRAM  TAKE ONE TABLET BY MOUTH EVERY 6 HOURS AS NEEDED     umeclidinium-vilanteroL 62.5-25 mcg/actuation Dsdv  Commonly known as: ANORO ELLIPTA  Inhale 1 puff into the lungs once daily. THIS SHOULD BE USED, NOT INCRUSE     vitamin D 1000 units Tab  Commonly known as: VITAMIN D3  Take 1,000 Units by mouth once daily.         * This list has 2 medication(s) that are the same as other medications prescribed for you. Read the directions carefully, and ask your doctor or other care provider to review them with you.                Anat Barkley MD  Thoracic Surgery  [facility]  [facility]

## 2022-12-28 NOTE — HOSPITAL COURSE
Ms. Genao presented for the above procedure. She tolerated this well and was transferred to PACU for recovery from anesthesia before being moved to the floor for further observation. She was started on a CLD initially which was advanced appropriately. Her chest tube was able to be removed on POD#1 and she was weaned off of supplemental O2. She was able to be discharged on POD#1. At that time, she was tolerating a diabetic diet, ambulating, voiding spontaneously, and had adequate pain control. Discharge instructions were provided along with a recommended time for follow up.

## 2022-12-29 NOTE — NURSING
D/c instructions reviewed with pt. F/u appointment reviewed with pt.  Pt. Verbalized understanding. Medications at bedside with all belongings. Pt. Left with sister via transport.

## 2022-12-29 NOTE — PLAN OF CARE
[facility]  Discharge Final Note    Primary Care Provider: Venancio Mayer MD    Expected Discharge Date: 12/28/2022    Final Discharge Note (most recent)       Final Note - 12/29/22 0944          Final Note    Assessment Type Final Discharge Note     Anticipated Discharge Disposition Home or Self Care     Hospital Resources/Appts/Education Provided Provided patient/caregiver with written discharge plan information        Post-Acute Status    Coverage PHN     Discharge Delays None known at this time                     Important Message from Medicare             Contact Info       Xavier Deng MD   Specialty: Radiation Oncology    [address] [id number]   Phone: [phone]       Next Steps: Follow up in 2 week(s)    Paxton Cannon MD   Specialty: Cardiothoracic Surgery    [address] [address]   Phone: [phone]       Next Steps: Follow up in 2 week(s)    Instructions: For post operative check up          Pt d/c home with family. No d/c needs reported by medical team at this time.    Daniela Cornelius LCSW  Case Management/[facility]  [phone]

## 2023-01-09 NOTE — PROGRESS NOTES
"Subjective:       Patient ID: Sarina Genao is a 72 y.o. female.    Chief Complaint: Follow-up    Diagnosis:  RLL NSCLC    Pre-operative therapy: none    Procedure(s) and date(s):   10/25/22 - Robotic Bronchoscopy  12/27/22 - Right VATS, adhesiolysis, intercostal nerve block 2 or more intercostal levels    Pathology:   10/25/22 - RLL squamous cell carcinoma. LN 7 negative for malignancy, lymphocytes present.     Post-operative therapy:  surveillance    HPI  Mrs. Genao is a 72 y.o. female current smoker with CKD3, COPD, DM2, HLD, HTN, primary hyperparathyroidism, and h/o of renal cell carcinoma (2015) here today for 2 week follow up s/p aborted resection. Aborted resection secondary to intense adhesion formation coupled with hypercarbia and upper lobe predominant emphysema. No incisional pain. Breathing stable.     Review of Systems   Constitutional:  Negative for activity change and appetite change.   Respiratory:  Negative for cough, chest tightness and shortness of breath.    Cardiovascular: Negative.    Gastrointestinal: Negative.    Neurological:  Negative for syncope, coordination difficulties and coordination difficulties.   Psychiatric/Behavioral:  Negative for agitation. The patient is not nervous/anxious.        Objective:      Vitals:    01/11/23 1000   BP: 136/75   Pulse: 70   SpO2: 96%   Weight: 65.4 kg (144 lb 2.9 oz)   Height: 5' 3" (1.6 m)   PainSc: 0-No pain         Physical Exam  Constitutional:       Appearance: Normal appearance.   HENT:      Head: Normocephalic and atraumatic.   Eyes:      Extraocular Movements: Extraocular movements intact.   Cardiovascular:      Rate and Rhythm: Normal rate.   Pulmonary:      Effort: Pulmonary effort is normal.      Comments: Robotic incisions healing well. Anterior with very slight skin separation. No erythema or drainage  Abdominal:      General: Abdomen is flat.      Palpations: Abdomen is soft.   Musculoskeletal:         General: Normal range of " motion.      Cervical back: Normal range of motion.   Skin:     General: Skin is warm and dry.      Capillary Refill: Capillary refill takes less than 2 seconds.   Neurological:      General: No focal deficit present.      Mental Status: She is alert and oriented to person, place, and time.   Psychiatric:         Mood and Affect: Mood normal.         Behavior: Behavior normal.         Diagnostics:     CXR - 01/11/23: no pTX     Assessment:       Mrs. Genao is a 72 y.o. female current smoker with CKD3, COPD, DM2, HLD, HTN, primary hyperparathyroidism, and h/o of renal cell carcinoma (2015) here today for 2 week follow up s/p Right VATS, adhesiolysis aborted robotic lobectomy for RLL NSCLC.  Moderate to severe upper lobe predominant emphysema  Plan:       Incisions healing. Seeing Dr. Deng with radiation oncology today to discuss SBRT for localized management of right lower lobe NSCLC.   RTC prn.

## 2023-01-11 ENCOUNTER — OFFICE VISIT (OUTPATIENT)
Dept: CARDIOTHORACIC SURGERY | Facility: CLINIC | Age: 73
End: 2023-01-11
Payer: MEDICARE

## 2023-01-11 ENCOUNTER — HOSPITAL ENCOUNTER (OUTPATIENT)
Dept: RADIOLOGY | Facility: HOSPITAL | Age: 73
Discharge: HOME | End: 2023-01-11
Payer: MEDICARE

## 2023-01-11 ENCOUNTER — OFFICE VISIT (OUTPATIENT)
Dept: RADIATION ONCOLOGY | Facility: CLINIC | Age: 73
End: 2023-01-11
Payer: MEDICARE

## 2023-01-11 VITALS
RESPIRATION RATE: 17 BRPM | SYSTOLIC BLOOD PRESSURE: 136 MMHG | OXYGEN SATURATION: 96 % | OXYGEN SATURATION: 96 % | BODY MASS INDEX: 25.55 KG/M2 | SYSTOLIC BLOOD PRESSURE: 136 MMHG | TEMPERATURE: 97 F | HEART RATE: 70 BPM | HEIGHT: 63 IN | HEIGHT: 63 IN | WEIGHT: 144.19 LBS | WEIGHT: 144.19 LBS | DIASTOLIC BLOOD PRESSURE: 75 MMHG | DIASTOLIC BLOOD PRESSURE: 75 MMHG | BODY MASS INDEX: 25.55 KG/M2 | HEART RATE: 70 BPM

## 2023-01-11 DIAGNOSIS — C34.31 PRIMARY MALIGNANT NEOPLASM OF RIGHT LOWER LOBE OF LUNG: Primary | ICD-10-CM

## 2023-01-11 DIAGNOSIS — R91.1 RIGHT LOWER LOBE PULMONARY NODULE: ICD-10-CM

## 2023-01-11 DIAGNOSIS — C34.91 SQUAMOUS CELL LUNG CANCER, RIGHT: ICD-10-CM

## 2023-01-11 DIAGNOSIS — C34.31 MALIGNANT NEOPLASM OF LOWER LOBE OF RIGHT LUNG: Primary | ICD-10-CM

## 2023-01-11 PROBLEM — C34.90 MALIGNANT NEOPLASM OF UNSPECIFIED PART OF UNSPECIFIED BRONCHUS OR LUNG: Status: RESOLVED | Noted: 2022-11-25 | Resolved: 2023-01-11

## 2023-01-11 PROCEDURE — 3078F DIAST BP <80 MM HG: CPT | Mod: CPTII,S$GLB,, | Performed by: THORACIC SURGERY (CARDIOTHORACIC VASCULAR SURGERY)

## 2023-01-11 PROCEDURE — 3008F PR BODY MASS INDEX (BMI) DOCUMENTED: ICD-10-PCS | Mod: CPTII,S$GLB,, | Performed by: RADIOLOGY

## 2023-01-11 PROCEDURE — 3008F BODY MASS INDEX DOCD: CPT | Mod: CPTII,S$GLB,, | Performed by: THORACIC SURGERY (CARDIOTHORACIC VASCULAR SURGERY)

## 2023-01-11 PROCEDURE — 71046 X-RAY EXAM CHEST 2 VIEWS: CPT | Mod: 26,,, | Performed by: RADIOLOGY

## 2023-01-11 PROCEDURE — 99999 PR PBB SHADOW E&M-EST. PATIENT-LVL IV: ICD-10-PCS | Mod: PBBFAC,,, | Performed by: THORACIC SURGERY (CARDIOTHORACIC VASCULAR SURGERY)

## 2023-01-11 PROCEDURE — 3075F SYST BP GE 130 - 139MM HG: CPT | Mod: CPTII,S$GLB,, | Performed by: THORACIC SURGERY (CARDIOTHORACIC VASCULAR SURGERY)

## 2023-01-11 PROCEDURE — 99024 PR POST-OP FOLLOW-UP VISIT: ICD-10-PCS | Mod: S$GLB,,, | Performed by: THORACIC SURGERY (CARDIOTHORACIC VASCULAR SURGERY)

## 2023-01-11 PROCEDURE — 1126F AMNT PAIN NOTED NONE PRSNT: CPT | Mod: CPTII,S$GLB,, | Performed by: RADIOLOGY

## 2023-01-11 PROCEDURE — 99205 OFFICE O/P NEW HI 60 MIN: CPT | Mod: S$GLB,,, | Performed by: RADIOLOGY

## 2023-01-11 PROCEDURE — 1159F PR MEDICATION LIST DOCUMENTED IN MEDICAL RECORD: ICD-10-PCS | Mod: CPTII,S$GLB,, | Performed by: RADIOLOGY

## 2023-01-11 PROCEDURE — 1126F AMNT PAIN NOTED NONE PRSNT: CPT | Mod: CPTII,S$GLB,, | Performed by: THORACIC SURGERY (CARDIOTHORACIC VASCULAR SURGERY)

## 2023-01-11 PROCEDURE — 3078F PR MOST RECENT DIASTOLIC BLOOD PRESSURE < 80 MM HG: ICD-10-PCS | Mod: CPTII,S$GLB,, | Performed by: RADIOLOGY

## 2023-01-11 PROCEDURE — 1101F PT FALLS ASSESS-DOCD LE1/YR: CPT | Mod: CPTII,S$GLB,, | Performed by: THORACIC SURGERY (CARDIOTHORACIC VASCULAR SURGERY)

## 2023-01-11 PROCEDURE — 3008F PR BODY MASS INDEX (BMI) DOCUMENTED: ICD-10-PCS | Mod: CPTII,S$GLB,, | Performed by: THORACIC SURGERY (CARDIOTHORACIC VASCULAR SURGERY)

## 2023-01-11 PROCEDURE — 71046 XR CHEST PA AND LATERAL: ICD-10-PCS | Mod: 26,,, | Performed by: RADIOLOGY

## 2023-01-11 PROCEDURE — 1101F PR PT FALLS ASSESS DOC 0-1 FALLS W/OUT INJ PAST YR: ICD-10-PCS | Mod: CPTII,S$GLB,, | Performed by: THORACIC SURGERY (CARDIOTHORACIC VASCULAR SURGERY)

## 2023-01-11 PROCEDURE — 71046 X-RAY EXAM CHEST 2 VIEWS: CPT | Mod: TC

## 2023-01-11 PROCEDURE — 3075F PR MOST RECENT SYSTOLIC BLOOD PRESS GE 130-139MM HG: ICD-10-PCS | Mod: CPTII,S$GLB,, | Performed by: RADIOLOGY

## 2023-01-11 PROCEDURE — 3288F PR FALLS RISK ASSESSMENT DOCUMENTED: ICD-10-PCS | Mod: CPTII,S$GLB,, | Performed by: RADIOLOGY

## 2023-01-11 PROCEDURE — 3075F PR MOST RECENT SYSTOLIC BLOOD PRESS GE 130-139MM HG: ICD-10-PCS | Mod: CPTII,S$GLB,, | Performed by: THORACIC SURGERY (CARDIOTHORACIC VASCULAR SURGERY)

## 2023-01-11 PROCEDURE — 99999 PR PBB SHADOW E&M-EST. PATIENT-LVL IV: CPT | Mod: PBBFAC,,, | Performed by: THORACIC SURGERY (CARDIOTHORACIC VASCULAR SURGERY)

## 2023-01-11 PROCEDURE — 99999 PR PBB SHADOW E&M-EST. PATIENT-LVL V: CPT | Mod: PBBFAC,,, | Performed by: RADIOLOGY

## 2023-01-11 PROCEDURE — 1101F PT FALLS ASSESS-DOCD LE1/YR: CPT | Mod: CPTII,S$GLB,, | Performed by: RADIOLOGY

## 2023-01-11 PROCEDURE — 3078F PR MOST RECENT DIASTOLIC BLOOD PRESSURE < 80 MM HG: ICD-10-PCS | Mod: CPTII,S$GLB,, | Performed by: THORACIC SURGERY (CARDIOTHORACIC VASCULAR SURGERY)

## 2023-01-11 PROCEDURE — 1111F PR DISCHARGE MEDS RECONCILED W/ CURRENT OUTPATIENT MED LIST: ICD-10-PCS | Mod: CPTII,S$GLB,, | Performed by: RADIOLOGY

## 2023-01-11 PROCEDURE — 3078F DIAST BP <80 MM HG: CPT | Mod: CPTII,S$GLB,, | Performed by: RADIOLOGY

## 2023-01-11 PROCEDURE — 1126F PR PAIN SEVERITY QUANTIFIED, NO PAIN PRESENT: ICD-10-PCS | Mod: CPTII,S$GLB,, | Performed by: THORACIC SURGERY (CARDIOTHORACIC VASCULAR SURGERY)

## 2023-01-11 PROCEDURE — 1126F PR PAIN SEVERITY QUANTIFIED, NO PAIN PRESENT: ICD-10-PCS | Mod: CPTII,S$GLB,, | Performed by: RADIOLOGY

## 2023-01-11 PROCEDURE — 3288F PR FALLS RISK ASSESSMENT DOCUMENTED: ICD-10-PCS | Mod: CPTII,S$GLB,, | Performed by: THORACIC SURGERY (CARDIOTHORACIC VASCULAR SURGERY)

## 2023-01-11 PROCEDURE — 3288F FALL RISK ASSESSMENT DOCD: CPT | Mod: CPTII,S$GLB,, | Performed by: RADIOLOGY

## 2023-01-11 PROCEDURE — 3288F FALL RISK ASSESSMENT DOCD: CPT | Mod: CPTII,S$GLB,, | Performed by: THORACIC SURGERY (CARDIOTHORACIC VASCULAR SURGERY)

## 2023-01-11 PROCEDURE — 99205 PR OFFICE/OUTPT VISIT, NEW, LEVL V, 60-74 MIN: ICD-10-PCS | Mod: S$GLB,,, | Performed by: RADIOLOGY

## 2023-01-11 PROCEDURE — 99999 PR PBB SHADOW E&M-EST. PATIENT-LVL V: ICD-10-PCS | Mod: PBBFAC,,, | Performed by: RADIOLOGY

## 2023-01-11 PROCEDURE — 99024 POSTOP FOLLOW-UP VISIT: CPT | Mod: S$GLB,,, | Performed by: THORACIC SURGERY (CARDIOTHORACIC VASCULAR SURGERY)

## 2023-01-11 PROCEDURE — 3075F SYST BP GE 130 - 139MM HG: CPT | Mod: CPTII,S$GLB,, | Performed by: RADIOLOGY

## 2023-01-11 PROCEDURE — 1101F PR PT FALLS ASSESS DOC 0-1 FALLS W/OUT INJ PAST YR: ICD-10-PCS | Mod: CPTII,S$GLB,, | Performed by: RADIOLOGY

## 2023-01-11 PROCEDURE — 3008F BODY MASS INDEX DOCD: CPT | Mod: CPTII,S$GLB,, | Performed by: RADIOLOGY

## 2023-01-11 PROCEDURE — 1159F MED LIST DOCD IN RCRD: CPT | Mod: CPTII,S$GLB,, | Performed by: RADIOLOGY

## 2023-01-11 PROCEDURE — 1111F DSCHRG MED/CURRENT MED MERGE: CPT | Mod: CPTII,S$GLB,, | Performed by: RADIOLOGY

## 2023-01-11 NOTE — PROGRESS NOTES
1/11/2023    Radiation Oncology Consultation    Assessment   This is a 72 y.o. y/o female with Stage IA2 (cT1b cN0 M0) RLL NSCLC (squam) diagnosed in robotic bronch w/ EBUS 10/25/22. She has a known 2.3 cm LLL nodule since 2018 that was biopsied in 12/2018 and 10/2022 with results negative for malignancy. RLL primary was planned for surgical resection but aborted due to dense adhesions and poor pulmonary tolerance during procedure. She is referred for consideration of definitive radiation.    I discussed treatment options for early stage NSCLC including surgical resection and stereotactic body radiotherapy (SBRT). Since resection carried a high risk for morbidity, I recommend treating the RLL primary to 50-54 Gy in 3-4 fractions, which offers local control in 90% of patients. Potential short- and long-term risks of treatment were reviewed with the patient, particularly pneumonitis. At the end of our discussion, she was in agreement with proceeding with the recommended treatment.          Plan   1) Obtain re-staging diagnostic CT Chest to assess anatomy following surgery and since her last imaging was 11/25/22 (will be >2 months from time of treatment).   2) Schedule CT Simulation for SBRT treatment planning on a date following re-staging CT Chest.            CHIEF COMPLAINT: Early-stage NSCLC of Right lower lobe    HPI/Focused ROS: Ms. Genao is a 73 y/o female who has been followed for pulmonary nodules with Dr. Ramires since 2018. Annual CT Chest 9/29/22 demonstrated interval enlargement of a RLL nodule to 1.5 cm (up from 0.7 cm in 9/2021). Note was also made of a stable LLL 2.3 cm nodule. Attempt at biopsy of the LLL nodule in 12/2018 w/ EBUS was non-diagnostic for malignancy. She underwent robotic bronch w/ EBUS w/ Dr. Ramires on 10/25/22 with biopsy of the RLL nodule revealing squamous cell carcinoma; biopsy of LLL nodule and Station 7 nodes negative for malignant cells. PET/CT 11/25/22 demonstrated uptake in the  RLL nodule only. She met with Dr. Cannon and elected to undergo resection. This was attempted on 12/27/22 but aborted due to significant adhesions as well as progressive hypercarbia and concern for pulmonary function following lobectomy. She is referred for consideration of definitive radiation.     Today in clinic, the patient is accompanied by her sister. She had post-op follow-up visit with Dr. Cannon earlier. Denies fevers, weight loss, or significant dyspnea. No chest pain.       Is the patient female between ages 15-55:  no    Does the patient have a CIED:  no    Prior Radiation History: None      Past Medical History:   Diagnosis Date    Anticoagulant long-term use     Cancer     Kidney (Right)    Cataracts, bilateral     CKD (chronic kidney disease) stage 3, GFR 30-59 ml/min 12/15/2014    Colon polyps     Combined hyperlipidemia associated with type 2 diabetes mellitus 6/7/2013    COPD (chronic obstructive pulmonary disease) 12/15/2014    Diabetes mellitus type II     NIDDM, a.m. glucose-120s-130s-last A1c-7.1-6/7/13    Diabetes mellitus with renal manifestations, uncontrolled 2/1/2017    Diabetic retinopathy     Family history of stomach cancer 12/5/2013    Former smoker     H/O renal cell carcinoma 12/15/2015    History of colonic polyps 2/1/2017    3 polyps 7/13 ---5 yrs    History of gastroesophageal reflux (GERD)     History of urinary incontinence     s/p bladder lift-october, 2011 (at [facility])    Hyperlipidemia     Hypertension     120s/70s-80s    Nephrolithiasis     Osteoarthritis of thumb 12/20/2019    Osteoporosis, post-menopausal 3/17/2014    Primary hyperparathyroidism 10/20/2016    Schatzki's ring 2/1/2017    Dilated 2014       Past Surgical History:   Procedure Laterality Date    bladder lift  2011    done at [facility]    BLADDER STONE REMOVAL  2011    before bladder lift    CATARACT EXTRACTION W/  INTRAOCULAR LENS IMPLANT Left 06/07/2016    Dr. Vásquez    CATARACT EXTRACTION W/   INTRAOCULAR LENS IMPLANT Right 06/21/2016    Dr. Vásquez    COLONOSCOPY N/A 4/26/2018    Procedure: COLONOSCOPY;  Surgeon: Benjamin Zamora MD;  Location: [facility];  Service: Endoscopy;  Laterality: N/A;  confirmed ss    CYSTOSCOPY      INJECTION OF ANESTHETIC AGENT AROUND MULTIPLE INTERCOSTAL NERVES  12/27/2022    Procedure: BLOCK, NERVE, INTERCOSTAL, 2 OR MORE;  Surgeon: Paxton Cannon MD;  Location: [facility];  Service: Thoracic;;    LYSIS OF ADHESIONS  12/27/2022    Procedure: LYSIS, ADHESIONS;  Surgeon: Paxton Cannon MD;  Location: [facility];  Service: Thoracic;;    NAVIGATIONAL BRONCHOSCOPY N/A 12/11/2018    Procedure: BRONCHOSCOPY, NAVIGATIONAL;  Surgeon: Ashtyn Ramires MD;  Location: [facility];  Service: Pulmonary;  Laterality: N/A;    NEPHRECTOMY      partial right    ROBOTIC BRONCHOSCOPY N/A 10/25/2022    Procedure: ROBOTIC BRONCHOSCOPY;  Surgeon: Ashtyn Ramires MD;  Location: [facility];  Service: Pulmonary;  Laterality: N/A;    VIDEO-ASSISTED THORACOSCOPIC SURGERY (VATS)  12/27/2022    Procedure: VATS (VIDEO-ASSISTED THORACOSCOPIC SURGERY);  Surgeon: Paxton Cannon MD;  Location: [facility];  Service: Thoracic;;       Social History     Tobacco Use    Smoking status: Every Day     Packs/day: 0.25     Years: 30.00     Pack years: 7.50     Types: Cigarettes    Smokeless tobacco: Never    Tobacco comments:     pt. reports quitting January 2018   Substance Use Topics    Alcohol use: No     Alcohol/week: 0.0 standard drinks    Drug use: No       Cancer-related family history is negative for Kidney cancer and Cancer.    Current Outpatient Medications on File Prior to Visit   Medication Sig Dispense Refill    albuterol (PROVENTIL/VENTOLIN HFA) 90 mcg/actuation inhaler Inhale 2 puffs into the lungs every 6 (six) hours as needed for Wheezing or Shortness of Breath (rescue and prevention). 18 g 11    albuterol-ipratropium (DUO-NEB) 2.5 mg-0.5 mg/3 mL nebulizer  "solution INHALE content of ONE vial BY MOUTH via nebulizer EVERY 6 HOURS AS NEEDED FOR WHEEZING OR shortness of breath. rescue 270 mL 12    amLODIPine (NORVASC) 5 MG tablet Take 1 tablet (5 mg total) by mouth once daily. 90 tablet 3    aspirin (ECOTRIN) 81 MG EC tablet Take 81 mg by mouth once daily.      cyanocobalamin, vitamin B-12, 1,000 mcg TbSR Take by mouth once daily.       DOCOSAHEXANOIC ACID/EPA (FISH OIL ORAL) Take 1,200 mg by mouth once daily.      famotidine (PEPCID) 40 MG tablet TAKE ONE TABLET BY MOUTH ONCE A DAY 90 tablet 0    FLUAD [id number], 65 YR UP,,PF, 45 mcg (15 mcg x 3)/0.5 mL Syrg inject .5 milliliter intramuscularly as directed  0    FLUZONE HIGHDOSE QUAD 20-21  mcg/0.7 mL Syrg inj .7 milliliter intramuscularly as directed      gabapentin (NEURONTIN) 300 MG capsule Take 1 capsule (300 mg total) by mouth every evening. for 5 days 5 capsule 0    glipiZIDE (GLUCOTROL) 10 MG tablet 1 prn when eats too much carbs, about 1x a mo 30 tablet 3    irbesartan-hydrochlorothiazide (AVALIDE) 150-12.5 mg per tablet TAKE ONE TABLET BY MOUTH ONCE A DAY 90 tablet 12    omeprazole (PRILOSEC) 40 MG capsule Take 1 capsule (40 mg total) by mouth once daily. 90 capsule 4    oxybutynin (DITROPAN-XL) 5 MG TR24 TAKE ONE TABLET BY MOUTH ONCE A DAY 90 tablet 12    oxyCODONE (ROXICODONE) 5 MG immediate release tablet Take one tablet (5 mg) every 6 hours as needed for pain. Do not take Tramadol if taking oxycodone. 20 tablet 0    pen needle, diabetic (BD INSULIN PEN NEEDLE UF SHORT) 31 gauge x 5/16" Ndle USE AS DIRECTED 100 each 12    rosuvastatin (CRESTOR) 20 MG tablet TAKE ONE TABLET BY MOUTH ONCE A DAY 90 tablet 12    sars-cov-2, covid-19, (MODERNA COVID-19) 100 mcg/0.5 ml injection       semaglutide (OZEMPIC) 1 mg/dose (4 mg/3 mL) INJECT ONE MG SUBCUTANEOUSLY once every week 1 pen 4    traMADoL (ULTRAM) 50 mg tablet TAKE ONE TABLET BY MOUTH EVERY 6 HOURS AS NEEDED 120 tablet 3    umeclidinium-vilanteroL (ANORO " "ELLIPTA) 62.5-25 mcg/actuation DsDv Inhale 1 puff into the lungs once daily. THIS SHOULD BE USED, NOT INCRUSE 3 each 3    varenicline (CHANTIX STARTING MONTH BOX) 0.5 mg (11)- 1 mg (42) tablet Take one 0.5mg tab by mouth once daily X3 days,then increase to one 0.5mg tab twice daily X4 days,then increase to one 1mg tab twice daily 1 Package 0    varenicline (CHANTIX) 1 mg Tab Take 1 tablet (1 mg total) by mouth 2 (two) times daily. 60 tablet 2    vitamin D (VITAMIN D3) 1000 units Tab Take 1,000 Units by mouth once daily.       No current facility-administered medications on file prior to visit.       Review of patient's allergies indicates:   Allergen Reactions    Metformin Diarrhea    Pantoprazole      elevated blood sugars         Vital Signs: /75   Pulse 70   Temp 97.3 °F (36.3 °C)   Resp 17   Ht 5' 3" (1.6 m)   Wt 65.4 kg (144 lb 2.9 oz)   SpO2 96%   BMI 25.54 kg/m²     ECOG Performance Status: 1 - Ambulates, capable of light work    Physical Exam  Vitals reviewed.   Constitutional:       Appearance: Normal appearance.   HENT:      Head: Normocephalic and atraumatic.   Eyes:      General: No scleral icterus.     Extraocular Movements: Extraocular movements intact.   Pulmonary:      Effort: No accessory muscle usage or respiratory distress.   Abdominal:      General: There is no distension.   Musculoskeletal:         General: Normal range of motion.      Cervical back: Normal range of motion and neck supple.   Lymphadenopathy:      Cervical: No cervical adenopathy.   Skin:     General: Skin is dry.      Coloration: Skin is not jaundiced.   Neurological:      Mental Status: She is alert and oriented to person, place, and time.      Cranial Nerves: No cranial nerve deficit.   Psychiatric:         Mood and Affect: Mood and affect normal.         Judgment: Judgment normal.        Labs:    Imaging: I have personally reviewed the patient's available images and reports and summarized pertinent findings above " in HPI.     Pathology: I have personally reviewed the patient's available pathology and summarized pertinent findings above in HPI.       - Thank you for allowing me to participate in the care of your patient.    Xavier Deng MD, PhD

## 2023-01-12 ENCOUNTER — TELEPHONE (OUTPATIENT)
Dept: FAMILY MEDICINE | Facility: CLINIC | Age: 73
End: 2023-01-12
Payer: MEDICARE

## 2023-01-13 ENCOUNTER — OFFICE VISIT (OUTPATIENT)
Dept: FAMILY MEDICINE | Facility: CLINIC | Age: 73
End: 2023-01-13
Payer: MEDICARE

## 2023-01-13 VITALS
TEMPERATURE: 98 F | HEIGHT: 63 IN | WEIGHT: 145.75 LBS | OXYGEN SATURATION: 95 % | DIASTOLIC BLOOD PRESSURE: 62 MMHG | SYSTOLIC BLOOD PRESSURE: 124 MMHG | HEART RATE: 86 BPM | BODY MASS INDEX: 25.82 KG/M2

## 2023-01-13 DIAGNOSIS — I70.0 AORTIC ATHEROSCLEROSIS: ICD-10-CM

## 2023-01-13 DIAGNOSIS — N18.31 STAGE 3A CHRONIC KIDNEY DISEASE: ICD-10-CM

## 2023-01-13 DIAGNOSIS — N18.31 TYPE 2 DIABETES MELLITUS WITH STAGE 3A CHRONIC KIDNEY DISEASE, WITH LONG-TERM CURRENT USE OF INSULIN: ICD-10-CM

## 2023-01-13 DIAGNOSIS — E11.65 TYPE 2 DIABETES MELLITUS WITH HYPERGLYCEMIA, WITH LONG-TERM CURRENT USE OF INSULIN: ICD-10-CM

## 2023-01-13 DIAGNOSIS — E11.22 TYPE 2 DIABETES MELLITUS WITH STAGE 3A CHRONIC KIDNEY DISEASE, WITH LONG-TERM CURRENT USE OF INSULIN: ICD-10-CM

## 2023-01-13 DIAGNOSIS — D64.9 ANEMIA, UNSPECIFIED TYPE: ICD-10-CM

## 2023-01-13 DIAGNOSIS — E11.9 DM TYPE 2 WITHOUT RETINOPATHY: ICD-10-CM

## 2023-01-13 DIAGNOSIS — I50.30 HEART FAILURE WITH PRESERVED EJECTION FRACTION, UNSPECIFIED HF CHRONICITY: ICD-10-CM

## 2023-01-13 DIAGNOSIS — Z79.4 TYPE 2 DIABETES MELLITUS WITH HYPERGLYCEMIA, WITH LONG-TERM CURRENT USE OF INSULIN: ICD-10-CM

## 2023-01-13 DIAGNOSIS — I65.23 BILATERAL CAROTID ARTERY STENOSIS: ICD-10-CM

## 2023-01-13 DIAGNOSIS — I10 ESSENTIAL HYPERTENSION: ICD-10-CM

## 2023-01-13 DIAGNOSIS — M46.99 UNSPECIFIED INFLAMMATORY SPONDYLOPATHY, MULTIPLE SITES IN SPINE: ICD-10-CM

## 2023-01-13 DIAGNOSIS — F11.20 OPIOID DEPENDENCE, UNCOMPLICATED: ICD-10-CM

## 2023-01-13 DIAGNOSIS — C34.31 PRIMARY MALIGNANT NEOPLASM OF RIGHT LOWER LOBE OF LUNG: Primary | ICD-10-CM

## 2023-01-13 DIAGNOSIS — M81.0 OSTEOPOROSIS, POST-MENOPAUSAL: ICD-10-CM

## 2023-01-13 DIAGNOSIS — Z79.4 TYPE 2 DIABETES MELLITUS WITH STAGE 3A CHRONIC KIDNEY DISEASE, WITH LONG-TERM CURRENT USE OF INSULIN: ICD-10-CM

## 2023-01-13 DIAGNOSIS — J43.2 CENTRILOBULAR EMPHYSEMA: ICD-10-CM

## 2023-01-13 DIAGNOSIS — I73.9 PVD (PERIPHERAL VASCULAR DISEASE): ICD-10-CM

## 2023-01-13 DIAGNOSIS — J44.9 CHRONIC OBSTRUCTIVE PULMONARY DISEASE, UNSPECIFIED COPD TYPE: ICD-10-CM

## 2023-01-13 DIAGNOSIS — M47.816 LUMBAR ARTHROPATHY: ICD-10-CM

## 2023-01-13 DIAGNOSIS — I73.9 PVD (PERIPHERAL VASCULAR DISEASE) WITH CLAUDICATION: ICD-10-CM

## 2023-01-13 DIAGNOSIS — E21.0 PRIMARY HYPERPARATHYROIDISM: ICD-10-CM

## 2023-01-13 DIAGNOSIS — Z79.4 LONG-TERM INSULIN USE: ICD-10-CM

## 2023-01-13 PROCEDURE — 3074F SYST BP LT 130 MM HG: CPT | Mod: CPTII,S$GLB,, | Performed by: INTERNAL MEDICINE

## 2023-01-13 PROCEDURE — 99999 PR PBB SHADOW E&M-EST. PATIENT-LVL IV: CPT | Mod: PBBFAC,,, | Performed by: INTERNAL MEDICINE

## 2023-01-13 PROCEDURE — 1111F DSCHRG MED/CURRENT MED MERGE: CPT | Mod: CPTII,S$GLB,, | Performed by: INTERNAL MEDICINE

## 2023-01-13 PROCEDURE — 1126F AMNT PAIN NOTED NONE PRSNT: CPT | Mod: CPTII,S$GLB,, | Performed by: INTERNAL MEDICINE

## 2023-01-13 PROCEDURE — 1111F PR DISCHARGE MEDS RECONCILED W/ CURRENT OUTPATIENT MED LIST: ICD-10-PCS | Mod: CPTII,S$GLB,, | Performed by: INTERNAL MEDICINE

## 2023-01-13 PROCEDURE — 3288F PR FALLS RISK ASSESSMENT DOCUMENTED: ICD-10-PCS | Mod: CPTII,S$GLB,, | Performed by: INTERNAL MEDICINE

## 2023-01-13 PROCEDURE — 1101F PT FALLS ASSESS-DOCD LE1/YR: CPT | Mod: CPTII,S$GLB,, | Performed by: INTERNAL MEDICINE

## 2023-01-13 PROCEDURE — 3008F PR BODY MASS INDEX (BMI) DOCUMENTED: ICD-10-PCS | Mod: CPTII,S$GLB,, | Performed by: INTERNAL MEDICINE

## 2023-01-13 PROCEDURE — 1101F PR PT FALLS ASSESS DOC 0-1 FALLS W/OUT INJ PAST YR: ICD-10-PCS | Mod: CPTII,S$GLB,, | Performed by: INTERNAL MEDICINE

## 2023-01-13 PROCEDURE — 3288F FALL RISK ASSESSMENT DOCD: CPT | Mod: CPTII,S$GLB,, | Performed by: INTERNAL MEDICINE

## 2023-01-13 PROCEDURE — 99499 UNLISTED E&M SERVICE: CPT | Mod: S$GLB,,, | Performed by: INTERNAL MEDICINE

## 2023-01-13 PROCEDURE — 3008F BODY MASS INDEX DOCD: CPT | Mod: CPTII,S$GLB,, | Performed by: INTERNAL MEDICINE

## 2023-01-13 PROCEDURE — 3074F PR MOST RECENT SYSTOLIC BLOOD PRESSURE < 130 MM HG: ICD-10-PCS | Mod: CPTII,S$GLB,, | Performed by: INTERNAL MEDICINE

## 2023-01-13 PROCEDURE — 3078F PR MOST RECENT DIASTOLIC BLOOD PRESSURE < 80 MM HG: ICD-10-PCS | Mod: CPTII,S$GLB,, | Performed by: INTERNAL MEDICINE

## 2023-01-13 PROCEDURE — 99215 OFFICE O/P EST HI 40 MIN: CPT | Mod: S$GLB,,, | Performed by: INTERNAL MEDICINE

## 2023-01-13 PROCEDURE — 1159F MED LIST DOCD IN RCRD: CPT | Mod: CPTII,S$GLB,, | Performed by: INTERNAL MEDICINE

## 2023-01-13 PROCEDURE — 99999 PR PBB SHADOW E&M-EST. PATIENT-LVL IV: ICD-10-PCS | Mod: PBBFAC,,, | Performed by: INTERNAL MEDICINE

## 2023-01-13 PROCEDURE — 99215 PR OFFICE/OUTPT VISIT, EST, LEVL V, 40-54 MIN: ICD-10-PCS | Mod: S$GLB,,, | Performed by: INTERNAL MEDICINE

## 2023-01-13 PROCEDURE — 1126F PR PAIN SEVERITY QUANTIFIED, NO PAIN PRESENT: ICD-10-PCS | Mod: CPTII,S$GLB,, | Performed by: INTERNAL MEDICINE

## 2023-01-13 PROCEDURE — 3078F DIAST BP <80 MM HG: CPT | Mod: CPTII,S$GLB,, | Performed by: INTERNAL MEDICINE

## 2023-01-13 PROCEDURE — 99499 RISK ADDL DX/OHS AUDIT: ICD-10-PCS | Mod: S$GLB,,, | Performed by: INTERNAL MEDICINE

## 2023-01-13 PROCEDURE — 1159F PR MEDICATION LIST DOCUMENTED IN MEDICAL RECORD: ICD-10-PCS | Mod: CPTII,S$GLB,, | Performed by: INTERNAL MEDICINE

## 2023-01-13 NOTE — PROGRESS NOTES
Chief complaint:  Follow-up from the hospital, lung cancer      Physical exam 6/22        72-year-old black female  whose PCP is retired.  Regarding health maintenance she is up-to-date on mammogram 6/21 She prefers to do a mammogram every two years.  It looks like she had colon polyps in 2013, 1 polyp 4/18--5 yrs.  She did quit smoking but then with the pandemic she resume.  She was able to stop on her own in the past.  We discussed the smoking cessation clinic but she was able to do it on her own in the past so she defers..    Interval events reviewed.  She is in good spirits with having had her attempt for surgical resection and now plans to have radiation therapy.  She is breathing fine.  She is up-to-date on lab work, vaccinations and so forth.  Diabetes still under good control       CT surg  Procedure(s) and date(s):   10/25/22 - Robotic Bronchoscopy  12/27/22 - Right VATS, adhesiolysis, intercostal nerve block 2 or more intercostal levels   Pathology:   10/25/22 - RLL squamous cell carcinoma. LN 7 negative for malignancy, lymphocytes present.    Post-operative therapy:  surveillance  Mrs. Genao is a 72 y.o. female current smoker with CKD3, COPD, DM2, HLD, HTN, primary hyperparathyroidism, and h/o of renal cell carcinoma (2015) here today for 2 week follow up s/p aborted resection. Aborted resection secondary to intense adhesion formation coupled with hypercarbia and upper lobe predominant emphysema. No incisional pain. Breathing stable.     Seen [facility]  HPI/Focused ROS: Ms. Genao is a 73 y/o female who has been followed for pulmonary nodules with Dr. Ramires since 2018. Annual CT Chest 9/29/22 demonstrated interval enlargement of a RLL nodule to 1.5 cm (up from 0.7 cm in 9/2021). Note was also made of a stable LLL 2.3 cm nodule. Attempt at biopsy of the LLL nodule in 12/2018 w/ EBUS was non-diagnostic for malignancy. She underwent robotic bronch w/ EBUS w/ Dr. Ramires on 10/25/22 with biopsy of the RLL  nodule revealing squamous cell carcinoma; biopsy of LLL nodule and Station 7 nodes negative for malignant cells. PET/CT 11/25/22 demonstrated uptake in the RLL nodule only. She met with Dr. Cannon and elected to undergo resection. This was attempted on 12/27/22 but aborted due to significant adhesions as well as progressive hypercarbia and concern for pulmonary function following lobectomy. She is referred for consideration of definitive radiation.     ROS:   CONST: weight stable. EYES: no vision change. ENT: no sore throat. CV: no chest pain w/ exertion. RESP: no shortness of breath. GI: no nausea, vomiting, diarrhea. No dysphagia. : no urinary issues. MUSCULOSKELETAL: no new myalgias or arthralgias. SKIN: no new changes. NEURO: no focal deficits. PSYCH: no new issues. ENDOCRINE: no polyuria. HEME: no lymph nodes. ALLERGY: no general pruritis.    Past Medical History   Diagnosis Date    Cataracts, bilateral     CKD (chronic kidney disease) stage 3, GFR 30-59 ml/min 12/15/2014    Combined hyperlipidemia associated with type 2 diabetes mellitus 6/7/2013    COPD (chronic obstructive pulmonary disease) 12/15/2014    Diabetes mellitus type II----with chronic kidney disease           Diabetes mellitus with renal manifestations, uncontrolled 2/1/2017    Diabetic retinopathy     Family history of stomach cancer 12/5/2013    H/O renal cell carcinoma 12/15/2015    History of colonic polyps 2/1/2017     3 polyps 7/13 ---1 polyp 4/18--5 yrs    History of gastroesophageal reflux (GERD)     History of urinary incontinence      s/p bladder lift-october, 2011 (at [facility])    Hyperlipidemia     Hypertension      120s/70s-80s    Nephrolithiasis     Osteoporosis, post-menopausal, evaluated by Dr. York, quit Fosamax due to CKD early 2017  3/17/2014    Primary hyperparathyroidism 10/20/2016    Schatzki's ring 2/1/2017     Dilated 2014   Prevnar 2017  Lung cancer 2022,     Past Surgical History:   Procedure Laterality Date     bladder lift  2011    done at [facility]    BLADDER STONE REMOVAL  2011    before bladder lift    CATARACT EXTRACTION W/  INTRAOCULAR LENS IMPLANT Left 06/07/2016    Dr. Vásquez    CATARACT EXTRACTION W/  INTRAOCULAR LENS IMPLANT Right 06/21/2016    Dr. Vásquez    COLONOSCOPY N/A 4/26/2018    Procedure: COLONOSCOPY;  Surgeon: Benjamin Zamora MD;  Location: [facility] ENDO;  Service: Endoscopy;  Laterality: N/A;  confirmed ss    CYSTOSCOPY      INJECTION OF ANESTHETIC AGENT AROUND MULTIPLE INTERCOSTAL NERVES  12/27/2022    Procedure: BLOCK, NERVE, INTERCOSTAL, 2 OR MORE;  Surgeon: Paxton Cannon MD;  Location: [facility] OR 2ND FLR;  Service: Thoracic;;    LYSIS OF ADHESIONS  12/27/2022    Procedure: LYSIS, ADHESIONS;  Surgeon: Paxton Cannon MD;  Location: [facility] OR 2ND FLR;  Service: Thoracic;;    NAVIGATIONAL BRONCHOSCOPY N/A 12/11/2018    Procedure: BRONCHOSCOPY, NAVIGATIONAL;  Surgeon: Ashtyn Ramires MD;  [facility]LR;  Service: Pulmonary;  Laterality: N/A;    NEPHRECTOMY      partial right    ROBOTIC BRONCHOSCOPY N/A 10/25/2022    Procedure: ROBOTIC BRONCHOSCOPY;  Surgeon: Ashtyn Ramires MD;  [facility] 2ND [facility]LR;  Service: Pulmonary;  Laterality: N/A;    VIDEO-ASSISTED THORACOSCOPIC SURGERY (VATS)  12/27/2022    Procedure: VATS (VIDEO-ASSISTED THORACOSCOPIC SURGERY);  Surgeon: Paxton Cannon MD;  [facility]ND FLR;  Service: Thoracic;;     Social History     Socioeconomic History    Marital status:    Occupational History    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 reports quitting January 2018   Substance and Sexual Activity    Alcohol use: No     Alcohol/week: 0.0 standard drinks    Drug use: No   Social History Narrative    Lives on [facility]    Here with sister Kate [phone]         family  history includes Cataracts in her mother; Colon cancer in her brother; Glaucoma in her mother; Nephrolithiasis in her sister; No Known Problems in her father, maternal aunt, maternal grandfather, maternal grandmother, maternal uncle, paternal aunt, paternal grandfather, paternal grandmother, and paternal uncle.     Gen: no distress  EYES: conjunctiva clear, non-icteric, PERRL  ENT: nose clear, nasal mucosa normal, oropharynx clear and moist, teeth good  NECK:supple, thyroid non-palpable  RESP: effort is good, lungs clear  CV: heart RRR w/o murmur, gallops or rubs; no edema  MS: gait normal, no clubbing or cyanosis of the digits  SKIN: no rashes, warm to touch            She has hypertension which appears to be under good control and needs refills of her medication.  She does get some edema at the end of the day that is gone in the morning and I reassured her about that but she is concerned and on Norvasc 5 mg.  Blood pressure is excellent and we will discontinue and have her monitor.  She was on Diovan but due to availability issues we had switched her to irbesartan/HCTZ..     Regarding her chronic pain she does use 1 tramadol daily usually taking it in the middle of the morning and that allows her to exercise and carry on her daily functions.  Her use appears to be appropriate     She has diabetes which is still uncontrolled and apparently back to seeing Endocrine and her most recent A1c she says was 5.6, 6.3, 6.9, 6.4, 5.6 Nov.  She still on insulin and the Ozempic injection but off of the glyburide which I would agree with based upon her age and overall good control...  She is not taking metformin or glipizide or glimepiride so I took those off the list.  She says her sugars are running better.  She does take glipizide maybe once a month when she eats poorly.    She does have chronic renal insufficiency and follows with nephrology.  I explained her chronic kidney disease.  Her GFR is actually better.        Regarding osteoporosis.  Her prior local endocrinologist had some changes in the office and now she may have to pay over 200 dollars for her part of the infusion at the office per previously she was getting an at the infusion center.  I have other patients with similar issues.  She is now followed by [facility] for her Prolia     She gets occasional reflux and was occasionally using zantac and will send omeprazole    She apparently is primary hyperparathyroidism and a history of hypercalcemia but all recent labs appear to be normal as well as PTH.  Thyroid ultrasound from 2016 did have a parathyroid adenoma that Endocrine may need to reassess      Vitamin D level also normal.  She apparently was on a weekly medication  for osteoporosis but d/c 2017 for CKD?  Bone density from prior reviewed noting slight decline in the hip     She has a history of COPD but no active symptoms lately.      She has hyperlipidemia and may been on Lipitor in the past with an unremembered intolerance.  We discussed benefits of statin and her LDL has responded to crestor .      All these issues reviewed and patient counseled an evaluation and management of all issues we based upon medical decision making as below    4/18  Impression      Osteoporosis of total hip, femoral neck and lumbar spine.  Decrease in hip (-4.7%) and increase in lumbar spine (5%) since prior study.     Events reviewed and patient counseled. Over 70 min  minutes of total time spent on the encounter, which includes face to face time and non-face to face time preparing to see the patient (eg, review of tests), Obtaining and/or reviewing separately obtained history, Documenting clinical information in the electronic or other health record, Independently interpreting results (not separately reported) and communicating results to the patient/family/caregiver, or Care coordination (not separately reported).       Assessment and plan:    Diagnoses and all orders  for this visit:    Squamous Cell Carcinoma of right lower lobe of lung, new diagnosis reviewed, patient amenable to the treatment plan    DM type 2 without retinopathy, chronic and stable    Essential hypertension, chronic and stable    Primary hyperparathyroidism, followed by Endocrine    Type 2 diabetes mellitus with hyperglycemia, with long-term current use of insulin    Type 2 diabetes mellitus with stage 3a chronic kidney disease, with long-term current use of insulin    Stage 3a chronic kidney disease, has an appointment to see Nephrology    Osteoporosis, post-menopausal, followed by Endocrine    PVD (peripheral vascular disease) with claudication    Anemia, unspecified type    Long-term insulin use    Chronic obstructive pulmonary disease, unspecified COPD type    Aortic atherosclerosis    Centrilobular emphysema, explained that was one of the reasons that they avoided surgical resection    PVD (peripheral vascular disease)    Lumbar arthropathy    Bilateral carotid artery stenosis    Unspecified inflammatory spondylopathy, multiple sites in spine    Heart failure with preserved ejection fraction, unspecified HF chronicity    Opioid dependence, uncomplicated, chronic and stable use of tramadol, refills of already been done

## 2023-01-17 ENCOUNTER — HOSPITAL ENCOUNTER (OUTPATIENT)
Dept: RADIOLOGY | Facility: HOSPITAL | Age: 73
Discharge: HOME | End: 2023-01-17
Attending: RADIOLOGY
Payer: MEDICARE

## 2023-01-17 DIAGNOSIS — C34.31 PRIMARY MALIGNANT NEOPLASM OF RIGHT LOWER LOBE OF LUNG: ICD-10-CM

## 2023-01-17 PROCEDURE — 71250 CT THORAX DX C-: CPT | Mod: 26,,, | Performed by: RADIOLOGY

## 2023-01-17 PROCEDURE — 71250 CT CHEST WITHOUT CONTRAST: ICD-10-PCS | Mod: 26,,, | Performed by: RADIOLOGY

## 2023-01-17 PROCEDURE — 71250 CT THORAX DX C-: CPT | Mod: TC

## 2023-01-19 ENCOUNTER — HOSPITAL ENCOUNTER (OUTPATIENT)
Dept: RADIATION THERAPY | Facility: HOSPITAL | Age: 73
Discharge: HOME | End: 2023-01-19
Attending: RADIOLOGY
Payer: MEDICARE

## 2023-01-19 PROCEDURE — 77014 PR  CT GUIDANCE PLACEMENT RAD THERAPY FIELDS: ICD-10-PCS | Mod: 26,,, | Performed by: RADIOLOGY

## 2023-01-19 PROCEDURE — 77334 RADIATION TREATMENT AID(S): CPT | Mod: TC | Performed by: RADIOLOGY

## 2023-01-19 PROCEDURE — 77014 HC CT GUIDANCE RADIATION THERAPY FLDS PLACEMENT: CPT | Mod: TC | Performed by: RADIOLOGY

## 2023-01-19 PROCEDURE — 77263 THER RADIOLOGY TX PLNG CPLX: CPT | Mod: ,,, | Performed by: RADIOLOGY

## 2023-01-19 PROCEDURE — 77263 PR  RADIATION THERAPY PLAN COMPLEX: ICD-10-PCS | Mod: ,,, | Performed by: RADIOLOGY

## 2023-01-19 PROCEDURE — 77334 RADIATION TREATMENT AID(S): CPT | Mod: 26,,, | Performed by: RADIOLOGY

## 2023-01-19 PROCEDURE — 77014 PR  CT GUIDANCE PLACEMENT RAD THERAPY FIELDS: CPT | Mod: 26,,, | Performed by: RADIOLOGY

## 2023-01-19 PROCEDURE — 77334 PR  RADN TREATMENT AID(S) COMPLX: ICD-10-PCS | Mod: 26,,, | Performed by: RADIOLOGY

## 2023-01-23 PROCEDURE — 77293 RESPIRATOR MOTION MGMT SIMUL: CPT | Mod: 26,,, | Performed by: RADIOLOGY

## 2023-01-23 PROCEDURE — 77293 RESPIRATOR MOTION MGMT SIMUL: CPT | Mod: TC | Performed by: RADIOLOGY

## 2023-01-23 PROCEDURE — 77301 RADIOTHERAPY DOSE PLAN IMRT: CPT | Mod: 26,,, | Performed by: RADIOLOGY

## 2023-01-23 PROCEDURE — 77301 PR  INTEN MOD RADIOTHER PLAN W/DOSE VOL HIST: ICD-10-PCS | Mod: 26,,, | Performed by: RADIOLOGY

## 2023-01-23 PROCEDURE — 77293 PR RESPIRATORY MOTION MGMT SIMULATION: ICD-10-PCS | Mod: 26,,, | Performed by: RADIOLOGY

## 2023-01-23 PROCEDURE — 77301 RADIOTHERAPY DOSE PLAN IMRT: CPT | Mod: TC | Performed by: RADIOLOGY

## 2023-01-24 PROCEDURE — 77370 RADIATION PHYSICS CONSULT: CPT | Performed by: RADIOLOGY

## 2023-01-24 PROCEDURE — 77300 PR RADIATION THERAPY,DOSIMETRY PLAN: ICD-10-PCS | Mod: 26,,, | Performed by: RADIOLOGY

## 2023-01-24 PROCEDURE — 77338 DESIGN MLC DEVICE FOR IMRT: CPT | Mod: 26,,, | Performed by: RADIOLOGY

## 2023-01-24 PROCEDURE — 77338 DESIGN MLC DEVICE FOR IMRT: CPT | Mod: TC | Performed by: RADIOLOGY

## 2023-01-24 PROCEDURE — 77338 PR  MLC IMRT DESIGN & CONSTRUCTION PER IMRT PLAN: ICD-10-PCS | Mod: 26,,, | Performed by: RADIOLOGY

## 2023-01-24 PROCEDURE — 77300 RADIATION THERAPY DOSE PLAN: CPT | Mod: 26,,, | Performed by: RADIOLOGY

## 2023-01-24 PROCEDURE — 77300 RADIATION THERAPY DOSE PLAN: CPT | Mod: TC | Performed by: RADIOLOGY

## 2023-01-25 PROCEDURE — 77470 SPECIAL RADIATION TREATMENT: CPT | Mod: 59,TC | Performed by: RADIOLOGY

## 2023-01-25 PROCEDURE — 77470 PR  SPECIAL RADIATION TREATMENT: ICD-10-PCS | Mod: 26,59,, | Performed by: RADIOLOGY

## 2023-01-25 PROCEDURE — 77470 SPECIAL RADIATION TREATMENT: CPT | Mod: 26,59,, | Performed by: RADIOLOGY

## 2023-01-26 DIAGNOSIS — C34.31 PRIMARY MALIGNANT NEOPLASM OF RIGHT LOWER LOBE OF LUNG: Primary | ICD-10-CM

## 2023-01-26 PROCEDURE — 77014 PR  CT GUIDANCE PLACEMENT RAD THERAPY FIELDS: ICD-10-PCS | Mod: 26,,, | Performed by: RADIOLOGY

## 2023-01-26 PROCEDURE — 77373 STRTCTC BDY RAD THER TX DLVR: CPT | Performed by: RADIOLOGY

## 2023-01-26 PROCEDURE — 77014 HC CT GUIDANCE RADIATION THERAPY FLDS PLACEMENT: CPT | Mod: TC | Performed by: RADIOLOGY

## 2023-01-26 PROCEDURE — 77014 PR  CT GUIDANCE PLACEMENT RAD THERAPY FIELDS: CPT | Mod: 26,,, | Performed by: RADIOLOGY

## 2023-01-30 PROCEDURE — 77014 HC CT GUIDANCE RADIATION THERAPY FLDS PLACEMENT: CPT | Mod: TC,59 | Performed by: RADIOLOGY

## 2023-01-30 PROCEDURE — 77373 STRTCTC BDY RAD THER TX DLVR: CPT | Performed by: RADIOLOGY

## 2023-01-30 PROCEDURE — 77014 PR  CT GUIDANCE PLACEMENT RAD THERAPY FIELDS: CPT | Mod: 26,,, | Performed by: RADIOLOGY

## 2023-01-30 PROCEDURE — 77014 PR  CT GUIDANCE PLACEMENT RAD THERAPY FIELDS: ICD-10-PCS | Mod: 26,,, | Performed by: RADIOLOGY

## 2023-02-01 ENCOUNTER — HOSPITAL ENCOUNTER (OUTPATIENT)
Dept: RADIATION THERAPY | Facility: HOSPITAL | Age: 73
Discharge: HOME | End: 2023-02-01
Attending: RADIOLOGY
Payer: MEDICARE

## 2023-02-01 PROCEDURE — 77014 PR  CT GUIDANCE PLACEMENT RAD THERAPY FIELDS: ICD-10-PCS | Mod: 26,,, | Performed by: RADIOLOGY

## 2023-02-01 PROCEDURE — 77014 HC CT GUIDANCE RADIATION THERAPY FLDS PLACEMENT: CPT | Mod: TC | Performed by: RADIOLOGY

## 2023-02-01 PROCEDURE — 77373 STRTCTC BDY RAD THER TX DLVR: CPT | Performed by: RADIOLOGY

## 2023-02-01 PROCEDURE — 77014 PR  CT GUIDANCE PLACEMENT RAD THERAPY FIELDS: CPT | Mod: 26,,, | Performed by: RADIOLOGY

## 2023-02-03 PROCEDURE — 77014 PR  CT GUIDANCE PLACEMENT RAD THERAPY FIELDS: ICD-10-PCS | Mod: 26,,, | Performed by: RADIOLOGY

## 2023-02-03 PROCEDURE — 77014 PR  CT GUIDANCE PLACEMENT RAD THERAPY FIELDS: CPT | Mod: 26,,, | Performed by: RADIOLOGY

## 2023-02-03 PROCEDURE — 77336 RADIATION PHYSICS CONSULT: CPT | Performed by: RADIOLOGY

## 2023-02-03 PROCEDURE — 77014 HC CT GUIDANCE RADIATION THERAPY FLDS PLACEMENT: CPT | Mod: TC,59 | Performed by: RADIOLOGY

## 2023-02-03 PROCEDURE — 77373 STRTCTC BDY RAD THER TX DLVR: CPT | Performed by: RADIOLOGY

## 2023-02-06 ENCOUNTER — OFFICE VISIT (OUTPATIENT)
Dept: ENDOCRINOLOGY | Facility: CLINIC | Age: 73
End: 2023-02-06
Payer: MEDICARE

## 2023-02-06 VITALS
HEART RATE: 72 BPM | SYSTOLIC BLOOD PRESSURE: 175 MMHG | WEIGHT: 145.38 LBS | TEMPERATURE: 98 F | BODY MASS INDEX: 25.76 KG/M2 | DIASTOLIC BLOOD PRESSURE: 80 MMHG

## 2023-02-06 DIAGNOSIS — E78.2 COMBINED HYPERLIPIDEMIA ASSOCIATED WITH TYPE 2 DIABETES MELLITUS: ICD-10-CM

## 2023-02-06 DIAGNOSIS — E11.69 COMBINED HYPERLIPIDEMIA ASSOCIATED WITH TYPE 2 DIABETES MELLITUS: ICD-10-CM

## 2023-02-06 DIAGNOSIS — M81.0 OSTEOPOROSIS, POST-MENOPAUSAL: Primary | ICD-10-CM

## 2023-02-06 DIAGNOSIS — R93.89 ABNORMAL FINDINGS ON DIAGNOSTIC IMAGING OF OTHER SPECIFIED BODY STRUCTURES: ICD-10-CM

## 2023-02-06 DIAGNOSIS — N18.31 STAGE 3A CHRONIC KIDNEY DISEASE: ICD-10-CM

## 2023-02-06 DIAGNOSIS — N18.31 TYPE 2 DIABETES MELLITUS WITH STAGE 3A CHRONIC KIDNEY DISEASE, WITHOUT LONG-TERM CURRENT USE OF INSULIN: ICD-10-CM

## 2023-02-06 DIAGNOSIS — E11.22 TYPE 2 DIABETES MELLITUS WITH STAGE 3A CHRONIC KIDNEY DISEASE, WITHOUT LONG-TERM CURRENT USE OF INSULIN: ICD-10-CM

## 2023-02-06 DIAGNOSIS — E11.59 CONTROLLED TYPE 2 DIABETES MELLITUS WITH OTHER CIRCULATORY COMPLICATION, WITHOUT LONG-TERM CURRENT USE OF INSULIN: ICD-10-CM

## 2023-02-06 PROCEDURE — 1159F MED LIST DOCD IN RCRD: CPT | Mod: CPTII,S$GLB,, | Performed by: HOSPITALIST

## 2023-02-06 PROCEDURE — 99214 PR OFFICE/OUTPT VISIT, EST, LEVL IV, 30-39 MIN: ICD-10-PCS | Mod: S$GLB,,, | Performed by: HOSPITALIST

## 2023-02-06 PROCEDURE — 1126F AMNT PAIN NOTED NONE PRSNT: CPT | Mod: CPTII,S$GLB,, | Performed by: HOSPITALIST

## 2023-02-06 PROCEDURE — 3008F PR BODY MASS INDEX (BMI) DOCUMENTED: ICD-10-PCS | Mod: CPTII,S$GLB,, | Performed by: HOSPITALIST

## 2023-02-06 PROCEDURE — 3008F BODY MASS INDEX DOCD: CPT | Mod: CPTII,S$GLB,, | Performed by: HOSPITALIST

## 2023-02-06 PROCEDURE — 1126F PR PAIN SEVERITY QUANTIFIED, NO PAIN PRESENT: ICD-10-PCS | Mod: CPTII,S$GLB,, | Performed by: HOSPITALIST

## 2023-02-06 PROCEDURE — 1159F PR MEDICATION LIST DOCUMENTED IN MEDICAL RECORD: ICD-10-PCS | Mod: CPTII,S$GLB,, | Performed by: HOSPITALIST

## 2023-02-06 PROCEDURE — 99999 PR PBB SHADOW E&M-EST. PATIENT-LVL V: ICD-10-PCS | Mod: PBBFAC,,, | Performed by: HOSPITALIST

## 2023-02-06 PROCEDURE — 1101F PR PT FALLS ASSESS DOC 0-1 FALLS W/OUT INJ PAST YR: ICD-10-PCS | Mod: CPTII,S$GLB,, | Performed by: HOSPITALIST

## 2023-02-06 PROCEDURE — 3288F PR FALLS RISK ASSESSMENT DOCUMENTED: ICD-10-PCS | Mod: CPTII,S$GLB,, | Performed by: HOSPITALIST

## 2023-02-06 PROCEDURE — 3079F PR MOST RECENT DIASTOLIC BLOOD PRESSURE 80-89 MM HG: ICD-10-PCS | Mod: CPTII,S$GLB,, | Performed by: HOSPITALIST

## 2023-02-06 PROCEDURE — 3079F DIAST BP 80-89 MM HG: CPT | Mod: CPTII,S$GLB,, | Performed by: HOSPITALIST

## 2023-02-06 PROCEDURE — 99999 PR PBB SHADOW E&M-EST. PATIENT-LVL V: CPT | Mod: PBBFAC,,, | Performed by: HOSPITALIST

## 2023-02-06 PROCEDURE — 3077F SYST BP >= 140 MM HG: CPT | Mod: CPTII,S$GLB,, | Performed by: HOSPITALIST

## 2023-02-06 PROCEDURE — 3288F FALL RISK ASSESSMENT DOCD: CPT | Mod: CPTII,S$GLB,, | Performed by: HOSPITALIST

## 2023-02-06 PROCEDURE — 3077F PR MOST RECENT SYSTOLIC BLOOD PRESSURE >= 140 MM HG: ICD-10-PCS | Mod: CPTII,S$GLB,, | Performed by: HOSPITALIST

## 2023-02-06 PROCEDURE — 1101F PT FALLS ASSESS-DOCD LE1/YR: CPT | Mod: CPTII,S$GLB,, | Performed by: HOSPITALIST

## 2023-02-06 PROCEDURE — 99214 OFFICE O/P EST MOD 30 MIN: CPT | Mod: S$GLB,,, | Performed by: HOSPITALIST

## 2023-02-06 RX ORDER — SEMAGLUTIDE 1.34 MG/ML
INJECTION, SOLUTION SUBCUTANEOUS
Qty: 1 PEN | Refills: 6 | Status: SHIPPED | OUTPATIENT
Start: 2023-02-06 | End: 2023-02-17 | Stop reason: SDUPTHER

## 2023-02-06 NOTE — ASSESSMENT & PLAN NOTE
- Diabetes is at goal, given current A1c, goal A1C for patient is 7%  - Diabetic supplies/medications: refilled this visit  - Continue reinforcement dietary modification, portion size control, decreasing carbohydrates intake  - continue Ozempic 1 mg Q weekly injection  - discussed with patient to use glipizide sparingly due to risk of hypoglycemia  - A1C in 6 mo  - Pt will see optometry and podiatry soon

## 2023-02-06 NOTE — ASSESSMENT & PLAN NOTE
- management of long-term osteoporosis  - On Prolia therapy>> next dose 5/22  - DXA personal reviewed by me, improvement in bone density-DXA:  6/5/2022  - continue vitamin-D and calcium supplement  - Fall precautions/Exercise: advised   - Prolia order renewed, scheduled every 6 months  - consider holiday in the future, will discuss this at the next office visit pending bone density results    Risk of osteonecrosis of the jaw (DISCUSSED) with bisphosphonates and denosumab, with incidence estimated from 1-10/771402 treated patients. The incidence of ONJ is higher in patients undergoing invasive dental surgery (excludes routine cleaning, fillings or root canals).   Dental health: Advised dental work should ideally be completed prior to initiation of treatment, follow up with dentist/oral surgeon advised.

## 2023-02-06 NOTE — ASSESSMENT & PLAN NOTE
- ASCVD Risk below: Statin: Taking  The 10-year ASCVD risk score (Razia MAGUIRE, et al., 2019) is: 55.2%    Values used to calculate the score:      Age: 72 years      Sex: Female      Is Non- : Yes      Diabetic: Yes      Tobacco smoker: Yes      Systolic Blood Pressure: 175 mmHg      Is BP treated: Yes      HDL Cholesterol: 41 mg/dL      Total Cholesterol: 139 mg/dL

## 2023-02-06 NOTE — PROGRESS NOTES
Subjective:      Patient ID: Sarina Genao is a 72 y.o. female presented to [facility] on 2/6/2023.  Chief Complaint:  Diabetes      History of Present Illness: Sarina Genao is a 72 y.o. female here for management of osteoporosis  Other significant past medical history:  Hypertension, hyperlipidemia    Interval hx: Here for follow up, s/p Prolia tolerated it well next injection 05/2023  Recent falls? No, Recent fractures? no  DM is good controlled,  On Ozempic      1) Osteoporosis  - Diagnosis on DXA in 2018  - On prolia, started 9/17/2018 to 11/2020>> not in our system, unable to get new Prolia from her endocrinologist due to cost  - Prolia restarted on Prolia restarted on 5/14/21> Last Prolia injection 11/14/2022, upcoming injection 05/2023  - Bone density is improving 2/2018> compare to 6/5/2020>> 6/5/2022  - Vit D: 1000iu daily, on MVN    - Current diarrhea or h/o malabsorption? no  - Height loss (>2 inches)? no  - Family hx of Osteoporosis: mother, possibly sister  - History of Cancer? Kindey cancer, nephrectomy, no issue  - Malignancy involving bone (active or history)? no  - Prior radiation treatment? no  - Post-menopausal? Age?: 50s  - No hysterectomy  - Tobacco use ?  Yes, just restarted due to covid stress  - EtOH use? no (<2 drinks a day in female, <3 drinks a day for male)  - Weight bearing exercise?   Yes, walking 30 mins, 3x, need to restart again   - Fall Precautions? yes, get out the chair without using their arms  - Dental work planned? No, dentures    Bone density:  Review  6/9/2022  Lumbar spine (L1-L4): BMD is 0.876 g/cm2, T-score is -1.6, and Z-score is 0.1.  Total hip: BMD is 0.661 g/cm2, T-score is -2.3, and Z-score is -1.1.  Femoral neck: BMD is 0.635 g/cm2, T-score is -1.9, and Z-score is -0.7.  Distal 1/3 radius: BMD is 0.552 g/cm2, T-score is -2.4, and Z-score is 0.1.     FRAX:  6% risk of a major osteoporotic fracture in the next 10 years.  2.1% risk of  hip fracture in the next 10 years.    Impression:  *Osteoporosis on treatment with denosumab.  Bone density in osteopenic range approaching osteoporosis at the distal 1/3 forearm with FRAX not suggesting treatment.  *Compared with previous DXA, BMD at the distal forearm has decreased by -3.8%,  BMD at the lumbar spine has remained stable, and the BMD at the total hip has increased by 7.9%.     Lab work reviewed  Lab Results   Component Value Date    PTH 73.0 01/31/2020    PTH 70.0 05/22/2019    PTH 66.0 10/20/2016    [id number] 63 05/03/2022    BKLWCIWO94DN 64 11/08/2021    HOYYBTMH48UD 51 01/31/2020    CALCIUM 11.3 (H) 11/04/2022    CALCIUM 10.5 05/03/2022    CALCIUM 10.2 11/08/2021    CALCIUM 10.2 11/08/2021    PHOS 2.9 11/08/2021    PHOS 3.0 10/30/2020    PHOS 2.9 09/14/2020    ALKPHOS 100 11/04/2022    ALKPHOS 87 05/03/2022    ALKPHOS 88 11/08/2021    TSH 1.780 02/13/2021    LABCALC 6.9 06/24/2016    [id number] 3 (L) 06/24/2016       2) Diabetes Mellitus Type 2  - Known diabetic complications: nephropathy  - Cardiovascular risk factors: advanced age (older than 55 for men, 65 for women), diabetes mellitus, dyslipidemia, sedentary lifestyle and smoking/ tobacco exposure  - Diagnosed w/ DM: in 2007  - Glucose 119 this AM, Staying below 135  - Need to see Dr De La Torre for toe nail trim soon    Currently:    Ozempic 1mg Qweekly, cost is not an issue   Glipizide PRN (1-2x  A month)  Previous meds:              Was on MDI insulin  Home glucose checks: checks 1x a day, Logs reviewed/Unavailable: oral recall: 112  Weight trend: stable  Diabetes Education:  no  Diabetes Related Hospitalization: no  Hx of pancreatitis, hx of thyroid cancer: no  Family history of diabetes: yes       Eye exam current (within one year): yes, DR: no  Reports cuts or ulcers on feet: no, has podiatrist  Statin: Taking, ACE/ARB: Taking    Diabetes lab work  Lab Results   Component Value Date    HGBA1C 5.6 11/04/2022    HGBA1C 5.6 05/03/2022     HGBA1C 5.9 (H) 11/08/2021    HGBA1C 6.4 (H) 08/06/2021     No results found for: CPEPTIDE, GLUTAMICACID, ISLETCELLANT   No results found for: FRUCTOSAMINE  Lab Results   Component Value Date    MICALBCREAT 26.1 11/04/2022     No results found for: [id number]    Diabetes Management Status: Reviewed this office visit  Screening or Prevention Patient's value Goal Complete/Controlled?   Lipid profile : 02/13/2021 Annually No     Dilated retinal exam : 11/16/2021 Annually No     Foot exam   Most Recent Foot Exam Date: Not Found Annually No       Reviewed past surgical, medical, family, social history and updated as appropriate.  Review of Systems: see HPI above    Objective:   BP (!) 175/80 (BP Location: Right arm)   Pulse 72   Temp 98.4 °F (36.9 °C) (Oral)   Wt 66 kg (145 lb 6.4 oz)   BMI 25.76 kg/m²     Body mass index is 25.76 kg/m².  Vital signs reviewed    Physical Exam  Vitals and nursing note reviewed.   Constitutional:       General: She is not in acute distress.     Appearance: Normal appearance. She is well-developed.   Neck:      Thyroid: No thyromegaly.   Cardiovascular:      Rate and Rhythm: Normal rate.      Heart sounds: Normal heart sounds.   Pulmonary:      Effort: Pulmonary effort is normal. No respiratory distress.   Abdominal:      Tenderness: There is no abdominal tenderness.   Musculoskeletal:         General: Normal range of motion.      Cervical back: Neck supple.   Skin:     General: Skin is warm.      Findings: No erythema.   Neurological:      Mental Status: She is alert and oriented to person, place, and time.       Lab Reviewed:   Lab Results   Component Value Date    HGBA1C 5.6 11/04/2022       Lab Results   Component Value Date    CHOL 139 02/13/2021    HDL 41 02/13/2021    LDLCALC 66.2 02/13/2021    TRIG 159 (H) 02/13/2021    CHOLHDL 29.5 02/13/2021       Lab Results   Component Value Date     11/04/2022    K 3.5 11/04/2022     11/04/2022    CO2 28 11/04/2022    GLU 65 (L)  11/04/2022    BUN 14 11/04/2022    CREATININE 1.3 11/04/2022    CALCIUM 11.3 (H) 11/04/2022    PROT 7.5 11/04/2022    ALBUMIN 3.6 11/04/2022    BILITOT 0.8 11/04/2022    ALKPHOS 100 11/04/2022    AST 13 11/04/2022    ALT 8 (L) 11/04/2022    ANIONGAP 9 11/04/2022    ESTGFRAFRICA 51.8 (A) 05/03/2022    EGFRNONAA 44.9 (A) 05/03/2022    TSH 1.780 02/13/2021        Lab Results   Component Value Date    PTH 73.0 01/31/2020    PTH 70.0 05/22/2019    PTH 66.0 10/20/2016    VZUGTPSH12WV 63 05/03/2022    OVETQPQA06IU 64 11/08/2021    UMOFBNMP34CP 51 01/31/2020    CALCIUM 11.3 (H) 11/04/2022    CALCIUM 10.5 05/03/2022    CALCIUM 10.2 11/08/2021    CALCIUM 10.2 11/08/2021    PHOS 2.9 11/08/2021    PHOS 3.0 10/30/2020    PHOS 2.9 09/14/2020    ALKPHOS 100 11/04/2022    ALKPHOS 87 05/03/2022    ALKPHOS 88 11/08/2021    TSH 1.780 02/13/2021    LABCALC 6.9 06/24/2016    AGBARMT97VQH 3 (L) 06/24/2016       Assessment     1. Osteoporosis, post-menopausal  Vitamin D    TSH    PTH, Intact    Comprehensive Metabolic Panel    Magnesium    Phosphorus      2. Stage 3a chronic kidney disease  semaglutide (OZEMPIC) 1 mg/dose (4 mg/3 mL)      3. Controlled type 2 diabetes mellitus with other circulatory complication, without long-term current use of insulin  semaglutide (OZEMPIC) 1 mg/dose (4 mg/3 mL)    Ambulatory referral/consult to Podiatry    Hemoglobin A1C      4. Type 2 diabetes mellitus with stage 3a chronic kidney disease, without long-term current use of insulin        5. Abnormal findings on diagnostic imaging of other specified body structures  TSH      6. Combined hyperlipidemia associated with type 2 diabetes mellitus               Plan     Osteoporosis, post-menopausal  - management of long-term osteoporosis  - On Prolia therapy>> next dose 5/22  - DXA personal reviewed by me, improvement in bone density-DXA:  6/5/2022  - continue vitamin-D and calcium supplement  - Fall precautions/Exercise: advised   - Prolia order renewed,  scheduled every 6 months  - consider holiday in the future, will discuss this at the next office visit pending bone density results    Risk of osteonecrosis of the jaw (DISCUSSED) with bisphosphonates and denosumab, with incidence estimated from 1-10/330970 treated patients. The incidence of ONJ is higher in patients undergoing invasive dental surgery (excludes routine cleaning, fillings or root canals).   Dental health: Advised dental work should ideally be completed prior to initiation of treatment, follow up with dentist/oral surgeon advised.     Type 2 diabetes mellitus with chronic kidney disease, without long-term current use of insulin  - Diabetes is at goal, given current A1c, goal A1C for patient is 7%  - Diabetic supplies/medications: refilled this visit  - Continue reinforcement dietary modification, portion size control, decreasing carbohydrates intake  - continue Ozempic 1 mg Q weekly injection  - discussed with patient to use glipizide sparingly due to risk of hypoglycemia  - A1C in 6 mo  - Pt will see optometry and podiatry soon    CKD (chronic kidney disease) stage 3, GFR 30-59 ml/min  - Renal function reviewed on lab work today, stable   - continue routine monitoring    Combined hyperlipidemia associated with type 2 diabetes mellitus  - ASCVD Risk below: Statin: Taking  The 10-year ASCVD risk score (Razia DK, et al., 2019) is: 55.2%    Values used to calculate the score:      Age: 72 years      Sex: Female      Is Non- : Yes      Diabetic: Yes      Tobacco smoker: Yes      Systolic Blood Pressure: 175 mmHg      Is BP treated: Yes      HDL Cholesterol: 41 mg/dL      Total Cholesterol: 139 mg/dL        Advised patient to follow up with PCP for routine health maintenance care.   RTC in 6 month     Naveed Patel M.D  Endocrinology  [facility]  2/6/2023      Disclaimer: This note has been generated using voice-recognition software. There may be typographical  errors that have been missed during proof-reading.

## 2023-02-08 ENCOUNTER — OFFICE VISIT (OUTPATIENT)
Dept: NEPHROLOGY | Facility: CLINIC | Age: 73
End: 2023-02-08
Payer: MEDICARE

## 2023-02-08 ENCOUNTER — TELEPHONE (OUTPATIENT)
Dept: ENDOCRINOLOGY | Facility: CLINIC | Age: 73
End: 2023-02-08
Payer: MEDICARE

## 2023-02-08 VITALS
WEIGHT: 146 LBS | BODY MASS INDEX: 25.86 KG/M2 | DIASTOLIC BLOOD PRESSURE: 80 MMHG | HEART RATE: 65 BPM | OXYGEN SATURATION: 97 % | SYSTOLIC BLOOD PRESSURE: 140 MMHG

## 2023-02-08 DIAGNOSIS — N18.31 STAGE 3A CHRONIC KIDNEY DISEASE: Primary | ICD-10-CM

## 2023-02-08 DIAGNOSIS — I10 PRIMARY HYPERTENSION: ICD-10-CM

## 2023-02-08 DIAGNOSIS — N18.31 TYPE 2 DIABETES MELLITUS WITH STAGE 3A CHRONIC KIDNEY DISEASE, WITHOUT LONG-TERM CURRENT USE OF INSULIN: ICD-10-CM

## 2023-02-08 DIAGNOSIS — E11.22 TYPE 2 DIABETES MELLITUS WITH STAGE 3A CHRONIC KIDNEY DISEASE, WITHOUT LONG-TERM CURRENT USE OF INSULIN: ICD-10-CM

## 2023-02-08 PROCEDURE — 3288F PR FALLS RISK ASSESSMENT DOCUMENTED: ICD-10-PCS | Mod: CPTII,S$GLB,, | Performed by: INTERNAL MEDICINE

## 2023-02-08 PROCEDURE — 99215 OFFICE O/P EST HI 40 MIN: CPT | Mod: S$GLB,,, | Performed by: INTERNAL MEDICINE

## 2023-02-08 PROCEDURE — 3066F PR DOCUMENTATION OF TREATMENT FOR NEPHROPATHY: ICD-10-PCS | Mod: CPTII,S$GLB,, | Performed by: INTERNAL MEDICINE

## 2023-02-08 PROCEDURE — 1101F PT FALLS ASSESS-DOCD LE1/YR: CPT | Mod: CPTII,S$GLB,, | Performed by: INTERNAL MEDICINE

## 2023-02-08 PROCEDURE — 3077F SYST BP >= 140 MM HG: CPT | Mod: CPTII,S$GLB,, | Performed by: INTERNAL MEDICINE

## 2023-02-08 PROCEDURE — 3288F FALL RISK ASSESSMENT DOCD: CPT | Mod: CPTII,S$GLB,, | Performed by: INTERNAL MEDICINE

## 2023-02-08 PROCEDURE — 3008F PR BODY MASS INDEX (BMI) DOCUMENTED: ICD-10-PCS | Mod: CPTII,S$GLB,, | Performed by: INTERNAL MEDICINE

## 2023-02-08 PROCEDURE — 1101F PR PT FALLS ASSESS DOC 0-1 FALLS W/OUT INJ PAST YR: ICD-10-PCS | Mod: CPTII,S$GLB,, | Performed by: INTERNAL MEDICINE

## 2023-02-08 PROCEDURE — 99999 PR PBB SHADOW E&M-EST. PATIENT-LVL IV: ICD-10-PCS | Mod: PBBFAC,,, | Performed by: INTERNAL MEDICINE

## 2023-02-08 PROCEDURE — 99215 PR OFFICE/OUTPT VISIT, EST, LEVL V, 40-54 MIN: ICD-10-PCS | Mod: S$GLB,,, | Performed by: INTERNAL MEDICINE

## 2023-02-08 PROCEDURE — 3079F PR MOST RECENT DIASTOLIC BLOOD PRESSURE 80-89 MM HG: ICD-10-PCS | Mod: CPTII,S$GLB,, | Performed by: INTERNAL MEDICINE

## 2023-02-08 PROCEDURE — 3077F PR MOST RECENT SYSTOLIC BLOOD PRESSURE >= 140 MM HG: ICD-10-PCS | Mod: CPTII,S$GLB,, | Performed by: INTERNAL MEDICINE

## 2023-02-08 PROCEDURE — 3008F BODY MASS INDEX DOCD: CPT | Mod: CPTII,S$GLB,, | Performed by: INTERNAL MEDICINE

## 2023-02-08 PROCEDURE — 1159F MED LIST DOCD IN RCRD: CPT | Mod: CPTII,S$GLB,, | Performed by: INTERNAL MEDICINE

## 2023-02-08 PROCEDURE — 99999 PR PBB SHADOW E&M-EST. PATIENT-LVL IV: CPT | Mod: PBBFAC,,, | Performed by: INTERNAL MEDICINE

## 2023-02-08 PROCEDURE — 3066F NEPHROPATHY DOC TX: CPT | Mod: CPTII,S$GLB,, | Performed by: INTERNAL MEDICINE

## 2023-02-08 PROCEDURE — 1126F AMNT PAIN NOTED NONE PRSNT: CPT | Mod: CPTII,S$GLB,, | Performed by: INTERNAL MEDICINE

## 2023-02-08 PROCEDURE — 1159F PR MEDICATION LIST DOCUMENTED IN MEDICAL RECORD: ICD-10-PCS | Mod: CPTII,S$GLB,, | Performed by: INTERNAL MEDICINE

## 2023-02-08 PROCEDURE — 3079F DIAST BP 80-89 MM HG: CPT | Mod: CPTII,S$GLB,, | Performed by: INTERNAL MEDICINE

## 2023-02-08 PROCEDURE — 1126F PR PAIN SEVERITY QUANTIFIED, NO PAIN PRESENT: ICD-10-PCS | Mod: CPTII,S$GLB,, | Performed by: INTERNAL MEDICINE

## 2023-02-08 NOTE — PROGRESS NOTES
CHIEF COMPLAINT/HPI: Sarina is a 72 y.o. woman with DM, HTN,COPD, partial Rt nephrectomy  (renal cell carcinoma) 2014 , GERD,Stage IA2 RLL NSCLC. diagnosed in robotic bronch w/ EBUS 10/25/22. She just completed Radiation on Friday 02/03/23 and her BP was elevated and has been improving over those few days.she  presenting for follow up of chronic kidney disease.      Past Medical History:   Diagnosis Date    Anticoagulant long-term use     Cancer     Kidney (Right)    Cataracts, bilateral     CKD (chronic kidney disease) stage 3, GFR 30-59 ml/min 12/15/2014    Colon polyps     Combined hyperlipidemia associated with type 2 diabetes mellitus 6/7/2013    COPD (chronic obstructive pulmonary disease) 12/15/2014    Diabetes mellitus type II     NIDDM, a.m. glucose-120s-130s-last A1c-7.1-6/7/13    Diabetes mellitus with renal manifestations, uncontrolled 2/1/2017    Diabetic retinopathy     Family history of stomach cancer 12/5/2013    Former smoker     H/O renal cell carcinoma 12/15/2015    History of colonic polyps 2/1/2017    3 polyps 7/13 ---5 yrs    History of gastroesophageal reflux (GERD)     History of urinary incontinence     s/p bladder lift-october, 2011 (at [facility])    Hyperlipidemia     Hypertension     120s/70s-80s    Nephrolithiasis     Osteoarthritis of thumb 12/20/2019    Osteoporosis, post-menopausal 3/17/2014    Primary hyperparathyroidism 10/20/2016    Schatzki's ring 2/1/2017    Dilated 2014       Sarina reports that she has been smoking cigarettes. She has a 7.50 pack-year smoking history. She has never used smokeless tobacco. She reports that she does not drink alcohol and does not use drugs.    Family History   Problem Relation Age of Onset    Glaucoma Mother     Cataracts Mother     No Known Problems Father     Colon cancer Brother     Nephrolithiasis Sister     No Known Problems Maternal Aunt     No Known Problems Maternal Uncle     No Known Problems Paternal Aunt     No Known Problems  Paternal Uncle     No Known Problems Maternal Grandmother     No Known Problems Maternal Grandfather     No Known Problems Paternal Grandmother     No Known Problems Paternal Grandfather     Anesthesia problems Neg Hx     Kidney cancer Neg Hx     Amblyopia Neg Hx     Blindness Neg Hx     Cancer Neg Hx     Diabetes Neg Hx     Hypertension Neg Hx     Macular degeneration Neg Hx     Retinal detachment Neg Hx     Strabismus Neg Hx     Stroke Neg Hx     Thyroid disease Neg Hx        ROS:    Review of Systems   Constitutional:  Negative for activity change, appetite change, chills, fever and unexpected weight change.   HENT:  Negative for congestion, ear discharge, hearing loss, nosebleeds, sore throat and tinnitus.    Eyes:  Negative for photophobia, pain, redness and visual disturbance.   Respiratory:  Negative for cough, chest tightness, shortness of breath and wheezing.    Cardiovascular:  Negative for chest pain, palpitations and leg swelling.   Gastrointestinal:  Negative for abdominal distention, constipation, diarrhea, nausea and vomiting.   Endocrine: Negative for cold intolerance, heat intolerance, polydipsia and polyuria.   Genitourinary:  Negative for decreased urine volume, difficulty urinating, dysuria, flank pain and hematuria.   Musculoskeletal:  Negative for arthralgias, gait problem, joint swelling and neck stiffness.   Skin:  Negative for rash.   Neurological:  Negative for dizziness, tremors, weakness, numbness and headaches.   Psychiatric/Behavioral:  Negative for confusion and sleep disturbance.        PE:    Vitals:    02/08/23 1333   BP: (!) 140/80   Pulse: 65   SpO2: 97%   Weight: 66.2 kg (146 lb)       Physical Exam  Constitutional:       General: She is not in acute distress.     Appearance: Normal appearance. She is normal weight.   HENT:      Head: Normocephalic and atraumatic.      Nose: Nose normal.      Mouth/Throat:      Mouth: Mucous membranes are moist.      Pharynx: Oropharynx is clear.  No oropharyngeal exudate or posterior oropharyngeal erythema.   Eyes:      Extraocular Movements: Extraocular movements intact.      Conjunctiva/sclera: Conjunctivae normal.      Pupils: Pupils are equal, round, and reactive to light.   Cardiovascular:      Rate and Rhythm: Normal rate and regular rhythm.      Pulses: Normal pulses.      Heart sounds: Normal heart sounds. No murmur heard.    No friction rub. No gallop.   Pulmonary:      Effort: Pulmonary effort is normal. No respiratory distress.      Breath sounds: Normal breath sounds. No stridor. No wheezing or rales.   Abdominal:      General: Abdomen is flat. Bowel sounds are normal.      Palpations: Abdomen is soft.      Tenderness: There is no abdominal tenderness. There is no guarding or rebound.   Musculoskeletal:         General: No swelling. Normal range of motion.      Cervical back: Normal range of motion and neck supple.      Right lower leg: No edema.      Left lower leg: No edema.   Skin:     General: Skin is warm.      Coloration: Skin is not jaundiced or pale.      Findings: No lesion.   Neurological:      General: No focal deficit present.      Mental Status: She is alert and oriented to person, place, and time.      Motor: No weakness.   Psychiatric:         Mood and Affect: Mood normal.         Impression/Plan:    1. Stage 3a chronic kidney disease    2. Primary hypertension    3. Type 2 diabetes mellitus with stage 3a chronic kidney disease, without long-term current use of insulin      # CKD3a :  due to HTN , DM and partial nephrectomy, s/p radiation for NSCLC( squam.)   -counseled on BP and glycemic control  -counseled on hydration and avoiding NSAID's  -my labs not done, will arrange for labs with her upcoming labs with Endo. In April.        Lab Results   Component Value Date    CREATININE 1.3 11/04/2022     Protein Creatinine Ratios:    Prot/Creat Ratio, Urine   Date Value Ref Range Status   09/14/2020 0.06 0.00 - 0.20 Final   01/31/2020  "0.04 0.00 - 0.20 Final   08/08/2018 0.07 0.00 - 0.20 Final       Acid-Base:   Lab Results   Component Value Date     11/04/2022    K 3.5 11/04/2022    CO2 28 11/04/2022     #HTN: Blood pressures , mildly elevated, however patient .Stated that BP at home is always normal . Will keep monitoring. She stated " I bong completed my Radiation on Friday 02/03/23 and her BP was in 200 systolic and has been improving slowly. Counseled to keep monitoring and let me know if does not go back to normal     # Renal osteodystrophy:   Lab Results   Component Value Date    PTH 73.0 01/31/2020    CALCIUM 11.3 (H) 11/04/2022    PHOS 2.9 11/08/2021       # Anemia:   Lab Results   Component Value Date    HGB 14.7 02/13/2021        # DM:  Last HbA1C   Lab Results   Component Value Date    HGBA1C 5.6 11/04/2022       # Lipid management:   Lab Results   Component Value Date    LDLCALC 66.2 02/13/2021       # ESRD planing: none     Follow up in 4-6m         "

## 2023-02-08 NOTE — TELEPHONE ENCOUNTER
LVM to notify pt that an A1C was ordered at her visit on 2/6 and that she is scheduled to get that lab drawn on 4/6.

## 2023-02-08 NOTE — TELEPHONE ENCOUNTER
----- Message from Sammie Stuart sent at 2/8/2023  2:09 PM CST -----  Regarding: self [phone]  .Type: Patient Call Back    Who called: self     What is the request in detail: Pt is requesting to get order for A1C     Can the clinic reply by [facility]? Call back     Would the patient rather a call back or a response via [facility]? Call back    Best call back number: .[phone]

## 2023-02-10 ENCOUNTER — TELEPHONE (OUTPATIENT)
Dept: RADIATION ONCOLOGY | Facility: CLINIC | Age: 73
End: 2023-02-10
Payer: MEDICARE

## 2023-02-10 NOTE — TELEPHONE ENCOUNTER
Followup call after completing radiation to the lung states she is feeling well f/u appt confirmed

## 2023-02-17 RX ORDER — SEMAGLUTIDE 1.34 MG/ML
INJECTION, SOLUTION SUBCUTANEOUS
Qty: 1 PEN | Refills: 6 | Status: SHIPPED | OUTPATIENT
Start: 2023-02-17 | End: 2023-06-14

## 2023-03-01 ENCOUNTER — OFFICE VISIT (OUTPATIENT)
Dept: PODIATRY | Facility: CLINIC | Age: 73
End: 2023-03-01
Payer: MEDICARE

## 2023-03-01 VITALS — BODY MASS INDEX: 25.87 KG/M2 | WEIGHT: 146 LBS | HEIGHT: 63 IN

## 2023-03-01 DIAGNOSIS — M20.5X1 HALLUX LIMITUS, ACQUIRED, RIGHT: ICD-10-CM

## 2023-03-01 DIAGNOSIS — M20.5X2 HALLUX LIMITUS, ACQUIRED, LEFT: ICD-10-CM

## 2023-03-01 DIAGNOSIS — M20.42 HAMMER TOES OF BOTH FEET: ICD-10-CM

## 2023-03-01 DIAGNOSIS — M20.41 HAMMER TOES OF BOTH FEET: ICD-10-CM

## 2023-03-01 DIAGNOSIS — E11.51 TYPE II DIABETES MELLITUS WITH PERIPHERAL CIRCULATORY DISORDER: Primary | ICD-10-CM

## 2023-03-01 DIAGNOSIS — M20.11 HALLUX ABDUCTO VALGUS, RIGHT: ICD-10-CM

## 2023-03-01 DIAGNOSIS — E11.59 CONTROLLED TYPE 2 DIABETES MELLITUS WITH OTHER CIRCULATORY COMPLICATION, WITHOUT LONG-TERM CURRENT USE OF INSULIN: ICD-10-CM

## 2023-03-01 DIAGNOSIS — M20.12 HALLUX ABDUCTO VALGUS, LEFT: ICD-10-CM

## 2023-03-01 PROCEDURE — 1160F PR REVIEW ALL MEDS BY PRESCRIBER/CLIN PHARMACIST DOCUMENTED: ICD-10-PCS | Mod: CPTII,S$GLB,, | Performed by: PODIATRIST

## 2023-03-01 PROCEDURE — 99214 PR OFFICE/OUTPT VISIT, EST, LEVL IV, 30-39 MIN: ICD-10-PCS | Mod: S$GLB,,, | Performed by: PODIATRIST

## 2023-03-01 PROCEDURE — 1160F RVW MEDS BY RX/DR IN RCRD: CPT | Mod: CPTII,S$GLB,, | Performed by: PODIATRIST

## 2023-03-01 PROCEDURE — 3066F NEPHROPATHY DOC TX: CPT | Mod: CPTII,S$GLB,, | Performed by: PODIATRIST

## 2023-03-01 PROCEDURE — 1101F PT FALLS ASSESS-DOCD LE1/YR: CPT | Mod: CPTII,S$GLB,, | Performed by: PODIATRIST

## 2023-03-01 PROCEDURE — 99999 PR PBB SHADOW E&M-EST. PATIENT-LVL V: CPT | Mod: PBBFAC,,, | Performed by: PODIATRIST

## 2023-03-01 PROCEDURE — 3008F BODY MASS INDEX DOCD: CPT | Mod: CPTII,S$GLB,, | Performed by: PODIATRIST

## 2023-03-01 PROCEDURE — 1159F MED LIST DOCD IN RCRD: CPT | Mod: CPTII,S$GLB,, | Performed by: PODIATRIST

## 2023-03-01 PROCEDURE — 1126F AMNT PAIN NOTED NONE PRSNT: CPT | Mod: CPTII,S$GLB,, | Performed by: PODIATRIST

## 2023-03-01 PROCEDURE — 3288F FALL RISK ASSESSMENT DOCD: CPT | Mod: CPTII,S$GLB,, | Performed by: PODIATRIST

## 2023-03-01 PROCEDURE — 1101F PR PT FALLS ASSESS DOC 0-1 FALLS W/OUT INJ PAST YR: ICD-10-PCS | Mod: CPTII,S$GLB,, | Performed by: PODIATRIST

## 2023-03-01 PROCEDURE — 3288F PR FALLS RISK ASSESSMENT DOCUMENTED: ICD-10-PCS | Mod: CPTII,S$GLB,, | Performed by: PODIATRIST

## 2023-03-01 PROCEDURE — 99999 PR PBB SHADOW E&M-EST. PATIENT-LVL V: ICD-10-PCS | Mod: PBBFAC,,, | Performed by: PODIATRIST

## 2023-03-01 PROCEDURE — 3066F PR DOCUMENTATION OF TREATMENT FOR NEPHROPATHY: ICD-10-PCS | Mod: CPTII,S$GLB,, | Performed by: PODIATRIST

## 2023-03-01 PROCEDURE — 1159F PR MEDICATION LIST DOCUMENTED IN MEDICAL RECORD: ICD-10-PCS | Mod: CPTII,S$GLB,, | Performed by: PODIATRIST

## 2023-03-01 PROCEDURE — 1126F PR PAIN SEVERITY QUANTIFIED, NO PAIN PRESENT: ICD-10-PCS | Mod: CPTII,S$GLB,, | Performed by: PODIATRIST

## 2023-03-01 PROCEDURE — 3008F PR BODY MASS INDEX (BMI) DOCUMENTED: ICD-10-PCS | Mod: CPTII,S$GLB,, | Performed by: PODIATRIST

## 2023-03-01 PROCEDURE — 99214 OFFICE O/P EST MOD 30 MIN: CPT | Mod: S$GLB,,, | Performed by: PODIATRIST

## 2023-03-01 NOTE — PATIENT INSTRUCTIONS
Over the counter pain creams: Voltaren Gel, Biofreeze, Bengay, tiger balm, two old goat, lidocaine gel,  Absorbine Veterinary Liniment Gel Topical Analgesic Sore Muscle and Joint Pain Relief    Recommend lotions: eucerin, eucerin for diabetics, aquaphor, A&D ointment, gold bond for diabetics, sween, Beaumont's Bees all purpose baby ointment,  urea 40 with aloe (found on amazon.com)    Shoe recommendations: (try 6pm.com, zappos.com , nordstromrack.[email or web address]¨ve, or shoes.com for discounted prices) you can visit [employer] in [place]  or [employer] [facility] in [facility] (there are also several shoe brand outlets in the [facility])    Asics (GT 2000 or gel foundations), new balance stability type shoes (such as the 940 series), saucony (stabil c3),  Barry (GTS or Beast or transcend), propet (tennis shoe)    Nikolas Telles (women) Jerry&Nacho (men), clarks, crocs, aerosoles, naturalizers, SAS, ecco, born, alok russo, rockports (dress shoes)    Vionic, burkenstocks, fitflops, propet (sandals)  Nike comfort thong sandals, crocs, propet (house shoes)    Nail Home remedy:  Vicks Vapor rub or Emuaid to nails for easier manageability    Diabetes: Inspecting Your Feet  Diabetes increases your chances of developing foot problems. So inspect your feet every day. This helps you find small skin irritations before they become serious infections. If you have trouble seeing the bottoms of your feet, use a mirror or ask a family member or friend to help.     Pressure spots on the bottom of the foot are common areas where problems develop.   How to check your feet  Below are tips to help you look for foot problems. Try to check your feet at the same time each day, such as when you get out of bed in the morning:  Check the top of each foot. The tops of toes, back of the heel, and outer edge of the foot can get a lot of rubbing from poor-fitting shoes.  Check the bottom of each foot. Daily wear and tear often leads to problems at pressure spots.  Check  the toes and nails. Fungal infections often occur between toes. Toenail problems can also be a sign of fungal infections or lead to breaks in the skin.  Check your shoes, too. Loose objects inside a shoe can injure the foot. Use your hand to feel inside your shoes for things like miguel, loose stitching, or rough areas that could irritate your skin.  Warning signs  Look for any color changes in the foot. Redness with streaks can signal a severe infection, which needs immediate medical attention. Tell your doctor right away if you have any of these problems:  Swelling, sometimes with color changes, may be a sign of poor blood flow or infection. Symptoms include tenderness and an increase in the size of your foot.  Warm or hot areas on your feet may be signs of infection. A foot that is cold may not be getting enough blood.  Sensations such as burning, tingling, or pins and needles can be signs of a problem. Also check for areas that may be numb.  Hot spots are caused by friction or pressure. Look for hot spots in areas that get a lot of rubbing. Hot spots can turn into blisters, calluses, or sores.  Cracks and sores are caused by dry or irritated skin. They are a sign that the skin is breaking down, which can lead to infection.  Toenail problems to watch for include nails growing into the skin (ingrown toenail) and causing redness or pain. Thick, yellow, or discolored nails can signal a fungal infection.  Drainage and odor can develop from untreated sores and ulcers. Call your doctor right away if you notice white or yellow drainage, bleeding, or unpleasant odor.   © 1825-8782 [employer]. [address] [place] [address]. All rights reserved. This information is not intended as a substitute for professional medical care. Always follow your healthcare professional's instructions.        Step-by-Step:  Inspecting Your Feet (Diabetes)    Date Last Reviewed: 10/1/2016  © 3081-3008 [employer], [employer]. [address] [place] [address], [place] [address]. All rights reserved. This information is not intended as a substitute for professional medical care. Always follow your healthcare professional's instructions.

## 2023-03-01 NOTE — PROGRESS NOTES
Subjective:      Patient ID: Sarina Genao is a 72 y.o. female.    Chief Complaint: Diabetes Mellitus (Venancio Mayer MD at 1/13/2023) and Diabetic Foot Exam    Sarina is a 72 y.o. female who presents to the clinic for evaluation and treatment of high risk feet. Sarina has a past medical history of Anticoagulant long-term use, Cancer, Cataracts, bilateral, CKD (chronic kidney disease) stage 3, GFR 30-59 ml/min (12/15/2014), Colon polyps, Combined hyperlipidemia associated with type 2 diabetes mellitus (6/7/2013), COPD (chronic obstructive pulmonary disease) (12/15/2014), Diabetes mellitus type II, Diabetes mellitus with renal manifestations, uncontrolled (2/1/2017), Diabetic retinopathy, Family history of stomach cancer (12/5/2013), Former smoker, H/O renal cell carcinoma (12/15/2015), History of colonic polyps (2/1/2017), History of gastroesophageal reflux (GERD), History of urinary incontinence, Hyperlipidemia, Hypertension, Nephrolithiasis, Osteoarthritis of thumb (12/20/2019), Osteoporosis, post-menopausal (3/17/2014), Primary hyperparathyroidism (10/20/2016), and Schatzki's ring (2/1/2017). The patient has no major complaints with feet. Chief concern is how to care for feet as a diabetic.      PCP: Venancio Mayer MD    Date Last Seen by PCP:   Chief Complaint   Patient presents with    Diabetes Mellitus     Venancio Mayer MD at 1/13/2023    Diabetic Foot Exam     Current shoe gear: boat shoes     Hemoglobin A1C   Date Value Ref Range Status   11/04/2022 5.6 4.0 - 5.6 % Final     Comment:     ADA Screening Guidelines:  5.7-6.4%  Consistent with prediabetes  >or=6.5%  Consistent with diabetes    High levels of fetal hemoglobin interfere with the HbA1C  assay. Heterozygous hemoglobin variants (HbS, HgC, etc)do  not significantly interfere with this assay.   However, presence of multiple variants may affect accuracy.     05/03/2022 5.6 4.0 - 5.6 % Final     Comment:     ADA Screening  Guidelines:  5.7-6.4%  Consistent with prediabetes  >or=6.5%  Consistent with diabetes    High levels of fetal hemoglobin interfere with the HbA1C  assay. Heterozygous hemoglobin variants (HbS, HgC, etc)do  not significantly interfere with this assay.   However, presence of multiple variants may affect accuracy.     11/08/2021 5.9 (H) 4.0 - 5.6 % Final     Comment:     ADA Screening Guidelines:  5.7-6.4%  Consistent with prediabetes  >or=6.5%  Consistent with diabetes    High levels of fetal hemoglobin interfere with the HbA1C  assay. Heterozygous hemoglobin variants (HbS, HgC, etc)do  not significantly interfere with this assay.   However, presence of multiple variants may affect accuracy.     02/05/2020 6.0 (H) 4.0 - 5.6 % Final     Comment:     [employer]     Patient Active Problem List   Diagnosis    Combined hyperlipidemia associated with type 2 diabetes mellitus    Hypertension, benign    Urinary incontinence, urge    History of kidney cancer    Family history of stomach cancer    GERD (gastroesophageal reflux disease)    Osteoporosis, post-menopausal    Cigarette nicotine dependence with withdrawal    Centrilobular emphysema    CKD (chronic kidney disease) stage 3, GFR 30-59 ml/min    Nuclear sclerosis of both eyes    DM type 2 without retinopathy    Essential hypertension    Refractive error    Senile nuclear sclerosis    Post-operative state    Nuclear sclerosis of right eye    Diabetes mellitus with renal manifestations, uncontrolled    History of colonic polyps    Schatzki's ring    Pseudophakia    PVD (peripheral vascular disease) with claudication    PVD (peripheral vascular disease)    Bilateral carotid artery stenosis    PCO (posterior capsular opacification), right    Pulmonary vascular congestion    Type 2 diabetes mellitus with chronic kidney disease, without long-term current use of insulin    Chest pain on breathing    Right lower lobe pulmonary nodule    Carotid artery stenosis    Aortic  "atherosclerosis    Smoker    Osteoarthritis of thumb    De Quervain's tenosynovitis, bilateral    Unspecified inflammatory spondylopathy, multiple sites in spine    Heart failure with preserved ejection fraction    Squamous Cell Carcinoma of right lower lobe of lung    Opioid dependence, uncomplicated     Current Outpatient Medications on File Prior to Visit   Medication Sig Dispense Refill    albuterol (PROVENTIL/VENTOLIN HFA) 90 mcg/actuation inhaler Inhale 2 puffs into the lungs every 6 (six) hours as needed for Wheezing or Shortness of Breath (rescue and prevention). 18 g 11    albuterol-ipratropium (DUO-NEB) 2.5 mg-0.5 mg/3 mL nebulizer solution INHALE content of ONE vial BY MOUTH via nebulizer EVERY 6 HOURS AS NEEDED FOR WHEEZING OR shortness of breath. rescue 270 mL 12    amLODIPine (NORVASC) 5 MG tablet Take 1 tablet (5 mg total) by mouth once daily. 90 tablet 3    aspirin (ECOTRIN) 81 MG EC tablet Take 81 mg by mouth once daily.      cyanocobalamin, vitamin B-12, 1,000 mcg TbSR Take by mouth once daily.       DOCOSAHEXANOIC ACID/EPA (FISH OIL ORAL) Take 1,200 mg by mouth once daily.      famotidine (PEPCID) 40 MG tablet TAKE ONE TABLET BY MOUTH ONCE A DAY 90 tablet 0    FLUAD [id number], 65 YR UP,,PF, 45 mcg (15 mcg x 3)/0.5 mL Syrg inject .5 milliliter intramuscularly as directed  0    FLUZONE HIGHDOSE QUAD 20-21  mcg/0.7 mL Syrg inj .7 milliliter intramuscularly as directed      irbesartan-hydrochlorothiazide (AVALIDE) 150-12.5 mg per tablet TAKE ONE TABLET BY MOUTH ONCE A DAY 90 tablet 12    oxybutynin (DITROPAN-XL) 5 MG TR24 TAKE ONE TABLET BY MOUTH ONCE A DAY 90 tablet 12    oxyCODONE (ROXICODONE) 5 MG immediate release tablet Take one tablet (5 mg) every 6 hours as needed for pain. Do not take Tramadol if taking oxycodone. 20 tablet 0    pen needle, diabetic (BD INSULIN PEN NEEDLE UF SHORT) 31 gauge x 5/16" Ndle USE AS DIRECTED 100 each 12    rosuvastatin (CRESTOR) 20 MG tablet TAKE ONE TABLET BY " MOUTH ONCE A DAY 90 tablet 12    sars-cov-2, covid-19, (MODERNA COVID-19) 100 mcg/0.5 ml injection       semaglutide (OZEMPIC) 1 mg/dose (4 mg/3 mL) INJECT ONE MG SUBCUTANEOUSLY once every week 1 pen 6    traMADoL (ULTRAM) 50 mg tablet TAKE ONE TABLET BY MOUTH EVERY 6 HOURS AS NEEDED 120 tablet 3    umeclidinium-vilanteroL (ANORO ELLIPTA) 62.5-25 mcg/actuation DsDv Inhale 1 puff into the lungs once daily. THIS SHOULD BE USED, NOT INCRUSE 3 each 3    varenicline (CHANTIX STARTING MONTH BOX) 0.5 mg (11)- 1 mg (42) tablet Take one 0.5mg tab by mouth once daily X3 days,then increase to one 0.5mg tab twice daily X4 days,then increase to one 1mg tab twice daily 1 Package 0    varenicline (CHANTIX) 1 mg Tab Take 1 tablet (1 mg total) by mouth 2 (two) times daily. 60 tablet 2    vitamin D (VITAMIN D3) 1000 units Tab Take 1,000 Units by mouth once daily.      gabapentin (NEURONTIN) 300 MG capsule Take 1 capsule (300 mg total) by mouth every evening. for 5 days 5 capsule 0    omeprazole (PRILOSEC) 40 MG capsule Take 1 capsule (40 mg total) by mouth once daily. 90 capsule 4     No current facility-administered medications on file prior to visit.     Review of patient's allergies indicates:   Allergen Reactions    Metformin Diarrhea    Pantoprazole      elevated blood sugars     Past Surgical History:   Procedure Laterality Date    bladder lift  2011    done at [facility]    BLADDER STONE REMOVAL  2011    before bladder lift    CATARACT EXTRACTION W/  INTRAOCULAR LENS IMPLANT Left 06/07/2016    Dr. Vásquez    CATARACT EXTRACTION W/  INTRAOCULAR LENS IMPLANT Right 06/21/2016    Dr. Vásquez    COLONOSCOPY N/A 4/26/2018    Procedure: COLONOSCOPY;  Surgeon: Benjamin Zamora MD;  Location: [facility];  Service: Endoscopy;  Laterality: N/A;  confirmed ss    CYSTOSCOPY      INJECTION OF ANESTHETIC AGENT AROUND MULTIPLE INTERCOSTAL NERVES  12/27/2022    Procedure: BLOCK, NERVE, INTERCOSTAL, 2 OR MORE;  Surgeon: Paxton Cannon,  MD;  Location: [facility] OR 2ND FLR;  Service: Thoracic;;    LYSIS OF ADHESIONS  12/27/2022    Procedure: LYSIS, ADHESIONS;  Surgeon: Paxton Cannon MD;  Location: [facility] OR 2ND FLR;  Service: Thoracic;;    NAVIGATIONAL BRONCHOSCOPY N/A 12/11/2018    Procedure: BRONCHOSCOPY, NAVIGATIONAL;  Surgeon: Ashtyn Ramires MD;  Location: [facility] OR [facility] FLR;  Service: Pulmonary;  Laterality: N/A;    NEPHRECTOMY      partial right    ROBOTIC BRONCHOSCOPY N/A 10/25/2022    Procedure: ROBOTIC BRONCHOSCOPY;  Surgeon: Ashtyn Ramires MD;  Location: [facility] OR [facility] FLR;  Service: Pulmonary;  Laterality: N/A;    VIDEO-ASSISTED THORACOSCOPIC SURGERY (VATS)  12/27/2022    Procedure: VATS (VIDEO-ASSISTED THORACOSCOPIC SURGERY);  Surgeon: Paxton Cannon MD;  Location: [facility] OR [facility] FLR;  Service: Thoracic;;     Family History   Problem Relation Age of Onset    Glaucoma Mother     Cataracts Mother     No Known Problems Father     Colon cancer Brother     Nephrolithiasis Sister     No Known Problems Maternal Aunt     No Known Problems Maternal Uncle     No Known Problems Paternal Aunt     No Known Problems Paternal Uncle     No Known Problems Maternal Grandmother     No Known Problems Maternal Grandfather     No Known Problems Paternal Grandmother     No Known Problems Paternal Grandfather     Anesthesia problems Neg Hx     Kidney cancer Neg Hx     Amblyopia Neg Hx     Blindness Neg Hx     Cancer Neg Hx     Diabetes Neg Hx     Hypertension Neg Hx     Macular degeneration Neg Hx     Retinal detachment Neg Hx     Strabismus Neg Hx     Stroke Neg Hx     Thyroid disease Neg Hx      Social History     Socioeconomic History    Marital status:    Occupational History    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 reports quitting January 2018   Substance and Sexual Activity     "Alcohol use: No     Alcohol/week: 0.0 standard drinks    Drug use: No   Social History Narrative    Lives on [facility] [address] [facility]    Here with sister Kate [phone]           Review of Systems   Constitutional: Negative for chills and fever.   Cardiovascular:  Negative for chest pain, claudication and leg swelling.   Respiratory:  Positive for cough. Negative for shortness of breath.    Skin:  Positive for dry skin and nail changes. Negative for itching and rash.   Musculoskeletal:  Positive for arthritis, joint pain and muscle cramps. Negative for falls, joint swelling and muscle weakness.   Gastrointestinal:  Negative for diarrhea, nausea and vomiting.   Neurological:  Negative for numbness, paresthesias, tremors and weakness.   Psychiatric/Behavioral:  Negative for altered mental status and hallucinations.          Objective:       Vitals:    03/01/23 1121   Weight: 66.2 kg (146 lb)   Height: 5' 3" (1.6 m)   PainSc: 0-No pain       Physical Exam  Vitals and nursing note reviewed.   Constitutional:       Appearance: She is not diaphoretic.      Comments: General: Pt. is well-developed, well-nourished, appears stated age, in no acute distress, alert and oriented x 3. No evidence of depression, anxiety, or agitation. Calm, cooperative, and communicative. Appropriate interactions and affect.       Cardiovascular:      Pulses:           Dorsalis pedis pulses are 1+ on the right side and 1+ on the left side.        Posterior tibial pulses are 1+ on the right side and 1+ on the left side.      Comments: Dorsalis pedis and posterior tibial pulses are diminished Bilaterally. Skin is atrophic  Musculoskeletal:      Right ankle: No swelling. No tenderness. Normal range of motion.      Right Achilles Tendon: No defects.      Left ankle: No swelling. No tenderness. Normal range of motion.      Left Achilles Tendon: No defects.      Right foot: Normal range of motion. No tenderness.      Left foot: Normal range of motion. No " tenderness.      Comments: Muscle strength is 5/5 in all groups bilaterally.    Patient has hammertoes of digits 2-5 bilateral partially reducible without symptom today.    Visible and palpable bunion without pain at dorsomedial 1st metatarsal head right and left.  Hallux abducted right and left partially reducible, tracks laterally without being track bound.  No ecchymosis, erythema, edema, or cardinal signs infection or signs of trauma same foot.     Skin:     General: Skin is warm and dry.      Coloration: Skin is not pale.      Findings: No abrasion, ecchymosis, erythema, lesion or rash.      Nails: There is no clubbing.      Comments: Toenails 1-5 bilaterally are elongated by 2-3 mm, thickened by 2-3 mm, discolored/yellowed, dystrophic, brittle with subungual debris.      Interdigital Spaces clean, dry and without evidence of break in skin integrity   Neurological:      Sensory: No sensory deficit.      Motor: No tremor, atrophy or abnormal muscle tone.      Comments: [place]-Peggy 5.07 monofilament is intact bilateral feet. Sharp/dull sensation is also intact Bilateral feet.     Psychiatric:         Speech: Speech normal.           Assessment:       Encounter Diagnoses   Name Primary?    Controlled type 2 diabetes mellitus with other circulatory complication, without long-term current use of insulin     Type II diabetes mellitus with peripheral circulatory disorder Yes    Hammer toes of both feet     Hallux limitus, acquired, right     Hallux limitus, acquired, left     Hallux abducto valgus, right     Hallux abducto valgus, left          Plan:       Sarina was seen today for diabetes mellitus and diabetic foot exam.    Diagnoses and all orders for this visit:    Type II diabetes mellitus with peripheral circulatory disorder  -     DIABETIC SHOES FOR HOME USE    Controlled type 2 diabetes mellitus with other circulatory complication, without long-term current use of insulin  -     Ambulatory  referral/consult to Podiatry    Hammer toes of both feet  -     DIABETIC SHOES FOR HOME USE    Hallux limitus, acquired, right  -     DIABETIC SHOES FOR HOME USE    Hallux limitus, acquired, left  -     DIABETIC SHOES FOR HOME USE    Hallux abducto valgus, right  -     DIABETIC SHOES FOR HOME USE    Hallux abducto valgus, left  -     DIABETIC SHOES FOR HOME USE      I counseled the patient on her conditions, their implications and medical management.     Greater than 50% of this visit spent on counseling and coordination of care.    Education about the diabetic foot, neuropathy, and prevention of limb loss.    Shoe inspection. Diabetic Foot Education. Patient reminded of the importance of good nutrition/healthy diet/weight management and blood sugar control to help prevent podiatric complications of diabetes. Patient instructed on proper foot hygeine. Wear comfortable, proper fitting shoes. Wash feet daily. Dry well. After drying, apply moisturizer to feet (no lotion to webspaces). Inspect feet daily for skin breaks, blisters, swelling, or redness. Wear cotton socks (preferably white)  Change socks every day. Do NOT walk barefoot. Do NOT use heating pads or hot water soaks. We discussed wearing proper shoe gear, daily foot inspections, never walking without protective shoe gear.     Discussed edema control and the importance of daily moisturizer to the feet such as Gold bonds diabetic foot cream    Recommend applying vicks vaporub to thick abnormal toenails daily x 6 months to treat fungal nail infection.    rx diabetic shoes for protection and support    Based on chart review this patient does not qualify for nail care (Patients who qualify for nail care are usually as follows: diabetic with neurological manifestations, PVD, pernicious anemia, or CKD with appropriate modifiers that indicate high amputation risk).     Patient is low risk for developing lower extremity issues secondary to diabetes. I recommend  continued yearly diabetic foot examinations.      Patients PCP can perform yearly foot checks . Currently  patient has no pedal manifestations of DM     Patients without pedal manifestations of DM, do not qualify for nail/callus trimming        She will continue to monitor the areas daily, inspect her feet, wear protective shoe gear when ambulatory, moisturizer to maintain skin integrity and follow in this office in approximately 12 months, sooner p.r.n. or as cosmetic nail visit (Proc B)

## 2023-03-15 DIAGNOSIS — R52 PAIN: ICD-10-CM

## 2023-03-15 NOTE — TELEPHONE ENCOUNTER
No new care gaps identified.  [facility] Embedded Care Gaps. Reference number: [id number]. 3/15/2023   2:05:07 PM CDT

## 2023-03-15 NOTE — TELEPHONE ENCOUNTER
----- Message from Theo Mcgovern sent at 3/15/2023 11:44 AM CDT -----      Can the clinic reply in [facility]:N        Please refill the medication(s) listed below. Please call the patient when the prescription(s) is ready for  at this phone number         Medication #1 traMADoL (ULTRAM) 50 mg tablet    Medication #2       Preferred Pharmacy: [facility] - [facility]  [address]

## 2023-03-16 RX ORDER — TRAMADOL HYDROCHLORIDE 50 MG/1
50 TABLET ORAL EVERY 6 HOURS PRN
Qty: 120 TABLET | Refills: 3 | Status: SHIPPED | OUTPATIENT
Start: 2023-03-16 | End: 2023-03-16 | Stop reason: SDUPTHER

## 2023-03-16 NOTE — TELEPHONE ENCOUNTER
"----- Message from Zackary Currie sent at 3/16/2023  3:19 PM CDT -----  Regarding: [facility]  [phone]  Type: Patient Call Back    Who called: [facility]    What is the request in detail: Called in regards to the medication of traMADoL (ULTRAM) 50 mg tablet stated that the medication needs to fixed. Stating it needs to say "greater than 7 days supply medically necessary" for the medication to be refilled.     Can the clinic reply by [facility]? No     Would the patient rather a call back or a response via [facility]? Call back     Best call back number: [phone]    Additional Information:    Thank you.    "

## 2023-03-16 NOTE — TELEPHONE ENCOUNTER
----- Message from Juvenal Muller MA sent at 3/16/2023  2:11 PM CDT -----  Type: Patient Call Back    Who called: [facility]     What is the request in detail:Missing greater than 7 day medically necessary ..    traMADoL (ULTRAM) 50 mg tablet    Can the clinic reply by [facility]?No    Would the patient rather a call back or a response via [facility]? yes    Best call back number: [phone]

## 2023-03-17 RX ORDER — TRAMADOL HYDROCHLORIDE 50 MG/1
50 TABLET ORAL EVERY 6 HOURS PRN
Qty: 120 TABLET | Refills: 3 | Status: SHIPPED | OUTPATIENT
Start: 2023-03-17 | End: 2023-06-15

## 2023-04-06 ENCOUNTER — LAB VISIT (OUTPATIENT)
Dept: LAB | Facility: HOSPITAL | Age: 73
End: 2023-04-06
Attending: HOSPITALIST
Payer: MEDICARE

## 2023-04-06 DIAGNOSIS — E11.59 CONTROLLED TYPE 2 DIABETES MELLITUS WITH OTHER CIRCULATORY COMPLICATION, WITHOUT LONG-TERM CURRENT USE OF INSULIN: ICD-10-CM

## 2023-04-06 DIAGNOSIS — R93.89 ABNORMAL FINDINGS ON DIAGNOSTIC IMAGING OF OTHER SPECIFIED BODY STRUCTURES: ICD-10-CM

## 2023-04-06 DIAGNOSIS — M81.0 OSTEOPOROSIS, POST-MENOPAUSAL: ICD-10-CM

## 2023-04-06 DIAGNOSIS — N18.31 STAGE 3A CHRONIC KIDNEY DISEASE: ICD-10-CM

## 2023-04-06 DIAGNOSIS — I10 PRIMARY HYPERTENSION: ICD-10-CM

## 2023-04-06 LAB
25(OH)D3+25(OH)D2 SERPL-MCNC: 48 NG/ML (ref 30–96)
25(OH)D3+25(OH)D2 SERPL-MCNC: 48 NG/ML (ref 30–96)
ALBUMIN SERPL BCP-MCNC: 3.5 G/DL (ref 3.5–5.2)
ALP SERPL-CCNC: 97 U/L (ref 55–135)
ALT SERPL W/O P-5'-P-CCNC: 6 U/L (ref 10–44)
ANION GAP SERPL CALC-SCNC: 12 MMOL/L (ref 8–16)
AST SERPL-CCNC: 15 U/L (ref 10–40)
BASOPHILS # BLD AUTO: 0.04 K/UL (ref 0–0.2)
BASOPHILS # BLD AUTO: 0.04 K/UL (ref 0–0.2)
BASOPHILS NFR BLD: 0.3 % (ref 0–1.9)
BASOPHILS NFR BLD: 0.3 % (ref 0–1.9)
BILIRUB SERPL-MCNC: 0.5 MG/DL (ref 0.1–1)
BUN SERPL-MCNC: 11 MG/DL (ref 8–23)
CALCIUM SERPL-MCNC: 11.2 MG/DL (ref 8.7–10.5)
CHLORIDE SERPL-SCNC: 101 MMOL/L (ref 95–110)
CO2 SERPL-SCNC: 27 MMOL/L (ref 23–29)
CREAT SERPL-MCNC: 1.2 MG/DL (ref 0.5–1.4)
DIFFERENTIAL METHOD: ABNORMAL
DIFFERENTIAL METHOD: ABNORMAL
EOSINOPHIL # BLD AUTO: 0.1 K/UL (ref 0–0.5)
EOSINOPHIL # BLD AUTO: 0.1 K/UL (ref 0–0.5)
EOSINOPHIL NFR BLD: 1.2 % (ref 0–8)
EOSINOPHIL NFR BLD: 1.2 % (ref 0–8)
ERYTHROCYTE [DISTWIDTH] IN BLOOD BY AUTOMATED COUNT: 15.1 % (ref 11.5–14.5)
ERYTHROCYTE [DISTWIDTH] IN BLOOD BY AUTOMATED COUNT: 15.1 % (ref 11.5–14.5)
EST. GFR  (NO RACE VARIABLE): 47.5 ML/MIN/1.73 M^2
ESTIMATED AVG GLUCOSE: 111 MG/DL (ref 68–131)
GLUCOSE SERPL-MCNC: 82 MG/DL (ref 70–110)
HBA1C MFR BLD: 5.5 % (ref 4–5.6)
HCT VFR BLD AUTO: 45.2 % (ref 37–48.5)
HCT VFR BLD AUTO: 45.2 % (ref 37–48.5)
HGB BLD-MCNC: 14.1 G/DL (ref 12–16)
HGB BLD-MCNC: 14.1 G/DL (ref 12–16)
IMM GRANULOCYTES # BLD AUTO: 0.05 K/UL (ref 0–0.04)
IMM GRANULOCYTES # BLD AUTO: 0.05 K/UL (ref 0–0.04)
IMM GRANULOCYTES NFR BLD AUTO: 0.4 % (ref 0–0.5)
IMM GRANULOCYTES NFR BLD AUTO: 0.4 % (ref 0–0.5)
LYMPHOCYTES # BLD AUTO: 1.6 K/UL (ref 1–4.8)
LYMPHOCYTES # BLD AUTO: 1.6 K/UL (ref 1–4.8)
LYMPHOCYTES NFR BLD: 13.3 % (ref 18–48)
LYMPHOCYTES NFR BLD: 13.3 % (ref 18–48)
MAGNESIUM SERPL-MCNC: 1.6 MG/DL (ref 1.6–2.6)
MCH RBC QN AUTO: 27.5 PG (ref 27–31)
MCH RBC QN AUTO: 27.5 PG (ref 27–31)
MCHC RBC AUTO-ENTMCNC: 31.2 G/DL (ref 32–36)
MCHC RBC AUTO-ENTMCNC: 31.2 G/DL (ref 32–36)
MCV RBC AUTO: 88 FL (ref 82–98)
MCV RBC AUTO: 88 FL (ref 82–98)
MONOCYTES # BLD AUTO: 0.8 K/UL (ref 0.3–1)
MONOCYTES # BLD AUTO: 0.8 K/UL (ref 0.3–1)
MONOCYTES NFR BLD: 6.5 % (ref 4–15)
MONOCYTES NFR BLD: 6.5 % (ref 4–15)
NEUTROPHILS # BLD AUTO: 9.5 K/UL (ref 1.8–7.7)
NEUTROPHILS # BLD AUTO: 9.5 K/UL (ref 1.8–7.7)
NEUTROPHILS NFR BLD: 78.3 % (ref 38–73)
NEUTROPHILS NFR BLD: 78.3 % (ref 38–73)
NRBC BLD-RTO: 0 /100 WBC
NRBC BLD-RTO: 0 /100 WBC
PHOSPHATE SERPL-MCNC: 3.4 MG/DL (ref 2.7–4.5)
PHOSPHATE SERPL-MCNC: 3.4 MG/DL (ref 2.7–4.5)
PLATELET # BLD AUTO: 356 K/UL (ref 150–450)
PLATELET # BLD AUTO: 356 K/UL (ref 150–450)
PMV BLD AUTO: 9.4 FL (ref 9.2–12.9)
PMV BLD AUTO: 9.4 FL (ref 9.2–12.9)
POTASSIUM SERPL-SCNC: 3.2 MMOL/L (ref 3.5–5.1)
PROT SERPL-MCNC: 7.2 G/DL (ref 6–8.4)
PTH-INTACT SERPL-MCNC: 35.6 PG/ML (ref 9–77)
RBC # BLD AUTO: 5.12 M/UL (ref 4–5.4)
RBC # BLD AUTO: 5.12 M/UL (ref 4–5.4)
SODIUM SERPL-SCNC: 140 MMOL/L (ref 136–145)
TSH SERPL DL<=0.005 MIU/L-ACNC: 1.32 UIU/ML (ref 0.4–4)
WBC # BLD AUTO: 12.06 K/UL (ref 3.9–12.7)
WBC # BLD AUTO: 12.06 K/UL (ref 3.9–12.7)

## 2023-04-06 PROCEDURE — 80053 COMPREHEN METABOLIC PANEL: CPT | Performed by: INTERNAL MEDICINE

## 2023-04-06 PROCEDURE — 85025 COMPLETE CBC W/AUTO DIFF WBC: CPT | Performed by: INTERNAL MEDICINE

## 2023-04-06 PROCEDURE — 84443 ASSAY THYROID STIM HORMONE: CPT | Performed by: HOSPITALIST

## 2023-04-06 PROCEDURE — 83970 ASSAY OF PARATHORMONE: CPT | Performed by: INTERNAL MEDICINE

## 2023-04-06 PROCEDURE — 82306 VITAMIN D 25 HYDROXY: CPT | Performed by: HOSPITALIST

## 2023-04-06 PROCEDURE — 83036 HEMOGLOBIN GLYCOSYLATED A1C: CPT | Performed by: HOSPITALIST

## 2023-04-06 PROCEDURE — 83735 ASSAY OF MAGNESIUM: CPT | Performed by: HOSPITALIST

## 2023-04-06 PROCEDURE — 36415 COLL VENOUS BLD VENIPUNCTURE: CPT | Mod: PO | Performed by: HOSPITALIST

## 2023-04-06 PROCEDURE — 84100 ASSAY OF PHOSPHORUS: CPT | Performed by: HOSPITALIST

## 2023-04-17 ENCOUNTER — TELEPHONE (OUTPATIENT)
Dept: NEPHROLOGY | Facility: CLINIC | Age: 73
End: 2023-04-17
Payer: MEDICARE

## 2023-04-17 NOTE — TELEPHONE ENCOUNTER
Called patient to discuss labs/ UA . She has no UTI symptoms. No treatment needed. She will let me know if she has any symptoms.

## 2023-04-19 ENCOUNTER — TELEPHONE (OUTPATIENT)
Dept: FAMILY MEDICINE | Facility: CLINIC | Age: 73
End: 2023-04-19
Payer: MEDICARE

## 2023-04-19 DIAGNOSIS — E83.52 HYPERCALCEMIA: Primary | ICD-10-CM

## 2023-04-19 RX ORDER — IRBESARTAN 150 MG/1
150 TABLET ORAL NIGHTLY
Qty: 90 TABLET | Refills: 3 | Status: SHIPPED | OUTPATIENT
Start: 2023-04-19 | End: 2023-06-27 | Stop reason: SDUPTHER

## 2023-04-19 NOTE — TELEPHONE ENCOUNTER
Please call patient     Dr. Mayer got a message from the endocrinologist about the high calcium level.  He recommended removing the HCTZ from the irbesartan blood pressure pill and then rechecking labs in two weeks.      Dr. Mayer sent a new prescription for just the plain irbesartan 150 mg daily without the HCTZ    Obviously, we stopping the HCTZ does need you to check your blood pressure a couple of times per day over the next few weeks as the blood pressure can slowly rise over the next few weeks.      If the blood pressure rises we can increase the irbesartan to 300 mg        Labs in and she can schedule, not fasting

## 2023-04-19 NOTE — TELEPHONE ENCOUNTER
----- Message from Naveed Patel MD sent at 4/17/2023  5:19 PM CDT -----  Hello, patient with hypercalcemia.  Currently on Irbesartan/hydrochlorothiazide for blood pressure medication.  I recommend stopping hydrochlorothiazide as this is can lead to hypercalcemia and repeat lab work in 2 months.

## 2023-04-20 NOTE — TELEPHONE ENCOUNTER
Spoke to patient and relayed message to patient per Dr. Mayer. Patient verbalized understanding and was able to schedule 2 mon labs.

## 2023-04-24 ENCOUNTER — TELEPHONE (OUTPATIENT)
Dept: RADIATION ONCOLOGY | Facility: CLINIC | Age: 73
End: 2023-04-24
Payer: MEDICARE

## 2023-04-24 NOTE — TELEPHONE ENCOUNTER
Spoke with patient regarding upcoming appt change pt states she rather come in for a visit. Appt was made date time and location was confirmed.

## 2023-04-26 ENCOUNTER — HOSPITAL ENCOUNTER (OUTPATIENT)
Dept: RADIOLOGY | Facility: HOSPITAL | Age: 73
Discharge: HOME | End: 2023-04-26
Attending: RADIOLOGY
Payer: MEDICARE

## 2023-04-26 DIAGNOSIS — C34.31 PRIMARY MALIGNANT NEOPLASM OF RIGHT LOWER LOBE OF LUNG: ICD-10-CM

## 2023-04-26 PROCEDURE — 71250 CT THORAX DX C-: CPT | Mod: TC

## 2023-04-26 PROCEDURE — 71250 CT CHEST WITHOUT CONTRAST: ICD-10-PCS | Mod: 26,,, | Performed by: RADIOLOGY

## 2023-04-26 PROCEDURE — 71250 CT THORAX DX C-: CPT | Mod: 26,,, | Performed by: RADIOLOGY

## 2023-05-01 ENCOUNTER — PATIENT OUTREACH (OUTPATIENT)
Dept: ADMINISTRATIVE | Facility: HOSPITAL | Age: 73
End: 2023-05-01
Payer: MEDICARE

## 2023-05-04 NOTE — PROGRESS NOTES
5/5/2023    Radiation Oncology Follow-Up Visit      Prior Radiation History:    Site  Technique  Energy  Dose/Fx (Gy)  #Fx  Total Dose (Gy)  Start Date  End Date  Elapsed Days    RLL Lung  SBRT  6X  12.5  4 / 4  50  1/26/2023  2/3/2023  8        Is the patient female between ages 15-55:  No    Does the patient have a CIED:  No      Assessment   This is a 72 y.o. female with Stage IA2 (cT1b cN0 M0) RLL NSCLC (squam) diagnosed in robotic bronch w/ EBUS 10/25/22. She has a known 2.3 cm LLL nodule since 2018 that was biopsied in 12/2018 and 10/2022 with results negative for malignancy. RLL primary was planned for surgical resection but aborted due to dense adhesions and poor pulmonary tolerance during procedure. This was treated with definitive SBRT 50 Gy in 4 fx completed 2/3/23.      CT Chest 4/26/23 demonstrated interval decrease in size of treated RLL nodule down to 1.1 cm; other known nodules all stable. Currently no evidence of disease.           Plan   1) I will see her back in 6 months with CT Chest prior for re-staging.            CHIEF COMPLAINT: F/U after lung SBRT    HPI/Focused ROS: Feeling well after radiation. Had some Right chest wall soreness after surgery that has now resolved. Denies dyspnea or cough.         Past Medical History:   Diagnosis Date    Anticoagulant long-term use     Cancer     Kidney (Right)    Cataracts, bilateral     CKD (chronic kidney disease) stage 3, GFR 30-59 ml/min 12/15/2014    Colon polyps     Combined hyperlipidemia associated with type 2 diabetes mellitus 6/7/2013    COPD (chronic obstructive pulmonary disease) 12/15/2014    Diabetes mellitus type II     NIDDM, a.m. glucose-120s-130s-last A1c-7.1-6/7/13    Diabetes mellitus with renal manifestations, uncontrolled 2/1/2017    Diabetic retinopathy     Family history of stomach cancer 12/5/2013    Former smoker     H/O renal cell carcinoma 12/15/2015    History of colonic polyps 2/1/2017    3 polyps 7/13 ---5 yrs    History of  gastroesophageal reflux (GERD)     History of urinary incontinence     s/p bladder lift-october, 2011 (at [facility])    Hyperlipidemia     Hypertension     120s/70s-80s    Nephrolithiasis     Osteoarthritis of thumb 12/20/2019    Osteoporosis, post-menopausal 3/17/2014    Primary hyperparathyroidism 10/20/2016    Schatzki's ring 2/1/2017    Dilated 2014       Past Surgical History:   Procedure Laterality Date    bladder lift  2011    done at [facility]    BLADDER STONE REMOVAL  2011    before bladder lift    CATARACT EXTRACTION W/  INTRAOCULAR LENS IMPLANT Left 06/07/2016    Dr. Vásquez    CATARACT EXTRACTION W/  INTRAOCULAR LENS IMPLANT Right 06/21/2016    Dr. Vásquez    COLONOSCOPY N/A 4/26/2018    Procedure: COLONOSCOPY;  Surgeon: Benjamin Zamora MD;  Location: [facility] ENDO;  Service: Endoscopy;  Laterality: N/A;  confirmed ss    CYSTOSCOPY      INJECTION OF ANESTHETIC AGENT AROUND MULTIPLE INTERCOSTAL NERVES  12/27/2022    Procedure: BLOCK, NERVE, INTERCOSTAL, 2 OR MORE;  Surgeon: Paxton Cannon MD;  Location: [facility] OR 2ND FLR;  Service: Thoracic;;    LYSIS OF ADHESIONS  12/27/2022    Procedure: LYSIS, ADHESIONS;  Surgeon: Paxton Cannon MD;  Location: [facility] OR 2ND FLR;  Service: Thoracic;;    NAVIGATIONAL BRONCHOSCOPY N/A 12/11/2018    Procedure: BRONCHOSCOPY, NAVIGATIONAL;  Surgeon: Ashtyn Ramires MD;  Location: [facility] OR 2ND FLR;  Service: Pulmonary;  Laterality: N/A;    NEPHRECTOMY      partial right    ROBOTIC BRONCHOSCOPY N/A 10/25/2022    Procedure: ROBOTIC BRONCHOSCOPY;  Surgeon: Ashtyn Ramires MD;  Location: [facility] OR 2ND FLR;  Service: Pulmonary;  Laterality: N/A;    VIDEO-ASSISTED THORACOSCOPIC SURGERY (VATS)  12/27/2022    Procedure: VATS (VIDEO-ASSISTED THORACOSCOPIC SURGERY);  Surgeon: Paxton Cannon MD;  Location: [facility] OR 2ND FLR;  Service: Thoracic;;       Social History     Tobacco Use    Smoking status: Every Day     Packs/day: 0.25     Years: 30.00     Pack years: 7.50     Types:  Cigarettes    Smokeless tobacco: Never    Tobacco comments:     pt. reports quitting January 2018   Substance Use Topics    Alcohol use: No     Alcohol/week: 0.0 standard drinks    Drug use: No       Cancer-related family history is negative for Kidney cancer and Cancer.    Current Outpatient Medications on File Prior to Visit   Medication Sig Dispense Refill    albuterol (PROVENTIL/VENTOLIN HFA) 90 mcg/actuation inhaler Inhale 2 puffs into the lungs every 6 (six) hours as needed for Wheezing or Shortness of Breath (rescue and prevention). 18 g 11    albuterol-ipratropium (DUO-NEB) 2.5 mg-0.5 mg/3 mL nebulizer solution INHALE content of ONE vial BY MOUTH via nebulizer EVERY 6 HOURS AS NEEDED FOR WHEEZING OR shortness of breath. rescue 270 mL 12    amLODIPine (NORVASC) 5 MG tablet Take 1 tablet (5 mg total) by mouth once daily. 90 tablet 3    aspirin (ECOTRIN) 81 MG EC tablet Take 81 mg by mouth once daily.      cyanocobalamin, vitamin B-12, 1,000 mcg TbSR Take by mouth once daily.       DOCOSAHEXANOIC ACID/EPA (FISH OIL ORAL) Take 1,200 mg by mouth once daily.      famotidine (PEPCID) 40 MG tablet TAKE ONE TABLET BY MOUTH ONCE A DAY 90 tablet 0    FLUAD 0721-4675, 65 YR UP,,PF, 45 mcg (15 mcg x 3)/0.5 mL Syrg inject .5 milliliter intramuscularly as directed  0    FLUZONE HIGHDOSE QUAD 20-21  mcg/0.7 mL Syrg inj .7 milliliter intramuscularly as directed      gabapentin (NEURONTIN) 300 MG capsule Take 1 capsule (300 mg total) by mouth every evening. for 5 days 5 capsule 0    irbesartan (AVAPRO) 150 MG tablet Take 1 tablet (150 mg total) by mouth every evening. 90 tablet 3    omeprazole (PRILOSEC) 40 MG capsule Take 1 capsule (40 mg total) by mouth once daily. 90 capsule 4    oxybutynin (DITROPAN-XL) 5 MG TR24 TAKE ONE TABLET BY MOUTH ONCE A DAY 90 tablet 12    oxyCODONE (ROXICODONE) 5 MG immediate release tablet Take one tablet (5 mg) every 6 hours as needed for pain. Do not take Tramadol if taking oxycodone. 20  "tablet 0    pen needle, diabetic (BD INSULIN PEN NEEDLE UF SHORT) 31 gauge x 5/16" Ndle USE AS DIRECTED 100 each 12    rosuvastatin (CRESTOR) 20 MG tablet TAKE ONE TABLET BY MOUTH ONCE A DAY 90 tablet 12    sars-cov-2, covid-19, (MODERNA COVID-19) 100 mcg/0.5 ml injection       semaglutide (OZEMPIC) 1 mg/dose (4 mg/3 mL) INJECT ONE MG SUBCUTANEOUSLY once every week 1 pen 6    traMADoL (ULTRAM) 50 mg tablet Take 1 tablet (50 mg total) by mouth every 6 (six) hours as needed for Pain. 120 tablet 3    umeclidinium-vilanteroL (ANORO ELLIPTA) 62.5-25 mcg/actuation DsDv Inhale 1 puff into the lungs once daily. THIS SHOULD BE USED, NOT INCRUSE 3 each 3    varenicline (CHANTIX STARTING MONTH BOX) 0.5 mg (11)- 1 mg (42) tablet Take one 0.5mg tab by mouth once daily X3 days,then increase to one 0.5mg tab twice daily X4 days,then increase to one 1mg tab twice daily 1 Package 0    varenicline (CHANTIX) 1 mg Tab Take 1 tablet (1 mg total) by mouth 2 (two) times daily. 60 tablet 2    vitamin D (VITAMIN D3) 1000 units Tab Take 1,000 Units by mouth once daily.       No current facility-administered medications on file prior to visit.       Review of patient's allergies indicates:   Allergen Reactions    Metformin Diarrhea    Pantoprazole      elevated blood sugars         Vital Signs: BP (!) 142/66 (BP Location: Right arm, Patient Position: Sitting)   Pulse 73   Temp 97.5 °F (36.4 °C)   Resp 16   Ht 5' 4" (1.626 m)   Wt 62.3 kg (137 lb 5.6 oz)   SpO2 96%   BMI 23.58 kg/m²     ECOG Performance Status: 1 - Ambulates, capable of light work    Physical Exam  Vitals reviewed.   Constitutional:       Appearance: Normal appearance.   HENT:      Head: Normocephalic and atraumatic.   Eyes:      General: No scleral icterus.     Extraocular Movements: Extraocular movements intact.   Pulmonary:      Effort: No accessory muscle usage or respiratory distress.   Abdominal:      General: There is no distension.   Musculoskeletal:         " General: Normal range of motion.      Cervical back: Normal range of motion and neck supple.   Lymphadenopathy:      Cervical: No cervical adenopathy.   Skin:     General: Skin is dry.      Coloration: Skin is not jaundiced.   Neurological:      Mental Status: She is alert.      Cranial Nerves: No cranial nerve deficit.   Psychiatric:         Mood and Affect: Mood and affect normal.         Judgment: Judgment normal.        Labs:    Imaging: I have personally reviewed the patient's available images and reports and summarized pertinent findings above in HPI.     Pathology: No new path

## 2023-05-05 ENCOUNTER — OFFICE VISIT (OUTPATIENT)
Dept: RADIATION ONCOLOGY | Facility: CLINIC | Age: 73
End: 2023-05-05
Payer: MEDICARE

## 2023-05-05 VITALS
BODY MASS INDEX: 23.45 KG/M2 | HEIGHT: 64 IN | WEIGHT: 137.38 LBS | SYSTOLIC BLOOD PRESSURE: 142 MMHG | DIASTOLIC BLOOD PRESSURE: 66 MMHG | OXYGEN SATURATION: 96 % | RESPIRATION RATE: 16 BRPM | HEART RATE: 73 BPM | TEMPERATURE: 98 F

## 2023-05-05 DIAGNOSIS — Z85.118 PERSONAL HISTORY OF LUNG CANCER: Primary | ICD-10-CM

## 2023-05-05 PROCEDURE — 3078F DIAST BP <80 MM HG: CPT | Mod: CPTII,S$GLB,, | Performed by: RADIOLOGY

## 2023-05-05 PROCEDURE — 3060F PR POS MICROALBUMINURIA RESULT DOCUMENTED/REVIEW: ICD-10-PCS | Mod: CPTII,S$GLB,, | Performed by: RADIOLOGY

## 2023-05-05 PROCEDURE — 3288F PR FALLS RISK ASSESSMENT DOCUMENTED: ICD-10-PCS | Mod: CPTII,S$GLB,, | Performed by: RADIOLOGY

## 2023-05-05 PROCEDURE — 3288F FALL RISK ASSESSMENT DOCD: CPT | Mod: CPTII,S$GLB,, | Performed by: RADIOLOGY

## 2023-05-05 PROCEDURE — 99024 PR POST-OP FOLLOW-UP VISIT: ICD-10-PCS | Mod: S$GLB,,, | Performed by: RADIOLOGY

## 2023-05-05 PROCEDURE — 3078F PR MOST RECENT DIASTOLIC BLOOD PRESSURE < 80 MM HG: ICD-10-PCS | Mod: CPTII,S$GLB,, | Performed by: RADIOLOGY

## 2023-05-05 PROCEDURE — 3008F BODY MASS INDEX DOCD: CPT | Mod: CPTII,S$GLB,, | Performed by: RADIOLOGY

## 2023-05-05 PROCEDURE — 3044F HG A1C LEVEL LT 7.0%: CPT | Mod: CPTII,S$GLB,, | Performed by: RADIOLOGY

## 2023-05-05 PROCEDURE — 1101F PT FALLS ASSESS-DOCD LE1/YR: CPT | Mod: CPTII,S$GLB,, | Performed by: RADIOLOGY

## 2023-05-05 PROCEDURE — 1101F PR PT FALLS ASSESS DOC 0-1 FALLS W/OUT INJ PAST YR: ICD-10-PCS | Mod: CPTII,S$GLB,, | Performed by: RADIOLOGY

## 2023-05-05 PROCEDURE — 1159F MED LIST DOCD IN RCRD: CPT | Mod: CPTII,S$GLB,, | Performed by: RADIOLOGY

## 2023-05-05 PROCEDURE — 3077F SYST BP >= 140 MM HG: CPT | Mod: CPTII,S$GLB,, | Performed by: RADIOLOGY

## 2023-05-05 PROCEDURE — 4010F PR ACE/ARB THEARPY RXD/TAKEN: ICD-10-PCS | Mod: CPTII,S$GLB,, | Performed by: RADIOLOGY

## 2023-05-05 PROCEDURE — 3060F POS MICROALBUMINURIA REV: CPT | Mod: CPTII,S$GLB,, | Performed by: RADIOLOGY

## 2023-05-05 PROCEDURE — 3077F PR MOST RECENT SYSTOLIC BLOOD PRESSURE >= 140 MM HG: ICD-10-PCS | Mod: CPTII,S$GLB,, | Performed by: RADIOLOGY

## 2023-05-05 PROCEDURE — 3044F PR MOST RECENT HEMOGLOBIN A1C LEVEL <7.0%: ICD-10-PCS | Mod: CPTII,S$GLB,, | Performed by: RADIOLOGY

## 2023-05-05 PROCEDURE — 1126F AMNT PAIN NOTED NONE PRSNT: CPT | Mod: CPTII,S$GLB,, | Performed by: RADIOLOGY

## 2023-05-05 PROCEDURE — 99024 POSTOP FOLLOW-UP VISIT: CPT | Mod: S$GLB,,, | Performed by: RADIOLOGY

## 2023-05-05 PROCEDURE — 3066F PR DOCUMENTATION OF TREATMENT FOR NEPHROPATHY: ICD-10-PCS | Mod: CPTII,S$GLB,, | Performed by: RADIOLOGY

## 2023-05-05 PROCEDURE — 99999 PR PBB SHADOW E&M-EST. PATIENT-LVL V: ICD-10-PCS | Mod: PBBFAC,,, | Performed by: RADIOLOGY

## 2023-05-05 PROCEDURE — 3008F PR BODY MASS INDEX (BMI) DOCUMENTED: ICD-10-PCS | Mod: CPTII,S$GLB,, | Performed by: RADIOLOGY

## 2023-05-05 PROCEDURE — 3066F NEPHROPATHY DOC TX: CPT | Mod: CPTII,S$GLB,, | Performed by: RADIOLOGY

## 2023-05-05 PROCEDURE — 1159F PR MEDICATION LIST DOCUMENTED IN MEDICAL RECORD: ICD-10-PCS | Mod: CPTII,S$GLB,, | Performed by: RADIOLOGY

## 2023-05-05 PROCEDURE — 99999 PR PBB SHADOW E&M-EST. PATIENT-LVL V: CPT | Mod: PBBFAC,,, | Performed by: RADIOLOGY

## 2023-05-05 PROCEDURE — 1126F PR PAIN SEVERITY QUANTIFIED, NO PAIN PRESENT: ICD-10-PCS | Mod: CPTII,S$GLB,, | Performed by: RADIOLOGY

## 2023-05-05 PROCEDURE — 4010F ACE/ARB THERAPY RXD/TAKEN: CPT | Mod: CPTII,S$GLB,, | Performed by: RADIOLOGY

## 2023-05-08 ENCOUNTER — INFUSION (OUTPATIENT)
Dept: INFUSION THERAPY | Facility: HOSPITAL | Age: 73
End: 2023-05-08
Attending: HOSPITALIST
Payer: MEDICARE

## 2023-05-08 VITALS
TEMPERATURE: 98 F | OXYGEN SATURATION: 98 % | HEART RATE: 75 BPM | DIASTOLIC BLOOD PRESSURE: 69 MMHG | RESPIRATION RATE: 18 BRPM | SYSTOLIC BLOOD PRESSURE: 148 MMHG

## 2023-05-08 DIAGNOSIS — M81.0 OSTEOPOROSIS, POST-MENOPAUSAL: Primary | ICD-10-CM

## 2023-05-08 PROCEDURE — 96372 THER/PROPH/DIAG INJ SC/IM: CPT

## 2023-05-08 PROCEDURE — 63600175 PHARM REV CODE 636 W HCPCS: Mod: JZ,JG | Performed by: HOSPITALIST

## 2023-05-08 RX ADMIN — DENOSUMAB 60 MG: 60 INJECTION SUBCUTANEOUS at 01:05

## 2023-05-08 NOTE — PLAN OF CARE
Patient presents to unit for Prolia 60 mg injection. Denies adverse reactions from previous injection. Continues to take calcium and Vit. D at home. No recent falls or   Major dental work done in the past 3 months. Most recent calcium 11.2. Injection administered subq on left arm. Appt schedule reviewed. Patient verbalized understanding. No new or worsening symptoms reported. Discharged from unit in no acute signs of distress.

## 2023-06-06 DIAGNOSIS — I65.23 BILATERAL CAROTID ARTERY STENOSIS: Primary | ICD-10-CM

## 2023-06-06 DIAGNOSIS — I73.9 PVD (PERIPHERAL VASCULAR DISEASE) WITH CLAUDICATION: ICD-10-CM

## 2023-06-09 ENCOUNTER — TELEPHONE (OUTPATIENT)
Dept: VASCULAR SURGERY | Facility: CLINIC | Age: 73
End: 2023-06-09
Payer: MEDICARE

## 2023-06-09 NOTE — TELEPHONE ENCOUNTER
----- Message from Angelina Lozano sent at 6/9/2023  1:32 PM CDT -----  Regarding: appt access  Contact: [phone]  Pt needing a call back from in regards to scheduling. Pls call

## 2023-06-12 DIAGNOSIS — N18.31 STAGE 3A CHRONIC KIDNEY DISEASE: ICD-10-CM

## 2023-06-12 DIAGNOSIS — E11.59 CONTROLLED TYPE 2 DIABETES MELLITUS WITH OTHER CIRCULATORY COMPLICATION, WITHOUT LONG-TERM CURRENT USE OF INSULIN: ICD-10-CM

## 2023-06-14 DIAGNOSIS — R52 PAIN: ICD-10-CM

## 2023-06-14 RX ORDER — SEMAGLUTIDE 1.34 MG/ML
INJECTION, SOLUTION SUBCUTANEOUS
Qty: 3 ML | Refills: 6 | Status: SHIPPED | OUTPATIENT
Start: 2023-06-14 | End: 2023-08-15 | Stop reason: SDUPTHER

## 2023-06-14 NOTE — TELEPHONE ENCOUNTER
No care due was identified.  [facility] Embedded Care Due Messages. Reference number: [id number].   6/14/2023 3:06:13 PM CDT

## 2023-06-15 RX ORDER — TRAMADOL HYDROCHLORIDE 50 MG/1
TABLET ORAL
Qty: 120 TABLET | Refills: 3 | Status: SHIPPED | OUTPATIENT
Start: 2023-06-15 | End: 2023-10-04

## 2023-06-20 ENCOUNTER — LAB VISIT (OUTPATIENT)
Dept: LAB | Facility: HOSPITAL | Age: 73
End: 2023-06-20
Attending: INTERNAL MEDICINE
Payer: MEDICARE

## 2023-06-20 DIAGNOSIS — E83.52 HYPERCALCEMIA: ICD-10-CM

## 2023-06-20 LAB
ANION GAP SERPL CALC-SCNC: 11 MMOL/L (ref 8–16)
BUN SERPL-MCNC: 8 MG/DL (ref 8–23)
CALCIUM SERPL-MCNC: 9.8 MG/DL (ref 8.7–10.5)
CHLORIDE SERPL-SCNC: 105 MMOL/L (ref 95–110)
CO2 SERPL-SCNC: 26 MMOL/L (ref 23–29)
CREAT SERPL-MCNC: 1.3 MG/DL (ref 0.5–1.4)
EST. GFR  (NO RACE VARIABLE): 42.9 ML/MIN/1.73 M^2
GLUCOSE SERPL-MCNC: 177 MG/DL (ref 70–110)
POTASSIUM SERPL-SCNC: 3.2 MMOL/L (ref 3.5–5.1)
PTH-INTACT SERPL-MCNC: 144.5 PG/ML (ref 9–77)
SODIUM SERPL-SCNC: 142 MMOL/L (ref 136–145)

## 2023-06-20 PROCEDURE — 80048 BASIC METABOLIC PNL TOTAL CA: CPT | Performed by: INTERNAL MEDICINE

## 2023-06-20 PROCEDURE — 36415 COLL VENOUS BLD VENIPUNCTURE: CPT | Mod: PO | Performed by: INTERNAL MEDICINE

## 2023-06-20 PROCEDURE — 83970 ASSAY OF PARATHORMONE: CPT | Performed by: INTERNAL MEDICINE

## 2023-06-27 ENCOUNTER — OFFICE VISIT (OUTPATIENT)
Dept: FAMILY MEDICINE | Facility: CLINIC | Age: 73
End: 2023-06-27
Payer: MEDICARE

## 2023-06-27 VITALS
HEART RATE: 80 BPM | OXYGEN SATURATION: 96 % | SYSTOLIC BLOOD PRESSURE: 146 MMHG | WEIGHT: 139.13 LBS | HEIGHT: 64 IN | TEMPERATURE: 98 F | DIASTOLIC BLOOD PRESSURE: 62 MMHG | BODY MASS INDEX: 23.75 KG/M2

## 2023-06-27 DIAGNOSIS — N18.31 TYPE 2 DIABETES MELLITUS WITH STAGE 3A CHRONIC KIDNEY DISEASE, WITH LONG-TERM CURRENT USE OF INSULIN: ICD-10-CM

## 2023-06-27 DIAGNOSIS — Z79.4 TYPE 2 DIABETES MELLITUS WITH HYPERGLYCEMIA, WITH LONG-TERM CURRENT USE OF INSULIN: ICD-10-CM

## 2023-06-27 DIAGNOSIS — M81.0 OSTEOPOROSIS, POST-MENOPAUSAL: ICD-10-CM

## 2023-06-27 DIAGNOSIS — Z12.31 ENCOUNTER FOR SCREENING MAMMOGRAM FOR BREAST CANCER: ICD-10-CM

## 2023-06-27 DIAGNOSIS — M46.99 UNSPECIFIED INFLAMMATORY SPONDYLOPATHY, MULTIPLE SITES IN SPINE: ICD-10-CM

## 2023-06-27 DIAGNOSIS — I73.9 PVD (PERIPHERAL VASCULAR DISEASE): ICD-10-CM

## 2023-06-27 DIAGNOSIS — J43.2 CENTRILOBULAR EMPHYSEMA: ICD-10-CM

## 2023-06-27 DIAGNOSIS — E11.22 TYPE 2 DIABETES MELLITUS WITH STAGE 3A CHRONIC KIDNEY DISEASE, WITH LONG-TERM CURRENT USE OF INSULIN: ICD-10-CM

## 2023-06-27 DIAGNOSIS — I50.30 HEART FAILURE WITH PRESERVED EJECTION FRACTION, UNSPECIFIED HF CHRONICITY: ICD-10-CM

## 2023-06-27 DIAGNOSIS — E11.9 DM TYPE 2 WITHOUT RETINOPATHY: ICD-10-CM

## 2023-06-27 DIAGNOSIS — I10 ESSENTIAL HYPERTENSION: ICD-10-CM

## 2023-06-27 DIAGNOSIS — J44.9 CHRONIC OBSTRUCTIVE PULMONARY DISEASE, UNSPECIFIED COPD TYPE: ICD-10-CM

## 2023-06-27 DIAGNOSIS — C34.31 PRIMARY MALIGNANT NEOPLASM OF RIGHT LOWER LOBE OF LUNG: ICD-10-CM

## 2023-06-27 DIAGNOSIS — I70.0 AORTIC ATHEROSCLEROSIS: ICD-10-CM

## 2023-06-27 DIAGNOSIS — Z00.00 ROUTINE MEDICAL EXAM: Primary | ICD-10-CM

## 2023-06-27 DIAGNOSIS — Z86.010 HISTORY OF COLONIC POLYPS: ICD-10-CM

## 2023-06-27 DIAGNOSIS — I65.23 BILATERAL CAROTID ARTERY STENOSIS: ICD-10-CM

## 2023-06-27 DIAGNOSIS — Z79.4 TYPE 2 DIABETES MELLITUS WITH STAGE 3A CHRONIC KIDNEY DISEASE, WITH LONG-TERM CURRENT USE OF INSULIN: ICD-10-CM

## 2023-06-27 DIAGNOSIS — I73.9 PVD (PERIPHERAL VASCULAR DISEASE) WITH CLAUDICATION: ICD-10-CM

## 2023-06-27 DIAGNOSIS — D64.9 ANEMIA, UNSPECIFIED TYPE: ICD-10-CM

## 2023-06-27 DIAGNOSIS — E11.65 TYPE 2 DIABETES MELLITUS WITH HYPERGLYCEMIA, WITH LONG-TERM CURRENT USE OF INSULIN: ICD-10-CM

## 2023-06-27 DIAGNOSIS — Z79.4 LONG-TERM INSULIN USE: ICD-10-CM

## 2023-06-27 DIAGNOSIS — N18.31 STAGE 3A CHRONIC KIDNEY DISEASE: ICD-10-CM

## 2023-06-27 PROCEDURE — 3078F PR MOST RECENT DIASTOLIC BLOOD PRESSURE < 80 MM HG: ICD-10-PCS | Mod: CPTII,S$GLB,, | Performed by: INTERNAL MEDICINE

## 2023-06-27 PROCEDURE — 1159F MED LIST DOCD IN RCRD: CPT | Mod: CPTII,S$GLB,, | Performed by: INTERNAL MEDICINE

## 2023-06-27 PROCEDURE — 1101F PR PT FALLS ASSESS DOC 0-1 FALLS W/OUT INJ PAST YR: ICD-10-PCS | Mod: CPTII,S$GLB,, | Performed by: INTERNAL MEDICINE

## 2023-06-27 PROCEDURE — 3060F POS MICROALBUMINURIA REV: CPT | Mod: CPTII,S$GLB,, | Performed by: INTERNAL MEDICINE

## 2023-06-27 PROCEDURE — 3066F NEPHROPATHY DOC TX: CPT | Mod: CPTII,S$GLB,, | Performed by: INTERNAL MEDICINE

## 2023-06-27 PROCEDURE — 99999 PR PBB SHADOW E&M-EST. PATIENT-LVL V: ICD-10-PCS | Mod: PBBFAC,,, | Performed by: INTERNAL MEDICINE

## 2023-06-27 PROCEDURE — 1126F AMNT PAIN NOTED NONE PRSNT: CPT | Mod: CPTII,S$GLB,, | Performed by: INTERNAL MEDICINE

## 2023-06-27 PROCEDURE — 3288F FALL RISK ASSESSMENT DOCD: CPT | Mod: CPTII,S$GLB,, | Performed by: INTERNAL MEDICINE

## 2023-06-27 PROCEDURE — 3077F PR MOST RECENT SYSTOLIC BLOOD PRESSURE >= 140 MM HG: ICD-10-PCS | Mod: CPTII,S$GLB,, | Performed by: INTERNAL MEDICINE

## 2023-06-27 PROCEDURE — 99999 PR PBB SHADOW E&M-EST. PATIENT-LVL V: CPT | Mod: PBBFAC,,, | Performed by: INTERNAL MEDICINE

## 2023-06-27 PROCEDURE — 3078F DIAST BP <80 MM HG: CPT | Mod: CPTII,S$GLB,, | Performed by: INTERNAL MEDICINE

## 2023-06-27 PROCEDURE — 3066F PR DOCUMENTATION OF TREATMENT FOR NEPHROPATHY: ICD-10-PCS | Mod: CPTII,S$GLB,, | Performed by: INTERNAL MEDICINE

## 2023-06-27 PROCEDURE — 3060F PR POS MICROALBUMINURIA RESULT DOCUMENTED/REVIEW: ICD-10-PCS | Mod: CPTII,S$GLB,, | Performed by: INTERNAL MEDICINE

## 2023-06-27 PROCEDURE — 4010F ACE/ARB THERAPY RXD/TAKEN: CPT | Mod: CPTII,S$GLB,, | Performed by: INTERNAL MEDICINE

## 2023-06-27 PROCEDURE — 4010F PR ACE/ARB THEARPY RXD/TAKEN: ICD-10-PCS | Mod: CPTII,S$GLB,, | Performed by: INTERNAL MEDICINE

## 2023-06-27 PROCEDURE — 1159F PR MEDICATION LIST DOCUMENTED IN MEDICAL RECORD: ICD-10-PCS | Mod: CPTII,S$GLB,, | Performed by: INTERNAL MEDICINE

## 2023-06-27 PROCEDURE — 99214 PR OFFICE/OUTPT VISIT, EST, LEVL IV, 30-39 MIN: ICD-10-PCS | Mod: 25,S$GLB,, | Performed by: INTERNAL MEDICINE

## 2023-06-27 PROCEDURE — 3044F HG A1C LEVEL LT 7.0%: CPT | Mod: CPTII,S$GLB,, | Performed by: INTERNAL MEDICINE

## 2023-06-27 PROCEDURE — 1126F PR PAIN SEVERITY QUANTIFIED, NO PAIN PRESENT: ICD-10-PCS | Mod: CPTII,S$GLB,, | Performed by: INTERNAL MEDICINE

## 2023-06-27 PROCEDURE — 99397 PER PM REEVAL EST PAT 65+ YR: CPT | Mod: GZ,S$GLB,, | Performed by: INTERNAL MEDICINE

## 2023-06-27 PROCEDURE — 3077F SYST BP >= 140 MM HG: CPT | Mod: CPTII,S$GLB,, | Performed by: INTERNAL MEDICINE

## 2023-06-27 PROCEDURE — 99397 PR PREVENTIVE VISIT,EST,65 & OVER: ICD-10-PCS | Mod: GZ,S$GLB,, | Performed by: INTERNAL MEDICINE

## 2023-06-27 PROCEDURE — 1101F PT FALLS ASSESS-DOCD LE1/YR: CPT | Mod: CPTII,S$GLB,, | Performed by: INTERNAL MEDICINE

## 2023-06-27 PROCEDURE — 3044F PR MOST RECENT HEMOGLOBIN A1C LEVEL <7.0%: ICD-10-PCS | Mod: CPTII,S$GLB,, | Performed by: INTERNAL MEDICINE

## 2023-06-27 PROCEDURE — 99214 OFFICE O/P EST MOD 30 MIN: CPT | Mod: 25,S$GLB,, | Performed by: INTERNAL MEDICINE

## 2023-06-27 PROCEDURE — 3288F PR FALLS RISK ASSESSMENT DOCUMENTED: ICD-10-PCS | Mod: CPTII,S$GLB,, | Performed by: INTERNAL MEDICINE

## 2023-06-27 RX ORDER — IRBESARTAN 300 MG/1
300 TABLET ORAL DAILY
Qty: 90 TABLET | Refills: 3 | Status: SHIPPED | OUTPATIENT
Start: 2023-06-27 | End: 2023-08-15

## 2023-06-27 NOTE — PROGRESS NOTES
Chief complaint: Physical exam,         73 -year-old black female  whose PCP is retired.  Regarding health maintenance she is up-to-date on mammogram 6/21 She prefers to do a mammogram every two years so she is due and willing.  She also wanted to hold off setting up colonoscopy since done with radiation so wants to set that up as well..  It looks like she had colon polyps in 2013, 1 polyp 4/18--5 yrs.  She did quit smoking but then with the pandemic she resume.  She was able to stop on her own in the past.  We discussed the smoking cessation clinic but she was able to do it on her own in the past so she defers..    ROS:   CONST: weight stable. EYES: no vision change. ENT: no sore throat. CV: no chest pain w/ exertion. RESP: no shortness of breath. GI: no nausea, vomiting, diarrhea. No dysphagia. : no urinary issues. MUSCULOSKELETAL: no new myalgias or arthralgias. SKIN: no new changes. NEURO: no focal deficits. PSYCH: no new issues. ENDOCRINE: no polyuria. HEME: no lymph nodes. ALLERGY: no general pruritis.    Past Medical History   Diagnosis Date    Cataracts, bilateral     CKD (chronic kidney disease) stage 3, GFR 30-59 ml/min 12/15/2014    Combined hyperlipidemia associated with type 2 diabetes mellitus 6/7/2013    COPD (chronic obstructive pulmonary disease) 12/15/2014    Diabetes mellitus type II----with chronic kidney disease           Diabetes mellitus with renal manifestations, uncontrolled 2/1/2017    Diabetic retinopathy     Family history of stomach cancer 12/5/2013    H/O renal cell carcinoma 12/15/2015    History of colonic polyps 2/1/2017     3 polyps 7/13 ---1 polyp 4/18--5 yrs    History of gastroesophageal reflux (GERD)     History of urinary incontinence      s/p bladder lift-october, 2011 (at [facility])    Hyperlipidemia     Hypertension      120s/70s-80s    Nephrolithiasis     Osteoporosis, post-menopausal, evaluated by Dr. York, quit Fosamax due to CKD early 2017  3/17/2014    Primary  hyperparathyroidism 10/20/2016    Schatzki's ring 2/1/2017     Dilated 2014   Prevnar 2017    Past Surgical History:   Procedure Laterality Date    bladder lift  2011    done at [facility]    BLADDER STONE REMOVAL  2011    before bladder lift    CATARACT EXTRACTION W/  INTRAOCULAR LENS IMPLANT Left 06/07/2016    Dr. Vásquez    CATARACT EXTRACTION W/  INTRAOCULAR LENS IMPLANT Right 06/21/2016    Dr. Vásquez    COLONOSCOPY N/A 4/26/2018    Procedure: COLONOSCOPY;  Surgeon: Benjamin Zamora MD;  Location: [facility] ENDO;  Service: Endoscopy;  Laterality: N/A;  confirmed ss    CYSTOSCOPY      INJECTION OF ANESTHETIC AGENT AROUND MULTIPLE INTERCOSTAL NERVES  12/27/2022    Procedure: BLOCK, NERVE, INTERCOSTAL, 2 OR MORE;  Surgeon: Paxton Cannon MD;  Location: [facility] OR 2ND FLR;  Service: Thoracic;;    LYSIS OF ADHESIONS  12/27/2022    Procedure: LYSIS, ADHESIONS;  Surgeon: Paxton Cannon MD;  Location: [facility] OR 2ND FLR;  Service: Thoracic;;    NAVIGATIONAL BRONCHOSCOPY N/A 12/11/2018    Procedure: BRONCHOSCOPY, NAVIGATIONAL;  Surgeon: Ashtyn Ramires MD;  Location: [facility] OR 2ND FLR;  Service: Pulmonary;  Laterality: N/A;    NEPHRECTOMY      partial right    ROBOTIC BRONCHOSCOPY N/A 10/25/2022    Procedure: ROBOTIC BRONCHOSCOPY;  Surgeon: Ashtyn Ramires MD;  Location: [facility] OR 2ND FLR;  Service: Pulmonary;  Laterality: N/A;    VIDEO-ASSISTED THORACOSCOPIC SURGERY (VATS)  12/27/2022    Procedure: VATS (VIDEO-ASSISTED THORACOSCOPIC SURGERY);  Surgeon: Paxton Cannon MD;  Location: [facility] OR 2ND FLR;  Service: Thoracic;;     Social History     Socioeconomic History    Marital status:    Occupational History    Occupation: [employer]   Tobacco Use    Smoking status: Every Day     Packs/day: 0.25     Years: 30.00     Pack years: 7.50     Types: Cigarettes    Smokeless tobacco: Never    Tobacco comments:     pt. reports quitting January 2018   Substance and Sexual Activity    Alcohol use: No      Alcohol/week: 0.0 standard drinks    Drug use: No   Social History Narrative    Lives on [facility] [place]    Here with sister Kate [phone]         family history includes Cataracts in her mother; Colon cancer in her brother; Glaucoma in her mother; Nephrolithiasis in her sister; No Known Problems in her father, maternal aunt, maternal grandfather, maternal grandmother, maternal uncle, paternal aunt, paternal grandfather, paternal grandmother, and paternal uncle.     Gen: no distress  EYES: conjunctiva clear, non-icteric, PERRL  ENT: nose clear, nasal mucosa normal, oropharynx clear and moist, teeth good  NECK:supple, thyroid non-palpable  RESP: effort is good, lungs clear  CV: heart RRR w/o murmur, gallops or rubs; no carotid bruits, no edema at all today even at the ankles  GI: abdomen soft, non-distended, non-tender, no hepatosplenomegaly  MS: gait normal, no clubbing or cyanosis of the digits  SKIN: no rashes, warm to touch    Sarina was seen today for establish care.    Diagnoses and all orders for this visit:    Routine medical exam    Encounter for screening mammogram for breast cancer  -     Mammo Digital Screening Bilat; Future    History of colonic polyps  -     Ambulatory referral/consult to Endo Procedure ; Future                                                  Additional evaluation and management issues:    Additionally, patient has numerous other medical issues to address today.  She has hypertension which is running high at home such as 180/110.  Today it is only 146 and we discussed nurse blood pressure check to make sure her home monitor is accurate.  Was running good but she believes her pharmacy is switching the manufacture of her meds and that is the cause.  Looks like she was taken off of the HCTZ and is now on Avapro 150 which we will increase to 300.  She was getting a little bit of edema on Norvasc 5 mg so hesitant to go to 10 mg although edema as not really even there  She does  get some edema at the end of the day that is gone in the morning and I reassured her about that but she is concerned and on Norvasc 5 mg.  Blood pressure is excellent and we will discontinue and have her monitor.  She was on Diovan but due to availability issues we had switched her to irbesartan/HCTZ.. then low K and off HCTZ and now on norvasc 5. BP up to 180s    Regarding her chronic pain she did use 1 tramadol daily usually taking it in the middle of the morning and that allows her to exercise and carry on her daily functions.  Her use appears to be appropriate. Now on oxycodone it appears     Interval events reviewed.  She is in good spirits with having had her attempt for surgical resection and now plans to have radiation therapy.    CT surg  Procedure(s) and date(s):   10/25/22 - Robotic Bronchoscopy  12/27/22 - Right VATS, adhesiolysis, intercostal nerve block 2 or more intercostal levels   Pathology:   10/25/22 - RLL squamous cell carcinoma. LN 7 negative for malignancy, lymphocytes present.    Post-operative therapy:  surveillance  Mrs. Genao is a 72 y.o. female current smoker with CKD3, COPD, DM2, HLD, HTN, primary hyperparathyroidism, and h/o of renal cell carcinoma (2015) here today for 2 week follow up s/p aborted resection. Aborted resection secondary to intense adhesion formation coupled with hypercarbia and upper lobe predominant emphysema. No incisional pain. Breathing stable.     Seen [facility]  HPI/Focused ROS: Ms. Genao is a 73 y/o female who has been followed for pulmonary nodules with Dr. Ramires since 2018. Annual CT Chest 9/29/22 demonstrated interval enlargement of a RLL nodule to 1.5 cm (up from 0.7 cm in 9/2021). Note was also made of a stable LLL 2.3 cm nodule. Attempt at biopsy of the LLL nodule in 12/2018 w/ EBUS was non-diagnostic for malignancy. She underwent robotic bronch w/ EBUS w/ Dr. Ramires on 10/25/22 with biopsy of the RLL nodule revealing squamous cell carcinoma; biopsy of LLL  nodule and Station 7 nodes negative for malignant cells. PET/CT 11/25/22 demonstrated uptake in the RLL nodule only. She met with Dr. Cannon and elected to undergo resection. This was attempted on 12/27/22 but aborted due to significant adhesions as well as progressive hypercarbia and concern for pulmonary function following lobectomy.  This was treated with definitive SBRT 50 Gy in 4 fx completed 2/3/23.     CT Chest 4/26/23 demonstrated interval decrease in size of treated RLL nodule down to 1.1 cm; other known nodules all stable. Currently no evidence of disease.      She has diabetes which is still uncontrolled and apparently back to seeing Endocrine and her most recent A1c she says was 5.6, 6.3, 6.9, 6.4, 5.6.  was on insulin and now only the Ozempic injection but off of the glyburide which I would agree with based upon her age and overall good control...  She is not taking metformin or glipizide or glimepiride so I took those off the list.  She says her sugars are running better.  She does take glipizide maybe once a month when she eats poorly.    She does have chronic renal insufficiency and follows with nephrology.  I explained her chronic kidney disease.  Her GFR is stable      Regarding osteoporosis.  Her prior local endocrinologist had some changes in the office and now she may have to pay over 200 dollars for her part of the infusion at the office per previously she was getting an at the infusion center.  I have other patients with similar issues.  She is now followed by [facility] for her Prolia     She gets occasional reflux and was occasionally using zantac and will send omeprazole    She apparently is primary hyperparathyroidism and a history of hypercalcemia but all recent labs appear to be normal as well as PTH.  Thyroid ultrasound from 2016 did have a parathyroid adenoma that Endocrine may need to reassess      Vitamin D level also normal.  She apparently was on a weekly medication  for  osteoporosis but d/c 2017 for CKD?  Bone density from prior reviewed noting slight decline in the hip     She has a history of COPD but no active symptoms lately.      She has hyperlipidemia and may been on Lipitor in the past with an unremembered intolerance.  We discussed benefits of statin and her LDL has responded to crestor .      All these issues reviewed and patient counseled an evaluation and management of all the separate issues we based upon medical decision making as below    4/18  Impression      Osteoporosis of total hip, femoral neck and lumbar spine.  Decrease in hip (-4.7%) and increase in lumbar spine (5%) since prior study.           Assessment and plan:          Essential hypertension, uncontrolled it appears, she can always ask the pharmacy to make sure same manufacture is use but we also need to make sure there is not a discrepancy in the measurements of the blood pressure and she will come for a blood pressure check.  She also sees Nephrology who could assist with blood pressure management.  Increase Avapro to 300, continue Norvasc 5 May need to add back something like Dyazide instead of HCTZ as it relates still low potassium.    Squamous Cell Carcinoma of right lower lobe of lung, status post radiation treatment apparently with good response    DM type 2 without retinopathy, only on Ozempic in A1c is normal    Type 2 diabetes mellitus with hyperglycemia, with long-term current use of insulin, prior use of insulin    Type 2 diabetes mellitus with stage 3a chronic kidney disease, with long-term current use of insulin    Stage 3a chronic kidney disease, follows with Nephrology    Osteoporosis, post-menopausal, follows with Endocrine    PVD (peripheral vascular disease) with claudication    Centrilobular emphysema    Aortic atherosclerosis    Chronic obstructive pulmonary disease, unspecified COPD type    Long-term insulin use    Anemia, unspecified type    PVD (peripheral vascular  disease)    Bilateral carotid artery stenosis    Unspecified inflammatory spondylopathy, multiple sites in spine    Heart failure with preserved ejection fraction, unspecified HF chronicity    Other orders  -     irbesartan (AVAPRO) 300 MG tablet; Take 1 tablet (300 mg total) by mouth once daily.

## 2023-07-05 ENCOUNTER — HOSPITAL ENCOUNTER (OUTPATIENT)
Dept: RADIOLOGY | Facility: HOSPITAL | Age: 73
Discharge: HOME | End: 2023-07-05
Attending: INTERNAL MEDICINE
Payer: MEDICARE

## 2023-07-05 DIAGNOSIS — Z12.31 ENCOUNTER FOR SCREENING MAMMOGRAM FOR BREAST CANCER: ICD-10-CM

## 2023-07-05 PROCEDURE — 77063 MAMMO DIGITAL SCREENING BILAT WITH TOMO: ICD-10-PCS | Mod: 26,,, | Performed by: RADIOLOGY

## 2023-07-05 PROCEDURE — 77067 SCR MAMMO BI INCL CAD: CPT | Mod: 26,,, | Performed by: RADIOLOGY

## 2023-07-05 PROCEDURE — 77063 BREAST TOMOSYNTHESIS BI: CPT | Mod: 26,,, | Performed by: RADIOLOGY

## 2023-07-05 PROCEDURE — 77067 MAMMO DIGITAL SCREENING BILAT WITH TOMO: ICD-10-PCS | Mod: 26,,, | Performed by: RADIOLOGY

## 2023-07-05 PROCEDURE — 77067 SCR MAMMO BI INCL CAD: CPT | Mod: TC,PO

## 2023-07-10 NOTE — PROGRESS NOTES
"Sarina Genao  07/12/2023    HPI:  Patient is a 73 y.o. female with a h/o recent Dx R lung CA (Dx 2022, s/p RTx), active smoking HTN, HLD, DMII, CKD III, hyperparathyroid, COPD, GERD who is here today as a f/u for carotid disease and PAD - no more claudication.  Comes with sister, Claribel    Last seen in clinic 6/3021.    Hospitalized for pneumonia Oct 2018. She reports no recent hospitalizations. She is followed by endocrinologist, last A1c 5.8 on insulin. Last seen by nephrologist on 2/4/20, baseline Cr 1.3-1.4. She reports being active lately, she denies weakness or claudication. She is taking a daily ASA. No headaches, chest pain, palpitations or dyspnea with activity. She reports a resolution in her claudication upon starting ASA.      No history of MI/stroke  Tobacco use: > 40 pack yrs, ~ 5 cigs/d. Previously stopped, started back at time of COVID.   Retired X-ray techs     PMD is Dr GUILHERME Mayer   Pulmonary Dr LASHANDA Ramires    Current Outpatient Medications:     amLODIPine (NORVASC) 5 MG tablet, Take 1 tablet (5 mg total) by mouth once daily., Disp: 90 tablet, Rfl: 3    aspirin (ECOTRIN) 81 MG EC tablet, Take 81 mg by mouth once daily., Disp: , Rfl:     cyanocobalamin, vitamin B-12, 1,000 mcg TbSR, Take by mouth once daily. , Disp: , Rfl:     famotidine (PEPCID) 40 MG tablet, TAKE ONE TABLET BY MOUTH ONCE A DAY, Disp: 90 tablet, Rfl: 0    irbesartan (AVAPRO) 300 MG tablet, Take 1 tablet (300 mg total) by mouth once daily., Disp: 90 tablet, Rfl: 3    oxybutynin (DITROPAN-XL) 5 MG TR24, TAKE ONE TABLET BY MOUTH ONCE A DAY, Disp: 90 tablet, Rfl: 12    pen needle, diabetic (BD INSULIN PEN NEEDLE UF SHORT) 31 gauge x 5/16" Ndle, USE AS DIRECTED, Disp: 100 each, Rfl: 12    rosuvastatin (CRESTOR) 20 MG tablet, TAKE ONE TABLET BY MOUTH ONCE A DAY, Disp: 90 tablet, Rfl: 12    semaglutide (OZEMPIC) 1 mg/dose (4 mg/3 mL), INJECT ONE MG UNDER THE SKIN once every week, Disp: 3 mL, Rfl: 6    traMADoL (ULTRAM) 50 mg " tablet, TAKE ONE TABLET BY MOUTH EVERY 6 HOURS AS NEEDED FOR PAIN, Disp: 120 tablet, Rfl: 3    albuterol (PROVENTIL/VENTOLIN HFA) 90 mcg/actuation inhaler, Inhale 2 puffs into the lungs every 6 (six) hours as needed for Wheezing or Shortness of Breath (rescue and prevention). (Patient not taking: Reported on 7/12/2023), Disp: 18 g, Rfl: 11    albuterol-ipratropium (DUO-NEB) 2.5 mg-0.5 mg/3 mL nebulizer solution, INHALE content of ONE vial BY MOUTH via nebulizer EVERY 6 HOURS AS NEEDED FOR WHEEZING OR shortness of breath. rescue (Patient not taking: Reported on 7/12/2023), Disp: 270 mL, Rfl: 12    DOCOSAHEXANOIC ACID/EPA (FISH OIL ORAL), Take 1,200 mg by mouth once daily., Disp: , Rfl:     FLUAD [id number], 65 YR UP,,PF, 45 mcg (15 mcg x 3)/0.5 mL Syrg, inject .5 milliliter intramuscularly as directed, Disp: , Rfl: 0    FLUZONE HIGHDOSE QUAD 20-21  mcg/0.7 mL Syrg, inj .7 milliliter intramuscularly as directed, Disp: , Rfl:     gabapentin (NEURONTIN) 300 MG capsule, Take 1 capsule (300 mg total) by mouth every evening. for 5 days, Disp: 5 capsule, Rfl: 0    omeprazole (PRILOSEC) 40 MG capsule, Take 1 capsule (40 mg total) by mouth once daily., Disp: 90 capsule, Rfl: 4    oxyCODONE (ROXICODONE) 5 MG immediate release tablet, Take one tablet (5 mg) every 6 hours as needed for pain. Do not take Tramadol if taking oxycodone. (Patient not taking: Reported on 7/12/2023), Disp: 20 tablet, Rfl: 0    sars-cov-2, covid-19, (MODERNA COVID-19) 100 mcg/0.5 ml injection, , Disp: , Rfl:     umeclidinium-vilanteroL (ANORO ELLIPTA) 62.5-25 mcg/actuation DsDv, Inhale 1 puff into the lungs once daily. THIS SHOULD BE USED, NOT INCRUSE (Patient not taking: Reported on 7/12/2023), Disp: 3 each, Rfl: 3    varenicline (CHANTIX STARTING MONTH BOX) 0.5 mg (11)- 1 mg (42) tablet, Take one 0.5mg tab by mouth once daily X3 days,then increase to one 0.5mg tab twice daily X4 days,then increase to one 1mg tab twice daily (Patient not taking:  Reported on 2023), Disp: 1 Package, Rfl: 0    varenicline (CHANTIX) 1 mg Tab, Take 1 tablet (1 mg total) by mouth 2 (two) times daily. (Patient not taking: Reported on 2023), Disp: 60 tablet, Rfl: 2    vitamin D (VITAMIN D3) 1000 units Tab, Take 1,000 Units by mouth once daily., Disp: , Rfl:     PHYSICAL EXAM:   Right Arm BP - Sittin/93 (23 0859)  Left Arm BP - Sittin/96 (23 0859)  Pulse: 74  Temp: 98.2 °F (36.8 °C)     Neurological: Normal speech, no focal findings noted; CN II - XII grossly intact                 Neck: Supple, no significant adenopathy; thyroid is not enlarged                  No carotid bruit can be auscultated          Abdomen: Soft, nontender, nondistended, no masses or organomegaly     No rebound tenderness noted; bowel sounds normal     Pulsatile aortic mass is not palpable.     No groin adenopathy      Extremities:   No pedal or posterior tibial pulses palpable.                                                        Trace pre-tibial edema                                                        No ulcerations    LAB RESULTS:  Lab Results   Component Value Date    K 3.2 (L) 2023    K 3.2 (L) 2023    K 3.2 (L) 2023    K 3.2 (L) 2023    K 3.2 (L) 2023    CREATININE 1.3 2023    CREATININE 1.2 2023    CREATININE 1.2 2023    CREATININE 1.2 2023    CREATININE 1.2 2023     Lab Results   Component Value Date    WBC 12.06 2023    WBC 12.06 2023    WBC 11.07 2021    HCT 45.2 2023    HCT 45.2 2023    HCT 34 (L) 2022     2023     2023     2021     Lab Results   Component Value Date    HGBA1C 5.5 2023    HGBA1C 5.6 2022    HGBA1C 5.6 2022       IMAGING (I have independently reviewed and interpreted the following tests):  2023, Carotid u/s:  R mid  cm/s (prior 143 cm/s) with EDV 43 cm/s; ICA:CCA 4.2 : 1, 60%  stenosis  L  cm/s  Nl antegrade flow x2     ABIs  R 1.17  L 1.15   Triphasic ankle waveforms x2    Prior:  Carotid u/s (6/10/20)  R ICA ~ 60% stenosis;  cm/s (previous 158 cm/s); ICA/CCA 2.2/1.5 (previous 2.4 : 1)  L ICA < 39% stenosis  Nl antegrade flow x2     AAA u/s (6/10/20)     Aorta mid: 2.1 diam AP     ABIs (3/27/20)  R 1.02 (previous 0.84; 0.88)  L 0.97 (previous 0.79; 0.72)  Biphasic waveforms R > L   Waveforms decrease from low thigh to calf     Previous:  MRI   T12-L1: No significant spinal canal stenosis or neuroforaminal narrowing.  L1-2: No significant spinal canal stenosis or neuroforaminal narrowing.  L2-3: Mild bilateral facet arthropathy and ligamentum flavum thickening without significant spinal canal stenosis or neuroforaminal narrowing.  L3-4: Mild bilateral facet arthropathy and ligamentum flavum thickening without significant spinal canal stenosis or neuroforaminal narrowing.  L4-5: Mild bilateral facet arthropathy resulting in mild left neuroforaminal narrowing.  L5-S1: Mild bilateral facet arthropathy without significant neuroforaminal narrowing or spinal canal stenosis.    IMP/PLAN:  73 y.o.   female with  h/o HTN, HLD, DMII, CKD III, hyperparathyroid, COPD, GERD : with likely R iliac and bilateral SFA disease -- minimally symptomatic- improving with claudication: now can ambulate without any claudication.  Re-assured patient there is no need for any urgent intervention. She has asymptomatic    R carotid stenosis: there has been some mild progression to at best 70% from prior 60% stenosis last year     Re-assured previously, no AAA     BP control (we will send note to PMD)  Continue ASA 81 po qd, statin.   Tobacco cessation important discussed   As she has been stable, f/u in 1 yr with carotid u/s    Klaus Cintron MD DFS FACS   Vascular/Endovascular Surgery    We discussed smoking cessation for greater than 10 minutes as well as the impact that continued smoking can have on  the progression of Vascular disease. This discussion included the use of medications, patches, nicotine gum, and lifestyle modifications.

## 2023-07-11 DIAGNOSIS — N39.41 URINARY INCONTINENCE, URGE: ICD-10-CM

## 2023-07-11 NOTE — TELEPHONE ENCOUNTER
Care Due:                  Date            Visit Type   Department     Provider  --------------------------------------------------------------------------------                                EP [facility] FAMILY                              PRIMARY      MED/ INTERNAL  Venancio Barnett  Last Visit: 06-      CARE ([facility])   MED/ PEDS      Ehrensing  Next Visit: None Scheduled  None         None Found                                                            Last  Test          Frequency    Reason                     Performed    Due Date  --------------------------------------------------------------------------------    Lipid Panel.  12 months..  rosuvastatin.............  Not Found    Overdue    [employer] Embedded Care Due Messages. Reference number: [id number].   7/11/2023 10:36:25 AM CDT

## 2023-07-12 ENCOUNTER — OFFICE VISIT (OUTPATIENT)
Dept: VASCULAR SURGERY | Facility: CLINIC | Age: 73
End: 2023-07-12
Attending: SURGERY
Payer: MEDICARE

## 2023-07-12 ENCOUNTER — HOSPITAL ENCOUNTER (OUTPATIENT)
Dept: VASCULAR SURGERY | Facility: CLINIC | Age: 73
Discharge: HOME | End: 2023-07-12
Attending: SURGERY
Payer: MEDICARE

## 2023-07-12 VITALS
WEIGHT: 138.88 LBS | HEIGHT: 63 IN | BODY MASS INDEX: 24.61 KG/M2 | DIASTOLIC BLOOD PRESSURE: 93 MMHG | TEMPERATURE: 98 F | SYSTOLIC BLOOD PRESSURE: 208 MMHG | HEART RATE: 74 BPM

## 2023-07-12 DIAGNOSIS — I65.23 BILATERAL CAROTID ARTERY STENOSIS: Primary | ICD-10-CM

## 2023-07-12 DIAGNOSIS — I73.9 PVD (PERIPHERAL VASCULAR DISEASE) WITH CLAUDICATION: ICD-10-CM

## 2023-07-12 DIAGNOSIS — I65.23 BILATERAL CAROTID ARTERY STENOSIS: ICD-10-CM

## 2023-07-12 PROCEDURE — 3060F PR POS MICROALBUMINURIA RESULT DOCUMENTED/REVIEW: ICD-10-PCS | Mod: CPTII,S$GLB,, | Performed by: SURGERY

## 2023-07-12 PROCEDURE — 99214 PR OFFICE/OUTPT VISIT, EST, LEVL IV, 30-39 MIN: ICD-10-PCS | Mod: S$GLB,,, | Performed by: SURGERY

## 2023-07-12 PROCEDURE — 3066F NEPHROPATHY DOC TX: CPT | Mod: CPTII,S$GLB,, | Performed by: SURGERY

## 2023-07-12 PROCEDURE — 3288F FALL RISK ASSESSMENT DOCD: CPT | Mod: CPTII,S$GLB,, | Performed by: SURGERY

## 2023-07-12 PROCEDURE — 4010F ACE/ARB THERAPY RXD/TAKEN: CPT | Mod: CPTII,S$GLB,, | Performed by: SURGERY

## 2023-07-12 PROCEDURE — 3080F PR MOST RECENT DIASTOLIC BLOOD PRESSURE >= 90 MM HG: ICD-10-PCS | Mod: CPTII,S$GLB,, | Performed by: SURGERY

## 2023-07-12 PROCEDURE — 3077F SYST BP >= 140 MM HG: CPT | Mod: CPTII,S$GLB,, | Performed by: SURGERY

## 2023-07-12 PROCEDURE — 1126F PR PAIN SEVERITY QUANTIFIED, NO PAIN PRESENT: ICD-10-PCS | Mod: CPTII,S$GLB,, | Performed by: SURGERY

## 2023-07-12 PROCEDURE — 1159F MED LIST DOCD IN RCRD: CPT | Mod: CPTII,S$GLB,, | Performed by: SURGERY

## 2023-07-12 PROCEDURE — 1101F PR PT FALLS ASSESS DOC 0-1 FALLS W/OUT INJ PAST YR: ICD-10-PCS | Mod: CPTII,S$GLB,, | Performed by: SURGERY

## 2023-07-12 PROCEDURE — 93923 PR NON-INVASIVE PHYSIOLOGIC STUDY EXTREMITY 3 LEVELS: ICD-10-PCS | Mod: S$GLB,,, | Performed by: SURGERY

## 2023-07-12 PROCEDURE — 3066F PR DOCUMENTATION OF TREATMENT FOR NEPHROPATHY: ICD-10-PCS | Mod: CPTII,S$GLB,, | Performed by: SURGERY

## 2023-07-12 PROCEDURE — 93923 UPR/LXTR ART STDY 3+ LVLS: CPT | Mod: S$GLB,,, | Performed by: SURGERY

## 2023-07-12 PROCEDURE — 93880 EXTRACRANIAL BILAT STUDY: CPT | Mod: S$GLB,,, | Performed by: SURGERY

## 2023-07-12 PROCEDURE — 4010F PR ACE/ARB THEARPY RXD/TAKEN: ICD-10-PCS | Mod: CPTII,S$GLB,, | Performed by: SURGERY

## 2023-07-12 PROCEDURE — 3080F DIAST BP >= 90 MM HG: CPT | Mod: CPTII,S$GLB,, | Performed by: SURGERY

## 2023-07-12 PROCEDURE — 3077F PR MOST RECENT SYSTOLIC BLOOD PRESSURE >= 140 MM HG: ICD-10-PCS | Mod: CPTII,S$GLB,, | Performed by: SURGERY

## 2023-07-12 PROCEDURE — 3044F PR MOST RECENT HEMOGLOBIN A1C LEVEL <7.0%: ICD-10-PCS | Mod: CPTII,S$GLB,, | Performed by: SURGERY

## 2023-07-12 PROCEDURE — 3288F PR FALLS RISK ASSESSMENT DOCUMENTED: ICD-10-PCS | Mod: CPTII,S$GLB,, | Performed by: SURGERY

## 2023-07-12 PROCEDURE — 99999 PR PBB SHADOW E&M-EST. PATIENT-LVL IV: CPT | Mod: PBBFAC,,, | Performed by: SURGERY

## 2023-07-12 PROCEDURE — 1101F PT FALLS ASSESS-DOCD LE1/YR: CPT | Mod: CPTII,S$GLB,, | Performed by: SURGERY

## 2023-07-12 PROCEDURE — 3060F POS MICROALBUMINURIA REV: CPT | Mod: CPTII,S$GLB,, | Performed by: SURGERY

## 2023-07-12 PROCEDURE — 93880 PR DUPLEX SCAN EXTRACRANIAL,BILAT: ICD-10-PCS | Mod: S$GLB,,, | Performed by: SURGERY

## 2023-07-12 PROCEDURE — 1159F PR MEDICATION LIST DOCUMENTED IN MEDICAL RECORD: ICD-10-PCS | Mod: CPTII,S$GLB,, | Performed by: SURGERY

## 2023-07-12 PROCEDURE — 99214 OFFICE O/P EST MOD 30 MIN: CPT | Mod: S$GLB,,, | Performed by: SURGERY

## 2023-07-12 PROCEDURE — 3044F HG A1C LEVEL LT 7.0%: CPT | Mod: CPTII,S$GLB,, | Performed by: SURGERY

## 2023-07-12 PROCEDURE — 1126F AMNT PAIN NOTED NONE PRSNT: CPT | Mod: CPTII,S$GLB,, | Performed by: SURGERY

## 2023-07-12 PROCEDURE — 99999 PR PBB SHADOW E&M-EST. PATIENT-LVL IV: ICD-10-PCS | Mod: PBBFAC,,, | Performed by: SURGERY

## 2023-07-12 RX ORDER — OXYBUTYNIN CHLORIDE 5 MG/1
TABLET, EXTENDED RELEASE ORAL
Qty: 90 TABLET | Refills: 3 | Status: SHIPPED | OUTPATIENT
Start: 2023-07-12

## 2023-07-12 NOTE — TELEPHONE ENCOUNTER
Provider Staff:  Action required for this patient     Please see care gap opportunities below in Care Due Message.    Thanks!  [facility]     Appointments      Date Provider   Last Visit   6/27/2023 Venancio Mayer MD   Next Visit   Visit date not found Venancio Mayer MD     Refill Decision Note   Sarina Genao  is requesting a refill authorization.  Brief Assessment and Rationale for Refill:  Approve     Medication Therapy Plan:         Comments:     Note composed:10:36 AM 07/12/2023

## 2023-07-24 NOTE — TELEPHONE ENCOUNTER
----- Message from Jailene Brito sent at 10/17/2017  1:06 PM CDT -----  Contact: self  [phone]  Pt is requesting a refill on Ranitidine 300 mg. Pls call [facility] [phone]. Thanks......Sharon  
Pt requesting refill . Please advise  
Yes

## 2023-07-28 NOTE — ASSESSMENT & PLAN NOTE
- Diabetes is at goal, given current A1c, goal A1C for patient is 7%  - Diabetic supplies/medications: refilled this visit  - Continue reinforcement dietary modification, portion size control, decreasing carbohydrates intake  - continue Ozempic 1 mg Q weekly injection  - discussed with patient to use glipizide sparingly due to risk of hypoglycemia  - A1C in 6 mo  - Pt will see optometry and podiatry soon   Art Therapy Group Progress Note    PATIENT SCHEDULED FOR GROUP AT:  1:00 PM    GROUP STOP TIME:  1:45 PM     ATTENDANCE:  Moderate (5/10 participants)     TOPIC / FOCUS: Resiliency in [place]; symbolic self-portrait      GOALS:  identify images of resiliency in nature, define resiliency, explore personal experience of resiliency, identify ways to practice resiliency in daily life, identify effective coping skills     PARTICIPATION LEVEL:  [place]  Art Therapist, MA     Pt was off unit for majority of group.

## 2023-08-08 ENCOUNTER — TELEPHONE (OUTPATIENT)
Dept: FAMILY MEDICINE | Facility: CLINIC | Age: 73
End: 2023-08-08
Payer: MEDICARE

## 2023-08-08 NOTE — TELEPHONE ENCOUNTER
----- Message from Susan Franklin sent at 8/8/2023  2:02 PM CDT -----  Type: RX Refill Request    Who Called: Self     Have you contacted your pharmacy:Yes     Refill or New Rx:Refill     RX Name and Strength:irbesartan (AVAPRO) 300 MG tablet 90 tablet     Preferred Pharmacy with phone number:[facility] [place] [address]   Phone: [phone]  Fax:  [phone]    Local or Mail Order:Local     Would the patient rather a call back or a response via [facility]? Call     Best Call Back Number: [phone] (home)

## 2023-08-09 RX ORDER — IRBESARTAN AND HYDROCHLOROTHIAZIDE 150; 12.5 MG/1; MG/1
TABLET, FILM COATED ORAL
Qty: 90 TABLET | Refills: 12 | Status: SHIPPED | OUTPATIENT
Start: 2023-08-09

## 2023-08-09 NOTE — TELEPHONE ENCOUNTER
----- Message from Dixie Noonan sent at 8/9/2023  9:32 AM CDT -----  Regarding: self  [phone]  Type: RX Refill Request    Who Called: self    Have you contacted your pharmacy:yes    Refill or New Rx: refill    RX Name and Strength: irbesartan 12.5 MG tablet    Is this a 30 day or 90 day RX: 30 day    Preferred Pharmacy with phone number:    [facility] - [place] [id number] [facility] [place] [id number]  Phone: [phone] Fax: [phone]    Local or Mail Order:local    Ordering Provider: Ehrensing     Would the patient rather a call back or a response via [facility]? Call back     Best Call Back Number: [phone]

## 2023-08-09 NOTE — TELEPHONE ENCOUNTER
No care due was identified.  [facility] Embedded Care Due Messages. Reference number: [id number].   8/09/2023 9:01:41 AM CDT

## 2023-08-15 ENCOUNTER — OFFICE VISIT (OUTPATIENT)
Dept: ENDOCRINOLOGY | Facility: CLINIC | Age: 73
End: 2023-08-15
Payer: MEDICARE

## 2023-08-15 VITALS
TEMPERATURE: 98 F | WEIGHT: 138 LBS | DIASTOLIC BLOOD PRESSURE: 79 MMHG | HEART RATE: 80 BPM | SYSTOLIC BLOOD PRESSURE: 155 MMHG | BODY MASS INDEX: 24.45 KG/M2

## 2023-08-15 DIAGNOSIS — E21.3 HYPERPARATHYROIDISM: ICD-10-CM

## 2023-08-15 DIAGNOSIS — N18.31 TYPE 2 DIABETES MELLITUS WITH STAGE 3A CHRONIC KIDNEY DISEASE, WITHOUT LONG-TERM CURRENT USE OF INSULIN: ICD-10-CM

## 2023-08-15 DIAGNOSIS — E11.22 TYPE 2 DIABETES MELLITUS WITH STAGE 3A CHRONIC KIDNEY DISEASE, WITHOUT LONG-TERM CURRENT USE OF INSULIN: ICD-10-CM

## 2023-08-15 DIAGNOSIS — M81.0 OSTEOPOROSIS, POST-MENOPAUSAL: ICD-10-CM

## 2023-08-15 DIAGNOSIS — E11.59 CONTROLLED TYPE 2 DIABETES MELLITUS WITH OTHER CIRCULATORY COMPLICATION, WITHOUT LONG-TERM CURRENT USE OF INSULIN: Primary | ICD-10-CM

## 2023-08-15 DIAGNOSIS — N18.31 STAGE 3A CHRONIC KIDNEY DISEASE: ICD-10-CM

## 2023-08-15 PROCEDURE — 3044F PR MOST RECENT HEMOGLOBIN A1C LEVEL <7.0%: ICD-10-PCS | Mod: CPTII,S$GLB,, | Performed by: HOSPITALIST

## 2023-08-15 PROCEDURE — 3044F HG A1C LEVEL LT 7.0%: CPT | Mod: CPTII,S$GLB,, | Performed by: HOSPITALIST

## 2023-08-15 PROCEDURE — 3077F PR MOST RECENT SYSTOLIC BLOOD PRESSURE >= 140 MM HG: ICD-10-PCS | Mod: CPTII,S$GLB,, | Performed by: HOSPITALIST

## 2023-08-15 PROCEDURE — 4010F PR ACE/ARB THEARPY RXD/TAKEN: ICD-10-PCS | Mod: CPTII,S$GLB,, | Performed by: HOSPITALIST

## 2023-08-15 PROCEDURE — 3077F SYST BP >= 140 MM HG: CPT | Mod: CPTII,S$GLB,, | Performed by: HOSPITALIST

## 2023-08-15 PROCEDURE — 1159F PR MEDICATION LIST DOCUMENTED IN MEDICAL RECORD: ICD-10-PCS | Mod: CPTII,S$GLB,, | Performed by: HOSPITALIST

## 2023-08-15 PROCEDURE — 4010F ACE/ARB THERAPY RXD/TAKEN: CPT | Mod: CPTII,S$GLB,, | Performed by: HOSPITALIST

## 2023-08-15 PROCEDURE — 99214 PR OFFICE/OUTPT VISIT, EST, LEVL IV, 30-39 MIN: ICD-10-PCS | Mod: S$GLB,,, | Performed by: HOSPITALIST

## 2023-08-15 PROCEDURE — 3288F FALL RISK ASSESSMENT DOCD: CPT | Mod: CPTII,S$GLB,, | Performed by: HOSPITALIST

## 2023-08-15 PROCEDURE — 3066F NEPHROPATHY DOC TX: CPT | Mod: CPTII,S$GLB,, | Performed by: HOSPITALIST

## 2023-08-15 PROCEDURE — 3060F PR POS MICROALBUMINURIA RESULT DOCUMENTED/REVIEW: ICD-10-PCS | Mod: CPTII,S$GLB,, | Performed by: HOSPITALIST

## 2023-08-15 PROCEDURE — 3078F PR MOST RECENT DIASTOLIC BLOOD PRESSURE < 80 MM HG: ICD-10-PCS | Mod: CPTII,S$GLB,, | Performed by: HOSPITALIST

## 2023-08-15 PROCEDURE — 1159F MED LIST DOCD IN RCRD: CPT | Mod: CPTII,S$GLB,, | Performed by: HOSPITALIST

## 2023-08-15 PROCEDURE — 3066F PR DOCUMENTATION OF TREATMENT FOR NEPHROPATHY: ICD-10-PCS | Mod: CPTII,S$GLB,, | Performed by: HOSPITALIST

## 2023-08-15 PROCEDURE — 99214 OFFICE O/P EST MOD 30 MIN: CPT | Mod: S$GLB,,, | Performed by: HOSPITALIST

## 2023-08-15 PROCEDURE — 99999 PR PBB SHADOW E&M-EST. PATIENT-LVL IV: ICD-10-PCS | Mod: PBBFAC,,, | Performed by: HOSPITALIST

## 2023-08-15 PROCEDURE — 1101F PT FALLS ASSESS-DOCD LE1/YR: CPT | Mod: CPTII,S$GLB,, | Performed by: HOSPITALIST

## 2023-08-15 PROCEDURE — 1160F RVW MEDS BY RX/DR IN RCRD: CPT | Mod: CPTII,S$GLB,, | Performed by: HOSPITALIST

## 2023-08-15 PROCEDURE — 3060F POS MICROALBUMINURIA REV: CPT | Mod: CPTII,S$GLB,, | Performed by: HOSPITALIST

## 2023-08-15 PROCEDURE — 3288F PR FALLS RISK ASSESSMENT DOCUMENTED: ICD-10-PCS | Mod: CPTII,S$GLB,, | Performed by: HOSPITALIST

## 2023-08-15 PROCEDURE — 3078F DIAST BP <80 MM HG: CPT | Mod: CPTII,S$GLB,, | Performed by: HOSPITALIST

## 2023-08-15 PROCEDURE — 1101F PR PT FALLS ASSESS DOC 0-1 FALLS W/OUT INJ PAST YR: ICD-10-PCS | Mod: CPTII,S$GLB,, | Performed by: HOSPITALIST

## 2023-08-15 PROCEDURE — 99999 PR PBB SHADOW E&M-EST. PATIENT-LVL IV: CPT | Mod: PBBFAC,,, | Performed by: HOSPITALIST

## 2023-08-15 PROCEDURE — 1160F PR REVIEW ALL MEDS BY PRESCRIBER/CLIN PHARMACIST DOCUMENTED: ICD-10-PCS | Mod: CPTII,S$GLB,, | Performed by: HOSPITALIST

## 2023-08-15 RX ORDER — SEMAGLUTIDE 1.34 MG/ML
INJECTION, SOLUTION SUBCUTANEOUS
Qty: 3 ML | Refills: 6 | Status: SHIPPED | OUTPATIENT
Start: 2023-08-15 | End: 2023-12-28

## 2023-08-15 NOTE — ASSESSMENT & PLAN NOTE
- Diabetes is at goal, given current A1c, goal A1C for patient is 7%  - Diabetic supplies/medications: refilled this visit  - Continue reinforcement dietary modification, portion size control, decreasing carbohydrates intake  - continue Ozempic 1 mg Q weekly injection  - discussed with patient to use glipizide sparingly due to risk of hypoglycemia  - A1C in with next set of lab work  - Pt will see optometry and podiatry soon

## 2023-08-15 NOTE — ASSESSMENT & PLAN NOTE
- patient with history of hyperparathyroidism  - patient also has history of recent hypercalcemia, suspected due to hydrochlorothiazide use combined with too much calcium intake.  - Calcium level- much better now  - has seen Nephrology  - advised patient to continue monitoring calcium intake, she does not want to get off hydrochlorothiazide  - repeat lab work, if hypercalcemia will need to discuss stoppage of hydrochlorothiazide

## 2023-08-15 NOTE — ASSESSMENT & PLAN NOTE
- Management of long-term osteoporosis, On chronic Prolia every 6 months therapy>> next dose 10/2023  - DXA personal reviewed by me, improvement in bone density-DXA:  6/5/2022>> improvement noted  - continue vitamin-D and calcium supplement  - Fall precautions/Exercise: advised   - Prolia order renewed, scheduled every 6 months>> continue next dose 10/2023  - will follow-up on bone density 06/2024, pending results can discuss holiday  - patient to continue calcium and vitamin-D supplements, but avoid hypercalcemia    Risk of osteonecrosis of the jaw (DISCUSSED) with bisphosphonates and denosumab, with incidence estimated from 1-10/558863 treated patients. The incidence of ONJ is higher in patients undergoing invasive dental surgery (excludes routine cleaning, fillings or root canals).   Dental health: Advised dental work should ideally be completed prior to initiation of treatment, follow up with dentist/oral surgeon advised.

## 2023-08-15 NOTE — PROGRESS NOTES
Subjective:      Patient ID: Sarina Genao is a 73 y.o. female presented to [facility] on 8/15/2023.  Chief Complaint:  Osteoporosis    History of Present Illness: Sarina Genao is a 73 y.o. female here for management of osteoporosis  Other significant past medical history:  Hypertension, hyperlipidemia    Interval hx: Here for follow up, s/p Prolia tolerated it well next injection 11/6/2023  Recent falls? No, Recent fractures? No  DM is good controlled,  On Ozempic.  Reports glucose to be ranging from   Hypercalcemia due to too much VitD3 and calcium, she stop taking it, But she is taking MVN  Also on HCTZ >> chronically but she like medication due to LE swelling, dose was decrease   She does eat dairy and take MVN    1) Osteoporosis  - Diagnosis on DXA in 2018  - On prolia, started 9/17/2018 to 11/2020>> not in our system, unable to get new Prolia from her endocrinologist due to cost  - Prolia restarted on Prolia restarted on 5/14/21> Last Prolia injection 11/14/2022, 05/2023  - Bone density is improving 2/2018> compare to 6/5/2020>> 6/5/2022  - Vit D: 1000iu daily, on MVN    - Current diarrhea or h/o malabsorption? no  - Height loss (>2 inches)? no  - Family hx of Osteoporosis: mother, possibly sister  - History of Cancer? Kindey cancer, nephrectomy, no issue  - Malignancy involving bone (active or history)? no  - Prior radiation treatment? no  - Post-menopausal? Age?: 50s  - No hysterectomy  - Tobacco use ?  Yes, just restarted due to covid stress  - EtOH use? no (<2 drinks a day in female, <3 drinks a day for male)  - Weight bearing exercise?   Yes, walking 30 mins, 3x  - Fall Precautions? yes, get out the chair without using their arms  - Dental work planned? No, dentures    Bone density:  Review  6/9/2022  Lumbar spine (L1-L4): BMD is 0.876 g/cm2, T-score is -1.6, and Z-score is 0.1.  Total hip: BMD is 0.661 g/cm2, T-score is -2.3, and Z-score is -1.1.  Femoral neck: BMD  is 0.635 g/cm2, T-score is -1.9, and Z-score is -0.7.  Distal 1/3 radius: BMD is 0.552 g/cm2, T-score is -2.4, and Z-score is 0.1.     FRAX:  6% risk of a major osteoporotic fracture in the next 10 years.  2.1% risk of hip fracture in the next 10 years.    Impression:  *Osteoporosis on treatment with denosumab.  Bone density in osteopenic range approaching osteoporosis at the distal 1/3 forearm with FRAX not suggesting treatment.  *Compared with previous DXA, BMD at the distal forearm has decreased by -3.8%,  BMD at the lumbar spine has remained stable, and the BMD at the total hip has increased by 7.9%.     Lab work reviewed  Lab Results   Component Value Date    .5 (H) 06/20/2023    PTH 35.6 04/06/2023    PTH 35.6 04/06/2023    PTH 35.6 04/06/2023    CTRLSSXI41RE 48 04/06/2023    QZLYLJAD21GO 48 04/06/2023    XIAAPVGN43ZS 63 05/03/2022    CALCIUM 9.8 06/20/2023    CALCIUM 11.2 (H) 04/06/2023    CALCIUM 11.2 (H) 04/06/2023    CALCIUM 11.2 (H) 04/06/2023    CALCIUM 11.2 (H) 04/06/2023    PHOS 3.4 04/06/2023    PHOS 3.4 04/06/2023    PHOS 2.9 11/08/2021    ALKPHOS 97 04/06/2023    ALKPHOS 97 04/06/2023    ALKPHOS 97 04/06/2023    TSH 1.324 04/06/2023    LABCALC 6.9 06/24/2016    CFDTFKS80SDO 3 (L) 06/24/2016       2) Diabetes Mellitus Type 2  - Known diabetic complications: nephropathy  - Cardiovascular risk factors: advanced age (older than 55 for men, 65 for women), diabetes mellitus, dyslipidemia, sedentary lifestyle and smoking/ tobacco exposure  - Diagnosed w/ DM: in 2007  - Glucose 119 this AM, Staying below 135  - Need to see Dr De La Torre for toe nail trim soon    Currently:    Ozempic 1mg Qweekly, cost is not an issue   Glipizide PRN (1-2x  A month)  Previous meds:              Was on MDI insulin  Home glucose checks: checks 1x a day, Logs reviewed/Unavailable: oral recall: 112  Weight trend: stable  Diabetes Education:  no  Diabetes Related Hospitalization: no  Hx of pancreatitis, hx of thyroid cancer:  "no  Family history of diabetes: yes       Eye exam current (within one year): yes, DR: no  Reports cuts or ulcers on feet: no, has podiatrist  Statin: Taking, ACE/ARB: Taking    Diabetes lab work  Lab Results   Component Value Date    HGBA1C 5.5 04/06/2023    HGBA1C 5.6 11/04/2022    HGBA1C 5.6 05/03/2022    HGBA1C 5.9 (H) 11/08/2021     No results found for: "CPEPTIDE", "GLUTAMICACID", "ISLETCELLANT"   No results found for: "FRUCTOSAMINE"  Lab Results   Component Value Date    MICALBCREAT 66.8 (H) 04/06/2023    MICALBCREAT 66.8 (H) 04/06/2023     No results found for: "GREWNWVL04"    Diabetes Management Status: Reviewed this office visit  Screening or Prevention Patient's value Goal Complete/Controlled?   Lipid profile : 02/13/2021 Annually No     Dilated retinal exam : 11/16/2021 Annually No     Foot exam   : 03/01/2023 Annually No       Reviewed past surgical, medical, family, social history and updated as appropriate.  Review of Systems: see HPI above    Objective:   BP (!) 155/79   Pulse 80   Temp 97.9 °F (36.6 °C) (Oral)   Wt 62.6 kg (138 lb)   BMI 24.45 kg/m²     Body mass index is 24.45 kg/m².  Vital signs reviewed    Physical Exam  Vitals and nursing note reviewed.   Constitutional:       General: She is not in acute distress.     Appearance: Normal appearance. She is well-developed.   Neck:      Thyroid: No thyromegaly.   Cardiovascular:      Rate and Rhythm: Normal rate.      Heart sounds: Normal heart sounds.   Pulmonary:      Effort: Pulmonary effort is normal. No respiratory distress.   Abdominal:      Tenderness: There is no abdominal tenderness.   Musculoskeletal:         General: Normal range of motion.      Cervical back: Neck supple.   Skin:     General: Skin is warm.      Findings: No erythema.   Neurological:      Mental Status: She is alert and oriented to person, place, and time.       Lab Reviewed:  See results in subjective  Lab Results   Component Value Date    HGBA1C 5.5 04/06/2023 "     Lab Results   Component Value Date    CHOL 139 02/13/2021    HDL 41 02/13/2021    LDLCALC 66.2 02/13/2021    TRIG 159 (H) 02/13/2021    CHOLHDL 29.5 02/13/2021     Lab Results   Component Value Date     06/20/2023    K 3.2 (L) 06/20/2023     06/20/2023    CO2 26 06/20/2023     (H) 06/20/2023    BUN 8 06/20/2023    CREATININE 1.3 06/20/2023    CALCIUM 9.8 06/20/2023    PHOS 3.4 04/06/2023    PHOS 3.4 04/06/2023    PROT 7.2 04/06/2023    PROT 7.2 04/06/2023    PROT 7.2 04/06/2023    ALBUMIN 3.5 04/06/2023    ALBUMIN 3.5 04/06/2023    ALBUMIN 3.5 04/06/2023    BILITOT 0.5 04/06/2023    BILITOT 0.5 04/06/2023    BILITOT 0.5 04/06/2023    ALKPHOS 97 04/06/2023    ALKPHOS 97 04/06/2023    ALKPHOS 97 04/06/2023    AST 15 04/06/2023    AST 15 04/06/2023    AST 15 04/06/2023    ALT 6 (L) 04/06/2023    ALT 6 (L) 04/06/2023    ALT 6 (L) 04/06/2023    ANIONGAP 11 06/20/2023    ESTGFRAFRICA 51.8 (A) 05/03/2022    EGFRNONAA 44.9 (A) 05/03/2022    TSH 1.324 04/06/2023    .5 (H) 06/20/2023    ONIVFSGU02FM 48 04/06/2023    ZMUYLPUG08YJ 48 04/06/2023     Assessment     1. Controlled type 2 diabetes mellitus with other circulatory complication, without long-term current use of insulin  HEMOGLOBIN A1C    Comprehensive Metabolic Panel    semaglutide (OZEMPIC) 1 mg/dose (4 mg/3 mL)      2. Osteoporosis, post-menopausal  Comprehensive Metabolic Panel    Calcium, Ionized    PTH, Intact    Phosphorus    Magnesium      3. Hyperparathyroidism  Comprehensive Metabolic Panel    Calcium, Ionized    PTH, Intact    Phosphorus    Magnesium      4. Stage 3a chronic kidney disease  semaglutide (OZEMPIC) 1 mg/dose (4 mg/3 mL)      5. Type 2 diabetes mellitus with stage 3a chronic kidney disease, without long-term current use of insulin          Plan     Osteoporosis, post-menopausal  - Management of long-term osteoporosis, On chronic Prolia every 6 months therapy>> next dose 10/2023  - DXA personal reviewed by me,  improvement in bone density-DXA:  6/5/2022>> improvement noted  - continue vitamin-D and calcium supplement  - Fall precautions/Exercise: advised   - Prolia order renewed, scheduled every 6 months>> continue next dose 10/2023  - will follow-up on bone density 06/2024, pending results can discuss holiday  - patient to continue calcium and vitamin-D supplements, but avoid hypercalcemia    Risk of osteonecrosis of the jaw (DISCUSSED) with bisphosphonates and denosumab, with incidence estimated from 1-10/852884 treated patients. The incidence of ONJ is higher in patients undergoing invasive dental surgery (excludes routine cleaning, fillings or root canals).   Dental health: Advised dental work should ideally be completed prior to initiation of treatment, follow up with dentist/oral surgeon advised.     Type 2 diabetes mellitus with chronic kidney disease, without long-term current use of insulin  - Diabetes is at goal, given current A1c, goal A1C for patient is 7%  - Diabetic supplies/medications: refilled this visit  - Continue reinforcement dietary modification, portion size control, decreasing carbohydrates intake  - continue Ozempic 1 mg Q weekly injection  - discussed with patient to use glipizide sparingly due to risk of hypoglycemia  - A1C in with next set of lab work  - Pt will see optometry and podiatry soon    CKD (chronic kidney disease) stage 3, GFR 30-59 ml/min  - Renal function reviewed on lab work today, stable   - continue routine monitoring    Hyperparathyroidism  - patient with history of hyperparathyroidism  - patient also has history of recent hypercalcemia, suspected due to hydrochlorothiazide use combined with too much calcium intake.  - Calcium level- much better now  - has seen Nephrology  - advised patient to continue monitoring calcium intake, she does not want to get off hydrochlorothiazide  - repeat lab work, if hypercalcemia will need to discuss stoppage of hydrochlorothiazide    Advised  patient to follow up with PCP for routine health maintenance care.   RTC in 11/2023    Naveed Patel M.D  Endocrinology  [facility]  8/15/2023      Disclaimer: This note has been generated using voice-recognition software. There may be typographical errors that have been missed during proof-reading.

## 2023-09-07 ENCOUNTER — TELEPHONE (OUTPATIENT)
Dept: ENDOSCOPY | Facility: HOSPITAL | Age: 73
End: 2023-09-07

## 2023-09-07 ENCOUNTER — CLINICAL SUPPORT (OUTPATIENT)
Dept: ENDOSCOPY | Facility: HOSPITAL | Age: 73
End: 2023-09-07
Attending: INTERNAL MEDICINE
Payer: MEDICARE

## 2023-09-07 VITALS — WEIGHT: 138 LBS | HEIGHT: 63 IN | BODY MASS INDEX: 24.45 KG/M2

## 2023-09-07 DIAGNOSIS — Z86.010 HISTORY OF COLONIC POLYPS: ICD-10-CM

## 2023-09-07 DIAGNOSIS — Z12.11 SPECIAL SCREENING FOR MALIGNANT NEOPLASMS, COLON: Primary | ICD-10-CM

## 2023-09-07 RX ORDER — SODIUM, POTASSIUM,MAG SULFATES 17.5-3.13G
1 SOLUTION, RECONSTITUTED, ORAL ORAL DAILY
Qty: 1 KIT | Refills: 0 | Status: SHIPPED | OUTPATIENT
Start: 2023-09-07 | End: 2023-09-09

## 2023-09-07 NOTE — TELEPHONE ENCOUNTER
Colonoscopy Procedure Prep Instructions      Date of procedure: 12/21/23 Arrive at: 12:00PM    Location of Department:   [facility] [address] [place] [address]  Take the Elevators to 2nd Floor Endoscopy Procedural Area    As soon as possible:   your prep from pharmacy and over the counter DULCOLAX LAXATIVE TABLETS     On the day before your procedure   What You CAN do:   You may have clear liquids ONLY -see below for list.     Liquids That Are OK to Drink:   Water  Sports drinks (Gatorade, Power-Aid)  Coffee or tea (no cream or nondairy creamer)  Clear juices without pulp (apple, white grape)  Gelatin desserts (no fruit or toppings)  Clear soda (sprite, coke, ginger ale)  Chicken broth (until 12 midnight the night before procedure)      What You CANNOT do:   Do not EAT solid food, drink milk or anything   colored red.  Do not drink alcohol.  Do not take oral medications within 1 hour of starting   each dose of SUPREP.  No gum chewing or candy morning of procedure.      Note:   (Please disregard the insert instructions from pharmacy).  SUPREP Bowel Prep Kit is indicated for cleansing of the colon as a preparation for colonoscopy in adults.   Be sure to tell your doctor about all the medicines you take, including prescription and non-prescription medicines, vitamins, and herbal supplements. SUPREP Bowel Prep Kit may affect how other medicines work.  Medication taken by mouth may not be absorbed properly when taken within 1 hour before the start of each dose of SUPREP Bowel Prep Kit.    It is not uncommon to experience some abdominal cramping, nausea and/or vomiting when taking the prep. If you have nausea and/or vomiting while taking the prep, stop drinking for 20 to 30 minutes then continue.    How to take prep:    SUPREP Bowel Prep Kit is a (2-day) prep.   Both 6-ounce bottles are required for a complete preparation for colonoscopy. Dilute the solution concentrate as directed  prior to use. You must drink water with each dose of SUPREP, and additional water after each dose.    DOSE 1--Day Before Colonoscopy 12/20/23    Drink at least 6 to 8 glasses of clear liquids from time you wake up until you begin your prep and then continue until bedtime to avoid dehydration.     12:00 pm (NOON) Take four (4) Dulcolax (Bisacodyl) tablets with at least 8 ounces or more of clear liquids.      6:00 pm:    You must complete Steps 1 through 4 using one (1) 6-ounce bottle before going to bed as shown below:    Step 1-Pour ONE (1) 6-ounce bottle of SUPREP liquid into the mixing container.  Step 2-Add cool drinking water to the 16-ounce line on the container and mix.  Step 3-Drink ALL the liquid in the container.  Step 4-You must drink two (2) more 16-ounce containers of water over the next 1 hour.  IMPORTANT: If you experience preparation-related symptoms (for example, nausea, bloating, or cramping), stop, or slow the rate of drinking the additional water until your symptoms decrease.    DOSE 2--Day of the Colonoscopy 12/21/23 at 7-8 AM    For this dose, repeat Steps 1 through 4 shown above using the other 6-ounce bottle.   You may continue drinking water/clear liquids until   4 hours before your colonoscopy or as directed by the scheduling nurse  9:00AM.    For more information about your procedure, please watch this informational video. It is important to watch this animated consent video prior to your arrival. If you haven't watched the video prior to arriving, you are required to watch it during admission which can cause delays.     Options for viewing:  Using a keyboard:  press and hold the control tab (Ctrl) and left mouse click to follow link          Colonoscopy Instructional Video                                                         OR    Type link address into your web browser's address bar:  [email or web address]    Using a mobile phone: tap on web address/link.      Comments: If you are taking any injectable medication (s) for weight loss and/or diabetes  weekly, please hold for 7 days prior to your scheduled procedure 12/13/23.             Are you ready for your Colonoscopy?      __ If you take blood thinners, have you stopped taking them according to your doctor's instructions before your procedures?  __ Have you stopped eating solid foods and followed a clear liquid diet a full day before your procedure or followed the diet       guidelines indicated in your instructions? REMINDER: NO BROTH AFTER MIDNIGHT THE DAY BEFORE PROCEDURE.   __ Have you completed all your prep solution? PLEASE DO NOT FOLLOW the insert/or box instructions from pharmacy)  __ Have you taken your blood pressure, heart, seizure, or other essential medications the morning of your procedure?  __ Have you planned for a ride to and from procedure?  (Medical Transportation, Uber, Lyft, Taxi, etc. may ONLY be used if a responsible adult is present to accompany you home). The responsible adult CANNOT be the  of the service.       Questions or Concerns?  Please call us!  [phone] (M-F) [phone] (Nights and weekends)    Listed below are some helpful tips:    Please bring protective cases for eyewear and hearing aids-wear comfortable clothing/shoes.  Follow prep instructions closely so you don't have to do it twice.  Bowel prep is prescription used to clean out the colon before a colonoscopy. The prep increases movement of your colon by causing you to have diarrhea (loose stools). Cleaning out stool from the colon helps your doctor to see in your colon clearly during this procedure.   It is important to stay hydrated before, during and after bowel prep to prevent loss of fluid (dehydration). You can have water and your choice of clear liquids. Reminder: these liquids you will drink in addition to the bowel prep.     Preparing the mixture:    First, mix the prep with water.  Make the taste better  by adding a sugar free drink mix (Crystal Light) can improve the taste of your prep.   Use a large bore (opening) straw. Place towards the back of mouth (throat) as tolerated.  Prepare a prep mixture that is lightly chilled, but not ice-cold. Drinking a large amount of ice-cold liquid can make you feel very ill.  Avoid drinking any RED beverages or eating popsicles with this color for the 24 hours leading up to your procedure. This color can look like blood in the colon.    Consuming the prep:    Take your time. If you feel ill, take a 15-minute break from drinking the prep mixture  Combat hunger and dehydration with clear liquids. Options like JELL-O, popsicles, or chicken broth will help.  Settle in with good reading material. The goal is to clean out 6 feet of colon, so you can plan on spending a good deal of time in the bathroom. Have some good reading material on-hand or an iPad ready to keep yourself entertained!  Keep yourself comfortable. We recommend applying personal hygiene wipes, Tucks pads and a soothing ointment, like A&D ointment, Desitin, or Vaseline to your bottom before starting and as needed to protect your skin.               IMPORTANT INFORMATION TO KNOW BEFORE YOUR PROCEDURE    [facility] 4th Floor    If your procedure requires the administration of anesthesia, it is necessary for a responsible adult to drive you home. (Medical Transportation, Uber, Lyft, Taxi, etc. may ONLY be used if a responsible adult is present to accompany you home.  The responsible adult CAN'T be the  of the service).      person must be available to return to pick you up within 30 minutes of being notified of discharge.     Due to the limited socially distant seating in our waiting room, please limit your guest (1) who accompany you for this procedure. If someone accompanies you for this procedure into the facility:    Consider having them proceed to an area that is socially distant  other than the lobby until a member of the medical team contacts them to provide an update after the procedure.     Also, please consider being dropped off and picked up from the facility.      Please bring a picture ID, insurance card, & copayment    Take Medications as directed below:      If you begin taking any blood thinning medications, please contact the  listed below as soon as possible.    If you are diabetic see the attached instruction sheet regarding your medication.     If you take HEART, BLOOD PRESSURE, SEIZURE, PAIN, LUNG (including inhalers/nebulizers), ANTI-REJECTION (transplant patients), or PSYCHIATRIC medications, please take at your regular times with a sip of water or as directed by the scheduling nurse.     Important contact information:    Endoscopy [phone] Hours of operation Monday-Friday 8:00-4:30pm.    Questions about insurance or financial obligations call [phone] or [phone].    If you have questions regarding the prep or need to reschedule, please call [phone]. After hours questions requiring immediate assistance, contact [facility] nurse line at [phone] or [phone].   NOTE:     On occasion, unforeseen circumstances may cause a delay in your procedure start time. We respect your time and appreciate your patience during these circumstances.      Comments:

## 2023-09-07 NOTE — TELEPHONE ENCOUNTER
Spoke to patient to schedule procedure(s) Colonoscopy       Physician to perform procedure(s) Dr. JACK Luis  Date of Procedure (s) 12/21/23  Arrival Time 12:00 PM  Time of Procedure(s) 1:00 PM   Location of Procedure(s) [facility] 2nd Floor  Type of Rx Prep sent to patient: Suprep  Instructions provided to patient via Postal Mail    Patient was informed on the following information and verbalized understanding. Screening questionnaire reviewed with patient and complete. If procedure requires anesthesia, a responsible adult needs to be present to accompany the patient home, patient cannot drive after receiving anesthesia. Appointment details are tentative, especially check-in time. Patient will receive a prep-op call 4 days prior to confirm check-in time for procedure. If applicable the patient should contact their pharmacy to verify Rx for procedure prep is ready for pick-up. Patient was advised to call the scheduling department at [phone] if pharmacy states no Rx is available. Patient was advised to call the endoscopy scheduling department if any questions or concerns arise.      [facility] Endoscopy Scheduling Department

## 2023-09-14 ENCOUNTER — CLINICAL SUPPORT (OUTPATIENT)
Dept: FAMILY MEDICINE | Facility: CLINIC | Age: 73
End: 2023-09-14
Payer: MEDICARE

## 2023-09-14 ENCOUNTER — TELEPHONE (OUTPATIENT)
Dept: FAMILY MEDICINE | Facility: CLINIC | Age: 73
End: 2023-09-14

## 2023-09-14 DIAGNOSIS — I10 ESSENTIAL HYPERTENSION: Primary | ICD-10-CM

## 2023-09-14 PROCEDURE — 99999 PR PBB SHADOW E&M-EST. PATIENT-LVL II: CPT | Mod: PBBFAC,,,

## 2023-09-14 PROCEDURE — 99999 PR PBB SHADOW E&M-EST. PATIENT-LVL II: ICD-10-PCS | Mod: PBBFAC,,,

## 2023-09-14 RX ORDER — AMLODIPINE BESYLATE 10 MG/1
10 TABLET ORAL DAILY
Qty: 90 TABLET | Refills: 90 | Status: SHIPPED | OUTPATIENT
Start: 2023-09-14 | End: 2023-12-13

## 2023-09-14 NOTE — TELEPHONE ENCOUNTER
Patient came as walk in to have blood pressure check states blood pressure checked at pharmacy reading 200 over something denies dizziness, headache ,blurred vision, chest pains ,or SOB at this time.B/P 140/100 P72 Patient advised by Dr GUME henderson.

## 2023-09-14 NOTE — TELEPHONE ENCOUNTER
Refill Routing Note   Medication(s) are not appropriate for processing by [facility] for the following reason(s):      Required vitals abnormal    [facility] action(s):  Defer Care Due:  Labs due            Appointments  past 12m or future 3m with PCP    Date Provider   Last Visit   6/27/2023 Venancio Mayer MD   Next Visit   Visit date not found Venancio Mayer MD   ED visits in past 90 days: 0        Note composed:1:18 PM 09/14/2023

## 2023-09-14 NOTE — PROGRESS NOTES
"Sarina Genao 73 y.o. female is here today for Blood Pressure check.   History of HTN yes.    Review of patient's allergies indicates:   Allergen Reactions    Metformin Diarrhea    Pantoprazole      elevated blood sugars     Creatinine   Date Value Ref Range Status   06/20/2023 1.3 0.5 - 1.4 mg/dL Final     Sodium   Date Value Ref Range Status   06/20/2023 142 136 - 145 mmol/L Final     Potassium   Date Value Ref Range Status   06/20/2023 3.2 (L) 3.5 - 5.1 mmol/L Final   ]  Patient verifies taking blood pressure medications on a regular basis at the same time of the day.     Current Outpatient Medications:     amLODIPine (NORVASC) 5 MG tablet, Take 1 tablet (5 mg total) by mouth once daily., Disp: 90 tablet, Rfl: 3    aspirin (ECOTRIN) 81 MG EC tablet, Take 81 mg by mouth once daily., Disp: , Rfl:     cyanocobalamin, vitamin B-12, 1,000 mcg TbSR, Take by mouth once daily. , Disp: , Rfl:     DOCOSAHEXANOIC ACID/EPA (FISH OIL ORAL), Take 1,200 mg by mouth once daily., Disp: , Rfl:     famotidine (PEPCID) 40 MG tablet, TAKE ONE TABLET BY MOUTH ONCE A DAY, Disp: 90 tablet, Rfl: 0    FLUAD [id number], 65 YR UP,,PF, 45 mcg (15 mcg x 3)/0.5 mL Syrg, inject .5 milliliter intramuscularly as directed, Disp: , Rfl: 0    FLUZONE HIGHDOSE QUAD 20-21  mcg/0.7 mL Syrg, inj .7 milliliter intramuscularly as directed, Disp: , Rfl:     gabapentin (NEURONTIN) 300 MG capsule, Take 1 capsule (300 mg total) by mouth every evening. for 5 days, Disp: 5 capsule, Rfl: 0    irbesartan-hydrochlorothiazide (AVALIDE) 150-12.5 mg per tablet, TAKE ONE TABLET BY MOUTH ONCE A DAY, Disp: 90 tablet, Rfl: 12    omeprazole (PRILOSEC) 40 MG capsule, Take 1 capsule (40 mg total) by mouth once daily., Disp: 90 capsule, Rfl: 4    oxybutynin (DITROPAN-XL) 5 MG TR24, TAKE ONE TABLET BY MOUTH ONCE A DAY, Disp: 90 tablet, Rfl: 3    pen needle, diabetic (BD INSULIN PEN NEEDLE UF SHORT) 31 gauge x 5/16" Ndle, USE AS DIRECTED, Disp: 100 each, Rfl: 12    " rosuvastatin (CRESTOR) 20 MG tablet, TAKE ONE TABLET BY MOUTH ONCE A DAY, Disp: 90 tablet, Rfl: 12    sars-cov-2, covid-19, (MODERNA COVID-19) 100 mcg/0.5 ml injection, , Disp: , Rfl:     semaglutide (OZEMPIC) 1 mg/dose (4 mg/3 mL), INJECT ONE MG UNDER THE SKIN once every week, Disp: 3 mL, Rfl: 6    traMADoL (ULTRAM) 50 mg tablet, TAKE ONE TABLET BY MOUTH EVERY 6 HOURS AS NEEDED FOR PAIN, Disp: 120 tablet, Rfl: 3    vitamin D (VITAMIN D3) 1000 units Tab, Take 1,000 Units by mouth once daily., Disp: , Rfl:   Does patient have record of home blood pressure readings no.  Last dose of blood pressure medication was taken at 7:40am.  Patient is asymptomatic.   Complains of none.    Vitals:         Dr. Navarro informed of nurse visit.

## 2023-09-14 NOTE — TELEPHONE ENCOUNTER
----- Message from Jono Meek sent at 9/14/2023  4:36 PM CDT -----  Name of Who is Calling:LES VELA [[id number]]           What is the request in detail:pt stated that she would like to speak with the office directly in regards to her questions/concerns, PT DID NOT GIVE INFO ON WHAT IT WAS IN REGARDS TO .Please contact to further discuss and advise.            Can the clinic reply by [facility]:[phone]           What Number to Call Back if not in [facility] Center[facility]:[phone]

## 2023-09-14 NOTE — TELEPHONE ENCOUNTER
Pt b/p here was 140/100 taken by Gail and pt told to start taking her b/p at home an calling it in to us. She was also told about the [facility] and it's services.

## 2023-09-14 NOTE — TELEPHONE ENCOUNTER
Looks like message was routed under Rx request.  Please always send under patient calls Patient walked in today with high blood pressure and apparently was only getting blood pressure checked at the pharmacy     State to patient that Dr. Mayer says.....      You need to get your own arm blood pressure cuff.  Your insurance does not allow you to be part of the digital blood pressure program unfortunately    Dr. Mayer says 1st we need to confirm that you are taking your medications and what they are     Dr. Mayer says that back in June he had increased her irbesartan to 300 but on the med list we have it is still only the 150 so we need to know exactly what you have at home.  Dr. Mayer sent the irbesartan 300 to the pharmacy in June      Looks like back in June the blood pressure was high and Dr. Mayer was trying to get it under control.  The plan was for you to be monitoring your blood pressure and report back    Dr. Mayer did increase the amlodipine from 5 mg to 10 mg but we might need to increase the other med

## 2023-09-14 NOTE — TELEPHONE ENCOUNTER
Care Due:                  Date            Visit Type   Department     Provider  --------------------------------------------------------------------------------                                [facility]                              PRIMARY      MED/ INTERNAL  Venancio Barnett  Last Visit: 06-      CARE ([facility])   MED/ PEDS      Ehrensing  Next Visit: None Scheduled  None         None Found                                                            Last  Test          Frequency    Reason                     Performed    Due Date  --------------------------------------------------------------------------------    Lipid Panel.  12 months..  rosuvastatin.............  Not Found    Overdue    [employer] Embedded Care Due Messages. Reference number: [id number].   9/14/2023 1:05:55 PM CDT

## 2023-09-15 NOTE — TELEPHONE ENCOUNTER
Pt will take amlopine 5mg and 300mg of irbesatan. Friday 9/22 she will call with her b/p results and how much swelling to her feet.

## 2023-09-28 NOTE — TELEPHONE ENCOUNTER
Refill Routing Note   Medication(s) are not appropriate for processing by [facility] for the following reason(s):      Required labs outdated    [facility] action(s):  Defer Care Due:  None identified              Appointments  past 12m or future 3m with PCP    Date Provider   Last Visit   6/27/2023 Venancio Mayer MD   Next Visit   Visit date not found Venancio Mayer MD   ED visits in past 90 days: 0        Note composed:4:21 PM 09/28/2023

## 2023-09-29 RX ORDER — ROSUVASTATIN CALCIUM 20 MG/1
TABLET, COATED ORAL
Qty: 90 TABLET | Refills: 12 | Status: SHIPPED | OUTPATIENT
Start: 2023-09-29

## 2023-10-04 DIAGNOSIS — R52 PAIN: ICD-10-CM

## 2023-10-04 RX ORDER — TRAMADOL HYDROCHLORIDE 50 MG/1
TABLET ORAL
Qty: 120 TABLET | Refills: 5 | Status: SHIPPED | OUTPATIENT
Start: 2023-10-04 | End: 2024-03-24

## 2023-10-04 NOTE — TELEPHONE ENCOUNTER
No care due was identified.  [facility] Embedded Care Due Messages. Reference number: [id number].   10/04/2023 8:55:40 AM CDT

## 2023-10-31 ENCOUNTER — OFFICE VISIT (OUTPATIENT)
Dept: OPTOMETRY | Facility: CLINIC | Age: 73
End: 2023-10-31
Payer: MEDICARE

## 2023-10-31 ENCOUNTER — LAB VISIT (OUTPATIENT)
Dept: LAB | Facility: HOSPITAL | Age: 73
End: 2023-10-31
Attending: HOSPITALIST
Payer: MEDICARE

## 2023-10-31 DIAGNOSIS — M81.0 OSTEOPOROSIS, POST-MENOPAUSAL: ICD-10-CM

## 2023-10-31 DIAGNOSIS — Z13.5 GLAUCOMA SCREENING: ICD-10-CM

## 2023-10-31 DIAGNOSIS — E21.3 HYPERPARATHYROIDISM: ICD-10-CM

## 2023-10-31 DIAGNOSIS — E11.9 DIABETES MELLITUS TYPE 2 WITHOUT RETINOPATHY: Primary | ICD-10-CM

## 2023-10-31 DIAGNOSIS — E11.59 CONTROLLED TYPE 2 DIABETES MELLITUS WITH OTHER CIRCULATORY COMPLICATION, WITHOUT LONG-TERM CURRENT USE OF INSULIN: ICD-10-CM

## 2023-10-31 DIAGNOSIS — Z96.1 PSEUDOPHAKIA OF BOTH EYES: ICD-10-CM

## 2023-10-31 LAB
ALBUMIN SERPL BCP-MCNC: 3.6 G/DL (ref 3.5–5.2)
ALP SERPL-CCNC: 106 U/L (ref 55–135)
ALT SERPL W/O P-5'-P-CCNC: 8 U/L (ref 10–44)
ANION GAP SERPL CALC-SCNC: 12 MMOL/L (ref 8–16)
AST SERPL-CCNC: 10 U/L (ref 10–40)
BILIRUB SERPL-MCNC: 0.6 MG/DL (ref 0.1–1)
BUN SERPL-MCNC: 16 MG/DL (ref 8–23)
CA-I BLDV-SCNC: 1.29 MMOL/L (ref 1.06–1.42)
CALCIUM SERPL-MCNC: 10.2 MG/DL (ref 8.7–10.5)
CHLORIDE SERPL-SCNC: 102 MMOL/L (ref 95–110)
CO2 SERPL-SCNC: 27 MMOL/L (ref 23–29)
CREAT SERPL-MCNC: 1.1 MG/DL (ref 0.5–1.4)
EST. GFR  (NO RACE VARIABLE): 52.4 ML/MIN/1.73 M^2
ESTIMATED AVG GLUCOSE: 128 MG/DL (ref 68–131)
GLUCOSE SERPL-MCNC: 265 MG/DL (ref 70–110)
HBA1C MFR BLD: 6.1 % (ref 4–5.6)
MAGNESIUM SERPL-MCNC: 1.7 MG/DL (ref 1.6–2.6)
PHOSPHATE SERPL-MCNC: 3.1 MG/DL (ref 2.7–4.5)
POTASSIUM SERPL-SCNC: 3.9 MMOL/L (ref 3.5–5.1)
PROT SERPL-MCNC: 7.3 G/DL (ref 6–8.4)
PTH-INTACT SERPL-MCNC: 53.9 PG/ML (ref 9–77)
SODIUM SERPL-SCNC: 141 MMOL/L (ref 136–145)

## 2023-10-31 PROCEDURE — 83735 ASSAY OF MAGNESIUM: CPT | Performed by: HOSPITALIST

## 2023-10-31 PROCEDURE — 1101F PR PT FALLS ASSESS DOC 0-1 FALLS W/OUT INJ PAST YR: ICD-10-PCS | Mod: CPTII,S$GLB,, | Performed by: OPTOMETRIST

## 2023-10-31 PROCEDURE — 1126F PR PAIN SEVERITY QUANTIFIED, NO PAIN PRESENT: ICD-10-PCS | Mod: CPTII,S$GLB,, | Performed by: OPTOMETRIST

## 2023-10-31 PROCEDURE — 3066F NEPHROPATHY DOC TX: CPT | Mod: CPTII,S$GLB,, | Performed by: OPTOMETRIST

## 2023-10-31 PROCEDURE — 3044F PR MOST RECENT HEMOGLOBIN A1C LEVEL <7.0%: ICD-10-PCS | Mod: CPTII,S$GLB,, | Performed by: OPTOMETRIST

## 2023-10-31 PROCEDURE — 82330 ASSAY OF CALCIUM: CPT | Performed by: HOSPITALIST

## 2023-10-31 PROCEDURE — 80053 COMPREHEN METABOLIC PANEL: CPT | Performed by: HOSPITALIST

## 2023-10-31 PROCEDURE — 3044F HG A1C LEVEL LT 7.0%: CPT | Mod: CPTII,S$GLB,, | Performed by: OPTOMETRIST

## 2023-10-31 PROCEDURE — 1101F PT FALLS ASSESS-DOCD LE1/YR: CPT | Mod: CPTII,S$GLB,, | Performed by: OPTOMETRIST

## 2023-10-31 PROCEDURE — 4010F PR ACE/ARB THEARPY RXD/TAKEN: ICD-10-PCS | Mod: CPTII,S$GLB,, | Performed by: OPTOMETRIST

## 2023-10-31 PROCEDURE — 3288F FALL RISK ASSESSMENT DOCD: CPT | Mod: CPTII,S$GLB,, | Performed by: OPTOMETRIST

## 2023-10-31 PROCEDURE — 1159F PR MEDICATION LIST DOCUMENTED IN MEDICAL RECORD: ICD-10-PCS | Mod: CPTII,S$GLB,, | Performed by: OPTOMETRIST

## 2023-10-31 PROCEDURE — 92014 COMPRE OPH EXAM EST PT 1/>: CPT | Mod: S$GLB,,, | Performed by: OPTOMETRIST

## 2023-10-31 PROCEDURE — 99999 PR PBB SHADOW E&M-EST. PATIENT-LVL III: ICD-10-PCS | Mod: PBBFAC,,, | Performed by: OPTOMETRIST

## 2023-10-31 PROCEDURE — 3060F POS MICROALBUMINURIA REV: CPT | Mod: CPTII,S$GLB,, | Performed by: OPTOMETRIST

## 2023-10-31 PROCEDURE — 3060F PR POS MICROALBUMINURIA RESULT DOCUMENTED/REVIEW: ICD-10-PCS | Mod: CPTII,S$GLB,, | Performed by: OPTOMETRIST

## 2023-10-31 PROCEDURE — 92014 PR EYE EXAM, EST PATIENT,COMPREHESV: ICD-10-PCS | Mod: S$GLB,,, | Performed by: OPTOMETRIST

## 2023-10-31 PROCEDURE — 36415 COLL VENOUS BLD VENIPUNCTURE: CPT | Mod: PO | Performed by: HOSPITALIST

## 2023-10-31 PROCEDURE — 3066F PR DOCUMENTATION OF TREATMENT FOR NEPHROPATHY: ICD-10-PCS | Mod: CPTII,S$GLB,, | Performed by: OPTOMETRIST

## 2023-10-31 PROCEDURE — 1159F MED LIST DOCD IN RCRD: CPT | Mod: CPTII,S$GLB,, | Performed by: OPTOMETRIST

## 2023-10-31 PROCEDURE — 1126F AMNT PAIN NOTED NONE PRSNT: CPT | Mod: CPTII,S$GLB,, | Performed by: OPTOMETRIST

## 2023-10-31 PROCEDURE — 83970 ASSAY OF PARATHORMONE: CPT | Performed by: HOSPITALIST

## 2023-10-31 PROCEDURE — 83036 HEMOGLOBIN GLYCOSYLATED A1C: CPT | Performed by: HOSPITALIST

## 2023-10-31 PROCEDURE — 2023F PR DILATED RETINAL EXAM W/O EVID OF RETINOPATHY: ICD-10-PCS | Mod: CPTII,S$GLB,, | Performed by: OPTOMETRIST

## 2023-10-31 PROCEDURE — 4010F ACE/ARB THERAPY RXD/TAKEN: CPT | Mod: CPTII,S$GLB,, | Performed by: OPTOMETRIST

## 2023-10-31 PROCEDURE — 99999 PR PBB SHADOW E&M-EST. PATIENT-LVL III: CPT | Mod: PBBFAC,,, | Performed by: OPTOMETRIST

## 2023-10-31 PROCEDURE — 3288F PR FALLS RISK ASSESSMENT DOCUMENTED: ICD-10-PCS | Mod: CPTII,S$GLB,, | Performed by: OPTOMETRIST

## 2023-10-31 PROCEDURE — 2023F DILAT RTA XM W/O RTNOPTHY: CPT | Mod: CPTII,S$GLB,, | Performed by: OPTOMETRIST

## 2023-10-31 PROCEDURE — 84100 ASSAY OF PHOSPHORUS: CPT | Performed by: HOSPITALIST

## 2023-10-31 NOTE — PROGRESS NOTES
HPI    Annual Diabetic Eye Exam   Pt states vision is blurry @ Near   Poor night vision     Pt denies F/F   Pt denies Dry/ Itchy/ Burning  Gtt: No     DM Dx'ed   BS: Unknown   Hemoglobin A1C       Date                     Value               Ref Range             Status                04/06/2023               5.5                 4.0 - 5.6 %           Final                Last edited by Fredo Angel, OD on 10/31/2023  1:14 PM.            Assessment /Plan     For exam results, see Encounter Report.    Diabetes mellitus type 2 without retinopathy  -No retinopathy noted today.  Continued control with primary care physician and annual comprehensive eye exam.    Pseudophakia of both eyes  -clear, centered    Glaucoma screening  -Monitor with annual eye exam and IOP check      RTC 1 yr

## 2023-11-06 ENCOUNTER — INFUSION (OUTPATIENT)
Dept: INFUSION THERAPY | Facility: HOSPITAL | Age: 73
End: 2023-11-06
Attending: HOSPITALIST
Payer: MEDICARE

## 2023-11-06 VITALS
HEART RATE: 80 BPM | DIASTOLIC BLOOD PRESSURE: 94 MMHG | SYSTOLIC BLOOD PRESSURE: 198 MMHG | RESPIRATION RATE: 18 BRPM | OXYGEN SATURATION: 96 %

## 2023-11-06 DIAGNOSIS — M81.0 OSTEOPOROSIS, POST-MENOPAUSAL: Primary | ICD-10-CM

## 2023-11-06 PROCEDURE — 63600175 PHARM REV CODE 636 W HCPCS: Mod: JZ,JG | Performed by: HOSPITALIST

## 2023-11-06 PROCEDURE — 96372 THER/PROPH/DIAG INJ SC/IM: CPT

## 2023-11-06 RX ADMIN — DENOSUMAB 60 MG: 60 INJECTION SUBCUTANEOUS at 02:11

## 2023-11-06 NOTE — PLAN OF CARE
Pt arrived to unit, ambulatory, for injection therapy Prolia. Pt alert and oriented with no complaints upon arrival. Tolerated injection with no problems. Next appointment provided and pt discharged home upon completion in [facility].

## 2023-11-10 ENCOUNTER — HOSPITAL ENCOUNTER (OUTPATIENT)
Dept: RADIOLOGY | Facility: HOSPITAL | Age: 73
Discharge: HOME | End: 2023-11-10
Attending: RADIOLOGY
Payer: MEDICARE

## 2023-11-10 ENCOUNTER — OFFICE VISIT (OUTPATIENT)
Dept: RADIATION ONCOLOGY | Facility: CLINIC | Age: 73
End: 2023-11-10
Payer: MEDICARE

## 2023-11-10 VITALS
BODY MASS INDEX: 24.17 KG/M2 | OXYGEN SATURATION: 98 % | HEIGHT: 64 IN | SYSTOLIC BLOOD PRESSURE: 146 MMHG | WEIGHT: 141.56 LBS | HEART RATE: 68 BPM | TEMPERATURE: 98 F | DIASTOLIC BLOOD PRESSURE: 62 MMHG

## 2023-11-10 DIAGNOSIS — Z85.118 PERSONAL HISTORY OF LUNG CANCER: ICD-10-CM

## 2023-11-10 DIAGNOSIS — Z85.118 PERSONAL HISTORY OF LUNG CANCER: Primary | ICD-10-CM

## 2023-11-10 PROCEDURE — 3288F FALL RISK ASSESSMENT DOCD: CPT | Mod: CPTII,S$GLB,, | Performed by: RADIOLOGY

## 2023-11-10 PROCEDURE — 3078F DIAST BP <80 MM HG: CPT | Mod: CPTII,S$GLB,, | Performed by: RADIOLOGY

## 2023-11-10 PROCEDURE — 71250 CT THORAX DX C-: CPT | Mod: 26,,, | Performed by: STUDENT IN AN ORGANIZED HEALTH CARE EDUCATION/TRAINING PROGRAM

## 2023-11-10 PROCEDURE — 3066F NEPHROPATHY DOC TX: CPT | Mod: CPTII,S$GLB,, | Performed by: RADIOLOGY

## 2023-11-10 PROCEDURE — 3044F HG A1C LEVEL LT 7.0%: CPT | Mod: CPTII,S$GLB,, | Performed by: RADIOLOGY

## 2023-11-10 PROCEDURE — 99213 PR OFFICE/OUTPT VISIT, EST, LEVL III, 20-29 MIN: ICD-10-PCS | Mod: S$GLB,,, | Performed by: RADIOLOGY

## 2023-11-10 PROCEDURE — 3077F PR MOST RECENT SYSTOLIC BLOOD PRESSURE >= 140 MM HG: ICD-10-PCS | Mod: CPTII,S$GLB,, | Performed by: RADIOLOGY

## 2023-11-10 PROCEDURE — 71250 CT THORAX DX C-: CPT | Mod: TC

## 2023-11-10 PROCEDURE — 3060F POS MICROALBUMINURIA REV: CPT | Mod: CPTII,S$GLB,, | Performed by: RADIOLOGY

## 2023-11-10 PROCEDURE — 3288F PR FALLS RISK ASSESSMENT DOCUMENTED: ICD-10-PCS | Mod: CPTII,S$GLB,, | Performed by: RADIOLOGY

## 2023-11-10 PROCEDURE — 1159F PR MEDICATION LIST DOCUMENTED IN MEDICAL RECORD: ICD-10-PCS | Mod: CPTII,S$GLB,, | Performed by: RADIOLOGY

## 2023-11-10 PROCEDURE — 3078F PR MOST RECENT DIASTOLIC BLOOD PRESSURE < 80 MM HG: ICD-10-PCS | Mod: CPTII,S$GLB,, | Performed by: RADIOLOGY

## 2023-11-10 PROCEDURE — 4010F ACE/ARB THERAPY RXD/TAKEN: CPT | Mod: CPTII,S$GLB,, | Performed by: RADIOLOGY

## 2023-11-10 PROCEDURE — 3044F PR MOST RECENT HEMOGLOBIN A1C LEVEL <7.0%: ICD-10-PCS | Mod: CPTII,S$GLB,, | Performed by: RADIOLOGY

## 2023-11-10 PROCEDURE — 99213 OFFICE O/P EST LOW 20 MIN: CPT | Mod: S$GLB,,, | Performed by: RADIOLOGY

## 2023-11-10 PROCEDURE — 1101F PR PT FALLS ASSESS DOC 0-1 FALLS W/OUT INJ PAST YR: ICD-10-PCS | Mod: CPTII,S$GLB,, | Performed by: RADIOLOGY

## 2023-11-10 PROCEDURE — 1159F MED LIST DOCD IN RCRD: CPT | Mod: CPTII,S$GLB,, | Performed by: RADIOLOGY

## 2023-11-10 PROCEDURE — 71250 CT CHEST WITHOUT CONTRAST: ICD-10-PCS | Mod: 26,,, | Performed by: STUDENT IN AN ORGANIZED HEALTH CARE EDUCATION/TRAINING PROGRAM

## 2023-11-10 PROCEDURE — 99999 PR PBB SHADOW E&M-EST. PATIENT-LVL IV: CPT | Mod: PBBFAC,,, | Performed by: RADIOLOGY

## 2023-11-10 PROCEDURE — 4010F PR ACE/ARB THEARPY RXD/TAKEN: ICD-10-PCS | Mod: CPTII,S$GLB,, | Performed by: RADIOLOGY

## 2023-11-10 PROCEDURE — 1101F PT FALLS ASSESS-DOCD LE1/YR: CPT | Mod: CPTII,S$GLB,, | Performed by: RADIOLOGY

## 2023-11-10 PROCEDURE — 3060F PR POS MICROALBUMINURIA RESULT DOCUMENTED/REVIEW: ICD-10-PCS | Mod: CPTII,S$GLB,, | Performed by: RADIOLOGY

## 2023-11-10 PROCEDURE — 3077F SYST BP >= 140 MM HG: CPT | Mod: CPTII,S$GLB,, | Performed by: RADIOLOGY

## 2023-11-10 PROCEDURE — 3066F PR DOCUMENTATION OF TREATMENT FOR NEPHROPATHY: ICD-10-PCS | Mod: CPTII,S$GLB,, | Performed by: RADIOLOGY

## 2023-11-10 PROCEDURE — 99999 PR PBB SHADOW E&M-EST. PATIENT-LVL IV: ICD-10-PCS | Mod: PBBFAC,,, | Performed by: RADIOLOGY

## 2023-11-10 NOTE — PROGRESS NOTES
11/10/2023    Radiation Oncology Follow-Up Visit      Prior Radiation History:    Site  Technique  Energy  Dose/Fx (Gy)  #Fx  Total Dose (Gy)  Start Date  End Date  Elapsed Days    RLL Lung  SBRT  6X  12.5  4 / 4  50  1/26/2023  2/3/2023  8        Is the patient female between ages 15-55:  No    Does the patient have a CIED:  No      Assessment   This is a 73 y.o. female with Stage IA2 (cT1b cN0 M0) RLL NSCLC (squam) diagnosed in robotic bronch w/ EBUS 10/25/22. She has a known 2.3 cm LLL nodule since 2018 that was biopsied in 12/2018 and 10/2022 with results negative for malignancy. RLL primary was planned for surgical resection but aborted due to dense adhesions and poor pulmonary tolerance during procedure. This was treated with definitive SBRT 50 Gy in 4 fx completed 2/3/23.      No late toxicity from treatment. CT Chest today 11/10/23 demonstrates somewhat improved RLL post-RT changes; other nodules in bilateral lungs stable by my review. Currently KAYLA.             Plan   1) I will see her back in 6 months with CT Chest prior for re-staging.            CHIEF COMPLAINT: F/U after lung SBRT    HPI/Focused ROS: Doing well since I last saw her. Denies dyspnea, chest wall pain, or cough.         Past Medical History:   Diagnosis Date    Anticoagulant long-term use     Cancer     Kidney (Right)    Cataracts, bilateral     CKD (chronic kidney disease) stage 3, GFR 30-59 ml/min 12/15/2014    Colon polyps     Combined hyperlipidemia associated with type 2 diabetes mellitus 6/7/2013    COPD (chronic obstructive pulmonary disease) 12/15/2014    Diabetes mellitus type II     NIDDM, a.m. glucose-120s-130s-last A1c-7.1-6/7/13    Diabetes mellitus with renal manifestations, uncontrolled 2/1/2017    Diabetic retinopathy     Family history of stomach cancer 12/5/2013    Former smoker     H/O renal cell carcinoma 12/15/2015    History of colonic polyps 2/1/2017    3 polyps 7/13 ---5 yrs    History of gastroesophageal reflux  (GERD)     History of urinary incontinence     s/p bladder lift-october, 2011 (at [facility])    Hyperlipidemia     Hypertension     120s/70s-80s    Nephrolithiasis     Osteoarthritis of thumb 12/20/2019    Osteoporosis, post-menopausal 3/17/2014    Primary hyperparathyroidism 10/20/2016    Schatzki's ring 2/1/2017    Dilated 2014       Past Surgical History:   Procedure Laterality Date    bladder lift  2011    done at [facility]    BLADDER STONE REMOVAL  2011    before bladder lift    CATARACT EXTRACTION W/  INTRAOCULAR LENS IMPLANT Left 06/07/2016    Dr. Vásquez    CATARACT EXTRACTION W/  INTRAOCULAR LENS IMPLANT Right 06/21/2016    Dr. Vásquez    COLONOSCOPY N/A 4/26/2018    Procedure: COLONOSCOPY;  Surgeon: Benjamin Zamora MD;  Location: [facility] ENDO;  Service: Endoscopy;  Laterality: N/A;  confirmed ss    CYSTOSCOPY      INJECTION OF ANESTHETIC AGENT AROUND MULTIPLE INTERCOSTAL NERVES  12/27/2022    Procedure: BLOCK, NERVE, INTERCOSTAL, 2 OR MORE;  Surgeon: Paxton Cannon MD;  Location: [facility] OR 2ND FLR;  Service: Thoracic;;    LYSIS OF ADHESIONS  12/27/2022    Procedure: LYSIS, ADHESIONS;  Surgeon: Paxton Cannon MD;  Location: [facility] OR 2ND FLR;  Service: Thoracic;;    NAVIGATIONAL BRONCHOSCOPY N/A 12/11/2018    Procedure: BRONCHOSCOPY, NAVIGATIONAL;  Surgeon: Ashtyn Ramires MD;  Location: [facility] OR 2ND FLR;  Service: Pulmonary;  Laterality: N/A;    NEPHRECTOMY      partial right    ROBOTIC BRONCHOSCOPY N/A 10/25/2022    Procedure: ROBOTIC BRONCHOSCOPY;  Surgeon: Ashtyn Ramires MD;  Location: [facility] 2ND FLR;  Service: Pulmonary;  Laterality: N/A;    VIDEO-ASSISTED THORACOSCOPIC SURGERY (VATS)  12/27/2022    Procedure: VATS (VIDEO-ASSISTED THORACOSCOPIC SURGERY);  Surgeon: Paxton Cannon MD;  Location: [facility] OR 2ND FLR;  Service: Thoracic;;       Social History     Tobacco Use    Smoking status: Every Day     Current packs/day: 0.25     Average packs/day: 0.3 packs/day for 30.0 years (7.5 ttl pk-yrs)     " Types: Cigarettes    Smokeless tobacco: Never    Tobacco comments:     pt. reports quitting January 2018   Substance Use Topics    Alcohol use: No     Alcohol/week: 0.0 standard drinks of alcohol    Drug use: No       Cancer-related family history is negative for Kidney cancer and Cancer.    Current Outpatient Medications on File Prior to Visit   Medication Sig Dispense Refill    amLODIPine (NORVASC) 10 MG tablet Take 1 tablet (10 mg total) by mouth once daily. 90 tablet 90    aspirin (ECOTRIN) 81 MG EC tablet Take 81 mg by mouth once daily.      cyanocobalamin, vitamin B-12, 1,000 mcg TbSR Take by mouth once daily.       DOCOSAHEXANOIC ACID/EPA (FISH OIL ORAL) Take 1,200 mg by mouth once daily.      famotidine (PEPCID) 40 MG tablet TAKE ONE TABLET BY MOUTH ONCE A DAY 90 tablet 0    FLUAD 1614-7084, 65 YR UP,,PF, 45 mcg (15 mcg x 3)/0.5 mL Syrg inject .5 milliliter intramuscularly as directed  0    FLUZONE HIGHDOSE QUAD 20-21  mcg/0.7 mL Syrg inj .7 milliliter intramuscularly as directed      gabapentin (NEURONTIN) 300 MG capsule Take 1 capsule (300 mg total) by mouth every evening. for 5 days 5 capsule 0    irbesartan-hydrochlorothiazide (AVALIDE) 150-12.5 mg per tablet TAKE ONE TABLET BY MOUTH ONCE A DAY 90 tablet 12    omeprazole (PRILOSEC) 40 MG capsule Take 1 capsule (40 mg total) by mouth once daily. 90 capsule 4    oxybutynin (DITROPAN-XL) 5 MG TR24 TAKE ONE TABLET BY MOUTH ONCE A DAY 90 tablet 3    pen needle, diabetic (BD INSULIN PEN NEEDLE UF SHORT) 31 gauge x 5/16" Ndle USE AS DIRECTED 100 each 12    rosuvastatin (CRESTOR) 20 MG tablet TAKE ONE TABLET BY MOUTH ONCE A DAY 90 tablet 12    sars-cov-2, covid-19, (MODERNA COVID-19) 100 mcg/0.5 ml injection       semaglutide (OZEMPIC) 1 mg/dose (4 mg/3 mL) INJECT ONE MG UNDER THE SKIN once every week 3 mL 6    traMADoL (ULTRAM) 50 mg tablet TAKE ONE TABLET BY MOUTH EVERY 6 HOURS AS NEEDED FOR PAIN 120 tablet 5    vitamin D (VITAMIN D3) 1000 units Tab " "Take 1,000 Units by mouth once daily.       No current facility-administered medications on file prior to visit.       Review of patient's allergies indicates:   Allergen Reactions    Metformin Diarrhea    Pantoprazole      elevated blood sugars         Vital Signs: BP (!) 146/62 (BP Location: Right arm, Patient Position: Sitting, BP Method: Large (Automatic))   Pulse 68   Temp 98.2 °F (36.8 °C)   Ht 5' 4" (1.626 m)   Wt 64.2 kg (141 lb 8.6 oz)   SpO2 98%   BMI 24.29 kg/m²     ECOG Performance Status: 1 - Ambulates, capable of light work    Physical Exam  Vitals reviewed.   Constitutional:       Appearance: Normal appearance.   HENT:      Head: Normocephalic and atraumatic.   Eyes:      General: No scleral icterus.     Extraocular Movements: Extraocular movements intact.   Pulmonary:      Effort: No accessory muscle usage or respiratory distress.   Abdominal:      General: There is no distension.   Musculoskeletal:         General: Normal range of motion.      Cervical back: Normal range of motion and neck supple.   Lymphadenopathy:      Cervical: No cervical adenopathy.   Skin:     General: Skin is dry.      Coloration: Skin is not jaundiced.   Neurological:      Mental Status: She is alert.      Cranial Nerves: No cranial nerve deficit.   Psychiatric:         Mood and Affect: Mood and affect normal.         Judgment: Judgment normal.          Labs:    Imaging: I have personally reviewed the patient's available images and reports and summarized pertinent findings above in HPI.     Pathology: No new path      "

## 2023-11-17 ENCOUNTER — OFFICE VISIT (OUTPATIENT)
Dept: ENDOCRINOLOGY | Facility: CLINIC | Age: 73
End: 2023-11-17
Payer: MEDICARE

## 2023-11-17 VITALS
TEMPERATURE: 98 F | HEART RATE: 80 BPM | DIASTOLIC BLOOD PRESSURE: 81 MMHG | WEIGHT: 139.19 LBS | BODY MASS INDEX: 23.89 KG/M2 | SYSTOLIC BLOOD PRESSURE: 159 MMHG

## 2023-11-17 DIAGNOSIS — E78.2 COMBINED HYPERLIPIDEMIA ASSOCIATED WITH TYPE 2 DIABETES MELLITUS: ICD-10-CM

## 2023-11-17 DIAGNOSIS — N18.31 STAGE 3A CHRONIC KIDNEY DISEASE: ICD-10-CM

## 2023-11-17 DIAGNOSIS — E11.69 COMBINED HYPERLIPIDEMIA ASSOCIATED WITH TYPE 2 DIABETES MELLITUS: ICD-10-CM

## 2023-11-17 DIAGNOSIS — N18.31 TYPE 2 DIABETES MELLITUS WITH STAGE 3A CHRONIC KIDNEY DISEASE, WITHOUT LONG-TERM CURRENT USE OF INSULIN: Primary | ICD-10-CM

## 2023-11-17 DIAGNOSIS — E11.59 CONTROLLED TYPE 2 DIABETES MELLITUS WITH OTHER CIRCULATORY COMPLICATION, WITHOUT LONG-TERM CURRENT USE OF INSULIN: ICD-10-CM

## 2023-11-17 DIAGNOSIS — E21.3 HYPERPARATHYROIDISM: ICD-10-CM

## 2023-11-17 DIAGNOSIS — E11.22 TYPE 2 DIABETES MELLITUS WITH STAGE 3A CHRONIC KIDNEY DISEASE, WITHOUT LONG-TERM CURRENT USE OF INSULIN: Primary | ICD-10-CM

## 2023-11-17 DIAGNOSIS — M81.0 OSTEOPOROSIS, POST-MENOPAUSAL: ICD-10-CM

## 2023-11-17 PROCEDURE — 99214 OFFICE O/P EST MOD 30 MIN: CPT | Mod: S$GLB,,, | Performed by: HOSPITALIST

## 2023-11-17 PROCEDURE — 3060F POS MICROALBUMINURIA REV: CPT | Mod: CPTII,S$GLB,, | Performed by: HOSPITALIST

## 2023-11-17 PROCEDURE — 3079F DIAST BP 80-89 MM HG: CPT | Mod: CPTII,S$GLB,, | Performed by: HOSPITALIST

## 2023-11-17 PROCEDURE — 3077F SYST BP >= 140 MM HG: CPT | Mod: CPTII,S$GLB,, | Performed by: HOSPITALIST

## 2023-11-17 PROCEDURE — 99999 PR PBB SHADOW E&M-EST. PATIENT-LVL IV: ICD-10-PCS | Mod: PBBFAC,,, | Performed by: HOSPITALIST

## 2023-11-17 PROCEDURE — 1101F PR PT FALLS ASSESS DOC 0-1 FALLS W/OUT INJ PAST YR: ICD-10-PCS | Mod: CPTII,S$GLB,, | Performed by: HOSPITALIST

## 2023-11-17 PROCEDURE — 1159F PR MEDICATION LIST DOCUMENTED IN MEDICAL RECORD: ICD-10-PCS | Mod: CPTII,S$GLB,, | Performed by: HOSPITALIST

## 2023-11-17 PROCEDURE — 1101F PT FALLS ASSESS-DOCD LE1/YR: CPT | Mod: CPTII,S$GLB,, | Performed by: HOSPITALIST

## 2023-11-17 PROCEDURE — 3288F PR FALLS RISK ASSESSMENT DOCUMENTED: ICD-10-PCS | Mod: CPTII,S$GLB,, | Performed by: HOSPITALIST

## 2023-11-17 PROCEDURE — 3044F HG A1C LEVEL LT 7.0%: CPT | Mod: CPTII,S$GLB,, | Performed by: HOSPITALIST

## 2023-11-17 PROCEDURE — 3060F PR POS MICROALBUMINURIA RESULT DOCUMENTED/REVIEW: ICD-10-PCS | Mod: CPTII,S$GLB,, | Performed by: HOSPITALIST

## 2023-11-17 PROCEDURE — 4010F PR ACE/ARB THEARPY RXD/TAKEN: ICD-10-PCS | Mod: CPTII,S$GLB,, | Performed by: HOSPITALIST

## 2023-11-17 PROCEDURE — 3066F NEPHROPATHY DOC TX: CPT | Mod: CPTII,S$GLB,, | Performed by: HOSPITALIST

## 2023-11-17 PROCEDURE — 99999 PR PBB SHADOW E&M-EST. PATIENT-LVL IV: CPT | Mod: PBBFAC,,, | Performed by: HOSPITALIST

## 2023-11-17 PROCEDURE — 4010F ACE/ARB THERAPY RXD/TAKEN: CPT | Mod: CPTII,S$GLB,, | Performed by: HOSPITALIST

## 2023-11-17 PROCEDURE — 1159F MED LIST DOCD IN RCRD: CPT | Mod: CPTII,S$GLB,, | Performed by: HOSPITALIST

## 2023-11-17 PROCEDURE — 3079F PR MOST RECENT DIASTOLIC BLOOD PRESSURE 80-89 MM HG: ICD-10-PCS | Mod: CPTII,S$GLB,, | Performed by: HOSPITALIST

## 2023-11-17 PROCEDURE — 3066F PR DOCUMENTATION OF TREATMENT FOR NEPHROPATHY: ICD-10-PCS | Mod: CPTII,S$GLB,, | Performed by: HOSPITALIST

## 2023-11-17 PROCEDURE — 99214 PR OFFICE/OUTPT VISIT, EST, LEVL IV, 30-39 MIN: ICD-10-PCS | Mod: S$GLB,,, | Performed by: HOSPITALIST

## 2023-11-17 PROCEDURE — 3044F PR MOST RECENT HEMOGLOBIN A1C LEVEL <7.0%: ICD-10-PCS | Mod: CPTII,S$GLB,, | Performed by: HOSPITALIST

## 2023-11-17 PROCEDURE — 3077F PR MOST RECENT SYSTOLIC BLOOD PRESSURE >= 140 MM HG: ICD-10-PCS | Mod: CPTII,S$GLB,, | Performed by: HOSPITALIST

## 2023-11-17 PROCEDURE — 3288F FALL RISK ASSESSMENT DOCD: CPT | Mod: CPTII,S$GLB,, | Performed by: HOSPITALIST

## 2023-11-17 RX ORDER — BLOOD-GLUCOSE SENSOR
EACH MISCELLANEOUS
Qty: 2 EACH | Refills: 6 | Status: SHIPPED | OUTPATIENT
Start: 2023-11-17

## 2023-11-17 NOTE — ASSESSMENT & PLAN NOTE
- Diabetes is at goal, given current A1c, goal A1C for patient is 7%  - Diabetic supplies/medications: refilled this visit  - Continue reinforcement dietary modification, portion size control, decreasing carbohydrates intake  - continue Ozempic 1 mg Q weekly injection  - patient requesting Freestyle Jan, Freestyle Jan 3 CGM sent to pharmacy  - discussed with patient to use glipizide sparingly due to risk of hypoglycemia  - A1C in with next set of lab work  - Pt will see optometry and podiatry soon

## 2023-11-17 NOTE — PROGRESS NOTES
Subjective:      Patient ID: Sarina Genao is a 73 y.o. female presented to [facility] on 11/17/2023.  Chief Complaint:  Diabetes    History of Present Illness: Sarina Genao is a 73 y.o. female here for management of osteoporosis  Other significant past medical history:  Hypertension, hyperlipidemia    Interval hx: Here for follow up, s/p Prolia tolerated most recent injection 11/6/2023  Recent falls? No, Recent fractures? No  DM is good controlled,  On Ozempic.  Reports glucose to be ranging from , A1c 6.1 point  Hypercalcemia resolved, due to too much vitamin-D and calcium suspected (Oscal + MVN).  Resolution Decreasing supplement.  Also with hydrochlorothiazide use leading to hypercalcemia  She does eat dairy and take MVN  3x a week: twice a day, range about 130 denies hypoglycemia    1) Osteoporosis  - Diagnosis on DXA in 2018  - On prolia, started 9/17/2018 to 11/2020>> not in our system, unable to get new Prolia from her endocrinologist due to cost  - Prolia restarted on Prolia restarted on 5/14/21> Last Prolia injection 11/14/2022, 05/2023, last injection 11/6/2023  - Bone density is improving 2/2018> compare to 6/5/2020>> 6/5/2022  - Vit D: 1000iu daily, on MVN    - Current diarrhea or h/o malabsorption? no  - Height loss (>2 inches)? no  - Family hx of Osteoporosis: mother, possibly sister  - History of Cancer? Kindey cancer, nephrectomy, no issue  - Malignancy involving bone (active or history)? no  - Prior radiation treatment? no  - Post-menopausal? Age?: 50s  - No hysterectomy  - Tobacco use ?  Yes, just restarted due to covid stress  - EtOH use? no (<2 drinks a day in female, <3 drinks a day for male)  - Weight bearing exercise?   Yes, walking 30 mins, 3x  - Fall Precautions? yes, get out the chair without using their arms  - Dental work planned? No, dentures    Bone density:  Review  6/9/2022  Lumbar spine (L1-L4): BMD is 0.876 g/cm2, T-score is -1.6, and Z-score  is 0.1.  Total hip: BMD is 0.661 g/cm2, T-score is -2.3, and Z-score is -1.1.  Femoral neck: BMD is 0.635 g/cm2, T-score is -1.9, and Z-score is -0.7.  Distal 1/3 radius: BMD is 0.552 g/cm2, T-score is -2.4, and Z-score is 0.1.     FRAX:  6% risk of a major osteoporotic fracture in the next 10 years.  2.1% risk of hip fracture in the next 10 years.    Impression:  *Osteoporosis on treatment with denosumab.  Bone density in osteopenic range approaching osteoporosis at the distal 1/3 forearm with FRAX not suggesting treatment.  *Compared with previous DXA, BMD at the distal forearm has decreased by -3.8%,  BMD at the lumbar spine has remained stable, and the BMD at the total hip has increased by 7.9%.     Lab work reviewed  Lab Results   Component Value Date    PTH 53.9 10/31/2023    .5 (H) 06/20/2023    PTH 35.6 04/06/2023    PTH 35.6 04/06/2023    PTH 35.6 04/06/2023    [id number] 48 04/06/2023    [id number] 48 04/06/2023    [id number] 63 05/03/2022    CALCIUM 10.2 10/31/2023    CALCIUM 9.8 06/20/2023    CALCIUM 11.2 (H) 04/06/2023    CALCIUM 11.2 (H) 04/06/2023    CALCIUM 11.2 (H) 04/06/2023    CALCIUM 11.2 (H) 04/06/2023    PHOS 3.1 10/31/2023    PHOS 3.4 04/06/2023    PHOS 3.4 04/06/2023    ALKPHOS 106 10/31/2023    ALKPHOS 97 04/06/2023    ALKPHOS 97 04/06/2023    ALKPHOS 97 04/06/2023    TSH 1.324 04/06/2023    LABCALC 6.9 06/24/2016    [id number] 3 (L) 06/24/2016       2) Diabetes Mellitus Type 2  - Known diabetic complications: nephropathy  - Cardiovascular risk factors: advanced age (older than 55 for men, 65 for women), diabetes mellitus, dyslipidemia, sedentary lifestyle and smoking/ tobacco exposure  - Diagnosed w/ DM: in 2007  - Need to see Dr De La Torre for toe nail trim soon  - current weight: 139, previous weight 138<145    Currently:    Ozempic 1mg Qweekly, cost is not an issue    Previous meds:              Was on MDI insulin   Glipizide   Home glucose checks: checks 1x a day, Logs  "reviewed/Unavailable: oral recall: 112  Weight trend: stable  Diabetes Education:  no  Diabetes Related Hospitalization: no  Hx of pancreatitis, hx of thyroid cancer: no  Family history of diabetes: yes       Eye exam current (within one year): yes, DR: no  Reports cuts or ulcers on feet: no, has podiatrist  Statin: Taking, ACE/ARB: Taking    Diabetes lab work  Lab Results   Component Value Date    HGBA1C 6.1 (H) 10/31/2023    HGBA1C 5.5 04/06/2023    HGBA1C 5.6 11/04/2022    HGBA1C 5.6 05/03/2022     No results found for: "CPEPTIDE", "GLUTAMICACID", "ISLETCELLANT"   No results found for: "FRUCTOSAMINE"  Lab Results   Component Value Date    MICALBCREAT 66.8 (H) 04/06/2023    MICALBCREAT 66.8 (H) 04/06/2023     No results found for: "DDLDOOLF17"    Diabetes Management Status: Reviewed this office visit  Screening or Prevention Patient's value Goal Complete/Controlled?   Lipid profile : 02/13/2021 Annually No     Dilated retinal exam : 10/31/2023 Annually No     Foot exam   : 03/01/2023 Annually No       Reviewed past surgical, medical, family, social history and updated as appropriate.  Review of Systems: see HPI above    Objective:   BP (!) 159/81   Pulse 80   Temp 98.2 °F (36.8 °C) (Oral)   Wt 63.1 kg (139 lb 3.2 oz)   BMI 23.89 kg/m²     Body mass index is 23.89 kg/m².  Vital signs reviewed    Physical Exam  Vitals and nursing note reviewed.   Constitutional:       General: She is not in acute distress.     Appearance: Normal appearance. She is well-developed.   Neck:      Thyroid: No thyromegaly.   Cardiovascular:      Rate and Rhythm: Normal rate.      Heart sounds: Normal heart sounds.   Pulmonary:      Effort: Pulmonary effort is normal. No respiratory distress.   Abdominal:      Tenderness: There is no abdominal tenderness.   Musculoskeletal:         General: Normal range of motion.      Cervical back: Neck supple.   Skin:     General: Skin is warm.      Findings: No erythema.   Neurological:      Mental " Status: She is alert and oriented to person, place, and time.       Lab Reviewed:  See results in subjective  Lab Results   Component Value Date    HGBA1C 6.1 (H) 10/31/2023     Lab Results   Component Value Date    CHOL 139 02/13/2021    HDL 41 02/13/2021    LDLCALC 66.2 02/13/2021    TRIG 159 (H) 02/13/2021    CHOLHDL 29.5 02/13/2021     Lab Results   Component Value Date     10/31/2023    K 3.9 10/31/2023     10/31/2023    CO2 27 10/31/2023     (H) 10/31/2023    BUN 16 10/31/2023    CREATININE 1.1 10/31/2023    CALCIUM 10.2 10/31/2023    PHOS 3.1 10/31/2023    PROT 7.3 10/31/2023    ALBUMIN 3.6 10/31/2023    BILITOT 0.6 10/31/2023    ALKPHOS 106 10/31/2023    AST 10 10/31/2023    ALT 8 (L) 10/31/2023    ANIONGAP 12 10/31/2023    ESTGFRAFRICA 51.8 (A) 05/03/2022    EGFRNONAA 44.9 (A) 05/03/2022    TSH 1.324 04/06/2023    PTH 53.9 10/31/2023    [id number] 48 04/06/2023    [id number] 48 04/06/2023     Assessment     1. Type 2 diabetes mellitus with stage 3a chronic kidney disease, without long-term current use of insulin  FREESTYLE KEENA 3 SENSOR Lucinda    HEMOGLOBIN A1C    RENAL FUNCTION PANEL      2. Controlled type 2 diabetes mellitus with other circulatory complication, without long-term current use of insulin        3. Stage 3a chronic kidney disease        4. Hyperparathyroidism  DXA Bone Density Appendicular Skeleton      5. Osteoporosis, post-menopausal  DXA Bone Density Axial Skeleton 1 or more sites      6. Combined hyperlipidemia associated with type 2 diabetes mellitus          Plan     Osteoporosis, post-menopausal  - Management of long-term osteoporosis, On chronic Prolia every 6 months therapy>> next dose 10/2023  - DXA personal reviewed by me, improvement in bone density-DXA:  6/5/2022>> improvement noted  - Continue vitamin-D and calcium supplement  - Fall precautions/Exercise: advised   - Prolia order renewed, scheduled every 6 months>> continue 5/2024  - Will follow-up on bone  density 06/2024, pending results can discuss holiday  - patient to continue calcium and vitamin-D supplements, but avoid hypercalcemia    Risk of osteonecrosis of the jaw (DISCUSSED) with bisphosphonates and denosumab, with incidence estimated from 1-10/273476 treated patients. The incidence of ONJ is higher in patients undergoing invasive dental surgery (excludes routine cleaning, fillings or root canals).   Dental health: Advised dental work should ideally be completed prior to initiation of treatment, follow up with dentist/oral surgeon advised.     CKD (chronic kidney disease) stage 3, GFR 30-59 ml/min  - Renal function reviewed on lab work today, stable   - continue routine monitoring    Combined hyperlipidemia associated with type 2 diabetes mellitus  - ASCVD Risk below: Statin: Taking  The 10-year ASCVD risk score (Razia MAGUIRE, et al., 2019) is: 50.2%    Values used to calculate the score:      Age: 73 years      Sex: Female      Is Non- : Yes      Diabetic: Yes      Tobacco smoker: Yes      Systolic Blood Pressure: 159 mmHg      Is BP treated: Yes      HDL Cholesterol: 41 mg/dL      Total Cholesterol: 139 mg/dL    Hyperparathyroidism  - patient with history of hyperparathyroidism  - patient also has history of recent hypercalcemia, suspected due to hydrochlorothiazide use combined with too much calcium intake.  - Calcium level- much better now  - has seen Nephrology  - advised patient to continue monitoring calcium intake, she does not want to get off hydrochlorothiazide    Type 2 diabetes mellitus with chronic kidney disease, without long-term current use of insulin  - Diabetes is at goal, given current A1c, goal A1C for patient is 7%  - Diabetic supplies/medications: refilled this visit  - Continue reinforcement dietary modification, portion size control, decreasing carbohydrates intake  - continue Ozempic 1 mg Q weekly injection  - patient requesting Freestyle Jan, Freestyle Jan  3 CGM sent to pharmacy  - discussed with patient to use glipizide sparingly due to risk of hypoglycemia  - A1C in with next set of lab work  - Pt will see optometry and podiatry soon    Advised patient to follow up with PCP for routine health maintenance care.   RTC in 5/2024    Naveed Patel M.D  Endocrinology  [facility]  11/17/2023      Disclaimer: This note has been generated using voice-recognition software. There may be typographical errors that have been missed during proof-reading.

## 2023-11-17 NOTE — ASSESSMENT & PLAN NOTE
- Management of long-term osteoporosis, On chronic Prolia every 6 months therapy>> next dose 10/2023  - DXA personal reviewed by me, improvement in bone density-DXA:  6/5/2022>> improvement noted  - Continue vitamin-D and calcium supplement  - Fall precautions/Exercise: advised   - Prolia order renewed, scheduled every 6 months>> continue 5/2024  - Will follow-up on bone density 06/2024, pending results can discuss holiday  - patient to continue calcium and vitamin-D supplements, but avoid hypercalcemia    Risk of osteonecrosis of the jaw (DISCUSSED) with bisphosphonates and denosumab, with incidence estimated from 1-10/236818 treated patients. The incidence of ONJ is higher in patients undergoing invasive dental surgery (excludes routine cleaning, fillings or root canals).   Dental health: Advised dental work should ideally be completed prior to initiation of treatment, follow up with dentist/oral surgeon advised.

## 2023-11-17 NOTE — ASSESSMENT & PLAN NOTE
- ASCVD Risk below: Statin: Taking  The 10-year ASCVD risk score (Razia MAGUIRE, et al., 2019) is: 50.2%    Values used to calculate the score:      Age: 73 years      Sex: Female      Is Non- : Yes      Diabetic: Yes      Tobacco smoker: Yes      Systolic Blood Pressure: 159 mmHg      Is BP treated: Yes      HDL Cholesterol: 41 mg/dL      Total Cholesterol: 139 mg/dL

## 2023-11-17 NOTE — ASSESSMENT & PLAN NOTE
- patient with history of hyperparathyroidism  - patient also has history of recent hypercalcemia, suspected due to hydrochlorothiazide use combined with too much calcium intake.  - Calcium level- much better now  - has seen Nephrology  - advised patient to continue monitoring calcium intake, she does not want to get off hydrochlorothiazide

## 2023-12-12 NOTE — TELEPHONE ENCOUNTER
Care Due:                  Date            Visit Type   Department     Provider  --------------------------------------------------------------------------------                                [facility] FAMILY                              PRIMARY      MED/ INTERNAL  Venancio Barnett  Last Visit: 06-      CARE ([facility])   MED/ PEDS      Ehrensing  Next Visit: None Scheduled  None         None Found                                                            Last  Test          Frequency    Reason                     Performed    Due Date  --------------------------------------------------------------------------------    Lipid Panel.  12 months..  rosuvastatin.............  Not Found    Overdue    [employer] Embedded Care Due Messages. Reference number: [id number].   12/12/2023 2:05:09 PM CST

## 2023-12-12 NOTE — TELEPHONE ENCOUNTER
Refill Routing Note   Medication(s) are not appropriate for processing by [facility] for the following reason(s):        Required vitals abnormal: 159/81     [facility] action(s):  Defer     Requires labs : Yes (lipid panel outdated)            Appointments  past 12m or future 3m with PCP    Date Provider   Last Visit   6/27/2023 Venancio Mayer MD   Next Visit   Visit date not found Venancio Mayer MD   ED visits in past 90 days: 0        Note composed:5:45 PM 12/12/2023

## 2023-12-13 RX ORDER — AMLODIPINE BESYLATE 10 MG/1
10 TABLET ORAL
Qty: 90 TABLET | Refills: 1 | Status: SHIPPED | OUTPATIENT
Start: 2023-12-13

## 2023-12-19 ENCOUNTER — PATIENT OUTREACH (OUTPATIENT)
Dept: ADMINISTRATIVE | Facility: HOSPITAL | Age: 73
End: 2023-12-19
Payer: MEDICARE

## 2023-12-20 ENCOUNTER — ANESTHESIA EVENT (OUTPATIENT)
Dept: ENDOSCOPY | Facility: HOSPITAL | Age: 73
End: 2023-12-20
Payer: MEDICARE

## 2023-12-20 RX ORDER — LIDOCAINE HYDROCHLORIDE 10 MG/ML
1 INJECTION, SOLUTION EPIDURAL; INFILTRATION; INTRACAUDAL; PERINEURAL ONCE
Status: CANCELLED | OUTPATIENT
Start: 2023-12-20 | End: 2023-12-20

## 2023-12-21 ENCOUNTER — ANESTHESIA (OUTPATIENT)
Dept: ENDOSCOPY | Facility: HOSPITAL | Age: 73
End: 2023-12-21
Payer: MEDICARE

## 2023-12-21 ENCOUNTER — HOSPITAL ENCOUNTER (OUTPATIENT)
Facility: HOSPITAL | Age: 73
Discharge: HOME | End: 2023-12-21
Attending: INTERNAL MEDICINE | Admitting: INTERNAL MEDICINE
Payer: MEDICARE

## 2023-12-21 VITALS
RESPIRATION RATE: 18 BRPM | SYSTOLIC BLOOD PRESSURE: 193 MMHG | TEMPERATURE: 98 F | HEART RATE: 91 BPM | DIASTOLIC BLOOD PRESSURE: 81 MMHG | OXYGEN SATURATION: 96 %

## 2023-12-21 DIAGNOSIS — Z86.010 HISTORY OF COLON POLYPS: ICD-10-CM

## 2023-12-21 LAB — POCT GLUCOSE: 246 MG/DL (ref 70–110)

## 2023-12-21 PROCEDURE — D9220A PRA ANESTHESIA: ICD-10-PCS | Mod: CRNA,,, | Performed by: NURSE ANESTHETIST, CERTIFIED REGISTERED

## 2023-12-21 PROCEDURE — G0105 COLORECTAL SCRN; HI RISK IND: HCPCS | Performed by: INTERNAL MEDICINE

## 2023-12-21 PROCEDURE — 25000003 PHARM REV CODE 250: Performed by: ANESTHESIOLOGY

## 2023-12-21 PROCEDURE — G0105 COLORECTAL SCRN; HI RISK IND: ICD-10-PCS | Mod: ,,, | Performed by: INTERNAL MEDICINE

## 2023-12-21 PROCEDURE — D9220A PRA ANESTHESIA: Mod: ANES,,, | Performed by: STUDENT IN AN ORGANIZED HEALTH CARE EDUCATION/TRAINING PROGRAM

## 2023-12-21 PROCEDURE — G0105 COLORECTAL SCRN; HI RISK IND: HCPCS | Mod: ,,, | Performed by: INTERNAL MEDICINE

## 2023-12-21 PROCEDURE — D9220A PRA ANESTHESIA: ICD-10-PCS | Mod: ANES,,, | Performed by: STUDENT IN AN ORGANIZED HEALTH CARE EDUCATION/TRAINING PROGRAM

## 2023-12-21 PROCEDURE — D9220A PRA ANESTHESIA: Mod: CRNA,,, | Performed by: NURSE ANESTHETIST, CERTIFIED REGISTERED

## 2023-12-21 PROCEDURE — 37000008 HC ANESTHESIA 1ST 15 MINUTES: Performed by: INTERNAL MEDICINE

## 2023-12-21 PROCEDURE — 63600175 PHARM REV CODE 636 W HCPCS: Performed by: NURSE ANESTHETIST, CERTIFIED REGISTERED

## 2023-12-21 PROCEDURE — 25000003 PHARM REV CODE 250: Performed by: NURSE ANESTHETIST, CERTIFIED REGISTERED

## 2023-12-21 PROCEDURE — 37000009 HC ANESTHESIA EA ADD 15 MINS: Performed by: INTERNAL MEDICINE

## 2023-12-21 RX ORDER — LIDOCAINE HYDROCHLORIDE 20 MG/ML
INJECTION, SOLUTION EPIDURAL; INFILTRATION; INTRACAUDAL; PERINEURAL
Status: DISCONTINUED
Start: 2023-12-21 | End: 2023-12-21 | Stop reason: HOSPADM

## 2023-12-21 RX ORDER — HYDRALAZINE HYDROCHLORIDE 20 MG/ML
INJECTION INTRAMUSCULAR; INTRAVENOUS
Status: DISCONTINUED
Start: 2023-12-21 | End: 2023-12-21 | Stop reason: HOSPADM

## 2023-12-21 RX ORDER — LIDOCAINE HYDROCHLORIDE 20 MG/ML
INJECTION INTRAVENOUS
Status: DISCONTINUED | OUTPATIENT
Start: 2023-12-21 | End: 2023-12-21

## 2023-12-21 RX ORDER — HYDRALAZINE HYDROCHLORIDE 20 MG/ML
INJECTION INTRAMUSCULAR; INTRAVENOUS
Status: DISCONTINUED | OUTPATIENT
Start: 2023-12-21 | End: 2023-12-21

## 2023-12-21 RX ORDER — SODIUM CHLORIDE 9 MG/ML
INJECTION, SOLUTION INTRAVENOUS CONTINUOUS
Status: DISCONTINUED | OUTPATIENT
Start: 2023-12-21 | End: 2023-12-21 | Stop reason: HOSPADM

## 2023-12-21 RX ORDER — PROPOFOL 10 MG/ML
VIAL (ML) INTRAVENOUS
Status: DISCONTINUED | OUTPATIENT
Start: 2023-12-21 | End: 2023-12-21

## 2023-12-21 RX ORDER — PROPOFOL 10 MG/ML
INJECTION, EMULSION INTRAVENOUS
Status: DISCONTINUED
Start: 2023-12-21 | End: 2023-12-21 | Stop reason: HOSPADM

## 2023-12-21 RX ADMIN — PROPOFOL 20 MG: 10 INJECTION, EMULSION INTRAVENOUS at 02:12

## 2023-12-21 RX ADMIN — LIDOCAINE HYDROCHLORIDE 100 MG: 20 INJECTION, SOLUTION INTRAVENOUS at 02:12

## 2023-12-21 RX ADMIN — PROPOFOL 80 MG: 10 INJECTION, EMULSION INTRAVENOUS at 02:12

## 2023-12-21 RX ADMIN — HYDRALAZINE HYDROCHLORIDE 10 MG: 20 INJECTION INTRAMUSCULAR; INTRAVENOUS at 02:12

## 2023-12-21 RX ADMIN — SODIUM CHLORIDE: 0.9 INJECTION, SOLUTION INTRAVENOUS at 02:12

## 2023-12-21 RX ADMIN — PROPOFOL 40 MG: 10 INJECTION, EMULSION INTRAVENOUS at 02:12

## 2023-12-21 RX ADMIN — SODIUM CHLORIDE: 9 INJECTION, SOLUTION INTRAVENOUS at 01:12

## 2023-12-21 NOTE — TRANSFER OF CARE
Anesthesia Transfer of Care Note    Patient: Sarina Genao    Procedure(s) Performed: Procedure(s) (LRB):  COLONOSCOPY (N/A)    Patient location: GI    Anesthesia Type: general    Transport from OR: Transported from OR on room air with adequate spontaneous ventilation    Post pain: adequate analgesia    Post assessment: no apparent anesthetic complications and tolerated procedure well    Post vital signs: stable    Level of consciousness: lethargic and responds to stimulation    Nausea/Vomiting: no nausea/vomiting    Complications: none    Transfer of care protocol was followed      Last vitals: Visit Vitals  BP (!) 151/68 (BP Location: Left arm, Patient Position: Lying)   Pulse 85   Temp 36.6 °C (97.8 °F) (Oral)   Resp 18   SpO2 95%   Breastfeeding No

## 2023-12-21 NOTE — PLAN OF CARE
Procedure and recovery complete. Pt awake and alert. MD at bedside, procedure findings and suggestions discussed. Discharge instructions given, pt verbalized understanding of instruction. Gait steady, able to ambulate without assistance. Pt walked out accompanied by sister.

## 2023-12-21 NOTE — ANESTHESIA PREPROCEDURE EVALUATION
12/21/2023  [facility]  Anesthesia Pre-Operative Evaluation         Patient Name: Sarina Genao  YOB: 1950  MRN: [id number]    SUBJECTIVE:     Pre-operative evaluation for Procedure(s) (LRB):  COLONOSCOPY (N/A)     12/21/2023    Sarina Genao is a 73 y.o. female   Patient now presents for the above procedure(s).    TTE 2018:   1 - Hyperdynamic left ventricular systolic function (EF >70%).     2 - Impaired LV relaxation, normal LAP (grade 1 diastolic dysfunction).     3 - Normal right ventricular systolic function .     4 - Low central venous pressure.     Patient Active Problem List   Diagnosis    Combined hyperlipidemia associated with type 2 diabetes mellitus    Hypertension, benign    Urinary incontinence, urge    History of kidney cancer    Family history of stomach cancer    GERD (gastroesophageal reflux disease)    Osteoporosis, post-menopausal    Cigarette nicotine dependence with withdrawal    Centrilobular emphysema    CKD (chronic kidney disease) stage 3, GFR 30-59 ml/min    Nuclear sclerosis of both eyes    DM type 2 without retinopathy    Essential hypertension    Refractive error    Senile nuclear sclerosis    Post-operative state    Nuclear sclerosis of right eye    Diabetes mellitus with renal manifestations, uncontrolled    History of colonic polyps    Schatzki's ring    Pseudophakia    PVD (peripheral vascular disease) with claudication    PVD (peripheral vascular disease)    Bilateral carotid artery stenosis    PCO (posterior capsular opacification), right    Pulmonary vascular congestion    Type 2 diabetes mellitus with chronic kidney disease, without long-term current use of insulin    Chest pain on breathing    Right lower lobe pulmonary nodule    Carotid artery stenosis    Aortic atherosclerosis    Smoker    Osteoarthritis of thumb    De  "Quervain's tenosynovitis, bilateral    Unspecified inflammatory spondylopathy, multiple sites in spine    Heart failure with preserved ejection fraction    Squamous Cell Carcinoma of right lower lobe of lung    Opioid dependence, uncomplicated    Personal history of lung cancer    Hyperparathyroidism       Review of patient's allergies indicates:   Allergen Reactions    Metformin Diarrhea    Pantoprazole      elevated blood sugars       Current Inpatient Medications:      No current facility-administered medications on file prior to encounter.     Current Outpatient Medications on File Prior to Encounter   Medication Sig Dispense Refill    aspirin (ECOTRIN) 81 MG EC tablet Take 81 mg by mouth once daily.      cyanocobalamin, vitamin B-12, 1,000 mcg TbSR Take by mouth once daily.       DOCOSAHEXANOIC ACID/EPA (FISH OIL ORAL) Take 1,200 mg by mouth once daily.      famotidine (PEPCID) 40 MG tablet TAKE ONE TABLET BY MOUTH ONCE A DAY 90 tablet 0    FLUAD 1613-4328, 65 YR UP,,PF, 45 mcg (15 mcg x 3)/0.5 mL Syrg inject .5 milliliter intramuscularly as directed  0    FLUZONE HIGHDOSE QUAD 20-21  mcg/0.7 mL Syrg inj .7 milliliter intramuscularly as directed      gabapentin (NEURONTIN) 300 MG capsule Take 1 capsule (300 mg total) by mouth every evening. for 5 days 5 capsule 0    irbesartan-hydrochlorothiazide (AVALIDE) 150-12.5 mg per tablet TAKE ONE TABLET BY MOUTH ONCE A DAY 90 tablet 12    omeprazole (PRILOSEC) 40 MG capsule Take 1 capsule (40 mg total) by mouth once daily. 90 capsule 4    oxybutynin (DITROPAN-XL) 5 MG TR24 TAKE ONE TABLET BY MOUTH ONCE A DAY 90 tablet 3    pen needle, diabetic (BD INSULIN PEN NEEDLE UF SHORT) 31 gauge x 5/16" Ndle USE AS DIRECTED 100 each 12    sars-cov-2, covid-19, (MODERNA COVID-19) 100 mcg/0.5 ml injection       semaglutide (OZEMPIC) 1 mg/dose (4 mg/3 mL) INJECT ONE MG UNDER THE SKIN once every week 3 mL 6    vitamin D (VITAMIN D3) 1000 units Tab Take 1,000 Units by mouth once " daily.         Past Surgical History:   Procedure Laterality Date    bladder lift  2011    done at [facility]    BLADDER STONE REMOVAL  2011    before bladder lift    CATARACT EXTRACTION W/  INTRAOCULAR LENS IMPLANT Left 06/07/2016    Dr. Vásquez    CATARACT EXTRACTION W/  INTRAOCULAR LENS IMPLANT Right 06/21/2016    Dr. Vásquez    COLONOSCOPY N/A 4/26/2018    Procedure: COLONOSCOPY;  Surgeon: Benjamin Zamora MD;  Location: [facility] ENDO;  Service: Endoscopy;  Laterality: N/A;  confirmed ss    CYSTOSCOPY      INJECTION OF ANESTHETIC AGENT AROUND MULTIPLE INTERCOSTAL NERVES  12/27/2022    Procedure: BLOCK, NERVE, INTERCOSTAL, 2 OR MORE;  Surgeon: Paxton Cannon MD;  Location: [facility] OR 2ND FLR;  Service: Thoracic;;    LYSIS OF ADHESIONS  12/27/2022    Procedure: LYSIS, ADHESIONS;  Surgeon: Paxton Cannon MD;  Location: [facility]LR;  Service: Thoracic;;    NAVIGATIONAL BRONCHOSCOPY N/A 12/11/2018    Procedure: BRONCHOSCOPY, NAVIGATIONAL;  Surgeon: Ashtyn Ramires MD;  Location: [facility]ND FLR;  Service: Pulmonary;  Laterality: N/A;    NEPHRECTOMY      partial right    ROBOTIC BRONCHOSCOPY N/A 10/25/2022    Procedure: ROBOTIC BRONCHOSCOPY;  Surgeon: Ashtyn Ramires MD;  Location: [facility] 2ND FLR;  Service: Pulmonary;  Laterality: N/A;    VIDEO-ASSISTED THORACOSCOPIC SURGERY (VATS)  12/27/2022    Procedure: VATS (VIDEO-ASSISTED THORACOSCOPIC SURGERY);  Surgeon: Paxton Cannon MD;  Location: [facility];  Service: Thoracic;;       OBJECTIVE:     Vital Signs Range (Last 24H):         Significant Labs:  Lab Results   Component Value Date    WBC 12.06 04/06/2023    WBC 12.06 04/06/2023    HGB 14.1 04/06/2023    HGB 14.1 04/06/2023    HCT 45.2 04/06/2023    HCT 45.2 04/06/2023     04/06/2023     04/06/2023    CHOL 139 02/13/2021    TRIG 159 (H) 02/13/2021    HDL 41 02/13/2021    ALT 8 (L) 10/31/2023    AST 10 10/31/2023     10/31/2023    K 3.9 10/31/2023     10/31/2023     CREATININE 1.1 10/31/2023    BUN 16 10/31/2023    CO2 27 10/31/2023    TSH 1.324 04/06/2023    HGBA1C 6.1 (H) 10/31/2023       Diagnostic Studies: No relevant studies.    EKG:   Results for orders placed or performed during the hospital encounter of 10/08/18   EKG 12-lead (Shortness of Breath) Age > 50    Collection Time: 10/08/18 12:37 PM    Narrative    Test Reason : R06.02,  Vent. Rate : 105 BPM     Atrial Rate : 105 BPM     P-R Int : 164 ms          QRS Dur : 094 ms      QT Int : 354 ms       P-R-T Axes : 053 037 057 degrees     QTc Int : 467 ms    Sinus tachycardia  Possible Anterior infarct (cited on or before 08-OCT-2018)  Abnormal ECG  When compared with ECG of 15-AUG-2017 10:06,  Significant changes have occurred  Confirmed by Israel Judd MD ([id number]) on 10/8/2018 8:09:16 PM    Referred By: LATRICE MCKEON           Confirmed By:Israel Judd MD       ASSESSMENT/PLAN:           Pre-op Assessment    I have reviewed the Patient Summary Reports.     I have reviewed the Nursing Notes. I have reviewed the NPO Status.   I have reviewed the Medications.     Review of Systems  Anesthesia Hx:  No problems with previous Anesthesia   History of prior surgery of interest to airway management or planning:            Denies Personal Hx of Anesthesia complications.                    Social:  Smoker       Hematology/Oncology:  Hematology Normal                       --  Cancer in past history:                     EENT/Dental:  EENT/Dental Normal           Cardiovascular:     Hypertension, poorly controlled       Denies Angina. CHF    hyperlipidemia  Denies BLISS.  ECG has been reviewed.                          Pulmonary:   COPD    Denies Shortness of breath.                  Renal/:  Chronic Renal Disease, CKD                Hepatic/GI:     GERD, well controlled   Schatzki's ring          Musculoskeletal:  Arthritis               Neurological:  Neurology Normal                                      Endocrine:  Diabetes,  poorly controlled, type 2           Dermatological:  Skin Normal    Psych:  Psychiatric Normal                    Physical Exam  General: Well nourished, Cooperative, Alert and Oriented    Airway:  Mallampati: III / II  Mouth Opening: Normal  TM Distance: Normal  Tongue: Normal  Neck ROM: Normal ROM    Dental:  Dentures, Edentulous        Anesthesia Plan  Type of Anesthesia, risks & benefits discussed:    Anesthesia Type: Gen Natural Airway  Intra-op Monitoring Plan: Standard ASA Monitors  Post Op Pain Control Plan: multimodal analgesia  Induction:  IV  Informed Consent: Informed consent signed with the Patient and all parties understand the risks and agree with anesthesia plan.  All questions answered.   ASA Score: 3  Day of Surgery Review of History & Physical: H&P Update referred to the surgeon/provider.    Ready For Surgery From Anesthesia Perspective.     .

## 2023-12-21 NOTE — H&P
Short Stay Endoscopy History and Physical    PCP - Venancio Mayer MD    Procedure - Colonoscopy  ASA - per anesthesia  Mallampati - per anesthesia  History of Anesthesia problems - no  Family history Anesthesia problems - no   Plan of anesthesia - General    HPI:  This is a 73 y.o. female here for evaluation of : asymptomatic screening exam with + fam history in brother colon cancer, previous rectal hyperplastic polyps      ROS:  Constitutional: No fevers, chills, No weight loss  CV: No chest pain  Pulm: No cough, No shortness of breath  GI: see HPI  Derm: No rash    Medical History:  has a past medical history of Anticoagulant long-term use, Cancer, Cataracts, bilateral, CKD (chronic kidney disease) stage 3, GFR 30-59 ml/min (12/15/2014), Colon polyps, Combined hyperlipidemia associated with type 2 diabetes mellitus (6/7/2013), COPD (chronic obstructive pulmonary disease) (12/15/2014), Diabetes mellitus type II, Diabetes mellitus with renal manifestations, uncontrolled (2/1/2017), Diabetic retinopathy, Family history of stomach cancer (12/5/2013), Former smoker, H/O renal cell carcinoma (12/15/2015), History of colonic polyps (2/1/2017), History of gastroesophageal reflux (GERD), History of urinary incontinence, Hyperlipidemia, Hypertension, Nephrolithiasis, Osteoarthritis of thumb (12/20/2019), Osteoporosis, post-menopausal (3/17/2014), Primary hyperparathyroidism (10/20/2016), and Schatzki's ring (2/1/2017).    Surgical History:  has a past surgical history that includes Bladder stone removal (2011); bladder lift (2011); Cystoscopy; Nephrectomy; Cataract extraction w/  intraocular lens implant (Left, 06/07/2016); Cataract extraction w/  intraocular lens implant (Right, 06/21/2016); Colonoscopy (N/A, 4/26/2018); Navigational bronchoscopy (N/A, 12/11/2018); robotic bronchoscopy (N/A, 10/25/2022); Video-assisted thoracoscopic surgery (VATS) (12/27/2022); Injection of anesthetic agent around multiple  intercostal nerves (12/27/2022); and Lysis of adhesions (12/27/2022).    Family History: family history includes Cataracts in her mother; Colon cancer in her brother; Glaucoma in her mother; Nephrolithiasis in her sister; No Known Problems in her father, maternal aunt, maternal grandfather, maternal grandmother, maternal uncle, paternal aunt, paternal grandfather, paternal grandmother, and paternal uncle.. Otherwise no colon cancer, inflammatory bowel disease, or GI malignancies.    Social History:  reports that she has been smoking cigarettes. She has a 7.5 pack-year smoking history. She has never used smokeless tobacco. She reports that she does not drink alcohol and does not use drugs.    Review of patient's allergies indicates:   Allergen Reactions    Metformin Diarrhea    Pantoprazole      elevated blood sugars       Medications:   Medications Prior to Admission   Medication Sig Dispense Refill Last Dose    amLODIPine (NORVASC) 10 MG tablet TAKE ONE TABLET BY MOUTH ONCE A DAY 90 tablet 1 12/18/2023    aspirin (ECOTRIN) 81 MG EC tablet Take 81 mg by mouth once daily.   12/13/2023    cyanocobalamin, vitamin B-12, 1,000 mcg TbSR Take by mouth once daily.    12/13/2023    irbesartan-hydrochlorothiazide (AVALIDE) 150-12.5 mg per tablet TAKE ONE TABLET BY MOUTH ONCE A DAY 90 tablet 12 12/18/2023    rosuvastatin (CRESTOR) 20 MG tablet TAKE ONE TABLET BY MOUTH ONCE A DAY 90 tablet 12     semaglutide (OZEMPIC) 1 mg/dose (4 mg/3 mL) INJECT ONE MG UNDER THE SKIN once every week 3 mL 6 12/13/2023    vitamin D (VITAMIN D3) 1000 units Tab Take 1,000 Units by mouth once daily.   12/13/2023    DOCOSAHEXANOIC ACID/EPA (FISH OIL ORAL) Take 1,200 mg by mouth once daily.       famotidine (PEPCID) 40 MG tablet TAKE ONE TABLET BY MOUTH ONCE A DAY 90 tablet 0     FLUAD 1856-2870, 65 YR UP,,PF, 45 mcg (15 mcg x 3)/0.5 mL Syrg inject .5 milliliter intramuscularly as directed  0     FLUZONE HIGHDOSE QUAD 20-21  mcg/0.7 mL Syrg inj  ".7 milliliter intramuscularly as directed       FREESTYLE KEENA 3 SENSOR Lucinda Use 1 sensor every 14 days to track blood glucose, ICD10: 11.9 2 each 6     gabapentin (NEURONTIN) 300 MG capsule Take 1 capsule (300 mg total) by mouth every evening. for 5 days 5 capsule 0     omeprazole (PRILOSEC) 40 MG capsule Take 1 capsule (40 mg total) by mouth once daily. 90 capsule 4     oxybutynin (DITROPAN-XL) 5 MG TR24 TAKE ONE TABLET BY MOUTH ONCE A DAY 90 tablet 3     pen needle, diabetic (BD INSULIN PEN NEEDLE UF SHORT) 31 gauge x 5/16" Ndle USE AS DIRECTED 100 each 12     traMADoL (ULTRAM) 50 mg tablet TAKE ONE TABLET BY MOUTH EVERY 6 HOURS AS NEEDED FOR PAIN 120 tablet 5          Physical Exam:    Vital Signs: There were no vitals filed for this visit.    General Appearance: Well appearing in no acute distress  Eyes:    No scleral icterus  ENT: Neck supple, Lips, mucosa, and tongue normal; teeth and gums normal  Abdomen: Soft, non tender, non distended with positive bowel sounds. No hepatosplenomegaly, ascites, or mass.  Extremities: 2+ pulses, no clubbing, cyanosis or edema  Skin: No rash      Labs:  Lab Results   Component Value Date    WBC 12.06 04/06/2023    WBC 12.06 04/06/2023    HGB 14.1 04/06/2023    HGB 14.1 04/06/2023    HCT 45.2 04/06/2023    HCT 45.2 04/06/2023     04/06/2023     04/06/2023    CHOL 139 02/13/2021    TRIG 159 (H) 02/13/2021    HDL 41 02/13/2021    ALT 8 (L) 10/31/2023    AST 10 10/31/2023     10/31/2023    K 3.9 10/31/2023     10/31/2023    CREATININE 1.1 10/31/2023    BUN 16 10/31/2023    CO2 27 10/31/2023    TSH 1.324 04/06/2023    HGBA1C 6.1 (H) 10/31/2023       I have explained the risks and benefits of endoscopy procedures to the patient including but not limited to bleeding, perforation, infection, and death.  The patient was asked if they understand and allowed to ask any further questions to their satisfaction.    Jamel Luis MD    "

## 2023-12-21 NOTE — PROVATION PATIENT INSTRUCTIONS
Discharge Summary/Instructions after an Endoscopic Procedure  Patient Name: Sarina Genao  Patient MRN: [id number]  Patient YOB: 1950  Thursday, December 21, 2023  Jamel Luis MD  Dear patient,  As a result of recent federal legislation (The Federal Cures Act), you may   receive lab or pathology results from your procedure in your [facility]   account before your physician is able to contact you. Your physician or   their representative will relay the results to you with their   recommendations at their soonest availability.  Thank you,  RESTRICTIONS:  During your procedure today, you received medications for sedation.  These   medications may affect your judgment, balance and coordination.  Therefore,   for 24 hours, you have the following restrictions:   - DO NOT drive a car, operate machinery, make legal/financial decisions,   sign important papers or drink alcohol.    ACTIVITY:  Today: no heavy lifting, straining or running due to procedural   sedation/anesthesia.  The following day: return to full activity including work.  DIET:  Eat and drink normally unless instructed otherwise.     TREATMENT FOR COMMON SIDE EFFECTS:  - Mild abdominal pain, nausea, belching, bloating or excessive gas:  rest,   eat lightly and use a heating pad.  - Sore Throat: treat with throat lozenges and/or gargle with warm salt   water.  - Because air was used during the procedure, expelling large amounts of air   from your rectum or belching is normal.  - If a bowel prep was taken, you may not have a bowel movement for 1-3 days.    This is normal.  SYMPTOMS TO WATCH FOR AND REPORT TO YOUR PHYSICIAN:  1. Abdominal pain or bloating, other than gas cramps.  2. Chest pain.  3. Back pain.  4. Signs of infection such as: chills or fever occurring within 24 hours   after the procedure.  5. Rectal bleeding, which would show as bright red, maroon, or black stools.   (A tablespoon of blood from the rectum is not serious, especially  if   hemorrhoids are present.)  6. Vomiting.  7. Weakness or dizziness.  GO DIRECTLY TO THE NEAREST EMERGENCY ROOM IF YOU HAVE ANY OF THE FOLLOWING:      Difficulty breathing              Chills and/or fever over 101 F   Persistent vomiting and/or vomiting blood   Severe abdominal pain   Severe chest pain   Black, tarry stools   Bleeding- more than one tablespoon   Any other symptom or condition that you feel may need urgent attention  Your doctor recommends these additional instructions:  If any biopsies were taken, your doctors clinic will contact you in 1 to 2   weeks with any results.  - Patient has a contact number available for emergencies.  The signs and   symptoms of potential delayed complications were discussed with the   patient.  Return to normal activities tomorrow.  Written discharge   instructions were provided to the patient.   - Discharge patient to home.   - Repeat colonoscopy is not recommended due to current age (66 years or   older) for screening purposes.   - The findings and recommendations were discussed with the designated   responsible adult.  For questions, problems or results please call your physician - Jamel Luis MD at Work:  [phone].  [facility] can be reached at [phone]     IF A COMPLICATION OR EMERGENCY SITUATION ARISES AND YOU ARE UNABLE TO REACH   YOUR PHYSICIAN - GO DIRECTLY TO THE EMERGENCY ROOM.  Jamel Luis MD  12/21/2023 2:23:45 PM  This report has been verified and signed electronically.  Dear patient,  As a result of recent federal legislation (The Federal Cures Act), you may   receive lab or pathology results from your procedure in your [facility]   account before your physician is able to contact you. Your physician or   their representative will relay the results to you with their   recommendations at their soonest availability.  Thank you,  PROVATION

## 2023-12-21 NOTE — ANESTHESIA POSTPROCEDURE EVALUATION
Anesthesia Post Evaluation    Patient: Sarina Genao    Procedure(s) Performed: Procedure(s) (LRB):  COLONOSCOPY (N/A)    Final Anesthesia Type: general      Patient location during evaluation: GI PACU  Patient participation: Yes- Able to Participate  Level of consciousness: awake and alert, oriented and awake  Post-procedure vital signs: reviewed and stable  Pain management: adequate  Airway patency: patent  HERON mitigation strategies: Multimodal analgesia  PONV status at discharge: No PONV  Anesthetic complications: no      Cardiovascular status: blood pressure returned to baseline, hemodynamically stable and hypertensive (Recommended patient follow up with her PCP post op for better management of her BP. She is otherwise asymptomatic and is ready for d/c.)  Respiratory status: unassisted and spontaneous ventilation  Hydration status: euvolemic  Follow-up not needed.              Vitals Value Taken Time   /81 12/21/23 1458   Temp 36.6 °C (97.8 °F) 12/21/23 1427   Pulse 90 12/21/23 1501   Resp 18 12/21/23 1441   SpO2 96 % 12/21/23 1501   Vitals shown include unvalidated device data.      Event Time   Out of Recovery 14:58:00         Pain/Graham Score: Graham Score: 10 (12/21/2023  2:58 PM)

## 2023-12-27 DIAGNOSIS — N18.31 STAGE 3A CHRONIC KIDNEY DISEASE: ICD-10-CM

## 2023-12-27 DIAGNOSIS — E11.59 CONTROLLED TYPE 2 DIABETES MELLITUS WITH OTHER CIRCULATORY COMPLICATION, WITHOUT LONG-TERM CURRENT USE OF INSULIN: ICD-10-CM

## 2023-12-28 RX ORDER — SEMAGLUTIDE 1.34 MG/ML
INJECTION, SOLUTION SUBCUTANEOUS
Qty: 3 ML | Refills: 6 | Status: SHIPPED | OUTPATIENT
Start: 2023-12-28

## 2024-02-02 NOTE — PLAN OF CARE
Problem: Fatigue  Goal: Improved Activity Tolerance  Outcome: Ongoing, Progressing     Problem: Fall Injury Risk  Goal: Absence of Fall and Fall-Related Injury  Outcome: Ongoing, Progressing      tROP [id number]

## 2024-03-22 DIAGNOSIS — R52 PAIN: ICD-10-CM

## 2024-03-22 NOTE — TELEPHONE ENCOUNTER
Care Due:                  Date            Visit Type   Department     Provider  --------------------------------------------------------------------------------                                EP [facility]                              PRIMARY      MED/ INTERNAL  Venancio Barnett  Last Visit: 06-      CARE ([facility])   MED/ PEDS      Ehrensing  Next Visit: None Scheduled  None         None Found                                                            Last  Test          Frequency    Reason                     Performed    Due Date  --------------------------------------------------------------------------------    Lipid Panel.  12 months..  rosuvastatin.............  Not Found    Overdue    [employer] Embedded Care Due Messages. Reference number: [id number].   3/22/2024 11:06:46 AM CDT

## 2024-03-24 RX ORDER — TRAMADOL HYDROCHLORIDE 50 MG/1
TABLET ORAL
Qty: 120 TABLET | Refills: 5 | Status: SHIPPED | OUTPATIENT
Start: 2024-03-24

## 2024-05-06 ENCOUNTER — PATIENT OUTREACH (OUTPATIENT)
Dept: ADMINISTRATIVE | Facility: HOSPITAL | Age: 74
End: 2024-05-06
Payer: MEDICARE

## 2024-05-06 DIAGNOSIS — E11.9 TYPE 2 DIABETES MELLITUS WITHOUT COMPLICATION, WITHOUT LONG-TERM CURRENT USE OF INSULIN: Primary | ICD-10-CM

## 2024-05-09 NOTE — PROGRESS NOTES
5/10/2024    Radiation Oncology Follow-Up Visit      Prior Radiation History:    Site  Technique  Energy  Dose/Fx (Gy)  #Fx  Total Dose (Gy)  Start Date  End Date  Elapsed Days    RLL Lung  SBRT  6X  12.5  4 / 4  50  1/26/2023  2/3/2023  8        Is the patient female between ages 15-55:  No    Does the patient have a CIED:  No      Assessment   This is a 73 y.o. female with Stage IA2 (cT1b cN0 M0) RLL NSCLC (squam) diagnosed in robotic bronch w/ EBUS 10/25/22. She has a known 2.3 cm LLL nodule since 2018 that was biopsied in 12/2018 and 10/2022 with results negative for malignancy. RLL primary was planned for surgical resection but aborted due to dense adhesions and poor pulmonary tolerance during procedure. This was treated with definitive SBRT 50 Gy in 4 fx completed 2/3/23.      No late toxicity from treatment. CT Chest today 5/10/24 demonstrates stable to slightly improved RLL treated lesion. Other solid nodules in RLL, LLL, and lingula all stable.              Plan   1)  I will see her back in 6 months with CT Chest prior for re-staging.            CHIEF COMPLAINT: F/U after lung SBRT    HPI/Focused ROS: Feeling well since I last saw her. Denies chest pain or dyspnea.         Past Medical History:   Diagnosis Date    Anticoagulant long-term use     Cancer     Kidney (Right)    Cataracts, bilateral     CKD (chronic kidney disease) stage 3, GFR 30-59 ml/min 12/15/2014    Colon polyps     Combined hyperlipidemia associated with type 2 diabetes mellitus 6/7/2013    COPD (chronic obstructive pulmonary disease) 12/15/2014    Diabetes mellitus type II     NIDDM, a.m. glucose-120s-130s-last A1c-7.1-6/7/13    Diabetes mellitus with renal manifestations, uncontrolled 2/1/2017    Diabetic retinopathy     Family history of stomach cancer 12/5/2013    Former smoker     H/O renal cell carcinoma 12/15/2015    History of colonic polyps 2/1/2017    3 polyps 7/13 ---5 yrs    History of gastroesophageal reflux (GERD)     History  of urinary incontinence     s/p bladder lift-october, 2011 (at [facility])    Hyperlipidemia     Hypertension     120s/70s-80s    Nephrolithiasis     Osteoarthritis of thumb 12/20/2019    Osteoporosis, post-menopausal 3/17/2014    Primary hyperparathyroidism 10/20/2016    Schatzki's ring 2/1/2017    Dilated 2014       Past Surgical History:   Procedure Laterality Date    bladder lift  2011    done at [facility]    BLADDER STONE REMOVAL  2011    before bladder lift    CATARACT EXTRACTION W/  INTRAOCULAR LENS IMPLANT Left 06/07/2016    Dr. Vásquez    CATARACT EXTRACTION W/  INTRAOCULAR LENS IMPLANT Right 06/21/2016    Dr. Vásquez    COLONOSCOPY N/A 4/26/2018    Procedure: COLONOSCOPY;  Surgeon: Benjamin Zamora MD;  Location: [facility] ENDO;  Service: Endoscopy;  Laterality: N/A;  confirmed ss    COLONOSCOPY N/A 12/21/2023    Procedure: COLONOSCOPY;  Surgeon: Jamel Luis MD;  Location: [facility] ENDO;  Service: Endoscopy;  Laterality: N/A;  9/7 ref Venancio Mayer MD, Suprep, instr. mailed, [facility]  12/14- instr reviewed, pt reminded to hold ozempic, pre call complete.  DBM    CYSTOSCOPY      INJECTION OF ANESTHETIC AGENT AROUND MULTIPLE INTERCOSTAL NERVES  12/27/2022    Procedure: BLOCK, NERVE, INTERCOSTAL, 2 OR MORE;  Surgeon: Paxton Cannon MD;  Location: [facility] OR 2ND FLR;  Service: Thoracic;;    LYSIS OF ADHESIONS  12/27/2022    Procedure: LYSIS, ADHESIONS;  Surgeon: Paxton Cannon MD;  Location: [facility] OR 2ND FLR;  Service: Thoracic;;    NAVIGATIONAL BRONCHOSCOPY N/A 12/11/2018    Procedure: BRONCHOSCOPY, NAVIGATIONAL;  Surgeon: Ashtyn Ramires MD;  Location: [facility] OR 2ND FLR;  Service: Pulmonary;  Laterality: N/A;    NEPHRECTOMY      partial right    ROBOTIC BRONCHOSCOPY N/A 10/25/2022    Procedure: ROBOTIC BRONCHOSCOPY;  Surgeon: Ashtyn Ramires MD;  Location: [facility] OR 2ND FLR;  Service: Pulmonary;  Laterality: N/A;    VIDEO-ASSISTED THORACOSCOPIC SURGERY (VATS)  12/27/2022    Procedure: VATS (VIDEO-ASSISTED  "THORACOSCOPIC SURGERY);  Surgeon: Paxton Cannon MD;  Location: [facility] OR [address] [place] [address];  Service: Thoracic;;       Social History     Tobacco Use    Smoking status: Every Day     Current packs/day: 0.25     Average packs/day: 0.3 packs/day for 30.0 years (7.5 ttl pk-yrs)     Types: Cigarettes    Smokeless tobacco: Never    Tobacco comments:     pt. reports quitting January 2018   Substance Use Topics    Alcohol use: No     Alcohol/week: 0.0 standard drinks of alcohol    Drug use: No       Cancer-related family history is negative for Kidney cancer and Cancer.    Current Outpatient Medications on File Prior to Visit   Medication Sig Dispense Refill    amLODIPine (NORVASC) 10 MG tablet TAKE ONE TABLET BY MOUTH ONCE A DAY 90 tablet 1    aspirin (ECOTRIN) 81 MG EC tablet Take 81 mg by mouth once daily.      cyanocobalamin, vitamin B-12, 1,000 mcg TbSR Take by mouth once daily.       DOCOSAHEXANOIC ACID/EPA (FISH OIL ORAL) Take 1,200 mg by mouth once daily.      famotidine (PEPCID) 40 MG tablet TAKE ONE TABLET BY MOUTH ONCE A DAY 90 tablet 0    irbesartan-hydrochlorothiazide (AVALIDE) 150-12.5 mg per tablet TAKE ONE TABLET BY MOUTH ONCE A DAY 90 tablet 12    oxybutynin (DITROPAN-XL) 5 MG TR24 TAKE ONE TABLET BY MOUTH ONCE A DAY 90 tablet 3    pen needle, diabetic (BD INSULIN PEN NEEDLE UF SHORT) 31 gauge x 5/16" Ndle USE AS DIRECTED 100 each 12    rosuvastatin (CRESTOR) 20 MG tablet TAKE ONE TABLET BY MOUTH ONCE A DAY 90 tablet 12    semaglutide (OZEMPIC) 1 mg/dose (4 mg/3 mL) INJECT 1MG SUBCUTANEOUSLY once every week 3 mL 6    traMADoL (ULTRAM) 50 mg tablet TAKE ONE TABLET BY MOUTH EVERY 6 HOURS AS NEEDED FOR PAIN 120 tablet 5    vitamin D (VITAMIN D3) 1000 units Tab Take 1,000 Units by mouth once daily.      FLUAD [id number], 65 YR UP,,PF, 45 mcg (15 mcg x 3)/0.5 mL Syrg inject .5 milliliter intramuscularly as directed  0    FLUZONE HIGHDOSE QUAD 20-21  mcg/0.7 mL Syrg inj .7 milliliter intramuscularly as directed   "    FREESTYLE KEENA 3 SENSOR Lucinda Use 1 sensor every 14 days to track blood glucose, ICD10: 11.9 (Patient not taking: Reported on 5/10/2024) 2 each 6    gabapentin (NEURONTIN) 300 MG capsule Take 1 capsule (300 mg total) by mouth every evening. for 5 days 5 capsule 0    omeprazole (PRILOSEC) 40 MG capsule Take 1 capsule (40 mg total) by mouth once daily. 90 capsule 4     No current facility-administered medications on file prior to visit.       Review of patient's allergies indicates:   Allergen Reactions    Metformin Diarrhea    Pantoprazole      elevated blood sugars         Vital Signs: There were no vitals taken for this visit.    ECOG Performance Status: 1 - Ambulates, capable of light work    Physical Exam  Vitals reviewed.   Constitutional:       Appearance: Normal appearance.   HENT:      Head: Normocephalic and atraumatic.   Eyes:      General: No scleral icterus.     Extraocular Movements: Extraocular movements intact.   Pulmonary:      Effort: No accessory muscle usage or respiratory distress.   Abdominal:      General: There is no distension.   Musculoskeletal:         General: Normal range of motion.      Cervical back: Normal range of motion and neck supple.   Lymphadenopathy:      Cervical: No cervical adenopathy.   Skin:     General: Skin is dry.      Coloration: Skin is not jaundiced.   Neurological:      Mental Status: She is alert.      Cranial Nerves: No cranial nerve deficit.   Psychiatric:         Mood and Affect: Mood and affect normal.         Judgment: Judgment normal.          Labs:    Imaging: I have personally reviewed the patient's available images and reports and summarized pertinent findings above in HPI.     Pathology: No new path

## 2024-05-10 ENCOUNTER — OFFICE VISIT (OUTPATIENT)
Dept: RADIATION ONCOLOGY | Facility: CLINIC | Age: 74
End: 2024-05-10
Payer: MEDICARE

## 2024-05-10 ENCOUNTER — HOSPITAL ENCOUNTER (OUTPATIENT)
Dept: RADIOLOGY | Facility: HOSPITAL | Age: 74
Discharge: HOME | End: 2024-05-10
Attending: RADIOLOGY
Payer: MEDICARE

## 2024-05-10 DIAGNOSIS — Z85.118 PERSONAL HISTORY OF LUNG CANCER: ICD-10-CM

## 2024-05-10 DIAGNOSIS — M81.0 OSTEOPOROSIS, POST-MENOPAUSAL: Primary | ICD-10-CM

## 2024-05-10 DIAGNOSIS — Z85.118 PERSONAL HISTORY OF LUNG CANCER: Primary | ICD-10-CM

## 2024-05-10 PROCEDURE — 71250 CT THORAX DX C-: CPT | Mod: 26,,, | Performed by: RADIOLOGY

## 2024-05-10 PROCEDURE — 1101F PT FALLS ASSESS-DOCD LE1/YR: CPT | Mod: CPTII,S$GLB,, | Performed by: RADIOLOGY

## 2024-05-10 PROCEDURE — 99213 OFFICE O/P EST LOW 20 MIN: CPT | Mod: S$GLB,,, | Performed by: RADIOLOGY

## 2024-05-10 PROCEDURE — 3288F FALL RISK ASSESSMENT DOCD: CPT | Mod: CPTII,S$GLB,, | Performed by: RADIOLOGY

## 2024-05-10 PROCEDURE — 3072F LOW RISK FOR RETINOPATHY: CPT | Mod: CPTII,S$GLB,, | Performed by: RADIOLOGY

## 2024-05-10 PROCEDURE — 71250 CT THORAX DX C-: CPT | Mod: TC

## 2024-05-10 PROCEDURE — 1159F MED LIST DOCD IN RCRD: CPT | Mod: CPTII,S$GLB,, | Performed by: RADIOLOGY

## 2024-05-10 PROCEDURE — 99999 PR PBB SHADOW E&M-EST. PATIENT-LVL III: CPT | Mod: PBBFAC,,, | Performed by: RADIOLOGY

## 2024-05-13 ENCOUNTER — INFUSION (OUTPATIENT)
Dept: INFUSION THERAPY | Facility: HOSPITAL | Age: 74
End: 2024-05-13
Attending: HOSPITALIST
Payer: MEDICARE

## 2024-05-13 VITALS
TEMPERATURE: 99 F | HEART RATE: 62 BPM | DIASTOLIC BLOOD PRESSURE: 79 MMHG | RESPIRATION RATE: 18 BRPM | SYSTOLIC BLOOD PRESSURE: 181 MMHG | OXYGEN SATURATION: 100 %

## 2024-05-13 DIAGNOSIS — M81.0 OSTEOPOROSIS, POST-MENOPAUSAL: Primary | ICD-10-CM

## 2024-05-13 PROCEDURE — 96372 THER/PROPH/DIAG INJ SC/IM: CPT

## 2024-05-13 PROCEDURE — 63600175 PHARM REV CODE 636 W HCPCS: Mod: JZ,JG | Performed by: HOSPITALIST

## 2024-05-13 RX ADMIN — DENOSUMAB 60 MG: 60 INJECTION SUBCUTANEOUS at 01:05

## 2024-05-13 NOTE — PLAN OF CARE
Pt arrived to unit, ambulatory and accompanied by family, for injection therapy Prolia. Pt alert and oriented with no new or worsening complaints upon arrival. Tolerated Prolia injection with no complaints or S&S of reaction. Discharged home upon completion in [facility].

## 2024-05-20 ENCOUNTER — OFFICE VISIT (OUTPATIENT)
Dept: ENDOCRINOLOGY | Facility: CLINIC | Age: 74
End: 2024-05-20
Payer: MEDICARE

## 2024-05-20 VITALS
HEART RATE: 79 BPM | DIASTOLIC BLOOD PRESSURE: 84 MMHG | WEIGHT: 132.81 LBS | SYSTOLIC BLOOD PRESSURE: 160 MMHG | BODY MASS INDEX: 22.8 KG/M2

## 2024-05-20 DIAGNOSIS — N18.31 STAGE 3A CHRONIC KIDNEY DISEASE: ICD-10-CM

## 2024-05-20 DIAGNOSIS — E11.22 TYPE 2 DIABETES MELLITUS WITH STAGE 3A CHRONIC KIDNEY DISEASE, WITHOUT LONG-TERM CURRENT USE OF INSULIN: ICD-10-CM

## 2024-05-20 DIAGNOSIS — E21.3 HYPERPARATHYROIDISM: ICD-10-CM

## 2024-05-20 DIAGNOSIS — E55.9 VITAMIN D DEFICIENCY: ICD-10-CM

## 2024-05-20 DIAGNOSIS — E11.59 CONTROLLED TYPE 2 DIABETES MELLITUS WITH OTHER CIRCULATORY COMPLICATION, WITHOUT LONG-TERM CURRENT USE OF INSULIN: ICD-10-CM

## 2024-05-20 DIAGNOSIS — M81.0 OSTEOPOROSIS, POST-MENOPAUSAL: Primary | ICD-10-CM

## 2024-05-20 DIAGNOSIS — N18.31 TYPE 2 DIABETES MELLITUS WITH STAGE 3A CHRONIC KIDNEY DISEASE, WITHOUT LONG-TERM CURRENT USE OF INSULIN: ICD-10-CM

## 2024-05-20 DIAGNOSIS — I10 HYPERTENSION, BENIGN: ICD-10-CM

## 2024-05-20 DIAGNOSIS — E83.52 HYPERCALCEMIA: ICD-10-CM

## 2024-05-20 DIAGNOSIS — I50.30 HEART FAILURE WITH PRESERVED EJECTION FRACTION, UNSPECIFIED HF CHRONICITY: ICD-10-CM

## 2024-05-20 PROCEDURE — 1160F RVW MEDS BY RX/DR IN RCRD: CPT | Mod: CPTII,S$GLB,, | Performed by: HOSPITALIST

## 2024-05-20 PROCEDURE — 3288F FALL RISK ASSESSMENT DOCD: CPT | Mod: CPTII,S$GLB,, | Performed by: HOSPITALIST

## 2024-05-20 PROCEDURE — 1159F MED LIST DOCD IN RCRD: CPT | Mod: CPTII,S$GLB,, | Performed by: HOSPITALIST

## 2024-05-20 PROCEDURE — 3072F LOW RISK FOR RETINOPATHY: CPT | Mod: CPTII,S$GLB,, | Performed by: HOSPITALIST

## 2024-05-20 PROCEDURE — 3079F DIAST BP 80-89 MM HG: CPT | Mod: CPTII,S$GLB,, | Performed by: HOSPITALIST

## 2024-05-20 PROCEDURE — 99999 PR PBB SHADOW E&M-EST. PATIENT-LVL III: CPT | Mod: PBBFAC,,, | Performed by: HOSPITALIST

## 2024-05-20 PROCEDURE — 3044F HG A1C LEVEL LT 7.0%: CPT | Mod: CPTII,S$GLB,, | Performed by: HOSPITALIST

## 2024-05-20 PROCEDURE — 3077F SYST BP >= 140 MM HG: CPT | Mod: CPTII,S$GLB,, | Performed by: HOSPITALIST

## 2024-05-20 PROCEDURE — 99215 OFFICE O/P EST HI 40 MIN: CPT | Mod: S$GLB,,, | Performed by: HOSPITALIST

## 2024-05-20 PROCEDURE — 1101F PT FALLS ASSESS-DOCD LE1/YR: CPT | Mod: CPTII,S$GLB,, | Performed by: HOSPITALIST

## 2024-05-20 RX ORDER — SEMAGLUTIDE 1.34 MG/ML
INJECTION, SOLUTION SUBCUTANEOUS
Qty: 3 ML | Refills: 6 | Status: SHIPPED | OUTPATIENT
Start: 2024-05-20 | End: 2024-05-24 | Stop reason: SDUPTHER

## 2024-05-20 NOTE — ASSESSMENT & PLAN NOTE
- Diabetes is at goal, given current A1c, goal A1C for patient is 7%  - Diabetic supplies/medications: refilled this visit  - Continue reinforcement dietary modification, portion size control, decreasing carbohydrates intake  - continue Ozempic 1 mg Q weekly injection  - A1C in with next set of lab work  - Pt will see optometry and podiatry soon

## 2024-05-20 NOTE — ASSESSMENT & PLAN NOTE
- on IV losartan/hydrochlorothiazide:  Hydrochlorothiazide can lead to hypercalcemia.  Advised cessation   - can consider as needed Lasix.    - continue calcium channel blocker, ARB per PCP

## 2024-05-20 NOTE — ASSESSMENT & PLAN NOTE
- Management of long-term osteoporosis, On chronic Prolia every 6 months therapy>> next dose 10/2023  - DXA personal reviewed by me, improvement in bone density-DXA:  6/5/2022>> improvement noted  - Continue vitamin-D and calcium supplement  - Fall precautions/Exercise: advised   - Prolia:  Initially started on 9/17/2018 - until 4/29/2020 (4 injections),>> not in our system, unable to get new Prolia from her endocrinologist due to cost  - Restarted: PROLIA injection >>> started 5/14/2021- every 6 months- to current (last injection 5/13/2024)> 7 injections  - Bone density is improving 2/2018> compare to 6/5/2020>> 6/5/2022  - continue Prolia every 6 months for now, will discuss this at the next office visit  - patient to continue calcium and vitamin-D supplements, but avoid hypercalcemia    Risk of osteonecrosis of the jaw (DISCUSSED) with bisphosphonates and denosumab, with incidence estimated from 1-10/761708 treated patients. The incidence of ONJ is higher in patients undergoing invasive dental surgery (excludes routine cleaning, fillings or root canals).   Dental health: Advised dental work should ideally be completed prior to initiation of treatment, follow up with dentist/oral surgeon advised.

## 2024-05-20 NOTE — PROGRESS NOTES
Subjective:      Patient ID: Sarina Genao is a 73 y.o. female presented to [facility] on 5/20/2024.  Chief Complaint:  Diabetes    History of Present Illness: Sarina Genao is a 73 y.o. female here for management of Multiple endocrine issues  Other significant past medical history:  Type 2 diabetes, osteoporosis, hypercalcemia Hypertension, hyperlipidemia    Interval hx: Here for follow up,   Chronic osteoporosis, on SC prolia every 6 months, need DXA 2024>> patient has this scheduled  She does eat dairy and take MVN  Recent falls? No, Recent fractures? No   Mild hypercalcemia noted: Corrected 10.9 on 5/13/2024. Off Caltrate.  Does take chronic hydrochlorothiazide due to lower extremity swelling and hypertension.  That can lead to hypercalcemia.  Type 2 diabetes: A1C is 6.8%,  On Ozempic. Dietary indiscretion, increase in A1c.  Is compliant with Ozempic.  No issues with injection.  3x a week: twice a day, range about 130 denies hypoglycemia      1) Osteoporosis  - Diagnosis on DXA in 2018  - Prolia:  Initially started on 9/17/2018 - until 4/29/2020 (4 injections),>> not in our system, unable to get new Prolia from her endocrinologist due to cost  - Restarted: PROLIA injection >>> started 5/14/2021- every 6 months- to current (last injection 5/13/2024)> 7 injections  - Bone density is improving 2/2018> compare to 6/5/2020>> 6/5/2022  - Vit D: 1000iu daily, on MVN    - Current diarrhea or h/o malabsorption? no  - Height loss (>2 inches)? no  - Family hx of Osteoporosis: mother, possibly sister  - History of Cancer? Kindey cancer, nephrectomy, no issue  - Malignancy involving bone (active or history)? no  - Prior radiation treatment? no  - Post-menopausal? Age?: 50s  - No hysterectomy  - Tobacco use ?  Yes, just restarted due to covid stress  - EtOH use? no (<2 drinks a day in female, <3 drinks a day for male)  - Weight bearing exercise?   Yes, walking 30 mins, 3x  - Fall Precautions?  yes, get out the chair without using their arms  - Dental work planned? No, dentures    Bone density:  Review  6/9/2022  Lumbar spine (L1-L4): BMD is 0.876 g/cm2, T-score is -1.6, and Z-score is 0.1.  Total hip: BMD is 0.661 g/cm2, T-score is -2.3, and Z-score is -1.1.  Femoral neck: BMD is 0.635 g/cm2, T-score is -1.9, and Z-score is -0.7.  Distal 1/3 radius: BMD is 0.552 g/cm2, T-score is -2.4, and Z-score is 0.1.     FRAX:  6% risk of a major osteoporotic fracture in the next 10 years.  2.1% risk of hip fracture in the next 10 years.    Impression:  *Osteoporosis on treatment with denosumab.  Bone density in osteopenic range approaching osteoporosis at the distal 1/3 forearm with FRAX not suggesting treatment.  *Compared with previous DXA, BMD at the distal forearm has decreased by -3.8%,  BMD at the lumbar spine has remained stable, and the BMD at the total hip has increased by 7.9%.    Lab work reviewed  Lab Results   Component Value Date    PTH 53.9 10/31/2023    .5 (H) 06/20/2023    PTH 35.6 04/06/2023    PTH 35.6 04/06/2023    PTH 35.6 04/06/2023    [id number] 48 04/06/2023    GNIMXUPY61IS 48 04/06/2023    KBGSQNTM09KD 63 05/03/2022    CALCIUM 10.6 (H) 05/13/2024    CALCIUM 10.6 (H) 05/13/2024    CALCIUM 10.2 10/31/2023    PHOS 3.6 05/13/2024    PHOS 3.1 10/31/2023    PHOS 3.4 04/06/2023    PHOS 3.4 04/06/2023    ALKPHOS 106 10/31/2023    ALKPHOS 97 04/06/2023    ALKPHOS 97 04/06/2023    ALKPHOS 97 04/06/2023    TSH 1.324 04/06/2023    LABCALC 6.9 06/24/2016    JQXDJDQ58HPS 3 (L) 06/24/2016       2) Diabetes Mellitus Type 2  - Known diabetic complications: nephropathy  - Cardiovascular risk factors: advanced age (older than 55 for men, 65 for women), diabetes mellitus, dyslipidemia, sedentary lifestyle and smoking/ tobacco exposure  - Diagnosed w/ DM: in 2007  - Need to see Dr De La Torre for toe nail trim soon  - Current weight: 132 previous weight 139<138<145  - attempted to get Freestyle Jan 11/2023,  "unable due to cost and lack of insurance coverage as patient is not on insulin    Currently:    Ozempic 1mg Qweekly  Previous meds:              Was on MDI insulin   Glipizide   Home glucose checks: checks 1x a day, Logs reviewed/Unavailable: oral recall: 112  Weight trend: stable  Diabetes Education:  no  Diabetes Related Hospitalization: no  Hx of pancreatitis, hx of thyroid cancer: no  Family history of diabetes: yes       Eye exam current (within one year): yes, DR: no  Reports cuts or ulcers on feet: no, has podiatrist  Statin: Taking, ACE/ARB: Taking    Diabetes lab work  Lab Results   Component Value Date    HGBA1C 6.8 (H) 05/13/2024    HGBA1C 6.1 (H) 10/31/2023    HGBA1C 5.5 04/06/2023    HGBA1C 5.6 11/04/2022     No results found for: "CPEPTIDE", "GLUTAMICACID", "ISLETCELLANT"   No results found for: "FRUCTOSAMINE"  Lab Results   Component Value Date    MICALBCREAT 381.0 (H) 05/13/2024     No results found for: "CVUSYZUC83"    Diabetes Management Status: Reviewed this office visit  Screening or Prevention Patient's value Goal Complete/Controlled?   Lipid profile Most Recent Lipid Panel Health Maintenance Topic Completion: Not Found Annually No     Dilated retinal exam : 10/31/2023 Annually No     Foot exam   : 03/01/2023 Annually No          3) Hypercalcemia  - history fluctuation in calcium with history of long-term hydrochlorothiazide use  - Corrected 10.9 on 5/13/2024. Off Caltrate.  - history of elevated PTH improved/resolved.  Reviewed past surgical, medical, family, social history and updated as appropriate.  Review of Systems: see HPI above    Objective:   BP (!) 160/84   Pulse 79   Wt 60.2 kg (132 lb 12.8 oz)   BMI 22.80 kg/m²     Body mass index is 22.8 kg/m².  Vital signs reviewed    Physical Exam  Vitals and nursing note reviewed.   Constitutional:       General: She is not in acute distress.     Appearance: Normal appearance. She is well-developed.   Neck:      Thyroid: No thyromegaly. "   Cardiovascular:      Rate and Rhythm: Normal rate.      Heart sounds: Normal heart sounds.   Pulmonary:      Effort: Pulmonary effort is normal. No respiratory distress.   Abdominal:      Tenderness: There is no abdominal tenderness.   Musculoskeletal:         General: Normal range of motion.      Cervical back: Neck supple.   Skin:     General: Skin is warm.      Findings: No erythema.   Neurological:      Mental Status: She is alert and oriented to person, place, and time.       Lab Reviewed:  See results in subjective  Lab Results   Component Value Date    HGBA1C 6.8 (H) 05/13/2024     Lab Results   Component Value Date    CHOL 139 02/13/2021    HDL 41 02/13/2021    LDLCALC 66.2 02/13/2021    TRIG 159 (H) 02/13/2021    CHOLHDL 29.5 02/13/2021     Lab Results   Component Value Date     05/13/2024    K 3.5 05/13/2024     05/13/2024    CO2 29 05/13/2024    GLU 72 05/13/2024    BUN 16 05/13/2024    CREATININE 1.3 05/13/2024    CALCIUM 10.6 (H) 05/13/2024    CALCIUM 10.6 (H) 05/13/2024    PHOS 3.6 05/13/2024    PROT 7.3 10/31/2023    ALBUMIN 3.7 05/13/2024    BILITOT 0.6 10/31/2023    ALKPHOS 106 10/31/2023    AST 10 10/31/2023    ALT 8 (L) 10/31/2023    ANIONGAP 10 05/13/2024    EGFRNORACEVR 42.9 (A) 05/13/2024    TSH 1.324 04/06/2023    PTH 53.9 10/31/2023    [id number] 48 04/06/2023    QPXXWDUI26CZ 48 04/06/2023      Assessment     1. Osteoporosis, post-menopausal  RENAL FUNCTION PANEL    Vitamin D    Calcium, Ionized      2. Type 2 diabetes mellitus with stage 3a chronic kidney disease, without long-term current use of insulin        3. Controlled type 2 diabetes mellitus with other circulatory complication, without long-term current use of insulin  semaglutide (OZEMPIC) 1 mg/dose (4 mg/3 mL)    HEMOGLOBIN A1C      4. Stage 3a chronic kidney disease  semaglutide (OZEMPIC) 1 mg/dose (4 mg/3 mL)      5. Vitamin D deficiency  Calcium, Ionized      6. Hypercalcemia        7. Hypertension, benign         8. Heart failure with preserved ejection fraction, unspecified HF chronicity        9. Hyperparathyroidism            Plan     Osteoporosis, post-menopausal  - Management of long-term osteoporosis, On chronic Prolia every 6 months therapy>> next dose 10/2023  - DXA personal reviewed by me, improvement in bone density-DXA:  6/5/2022>> improvement noted  - Continue vitamin-D and calcium supplement  - Fall precautions/Exercise: advised   - Prolia:  Initially started on 9/17/2018 - until 4/29/2020 (4 injections),>> not in our system, unable to get new Prolia from her endocrinologist due to cost  - Restarted: PROLIA injection >>> started 5/14/2021- every 6 months- to current (last injection 5/13/2024)> 7 injections  - Bone density is improving 2/2018> compare to 6/5/2020>> 6/5/2022  - continue Prolia every 6 months for now, will discuss this at the next office visit  - patient to continue calcium and vitamin-D supplements, but avoid hypercalcemia    Risk of osteonecrosis of the jaw (DISCUSSED) with bisphosphonates and denosumab, with incidence estimated from 1-10/568932 treated patients. The incidence of ONJ is higher in patients undergoing invasive dental surgery (excludes routine cleaning, fillings or root canals).   Dental health: Advised dental work should ideally be completed prior to initiation of treatment, follow up with dentist/oral surgeon advised.     Type 2 diabetes mellitus with chronic kidney disease, without long-term current use of insulin  - Diabetes is at goal, given current A1c, goal A1C for patient is 7%  - Diabetic supplies/medications: refilled this visit  - Continue reinforcement dietary modification, portion size control, decreasing carbohydrates intake  - continue Ozempic 1 mg Q weekly injection  - A1C in with next set of lab work  - Pt will see optometry and podiatry soon    CKD (chronic kidney disease) stage 3, GFR 30-59 ml/min  - Renal function reviewed on lab work today, stable   - continue  routine monitoring    Hypercalcemia  - patient with history of hyperparathyroidism  - patient also has history of recent hypercalcemia, suspected due to hydrochlorothiazide use combined with too much calcium intake.  - Calcium level- : Corrected 10.9 on 5/13/2024.   - Off Caltrate.   - Does take chronic hydrochlorothiazide due to lower extremity swelling and hypertension.  That can lead to hypercalcemia.  - I sent a message to the PCP for cessation of hydrochlorothiazide.  Patient is hesitant as she does not want to stop per fluid pill (similar to previous visit 11/2023)  - Continue monitoring situation    Hypertension, benign  - on IV losartan/hydrochlorothiazide:  Hydrochlorothiazide can lead to hypercalcemia.  Advised cessation   - can consider as needed Lasix.    - continue calcium channel blocker, ARB per PCP    Heart failure with preserved ejection fraction  - if diabetes does not improve can consider use SGLT2    Hyperparathyroidism  - stable finding, most likely due to hydrochlorothiazide    Advised patient to follow up with PCP for routine health maintenance care.   RTC in 4-5 months    Total Time for E/M Service preformed was greater than 40 minutes: Including review of medical records, direct face-to-face time with patient with discussion multiple separate endocrine issues. Along with active medical decision-making in office today.     Naveed Patel M.D  Endocrinology  [facility]  5/20/2024      Disclaimer: This note has been generated using voice-recognition software. There may be typographical errors that have been missed during proof-reading.

## 2024-05-20 NOTE — ASSESSMENT & PLAN NOTE
- patient with history of hyperparathyroidism  - patient also has history of recent hypercalcemia, suspected due to hydrochlorothiazide use combined with too much calcium intake.  - Calcium level- : Corrected 10.9 on 5/13/2024.   - Off Caltrate.   - Does take chronic hydrochlorothiazide due to lower extremity swelling and hypertension.  That can lead to hypercalcemia.  - I sent a message to the PCP for cessation of hydrochlorothiazide.  Patient is hesitant as she does not want to stop per fluid pill (similar to previous visit 11/2023)  - Continue monitoring situation

## 2024-05-24 DIAGNOSIS — N18.31 STAGE 3A CHRONIC KIDNEY DISEASE: ICD-10-CM

## 2024-05-24 DIAGNOSIS — E11.59 CONTROLLED TYPE 2 DIABETES MELLITUS WITH OTHER CIRCULATORY COMPLICATION, WITHOUT LONG-TERM CURRENT USE OF INSULIN: ICD-10-CM

## 2024-05-24 NOTE — TELEPHONE ENCOUNTER
----- Message from Nicole Cardoso sent at 5/24/2024 10:56 AM CDT -----  Type: Patient Call Back    Who called: self     What is the request in detail:pt is requesting to have her semaglutide (OZEMPIC) 1 mg/dose (4 mg/3 mL) transferred to  [facility] [place] - [address]   Phone: [phone]  Fax: [phone]          Can the clinic reply by [facility]? no    Would the patient rather a call back or a response via [facility]?  call    Best call back number: .[phone] (home)      Additional Information:

## 2024-05-28 RX ORDER — SEMAGLUTIDE 1.34 MG/ML
INJECTION, SOLUTION SUBCUTANEOUS
Qty: 3 ML | Refills: 6 | Status: SHIPPED | OUTPATIENT
Start: 2024-05-28

## 2024-06-10 ENCOUNTER — HOSPITAL ENCOUNTER (OUTPATIENT)
Dept: RADIOLOGY | Facility: CLINIC | Age: 74
Discharge: HOME | End: 2024-06-10
Attending: HOSPITALIST
Payer: MEDICARE

## 2024-06-10 DIAGNOSIS — E21.3 HYPERPARATHYROIDISM: ICD-10-CM

## 2024-06-10 DIAGNOSIS — M81.0 OSTEOPOROSIS, POST-MENOPAUSAL: ICD-10-CM

## 2024-06-10 PROCEDURE — 77080 DXA BONE DENSITY AXIAL: CPT | Mod: TC,PO

## 2024-06-10 PROCEDURE — 77081 DXA BONE DENSITY APPENDICULR: CPT | Mod: TC,59,PO

## 2024-06-10 NOTE — TELEPHONE ENCOUNTER
Care Due:                  Date            Visit Type   Department     Provider  --------------------------------------------------------------------------------                                [facility]      MED/ INTERNAL  Citra Ericka  Last Visit: 06-      CARE ([facility])   MED/ PEDS      Ehrensing                              [facility]/ INTERNAL  [facility]  Next Visit: 07-      CARE ([facility])   MED/ PEDS      Ehrensing                                                            Last  Test          Frequency    Reason                     Performed    Due Date  --------------------------------------------------------------------------------    Lipid Panel.  12 months..  rosuvastatin.............  02- 02-    [facility] Embedded Care Due Messages. Reference number: [id number].   6/10/2024 1:44:40 PM CDT

## 2024-06-10 NOTE — TELEPHONE ENCOUNTER
Refill Routing Note   Medication(s) are not appropriate for processing by [facility] for the following reason(s):        Required vitals abnormal: BP (!) 160/84     [facility] action(s):  Defer     Requires labs : Yes    Medication Therapy Plan:  Labs (lipid panel) outdated, last drawn 2021      Appointments  past 12m or future 3m with PCP    Date Provider   Last Visit   6/27/2023 Venancio Mayer MD   Next Visit   7/11/2024 Venancio Mayer MD   ED visits in past 90 days: 0        Note composed:3:40 PM 06/10/2024

## 2024-06-11 RX ORDER — AMLODIPINE BESYLATE 10 MG/1
10 TABLET ORAL DAILY
Qty: 90 TABLET | Refills: 0 | Status: SHIPPED | OUTPATIENT
Start: 2024-06-11

## 2024-07-10 ENCOUNTER — TELEPHONE (OUTPATIENT)
Dept: FAMILY MEDICINE | Facility: CLINIC | Age: 74
End: 2024-07-10
Payer: MEDICARE

## 2024-07-11 ENCOUNTER — OFFICE VISIT (OUTPATIENT)
Dept: FAMILY MEDICINE | Facility: CLINIC | Age: 74
End: 2024-07-11
Payer: MEDICARE

## 2024-07-11 VITALS
SYSTOLIC BLOOD PRESSURE: 140 MMHG | HEIGHT: 64 IN | BODY MASS INDEX: 22.58 KG/M2 | DIASTOLIC BLOOD PRESSURE: 74 MMHG | TEMPERATURE: 98 F | WEIGHT: 132.25 LBS | OXYGEN SATURATION: 95 % | HEART RATE: 78 BPM

## 2024-07-11 DIAGNOSIS — I70.0 AORTIC ATHEROSCLEROSIS: ICD-10-CM

## 2024-07-11 DIAGNOSIS — Z79.4 LONG-TERM INSULIN USE: ICD-10-CM

## 2024-07-11 DIAGNOSIS — M81.0 OSTEOPOROSIS, POST-MENOPAUSAL: ICD-10-CM

## 2024-07-11 DIAGNOSIS — E11.65 TYPE 2 DIABETES MELLITUS WITH HYPERGLYCEMIA, WITH LONG-TERM CURRENT USE OF INSULIN: ICD-10-CM

## 2024-07-11 DIAGNOSIS — Z00.00 ROUTINE MEDICAL EXAM: Primary | ICD-10-CM

## 2024-07-11 DIAGNOSIS — Z79.4 TYPE 2 DIABETES MELLITUS WITH STAGE 3A CHRONIC KIDNEY DISEASE, WITH LONG-TERM CURRENT USE OF INSULIN: ICD-10-CM

## 2024-07-11 DIAGNOSIS — Z12.31 ENCOUNTER FOR SCREENING MAMMOGRAM FOR BREAST CANCER: ICD-10-CM

## 2024-07-11 DIAGNOSIS — E11.9 DM TYPE 2 WITHOUT RETINOPATHY: ICD-10-CM

## 2024-07-11 DIAGNOSIS — J43.2 CENTRILOBULAR EMPHYSEMA: ICD-10-CM

## 2024-07-11 DIAGNOSIS — C34.31 PRIMARY MALIGNANT NEOPLASM OF RIGHT LOWER LOBE OF LUNG: ICD-10-CM

## 2024-07-11 DIAGNOSIS — N18.32 STAGE 3B CHRONIC KIDNEY DISEASE: ICD-10-CM

## 2024-07-11 DIAGNOSIS — I50.30 HEART FAILURE WITH PRESERVED EJECTION FRACTION, UNSPECIFIED HF CHRONICITY: ICD-10-CM

## 2024-07-11 DIAGNOSIS — J44.9 CHRONIC OBSTRUCTIVE PULMONARY DISEASE, UNSPECIFIED COPD TYPE: ICD-10-CM

## 2024-07-11 DIAGNOSIS — I73.9 PVD (PERIPHERAL VASCULAR DISEASE): ICD-10-CM

## 2024-07-11 DIAGNOSIS — I65.23 BILATERAL CAROTID ARTERY STENOSIS: ICD-10-CM

## 2024-07-11 DIAGNOSIS — M46.99 UNSPECIFIED INFLAMMATORY SPONDYLOPATHY, MULTIPLE SITES IN SPINE: ICD-10-CM

## 2024-07-11 DIAGNOSIS — N18.31 TYPE 2 DIABETES MELLITUS WITH STAGE 3A CHRONIC KIDNEY DISEASE, WITH LONG-TERM CURRENT USE OF INSULIN: ICD-10-CM

## 2024-07-11 DIAGNOSIS — Z86.010 HISTORY OF COLONIC POLYPS: ICD-10-CM

## 2024-07-11 DIAGNOSIS — E83.52 HYPERCALCEMIA: ICD-10-CM

## 2024-07-11 DIAGNOSIS — E11.22 TYPE 2 DIABETES MELLITUS WITH STAGE 3A CHRONIC KIDNEY DISEASE, WITH LONG-TERM CURRENT USE OF INSULIN: ICD-10-CM

## 2024-07-11 DIAGNOSIS — D64.9 ANEMIA, UNSPECIFIED TYPE: ICD-10-CM

## 2024-07-11 DIAGNOSIS — Z79.4 TYPE 2 DIABETES MELLITUS WITH HYPERGLYCEMIA, WITH LONG-TERM CURRENT USE OF INSULIN: ICD-10-CM

## 2024-07-11 DIAGNOSIS — I10 ESSENTIAL HYPERTENSION: ICD-10-CM

## 2024-07-11 DIAGNOSIS — I73.9 PVD (PERIPHERAL VASCULAR DISEASE) WITH CLAUDICATION: ICD-10-CM

## 2024-07-11 DIAGNOSIS — F11.20 OPIOID DEPENDENCE, UNCOMPLICATED: ICD-10-CM

## 2024-07-11 DIAGNOSIS — N18.31 STAGE 3A CHRONIC KIDNEY DISEASE: ICD-10-CM

## 2024-07-11 PROCEDURE — 99999 PR PBB SHADOW E&M-EST. PATIENT-LVL III: CPT | Mod: PBBFAC,,, | Performed by: INTERNAL MEDICINE

## 2024-07-11 RX ORDER — FUROSEMIDE 20 MG/1
20 TABLET ORAL DAILY PRN
Qty: 30 TABLET | Refills: 3 | Status: SHIPPED | OUTPATIENT
Start: 2024-07-11 | End: 2025-07-11

## 2024-07-11 RX ORDER — IRBESARTAN 150 MG/1
150 TABLET ORAL NIGHTLY
Qty: 90 TABLET | Refills: 3 | Status: SHIPPED | OUTPATIENT
Start: 2024-07-11 | End: 2025-07-11

## 2024-07-11 NOTE — PROGRESS NOTES
Chief complaint: Physical exam, last seen 6/23        74 -year-old black female  whose PCP is retired.  Regarding health maintenance she is up-to-date on mammogram 7/23 - She prefers to do a mammogram every two years but apparently that is breast cancer in the family so now she wants to get it yearly and I put the order in .  She also wanted to hold off setting up colonoscopy since done with radiation so wants to set that up as well..  It looks like she had colon polyps in 2013, 1 polyp 4/18--5 yrs. NORMAL 12/23 -no further rec.  She did quit smoking but then with the pandemic she resume.  She was able to stop on her own in the past.  We discussed the smoking cessation clinic but she was able to do it on her own in the past so she defers..    ROS:   CONST: weight stable. EYES: no vision change. ENT: no sore throat. CV: no chest pain w/ exertion. RESP: no shortness of breath. GI: no nausea, vomiting, diarrhea. No dysphagia. : no urinary issues. MUSCULOSKELETAL: no new myalgias or arthralgias. SKIN: no new changes. NEURO: no focal deficits. PSYCH: no new issues. ENDOCRINE: no polyuria. HEME: no lymph nodes. ALLERGY: no general pruritis.    Past Medical History   Diagnosis Date    Cataracts, bilateral     CKD (chronic kidney disease) stage 3, GFR 30-59 ml/min 12/15/2014    Combined hyperlipidemia associated with type 2 diabetes mellitus 6/7/2013    COPD (chronic obstructive pulmonary disease) 12/15/2014    Diabetes mellitus type II----with chronic kidney disease           Diabetes mellitus with renal manifestations, uncontrolled 2/1/2017    Diabetic retinopathy     Family history of stomach cancer 12/5/2013    H/O renal cell carcinoma 12/15/2015    History of colonic polyps 2/1/2017     3 polyps 7/13 ---1 polyp 4/18--5 yrs -NORMAL 12/23 -no further rec    History of gastroesophageal reflux (GERD)     History of urinary incontinence      s/p bladder lift-october, 2011 (at [facility])    Hyperlipidemia     Hypertension       120s/70s-80s    Nephrolithiasis     Osteoporosis, post-menopausal, evaluated by Dr. York, quit Fosamax due to CKD early 2017  3/17/2014    Primary hyperparathyroidism 10/20/2016    Schatzki's ring 2/1/2017     Dilated 2014   Prevnar 2017    Past Surgical History:   Procedure Laterality Date    bladder lift  2011    done at [facility]    BLADDER STONE REMOVAL  2011    before bladder lift    CATARACT EXTRACTION W/  INTRAOCULAR LENS IMPLANT Left 06/07/2016    Dr. Vásquez    CATARACT EXTRACTION W/  INTRAOCULAR LENS IMPLANT Right 06/21/2016    Dr. Vásquez    COLONOSCOPY N/A 4/26/2018    Procedure: COLONOSCOPY;  Surgeon: Benjamin Zamora MD;  Location: [facility] ENDO;  Service: Endoscopy;  Laterality: N/A;  confirmed ss    COLONOSCOPY N/A 12/21/2023    Procedure: COLONOSCOPY;  Surgeon: Jamel Luis MD;  Location: [facility] ENDO;  Service: Endoscopy;  Laterality: N/A;  9/7 ref Venancio Mayer MD, Suprep, instr. mailed, meds-st  12/14- instr reviewed, pt reminded to hold ozempic, pre call complete.  DBM    CYSTOSCOPY      INJECTION OF ANESTHETIC AGENT AROUND MULTIPLE INTERCOSTAL NERVES  12/27/2022    Procedure: BLOCK, NERVE, INTERCOSTAL, 2 OR MORE;  Surgeon: Paxton Cannon MD;  Location: [facility] OR 2ND FLR;  Service: Thoracic;;    LYSIS OF ADHESIONS  12/27/2022    Procedure: LYSIS, ADHESIONS;  Surgeon: Paxton Cannon MD;  Location: [facility] OR 2ND FLR;  Service: Thoracic;;    NAVIGATIONAL BRONCHOSCOPY N/A 12/11/2018    Procedure: BRONCHOSCOPY, NAVIGATIONAL;  Surgeon: Ashtyn Ramires MD;  Location: [facility] OR 2ND FLR;  Service: Pulmonary;  Laterality: N/A;    NEPHRECTOMY      partial right    ROBOTIC BRONCHOSCOPY N/A 10/25/2022    Procedure: ROBOTIC BRONCHOSCOPY;  Surgeon: Ashtyn Ramires MD;  Location: [facility] OR 2ND FLR;  Service: Pulmonary;  Laterality: N/A;    VIDEO-ASSISTED THORACOSCOPIC SURGERY (VATS)  12/27/2022    Procedure: VATS (VIDEO-ASSISTED THORACOSCOPIC SURGERY);  Surgeon: Paxton Cannon MD;  Location: [facility] OR  2ND FLR;  Service: Thoracic;;     Social History     Socioeconomic History    Marital status:    Occupational History    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   Tobacco Use    Smoking status: Every Day     Current packs/day: 0.25     Average packs/day: 0.3 packs/day for 30.0 years (7.5 ttl pk-yrs)     Types: Cigarettes    Smokeless tobacco: Never   Substance and Sexual Activity    Alcohol use: No     Alcohol/week: 0.0 standard drinks of alcohol    Drug use: No   Social History Narrative    Lives on [facility]    Here with sister Kate [phone]         family history includes Cataracts in her mother; Colon cancer in her brother; Glaucoma in her mother; Nephrolithiasis in her sister; No Known Problems in her father, maternal aunt, maternal grandfather, maternal grandmother, maternal uncle, paternal aunt, paternal grandfather, paternal grandmother, and paternal uncle.     Gen: no distress  EYES: conjunctiva clear, non-icteric, PERRL  ENT: nose clear, nasal mucosa normal, oropharynx clear and moist, teeth good  NECK:supple, thyroid non-palpable  RESP: effort is good, lungs clear  CV: heart RRR w/o murmur, gallops or rubs; no carotid bruits, no edema at all today even at the ankles  GI: abdomen soft, non-distended, non-tender, no hepatosplenomegaly  MS: gait normal, no clubbing or cyanosis of the digits  SKIN: no rashes, warm to touch    Sarina was seen today for establish care.    Diagnoses and all orders for this visit:    Routine medical exam    Encounter for screening mammogram for breast cancer  -     Mammo Digital Screening Bilat; Future    History of colonic polyps  -     Ambulatory referral/consult to Endo Procedure ; Future                                                  Additional evaluation and management issues:    Additionally, patient has numerous other medical issues to address today.  She has hypertension which is running high at home but stable.  Today it is only 140 and we discussed  nurse blood pressure check to make sure her home monitor is accurate.  Was running good but she believes her pharmacy is switching the manufacture of her meds and that is the cause.  Looks like she was taken off of the HCTZ and is now on Avapro 150 which we will increase to 300 but somehow back on 150 w HCTZ  She was getting a little bit of edema on Norvasc 5 mg then went to 10 mg although edema as not really even there  She does get some edema at the end of the day that is gone in the morning and I reassured her about that but she is concerned and on Norvasc 5 mg.  Blood pressure is excellent and we will discontinue and have her monitor.  She was on Diovan but due to availability issues we had switched her to irbesartan/HCTZ.. then low K and off HCTZ and now on norvasc up to 10 mg.  She does get some intermittent swelling occasionally and her prior endocrinologist did give her some Lasix to take p.r.n. but we discussed the negative issues of taking diuretics but will provide her with some.    Apparently she had an episode where she was feeling weak and blood pressure was high and so that is probably how all of her antihypertensives got escalated again.  She needs to monitor.  Calcium really was not that high but current endocrinologist wanted her to have the HCTZ removed.  She was taking calcium supplements as well and she stopped that is so perhaps if needed we could restart HCTZ at a lower dose and stay away from the calcium supplements and she might be fine.    Aortic Valve:  The aortic valve is mildly sclerotic with normal leaflet mobility.  Apparently it or wellness visit a murmur was heard about on my exam today she has no murmur carotid bruits that would warrant a repeat echo.    Regarding her chronic pain she did use 1 tramadol daily usually taking it in the middle of the morning and that allows her to exercise and carry on her daily functions.  Her use appears to be appropriate. Now on oxycodone it appears      Interval events reviewed.  She is in good spirits with having had her attempt for surgical resection and now plans to have radiation therapy.    CT surg  Procedure(s) and date(s):   10/25/22 - Robotic Bronchoscopy  12/27/22 - Right VATS, adhesiolysis, intercostal nerve block 2 or more intercostal levels   Pathology:   10/25/22 - RLL squamous cell carcinoma. LN 7 negative for malignancy, lymphocytes present.    Post-operative therapy:  surveillance  Mrs. Genao is a 72 y.o. female current smoker with CKD3, COPD, DM2, HLD, HTN, primary hyperparathyroidism, and h/o of renal cell carcinoma (2015) here today for 2 week follow up s/p aborted resection. Aborted resection secondary to intense adhesion formation coupled with hypercarbia and upper lobe predominant emphysema. No incisional pain. Breathing stable.     Seen [facility]  HPI/Focused ROS: Ms. Genao is a 71 y/o female who has been followed for pulmonary nodules with Dr. Ramires since 2018. Annual CT Chest 9/29/22 demonstrated interval enlargement of a RLL nodule to 1.5 cm (up from 0.7 cm in 9/2021). Note was also made of a stable LLL 2.3 cm nodule. Attempt at biopsy of the LLL nodule in 12/2018 w/ EBUS was non-diagnostic for malignancy. She underwent robotic bronch w/ EBUS w/ Dr. Ramires on 10/25/22 with biopsy of the RLL nodule revealing squamous cell carcinoma; biopsy of LLL nodule and Station 7 nodes negative for malignant cells. PET/CT 11/25/22 demonstrated uptake in the RLL nodule only. She met with Dr. Cannon and elected to undergo resection. This was attempted on 12/27/22 but aborted due to significant adhesions as well as progressive hypercarbia and concern for pulmonary function following lobectomy.  This was treated with definitive SBRT 50 Gy in 4 fx completed 2/3/23.     CT Chest 4/26/23 demonstrated interval decrease in size of treated RLL nodule down to 1.1 cm; other known nodules all stable. Currently no evidence of disease.      She has diabetes  which is still uncontrolled and apparently back to seeing Endocrine and her most recent A1c she says was 5.6, 6.3, 6.9, 6.4, 5.6.  was on insulin and now only the Ozempic injection but off of the glyburide which I would agree with based upon her age and overall good control...  She is not taking metformin or glipizide or glimepiride so I took those off the list.  She says her sugars are running better.  She does take glipizide maybe once a month when she eats poorly.    She does have chronic renal insufficiency and follows with nephrology.  I explained her chronic kidney disease.  Her GFR is stable      Regarding osteoporosis.  Sees endocrinologist  -Patel  Interval hx: Here for follow up,   Chronic osteoporosis, on SC prolia every 6 months, need DXA 2024>> patient has this scheduled  She does eat dairy and take MVN  Recent falls? No, Recent fractures? No   Mild hypercalcemia noted: Corrected 10.9 on 5/13/2024. Off Caltrate.  Does take chronic hydrochlorothiazide due to lower extremity swelling and hypertension.  That can lead to hypercalcemia.  Type 2 diabetes: A1C is 6.8%,  On Ozempic. Dietary indiscretion, increase in A1c.  Is compliant with Ozempic.  No issues with injection.  3x a week: twice a day, range about 130 denies hypoglycemia   1) Osteoporosis  - Diagnosis on DXA in 2018  - Prolia:  Initially started on 9/17/2018 - until 4/29/2020 (4 injections),>> not in our system, unable to get new Prolia from her endocrinologist due to cost  - Restarted: PROLIA injection >>> started 5/14/2021- every 6 months- to current (last injection 5/13/2024)> 7 injections  - Bone density is improving 2/2018> compare to 6/5/2020>> 6/5/2022  - Vit D: 1000iu daily, on MVN     She gets occasional reflux and was occasionally using zantac and will send omeprazole    She apparently is primary hyperparathyroidism and a history of hypercalcemia but all recent labs appear to be normal as well as PTH.  Thyroid ultrasound from 2016 did  have a parathyroid adenoma that Endocrine may need to reassess      Vitamin D level also normal.  She apparently was on a weekly medication  for osteoporosis but d/c 2017 for CKD?  Bone density from prior reviewed noting slight decline in the hip     She has a history of COPD but no active symptoms lately.      She has hyperlipidemia and may been on Lipitor in the past with an unremembered intolerance.  We discussed benefits of statin and her LDL has responded to crestor .      All these issues reviewed and patient counseled an evaluation and management of all the separate issues we based upon medical decision making as below          Assessment and plan:            Essential hypertension, chronic and stable but due to the calcium Endocrine would like a trial of being off HCTZ and we have done this in the past.  She needs to watch and we might increase irbesartan to 300 which she tolerated in the past apparently.  She does get intermittent edema possibly related to the Norvasc which we might be able to reduce.  She was on some calcium supplements that she stopped so perhaps she might tolerate the HCTZ in the future at a lower dose.    DM type 2 without retinopathy, chronic and stable    Type 2 diabetes mellitus with hyperglycemia, with long-term current use of insulin    Type 2 diabetes mellitus with stage 3a chronic kidney disease, with long-term current use of insulin    Stage 3a chronic kidney disease, chronic and stable, issues with diuretics as above    Osteoporosis, post-menopausal, followed by Endocrine    PVD (peripheral vascular disease) with claudication, chronic and stable    Chronic obstructive pulmonary disease, unspecified COPD type, chronic and stable and still smokes    Long-term insulin use    Centrilobular emphysema    PVD (peripheral vascular disease), no claudication    Bilateral carotid artery stenosis, no TIA symptoms    Anemia, unspecified type, chronic and stable    Hypercalcemia, off  supplements and will get her off HCTZ and looks like Endocrine has labs set up in the future    Heart failure with preserved ejection fraction, unspecified HF chronicity, you euvolemic    Aortic atherosclerosis, chronic and stable, lipids okay    Stage 3b chronic kidney disease    Primary malignant neoplasm of right lower lobe of lung, status post surgical treatment    Opioid dependence, uncomplicated    Unspecified inflammatory spondylopathy, multiple sites in spine    Other orders  -     irbesartan (AVAPRO) 150 MG tablet; Take 1 tablet (150 mg total) by mouth every evening.  -     furosemide (LASIX) 20 MG tablet; Take 1 tablet (20 mg total) by mouth daily as needed.

## 2024-08-12 RX ORDER — FUROSEMIDE 20 MG/1
TABLET ORAL
Qty: 30 TABLET | Refills: 3 | Status: SHIPPED | OUTPATIENT
Start: 2024-08-12

## 2024-08-12 NOTE — TELEPHONE ENCOUNTER
Care Due:                  Date            Visit Type   Department     Provider  --------------------------------------------------------------------------------                                EP [facility]                              PRIMARY      MED/ INTERNAL  Veanncio Barnett  Last Visit: 07-      CARE ([facility])   MED/ PEDS      Ehrensing  Next Visit: None Scheduled  None         None Found                                                            Last  Test          Frequency    Reason                     Performed    Due Date  --------------------------------------------------------------------------------    CMP.........  12 months..  rosuvastatin.............  10-   10-    Lipid Panel.  12 months..  rosuvastatin.............  02- 02-    Health [facility] Embedded Care Due Messages. Reference number: [id number].   8/12/2024 9:30:19 AM CDT

## 2024-08-12 NOTE — TELEPHONE ENCOUNTER
Refill Routing Note   Medication(s) are not appropriate for processing by [facility] for the following reason(s):        Outside of protocol    [facility] action(s):  Route   Requires labs : Yes      Medication Therapy Plan: PRN USAGE(OP)      Appointments  past 12m or future 3m with PCP    Date Provider   Last Visit   7/11/2024 Venancio Mayer MD   Next Visit   Visit date not found Venancio Mayer MD   ED visits in past 90 days: 0        Note composed:11:53 AM 08/12/2024

## 2024-08-16 DIAGNOSIS — N39.41 URINARY INCONTINENCE, URGE: ICD-10-CM

## 2024-08-16 NOTE — TELEPHONE ENCOUNTER
No care due was identified.  Health [facility] Embedded Care Due Messages. Reference number: [id number].   8/16/2024 11:49:04 AM CDT

## 2024-08-17 RX ORDER — OXYBUTYNIN CHLORIDE 5 MG/1
TABLET, EXTENDED RELEASE ORAL
Qty: 90 TABLET | Refills: 3 | Status: SHIPPED | OUTPATIENT
Start: 2024-08-17

## 2024-08-17 NOTE — TELEPHONE ENCOUNTER
Refill Decision Note   Sarina Genao  is requesting a refill authorization.  Brief Assessment and Rationale for Refill:  Approve     Medication Therapy Plan:         Comments:     Note composed:2:21 AM 08/17/2024

## 2024-09-13 ENCOUNTER — LAB VISIT (OUTPATIENT)
Dept: LAB | Facility: HOSPITAL | Age: 74
End: 2024-09-13
Attending: HOSPITALIST
Payer: MEDICARE

## 2024-09-13 DIAGNOSIS — E55.9 VITAMIN D DEFICIENCY: ICD-10-CM

## 2024-09-13 DIAGNOSIS — E11.59 CONTROLLED TYPE 2 DIABETES MELLITUS WITH OTHER CIRCULATORY COMPLICATION, WITHOUT LONG-TERM CURRENT USE OF INSULIN: ICD-10-CM

## 2024-09-13 DIAGNOSIS — M81.0 OSTEOPOROSIS, POST-MENOPAUSAL: ICD-10-CM

## 2024-09-13 LAB
25(OH)D3+25(OH)D2 SERPL-MCNC: 33 NG/ML (ref 30–96)
ALBUMIN SERPL BCP-MCNC: 3.7 G/DL (ref 3.5–5.2)
ANION GAP SERPL CALC-SCNC: 14 MMOL/L (ref 8–16)
BUN SERPL-MCNC: 13 MG/DL (ref 8–23)
CA-I BLDV-SCNC: 1.26 MMOL/L (ref 1.06–1.42)
CALCIUM SERPL-MCNC: 10.4 MG/DL (ref 8.7–10.5)
CHLORIDE SERPL-SCNC: 104 MMOL/L (ref 95–110)
CO2 SERPL-SCNC: 25 MMOL/L (ref 23–29)
CREAT SERPL-MCNC: 1.3 MG/DL (ref 0.5–1.4)
EST. GFR  (NO RACE VARIABLE): 42.6 ML/MIN/1.73 M^2
ESTIMATED AVG GLUCOSE: 160 MG/DL (ref 68–131)
GLUCOSE SERPL-MCNC: 95 MG/DL (ref 70–110)
HBA1C MFR BLD: 7.2 % (ref 4–5.6)
PHOSPHATE SERPL-MCNC: 3 MG/DL (ref 2.7–4.5)
POTASSIUM SERPL-SCNC: 3.4 MMOL/L (ref 3.5–5.1)
SODIUM SERPL-SCNC: 143 MMOL/L (ref 136–145)

## 2024-09-13 PROCEDURE — 36415 COLL VENOUS BLD VENIPUNCTURE: CPT | Mod: PO | Performed by: HOSPITALIST

## 2024-09-13 PROCEDURE — 82330 ASSAY OF CALCIUM: CPT | Performed by: HOSPITALIST

## 2024-09-13 PROCEDURE — 83036 HEMOGLOBIN GLYCOSYLATED A1C: CPT | Performed by: HOSPITALIST

## 2024-09-13 PROCEDURE — 80069 RENAL FUNCTION PANEL: CPT | Performed by: HOSPITALIST

## 2024-09-13 PROCEDURE — 82306 VITAMIN D 25 HYDROXY: CPT | Performed by: HOSPITALIST

## 2024-09-20 ENCOUNTER — OFFICE VISIT (OUTPATIENT)
Dept: ENDOCRINOLOGY | Facility: CLINIC | Age: 74
End: 2024-09-20
Payer: MEDICARE

## 2024-09-20 VITALS
DIASTOLIC BLOOD PRESSURE: 86 MMHG | BODY MASS INDEX: 23.28 KG/M2 | SYSTOLIC BLOOD PRESSURE: 150 MMHG | WEIGHT: 135.63 LBS | HEART RATE: 93 BPM

## 2024-09-20 DIAGNOSIS — E83.52 HYPERCALCEMIA: ICD-10-CM

## 2024-09-20 DIAGNOSIS — M81.0 OSTEOPOROSIS, POST-MENOPAUSAL: Primary | ICD-10-CM

## 2024-09-20 DIAGNOSIS — I10 HYPERTENSION, BENIGN: ICD-10-CM

## 2024-09-20 DIAGNOSIS — E21.3 HYPERPARATHYROIDISM: ICD-10-CM

## 2024-09-20 DIAGNOSIS — N18.31 STAGE 3A CHRONIC KIDNEY DISEASE: ICD-10-CM

## 2024-09-20 DIAGNOSIS — E55.9 VITAMIN D DEFICIENCY: ICD-10-CM

## 2024-09-20 DIAGNOSIS — E11.59 CONTROLLED TYPE 2 DIABETES MELLITUS WITH OTHER CIRCULATORY COMPLICATION, WITHOUT LONG-TERM CURRENT USE OF INSULIN: ICD-10-CM

## 2024-09-20 DIAGNOSIS — E11.65 TYPE 2 DIABETES MELLITUS WITH HYPERGLYCEMIA, WITHOUT LONG-TERM CURRENT USE OF INSULIN: ICD-10-CM

## 2024-09-20 DIAGNOSIS — F17.200 SMOKER: ICD-10-CM

## 2024-09-20 PROCEDURE — 99999 PR PBB SHADOW E&M-EST. PATIENT-LVL IV: CPT | Mod: PBBFAC,,, | Performed by: HOSPITALIST

## 2024-09-20 RX ORDER — SEMAGLUTIDE 0.68 MG/ML
0.5 INJECTION, SOLUTION SUBCUTANEOUS
Qty: 3 ML | Refills: 5 | Status: SHIPPED | OUTPATIENT
Start: 2024-09-20

## 2024-09-20 NOTE — ASSESSMENT & PLAN NOTE
- Diabetes is AT goal: But A1c increasing, as indicated by the most recent A1C reviewed on 9/20/2024.  - Therapy Goals: Discussed the aim to achieve optimal control without causing hypoglycemia, targeting an A1C of <7%.  - Diabetic Supplies and Medications: Reviewed to ensure continued refills and compliance.  - Preventive Care: Advised the patient to schedule periodic eye exams and maintain regular foot care monitoring.  - Annual Monitoring: Reviewed the importance of yearly lipid profile (with statin use) and urine protein/creatinine tests.    Plan  - Medication changes:  Patient reports that she has been skipping Ozempic due to concern for weight loss  - long discussion with patient and family, she is amenable in starting Ozempic 0.5 mg once a week (lower dose)  - Dietary Modifications: Encouraged portion size control and reduction of carbohydrate intake to better manage diabetes. >>Referred to diabetes education  - Clear written instructions provided on AVS. Follow-up scheduled within the next 6 months with lab work prior.   - Appointment date and time were provided to the patient today.

## 2024-09-20 NOTE — ASSESSMENT & PLAN NOTE
- Management of long-term osteoporosis, has been on chronic Prolia possibly since 2018 to 2020, restarted 2021 to current  - Bone density is improving 2/2018> compare to 6/5/2020>> 6/5/2022>> 6/10/2024   - Bone density:  Review 6/18/2024 with improvement in bone density in femoral neck 2%  - Does have history of falls, no history of fracture  - CKD stage IIIA/B    Plan  - DISCUSS WITH PATIENT 9/20/2024: SHE PREFERS TO CONTINUE PROLIA, did offered to switch to IV Reclast  - Continue Prolia injection every 6 months  - Recommend the patient take vitamin D3 OTC, monitor for hypercalcemia  - Check routine lab work: check Renal Panel, vitamin-D regularly   - Fall precautions/Exercise regimen: advised, (weight bearing exercises recommended)  - Routine dental health screening advised, dental cleaning every 6 months.   - Next DXA: 2 years from most current, 6/2026  - Routine follow-up with me every 6 months

## 2024-09-20 NOTE — PROGRESS NOTES
Subjective:      Patient ID: Sarina Genao is a 74 y.o. female presented to [facility] on 9/20/2024.  Chief Complaint:  Osteoporosis    History of Present Illness: Sarina Genao is a 74 y.o. female here for management of Multiple endocrine issues  Other significant past medical history:  Type 2 diabetes, osteoporosis, hypercalcemia Hypertension, hyperlipidemia, totab use    Interval hx: Here for follow up for diabetes and osteoporosis  Chronic osteoporosis, on [place] prolia every 6 months, doing well, next injection next month.    Patient recently got DXA 06/2024 with improvement in bone density in femoral neck 2%  She does eat dairy and take MVN  6/10/2024 bone density review, when compared to 2022, 2% improvement in total hip, overall continue improvement while on Prolia  Recent falls? No, Recent fractures? No   Mild hypercalcemia noted: Corrected 10.9 on 5/13/2024. Off Caltrate.  Previously on hydrochlorothiazide, this was stopped 7/11/2024 leading to resolution of hypercalcemia   Does take chronic hydrochlorothiazide due to lower extremity swelling and hypertension.  That can lead to hypercalcemia.  Type 2 diabetes: A1C is 7.1%,  On Ozempic. Dietary indiscretion, patient concerned about weight loss.  Has not been injecting her Ozempic regularly      1) Osteoporosis  - Diagnosis on DXA in 2018  - Prolia:  Initially started on 9/17/2018 - until 4/29/2020 (4 injections),>> not in our system, unable to get new Prolia from her endocrinologist due to cost  - currently PROLIA injection >>> started 5/14/2021- every 6 months- to current (last injection 5/13/2024)> 11 injections  - Bone density is improving 2/2018> compare to 6/5/2020>> 6/5/2022>> 6/10/2024   - Vit D: 1000iu daily, on MVN  - DXA: 6/10/2024 bone density review, when compared to 2022, 2% improvement in total hip, overall continue improvement while on Prolia  - DISCUSS WITH PATIENT 9/20/2024: SHE PREFERS TO CONTINUE PROLIA    -  Current diarrhea or h/o malabsorption? no  - Height loss (>2 inches)? no  - Family hx of Osteoporosis: mother, possibly sister  - History of Cancer? Kindey cancer, nephrectomy, no issue  - Malignancy involving bone (active or history)? no  - Prior radiation treatment? no  - Post-menopausal? Age?: 50s  - No hysterectomy  - Tobacco use ?  Yes, just restarted due to covid stress  - EtOH use? no (<2 drinks a day in female, <3 drinks a day for male)  - Weight bearing exercise?   Yes, walking 30 mins, 3x  - Fall Precautions? yes, get out the chair without using their arms  - Dental work planned? No, dentures    Bone density:  Review  6/18/2024  Lumbar spine (L1-L4): T-score is -1.6, and Z-score is +0.1.  Compared with previous DXA, BMD at the lumbar spine has remained stable.  Femoral neck: T-score is -2.2, and Z-score is -0.8.  Total hip: T-score is -2.2, and Z-score is -1.0.  Compared with previous DXA, BMD at the total hip has remained stable.  Distal 1/3 radius: T-score is -2.0, and Z-score is +0.6.  Compared with previous DXA, BMD at the distal 1/3 radius has remained stable.     Fracture Risk (FRAX)  6.8% risk of a major osteoporotic fracture in the next 10 years.  2.8% risk of hip fracture in the next 10 years.    Impression:  *Osteoporosis on treatment with Prolia  *Tobacco smoking is associated with bone loss and increased risk of fracture.    6/9/2022  Lumbar spine (L1-L4): BMD is 0.876 g/cm2, T-score is -1.6, and Z-score is 0.1.  Total hip: BMD is 0.661 g/cm2, T-score is -2.3, and Z-score is -1.1.  Femoral neck: BMD is 0.635 g/cm2, T-score is -1.9, and Z-score is -0.7.  Distal 1/3 radius: BMD is 0.552 g/cm2, T-score is -2.4, and Z-score is 0.1.     FRAX:  6% risk of a major osteoporotic fracture in the next 10 years.  2.1% risk of hip fracture in the next 10 years.    Impression:  *Osteoporosis on treatment with denosumab.  Bone density in osteopenic range approaching osteoporosis at the distal 1/3 forearm with  FRAX not suggesting treatment.  *Compared with previous DXA, BMD at the distal forearm has decreased by -3.8%,  BMD at the lumbar spine has remained stable, and the BMD at the total hip has increased by 7.9%.    Lab work reviewed  Lab Results   Component Value Date    PTH 53.9 10/31/2023    .5 (H) 06/20/2023    PTH 35.6 04/06/2023    PTH 35.6 04/06/2023    PTH 35.6 04/06/2023    [id number] 33 09/13/2024    DUTYNSYR84AN 48 04/06/2023    EFFLIKTE65KX 48 04/06/2023    CALCIUM 10.4 09/13/2024    CALCIUM 10.6 (H) 05/13/2024    CALCIUM 10.6 (H) 05/13/2024    PHOS 3.0 09/13/2024    PHOS 3.6 05/13/2024    PHOS 3.1 10/31/2023    ALKPHOS 106 10/31/2023    ALKPHOS 97 04/06/2023    ALKPHOS 97 04/06/2023    ALKPHOS 97 04/06/2023    EGFRNORACEVR 42.6 (A) 09/13/2024    ALBUMIN 3.7 09/13/2024     Lab Results   Component Value Date    TSH 1.324 04/06/2023    LABCALC 6.9 06/24/2016    UYUPSFY33RBF 3 (L) 06/24/2016        2) Diabetes Mellitus Type 2  - Known diabetic complications: nephropathy  - Cardiovascular risk factors: advanced age (older than 55 for men, 65 for women), diabetes mellitus, dyslipidemia, sedentary lifestyle and smoking/ tobacco exposure  - Diagnosed w/ DM: in 2007  - Need to see Dr De La Torre for toe nail trim soon  - Current weight: 132 previous weight 139<138<145  - attempted to get Freestyle Jan 11/2023, unable due to cost and lack of insurance coverage as patient is not on insulin  - patient did not like weight loss, has been skipping Ozempic injections regularly 9/20/2024    Currently:    Ozempic 1mg Qweekly  Previous meds:              Was on MDI insulin   Glipizide   Home glucose checks: checks 1x a day, Logs reviewed/Unavailable: oral recall: 112  Weight trend: stable  Diabetes Education:  no  Diabetes Related Hospitalization: no  Hx of pancreatitis, hx of thyroid cancer: no  Family history of diabetes: yes       Eye exam current (within one year): yes, DR: no  Reports cuts or ulcers on feet: no, has  "podiatrist  Statin: Taking, ACE/ARB: Taking    Diabetes lab work  Lab Results   Component Value Date    HGBA1C 7.2 (H) 09/13/2024    HGBA1C 6.8 (H) 05/13/2024    HGBA1C 6.1 (H) 10/31/2023    HGBA1C 5.5 04/06/2023     No results found for: "CPEPTIDE", "GLUTAMICACID", "ISLETCELLANT"   No results found for: "FRUCTOSAMINE"  Lab Results   Component Value Date    MICALBCREAT 381.0 (H) 05/13/2024     No results found for: "OCQITKLL10"    Diabetes Management Status: Reviewed this office visit  Screening or Prevention Patient's value Goal Complete/Controlled?   Lipid profile Most Recent Lipid Panel Health Maintenance Topic Completion: Not Found Annually No     Dilated retinal exam : 10/31/2023 Annually No     Foot exam   Most Recent Foot Exam Date: Not Found Annually No        Reviewed past surgical, medical, family, social history and updated as appropriate.  Review of Systems: see HPI above    Objective:   BP (!) 150/86   Pulse 93   Wt 61.5 kg (135 lb 9.6 oz)   BMI 23.28 kg/m²     Body mass index is 23.28 kg/m².  Vital signs reviewed    Physical Exam  Vitals and nursing note reviewed.   Constitutional:       General: She is not in acute distress.     Appearance: Normal appearance. She is well-developed.   Neck:      Thyroid: No thyromegaly.   Cardiovascular:      Rate and Rhythm: Normal rate.      Heart sounds: Normal heart sounds.   Pulmonary:      Effort: Pulmonary effort is normal. No respiratory distress.   Abdominal:      Tenderness: There is no abdominal tenderness.   Musculoskeletal:         General: Normal range of motion.      Cervical back: Neck supple.   Skin:     General: Skin is warm.      Findings: No erythema.   Neurological:      Mental Status: She is alert and oriented to person, place, and time.       Lab Reviewed:  See results in subjective  Lab Results   Component Value Date    HGBA1C 7.2 (H) 09/13/2024     Lab Results   Component Value Date    CHOL 139 02/13/2021    HDL 41 02/13/2021    LDLCALC 66.2 " 02/13/2021    TRIG 159 (H) 02/13/2021    CHOLHDL 29.5 02/13/2021     Lab Results   Component Value Date     09/13/2024    K 3.4 (L) 09/13/2024     09/13/2024    CO2 25 09/13/2024    GLU 95 09/13/2024    BUN 13 09/13/2024    CREATININE 1.3 09/13/2024    CALCIUM 10.4 09/13/2024    PHOS 3.0 09/13/2024    PROT 7.3 10/31/2023    ALBUMIN 3.7 09/13/2024    BILITOT 0.6 10/31/2023    ALKPHOS 106 10/31/2023    AST 10 10/31/2023    ALT 8 (L) 10/31/2023    ANIONGAP 14 09/13/2024    EGFRNORACEVR 42.6 (A) 09/13/2024    TSH 1.324 04/06/2023    PTH 53.9 10/31/2023    EVQYWJBO16EO 33 09/13/2024      Assessment     1. Osteoporosis, post-menopausal        2. Controlled type 2 diabetes mellitus with other circulatory complication, without long-term current use of insulin        3. Stage 3a chronic kidney disease        4. Type 2 diabetes mellitus with hyperglycemia, without long-term current use of insulin  OZEMPIC 0.25 mg or 0.5 mg (2 mg/3 mL) pen injector    Comprehensive Metabolic Panel    HEMOGLOBIN A1C    Ambulatory referral/consult to Diabetes Education      5. Vitamin D deficiency  Vitamin D      6. Hypercalcemia        7. Hyperparathyroidism        8. Hypertension, benign        9. Smoker          Plan     Osteoporosis, post-menopausal  - Management of long-term osteoporosis, has been on chronic Prolia possibly since 2018 to 2020, restarted 2021 to current  - Bone density is improving 2/2018> compare to 6/5/2020>> 6/5/2022>> 6/10/2024   - Bone density:  Review 6/18/2024 with improvement in bone density in femoral neck 2%  - Does have history of falls, no history of fracture  - CKD stage IIIA/B    Plan  - DISCUSS WITH PATIENT 9/20/2024: SHE PREFERS TO CONTINUE PROLIA, did offered to switch to IV Reclast  - Continue Prolia injection every 6 months  - Recommend the patient take vitamin D3 OTC, monitor for hypercalcemia  - Check routine lab work: check Renal Panel, vitamin-D regularly   - Fall precautions/Exercise  regimen: advised, (weight bearing exercises recommended)  - Routine dental health screening advised, dental cleaning every 6 months.   - Next DXA: 2 years from most current, 6/2026  - Routine follow-up with me every 6 months    Type 2 diabetes mellitus with hyperglycemia, without long-term current use of insulin  - Diabetes is AT goal: But A1c increasing, as indicated by the most recent A1C reviewed on 9/20/2024.  - Therapy Goals: Discussed the aim to achieve optimal control without causing hypoglycemia, targeting an A1C of <7%.  - Diabetic Supplies and Medications: Reviewed to ensure continued refills and compliance.  - Preventive Care: Advised the patient to schedule periodic eye exams and maintain regular foot care monitoring.  - Annual Monitoring: Reviewed the importance of yearly lipid profile (with statin use) and urine protein/creatinine tests.    Plan  - Medication changes:  Patient reports that she has been skipping Ozempic due to concern for weight loss  - long discussion with patient and family, she is amenable in starting Ozempic 0.5 mg once a week (lower dose)  - Dietary Modifications: Encouraged portion size control and reduction of carbohydrate intake to better manage diabetes. >>Referred to diabetes education  - Clear written instructions provided on AVS. Follow-up scheduled within the next 6 months with lab work prior.   - Appointment date and time were provided to the patient today.     CKD (chronic kidney disease) stage 3, GFR 30-59 ml/min  - Renal function reviewed on lab work today, stable   - continue routine monitoring    Hypercalcemia  - patient with history of hyperparathyroidism  - patient also has history of hypercalcemia, due to hydrochlorothiazide use, this was stopped 07/2024  - since cessation of hydrochlorothiazide, calcium now within normal range   - avoid the use of hydrochlorothiazide/Chlorthalidone as this can lead to hypercalcemia  - will continue monitoring  situation    Hyperparathyroidism  - stable finding, most likely due to hydrochlorothiazide  - mild CKD3    Hypertension, benign  - avoid the use of hydrochlorothiazide/Chlorthalidone    Smoker  - patient not interested in cessation      Advised patient to follow up with PCP for routine health maintenance care.   RTC in 5-6 months    Total Time for E/M Service preformed was greater than 48 minutes: Including review of medical records, direct face-to-face time with patient with discussion multiple separate endocrine issues. Along with active medical decision-making in office today.     Naveed Patel M.D  Endocrinology  [facility]  9/20/2024      Disclaimer: This note has been generated using voice-recognition software. There may be typographical errors that have been missed during proof-reading.

## 2024-10-09 DIAGNOSIS — R52 PAIN: ICD-10-CM

## 2024-10-09 NOTE — TELEPHONE ENCOUNTER
No care due was identified.  [facility] Embedded Care Due Messages. Reference number: [id number].   10/09/2024 1:59:32 PM CDT

## 2024-10-11 DIAGNOSIS — R52 PAIN: ICD-10-CM

## 2024-10-11 RX ORDER — TRAMADOL HYDROCHLORIDE 50 MG/1
50 TABLET ORAL EVERY 6 HOURS PRN
Qty: 120 TABLET | Refills: 5 | Status: SHIPPED | OUTPATIENT
Start: 2024-10-11

## 2024-10-11 RX ORDER — TRAMADOL HYDROCHLORIDE 50 MG/1
50 TABLET ORAL EVERY 6 HOURS PRN
Qty: 120 TABLET | Refills: 5 | OUTPATIENT
Start: 2024-10-11

## 2024-10-11 NOTE — TELEPHONE ENCOUNTER
Care Due:                  Date            Visit Type   Department     Provider  --------------------------------------------------------------------------------                                EP [facility]                              PRIMARY      MED/ INTERNAL  Venancio Barnett  Last Visit: 07-      CARE ([facility])   MED/ PEDS      Ehrensing  Next Visit: None Scheduled  None         None Found                                                            Last  Test          Frequency    Reason                     Performed    Due Date  --------------------------------------------------------------------------------    CMP.........  12 months..  rosuvastatin.............  10-   10-    Lipid Panel.  12 months..  rosuvastatin.............  02- 02-    [facility] Embedded Care Due Messages. Reference number: [id number].   10/11/2024 10:15:29 AM CDT

## 2024-10-11 NOTE — TELEPHONE ENCOUNTER
----- Message from Med Assistant Kelly sent at 10/11/2024  1:02 PM CDT -----  Who called:  Pt   What is the request in detail:  Pt needs status on tramadol refill , has been waiting since today     Can the clinic reply by [facility]? No  No   Would the patient rather a call back or a response via [facility]? Call back  Callback   Best call back number:  Telephone Information:  Mobile          [phone]  Mobile          Not on file.     Additional Information:  Please call back as soon as possible   Thank you.

## 2024-11-06 NOTE — TELEPHONE ENCOUNTER
Tried calling patient to relay message per Dr. Mayer. Patient didn't answer the phone but lvm.   [Follow-Up Visit] : a follow-up [Family Member] : family member [FreeTextEntry2] : cholangiocarcinoma

## 2024-11-09 NOTE — TELEPHONE ENCOUNTER
No care due was identified.  [facility] Embedded Care Due Messages. Reference number: [id number].   11/09/2024 2:04:38 PM CST

## 2024-11-10 RX ORDER — FUROSEMIDE 20 MG/1
TABLET ORAL
Qty: 30 TABLET | Refills: 3 | Status: SHIPPED | OUTPATIENT
Start: 2024-11-10

## 2024-11-10 NOTE — TELEPHONE ENCOUNTER
Refill Routing Note   Medication(s) are not appropriate for processing by [facility] for the following reason(s):        Required labs outdated  Required vitals abnormal    [facility] action(s):  Defer             Appointments  past 12m or future 3m with PCP    Date Provider   Last Visit   7/11/2024 Venancio Mayer MD   Next Visit   Visit date not found Venancio Mayer MD   ED visits in past 90 days: 0        Note composed:10:43 PM 11/09/2024

## 2024-11-11 ENCOUNTER — LAB VISIT (OUTPATIENT)
Dept: LAB | Facility: HOSPITAL | Age: 74
End: 2024-11-11
Attending: HOSPITALIST
Payer: MEDICARE

## 2024-11-11 DIAGNOSIS — M81.0 OSTEOPOROSIS, POST-MENOPAUSAL: ICD-10-CM

## 2024-11-11 LAB — CALCIUM SERPL-MCNC: 10.4 MG/DL (ref 8.7–10.5)

## 2024-11-11 PROCEDURE — 36415 COLL VENOUS BLD VENIPUNCTURE: CPT | Mod: PO | Performed by: HOSPITALIST

## 2024-11-11 PROCEDURE — 82310 ASSAY OF CALCIUM: CPT | Performed by: HOSPITALIST

## 2024-11-13 ENCOUNTER — HOSPITAL ENCOUNTER (OUTPATIENT)
Dept: RADIOLOGY | Facility: HOSPITAL | Age: 74
Discharge: HOME | End: 2024-11-13
Attending: RADIOLOGY
Payer: MEDICARE

## 2024-11-13 ENCOUNTER — OFFICE VISIT (OUTPATIENT)
Dept: RADIATION ONCOLOGY | Facility: CLINIC | Age: 74
End: 2024-11-13
Payer: MEDICARE

## 2024-11-13 VITALS
SYSTOLIC BLOOD PRESSURE: 192 MMHG | HEIGHT: 63 IN | OXYGEN SATURATION: 94 % | RESPIRATION RATE: 18 BRPM | WEIGHT: 126.31 LBS | TEMPERATURE: 98 F | BODY MASS INDEX: 22.38 KG/M2 | DIASTOLIC BLOOD PRESSURE: 107 MMHG | HEART RATE: 88 BPM

## 2024-11-13 DIAGNOSIS — Z85.118 PERSONAL HISTORY OF LUNG CANCER: ICD-10-CM

## 2024-11-13 DIAGNOSIS — C34.31 PRIMARY MALIGNANT NEOPLASM OF RIGHT LOWER LOBE OF LUNG: Primary | ICD-10-CM

## 2024-11-13 DIAGNOSIS — C34.90 MALIGNANT NEOPLASM OF UNSPECIFIED PART OF UNSPECIFIED BRONCHUS OR LUNG: ICD-10-CM

## 2024-11-13 PROCEDURE — 3288F FALL RISK ASSESSMENT DOCD: CPT | Mod: CPTII,S$GLB,, | Performed by: RADIOLOGY

## 2024-11-13 PROCEDURE — 99213 OFFICE O/P EST LOW 20 MIN: CPT | Mod: S$GLB,,, | Performed by: RADIOLOGY

## 2024-11-13 PROCEDURE — 1101F PT FALLS ASSESS-DOCD LE1/YR: CPT | Mod: CPTII,S$GLB,, | Performed by: RADIOLOGY

## 2024-11-13 PROCEDURE — 1159F MED LIST DOCD IN RCRD: CPT | Mod: CPTII,S$GLB,, | Performed by: RADIOLOGY

## 2024-11-13 PROCEDURE — 3080F DIAST BP >= 90 MM HG: CPT | Mod: CPTII,S$GLB,, | Performed by: RADIOLOGY

## 2024-11-13 PROCEDURE — 3077F SYST BP >= 140 MM HG: CPT | Mod: CPTII,S$GLB,, | Performed by: RADIOLOGY

## 2024-11-13 PROCEDURE — 3072F LOW RISK FOR RETINOPATHY: CPT | Mod: CPTII,S$GLB,, | Performed by: RADIOLOGY

## 2024-11-13 PROCEDURE — 71250 CT THORAX DX C-: CPT | Mod: TC

## 2024-11-13 PROCEDURE — 1126F AMNT PAIN NOTED NONE PRSNT: CPT | Mod: CPTII,S$GLB,, | Performed by: RADIOLOGY

## 2024-11-13 PROCEDURE — 99999 PR PBB SHADOW E&M-EST. PATIENT-LVL IV: CPT | Mod: PBBFAC,,, | Performed by: RADIOLOGY

## 2024-11-13 NOTE — PROGRESS NOTES
11/13/2024    Radiation Oncology Follow-Up Visit      Prior Radiation History:    Site  Technique  Energy  Dose/Fx (Gy)  #Fx  Total Dose (Gy)  Start Date  End Date  Elapsed Days    RLL Lung  SBRT  6X  12.5  4 / 4  50  1/26/2023  2/3/2023  8        Is the patient female between ages 15-55:  No    Does the patient have a CIED:  No      Assessment   This is a 74 y.o. female with Stage IA2 (cT1b cN0 M0) RLL NSCLC (squam) diagnosed in robotic bronch w/ EBUS 10/25/22. She has a known 2.3 cm LLL nodule since 2018 that was biopsied in 12/2018 and 10/2022 with results negative for malignancy. RLL primary was planned for surgical resection but aborted due to dense adhesions and poor pulmonary tolerance during procedure. This was treated with definitive SBRT 50 Gy in 4 fx completed 2/3/23.      No late toxicity from treatment. CT Chest today 11/13/24 demonstrates slight increase in size of previously treated RLL lung nodule by ~1-2 mm in long and short axis when I measured on coronal image. I recommend PET/CT to assess for possible progression vs evolving radiation fibrosis.          I don't see pleural effusions on her CT, so I'm not sure if CHF/fluid build-up explains her symptoms. If dyspnea continues to progress, I recommend she see her PCP to work up further.          Plan   1) Schedule PET/CT to assess RLL nodule for progression vs radiation fibrosis. I will call her with results and discuss next steps.             CHIEF COMPLAINT: F/U after lung SBRT    HPI/Focused ROS: She has been having some increased dyspnea over the past month, which she attributes to fluid on her lungs. Denies chest pain, cough, or fevers. Dyspnea worse with laying flat.         Past Medical History:   Diagnosis Date    Anticoagulant long-term use     Cancer     Kidney (Right)    Cataracts, bilateral     CKD (chronic kidney disease) stage 3, GFR 30-59 ml/min 12/15/2014    Colon polyps     Combined hyperlipidemia associated with type 2 diabetes  mellitus 6/7/2013    COPD (chronic obstructive pulmonary disease) 12/15/2014    Diabetes mellitus type II     NIDDM, a.m. glucose-120s-130s-last A1c-7.1-6/7/13    Diabetes mellitus with renal manifestations, uncontrolled 2/1/2017    Diabetic retinopathy     Family history of stomach cancer 12/5/2013    Former smoker     H/O renal cell carcinoma 12/15/2015    History of colonic polyps 2/1/2017    3 polyps 7/13 ---5 yrs    History of gastroesophageal reflux (GERD)     History of urinary incontinence     s/p bladder lift-october, 2011 (at [facility])    Hyperlipidemia     Hypertension     120s/70s-80s    Nephrolithiasis     Osteoarthritis of thumb 12/20/2019    Osteoporosis, post-menopausal 3/17/2014    Primary hyperparathyroidism 10/20/2016    Schatzki's ring 2/1/2017 Dilated 2014       Past Surgical History:   Procedure Laterality Date    bladder lift  2011    done at [facility]    BLADDER STONE REMOVAL  2011    before bladder lift    CATARACT EXTRACTION W/  INTRAOCULAR LENS IMPLANT Left 06/07/2016    Dr. Vásquez    CATARACT EXTRACTION W/  INTRAOCULAR LENS IMPLANT Right 06/21/2016    Dr. Vásquez    COLONOSCOPY N/A 4/26/2018    Procedure: COLONOSCOPY;  Surgeon: Benjamin Zamora MD;  Location: Health system END[facility];  Service: Endoscopy;  Laterality: N/A;  confirmed ss    COLONOSCOPY N/A 12/21/2023    Procedure: COLONOSCOPY;  Surgeon: Jamel Luis MD;  Location: Health system ENDO;  Service: Endoscopy;  Laterality: N/A;  9/7 ref Venancio Mayer MD, Suprep, instr. mailed, [facility]  12/14- instr reviewed, pt reminded to hold ozempic, pre call complete.  DBM    CYSTOSCOPY      INJECTION OF ANESTHETIC AGENT AROUND MULTIPLE INTERCOSTAL NERVES  12/27/2022    Procedure: BLOCK, NERVE, INTERCOSTAL, 2 OR MORE;  Surgeon: Paxton Cannon MD;  Location: [facility] OR 2ND FLR;  Service: Thoracic;;    LYSIS OF ADHESIONS  12/27/2022    Procedure: LYSIS, ADHESIONS;  Surgeon: Paxton Cannon MD;  Location: [facility] OR 2ND FLR;  Service: Thoracic;;     "NAVIGATIONAL BRONCHOSCOPY N/A 12/11/2018    Procedure: BRONCHOSCOPY, NAVIGATIONAL;  Surgeon: Ashtyn Ramires MD;  Location: [facility] OR 2ND FLR;  Service: Pulmonary;  Laterality: N/A;    NEPHRECTOMY      partial right    ROBOTIC BRONCHOSCOPY N/A 10/25/2022    Procedure: ROBOTIC BRONCHOSCOPY;  Surgeon: Ashtyn Ramires MD;  Location: [facility] OR 2ND FLR;  Service: Pulmonary;  Laterality: N/A;    VIDEO-ASSISTED THORACOSCOPIC SURGERY (VATS)  12/27/2022    Procedure: VATS (VIDEO-ASSISTED THORACOSCOPIC SURGERY);  Surgeon: Paxton Cannon MD;  Location: [facility] OR 2ND FLR;  Service: Thoracic;;       Social History     Tobacco Use    Smoking status: Every Day     Current packs/day: 0.25     Average packs/day: 0.3 packs/day for 30.0 years (7.5 ttl pk-yrs)     Types: Cigarettes    Smokeless tobacco: Never   Substance Use Topics    Alcohol use: No     Alcohol/week: 0.0 standard drinks of alcohol    Drug use: No       Cancer-related family history is negative for Kidney cancer and Cancer.    Current Outpatient Medications on File Prior to Visit   Medication Sig Dispense Refill    amLODIPine (NORVASC) 10 MG tablet Take 1 tablet (10 mg total) by mouth once daily. 90 tablet 0    aspirin (ECOTRIN) 81 MG EC tablet Take 81 mg by mouth once daily.      cyanocobalamin, vitamin B-12, 1,000 mcg TbSR Take by mouth once daily.       famotidine (PEPCID) 40 MG tablet TAKE ONE TABLET BY MOUTH ONCE A DAY 90 tablet 0    furosemide (LASIX) 20 MG tablet TAKE ONE TABLET BY MOUTH ONCE A DAY AS NEEDED 30 tablet 3    irbesartan (AVAPRO) 150 MG tablet Take 1 tablet (150 mg total) by mouth every evening. 90 tablet 3    oxybutynin (DITROPAN-XL) 5 MG TR24 TAKE ONE TABLET BY MOUTH ONCE A DAY 90 tablet 3    OZEMPIC 0.25 mg or 0.5 mg (2 mg/3 mL) pen injector Inject 0.5 mg into the skin every 7 days. 3 mL 5    pen needle, diabetic (BD INSULIN PEN NEEDLE UF SHORT) 31 gauge x 5/16" Ndle USE AS DIRECTED 100 each 12    rosuvastatin (CRESTOR) 20 MG tablet TAKE ONE TABLET " "BY MOUTH ONCE A DAY 90 tablet 12    traMADoL (ULTRAM) 50 mg tablet Take 1 tablet (50 mg total) by mouth every 6 (six) hours as needed for Pain. 120 tablet 5    vitamin D (VITAMIN D3) 1000 units Tab Take 1,000 Units by mouth once daily.      DOCOSAHEXANOIC ACID/EPA (FISH OIL ORAL) Take 1,200 mg by mouth once daily. (Patient not taking: Reported on 11/13/2024)      gabapentin (NEURONTIN) 300 MG capsule Take 1 capsule (300 mg total) by mouth every evening. for 5 days 5 capsule 0    omeprazole (PRILOSEC) 40 MG capsule Take 1 capsule (40 mg total) by mouth once daily. 90 capsule 4     No current facility-administered medications on file prior to visit.       Review of patient's allergies indicates:   Allergen Reactions    Pantoprazole Other (See Comments)     Elevated Blood Sugars    Metformin Diarrhea         Vital Signs: BP (!) 192/107   Pulse 88   Temp 98.1 °F (36.7 °C)   Resp 18   Ht 5' 3" (1.6 m)   Wt 57.3 kg (126 lb 5.2 oz)   SpO2 (!) 94%   BMI 22.38 kg/m²     ECOG Performance Status: 1 - Ambulates, capable of light work    Physical Exam  Vitals reviewed.   Constitutional:       Appearance: Normal appearance.   HENT:      Head: Normocephalic and atraumatic.   Eyes:      General: No scleral icterus.     Extraocular Movements: Extraocular movements intact.   Pulmonary:      Effort: No accessory muscle usage or respiratory distress.   Abdominal:      General: There is no distension.   Musculoskeletal:         General: Normal range of motion.      Cervical back: Normal range of motion and neck supple.   Lymphadenopathy:      Cervical: No cervical adenopathy.   Skin:     General: Skin is dry.      Coloration: Skin is not jaundiced.   Neurological:      Mental Status: She is alert.      Cranial Nerves: No cranial nerve deficit.   Psychiatric:         Mood and Affect: Mood and affect normal.         Judgment: Judgment normal.          Labs:    Imaging: I have personally reviewed the patient's available images and " reports and summarized pertinent findings above in HPI.     Pathology: No new path

## 2024-11-18 ENCOUNTER — INFUSION (OUTPATIENT)
Dept: INFUSION THERAPY | Facility: HOSPITAL | Age: 74
End: 2024-11-18
Attending: HOSPITALIST
Payer: MEDICARE

## 2024-11-18 ENCOUNTER — HOSPITAL ENCOUNTER (EMERGENCY)
Facility: HOSPITAL | Age: 74
Discharge: HOME | End: 2024-11-18
Attending: EMERGENCY MEDICINE
Payer: MEDICARE

## 2024-11-18 VITALS
DIASTOLIC BLOOD PRESSURE: 101 MMHG | OXYGEN SATURATION: 96 % | RESPIRATION RATE: 16 BRPM | TEMPERATURE: 98 F | HEART RATE: 92 BPM | SYSTOLIC BLOOD PRESSURE: 200 MMHG

## 2024-11-18 VITALS
BODY MASS INDEX: 22.32 KG/M2 | HEART RATE: 74 BPM | HEIGHT: 63 IN | OXYGEN SATURATION: 97 % | WEIGHT: 126 LBS | RESPIRATION RATE: 16 BRPM | TEMPERATURE: 99 F | DIASTOLIC BLOOD PRESSURE: 71 MMHG | SYSTOLIC BLOOD PRESSURE: 150 MMHG

## 2024-11-18 DIAGNOSIS — I10 HYPERTENSION: ICD-10-CM

## 2024-11-18 DIAGNOSIS — I10 UNCONTROLLED HYPERTENSION: Primary | ICD-10-CM

## 2024-11-18 DIAGNOSIS — M81.0 OSTEOPOROSIS, POST-MENOPAUSAL: Primary | ICD-10-CM

## 2024-11-18 LAB
ALBUMIN SERPL BCP-MCNC: 3.3 G/DL (ref 3.5–5.2)
ALP SERPL-CCNC: 117 U/L (ref 40–150)
ALT SERPL W/O P-5'-P-CCNC: 9 U/L (ref 10–44)
ANION GAP SERPL CALC-SCNC: 9 MMOL/L (ref 8–16)
AST SERPL-CCNC: 15 U/L (ref 10–40)
BILIRUB SERPL-MCNC: 0.5 MG/DL (ref 0.1–1)
BUN SERPL-MCNC: 20 MG/DL (ref 8–23)
CALCIUM SERPL-MCNC: 10.2 MG/DL (ref 8.7–10.5)
CHLORIDE SERPL-SCNC: 104 MMOL/L (ref 95–110)
CO2 SERPL-SCNC: 30 MMOL/L (ref 23–29)
CREAT SERPL-MCNC: 1.3 MG/DL (ref 0.5–1.4)
EST. GFR  (NO RACE VARIABLE): 43 ML/MIN/1.73 M^2
GLUCOSE SERPL-MCNC: 156 MG/DL (ref 70–110)
POTASSIUM SERPL-SCNC: 3.7 MMOL/L (ref 3.5–5.1)
PROT SERPL-MCNC: 6.9 G/DL (ref 6–8.4)
SODIUM SERPL-SCNC: 143 MMOL/L (ref 136–145)

## 2024-11-18 PROCEDURE — 96376 TX/PRO/DX INJ SAME DRUG ADON: CPT

## 2024-11-18 PROCEDURE — 25000003 PHARM REV CODE 250: Performed by: EMERGENCY MEDICINE

## 2024-11-18 PROCEDURE — 63600175 PHARM REV CODE 636 W HCPCS: Performed by: EMERGENCY MEDICINE

## 2024-11-18 PROCEDURE — 96374 THER/PROPH/DIAG INJ IV PUSH: CPT

## 2024-11-18 PROCEDURE — 99284 EMERGENCY DEPT VISIT MOD MDM: CPT | Mod: 25

## 2024-11-18 PROCEDURE — 63600175 PHARM REV CODE 636 W HCPCS: Mod: JZ,JG | Performed by: HOSPITALIST

## 2024-11-18 PROCEDURE — 96372 THER/PROPH/DIAG INJ SC/IM: CPT

## 2024-11-18 PROCEDURE — 80053 COMPREHEN METABOLIC PANEL: CPT | Performed by: EMERGENCY MEDICINE

## 2024-11-18 RX ORDER — FUROSEMIDE 10 MG/ML
80 INJECTION INTRAMUSCULAR; INTRAVENOUS
Status: DISCONTINUED | OUTPATIENT
Start: 2024-11-18 | End: 2024-11-18

## 2024-11-18 RX ORDER — IPRATROPIUM BROMIDE AND ALBUTEROL SULFATE 2.5; .5 MG/3ML; MG/3ML
3 SOLUTION RESPIRATORY (INHALATION)
Status: DISCONTINUED | OUTPATIENT
Start: 2024-11-18 | End: 2024-11-18

## 2024-11-18 RX ORDER — ONDANSETRON 4 MG/1
4 TABLET, ORALLY DISINTEGRATING ORAL
Status: COMPLETED | OUTPATIENT
Start: 2024-11-18 | End: 2024-11-18

## 2024-11-18 RX ORDER — CLONIDINE HYDROCHLORIDE 0.1 MG/1
0.1 TABLET ORAL
Status: COMPLETED | OUTPATIENT
Start: 2024-11-18 | End: 2024-11-18

## 2024-11-18 RX ORDER — HYDRALAZINE HYDROCHLORIDE 20 MG/ML
20 INJECTION INTRAMUSCULAR; INTRAVENOUS
Status: COMPLETED | OUTPATIENT
Start: 2024-11-18 | End: 2024-11-18

## 2024-11-18 RX ORDER — HYDRALAZINE HYDROCHLORIDE 20 MG/ML
10 INJECTION INTRAMUSCULAR; INTRAVENOUS
Status: COMPLETED | OUTPATIENT
Start: 2024-11-18 | End: 2024-11-18

## 2024-11-18 RX ORDER — IRBESARTAN 150 MG/1
300 TABLET ORAL DAILY
Qty: 180 TABLET | Refills: 3 | Status: SHIPPED | OUTPATIENT
Start: 2024-11-18 | End: 2024-11-19

## 2024-11-18 RX ADMIN — HYDRALAZINE HYDROCHLORIDE 20 MG: 20 INJECTION INTRAMUSCULAR; INTRAVENOUS at 05:11

## 2024-11-18 RX ADMIN — CLONIDINE HYDROCHLORIDE 0.1 MG: 0.1 TABLET ORAL at 05:11

## 2024-11-18 RX ADMIN — ONDANSETRON 4 MG: 4 TABLET, ORALLY DISINTEGRATING ORAL at 06:11

## 2024-11-18 RX ADMIN — CLONIDINE HYDROCHLORIDE 0.1 MG: 0.1 TABLET ORAL at 04:11

## 2024-11-18 RX ADMIN — DENOSUMAB 60 MG: 60 INJECTION SUBCUTANEOUS at 02:11

## 2024-11-18 RX ADMIN — HYDRALAZINE HYDROCHLORIDE 10 MG: 20 INJECTION INTRAMUSCULAR; INTRAVENOUS at 04:11

## 2024-11-18 NOTE — ED NOTES
Pt to ED today from infusion with hypertension. Pt is currently asymptomatic and reports that she has no other problems at this time. Pts sister is at bedside and states that the pt seems to be moving a bit slower today and does not appear to have her full strength. Pt states she has not been taking her BP pill and her fluid pill as she should.

## 2024-11-18 NOTE — ED PROVIDER NOTES
Encounter Date: 11/18/2024       History     Chief Complaint   Patient presents with    Hypertension     Pt presents to ED from infusion with complaint of elevated blood pressure readings. Pt denies headache, blurry vision, chest pain, sob or any complaint. Pt reports taking bp meds prior to infusion      HPI   This 74-year-old white female presents emergency room complaining that her blood pressure is up.  The patient has a history of CKD.  She denies shortness of breath headache neurologic deficit vomiting diarrhea neurologic deficits.   She feels well.  Review of patient's allergies indicates:   Allergen Reactions    Pantoprazole Other (See Comments)     Elevated Blood Sugars    Metformin Diarrhea     Past Medical History:   Diagnosis Date    Anticoagulant long-term use     Cancer     Kidney (Right)    Cataracts, bilateral     CKD (chronic kidney disease) stage 3, GFR 30-59 ml/min 12/15/2014    Colon polyps     Combined hyperlipidemia associated with type 2 diabetes mellitus 6/7/2013    COPD (chronic obstructive pulmonary disease) 12/15/2014    Diabetes mellitus type II     NIDDM, a.m. glucose-120s-130s-last A1c-7.1-6/7/13    Diabetes mellitus with renal manifestations, uncontrolled 2/1/2017    Diabetic retinopathy     Family history of stomach cancer 12/5/2013    Former smoker     H/O renal cell carcinoma 12/15/2015    History of colonic polyps 2/1/2017    3 polyps 7/13 ---5 yrs    History of gastroesophageal reflux (GERD)     History of urinary incontinence     s/p bladder lift-october, 2011 (at [facility])    Hyperlipidemia     Hypertension     120s/70s-80s    Nephrolithiasis     Osteoarthritis of thumb 12/20/2019    Osteoporosis, post-menopausal 3/17/2014    Primary hyperparathyroidism 10/20/2016    Schatzki's ring 2/1/2017    Dilated 2014     Past Surgical History:   Procedure Laterality Date    bladder lift  2011    done at [facility]    BLADDER STONE REMOVAL  2011    before bladder lift    CATARACT EXTRACTION W/   INTRAOCULAR LENS IMPLANT Left 06/07/2016    Dr. Vásquez    CATARACT EXTRACTION W/  INTRAOCULAR LENS IMPLANT Right 06/21/2016    Dr. Vásquez    COLONOSCOPY N/A 4/26/2018    Procedure: COLONOSCOPY;  Surgeon: Benjamin Zamora MD;  Location: [facility] ENDO;  Service: Endoscopy;  Laterality: N/A;  confirmed ss    COLONOSCOPY N/A 12/21/2023    Procedure: COLONOSCOPY;  Surgeon: Jamel Luis MD;  Location: [facility] ENDO;  Service: Endoscopy;  Laterality: N/A;  9/7 ref Venancio Mayer MD, Suprep, instr. mailed, [facility]  12/14- instr reviewed, pt reminded to hold ozempic, pre call complete.  DBM    CYSTOSCOPY      INJECTION OF ANESTHETIC AGENT AROUND MULTIPLE INTERCOSTAL NERVES  12/27/2022    Procedure: BLOCK, NERVE, INTERCOSTAL, 2 OR MORE;  Surgeon: Paxton Cannon MD;  Location: [facility] OR 2ND FLR;  Service: Thoracic;;    LYSIS OF ADHESIONS  12/27/2022    Procedure: LYSIS, ADHESIONS;  Surgeon: Paxton Cannon MD;  Location: [facility] OR 2ND FLR;  Service: Thoracic;;    NAVIGATIONAL BRONCHOSCOPY N/A 12/11/2018    Procedure: BRONCHOSCOPY, NAVIGATIONAL;  Surgeon: Ashtyn Ramires MD;  Location: [facility] OR 2ND FLR;  Service: Pulmonary;  Laterality: N/A;    NEPHRECTOMY      partial right    ROBOTIC BRONCHOSCOPY N/A 10/25/2022    Procedure: ROBOTIC BRONCHOSCOPY;  Surgeon: Ashtyn Ramires MD;  Location: [facility] OR 2ND FLR;  Service: Pulmonary;  Laterality: N/A;    VIDEO-ASSISTED THORACOSCOPIC SURGERY (VATS)  12/27/2022    Procedure: VATS (VIDEO-ASSISTED THORACOSCOPIC SURGERY);  Surgeon: Paxton Cannon MD;  Location: [facility] OR 2ND FLR;  Service: Thoracic;;     Family History   Problem Relation Name Age of Onset    Glaucoma Mother      Cataracts Mother      No Known Problems Father      Colon cancer Brother      Nephrolithiasis Sister      No Known Problems Maternal Aunt      No Known Problems Maternal Uncle      No Known Problems Paternal Aunt      No Known Problems Paternal Uncle      No Known Problems Maternal Grandmother      No Known  Problems Maternal Grandfather      No Known Problems Paternal Grandmother      No Known Problems Paternal Grandfather      Anesthesia problems Neg Hx      Kidney cancer Neg Hx      Amblyopia Neg Hx      Blindness Neg Hx      Cancer Neg Hx      Diabetes Neg Hx      Hypertension Neg Hx      Macular degeneration Neg Hx      Retinal detachment Neg Hx      Strabismus Neg Hx      Stroke Neg Hx      Thyroid disease Neg Hx       Social History     Tobacco Use    Smoking status: Every Day     Current packs/day: 0.25     Average packs/day: 0.3 packs/day for 30.0 years (7.5 ttl pk-yrs)     Types: Cigarettes    Smokeless tobacco: Never   Substance Use Topics    Alcohol use: No     Alcohol/week: 0.0 standard drinks of alcohol    Drug use: No     Review of Systems    The patient was questioned specifically with regard to the following.  General: Fever, chills, sweats. Neuro: Headache. Eyes: eye problems. ENT: Ear pain, sore throat. Cardiovascular: Chest pain. Respiratory: Cough, shortness of breath. Gastrointestinal: Abdominal pain, vomiting, diarrhea. Genitourinary: Painful urination.  Musculoskeletal: Arm and leg problems. Skin: Rash.  The review of systems was negative except for the following:  The patient's blood pressure is 201/95.  Lungs are clear.  The heart tones are normal.  The abdomen is soft.  Physical Exam     Initial Vitals [11/18/24 1450]   BP Pulse Resp Temp SpO2   (!) 201/95 85 15 99.3 °F (37.4 °C) 97 %      MAP       --         Physical Exam   The patient was examined specifically for the following:   General:No significant distress, Good color, Warm and dry. Head and neck:Scalp atraumatic, Neck supple. Neurological:Appropriate conversation, Gross motor deficits. Eyes:Conjugate gaze, Clear corneas. ENT: No epistaxis. Cardiac: Regular rate and rhythm, Grossly normal heart tones. Pulmonary: Wheezing, Rales. Gastrointestinal: Abdominal tenderness, Abdominal distention. Musculoskeletal: Extremity deformity,  "Apparent pain with range of motion of the joints. Skin: Rash.   The findings on examination were normal except for the following: the patient's blood pressure is 219/101.  The lungs are clear.  The heart tones are normal.  Mental status examination, cranial nerves, motor and sensory examination are normal.  ED Course   Procedures  Labs Reviewed   COMPREHENSIVE METABOLIC PANEL - Abnormal       Result Value    Sodium 143      Potassium 3.7      Chloride 104      CO2 30 (*)     Glucose 156 (*)     BUN 20      Creatinine 1.3      Calcium 10.2      Total Protein 6.9      Albumin 3.3 (*)     Total Bilirubin 0.5      Alkaline Phosphatase 117      AST 15      ALT 9 (*)     eGFR 43 (*)     Anion Gap 9            Imaging Results    None          Medications   cloNIDine tablet 0.1 mg (0.1 mg Oral Given 11/18/24 1601)   hydrALAZINE injection 10 mg (10 mg Intravenous Given 11/18/24 1601)   hydrALAZINE injection 20 mg (20 mg Intravenous Given 11/18/24 1730)   cloNIDine tablet 0.1 mg (0.1 mg Oral Given 11/18/24 1731)     Medical Decision Making   Given the above this patient comes to the emergency room for uncontrolled hypertension.  It was noted at the infusion center.  The patient's systolic blood pressure is as high as 219 in the emergency room.  The patient was treated with clonidine and hydralazine in his blood pressure fell to 146/76.   I re-evaluated the patient.  The patient reported that she" could not breathe".   I thought she was suggesting that she was short of breath.  I expanded the evaluation for possible pulmonary edema and congestive heart failure despite clear lungs and oxygen saturations of 96% with a heart rate of 88 and a respiratory rate of 16.   I was then called back to the room and the patient explained that she can not breathe through her nose and that this is a chronic problem.   She would like to be discharged to outpatient evaluation and treatment.    I will double her hyper startle to 300 mg daily.  " The patient has normal renal function.                                  Clinical Impression:  Final diagnoses:  [I10] Hypertension  [I10] Uncontrolled hypertension (Primary)          ED Disposition Condition    Discharge Stable          ED Prescriptions       Medication Sig Dispense Start Date End Date Auth. Provider    irbesartan (AVAPRO) 150 MG tablet Take 2 tablets (300 mg total) by mouth once daily. 180 tablet 11/18/2024 11/18/2025 Oliver Moran MD          Follow-up Information       Follow up With Specialties Details Why Contact Info    Venancio Mayer MD Internal Medicine In 3 days  [address] [place] [address]  [phone]               Oliver Moran MD  11/18/24 8032

## 2024-11-18 NOTE — PLAN OF CARE
"Ambulated on Infusion Unit with slow and steady gait with sister at side. Uses no DME equipment. Pt due for Prolia injection. Pre-med BP= 219/101 with re-check 200/101. BP manually to R arm= 202/88. Pt denies HA, dizziness, blurred vision. Pt verbalized she took her scheduled meds today and all week long, but does admit to not taking meds regularly over one week ago. Pt verbalized she has seen MD last week about high BP and BP was high at office visit. Educated pt on risk of high BP and CVA, and reinforced importance of going to ER. Multiple times pt refused to go to ER and stated, "I want to go lay down in my bed", and then pt stated, "I want to go to hospital on [facility]". In meantime nursing staff contacted Dr Amanda Lucio concerning high BP and currently due for Prolia injection. Md Ok'd pt to receive Prolia then send to ER. Informed pt of this and pt in agreement. Administered Prolia SQ to R upper post arm; tolerated well.  2:43pm Assisted pt in w/c and transported directly to ER. Pt's sister remains at side. This writer updated ER staff of above. ER staff assessing pt.   "

## 2024-11-19 ENCOUNTER — PATIENT OUTREACH (OUTPATIENT)
Dept: EMERGENCY MEDICINE | Facility: HOSPITAL | Age: 74
End: 2024-11-19
Payer: MEDICARE

## 2024-11-19 ENCOUNTER — TELEPHONE (OUTPATIENT)
Dept: FAMILY MEDICINE | Facility: CLINIC | Age: 74
End: 2024-11-19
Payer: MEDICARE

## 2024-11-19 RX ORDER — IRBESARTAN 300 MG/1
300 TABLET ORAL DAILY
Qty: 90 TABLET | Refills: 3 | Status: SHIPPED | OUTPATIENT
Start: 2024-11-19

## 2024-11-19 RX ORDER — IRBESARTAN 300 MG/1
300 TABLET ORAL DAILY
COMMUNITY
End: 2024-11-19 | Stop reason: SDUPTHER

## 2024-11-19 NOTE — PROGRESS NOTES
Patient was seen in the ED on 11/18/24. Phoned patient to assist with Post ED Discharge Navigation. Patient agreed to assistance scheduling a PCP follow-up appointment. In Basket message sent to PCP staff for scheduling assistance.  Jessica Mccann

## 2024-11-19 NOTE — TELEPHONE ENCOUNTER
I spoke the patient who was seen by the ED yesterday for high BP. The ED changed her medication to irbesartan 150 MG tablets (2 twice a day). Per [facility], the insurance will not cover the 150 MG. They will cover 300 MG once daily. Please advise when the Rx is sent to the pharmacy.     The patient stated she needs a ED F/U appointment ASAP. Please advise if you have an opening to schedule her an appointment.

## 2024-11-19 NOTE — DISCHARGE INSTRUCTIONS
Please take your blood pressure 3 times a day, right the values down so your doctor can check them.  Please double ibersartan to 2 pills, 300 mg daily.  Return immediately if you get worse or if new problems develop.

## 2024-11-22 ENCOUNTER — TELEPHONE (OUTPATIENT)
Dept: FAMILY MEDICINE | Facility: CLINIC | Age: 74
End: 2024-11-22
Payer: MEDICARE

## 2024-11-22 ENCOUNTER — HOSPITAL ENCOUNTER (OUTPATIENT)
Dept: RADIOLOGY | Facility: HOSPITAL | Age: 74
Discharge: HOME | End: 2024-11-22
Attending: RADIOLOGY
Payer: MEDICARE

## 2024-11-22 DIAGNOSIS — C34.31 PRIMARY MALIGNANT NEOPLASM OF RIGHT LOWER LOBE OF LUNG: ICD-10-CM

## 2024-11-22 LAB — POCT GLUCOSE: 138 MG/DL (ref 70–110)

## 2024-11-22 PROCEDURE — 78815 PET IMAGE W/CT SKULL-THIGH: CPT | Mod: TC

## 2024-11-22 PROCEDURE — 78815 PET IMAGE W/CT SKULL-THIGH: CPT | Mod: 26,PS,, | Performed by: NUCLEAR MEDICINE

## 2024-11-22 PROCEDURE — A9552 F18 FDG: HCPCS | Performed by: RADIOLOGY

## 2024-11-22 RX ORDER — FLUDEOXYGLUCOSE F18 500 MCI/ML
12 INJECTION INTRAVENOUS
Status: COMPLETED | OUTPATIENT
Start: 2024-11-22 | End: 2024-11-22

## 2024-11-22 RX ADMIN — FLUDEOXYGLUCOSE F-18 10.71 MILLICURIE: 500 INJECTION INTRAVENOUS at 08:11

## 2024-11-22 NOTE — TELEPHONE ENCOUNTER
----- Message from Jessica Mccann sent at 11/21/2024 11:49 AM CST -----  Regarding: FW: ED F/U  Please advise of appointment date and time  ----- Message -----  From: Jessica Mccann  Sent: 11/19/2024  10:15 AM CST  To: Leyla JACKMAN Staff  Subject: ED F/U                                           Good morning,   This pt was discharged from the ED on 11/18/24 and has orders to follow up with Dr Mayer. In compliance with Medicare 2+ Chronic Condition patient measure mandates, this patient requires a follow up appt within 7 days of ED visit d/c date. I am requesting scheduling assistance as you are booked, and I am unable to schedule pt an appt within 7 days.  Jessica Mccann

## 2024-11-25 ENCOUNTER — TELEPHONE (OUTPATIENT)
Dept: RADIATION ONCOLOGY | Facility: CLINIC | Age: 74
End: 2024-11-25
Payer: MEDICARE

## 2024-11-25 NOTE — TELEPHONE ENCOUNTER
I called and discussed results of her recent PET/CT. The Right lung opacity did not have increased FDG avidity so is less concerning for malignancy. However, note was made of increase in size of a Right anterior abdominal wall nodule over the past few years; this has mild FDG avidity.     She reports being able to feel this mass and denies any tenderness associated with it. I have sent a message to my Surgical Oncology colleague to get his input on next steps for work-up vs observation.     I will call her next week once I hear back for final plan regarding abdominal wall mass and to set up follow up for her lung cancer surveillance.

## 2024-11-29 DIAGNOSIS — R22.2 SUBCUTANEOUS NODULE OF ABDOMINAL WALL: Primary | ICD-10-CM

## 2024-12-12 ENCOUNTER — OFFICE VISIT (OUTPATIENT)
Dept: INTERVENTIONAL RADIOLOGY/VASCULAR | Facility: CLINIC | Age: 74
End: 2024-12-12
Payer: MEDICARE

## 2024-12-12 VITALS
DIASTOLIC BLOOD PRESSURE: 77 MMHG | HEART RATE: 70 BPM | BODY MASS INDEX: 23.02 KG/M2 | SYSTOLIC BLOOD PRESSURE: 172 MMHG | WEIGHT: 129.94 LBS

## 2024-12-12 DIAGNOSIS — M79.89 SOFT TISSUE MASS: Primary | ICD-10-CM

## 2024-12-12 DIAGNOSIS — C34.31 PRIMARY MALIGNANT NEOPLASM OF RIGHT LOWER LOBE OF LUNG: ICD-10-CM

## 2024-12-12 PROCEDURE — 99204 OFFICE O/P NEW MOD 45 MIN: CPT | Mod: S$GLB,,, | Performed by: FAMILY MEDICINE

## 2024-12-12 PROCEDURE — 1160F RVW MEDS BY RX/DR IN RCRD: CPT | Mod: CPTII,S$GLB,, | Performed by: FAMILY MEDICINE

## 2024-12-12 PROCEDURE — 99999 PR PBB SHADOW E&M-EST. PATIENT-LVL III: CPT | Mod: PBBFAC,,, | Performed by: FAMILY MEDICINE

## 2024-12-12 PROCEDURE — 3078F DIAST BP <80 MM HG: CPT | Mod: CPTII,S$GLB,, | Performed by: FAMILY MEDICINE

## 2024-12-12 PROCEDURE — 3077F SYST BP >= 140 MM HG: CPT | Mod: CPTII,S$GLB,, | Performed by: FAMILY MEDICINE

## 2024-12-12 PROCEDURE — 3072F LOW RISK FOR RETINOPATHY: CPT | Mod: CPTII,S$GLB,, | Performed by: FAMILY MEDICINE

## 2024-12-12 PROCEDURE — 1159F MED LIST DOCD IN RCRD: CPT | Mod: CPTII,S$GLB,, | Performed by: FAMILY MEDICINE

## 2024-12-12 NOTE — LETTER
December 12, 2024    Sarina Genao  [address] [place] [address]     [facility] [address]  [address]  [facility] [address]  Phone: [phone] PRE-PROCEDURE INSTRUCTIONS    Your procedure with Interventional Radiology is scheduled for _______________________.     Please arrive by _______________________. Please note your appointment time in [facility] will be different.    You must check-in and receive a wristband before going to your procedure. We will call you the day before to let you know your check-in location.    **Do not eat or drink anything between midnight and the time of your procedure. This includes gum, mints, and candy lemon drops.    **Do not smoke or drink alcoholic beverages 24 hours prior to your procedure.    **If you wear contact lenses, dentures, hearing aids, or glasses, bring a container to put them in during the procedure and give them to a family member for safekeeping.    **If you have been diagnosed with sleep apnea please bring your CPAP machine.    **If your doctor has scheduled you for an overnight stay, bring a small overnight bag with any personal items that you may need.    **Make arrangements in advance for transportation home by a responsible adult. It is not safe to drive a vehicle during the 24 hours following the procedure.    **All [facility] facilities and properties are tobacco free. Smoking is NOT allowed.    PLEASE NOTE: The procedure schedule has many variables which affect the time of your procedure. Family members should be available if your surgery time changes.    If you have any questions about these instructions call Interventional Radiology at [phone] Monday - Friday between 8:00am and 4:00pm or [phone] (ask for interventional radiology physician) for after hours.

## 2024-12-12 NOTE — PROGRESS NOTES
"Subjective     Patient ID: Sarina Genao is a 74 y.o. female.    Chief Complaint: Lesion    Patient referred to Interventional Radiology by Scottie Jacques MD for evaluation of a superficial soft tissue mass. Patient has a history of RLL NSCLC (squam) diagnosed 10/25/2022 s/p SBRT. Recent imaging with PET scan obtained on 11/22/2024 noted "Interval enlargement of a 3.9 cm anterior abdominal wall mass with mild increased tracer uptake.  Recommend further evaluation with ultrasound and tissue sampling is warranted." Patient reports feeling fatigued. She is accompanied by her sister.     Reviewed oncology progress note.      Review of Systems   Constitutional:  Positive for activity change (decreased due fatigue) and fatigue. Negative for appetite change, chills and fever.   Respiratory:  Negative for cough, shortness of breath, wheezing and stridor.    Cardiovascular:  Positive for leg swelling (mediation alleviates). Negative for chest pain and palpitations.   Gastrointestinal:  Positive for abdominal distention (intermittent x1 week) and abdominal pain (intermittent x1 week). Negative for constipation, diarrhea, nausea and vomiting.   Integumentary:         Abdominal wall mass          Objective     Physical Exam  Constitutional:       General: She is not in acute distress.     Appearance: She is well-developed. She is not diaphoretic.   HENT:      Head: Normocephalic and atraumatic.   Pulmonary:      Effort: Pulmonary effort is normal. No respiratory distress.   Skin:         Neurological:      Mental Status: She is alert and oriented to person, place, and time.   Psychiatric:         Behavior: Behavior normal.         Thought Content: Thought content normal.         Judgment: Judgment normal.       PET 11/22/2024             Assessment and Plan     1. Soft tissue mass  -     IR Biopsy Abdominal/Retroperitoneal w/US Guidance (XPD); Future; Expected date: 12/12/2024    2. Squamous Cell Carcinoma of right lower " lobe of lung  -     IR Biopsy Abdominal/Retroperitoneal w/US Guidance (XPD); Future; Expected date: 12/12/2024        Discussed case with Dr. Gramajo. Explained to patient can offer biopsy of mass. Discussed how the procedure will be performed, risks (including, but not limited to, pain, bleeding, infection, damage to nearby structures, and the need for additional procedures), benefits, possible complications, pre-post procedure expectations, and alternatives. The patient voices understanding and all questions have been answered.  The patient agrees to proceed as planned. Patient scheduled for 1/6/2025. Pre-procedure handout with clinic phone number provided.

## 2024-12-12 NOTE — PROGRESS NOTES
Patient seen in IR clinic today.  Scheduled for upcoming IR procedure.  Pre-procedure instructions were reviewed with patient.   Pt verbalized understanding of all pre-procedure instructions.

## 2024-12-13 DIAGNOSIS — E11.65 TYPE 2 DIABETES MELLITUS WITH HYPERGLYCEMIA, WITHOUT LONG-TERM CURRENT USE OF INSULIN: ICD-10-CM

## 2024-12-16 ENCOUNTER — TELEPHONE (OUTPATIENT)
Dept: ENDOCRINOLOGY | Facility: CLINIC | Age: 74
End: 2024-12-16
Payer: MEDICARE

## 2024-12-16 RX ORDER — SEMAGLUTIDE 0.68 MG/ML
INJECTION, SOLUTION SUBCUTANEOUS
Qty: 3 ML | Refills: 5 | Status: SHIPPED | OUTPATIENT
Start: 2024-12-16

## 2024-12-16 RX ORDER — AMLODIPINE BESYLATE 10 MG/1
10 TABLET ORAL
Qty: 90 TABLET | Refills: 0 | Status: SHIPPED | OUTPATIENT
Start: 2024-12-16

## 2024-12-16 NOTE — TELEPHONE ENCOUNTER
----- Message from Leighton sent at 12/16/2024  2:24 PM CST -----  Regarding: self  Type: Patient Call Back    Who called:self    What is the request in detail:[facility]    Can the clinic reply by [facility]?no  c  Would the patient rather a call back or a response via [facility]? callback    Best call back number:[phone]    Additional Information:[phone] [facility] fax

## 2024-12-16 NOTE — TELEPHONE ENCOUNTER
Care Due:                  Date            Visit Type   Department     Provider  --------------------------------------------------------------------------------                                EP [facility]                              PRIMARY      MED/ INTERNAL  Venancio Barnett  Last Visit: 07-      CARE ([facility])   MED/ PEDS      Ehrensing  Next Visit: None Scheduled  None         None Found                                                            Last  Test          Frequency    Reason                     Performed    Due Date  --------------------------------------------------------------------------------    Lipid Panel.  12 months..  rosuvastatin.............  02- 02-    [facility] Embedded Care Due Messages. Reference number: [id number].   12/16/2024 10:46:28 AM CST

## 2024-12-17 ENCOUNTER — TELEPHONE (OUTPATIENT)
Dept: ENDOCRINOLOGY | Facility: CLINIC | Age: 74
End: 2024-12-17
Payer: MEDICARE

## 2024-12-17 DIAGNOSIS — E11.65 TYPE 2 DIABETES MELLITUS WITH HYPERGLYCEMIA, WITHOUT LONG-TERM CURRENT USE OF INSULIN: Primary | ICD-10-CM

## 2024-12-17 RX ORDER — INSULIN PUMP SYRINGE, 3 ML
EACH MISCELLANEOUS
Qty: 1 EACH | Refills: 0 | Status: SHIPPED | OUTPATIENT
Start: 2024-12-17

## 2024-12-17 RX ORDER — LANCETS
EACH MISCELLANEOUS
Qty: 300 EACH | Refills: 3 | Status: SHIPPED | OUTPATIENT
Start: 2024-12-17

## 2024-12-17 RX ORDER — LANCING DEVICE
EACH MISCELLANEOUS
Qty: 1 EACH | Refills: 0 | Status: SHIPPED | OUTPATIENT
Start: 2024-12-17

## 2024-12-17 NOTE — TELEPHONE ENCOUNTER
----- Message from Byron sent at 12/17/2024  9:56 AM CST -----  Regarding: self  Type: RX Refill Request    Who called: self  Have you contacted your pharmacy: no   Refill or New Rx: refill  RX name and strength: Diabetic supplies      Ordering provider: PAXTON Patel  Would the patient rather a call back or a response via [facility]:  Best call back number: [phone]    Additional information: Patient is stating that she needs an order from Dr Patel faxed over to [facility] for her diabetic supplies. Fax # [phone]

## 2024-12-17 NOTE — TELEPHONE ENCOUNTER
Pt is requesting glucose meter, strips and lancets [facility]  Fax # [phone]. We will fax once Rx are printed. Pt verbalized understanding

## 2025-01-06 ENCOUNTER — HOSPITAL ENCOUNTER (OUTPATIENT)
Dept: INTERVENTIONAL RADIOLOGY/VASCULAR | Facility: HOSPITAL | Age: 75
Discharge: HOME | End: 2025-01-06
Attending: FAMILY MEDICINE
Payer: MEDICARE

## 2025-01-06 VITALS
DIASTOLIC BLOOD PRESSURE: 77 MMHG | OXYGEN SATURATION: 94 % | HEART RATE: 70 BPM | SYSTOLIC BLOOD PRESSURE: 161 MMHG | RESPIRATION RATE: 23 BRPM

## 2025-01-06 DIAGNOSIS — C34.31 PRIMARY MALIGNANT NEOPLASM OF RIGHT LOWER LOBE OF LUNG: ICD-10-CM

## 2025-01-06 DIAGNOSIS — M79.89 SOFT TISSUE MASS: ICD-10-CM

## 2025-01-06 PROCEDURE — 76942 ECHO GUIDE FOR BIOPSY: CPT | Mod: TC | Performed by: RADIOLOGY

## 2025-01-06 PROCEDURE — 63600175 PHARM REV CODE 636 W HCPCS: Performed by: RADIOLOGY

## 2025-01-06 PROCEDURE — 76942 ECHO GUIDE FOR BIOPSY: CPT | Mod: 26,,, | Performed by: RADIOLOGY

## 2025-01-06 PROCEDURE — 20206 BIOPSY MUSCLE PERQ NEEDLE: CPT | Performed by: RADIOLOGY

## 2025-01-06 RX ORDER — LIDOCAINE HYDROCHLORIDE 10 MG/ML
INJECTION, SOLUTION INFILTRATION; PERINEURAL
Status: COMPLETED | OUTPATIENT
Start: 2025-01-06 | End: 2025-01-06

## 2025-01-06 RX ADMIN — LIDOCAINE HYDROCHLORIDE 2 ML: 10 INJECTION, SOLUTION INFILTRATION; PERINEURAL at 01:01

## 2025-01-06 NOTE — NURSING
RUQ abdominal wall mass biopsy complete. Pt tolerated well. VSS. No signs or symptoms of distress noted. Pt escorted to waiting area to meet family. Written and verbal discharge/care instructions given and voices understanding.  Specimens to path per MD Sellers

## 2025-01-06 NOTE — DISCHARGE INSTRUCTIONS
For scheduling: Call  [phone]    For questions or concerns call: ARNULFO MON-FRI 8 AM- 5PM [phone]. Radiology resident on call [phone].    For immediate concerns that are not emergent, you may call our radiology clinic at: [phone]    May remove dressing in 24 hours and shower.   Do Not sit in bath water, hot tub, or swim for 7 days

## 2025-01-06 NOTE — NURSING
Pt arrived to [facility] for Right abdominal wall mass biopsy with US and local anesthesia only. Pt oriented to unit and staff. Plan of care reviewed with patient, patient verbalizes understanding. Comfort measures utilized. Pt safely transferred  to procedural table. Fall risk reviewed with patient, fall risk interventions maintained with direct observation/attendance.  Blankets applied. Pt prepped and draped utilizing standard sterile technique. Patient placed on continuous monitoring, as required by sedation policy. Timeouts completed utilizing standard universal time-out, per department and facility policy. RN to remain at bedside, continuous monitoring maintained. Pt resting comfortably. Denies pain/discomfort. Will continue to monitor. See flow sheets for monitoring, medication administration, and updates.

## 2025-01-06 NOTE — H&P
Radiology History & Physical      SUBJECTIVE:     Chief Complaint: R abdominal wall mass    History of Present Illness:  Sarina Genao is a 74 y.o. female with right abdominal wall mass who presents for image-guided biopsy.    Past Medical History:   Diagnosis Date    Anticoagulant long-term use     Cancer     Kidney (Right)    Cataracts, bilateral     CKD (chronic kidney disease) stage 3, GFR 30-59 ml/min 12/15/2014    Colon polyps     Combined hyperlipidemia associated with type 2 diabetes mellitus 6/7/2013    COPD (chronic obstructive pulmonary disease) 12/15/2014    Diabetes mellitus type II     NIDDM, a.m. glucose-120s-130s-last A1c-7.1-6/7/13    Diabetes mellitus with renal manifestations, uncontrolled 2/1/2017    Diabetic retinopathy     Family history of stomach cancer 12/5/2013    Former smoker     H/O renal cell carcinoma 12/15/2015    History of colonic polyps 2/1/2017    3 polyps 7/13 ---5 yrs    History of gastroesophageal reflux (GERD)     History of urinary incontinence     s/p bladder lift-october, 2011 (at [facility])    Hyperlipidemia     Hypertension     120s/70s-80s    Nephrolithiasis     Osteoarthritis of thumb 12/20/2019    Osteoporosis, post-menopausal 3/17/2014    Primary hyperparathyroidism 10/20/2016    Schatzki's ring 2/1/2017    Dilated 2014     Past Surgical History:   Procedure Laterality Date    bladder lift  2011    done at [facility]    BLADDER STONE REMOVAL  2011    before bladder lift    CATARACT EXTRACTION W/  INTRAOCULAR LENS IMPLANT Left 06/07/2016    Dr. Vásquez    CATARACT EXTRACTION W/  INTRAOCULAR LENS IMPLANT Right 06/21/2016    Dr. Vásquez    COLONOSCOPY N/A 4/26/2018    Procedure: COLONOSCOPY;  Surgeon: Benjamin Zamora MD;  Location: [facility] ENDO;  Service: Endoscopy;  Laterality: N/A;  confirmed ss    COLONOSCOPY N/A 12/21/2023    Procedure: COLONOSCOPY;  Surgeon: Jamel Luis MD;  Location: [facility] ENDO;  Service: Endoscopy;  Laterality: N/A;  9/7 ref Venancio Mayer  MD, Suprep, instr. mailed, [facility]  12/14- instr reviewed, pt reminded to hold ozempic, pre call complete.  DBM    CYSTOSCOPY      INJECTION OF ANESTHETIC AGENT AROUND MULTIPLE INTERCOSTAL NERVES  12/27/2022    Procedure: BLOCK, NERVE, INTERCOSTAL, 2 OR MORE;  Surgeon: Paxton Cannon MD;  Location: [facility] OR 2ND FLR;  Service: Thoracic;;    LYSIS OF ADHESIONS  12/27/2022    Procedure: LYSIS, ADHESIONS;  Surgeon: Paxton Cannon MD;  Location: [facility] OR 2ND FLR;  Service: Thoracic;;    NAVIGATIONAL BRONCHOSCOPY N/A 12/11/2018    Procedure: BRONCHOSCOPY, NAVIGATIONAL;  Surgeon: Ashtyn Ramires MD;  Location: [facility] OR 2ND FLR;  Service: Pulmonary;  Laterality: N/A;    NEPHRECTOMY      partial right    ROBOTIC BRONCHOSCOPY N/A 10/25/2022    Procedure: ROBOTIC BRONCHOSCOPY;  Surgeon: Ashtyn Ramires MD;  Location: [facility] OR 2ND FLR;  Service: Pulmonary;  Laterality: N/A;    VIDEO-ASSISTED THORACOSCOPIC SURGERY (VATS)  12/27/2022    Procedure: VATS (VIDEO-ASSISTED THORACOSCOPIC SURGERY);  Surgeon: Paxton Cannon MD;  Location: [facility] OR 2ND FLR;  Service: Thoracic;;       Home Meds:   Prior to Admission medications    Medication Sig Start Date End Date Taking? Authorizing Provider   amLODIPine (NORVASC) 10 MG tablet TAKE ONE TABLET BY MOUTH ONCE A DAY 12/16/24   Venancio Mayer MD   aspirin (ECOTRIN) 81 MG EC tablet Take 81 mg by mouth once daily.    Provider, Historical   blood sugar diagnostic Strp Dispense: Test strip to check glucose 2 times a day, ICD-10: E11.65, compatible with insurance/glucometer, dispense enough for 90 day supply 12/17/24   Naveed Patel MD   blood-glucose meter kit Dispense: 1 glucometer, use to check glucose 2 times a day, ICD-10: E11.65,  Dispense machine covered by insurance 12/17/24   Naveed Patel MD   cyanocobalamin, vitamin B-12, 1,000 mcg TbSR Take by mouth once daily.     Provider, Historical   famotidine (PEPCID) 40 MG tablet TAKE ONE TABLET BY MOUTH ONCE A DAY 1/4/22    "Maxx Wen MD   furosemide (LASIX) 20 MG tablet TAKE ONE TABLET BY MOUTH ONCE A DAY AS NEEDED 11/10/24   Venancio Mayer MD   irbesartan (AVAPRO) 300 MG tablet Take 1 tablet (300 mg total) by mouth once daily. 11/19/24   Venancio Mayer MD   lancets Misc Dispense: Lancets use to check glucose 2 times a day, ICD-10: E11.65, dispense enough for 90 day supply 12/17/24   Naveed Patel MD   lancing device Misc One device, used to check blood glucose, ICD-10: E11.65 12/17/24   Naveed Patel MD   omeprazole (PRILOSEC) 40 MG capsule Take 1 capsule (40 mg total) by mouth once daily. 5/4/21 9/7/22  Venancio Mayer MD   oxybutynin (DITROPAN-XL) 5 MG TR24 TAKE ONE TABLET BY MOUTH ONCE A DAY 8/17/24   Venancio Mayer MD   OZEMPIC 0.25 mg or 0.5 mg (2 mg/3 mL) pen injector INJECT .5 MILLIGRAM UNDER THE SKIN ONCE EVERY 7 DAYS 12/16/24   Naveed Patel MD   pen needle, diabetic (BD INSULIN PEN NEEDLE UF SHORT) 31 gauge x 5/16" Ndle USE AS DIRECTED 11/28/17   Venancio Mayer MD   rosuvastatin (CRESTOR) 20 MG tablet TAKE ONE TABLET BY MOUTH ONCE A DAY 9/29/23   Venancio Mayer MD   traMADoL (ULTRAM) 50 mg tablet Take 1 tablet (50 mg total) by mouth every 6 (six) hours as needed for Pain. 10/11/24   Venancio Mayer MD   vitamin D (VITAMIN D3) 1000 units Tab Take 1,000 Units by mouth once daily.    Provider, Historical     Anticoagulants/Antiplatelets: no anticoagulation    Allergies:   Review of patient's allergies indicates:   Allergen Reactions    Pantoprazole Other (See Comments)     Elevated Blood Sugars    Metformin Diarrhea     Sedation History:  no adverse reactions    Review of Systems:   Hematological: no known coagulopathies  Respiratory: no shortness of breath  Cardiovascular: no chest pain  Gastrointestinal: no abdominal pain  Genito-Urinary: no dysuria  Musculoskeletal: negative  Neurological: no TIA or stroke symptoms         OBJECTIVE:     Vital Signs (Most Recent)  Pulse: " "70 (01/06/25 1325)  Resp: 20 (01/06/25 1325)  BP: (!) 178/81 (01/06/25 1325)  SpO2: 99 % (01/06/25 1325)    Physical Exam:  ASA: n/a  Mallampati: n/a    General: no acute distress  Mental Status: alert and oriented to person, place and time  HEENT: normocephalic, atraumatic  Chest: unlabored breathing  Heart: regular heart rate  Abdomen: nondistended  Extremity: moves all extremities    Laboratory  No results found for: "INR", "PT", "PTT"    Lab Results   Component Value Date    WBC 12.06 04/06/2023    WBC 12.06 04/06/2023    HGB 14.1 04/06/2023    HGB 14.1 04/06/2023    HCT 45.2 04/06/2023    HCT 45.2 04/06/2023    MCV 88 04/06/2023    MCV 88 04/06/2023     04/06/2023     04/06/2023      Lab Results   Component Value Date     (H) 11/18/2024     11/18/2024    K 3.7 11/18/2024     11/18/2024    CO2 30 (H) 11/18/2024    BUN 20 11/18/2024    CREATININE 1.3 11/18/2024    CALCIUM 10.2 11/18/2024    MG 1.7 10/31/2023    ALT 9 (L) 11/18/2024    AST 15 11/18/2024    ALBUMIN 3.3 (L) 11/18/2024    BILITOT 0.5 11/18/2024       ASSESSMENT/PLAN:     Sedation Plan: Local only.  Patient will undergo image-guided biopsy.    Rivera Herrera MD  Interventional Radiologist  Department of Radiology   "

## 2025-01-09 LAB
ADEQUACY: NORMAL
COMMENT: NORMAL
FINAL PATHOLOGIC DIAGNOSIS: NORMAL
GROSS: NORMAL
Lab: NORMAL

## 2025-01-13 ENCOUNTER — TELEPHONE (OUTPATIENT)
Dept: HEMATOLOGY/ONCOLOGY | Facility: CLINIC | Age: 75
End: 2025-01-13
Payer: MEDICARE

## 2025-01-13 NOTE — NURSING
Nurse navigator spoke with patient to coordinate appt with Dr. Santiago on 1/15/25.  Patient states understanding.  All questions answered & appt letter mailed to patients home.  Contact info given for any future questions.

## 2025-01-15 ENCOUNTER — TELEPHONE (OUTPATIENT)
Dept: HEMATOLOGY/ONCOLOGY | Facility: CLINIC | Age: 75
End: 2025-01-15
Payer: MEDICARE

## 2025-01-15 ENCOUNTER — LAB VISIT (OUTPATIENT)
Dept: LAB | Facility: HOSPITAL | Age: 75
End: 2025-01-15
Attending: HOSPITALIST
Payer: MEDICARE

## 2025-01-15 ENCOUNTER — OFFICE VISIT (OUTPATIENT)
Dept: HEMATOLOGY/ONCOLOGY | Facility: CLINIC | Age: 75
End: 2025-01-15
Payer: MEDICARE

## 2025-01-15 ENCOUNTER — TELEPHONE (OUTPATIENT)
Dept: FAMILY MEDICINE | Facility: CLINIC | Age: 75
End: 2025-01-15
Payer: MEDICARE

## 2025-01-15 VITALS
DIASTOLIC BLOOD PRESSURE: 77 MMHG | HEART RATE: 70 BPM | RESPIRATION RATE: 23 BRPM | WEIGHT: 134.88 LBS | OXYGEN SATURATION: 94 % | SYSTOLIC BLOOD PRESSURE: 161 MMHG | BODY MASS INDEX: 23.9 KG/M2

## 2025-01-15 DIAGNOSIS — C64.9 RENAL CELL CARCINOMA, UNSPECIFIED LATERALITY: Primary | ICD-10-CM

## 2025-01-15 DIAGNOSIS — I10 ESSENTIAL HYPERTENSION: ICD-10-CM

## 2025-01-15 DIAGNOSIS — C64.9 RENAL CELL CARCINOMA, UNSPECIFIED LATERALITY: ICD-10-CM

## 2025-01-15 DIAGNOSIS — Z79.899 LONG TERM CURRENT USE OF THERAPEUTIC DRUG: ICD-10-CM

## 2025-01-15 LAB
ALBUMIN SERPL BCP-MCNC: 3.6 G/DL (ref 3.5–5.2)
ALP SERPL-CCNC: 112 U/L (ref 40–150)
ALT SERPL W/O P-5'-P-CCNC: 6 U/L (ref 10–44)
ANION GAP SERPL CALC-SCNC: 8 MMOL/L (ref 8–16)
AST SERPL-CCNC: 8 U/L (ref 10–40)
BASOPHILS # BLD AUTO: 0.05 K/UL (ref 0–0.2)
BASOPHILS NFR BLD: 0.4 % (ref 0–1.9)
BILIRUB SERPL-MCNC: 0.7 MG/DL (ref 0.1–1)
BUN SERPL-MCNC: 14 MG/DL (ref 8–23)
CALCIUM SERPL-MCNC: 9.8 MG/DL (ref 8.7–10.5)
CHLORIDE SERPL-SCNC: 105 MMOL/L (ref 95–110)
CO2 SERPL-SCNC: 29 MMOL/L (ref 23–29)
CREAT SERPL-MCNC: 1 MG/DL (ref 0.5–1.4)
CREAT SERPL-MCNC: 1 MG/DL (ref 0.5–1.4)
DIFFERENTIAL METHOD BLD: ABNORMAL
EOSINOPHIL # BLD AUTO: 0.1 K/UL (ref 0–0.5)
EOSINOPHIL NFR BLD: 0.8 % (ref 0–8)
ERYTHROCYTE [DISTWIDTH] IN BLOOD BY AUTOMATED COUNT: 14.4 % (ref 11.5–14.5)
EST. GFR  (NO RACE VARIABLE): 58.4 ML/MIN/1.73 M^2
EST. GFR  (NO RACE VARIABLE): 58.4 ML/MIN/1.73 M^2
GLUCOSE SERPL-MCNC: 80 MG/DL (ref 70–110)
HCT VFR BLD AUTO: 42.9 % (ref 37–48.5)
HGB BLD-MCNC: 14.6 G/DL (ref 12–16)
IMM GRANULOCYTES # BLD AUTO: 0.09 K/UL (ref 0–0.04)
IMM GRANULOCYTES NFR BLD AUTO: 0.7 % (ref 0–0.5)
LYMPHOCYTES # BLD AUTO: 1.7 K/UL (ref 1–4.8)
LYMPHOCYTES NFR BLD: 13 % (ref 18–48)
MCH RBC QN AUTO: 28.3 PG (ref 27–31)
MCHC RBC AUTO-ENTMCNC: 34 G/DL (ref 32–36)
MCV RBC AUTO: 83 FL (ref 82–98)
MONOCYTES # BLD AUTO: 0.9 K/UL (ref 0.3–1)
MONOCYTES NFR BLD: 7 % (ref 4–15)
NEUTROPHILS # BLD AUTO: 10.2 K/UL (ref 1.8–7.7)
NEUTROPHILS NFR BLD: 78.1 % (ref 38–73)
NRBC BLD-RTO: 0 /100 WBC
PLATELET # BLD AUTO: 210 K/UL (ref 150–450)
PMV BLD AUTO: 8.5 FL (ref 9.2–12.9)
POTASSIUM SERPL-SCNC: 3.5 MMOL/L (ref 3.5–5.1)
PROT SERPL-MCNC: 7.7 G/DL (ref 6–8.4)
RBC # BLD AUTO: 5.15 M/UL (ref 4–5.4)
SODIUM SERPL-SCNC: 142 MMOL/L (ref 136–145)
TSH SERPL DL<=0.005 MIU/L-ACNC: 1.02 UIU/ML (ref 0.4–4)
WBC # BLD AUTO: 13.11 K/UL (ref 3.9–12.7)

## 2025-01-15 PROCEDURE — G2211 COMPLEX E/M VISIT ADD ON: HCPCS | Mod: S$GLB,,, | Performed by: HOSPITALIST

## 2025-01-15 PROCEDURE — 99205 OFFICE O/P NEW HI 60 MIN: CPT | Mod: S$GLB,,, | Performed by: HOSPITALIST

## 2025-01-15 PROCEDURE — 3288F FALL RISK ASSESSMENT DOCD: CPT | Mod: CPTII,S$GLB,, | Performed by: HOSPITALIST

## 2025-01-15 PROCEDURE — 99999 PR PBB SHADOW E&M-EST. PATIENT-LVL IV: CPT | Mod: PBBFAC,,, | Performed by: HOSPITALIST

## 2025-01-15 PROCEDURE — 36415 COLL VENOUS BLD VENIPUNCTURE: CPT | Performed by: HOSPITALIST

## 2025-01-15 PROCEDURE — 1126F AMNT PAIN NOTED NONE PRSNT: CPT | Mod: CPTII,S$GLB,, | Performed by: HOSPITALIST

## 2025-01-15 PROCEDURE — 3077F SYST BP >= 140 MM HG: CPT | Mod: CPTII,S$GLB,, | Performed by: HOSPITALIST

## 2025-01-15 PROCEDURE — 3078F DIAST BP <80 MM HG: CPT | Mod: CPTII,S$GLB,, | Performed by: HOSPITALIST

## 2025-01-15 PROCEDURE — 85025 COMPLETE CBC W/AUTO DIFF WBC: CPT | Performed by: HOSPITALIST

## 2025-01-15 PROCEDURE — 1101F PT FALLS ASSESS-DOCD LE1/YR: CPT | Mod: CPTII,S$GLB,, | Performed by: HOSPITALIST

## 2025-01-15 PROCEDURE — 1159F MED LIST DOCD IN RCRD: CPT | Mod: CPTII,S$GLB,, | Performed by: HOSPITALIST

## 2025-01-15 PROCEDURE — 80053 COMPREHEN METABOLIC PANEL: CPT | Performed by: HOSPITALIST

## 2025-01-15 PROCEDURE — 84443 ASSAY THYROID STIM HORMONE: CPT | Performed by: HOSPITALIST

## 2025-01-15 NOTE — PROGRESS NOTES
MEDICAL ONCOLOGY - NEW PATIENT VISIT    Best Contact Phone Number(s): [phone] (home)      Cancer/Stage/TNM:    Cancer Staging   Squamous Cell Carcinoma of right lower lobe of lung  Staging form: Lung, AJCC 8th Edition  - Clinical stage from 1/11/2023: Stage IA2 (cT1b, cN0, cM0) - Signed by Xavier Deng MD on 1/11/2023       Reason for visit: Sarina Genao is a 74 y.o. 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 She has remote history of grade 2 ccRCC sp partial nephrectomy 08/2013.  Subsquently underwent radiation therapy to an early stage squamous cell lung cancer 01/2023. Around this time, was noted to have abdominal wall subcutaneous nodule. . FDG PET 11/2024 showed progressive 3.9cm anterior abdominal wall mass; biopsy 01/2025 consistetn with metatsatic ccRCC. There was also a 2.3 cm left pulmonary nodule of concern. She presents to medical oncology clinic for initial evaluation.    History has been obtained by chart review and discussion with the patient.    HPI:     Patient currently feels OK. Has had ongoing progression of abdmianl wall mass. Not painful. Appetite is fair. Has lost about 30 pounds over the last few years in setting of ozempic use. No signficant night sweats. No fevers. No signficant flank pain. No blood in urine.     No known MI or stroke. She is on ASA and rosuvastatin. Takes lasix for peripheral edema; has chart diagnosis of heart failure. She takes amlodipine and irbesartan for high blood pressure. She is on Ozempic. Remains ambulatory and independent. Lives alone. Accompanied by her sister, Carolyn. ECOG 1      Oncology History   Renal cell carcinoma   8/6/2013 Surgery    08/06/2013: Partial right nephrectomy.   Procedure: Partial nephrectomy. Specimen Laterality: Right Tumor Size: 4 cm in greatest dimension. Tumor Focality: Unifocal. Macroscopic Extent  Of Tumor: Tumor limited to the kidney. Histologic Type: Clear cell renal cell carcinoma. Sarcomatoid features: Not identified. Histologic Grade (Heron nuclear grade): G2. Microscopic Tumor Extension: Tumor limited to the kidney Margins: Uninvolved by invasive carcinoma. Pathologic Staging: Primary tumor: pT1a Regional lymph node: pNx Distant metastasis: Not applicable.      11/25/2022 Imaging Significant Findings    11/25/22 PET CT  Impression:  - Hypermetabolic 1.5 cm nodule in the right lower lobe of the lung concerning for primary neoplasm.  No hypermetabolic lymphadenopathy.  - Round soft tissue structure within the anterior abdominal wall measuring up to 2.3 cm with mild radiotracer uptake.  Recommend further assessment with dedicated ultrasound and/or percutaneous sampling as clinically warranted.  - Additional pulmonary nodules without significant FDG avidity as above.  Findings include a new 3 mm nodule in the left upper lobe.  Of note, subcentimeter nodules are likely too small to characterize with PET.  Recommend continued CT surveillance.     11/22/2024 Imaging Significant Findings    11/22/24 FDG PET  Impression:  - Patient with history of right lower lobe non-small cell lung cancer status post radiation therapy.  No evidence of tracer avid disease or metastasis.  - Interval enlargement of a 3.9 cm anterior abdominal wall mass with mild increased tracer uptake.  Recommend further evaluation with ultrasound and tissue sampling is warranted.     1/6/2025 Biopsy    01/06/25 Biopsy    RIGHT ABDOMINAL WALL, CT-GUIDED BIOPSY WITH PATHOLOGIST ASSISTED ADEQUACY (CYTOLOGY AND CELL BLOCK):  Positive for malignancy, consistent with renal cell carcinoma.  See comment.        Squamous Cell Carcinoma of right lower lobe of lung   10/25/2022 Biopsy    10/25/22 Lung biopsy  1. Lung, right lower lobe, endobronchial brushing, cytologic evaluation with  cell block:  Atypical cells present.  Epithelial atypia.  Findings  suggestive of squamous cell carcinoma  2. Lung, right lower lobe, endoscopic fine needle aspiration, cytologic  evaluation with cell block:  Positive for malignancy. Lung squamous cell carcinoma.  3. Lung, right lower lobe, transbronchial biopsy, cytologic evaluation with  cell block:  Positive for malignancy, Lung squamous cell carcinoma  - PD-L1 and Lung NGS Panel have been ordered and results will be issued in a  supplemental report.  4. Lung, right lower lobe, bronchoalveolar lavage, cytologic evaluation with  cell block:  Negative for malignant cells.  Reactive bronchial cells.  Macrophages.  5. Lung, left lower lobe, endoscopic fine needle aspiration, cytologic  evaluation with cell block:  Negative for malignant cells.  Reactive bronchial cells.  Macrophages.  6. Lymph node, Station 7, endoscopic fine needle aspiration, cytologic  evalu ation with cell block:  Negative for malignant cells. Reactive bronchial cells.  Lymphocytes present.      11/25/2022 Imaging Significant Findings    11/25/22 PET CT  Impression:  - Hypermetabolic 1.5 cm nodule in the right lower lobe of the lung concerning for primary neoplasm.  No hypermetabolic lymphadenopathy.  - Round soft tissue structure within the anterior abdominal wall measuring up to 2.3 cm with mild radiotracer uptake.  Recommend further assessment with dedicated ultrasound and/or percutaneous sampling as clinically warranted.  - Additional pulmonary nodules without significant FDG avidity as above.  Findings include a new 3 mm nodule in the left upper lobe.  Of note, subcentimeter nodules are likely too small to characterize with PET.  Recommend continued CT surveillance.     11/30/2022 Initial Diagnosis    Squamous Cell Carcinoma of right lower lobe of lung     1/11/2023 Cancer Staged    Staging form: Lung, AJCC 8th Edition  - Clinical stage from 1/11/2023: Stage IA2 (cT1b, cN0, cM0)     1/17/2023 Imaging Significant Findings    01/17/23 CT Chest  Impression:  1.  Interval enlargement of a right lower lobe nodule, now 2.3 cm maximally (previously 1.5 cm).  This was noted hypermetabolic on the prior PET-CT 09/29/2022.  2. There is a newly seen peripheral, right lower lobe irregular nodule up to 1.3 cm. v this was a site of thoracotomy and may be postsurgical in nature.  3. There other pulmonary nodules as described above which do not appear changed.  There is no new lymphadenopathy detected.  4. There is a new small right pleural effusion and peripheral airspace opacities in the right lung.  This may reflect infectious/inflammatory etiology.  Alternatively, this may reflect involving appearance of postsurgical change.     1/26/2023 - 2/3/2023 Radiation Therapy    Treating physician: Xavier Deng    [facility] Techn[employer] Dose/Fx (Gy) #Fx Total Dose (Gy)   RLL Lung SBRT 6X 12.5 4 / 4 50         Biopsy       4/26/2023 Imaging Significant Findings    04/26/23 CT Chest  Impression:  1. Interval decrease in size of a right lower lobe irregular nodule with adjacent architectural distortion.  Consistent with post treatment changes.  2. Other findings as above without significant interval change.     5/10/2024 Imaging Significant Findings    5/10/24 CT Chest  Impression:  1. Stable bilateral pulmonary nodules, including right lower lobe spiculated nodule.  No new or enlarging nodules.  2. Additional findings as above.     11/13/2024 Imaging Significant Findings    11/13/24 CT Chest  Impression:  1. Stable bilateral pulmonary nodules, including right lower lobe spiculated nodule.  No new or enlarging nodules.  2. Additional findings as above.     11/22/2024 Imaging Significant Findings    11/22/24 FDG PET  Impression:  - Patient with history of right lower lobe non-small cell lung cancer status post radiation therapy.  No evidence of tracer avid disease or metastasis.  - Interval enlargement of a 3.9 cm anterior abdominal wall mass with mild increased tracer uptake.  Recommend  further evaluation with ultrasound and tissue sampling is warranted.          Past Medical History:   Diagnosis Date    Anticoagulant long-term use     Cancer     Kidney (Right)    Cataracts, bilateral     CKD (chronic kidney disease) stage 3, GFR 30-59 ml/min 12/15/2014    Colon polyps     Combined hyperlipidemia associated with type 2 diabetes mellitus 6/7/2013    COPD (chronic obstructive pulmonary disease) 12/15/2014    Diabetes mellitus type II     NIDDM, a.m. glucose-120s-130s-last A1c-7.1-6/7/13    Diabetes mellitus with renal manifestations, uncontrolled 2/1/2017    Diabetic retinopathy     Family history of stomach cancer 12/5/2013    Former smoker     H/O renal cell carcinoma 12/15/2015    History of colonic polyps 2/1/2017    3 polyps 7/13 ---5 yrs    History of gastroesophageal reflux (GERD)     History of urinary incontinence     s/p bladder lift-october, 2011 (at [facility])    Hyperlipidemia     Hypertension     120s/70s-80s    Nephrolithiasis     Osteoarthritis of thumb 12/20/2019    Osteoporosis, post-menopausal 3/17/2014    Primary hyperparathyroidism 10/20/2016    Schatzki's ring 2/1/2017    Dilated 2014        Past Surgical History:   Procedure Laterality Date    bladder lift  2011    done at [facility]    BLADDER STONE REMOVAL  2011    before bladder lift    CATARACT EXTRACTION W/  INTRAOCULAR LENS IMPLANT Left 06/07/2016    Dr. Vásquez    CATARACT EXTRACTION W/  INTRAOCULAR LENS IMPLANT Right 06/21/2016    Dr. Vásquez    COLONOSCOPY N/A 4/26/2018    Procedure: COLONOSCOPY;  Surgeon: Benjamin Zamora MD;  Location: [facility];  Service: Endoscopy;  Laterality: N/A;  confirmed ss    COLONOSCOPY N/A 12/21/2023    Procedure: COLONOSCOPY;  Surgeon: Jamel Luis MD;  Location: [facility] ENDO;  Service: Endoscopy;  Laterality: N/A;  9/7 ref Venancio Mayer MD, Suprep, instr. mailed, meds-st  12/14- instr reviewed, pt reminded to hold ozempic, pre call complete.  DBM    CYSTOSCOPY      INJECTION OF  ANESTHETIC AGENT AROUND MULTIPLE INTERCOSTAL NERVES  12/27/2022    Procedure: BLOCK, NERVE, INTERCOSTAL, 2 OR MORE;  Surgeon: Paxton Cannon MD;  Location: [facility] OR 2ND FLR;  Service: Thoracic;;    LYSIS OF ADHESIONS  12/27/2022    Procedure: LYSIS, ADHESIONS;  Surgeon: Paxton Cannon MD;  Location: [facility] OR 2ND FLR;  Service: Thoracic;;    NAVIGATIONAL BRONCHOSCOPY N/A 12/11/2018    Procedure: BRONCHOSCOPY, NAVIGATIONAL;  Surgeon: Ashtyn Ramires MD;  Location: [facility] OR 2ND FLR;  Service: Pulmonary;  Laterality: N/A;    NEPHRECTOMY      partial right    ROBOTIC BRONCHOSCOPY N/A 10/25/2022    Procedure: ROBOTIC BRONCHOSCOPY;  Surgeon: Ashtyn Ramires MD;  Location: [facility] OR 2ND FLR;  Service: Pulmonary;  Laterality: N/A;    VIDEO-ASSISTED THORACOSCOPIC SURGERY (VATS)  12/27/2022    Procedure: VATS (VIDEO-ASSISTED THORACOSCOPIC SURGERY);  Surgeon: Paxton Cannon MD;  Location: [facility] OR 2ND FLR;  Service: Thoracic;;        Family History   Problem Relation Name Age of Onset    Glaucoma Mother      Cataracts Mother      No Known Problems Father      Colon cancer Brother      Nephrolithiasis Sister      No Known Problems Maternal Aunt      No Known Problems Maternal Uncle      No Known Problems Paternal Aunt      No Known Problems Paternal Uncle      No Known Problems Maternal Grandmother      No Known Problems Maternal Grandfather      No Known Problems Paternal Grandmother      No Known Problems Paternal Grandfather      Anesthesia problems Neg Hx      Kidney cancer Neg Hx      Amblyopia Neg Hx      Blindness Neg Hx      Cancer Neg Hx      Diabetes Neg Hx      Hypertension Neg Hx      Macular degeneration Neg Hx      Retinal detachment Neg Hx      Strabismus Neg Hx      Stroke Neg Hx      Thyroid disease Neg Hx          Social History     Tobacco Use    Smoking status: Every Day     Current packs/day: 0.25     Average packs/day: 0.3 packs/day for 30.0 years (7.5 ttl pk-yrs)     Types: Cigarettes    Smokeless  "tobacco: Never   Substance Use Topics    Alcohol use: No     Alcohol/week: 0.0 standard drinks of alcohol        I have reviewed and updated the patient's past medical, surgical, family and social histories.    Review of patient's allergies indicates:   Allergen Reactions    Pantoprazole Other (See Comments)     Elevated Blood Sugars    Metformin Diarrhea        Current Outpatient Medications   Medication Sig Dispense Refill    amLODIPine (NORVASC) 10 MG tablet TAKE ONE TABLET BY MOUTH ONCE A DAY 90 tablet 0    aspirin (ECOTRIN) 81 MG EC tablet Take 81 mg by mouth once daily.      blood sugar diagnostic Strp Dispense: Test strip to check glucose 2 times a day, ICD-10: E11.65, compatible with insurance/glucometer, dispense enough for 90 day supply 300 each 3    blood-glucose meter kit Dispense: 1 glucometer, use to check glucose 2 times a day, ICD-10: E11.65,  Dispense machine covered by insurance 1 each 0    cyanocobalamin, vitamin B-12, 1,000 mcg TbSR Take by mouth once daily.       famotidine (PEPCID) 40 MG tablet TAKE ONE TABLET BY MOUTH ONCE A DAY 90 tablet 0    furosemide (LASIX) 20 MG tablet TAKE ONE TABLET BY MOUTH ONCE A DAY AS NEEDED 30 tablet 3    irbesartan (AVAPRO) 300 MG tablet Take 1 tablet (300 mg total) by mouth once daily. 90 tablet 3    lancets Misc Dispense: Lancets use to check glucose 2 times a day, ICD-10: E11.65, dispense enough for 90 day supply 300 each 3    lancing device Misc One device, used to check blood glucose, ICD-10: E11.65 1 each 0    omeprazole (PRILOSEC) 40 MG capsule Take 1 capsule (40 mg total) by mouth once daily. 90 capsule 4    oxybutynin (DITROPAN-XL) 5 MG TR24 TAKE ONE TABLET BY MOUTH ONCE A DAY 90 tablet 3    OZEMPIC 0.25 mg or 0.5 mg (2 mg/3 mL) pen injector INJECT .5 MILLIGRAM UNDER THE SKIN ONCE EVERY 7 DAYS 3 mL 5    pen needle, diabetic (BD INSULIN PEN NEEDLE UF SHORT) 31 gauge x 5/16" Ndle USE AS DIRECTED 100 each 12    rosuvastatin (CRESTOR) 20 MG tablet TAKE ONE " TABLET BY MOUTH ONCE A DAY 90 tablet 12    traMADoL (ULTRAM) 50 mg tablet Take 1 tablet (50 mg total) by mouth every 6 (six) hours as needed for Pain. 120 tablet 5    vitamin D (VITAMIN D3) 1000 units Tab Take 1,000 Units by mouth once daily.       No current facility-administered medications for this visit.        Physical Exam:   BP (!) 161/77 (BP Location: Left arm, Patient Position: Sitting)   Pulse 70   Resp (!) 23   Wt 61.2 kg (134 lb 14.4 oz)   SpO2 (!) 94%   BMI 23.90 kg/m²      ECOG Performance status: 1            Physical Exam  Constitutional:       General: She is not in acute distress.     Appearance: She is normal weight.   HENT:      Head: Normocephalic.   Eyes:      General: No scleral icterus.     Conjunctiva/sclera: Conjunctivae normal.   Cardiovascular:      Rate and Rhythm: Normal rate.   Pulmonary:      Effort: Pulmonary effort is normal. No respiratory distress.   Abdominal:      General: There is no distension.      Palpations: Abdomen is soft.   Musculoskeletal:         General: Normal range of motion.      Cervical back: Normal range of motion and neck supple.   Skin:     General: Skin is warm.      Coloration: Skin is not jaundiced.   Neurological:      General: No focal deficit present.      Mental Status: She is alert and oriented to person, place, and time.   Psychiatric:         Mood and Affect: Mood normal.         Behavior: Behavior normal.         Thought Content: Thought content normal.           Labs:   No results found for this or any previous visit (from the past 48 hours).       Imaging:         I have personally reviewed the above imaging    Path:   Reviewed pathology as documented in oncology history      Assessment and Plan:   1. Renal cell carcinoma, unspecified laterality  Assessment & Plan:  Metachronous recurrence of ccRCC. Initial diagnosis in 2013 sp partial nephrectomy. Has biopsy proven recurrence to abdominal wall 01/2025. Additional lung nodules of concern. She  is asymptomatic.  - CT torso and bone scan to complete staging  - Referral to pulmonology for bronchsocopy with biopsy of concerning left lung nodule  - If she has multiple confirmed areas of metastatic ccRCC, consider palliative intent cabo nivo  - If tumor recurrence isolated to abdomian wall, consider resection  - FU 3 weeks to review treatment options    Orders:  -     CT Chest Abdomen Pelvis With IV Contrast (XPD) Routine Oral Contrast; Future; Expected date: 01/15/2025  -     Ambulatory referral/consult to Pulmonology; Future; Expected date: 01/22/2025  -     Creatinine, serum; Future; Expected date: 01/15/2025  -     CBC Auto Differential; Future; Expected date: 01/15/2025  -     Comprehensive Metabolic Panel; Standing  -     NM Bone Scan Whole Body; Future; Expected date: 01/15/2025    2. Essential hypertension    3. Long term current use of therapeutic drug  -     TSH; Standing               Follow up:   Route Chart for Scheduling    Med Onc Chart Routing      Follow up with physician 3 weeks.   Follow up with [facility]    Infusion scheduling note    Injection scheduling note    Labs CBC, CMP and TSH   Scheduling:  Preferred lab:  Lab interval:     Imaging CT chest abdomen pelvis and bone scans      Pharmacy appointment    Other referrals       Additional referrals needed  Pulm - Dr. Ramires               Therapy Plan Information  DENOSUMAB (PROLIA) Q6M for Osteoporosis, post-menopausal, noted on 3/17/2014  Medications  denosumab (PROLIA) injection 60 mg  60 mg, Subcutaneous, Every 26 weeks      No therapy plan of the specified type found.    No therapy plan of the specified type found.      The above information has been reviewed with the patient and all questions have been answered to their apparent satisfaction.  They understand that they can call the clinic with any questions.    Paxton Santiago MD  Hematology/Oncology  [facility]

## 2025-01-15 NOTE — Clinical Note
Ashtyn - could you bronch this lady to biopsy the left lung nodule? I think it will be metastatic ccRCC since it isn't FDG avid. If so, would offer her pallaitive nani/pem for metastatic ccRCC. Just put a referral in. Thanks!

## 2025-01-15 NOTE — TELEPHONE ENCOUNTER
Spoke with pt. Informed her that Dr. Santiago has agreed to seeing her today, but it will be an abbreviated visit as she will be late. Pt agreeable, provider notified.

## 2025-01-15 NOTE — ASSESSMENT & PLAN NOTE
Metachronous recurrence of ccRCC. Initial diagnosis in 2013 sp partial nephrectomy. Has biopsy proven recurrence to abdominal wall 01/2025. Additional lung nodules of concern. She is asymptomatic.  - CT torso and bone scan to complete staging  - Referral to pulmonology for bronchsocopy with biopsy of concerning left lung nodule  - If she has multiple confirmed areas of metastatic ccRCC, consider palliative intent cabo nivo  - If tumor recurrence isolated to abdomian wall, consider resection  - FU 3 weeks to review treatment options

## 2025-01-15 NOTE — TELEPHONE ENCOUNTER
----- Message from Joaquín sent at 1/15/2025  2:43 PM CST -----  Regarding: Consult/Advisory  Contact: [phone]  Consult/Advisory     Name Of Caller: Sarina        Contact Preference:  [phone]     Nature of call: Pt is calling and saying she may be 20 to 30 mins late and was seeing if she can still come.

## 2025-01-16 ENCOUNTER — OFFICE VISIT (OUTPATIENT)
Dept: FAMILY MEDICINE | Facility: CLINIC | Age: 75
End: 2025-01-16
Payer: MEDICARE

## 2025-01-16 VITALS
TEMPERATURE: 98 F | HEIGHT: 63 IN | DIASTOLIC BLOOD PRESSURE: 66 MMHG | BODY MASS INDEX: 23.75 KG/M2 | OXYGEN SATURATION: 74 % | HEART RATE: 95 BPM | WEIGHT: 134.06 LBS | SYSTOLIC BLOOD PRESSURE: 140 MMHG

## 2025-01-16 DIAGNOSIS — E11.22 TYPE 2 DIABETES MELLITUS WITH STAGE 3A CHRONIC KIDNEY DISEASE, WITHOUT LONG-TERM CURRENT USE OF INSULIN: ICD-10-CM

## 2025-01-16 DIAGNOSIS — D64.9 ANEMIA, UNSPECIFIED TYPE: ICD-10-CM

## 2025-01-16 DIAGNOSIS — R25.2 LEG CRAMPS: ICD-10-CM

## 2025-01-16 DIAGNOSIS — C64.9 RENAL CELL CARCINOMA, UNSPECIFIED LATERALITY: ICD-10-CM

## 2025-01-16 DIAGNOSIS — E11.65 TYPE 2 DIABETES MELLITUS WITH HYPERGLYCEMIA, WITH LONG-TERM CURRENT USE OF INSULIN: ICD-10-CM

## 2025-01-16 DIAGNOSIS — N18.32 STAGE 3B CHRONIC KIDNEY DISEASE: ICD-10-CM

## 2025-01-16 DIAGNOSIS — I50.30 HEART FAILURE WITH PRESERVED EJECTION FRACTION, UNSPECIFIED HF CHRONICITY: ICD-10-CM

## 2025-01-16 DIAGNOSIS — E11.59 CONTROLLED TYPE 2 DIABETES MELLITUS WITH OTHER CIRCULATORY COMPLICATION, WITHOUT LONG-TERM CURRENT USE OF INSULIN: ICD-10-CM

## 2025-01-16 DIAGNOSIS — N18.31 STAGE 3A CHRONIC KIDNEY DISEASE: ICD-10-CM

## 2025-01-16 DIAGNOSIS — F11.20 OPIOID DEPENDENCE, UNCOMPLICATED: ICD-10-CM

## 2025-01-16 DIAGNOSIS — N18.31 TYPE 2 DIABETES MELLITUS WITH STAGE 3A CHRONIC KIDNEY DISEASE, WITHOUT LONG-TERM CURRENT USE OF INSULIN: ICD-10-CM

## 2025-01-16 DIAGNOSIS — C34.31 PRIMARY MALIGNANT NEOPLASM OF RIGHT LOWER LOBE OF LUNG: ICD-10-CM

## 2025-01-16 DIAGNOSIS — I73.9 PVD (PERIPHERAL VASCULAR DISEASE): ICD-10-CM

## 2025-01-16 DIAGNOSIS — J44.9 CHRONIC OBSTRUCTIVE PULMONARY DISEASE, UNSPECIFIED COPD TYPE: ICD-10-CM

## 2025-01-16 DIAGNOSIS — I10 ESSENTIAL HYPERTENSION: Primary | ICD-10-CM

## 2025-01-16 DIAGNOSIS — Z79.4 LONG-TERM INSULIN USE: ICD-10-CM

## 2025-01-16 DIAGNOSIS — Z79.4 TYPE 2 DIABETES MELLITUS WITH HYPERGLYCEMIA, WITH LONG-TERM CURRENT USE OF INSULIN: ICD-10-CM

## 2025-01-16 DIAGNOSIS — E11.65 TYPE 2 DIABETES MELLITUS WITH HYPERGLYCEMIA, WITHOUT LONG-TERM CURRENT USE OF INSULIN: ICD-10-CM

## 2025-01-16 PROCEDURE — 3077F SYST BP >= 140 MM HG: CPT | Mod: CPTII,S$GLB,, | Performed by: INTERNAL MEDICINE

## 2025-01-16 PROCEDURE — 1126F AMNT PAIN NOTED NONE PRSNT: CPT | Mod: CPTII,S$GLB,, | Performed by: INTERNAL MEDICINE

## 2025-01-16 PROCEDURE — 1159F MED LIST DOCD IN RCRD: CPT | Mod: CPTII,S$GLB,, | Performed by: INTERNAL MEDICINE

## 2025-01-16 PROCEDURE — G2211 COMPLEX E/M VISIT ADD ON: HCPCS | Mod: S$GLB,,, | Performed by: INTERNAL MEDICINE

## 2025-01-16 PROCEDURE — 3288F FALL RISK ASSESSMENT DOCD: CPT | Mod: CPTII,S$GLB,, | Performed by: INTERNAL MEDICINE

## 2025-01-16 PROCEDURE — 99214 OFFICE O/P EST MOD 30 MIN: CPT | Mod: S$GLB,,, | Performed by: INTERNAL MEDICINE

## 2025-01-16 PROCEDURE — 99999 PR PBB SHADOW E&M-EST. PATIENT-LVL III: CPT | Mod: PBBFAC,,, | Performed by: INTERNAL MEDICINE

## 2025-01-16 PROCEDURE — 1101F PT FALLS ASSESS-DOCD LE1/YR: CPT | Mod: CPTII,S$GLB,, | Performed by: INTERNAL MEDICINE

## 2025-01-16 PROCEDURE — 3078F DIAST BP <80 MM HG: CPT | Mod: CPTII,S$GLB,, | Performed by: INTERNAL MEDICINE

## 2025-01-16 NOTE — PROGRESS NOTES
"Chief complaint:  Follow-up on medical problems, leg cramps, hypertension, discussed cancer issue      Physical exam, 7/24        74 -year-old black female .  Here with her daughter.  Interval events reviewed.  Blood pressure appears to be running fair and will have her monitor at home.  She did have some blood pressures over 200 and went to the emergency room.  She was sent there from the infusion center.  Does not appear to be running that high at home.  We discussed monitoring before we can make adjustments.  ER 11/24  Medical Decision Making   Given the above this patient comes to the emergency room for uncontrolled hypertension.  It was noted at the infusion center.  The patient's systolic blood pressure is as high as 219 in the emergency room.  The patient was treated with clonidine and hydralazine in his blood pressure fell to 146/76.   I re-evaluated the patient.  The patient reported that she" could not breathe".   I thought she was suggesting that she was short of breath.  I expanded the evaluation for possible pulmonary edema and congestive heart failure despite clear lungs and oxygen saturations of 96% with a heart rate of 88 and a respiratory rate of 16.   I was then called back to the room and the patient explained that she can not breathe through her nose and that this is a chronic problem.   She would like to be discharged to outpatient evaluation and treatment.    I will double her hyper startle to 300 mg daily.  The patient has normal renal function.      She has hypertension which is running high at home but stable.   Was running good but she believes her pharmacy is switching the manufacture of her meds and that is the cause.  Looks like she was taken off of the HCTZ and is now on Avapro 150 which we will increase to 300 but somehow back on 150 w HCTZ  She was getting a little bit of edema on Norvasc 5 mg then went to 10 mg although edema as not really even there  She does get some edema at the " end of the day that is gone in the morning and I reassured her about that but she is concerned and on Norvasc 5 mg.  Blood pressure is excellent and we will discontinue and have her monitor.  She was on Diovan but due to availability issues we had switched her to irbesartan/HCTZ.. then low K and off HCTZ and now on norvasc up to 10 mg.  She does get some intermittent swelling occasionally and her prior endocrinologist did give her some Lasix to take p.r.n. but we discussed the negative issues of taking diuretics but will provide her with some.      Apparently she had an episode where she was feeling weak and blood pressure was high and so that is probably how all of her antihypertensives got escalated again.  She needs to monitor.  Calcium really was not that high but current endocrinologist wanted her to have the HCTZ removed.  She was taking calcium supplements as well and she stopped that is so perhaps if needed we could restart HCTZ at a lower dose and stay away from the calcium supplements and she might be fine.    Aortic Valve:  The aortic valve is mildly sclerotic with normal leaflet mobility.  Apparently it or wellness visit a murmur was heard about on my exam today she has no murmur carotid bruits that would warrant a repeat echo.    Regarding her chronic pain she did use 1 tramadol daily usually taking it in the middle of the morning and that allows her to exercise and carry on her daily functions.  Her use appears to be appropriate. Now on oxycodone it appears     Interval events reviewed.  She is in good spirits with having had her attempt for surgical resection and now plans to have radiation therapy.    CT surg  Procedure(s) and date(s):   10/25/22 - Robotic Bronchoscopy  12/27/22 - Right VATS, adhesiolysis, intercostal nerve block 2 or more intercostal levels   Pathology:   10/25/22 - RLL squamous cell carcinoma. LN 7 negative for malignancy, lymphocytes present.    Post-operative therapy:   surveillance  Mrs. Genao is a 72 y.o. female current smoker with CKD3, COPD, DM2, HLD, HTN, primary hyperparathyroidism, and h/o of renal cell carcinoma (2015) here today for 2 week follow up s/p aborted resection. Aborted resection secondary to intense adhesion formation coupled with hypercarbia and upper lobe predominant emphysema. No incisional pain. Breathing stable.     Seen [facility]  HPI/Focused ROS: Ms. Genao is a 73 y/o female who has been followed for pulmonary nodules with Dr. Ramires since 2018. Annual CT Chest 9/29/22 demonstrated interval enlargement of a RLL nodule to 1.5 cm (up from 0.7 cm in 9/2021). Note was also made of a stable LLL 2.3 cm nodule. Attempt at biopsy of the LLL nodule in 12/2018 w/ EBUS was non-diagnostic for malignancy. She underwent robotic bronch w/ EBUS w/ Dr. Ramires on 10/25/22 with biopsy of the RLL nodule revealing squamous cell carcinoma; biopsy of LLL nodule and Station 7 nodes negative for malignant cells. PET/CT 11/25/22 demonstrated uptake in the RLL nodule only. She met with Dr. Cannon and elected to undergo resection. This was attempted on 12/27/22 but aborted due to significant adhesions as well as progressive hypercarbia and concern for pulmonary function following lobectomy.  This was treated with definitive SBRT 50 Gy in 4 fx completed 2/3/23.        Then Oncology 1/25  1. Renal cell carcinoma, unspecified laterality  Assessment & Plan:  Metachronous recurrence of ccRCC. Initial diagnosis in 2013 sp partial nephrectomy. Has biopsy proven recurrence to abdominal wall 01/2025. Additional lung nodules of concern. She is asymptomatic.  - CT torso and bone scan to complete staging  - Referral to pulmonology for bronchsocopy with biopsy of concerning left lung nodule  - If she has multiple confirmed areas of metastatic ccRCC, consider palliative intent cabo nivo  - If tumor recurrence isolated to abdomian wall, consider resection  - FU 3 weeks to review treatment  options           She has diabetes which was s uncontrolled and apparently back to seeing Endocrine and her most recent A1c she says was 5.6, 6.3, 6.9, 6.4, 5.6, 6.8, 7.2 sept.  was on insulin and now only the Ozempic injection but off of the glyburide which I would agree with based upon her age and overall good control...  She is not taking metformin or glipizide or glimepiride so I took those off the list.  She says her sugars are running better.  She does take glipizide maybe once a month when she eats poorly.  We did discuss her A1c is rising and she may well need to make some adjustments in her diet.    She does have chronic renal insufficiency and follows with nephrology.  I explained her chronic kidney disease.  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  Her daughter apparently heard something from an orthopedic about being on amlodipine and a statin but reassured the two medicines can be used together and that statin myalgias or typically at the onset of statin therapy in her diffuse and would be atypical for her current symptoms so she can remain on her current medications.      She apparently is primary hyperparathyroidism and a history of hypercalcemia but all recent labs appear to be normal as well as PTH.  Thyroid ultrasound from 2016 did have a parathyroid adenoma that Endocrine may need to reassess      Vitamin D level also normal.  She apparently was on a weekly medication  for osteoporosis but d/c 2017 for CKD?  Bone density from prior reviewed noting slight decline in the hip     She has a history of COPD but no active symptoms lately.      She has hyperlipidemia and may been on Lipitor in the past with an unremembered intolerance.  We discussed benefits of statin and her LDL has responded to crestor .           Regarding health maintenance she is up-to-date on mammogram 7/23 - She prefers to do a mammogram every two years but apparently  that is breast cancer in the family so now she wants to get it yearly and I put the order in .  She also wanted to hold off setting up colonoscopy since done with radiation so wants to set that up as well..  It looks like she had colon polyps in 2013, 1 polyp 4/18--5 yrs. NORMAL 12/23 -no further rec.  She did quit smoking but then with the pandemic she resume.  She was able to stop on her own in the past.  We discussed the smoking cessation clinic but she was able to do it on her own in the past so she defers..    ROS:   CONST: weight stable. EYES: no vision change. ENT: no sore throat. CV: no chest pain w/ exertion. RESP: no shortness of breath. GI: no nausea, vomiting, diarrhea. No dysphagia. : no urinary issues. MUSCULOSKELETAL: no new myalgias or arthralgias. SKIN: no new changes. NEURO: no focal deficits. PSYCH: no new issues. ENDOCRINE: no polyuria. HEME: no lymph nodes. ALLERGY: no general pruritis.    Past Medical History   Diagnosis Date    Cataracts, bilateral     CKD (chronic kidney disease) stage 3, GFR 30-59 ml/min 12/15/2014    Combined hyperlipidemia associated with type 2 diabetes mellitus 6/7/2013    COPD (chronic obstructive pulmonary disease) 12/15/2014    Diabetes mellitus type II----with chronic kidney disease           Diabetes mellitus with renal manifestations, uncontrolled 2/1/2017    Diabetic retinopathy     Family history of stomach cancer 12/5/2013    H/O renal cell carcinoma 12/15/2015    History of colonic polyps 2/1/2017     3 polyps 7/13 ---1 polyp 4/18--5 yrs -NORMAL 12/23 -no further rec    History of gastroesophageal reflux (GERD)     History of urinary incontinence      s/p bladder lift-october, 2011 (at [facility])    Hyperlipidemia     Hypertension      120s/70s-80s    Nephrolithiasis     Osteoporosis, post-menopausal, evaluated by Dr. York, quit Fosamax due to CKD early 2017  3/17/2014    Primary hyperparathyroidism 10/20/2016    Schatzki's ring 2/1/2017     Dilated 2014    Prevnar 2017    Past Surgical History:   Procedure Laterality Date    bladder lift  2011    done at [facility]    BLADDER STONE REMOVAL  2011    before bladder lift    CATARACT EXTRACTION W/  INTRAOCULAR LENS IMPLANT Left 06/07/2016    Dr. Vásquez    CATARACT EXTRACTION W/  INTRAOCULAR LENS IMPLANT Right 06/21/2016    Dr. Vásquez    COLONOSCOPY N/A 4/26/2018    Procedure: COLONOSCOPY;  Surgeon: Benjamin Zamora MD;  Location: [facility] ENDO;  Service: Endoscopy;  Laterality: N/A;  confirmed ss    COLONOSCOPY N/A 12/21/2023    Procedure: COLONOSCOPY;  Surgeon: Jamel Luis MD;  Location: [facility] ENDO;  Service: Endoscopy;  Laterality: N/A;  9/7 ref Venancio Mayer MD, Suprep, instr. mailed, [facility]  12/14- instr reviewed, pt reminded to hold ozempic, pre call complete.  DBM    CYSTOSCOPY      INJECTION OF ANESTHETIC AGENT AROUND MULTIPLE INTERCOSTAL NERVES  12/27/2022    Procedure: BLOCK, NERVE, INTERCOSTAL, 2 OR MORE;  Surgeon: Paxton Cannon MD;  Location: [facility] OR 2ND FLR;  Service: Thoracic;;    LYSIS OF ADHESIONS  12/27/2022    Procedure: LYSIS, ADHESIONS;  Surgeon: Paxton Cannon MD;  Location: [facility] OR 2ND FLR;  Service: Thoracic;;    NAVIGATIONAL BRONCHOSCOPY N/A 12/11/2018    Procedure: BRONCHOSCOPY, NAVIGATIONAL;  Surgeon: Ashtyn Ramires MD;  Location: [facility] OR 2ND FLR;  Service: Pulmonary;  Laterality: N/A;    NEPHRECTOMY      partial right    ROBOTIC BRONCHOSCOPY N/A 10/25/2022    Procedure: ROBOTIC BRONCHOSCOPY;  Surgeon: Ashtyn Ramires MD;  Location: [facility] OR 2ND FLR;  Service: Pulmonary;  Laterality: N/A;    VIDEO-ASSISTED THORACOSCOPIC SURGERY (VATS)  12/27/2022    Procedure: VATS (VIDEO-ASSISTED THORACOSCOPIC SURGERY);  Surgeon: Paxton Cannon MD;  Location: [facility] OR 2ND FLR;  Service: Thoracic;;     Social History     Socioeconomic History    Marital status:    Occupational History    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   Tobacco Use    Smoking status: Every Day     Current packs/day:  0.25     Average packs/day: 0.3 packs/day for 30.0 years (7.5 ttl pk-yrs)     Types: Cigarettes    Smokeless tobacco: Never   Substance and Sexual Activity    Alcohol use: No     Alcohol/week: 0.0 standard drinks of alcohol    Drug use: No   Social History Narrative    Lives on [facility] [place]    Here with sister Kate [phone]         Social Drivers of Health     Financial Resource Strain: Patient Unable To Answer (11/19/2024)    Overall Financial Resource Strain (CARDIA)     Difficulty of Paying Living Expenses: Patient unable to answer   Food Insecurity: Patient Unable To Answer (11/19/2024)    Hunger Vital Sign     Worried About Running Out of Food in the Last Year: Patient unable to answer     Ran Out of Food in the Last Year: Patient unable to answer   Transportation Needs: Unknown (11/19/2024)    PRAPARE - Transportation     Lack of Transportation (Medical): No     Lack of Transportation (Non-Medical): Patient unable to answer   Physical Activity: Patient Unable To Answer (11/19/2024)    Exercise Vital Sign     Days of Exercise per Week: Patient unable to answer     Minutes of Exercise per Session: Patient unable to answer   Stress: Patient Unable To Answer (11/19/2024)    Citizen of [place] of Occupational Health - Occupational Stress Questionnaire     Feeling of Stress : Patient unable to answer   Housing Stability: Low Risk  (11/19/2024)    Housing Stability Vital Sign     Unable to Pay for Housing in the Last Year: No     Homeless in the Last Year: No     family history includes Cataracts in her mother; Colon cancer in her brother; Glaucoma in her mother; Nephrolithiasis in her sister; No Known Problems in her father, maternal aunt, maternal grandfather, maternal grandmother, maternal uncle, paternal aunt, paternal grandfather, paternal grandmother, and paternal uncle.     Gen: no distress  Smells of smoke, new line lower extremities and calves nontender and normal.  No edema, no rashes, nothing to  suggest any DVT      Assessment and plan:        Diagnoses and all orders for this visit:    Essential hypertension, episode of elevated blood pressure but really with no specific symptoms that would have warned her to go to the emergency room but she was sent there from the infusion center.  We discussed that she should not go to the emergency room for just the number elevation but only for symptoms such as blurry vision, severe headache, chest pain or neurological deficits that might suggest a stroke.  Monitor blood pressure for now as we need more readings to determine if additional medications are needed.    Stage 3a chronic kidney disease, chronic and stable    Type 2 diabetes mellitus with hyperglycemia, with long-term current use of insulin, worsening, reassess diet, she appears to be on low-dose Ozempic    Chronic obstructive pulmonary disease, unspecified COPD type, continues to smoke but apparently stable    Long-term insulin use    PVD (peripheral vascular disease), no claudication to be implicated with her leg cramps    Anemia, unspecified type, chronic and stable    Heart failure with preserved ejection fraction, unspecified HF chronicity, chronic and stable    Primary malignant neoplasm of right lower lobe of lung, apparently received radiation treatment with good response    Controlled type 2 diabetes mellitus with other circulatory complication, without long-term current use of insulin    Opioid dependence, uncomplicated    Stage 3b chronic kidney disease    Renal cell carcinoma, unspecified laterality, apparently recurrence of metastatic renal cell carcinoma hopefully there is a immunotherapy offered that can be tolerated and improve longevity    Type 2 diabetes mellitus with hyperglycemia, without long-term current use of insulin  -     Cancel: Hemoglobin A1C; Future    Type 2 diabetes mellitus with stage 3a chronic kidney disease, without long-term current use of insulin    Leg cramps, as above,  patient and assess if it relates to days when she is more so on her feet and so forth and apply heat, stretching

## 2025-01-17 DIAGNOSIS — C64.9 RENAL CELL CARCINOMA, UNSPECIFIED LATERALITY: Primary | ICD-10-CM

## 2025-01-31 ENCOUNTER — HOSPITAL ENCOUNTER (OUTPATIENT)
Dept: RADIOLOGY | Facility: HOSPITAL | Age: 75
Discharge: HOME | End: 2025-01-31
Attending: HOSPITALIST
Payer: MEDICARE

## 2025-01-31 DIAGNOSIS — C64.9 RENAL CELL CARCINOMA, UNSPECIFIED LATERALITY: ICD-10-CM

## 2025-01-31 PROCEDURE — 78306 BONE IMAGING WHOLE BODY: CPT | Mod: 26,,, | Performed by: NUCLEAR MEDICINE

## 2025-01-31 PROCEDURE — 71260 CT THORAX DX C+: CPT | Mod: 26,,, | Performed by: RADIOLOGY

## 2025-01-31 PROCEDURE — 78306 BONE IMAGING WHOLE BODY: CPT | Mod: TC

## 2025-01-31 PROCEDURE — 74177 CT ABD & PELVIS W/CONTRAST: CPT | Mod: TC

## 2025-01-31 PROCEDURE — A9503 TC99M MEDRONATE: HCPCS | Performed by: HOSPITALIST

## 2025-01-31 PROCEDURE — 25500020 PHARM REV CODE 255: Performed by: HOSPITALIST

## 2025-01-31 PROCEDURE — A9698 NON-RAD CONTRAST MATERIALNOC: HCPCS | Performed by: HOSPITALIST

## 2025-01-31 PROCEDURE — 74177 CT ABD & PELVIS W/CONTRAST: CPT | Mod: 26,,, | Performed by: RADIOLOGY

## 2025-01-31 RX ORDER — TC 99M MEDRONATE 20 MG/10ML
20 INJECTION, POWDER, LYOPHILIZED, FOR SOLUTION INTRAVENOUS
Status: COMPLETED | OUTPATIENT
Start: 2025-01-31 | End: 2025-01-31

## 2025-01-31 RX ADMIN — IOHEXOL 75 ML: 350 INJECTION, SOLUTION INTRAVENOUS at 01:01

## 2025-01-31 RX ADMIN — TECHNETIUM TC 99M MEDRONATE 19.9 MILLICURIE: 20 INJECTION, POWDER, LYOPHILIZED, FOR SOLUTION INTRAVENOUS at 12:01

## 2025-01-31 RX ADMIN — BARIUM SULFATE 450 ML: 20 SUSPENSION ORAL at 01:01

## 2025-02-03 ENCOUNTER — OFFICE VISIT (OUTPATIENT)
Dept: HEMATOLOGY/ONCOLOGY | Facility: CLINIC | Age: 75
End: 2025-02-03
Payer: MEDICARE

## 2025-02-03 ENCOUNTER — LAB VISIT (OUTPATIENT)
Dept: LAB | Facility: HOSPITAL | Age: 75
End: 2025-02-03
Attending: HOSPITALIST
Payer: MEDICARE

## 2025-02-03 VITALS
DIASTOLIC BLOOD PRESSURE: 69 MMHG | HEART RATE: 75 BPM | RESPIRATION RATE: 18 BRPM | OXYGEN SATURATION: 97 % | TEMPERATURE: 98 F | WEIGHT: 134.56 LBS | SYSTOLIC BLOOD PRESSURE: 137 MMHG | BODY MASS INDEX: 23.84 KG/M2

## 2025-02-03 DIAGNOSIS — C64.9 RENAL CELL CARCINOMA, UNSPECIFIED LATERALITY: Primary | ICD-10-CM

## 2025-02-03 DIAGNOSIS — Z79.899 LONG TERM CURRENT USE OF THERAPEUTIC DRUG: ICD-10-CM

## 2025-02-03 DIAGNOSIS — C64.9 RENAL CELL CARCINOMA, UNSPECIFIED LATERALITY: ICD-10-CM

## 2025-02-03 LAB
ALBUMIN SERPL BCP-MCNC: 3.6 G/DL (ref 3.5–5.2)
ALP SERPL-CCNC: 123 U/L (ref 40–150)
ALT SERPL W/O P-5'-P-CCNC: 5 U/L (ref 10–44)
ANION GAP SERPL CALC-SCNC: 10 MMOL/L (ref 8–16)
AST SERPL-CCNC: 10 U/L (ref 10–40)
BILIRUB SERPL-MCNC: 0.8 MG/DL (ref 0.1–1)
BUN SERPL-MCNC: 14 MG/DL (ref 8–23)
CALCIUM SERPL-MCNC: 9.5 MG/DL (ref 8.7–10.5)
CHLORIDE SERPL-SCNC: 104 MMOL/L (ref 95–110)
CO2 SERPL-SCNC: 26 MMOL/L (ref 23–29)
CREAT SERPL-MCNC: 1.1 MG/DL (ref 0.5–1.4)
ERYTHROCYTE [DISTWIDTH] IN BLOOD BY AUTOMATED COUNT: 15 % (ref 11.5–14.5)
EST. GFR  (NO RACE VARIABLE): 52.1 ML/MIN/1.73 M^2
GLUCOSE SERPL-MCNC: 152 MG/DL (ref 70–110)
HCT VFR BLD AUTO: 43.9 % (ref 37–48.5)
HGB BLD-MCNC: 14.4 G/DL (ref 12–16)
IMM GRANULOCYTES # BLD AUTO: 0.07 K/UL (ref 0–0.04)
MCH RBC QN AUTO: 27.9 PG (ref 27–31)
MCHC RBC AUTO-ENTMCNC: 32.8 G/DL (ref 32–36)
MCV RBC AUTO: 85 FL (ref 82–98)
NEUTROPHILS # BLD AUTO: 9.5 K/UL (ref 1.8–7.7)
PLATELET # BLD AUTO: 210 K/UL (ref 150–450)
PMV BLD AUTO: ABNORMAL FL (ref 9.2–12.9)
POTASSIUM SERPL-SCNC: 3.9 MMOL/L (ref 3.5–5.1)
PROT SERPL-MCNC: 8 G/DL (ref 6–8.4)
RBC # BLD AUTO: 5.16 M/UL (ref 4–5.4)
SODIUM SERPL-SCNC: 140 MMOL/L (ref 136–145)
TSH SERPL DL<=0.005 MIU/L-ACNC: 1.9 UIU/ML (ref 0.4–4)
WBC # BLD AUTO: 11.91 K/UL (ref 3.9–12.7)

## 2025-02-03 PROCEDURE — 3288F FALL RISK ASSESSMENT DOCD: CPT | Mod: CPTII,S$GLB,, | Performed by: HOSPITALIST

## 2025-02-03 PROCEDURE — 3075F SYST BP GE 130 - 139MM HG: CPT | Mod: CPTII,S$GLB,, | Performed by: HOSPITALIST

## 2025-02-03 PROCEDURE — 1159F MED LIST DOCD IN RCRD: CPT | Mod: CPTII,S$GLB,, | Performed by: HOSPITALIST

## 2025-02-03 PROCEDURE — 99999 PR PBB SHADOW E&M-EST. PATIENT-LVL IV: CPT | Mod: PBBFAC,,, | Performed by: HOSPITALIST

## 2025-02-03 PROCEDURE — 85027 COMPLETE CBC AUTOMATED: CPT | Performed by: HOSPITALIST

## 2025-02-03 PROCEDURE — G2211 COMPLEX E/M VISIT ADD ON: HCPCS | Mod: S$GLB,,, | Performed by: HOSPITALIST

## 2025-02-03 PROCEDURE — 1126F AMNT PAIN NOTED NONE PRSNT: CPT | Mod: CPTII,S$GLB,, | Performed by: HOSPITALIST

## 2025-02-03 PROCEDURE — 99215 OFFICE O/P EST HI 40 MIN: CPT | Mod: S$GLB,,, | Performed by: HOSPITALIST

## 2025-02-03 PROCEDURE — 3078F DIAST BP <80 MM HG: CPT | Mod: CPTII,S$GLB,, | Performed by: HOSPITALIST

## 2025-02-03 PROCEDURE — 84443 ASSAY THYROID STIM HORMONE: CPT | Performed by: HOSPITALIST

## 2025-02-03 PROCEDURE — 80053 COMPREHEN METABOLIC PANEL: CPT | Performed by: HOSPITALIST

## 2025-02-03 PROCEDURE — 36415 COLL VENOUS BLD VENIPUNCTURE: CPT | Performed by: HOSPITALIST

## 2025-02-03 PROCEDURE — 1101F PT FALLS ASSESS-DOCD LE1/YR: CPT | Mod: CPTII,S$GLB,, | Performed by: HOSPITALIST

## 2025-02-03 NOTE — Clinical Note
Solitario - fairly indolent metachrnous recurrence of renal cell carcinoma with subcutaneous abdominal met. Also with a few pulm nodules that rad onc wants to radiate. If we radiate those lung nodules, can you cut out her subcutaneous met?  Paxton

## 2025-02-03 NOTE — PROGRESS NOTES
MEDICAL ONCOLOGY - NEW PATIENT VISIT    Best Contact Phone Number(s): [phone] (home)      Cancer/Stage/TNM:    Cancer Staging   Squamous Cell Carcinoma of right lower lobe of lung  Staging form: Lung, AJCC 8th Edition  - Clinical stage from 1/11/2023: Stage IA2 (cT1b, cN0, cM0) - Signed by Xavier Deng MD on 1/11/2023       Reason for visit: Metachronous recurrence of       Treatment:  08/2013    Partial nephrectomy      HPI: Sarina Genao is a 74 y.o. 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 She has remote history of grade 2 ccRCC sp partial nephrectomy 08/2013.  Subsquently underwent radiation therapy to an early stage squamous cell lung cancer 01/2023. Around this time, was noted to have abdominal wall subcutaneous nodule. . FDG PET 11/2024 showed progressive 3.9cm anterior abdominal wall mass; biopsy 01/2025 consistetn with metatsatic ccRCC. There was also a 2.3 cm left pulmonary nodule of concern and an additional RITA nodule. She presents to medical oncology clinic for further treatment planning.    History has been obtained by chart review and discussion with the patient.    Interval Events:    Recent bone scan with no osseous mets. CT torso with decrased sized of biopsy proven RCC soft tissue met. Stable pulm nodules and hilar nodes. The 2cm left hilar node has been previously biopsied by interventional pulm. RITA nodule remains undefined.  Discussed with Dr. Deng, he is considering radiation to the lung nodules combined with definitive therapy to the soft tissue mass.     Patient current feels the same without issue. Has some modest discomfort about the right abdominal mass. No other symptoms.         Oncology History   Renal cell carcinoma   8/6/2013 Surgery    08/06/2013: Partial right nephrectomy.   Procedure: Partial nephrectomy. Specimen Laterality: Right Tumor Size: 4  cm in greatest dimension. Tumor Focality: Unifocal. Macroscopic Extent Of Tumor: Tumor limited to the kidney. Histologic Type: Clear cell renal cell carcinoma. Sarcomatoid features: Not identified. Histologic Grade (Heron nuclear grade): G2. Microscopic Tumor Extension: Tumor limited to the kidney Margins: Uninvolved by invasive carcinoma. Pathologic Staging: Primary tumor: pT1a Regional lymph node: pNx Distant metastasis: Not applicable.      11/25/2022 Imaging Significant Findings    11/25/22 PET CT  Impression:  - Hypermetabolic 1.5 cm nodule in the right lower lobe of the lung concerning for primary neoplasm.  No hypermetabolic lymphadenopathy.  - Round soft tissue structure within the anterior abdominal wall measuring up to 2.3 cm with mild radiotracer uptake.  Recommend further assessment with dedicated ultrasound and/or percutaneous sampling as clinically warranted.  - Additional pulmonary nodules without significant FDG avidity as above.  Findings include a new 3 mm nodule in the left upper lobe.  Of note, subcentimeter nodules are likely too small to characterize with PET.  Recommend continued CT surveillance.     11/22/2024 Imaging Significant Findings    11/22/24 FDG PET  Impression:  - Patient with history of right lower lobe non-small cell lung cancer status post radiation therapy.  No evidence of tracer avid disease or metastasis.  - Interval enlargement of a 3.9 cm anterior abdominal wall mass with mild increased tracer uptake.  Recommend further evaluation with ultrasound and tissue sampling is warranted.     1/6/2025 Biopsy    01/06/25 Biopsy    RIGHT ABDOMINAL WALL, CT-GUIDED BIOPSY WITH PATHOLOGIST ASSISTED ADEQUACY (CYTOLOGY AND CELL BLOCK):  Positive for malignancy, consistent with renal cell carcinoma.  See comment.        1/31/2025 Imaging Significant Findings    1/31/25 Tc99m Bone scan  Impression:  - There is no scintigraphic evidence of osteoblastic metastatic disease.    1/31/25 CT  torso  Impression:  - Decreased size of a right anterior abdominal wall mass.  - Stable pulmonary nodules.     Squamous Cell Carcinoma of right lower lobe of lung   10/25/2022 Biopsy    10/25/22 Lung biopsy  1. Lung, right lower lobe, endobronchial brushing, cytologic evaluation with  cell block:  Atypical cells present.  Epithelial atypia.  Findings suggestive of squamous cell carcinoma  2. Lung, right lower lobe, endoscopic fine needle aspiration, cytologic  evaluation with cell block:  Positive for malignancy. Lung squamous cell carcinoma.  3. Lung, right lower lobe, transbronchial biopsy, cytologic evaluation with  cell block:  Positive for malignancy, Lung squamous cell carcinoma  - PD-L1 and Lung NGS Panel have been ordered and results will be issued in a  supplemental report.  4. Lung, right lower lobe, bronchoalveolar lavage, cytologic evaluation with  cell block:  Negative for malignant cells.  Reactive bronchial cells.  Macrophages.  5. Lung, left lower lobe, endoscopic fine needle aspiration, cytologic  evaluation with cell block:  Negative for malignant cells.  Reactive bronchial cells.  Macrophages.  6. Lymph node, Station 7, endoscopic fine needle aspiration, cytologic  evalu ation with cell block:  Negative for malignant cells. Reactive bronchial cells.  Lymphocytes present.      11/25/2022 Imaging Significant Findings    11/25/22 PET CT  Impression:  - Hypermetabolic 1.5 cm nodule in the right lower lobe of the lung concerning for primary neoplasm.  No hypermetabolic lymphadenopathy.  - Round soft tissue structure within the anterior abdominal wall measuring up to 2.3 cm with mild radiotracer uptake.  Recommend further assessment with dedicated ultrasound and/or percutaneous sampling as clinically warranted.  - Additional pulmonary nodules without significant FDG avidity as above.  Findings include a new 3 mm nodule in the left upper lobe.  Of note, subcentimeter nodules are likely too small to  characterize with PET.  Recommend continued CT surveillance.     11/30/2022 Initial Diagnosis    Squamous Cell Carcinoma of right lower lobe of lung     1/11/2023 Cancer Staged    Staging form: Lung, AJCC 8th Edition  - Clinical stage from 1/11/2023: Stage IA2 (cT1b, cN0, cM0)     1/17/2023 Imaging Significant Findings    01/17/23 CT Chest  Impression:  1. Interval enlargement of a right lower lobe nodule, now 2.3 cm maximally (previously 1.5 cm).  This was noted hypermetabolic on the prior PET-CT 09/29/2022.  2. There is a newly seen peripheral, right lower lobe irregular nodule up to 1.3 cm. v this was a site of thoracotomy and may be postsurgical in nature.  3. There other pulmonary nodules as described above which do not appear changed.  There is no new lymphadenopathy detected.  4. There is a new small right pleural effusion and peripheral airspace opacities in the right lung.  This may reflect infectious/inflammatory etiology.  Alternatively, this may reflect involving appearance of postsurgical change.     1/26/2023 - 2/3/2023 Radiation Therapy    Treating physician: Xavier Deng    Site Technique Energy Dose/Fx (Gy) #Fx Total Dose (Gy)   RLL Lung SBRT 6X 12.5 4 / 4 50         Biopsy       4/26/2023 Imaging Significant Findings    04/26/23 CT Chest  Impression:  1. Interval decrease in size of a right lower lobe irregular nodule with adjacent architectural distortion.  Consistent with post treatment changes.  2. Other findings as above without significant interval change.     5/10/2024 Imaging Significant Findings    5/10/24 CT Chest  Impression:  1. Stable bilateral pulmonary nodules, including right lower lobe spiculated nodule.  No new or enlarging nodules.  2. Additional findings as above.     11/13/2024 Imaging Significant Findings    11/13/24 CT Chest  Impression:  1. Stable bilateral pulmonary nodules, including right lower lobe spiculated nodule.  No new or enlarging nodules.  2. Additional  findings as above.     11/22/2024 Imaging Significant Findings    11/22/24 FDG PET  Impression:  - Patient with history of right lower lobe non-small cell lung cancer status post radiation therapy.  No evidence of tracer avid disease or metastasis.  - Interval enlargement of a 3.9 cm anterior abdominal wall mass with mild increased tracer uptake.  Recommend further evaluation with ultrasound and tissue sampling is warranted.          Past Medical History:   Diagnosis Date    Anticoagulant long-term use     Cancer     Kidney (Right)    Cataracts, bilateral     CKD (chronic kidney disease) stage 3, GFR 30-59 ml/min 12/15/2014    Colon polyps     Combined hyperlipidemia associated with type 2 diabetes mellitus 6/7/2013    COPD (chronic obstructive pulmonary disease) 12/15/2014    Diabetes mellitus type II     NIDDM, a.m. glucose-120s-130s-last A1c-7.1-6/7/13    Diabetes mellitus with renal manifestations, uncontrolled 2/1/2017    Diabetic retinopathy     Family history of stomach cancer 12/5/2013    Former smoker     H/O renal cell carcinoma 12/15/2015    History of colonic polyps 2/1/2017    3 polyps 7/13 ---5 yrs    History of gastroesophageal reflux (GERD)     History of urinary incontinence     s/p bladder lift-october, 2011 (at [facility])    Hyperlipidemia     Hypertension     120s/70s-80s    Nephrolithiasis     Osteoarthritis of thumb 12/20/2019    Osteoporosis, post-menopausal 3/17/2014    Primary hyperparathyroidism 10/20/2016    Schatzki's ring 2/1/2017    Dilated 2014        Past Surgical History:   Procedure Laterality Date    bladder lift  2011    done at [facility]    BLADDER STONE REMOVAL  2011    before bladder lift    CATARACT EXTRACTION W/  INTRAOCULAR LENS IMPLANT Left 06/07/2016    Dr. Vásquez    CATARACT EXTRACTION W/  INTRAOCULAR LENS IMPLANT Right 06/21/2016    Dr. Vásquez    COLONOSCOPY N/A 4/26/2018    Procedure: COLONOSCOPY;  Surgeon: Benjamin Zamora MD;  Location: [facility];  Service:  Endoscopy;  Laterality: N/A;  confirmed ss    COLONOSCOPY N/A 12/21/2023    Procedure: COLONOSCOPY;  Surgeon: Jamel Luis MD;  Location: [facility] ENDO;  Service: Endoscopy;  Laterality: N/A;  9/7 ref Venancio Mayer MD, Suprep, instr. mailed, meds-st  12/14- instr reviewed, pt reminded to hold ozempic, pre call complete.  DBM    CYSTOSCOPY      INJECTION OF ANESTHETIC AGENT AROUND MULTIPLE INTERCOSTAL NERVES  12/27/2022    Procedure: BLOCK, NERVE, INTERCOSTAL, 2 OR MORE;  Surgeon: Paxton Cannon MD;  Location: [facility] OR 2ND FLR;  Service: Thoracic;;    LYSIS OF ADHESIONS  12/27/2022    Procedure: LYSIS, ADHESIONS;  Surgeon: Paxton Cannon MD;  Location: [facility] OR 2ND FLR;  Service: Thoracic;;    NAVIGATIONAL BRONCHOSCOPY N/A 12/11/2018    Procedure: BRONCHOSCOPY, NAVIGATIONAL;  Surgeon: Ashtyn Ramires MD;  Location: [facility] OR 2ND FLR;  Service: Pulmonary;  Laterality: N/A;    NEPHRECTOMY      partial right    ROBOTIC BRONCHOSCOPY N/A 10/25/2022    Procedure: ROBOTIC BRONCHOSCOPY;  Surgeon: Ashtyn Ramires MD;  [facility]: [facility] OR 2ND FLR;  Service: Pulmonary;  Laterality: N/A;    VIDEO-ASSISTED THORACOSCOPIC SURGERY (VATS)  12/27/2022    Procedure: VATS (VIDEO-ASSISTED THORACOSCOPIC SURGERY);  Surgeon: Paxton Cannon MD;  Location: [facility] OR 2ND FLR;  Service: Thoracic;;        Family History   Problem Relation Name Age of Onset    Glaucoma Mother      Cataracts Mother      No Known Problems Father      Colon cancer Brother      Nephrolithiasis Sister      No Known Problems Maternal Aunt      No Known Problems Maternal Uncle      No Known Problems Paternal Aunt      No Known Problems Paternal Uncle      No Known Problems Maternal Grandmother      No Known Problems Maternal Grandfather      No Known Problems Paternal Grandmother      No Known Problems Paternal Grandfather      Anesthesia problems Neg Hx      Kidney cancer Neg Hx      Amblyopia Neg Hx      Blindness Neg Hx      Cancer Neg Hx      Diabetes Neg Hx       Hypertension Neg Hx      Macular degeneration Neg Hx      Retinal detachment Neg Hx      Strabismus Neg Hx      Stroke Neg Hx      Thyroid disease Neg Hx          Social History     Tobacco Use    Smoking status: Every Day     Current packs/day: 0.25     Average packs/day: 0.3 packs/day for 30.0 years (7.5 ttl pk-yrs)     Types: Cigarettes    Smokeless tobacco: Never   Substance Use Topics    Alcohol use: No     Alcohol/week: 0.0 standard drinks of alcohol        I have reviewed and updated the patient's past medical, surgical, family and social histories.    Review of patient's allergies indicates:   Allergen Reactions    Pantoprazole Other (See Comments)     Elevated Blood Sugars    Metformin Diarrhea        Current Outpatient Medications   Medication Sig Dispense Refill    amLODIPine (NORVASC) 10 MG tablet TAKE ONE TABLET BY MOUTH ONCE A DAY 90 tablet 0    aspirin (ECOTRIN) 81 MG EC tablet Take 81 mg by mouth once daily.      blood sugar diagnostic Strp Dispense: Test strip to check glucose 2 times a day, ICD-10: E11.65, compatible with insurance/glucometer, dispense enough for 90 day supply 300 each 3    blood-glucose meter kit Dispense: 1 glucometer, use to check glucose 2 times a day, ICD-10: E11.65,  Dispense machine covered by insurance 1 each 0    cyanocobalamin, vitamin B-12, 1,000 mcg TbSR Take by mouth once daily.       famotidine (PEPCID) 40 MG tablet TAKE ONE TABLET BY MOUTH ONCE A DAY 90 tablet 0    furosemide (LASIX) 20 MG tablet TAKE ONE TABLET BY MOUTH ONCE A DAY AS NEEDED 30 tablet 3    irbesartan (AVAPRO) 300 MG tablet Take 1 tablet (300 mg total) by mouth once daily. 90 tablet 3    lancets Misc Dispense: Lancets use to check glucose 2 times a day, ICD-10: E11.65, dispense enough for 90 day supply 300 each 3    lancing device Misc One device, used to check blood glucose, ICD-10: E11.65 1 each 0    omeprazole (PRILOSEC) 40 MG capsule Take 1 capsule (40 mg total) by mouth once daily. 90 capsule  "4    oxybutynin (DITROPAN-XL) 5 MG TR24 TAKE ONE TABLET BY MOUTH ONCE A DAY 90 tablet 3    OZEMPIC 0.25 mg or 0.5 mg (2 mg/3 mL) pen injector INJECT .5 MILLIGRAM UNDER THE SKIN ONCE EVERY 7 DAYS 3 mL 5    pen needle, diabetic (BD INSULIN PEN NEEDLE UF SHORT) 31 gauge x 5/16" Ndle USE AS DIRECTED 100 each 12    rosuvastatin (CRESTOR) 20 MG tablet TAKE ONE TABLET BY MOUTH ONCE A DAY 90 tablet 12    traMADoL (ULTRAM) 50 mg tablet Take 1 tablet (50 mg total) by mouth every 6 (six) hours as needed for Pain. 120 tablet 5    vitamin D (VITAMIN D3) 1000 units Tab Take 1,000 Units by mouth once daily.       No current facility-administered medications for this visit.        Physical Exam:   /69 (BP Location: Left arm, Patient Position: Sitting)   Pulse 75   Temp 98 °F (36.7 °C) (Temporal)   Resp 18   Wt 61 kg (134 lb 9.5 oz)   SpO2 97%   BMI 23.84 kg/m²      ECOG Performance status: 1            Physical Exam  Constitutional:       General: She is not in acute distress.     Appearance: She is normal weight.   HENT:      Head: Normocephalic.   Eyes:      General: No scleral icterus.     Conjunctiva/sclera: Conjunctivae normal.   Cardiovascular:      Rate and Rhythm: Normal rate.   Pulmonary:      Effort: Pulmonary effort is normal. No respiratory distress.   Abdominal:      General: There is no distension.      Palpations: Abdomen is soft.   Musculoskeletal:         General: Normal range of motion.      Cervical back: Normal range of motion and neck supple.   Skin:     General: Skin is warm.      Coloration: Skin is not jaundiced.   Neurological:      General: No focal deficit present.      Mental Status: She is alert and oriented to person, place, and time.   Psychiatric:         Mood and Affect: Mood normal.         Behavior: Behavior normal.         Thought Content: Thought content normal.           Labs:   Recent Results (from the past 48 hours)   Comprehensive Metabolic Panel    Collection Time: 02/03/25  " 8:32 AM   Result Value Ref Range    Sodium 140 136 - 145 mmol/L    Potassium 3.9 3.5 - 5.1 mmol/L    Chloride 104 95 - 110 mmol/L    CO2 26 23 - 29 mmol/L    Glucose 152 (H) 70 - 110 mg/dL    BUN 14 8 - 23 mg/dL    Creatinine 1.1 0.5 - 1.4 mg/dL    Calcium 9.5 8.7 - 10.5 mg/dL    Total Protein 8.0 6.0 - 8.4 g/dL    Albumin 3.6 3.5 - 5.2 g/dL    Total Bilirubin 0.8 0.1 - 1.0 mg/dL    Alkaline Phosphatase 123 40 - 150 U/L    AST 10 10 - 40 U/L    ALT 5 (L) 10 - 44 U/L    eGFR 52.1 (A) >60 mL/min/1.73 m^2    Anion Gap 10 8 - 16 mmol/L   TSH    Collection Time: 02/03/25  8:32 AM   Result Value Ref Range    TSH 1.899 0.400 - 4.000 uIU/mL   CBC Oncology    Collection Time: 02/03/25  8:32 AM   Result Value Ref Range    WBC 11.91 3.90 - 12.70 K/uL    RBC 5.16 4.00 - 5.40 M/uL    Hemoglobin 14.4 12.0 - 16.0 g/dL    Hematocrit 43.9 37.0 - 48.5 %    MCV 85 82 - 98 fL    MCH 27.9 27.0 - 31.0 pg    MCHC 32.8 32.0 - 36.0 g/dL    RDW 15.0 (H) 11.5 - 14.5 %    Platelets 210 150 - 450 K/uL    MPV SEE COMMENT 9.2 - 12.9 fL    Gran # (ANC) 9.5 (H) 1.8 - 7.7 K/uL    Immature Grans (Abs) 0.07 (H) 0.00 - 0.04 K/uL          Imaging:         I have personally reviewed the above imaging    Path:   Reviewed pathology as documented in oncology history      Assessment and Plan:   1. Renal cell carcinoma, unspecified laterality  Overview:  Metachronous recurrence of ccRCC. Initial diagnosis in 2013 sp partial nephrectomy. Has biopsy proven recurrence to abdominal wall 01/2025.       Assessment & Plan:  Will plan to attempt definitve therapy to all known sites of disease. Rad onc willing to radiate any biopsy proven lung mets - has pulm appt coming up. Will also refer to surg onc to consider resection of the sof tissue met.  - Referral to Surg onc  - FU Pulm and Rad onc  - FU with me in 6 months with repeat imaging    Orders:  -     Ambulatory referral/consult to Surgical Oncology; Future; Expected date: 02/10/2025  -     CT Chest Abdomen Pelvis  With IV Contrast (XPD) Routine Oral Contrast; Future; Expected date: 02/03/2025                 Follow up:   Route Chart for Scheduling    Med Onc Chart Routing      Follow up with physician 6 weeks.   Follow up with [facility]    Infusion scheduling note    Injection scheduling note    Labs CBC and CMP   Scheduling:  Preferred lab:  Lab interval:     Imaging CT chest abdomen pelvis   6 months   Pharmacy appointment    Other referrals                    Therapy Plan Information  DENOSUMAB (PROLIA) Q6M for Osteoporosis, post-menopausal, noted on 3/17/2014  Medications  denosumab (PROLIA) injection 60 mg  60 mg, Subcutaneous, Every 26 weeks      No therapy plan of the specified type found.    No therapy plan of the specified type found.      The above information has been reviewed with the patient and all questions have been answered to their apparent satisfaction.  They understand that they can call the clinic with any questions.    Paxton Santiago MD  Hematology/Oncology  [facility]

## 2025-02-03 NOTE — ASSESSMENT & PLAN NOTE
Will plan to attempt definitve therapy to all known sites of disease. Rad onc willing to radiate any biopsy proven lung mets - has pulm appt coming up. Will also refer to surg onc to consider resection of the sof tissue met.  - Referral to Surg onc  - FU Pulm and Rad onc  - FU with me in 6 months with repeat imaging

## 2025-02-04 ENCOUNTER — TELEPHONE (OUTPATIENT)
Dept: PULMONOLOGY | Facility: CLINIC | Age: 75
End: 2025-02-04
Payer: MEDICARE

## 2025-02-04 ENCOUNTER — TELEPHONE (OUTPATIENT)
Dept: SURGERY | Facility: CLINIC | Age: 75
End: 2025-02-04
Payer: MEDICARE

## 2025-02-04 NOTE — TELEPHONE ENCOUNTER
I spoke with patient to schedule with Dr Ramires on 2/6/25 at 11:30. Patient confirmed and verbalized understanding.

## 2025-02-04 NOTE — TELEPHONE ENCOUNTER
I spoke with patient to scheduled with Dr Ramires on 2/6/25 at 11:30am. Patient confirmed and verbalized understanding.

## 2025-02-04 NOTE — TELEPHONE ENCOUNTER
----- Message from Ashtyn Ramires MD sent at 2/4/2025  8:55 AM CST -----  Not sure who schedule her but need to see her way before March.  Ashtyn  ----- Message -----  From: Paxton Santiago MD  Sent: 1/16/2025   1:19 PM CST  To: Ashtyn Ramires MD; Xavier Deng MD    I'd be happy to hold off on systemic therapy if we could address the known mets with local therapy. She'll eventually need some systemic therapy, but her course has been quite indolent thus far.    Paxton  ----- Message -----  From: Xavier Deng MD  Sent: 1/16/2025  11:33 AM CST  To: Ashtyn Ramires MD; MD Paxton Wise, thanks for seeing Ms. Genao yesterday.     Ashtyn, you've bronched her multiple times. She had NSCLC (squam) in a RLL primary that I treated with SBRT in 2023. She now has confirmed metastatic renal cell carcinoma to her abdominal wall.     It looks like she has 2 lung nodules that may be of concern for mets. There is a separate RLL nodule than the one I treated that has been growing over the years and looks like a metastasis to me. Separately she has a calcified LLL nodule that you sampled in 2018 and 2022, both times negative for malignancy. She is being referred back for repeat bronch to confirm if she has additional RCC mets beyond the one in the abdominal wall.     Paxton, if the one in the RLL is a met, that can be treated with SBRT without much toxicity risk. The LLL nodule would be a little more difficult since it's so central, but I would offer her radiation to it as well. If she only has 2-3 mets that are addressed with local therapy, would you want to hold off on systemic therapy?     Thanks,    Xavier

## 2025-02-05 DIAGNOSIS — C64.9 RENAL CELL CARCINOMA, UNSPECIFIED LATERALITY: Primary | ICD-10-CM

## 2025-02-06 ENCOUNTER — OFFICE VISIT (OUTPATIENT)
Dept: PULMONOLOGY | Facility: CLINIC | Age: 75
End: 2025-02-06
Payer: MEDICARE

## 2025-02-06 ENCOUNTER — OFFICE VISIT (OUTPATIENT)
Dept: SURGERY | Facility: CLINIC | Age: 75
End: 2025-02-06
Payer: MEDICARE

## 2025-02-06 VITALS
OXYGEN SATURATION: 94 % | HEART RATE: 67 BPM | BODY MASS INDEX: 23.86 KG/M2 | DIASTOLIC BLOOD PRESSURE: 60 MMHG | WEIGHT: 134.69 LBS | SYSTOLIC BLOOD PRESSURE: 120 MMHG | HEIGHT: 63 IN

## 2025-02-06 VITALS
HEART RATE: 78 BPM | OXYGEN SATURATION: 95 % | BODY MASS INDEX: 23.8 KG/M2 | WEIGHT: 134.38 LBS | DIASTOLIC BLOOD PRESSURE: 55 MMHG | SYSTOLIC BLOOD PRESSURE: 117 MMHG

## 2025-02-06 DIAGNOSIS — C34.31 PRIMARY MALIGNANT NEOPLASM OF RIGHT LOWER LOBE OF LUNG: ICD-10-CM

## 2025-02-06 DIAGNOSIS — R60.0 LOWER EXTREMITY EDEMA: Primary | ICD-10-CM

## 2025-02-06 DIAGNOSIS — R91.1 SOLITARY PULMONARY NODULE: ICD-10-CM

## 2025-02-06 DIAGNOSIS — C64.9 RENAL CELL CARCINOMA, UNSPECIFIED LATERALITY: ICD-10-CM

## 2025-02-06 DIAGNOSIS — R91.1 RIGHT LOWER LOBE PULMONARY NODULE: ICD-10-CM

## 2025-02-06 PROCEDURE — 3074F SYST BP LT 130 MM HG: CPT | Mod: CPTII,S$GLB,, | Performed by: SURGERY

## 2025-02-06 PROCEDURE — 99214 OFFICE O/P EST MOD 30 MIN: CPT | Mod: S$GLB,,, | Performed by: INTERNAL MEDICINE

## 2025-02-06 PROCEDURE — 3074F SYST BP LT 130 MM HG: CPT | Mod: CPTII,S$GLB,, | Performed by: INTERNAL MEDICINE

## 2025-02-06 PROCEDURE — 99999 PR PBB SHADOW E&M-EST. PATIENT-LVL V: CPT | Mod: PBBFAC,,, | Performed by: INTERNAL MEDICINE

## 2025-02-06 PROCEDURE — 3078F DIAST BP <80 MM HG: CPT | Mod: CPTII,S$GLB,, | Performed by: SURGERY

## 2025-02-06 PROCEDURE — 3288F FALL RISK ASSESSMENT DOCD: CPT | Mod: CPTII,S$GLB,, | Performed by: INTERNAL MEDICINE

## 2025-02-06 PROCEDURE — 1159F MED LIST DOCD IN RCRD: CPT | Mod: CPTII,S$GLB,, | Performed by: INTERNAL MEDICINE

## 2025-02-06 PROCEDURE — 1101F PT FALLS ASSESS-DOCD LE1/YR: CPT | Mod: CPTII,S$GLB,, | Performed by: INTERNAL MEDICINE

## 2025-02-06 PROCEDURE — 99204 OFFICE O/P NEW MOD 45 MIN: CPT | Mod: S$GLB,,, | Performed by: SURGERY

## 2025-02-06 PROCEDURE — 3078F DIAST BP <80 MM HG: CPT | Mod: CPTII,S$GLB,, | Performed by: INTERNAL MEDICINE

## 2025-02-06 PROCEDURE — 99999 PR PBB SHADOW E&M-EST. PATIENT-LVL III: CPT | Mod: PBBFAC,,, | Performed by: SURGERY

## 2025-02-06 NOTE — PROGRESS NOTES
"SURGICAL ONCOLOGY CLINIC H&P    Subjective:     Sarina Genao is a 74 y.o. 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    History per Surgical Oncology Dr. Paxton Santiago 2/3/25:  "Sarina Genao is a 74 y.o. 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 She has remote history of grade 2 ccRCC s/p partial nephrectomy 08/2013.  Subsquently underwent radiation therapy to an early stage squamous cell lung cancer 01/2023. Around this time, was noted to have abdominal wall subcutaneous nodule. FDG PET 11/2024 showed progressive 3.9cm anterior abdominal wall mass; biopsy 01/2025 consistent with metastatic ccRCC. There was also a 2.3 cm left pulmonary nodule of concern and an additional RITA nodule.      Recent bone scan with no osseous mets. CT torso with decreased sized of biopsy-proven RCC soft tissue met. Stable pulm nodules and hilar nodes. The 2cm left hilar node has been previously biopsied by interventional pulm. RITA nodule remains undefined.  Discussed with Dr. Deng, he is considering radiation to the lung nodules combined with definitive therapy to the soft tissue mass.      Patient currently feels the same without issue. Has some modest discomfort about the right abdominal mass. No other symptoms."    Interval History 02/06/2025   Patient presents today for surgical discussion regarding her metastatic RCC abdominal wall mass. She first noticed it shortly after starting radiation therapy for her lung cancer approximately two years ago. It grew slowly over time to approximately 3.9 x 3.9cm. On recent imaging it appears to have decreased in size slightly to 3.4 x 3.0cm.  It was never particularly bothersome to her, she denies " any pain or significant discomfort from it. She does report significant unintentional weight loss of about 40 lbs (173 to 134 lbs) over the last 1.5 years.  Denies fevers, chills, constipation, diarrhea, nausea, vomiting, night sweats.  Per pt, she had a LEON drain placed after her partial nephrectomy at the same site of her current metastatic abdominal wall mass.     She has other significant comorbidities including:  T2DM on Ozempic, hypertension, CKD3, hyperlipidemia, and COPD with a 50-pack-year smoking history. On daily ASA, pt is unsure why. LV EF in 2018 was >70%. Of note, she was recently diagnosed with non-small cell lung cancer of the RLL s/p definitive SBRT 50 Gy in 4 fx completed 2/3/23; she also has a recently discovered stable nodule in her RITA. Full oncologic history below.        Oncology History   Renal cell carcinoma   8/6/2013 Surgery     08/06/2013: Partial right nephrectomy.   Procedure: Partial nephrectomy. Specimen Laterality: Right Tumor Size: 4 cm in greatest dimension. Tumor Focality: Unifocal. Macroscopic Extent Of Tumor: Tumor limited to the kidney. Histologic Type: Clear cell renal cell carcinoma. Sarcomatoid features: Not identified. Histologic Grade (Heron nuclear grade): G2. Microscopic Tumor Extension: Tumor limited to the kidney Margins: Uninvolved by invasive carcinoma. Pathologic Staging: Primary tumor: pT1a Regional lymph node: pNx Distant metastasis: Not applicable.       11/25/2022 Imaging Significant Findings     11/25/22 PET CT  Impression:  - Hypermetabolic 1.5 cm nodule in the right lower lobe of the lung concerning for primary neoplasm.  No hypermetabolic lymphadenopathy.  - Round soft tissue structure within the anterior abdominal wall measuring up to 2.3 cm with mild radiotracer uptake.  Recommend further assessment with dedicated ultrasound and/or percutaneous sampling as clinically warranted.  - Additional pulmonary nodules without significant FDG avidity as above.   Findings include a new 3 mm nodule in the left upper lobe.  Of note, subcentimeter nodules are likely too small to characterize with PET.  Recommend continued CT surveillance.      11/22/2024 Imaging Significant Findings     11/22/24 FDG PET  Impression:  - Patient with history of right lower lobe non-small cell lung cancer status post radiation therapy.  No evidence of tracer avid disease or metastasis.  - Interval enlargement of a 3.9 cm anterior abdominal wall mass with mild increased tracer uptake.  Recommend further evaluation with ultrasound and tissue sampling is warranted.      1/6/2025 Biopsy     01/06/25 Biopsy    RIGHT ABDOMINAL WALL, CT-GUIDED BIOPSY WITH PATHOLOGIST ASSISTED ADEQUACY (CYTOLOGY AND CELL BLOCK):  Positive for malignancy, consistent with renal cell carcinoma.  See comment.          1/31/2025 Imaging Significant Findings     1/31/25 Tc99m Bone scan  Impression:  - There is no scintigraphic evidence of osteoblastic metastatic disease.     1/31/25 CT torso  Impression:  - Decreased size of a right anterior abdominal wall mass.  - Stable pulmonary nodules.      Squamous Cell Carcinoma of right lower lobe of lung   10/25/2022 Biopsy     10/25/22 Lung biopsy  1. Lung, right lower lobe, endobronchial brushing, cytologic evaluation with  cell block:  Atypical cells present.  Epithelial atypia.  Findings suggestive of squamous cell carcinoma  2. Lung, right lower lobe, endoscopic fine needle aspiration, cytologic  evaluation with cell block:  Positive for malignancy. Lung squamous cell carcinoma.  3. Lung, right lower lobe, transbronchial biopsy, cytologic evaluation with  cell block:  Positive for malignancy, Lung squamous cell carcinoma  - PD-L1 and Lung NGS Panel have been ordered and results will be issued in a  supplemental report.  4. Lung, right lower lobe, bronchoalveolar lavage, cytologic evaluation with  cell block:  Negative for malignant cells.  Reactive bronchial  cells.  Macrophages.  5. Lung, left lower lobe, endoscopic fine needle aspiration, cytologic  evaluation with cell block:  Negative for malignant cells.  Reactive bronchial cells.  Macrophages.  6. Lymph node, Station 7, endoscopic fine needle aspiration, cytologic  evalu ation with cell block:  Negative for malignant cells. Reactive bronchial cells.  Lymphocytes present.       11/25/2022 Imaging Significant Findings     11/25/22 PET CT  Impression:  - Hypermetabolic 1.5 cm nodule in the right lower lobe of the lung concerning for primary neoplasm.  No hypermetabolic lymphadenopathy.  - Round soft tissue structure within the anterior abdominal wall measuring up to 2.3 cm with mild radiotracer uptake.  Recommend further assessment with dedicated ultrasound and/or percutaneous sampling as clinically warranted.  - Additional pulmonary nodules without significant FDG avidity as above.  Findings include a new 3 mm nodule in the left upper lobe.  Of note, subcentimeter nodules are likely too small to characterize with PET.  Recommend continued CT surveillance.      11/30/2022 Initial Diagnosis     Squamous Cell Carcinoma of right lower lobe of lung      1/11/2023 Cancer Staged     Staging form: Lung, AJCC 8th Edition  - Clinical stage from 1/11/2023: Stage IA2 (cT1b, cN0, cM0)      1/17/2023 Imaging Significant Findings     01/17/23 CT Chest  Impression:  1. Interval enlargement of a right lower lobe nodule, now 2.3 cm maximally (previously 1.5 cm).  This was noted hypermetabolic on the prior PET-CT 09/29/2022.  2. There is a newly seen peripheral, right lower lobe irregular nodule up to 1.3 cm. v this was a site of thoracotomy and may be postsurgical in nature.  3. There other pulmonary nodules as described above which do not appear changed.  There is no new lymphadenopathy detected.  4. There is a new small right pleural effusion and peripheral airspace opacities in the right lung.  This may reflect  infectious/inflammatory etiology.  Alternatively, this may reflect involving appearance of postsurgical change.      1/26/2023 - 2/3/2023 Radiation Therapy     Treating physician: Xavier Deng     Site Technique Energy Dose/Fx (Gy) #Fx Total Dose (Gy)   RLL Lung SBRT 6X 12.5 4 / 4 50            Biopsy         4/26/2023 Imaging Significant Findings     04/26/23 CT Chest  Impression:  1. Interval decrease in size of a right lower lobe irregular nodule with adjacent architectural distortion.  Consistent with post treatment changes.  2. Other findings as above without significant interval change.      5/10/2024 Imaging Significant Findings     5/10/24 CT Chest  Impression:  1. Stable bilateral pulmonary nodules, including right lower lobe spiculated nodule.  No new or enlarging nodules.  2. Additional findings as above.      11/13/2024 Imaging Significant Findings     11/13/24 CT Chest  Impression:  1. Stable bilateral pulmonary nodules, including right lower lobe spiculated nodule.  No new or enlarging nodules.  2. Additional findings as above.      11/22/2024 Imaging Significant Findings     11/22/24 FDG PET  Impression:  - Patient with history of right lower lobe non-small cell lung cancer status post radiation therapy.  No evidence of tracer avid disease or metastasis.  - Interval enlargement of a 3.9 cm anterior abdominal wall mass with mild increased tracer uptake.  Recommend further evaluation with ultrasound and tissue sampling is warranted.       PMH:   Past Medical History:   Diagnosis Date    Anticoagulant long-term use     Cancer     Kidney (Right)    Cataracts, bilateral     CKD (chronic kidney disease) stage 3, GFR 30-59 ml/min 12/15/2014    Colon polyps     Combined hyperlipidemia associated with type 2 diabetes mellitus 6/7/2013    COPD (chronic obstructive pulmonary disease) 12/15/2014    Diabetes mellitus type II     NIDDM, a.m. glucose-120s-130s-last A1c-7.1-6/7/13    Diabetes mellitus with renal  manifestations, uncontrolled 2/1/2017    Diabetic retinopathy     Family history of stomach cancer 12/5/2013    Former smoker     H/O renal cell carcinoma 12/15/2015    History of colonic polyps 2/1/2017    3 polyps 7/13 ---5 yrs    History of gastroesophageal reflux (GERD)     History of urinary incontinence     s/p bladder lift-october, 2011 (at [facility])    Hyperlipidemia     Hypertension     120s/70s-80s    Nephrolithiasis     Osteoarthritis of thumb 12/20/2019    Osteoporosis, post-menopausal 3/17/2014    Primary hyperparathyroidism 10/20/2016    Schatzki's ring 2/1/2017    Dilated 2014       Past Surgical History:   Past Surgical History:   Procedure Laterality Date    bladder lift  2011    done at [facility]    BLADDER STONE REMOVAL  2011    before bladder lift    CATARACT EXTRACTION W/  INTRAOCULAR LENS IMPLANT Left 06/07/2016    Dr. Vásquez    CATARACT EXTRACTION W/  INTRAOCULAR LENS IMPLANT Right 06/21/2016    Dr. Vásquez    COLONOSCOPY N/A 4/26/2018    Procedure: COLONOSCOPY;  Surgeon: Benjamin Zamora MD;  Location: [facility] ENDO;  Service: Endoscopy;  Laterality: N/A;  confirmed ss    COLONOSCOPY N/A 12/21/2023    Procedure: COLONOSCOPY;  Surgeon: Jamel Luis MD;  Location: [facility] ENDO;  Service: Endoscopy;  Laterality: N/A;  9/7 ref Venancio Mayer MD, Suprep, instr. mailed, [facility]-st  12/14- instr reviewed, pt reminded to hold ozempic, pre call complete.  DBM    CYSTOSCOPY      INJECTION OF ANESTHETIC AGENT AROUND MULTIPLE INTERCOSTAL NERVES  12/27/2022    Procedure: BLOCK, NERVE, INTERCOSTAL, 2 OR MORE;  Surgeon: Paxton Cannon MD;  Location: [facility] OR 2ND FLR;  Service: Thoracic;;    LYSIS OF ADHESIONS  12/27/2022    Procedure: LYSIS, ADHESIONS;  Surgeon: Paxton Cannon MD;  Location: [facility] OR 2ND FLR;  Service: Thoracic;;    NAVIGATIONAL BRONCHOSCOPY N/A 12/11/2018    Procedure: BRONCHOSCOPY, NAVIGATIONAL;  Surgeon: Ashtyn Ramires MD;  Location: [facility] OR 2ND FLR;  Service: Pulmonary;  Laterality:  N/A;    NEPHRECTOMY      partial right    ROBOTIC BRONCHOSCOPY N/A 10/25/2022    Procedure: ROBOTIC BRONCHOSCOPY;  Surgeon: Ashtyn Ramires MD;  Location: [facility] OR [address] [place] [address];  Service: Pulmonary;  Laterality: N/A;    VIDEO-ASSISTED THORACOSCOPIC SURGERY (VATS)  12/27/2022    Procedure: VATS (VIDEO-ASSISTED THORACOSCOPIC SURGERY);  Surgeon: Paxton Cannon MD;  Location: [facility] OR [address] [place] [address];  Service: Thoracic;;       Social History:  Social History     Socioeconomic History    Marital status:    Occupational History    Occupation: [employer]   Tobacco Use    Smoking status: Every Day     Current packs/day: 0.25     Average packs/day: 0.3 packs/day for 30.0 years (7.5 ttl pk-yrs)     Types: Cigarettes    Smokeless tobacco: Never   Substance and Sexual Activity    Alcohol use: No     Alcohol/week: 0.0 standard drinks of alcohol    Drug use: No   Social History Narrative    Lives on [facility]    Here with sister Kate [phone]         Social Drivers of Health     Financial Resource Strain: Patient Unable To Answer (11/19/2024)    Overall Financial Resource Strain (CARDIA)     Difficulty of Paying Living Expenses: Patient unable to answer   Food Insecurity: Patient Unable To Answer (11/19/2024)    Hunger Vital Sign     Worried About Running Out of Food in the Last Year: Patient unable to answer     Ran Out of Food in the Last Year: Patient unable to answer   Transportation Needs: Unknown (11/19/2024)    PRAPARE - Transportation     Lack of Transportation (Medical): No     Lack of Transportation (Non-Medical): Patient unable to answer   Physical Activity: Patient Unable To Answer (11/19/2024)    Exercise Vital Sign     Days of Exercise per Week: Patient unable to answer     Minutes of Exercise per Session: Patient unable to answer   Stress: Patient Unable To Answer (11/19/2024)    [place] [employer] - Occupational Stress Questionnaire     Feeling of Stress : Patient unable to  answer   Housing Stability: Low Risk  (11/19/2024)    Housing Stability Vital Sign     Unable to Pay for Housing in the Last Year: No     Homeless in the Last Year: No       Family History:  Family History   Problem Relation Name Age of Onset    Glaucoma Mother      Cataracts Mother      No Known Problems Father      Colon cancer Brother      Nephrolithiasis Sister      No Known Problems Maternal Aunt      No Known Problems Maternal Uncle      No Known Problems Paternal Aunt      No Known Problems Paternal Uncle      No Known Problems Maternal Grandmother      No Known Problems Maternal Grandfather      No Known Problems Paternal Grandmother      No Known Problems Paternal Grandfather      Anesthesia problems Neg Hx      Kidney cancer Neg Hx      Amblyopia Neg Hx      Blindness Neg Hx      Cancer Neg Hx      Diabetes Neg Hx      Hypertension Neg Hx      Macular degeneration Neg Hx      Retinal detachment Neg Hx      Strabismus Neg Hx      Stroke Neg Hx      Thyroid disease Neg Hx         Allergies:   Review of patient's allergies indicates:   Allergen Reactions    Pantoprazole Other (See Comments)     Elevated Blood Sugars    Metformin Diarrhea       Medications:  Current Outpatient Medications on File Prior to Visit   Medication Sig Dispense Refill    amLODIPine (NORVASC) 10 MG tablet TAKE ONE TABLET BY MOUTH ONCE A DAY 90 tablet 0    aspirin (ECOTRIN) 81 MG EC tablet Take 81 mg by mouth once daily.      blood sugar diagnostic Strp Dispense: Test strip to check glucose 2 times a day, ICD-10: E11.65, compatible with insurance/glucometer, dispense enough for 90 day supply 300 each 3    blood-glucose meter kit Dispense: 1 glucometer, use to check glucose 2 times a day, ICD-10: E11.65,  Dispense machine covered by insurance 1 each 0    cyanocobalamin, vitamin B-12, 1,000 mcg TbSR Take by mouth once daily.       famotidine (PEPCID) 40 MG tablet TAKE ONE TABLET BY MOUTH ONCE A DAY 90 tablet 0    furosemide (LASIX) 20 MG  "tablet TAKE ONE TABLET BY MOUTH ONCE A DAY AS NEEDED 30 tablet 3    irbesartan (AVAPRO) 300 MG tablet Take 1 tablet (300 mg total) by mouth once daily. 90 tablet 3    lancets Misc Dispense: Lancets use to check glucose 2 times a day, ICD-10: E11.65, dispense enough for 90 day supply 300 each 3    lancing device Misc One device, used to check blood glucose, ICD-10: E11.65 1 each 0    omeprazole (PRILOSEC) 40 MG capsule Take 1 capsule (40 mg total) by mouth once daily. 90 capsule 4    oxybutynin (DITROPAN-XL) 5 MG TR24 TAKE ONE TABLET BY MOUTH ONCE A DAY 90 tablet 3    OZEMPIC 0.25 mg or 0.5 mg (2 mg/3 mL) pen injector INJECT .5 MILLIGRAM UNDER THE SKIN ONCE EVERY 7 DAYS 3 mL 5    pen needle, diabetic (BD INSULIN PEN NEEDLE UF SHORT) 31 gauge x 5/16" Ndle USE AS DIRECTED 100 each 12    rosuvastatin (CRESTOR) 20 MG tablet TAKE ONE TABLET BY MOUTH ONCE A DAY 90 tablet 12    traMADoL (ULTRAM) 50 mg tablet Take 1 tablet (50 mg total) by mouth every 6 (six) hours as needed for Pain. 120 tablet 5    vitamin D (VITAMIN D3) 1000 units Tab Take 1,000 Units by mouth once daily.       No current facility-administered medications on file prior to visit.         Objective:     Vital Signs (Most Recent)       ROS A 10+ review of systems was performed with pertinent positives and negatives noted above in the history of present illness.  Other systems were negative unless otherwise specified.    Physical Exam:   Gen: awake, alert, in no acute distress  HEENT: normocephalic, atraumatic, EOMI, no scleral icterus  CV: regular rate and rhythm  Pulm: equal chest rise bilaterally, normal work of breathing. Hoarse voice  Abd:  soft, non-tender, no guarding. 4cm firm mass present at RUQ. Nontender, no overlying skin changes  Ext: WWP, skin warm and dry    Imaging  The following imaging was reviewed: CT Abd/Pelvis  CT Chest      Assessment:     Sarina Genao is a 74 y.o. female with PMHx of clear cell renal cell carcinoma s/p right " partial nephrectomy (2013) who presents to clinic for evaluation of an abdominal wall mass confirmed by biopsy to be metastatic renal cell carcinoma.  Given her report that she previously had a LEON drain at the site of her current mass after her partial nephrectomy, it is likely that she had seeding of the tumor into her abdominal wall during this procedure.  On imaging, the mass involves the subcutaneous tissue as well as the underlying muscle and fascia.  The surgical would require an extensive operation including abdominal wall reconstruction with mesh to excise the entire mass.  We discussed this in detail, and she expressed understanding that she would have to completely stop smoking prior to the surgery in order to avoid postoperative complications.  In addition, she will need a preoperative workup by her pulmonologist to ensure that she can tolerate surgery from a respiratory standpoint given her COPD and extensive smoking history.  Since this mass is not particularly bothersome to her and she does not have significant metastasis seen on imaging outside of this, we can consider delaying the surgery at this time.  In addition, she may benefit from immunotherapy in the interim.  The patient is agreeable to this plan.         Plan:       Continue lung irradiation  Will only proceed w/ surgery  Short interval f/u    Apollo Dye MD  General Surgery PGY-1

## 2025-02-06 NOTE — PROGRESS NOTES
"Subjective:       Patient ID: Sarina Genao is a 74 y.o. female.    Chief Complaint: Robotic bronchoscopy    Patient with a history of NSCLC (squam) in a RLL primary that I treated with SBRT in 2023. She now has confirmed metastatic renal cell carcinoma to her abdominal wall.      It looks like she has 2 lung nodules that may be of concern for mets. There is a separate RLL nodule than the one I treated that has been growing over the years and looks like a metastasis to me. Separately she has a calcified LLL nodule that you sampled in 2018 and 2022, both times negative for malignancy. She is being referred back for repeat bronch to confirm if she has additional RCC mets beyond the one in the abdominal wall.      Potential plan:  If RLL is a met, that can be treated with SBRT without much toxicity risk. The LLL nodule would be a little more difficult since it's so central, but I would offer her radiation to it as well.        Review of Systems      Patient is currently complaining of lower extremity edema and takes lasix daily. On a new medication for BP which she attributes to     Has a cough and dyspnea she states the only time she feels like this is when she is "full of fluid".      Patient saw surg-onc today and described that it would be a "painful surgery" which she is not interested in due to muscular invasion.  He recommended smoking cessation for two months to ensure proper healing due to amount of resection.    Objective:      Vitals:    02/06/25 1146   BP: 120/60   BP Location: Right arm   Patient Position: Sitting   Pulse: 67   SpO2: (!) 94%   Weight: 61.1 kg (134 lb 11.2 oz)   Height: 5' 3" (1.6 m)      Physical Exam   Constitutional: She is oriented to person, place, and time. She appears well-developed and well-nourished.   Cardiovascular: Normal rate and regular rhythm.   Pulmonary/Chest: Normal expansion and breath sounds normal.   Musculoskeletal:         General: Normal range of motion. " "  Neurological: She is alert and oriented to person, place, and time.   Skin: Skin is warm and dry.     Personal Diagnostic Review    RLL new from NSCLCa dx and enlarging      PET:  In the chest, the known right lower lobe spiculated nodule is stable in size measuring 1.0 cm without appreciable tracer uptake by PET CT (3-91).     Additional stable pulmonary nodules including a partially calcified infrahilar nodule in the left lower lobe measuring up to 2.3 cm (3-85) without appreciable tracer uptake by PET-CT.     Stable 1.3 cm nodule in the right lower lobe (3-101) with max SUV of 1.8.     Stable 0.5 cm nodule in the left upper lobe (3-84)    LLL wtihout activity and lemellar calcifications measures 2.3cm.  Stable from 2018 to present.  Bx x 2 negative.           Stress echo 2022:    Abnormal myocardial perfusion scan.    There is a moderate intensity, moderate sized, reversible perfusion abnormality that is consistent with ischemia in the mid to apical lateral wall(s) in the typical distribution of the D1 territory.    There are no other significant perfusion abnormalities.    The visually estimated ejection fraction is normal at rest and normal during stress.    There is normal wall motion at rest and post stress.    LV cavity size is normal at rest and normal at stress.    The ECG portion of the study is negative for ischemia.    The patient reported no chest pain during the stress test.    There were no arrhythmias during stress.    When compared to the previous study from 8/15/2017, there is now a reversible defect in the D1 distribuition.      2/6/2025    11:46 AM 2/6/2025    10:32 AM 2/3/2025     9:17 AM 1/16/2025     2:06 PM 1/15/2025     3:29 PM 1/6/2025     1:56 PM 1/6/2025     1:25 PM   Pulmonary Function Tests   SpO2 94 % 95 % 97 % 74 % 94 % 94 % 99 %   Height 5' 3" (1.6 m)   5' 3" (1.6 m)      Weight 61.1 kg (134 lb 11.2 oz) 61 kg (134 lb 5.9 oz) 61 kg (134 lb 9.5 oz) 60.8 kg (134 lb 0.6 oz) 61.2 kg " "(134 lb 14.4 oz)     BMI (Calculated) 23.9  23.8 23.8            Assessment:       1. Lower extremity edema    2. Renal cell carcinoma, unspecified laterality    3. Solitary pulmonary nodule    4. Squamous Cell Carcinoma of right lower lobe of lung    5. Right lower lobe pulmonary nodule        Outpatient Encounter Medications as of 2/6/2025   Medication Sig Dispense Refill    amLODIPine (NORVASC) 10 MG tablet TAKE ONE TABLET BY MOUTH ONCE A DAY 90 tablet 0    aspirin (ECOTRIN) 81 MG EC tablet Take 81 mg by mouth once daily.      blood sugar diagnostic Strp Dispense: Test strip to check glucose 2 times a day, ICD-10: E11.65, compatible with insurance/glucometer, dispense enough for 90 day supply 300 each 3    blood-glucose meter kit Dispense: 1 glucometer, use to check glucose 2 times a day, ICD-10: E11.65,  Dispense machine covered by insurance 1 each 0    cyanocobalamin, vitamin B-12, 1,000 mcg TbSR Take by mouth once daily.      famotidine (PEPCID) 40 MG tablet TAKE ONE TABLET BY MOUTH ONCE A DAY 90 tablet 0    furosemide (LASIX) 20 MG tablet TAKE ONE TABLET BY MOUTH ONCE A DAY AS NEEDED 30 tablet 3    irbesartan (AVAPRO) 300 MG tablet Take 1 tablet (300 mg total) by mouth once daily. 90 tablet 3    lancets Misc Dispense: Lancets use to check glucose 2 times a day, ICD-10: E11.65, dispense enough for 90 day supply 300 each 3    lancing device Misc One device, used to check blood glucose, ICD-10: E11.65 1 each 0    omeprazole (PRILOSEC) 40 MG capsule Take 1 capsule (40 mg total) by mouth once daily. 90 capsule 4    oxybutynin (DITROPAN-XL) 5 MG TR24 TAKE ONE TABLET BY MOUTH ONCE A DAY 90 tablet 3    OZEMPIC 0.25 mg or 0.5 mg (2 mg/3 mL) pen injector INJECT .5 MILLIGRAM UNDER THE SKIN ONCE EVERY 7 DAYS 3 mL 5    pen needle, diabetic (BD INSULIN PEN NEEDLE UF SHORT) 31 gauge x 5/16" Ndle USE AS DIRECTED 100 each 12    rosuvastatin (CRESTOR) 20 MG tablet TAKE ONE TABLET BY MOUTH ONCE A DAY 90 tablet 12    traMADoL " (ULTRAM) 50 mg tablet Take 1 tablet (50 mg total) by mouth every 6 (six) hours as needed for Pain. 120 tablet 5    vitamin D (VITAMIN D3) 1000 units Tab Take 1,000 Units by mouth once daily.       No facility-administered encounter medications on file as of 2/6/2025.     Orders Placed This Encounter   Procedures    CT Chest Without Contrast     Standing Status:   Future     Standing Expiration Date:   2/6/2026     Order Specific Question:   May the Radiologist modify the order per protocol to meet the clinical needs of the patient?     Answer:   Yes    CT Chest Without Contrast     Standing Status:   Future     Standing Expiration Date:   2/6/2026     Scheduling Instructions:      Ion Chest Navigational Bronchoscopy     Order Specific Question:   May the Radiologist modify the order per protocol to meet the clinical needs of the patient?     Answer:   Yes    EKG 12-lead     Standing Status:   Future     Standing Expiration Date:   2/6/2026    Echo     Standing Status:   Future     Standing Expiration Date:   2/6/2026     Order Specific Question:   Release to patient     Answer:   Immediate    Case Request Operating Room: ROBOTIC BRONCHOSCOPY     Standing Status:   Standing     Number of Occurrences:   1     Order Specific Question:   Requested Time     Answer:   7:00 AM     Order Specific Question:   Medical Necessity:     Answer:   Medically Urgent [101]     Order Specific Question:   Case classification     Answer:   E - Elective [90]     Order Specific Question:   Post-Procedure Disposition:     Answer:   Amb Surgery/DOSC [2]     Order Specific Question:   Is an on-site pathologist required for this procedure?     Answer:   N/A     Plan:     Problem List Items Addressed This Visit       Renal cell carcinoma    Overview     Metachronous recurrence of ccRCC. Initial diagnosis in 2013 sp partial nephrectomy. Has biopsy proven recurrence to abdominal wall 01/2025.     Lengthy discussion with patient to reconsider  surgical resection.         Relevant Orders    Echo    Case Request Operating Room: ROBOTIC BRONCHOSCOPY (Completed)    Right lower lobe pulmonary nodule    Current Assessment & Plan     New pulmonary nodule in the RLL, previous history of squamous cell treated with SBRT    Plan for diagnostic and staging bronchoscopy with EBUS.         Squamous Cell Carcinoma of right lower lobe of lung    Overview     Status post SBRT    Enlarging RLL spiculated nodule could represent additional lung primary or RCC metastasis.  Robotic bronchoscopy scheduled.    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  Has remained stable.  Do not believe patient would benefit from SBRT to this region.           Other Visit Diagnoses       Lower extremity edema    -  Primary    Relevant Orders    Echo    EKG 12-lead    Solitary pulmonary nodule        Relevant Orders    CT Chest Without Contrast    Case Request Operating Room: ROBOTIC BRONCHOSCOPY (Completed)    CT Chest Without Contrast        I have explained the risks, benefits and alternatives of the procedure in detail.  The patient voices understanding and all questions have been answered.  The patient agrees to proceed as planned.

## 2025-02-12 NOTE — ASSESSMENT & PLAN NOTE
New pulmonary nodule in the RLL, previous history of squamous cell treated with SBRT    Plan for diagnostic and staging bronchoscopy with EBUS.

## 2025-02-13 ENCOUNTER — HOSPITAL ENCOUNTER (OUTPATIENT)
Dept: CARDIOLOGY | Facility: HOSPITAL | Age: 75
Discharge: HOME | End: 2025-02-13
Attending: INTERNAL MEDICINE
Payer: MEDICARE

## 2025-02-13 VITALS — HEIGHT: 63 IN | WEIGHT: 134.69 LBS | BODY MASS INDEX: 23.86 KG/M2

## 2025-02-13 DIAGNOSIS — R60.0 LOWER EXTREMITY EDEMA: ICD-10-CM

## 2025-02-13 DIAGNOSIS — C64.9 RENAL CELL CARCINOMA, UNSPECIFIED LATERALITY: ICD-10-CM

## 2025-02-13 LAB
ASCENDING AORTA: 2.65 CM
AV INDEX (PROSTH): 0.58
AV MEAN GRADIENT: 8 MMHG
AV PEAK GRADIENT: 13 MMHG
AV VALVE AREA BY VELOCITY RATIO: 1.6 CM²
AV VALVE AREA: 1.6 CM²
AV VELOCITY RATIO: 0.56
BSA FOR ECHO PROCEDURE: 1.65 M2
CV ECHO LV RWT: 0.57 CM
DOP CALC AO PEAK VEL: 1.8 M/S
DOP CALC AO VTI: 38 CM
DOP CALC LVOT AREA: 2.8 CM2
DOP CALC LVOT DIAMETER: 1.9 CM
DOP CALC LVOT PEAK VEL: 1 M/S
DOP CALC LVOT STROKE VOLUME: 62.3 CM3
DOP CALCLVOT PEAK VEL VTI: 22 CM
E WAVE DECELERATION TIME: 261 MSEC
E/A RATIO: 0.91
E/E' RATIO: 16 M/S
ECHO LV POSTERIOR WALL: 1.2 CM (ref 0.6–1.1)
FRACTIONAL SHORTENING: 23.8 % (ref 28–44)
INTERVENTRICULAR SEPTUM: 1.2 CM (ref 0.6–1.1)
IVC DIAMETER: 1.62 CM
IVRT: 107 MSEC
LA MAJOR: 5.7 CM
LA MINOR: 5.5 CM
LA WIDTH: 4.3 CM
LEFT ATRIUM SIZE: 4.4 CM
LEFT ATRIUM VOLUME INDEX: 55 ML/M2
LEFT ATRIUM VOLUME: 90 CM3
LEFT INTERNAL DIMENSION IN SYSTOLE: 3.2 CM (ref 2.1–4)
LEFT VENTRICLE DIASTOLIC VOLUME INDEX: 49.06 ML/M2
LEFT VENTRICLE DIASTOLIC VOLUME: 79.97 ML
LEFT VENTRICLE MASS INDEX: 109.3 G/M2
LEFT VENTRICLE SYSTOLIC VOLUME INDEX: 25.4 ML/M2
LEFT VENTRICLE SYSTOLIC VOLUME: 41.48 ML
LEFT VENTRICULAR INTERNAL DIMENSION IN DIASTOLE: 4.2 CM (ref 3.5–6)
LEFT VENTRICULAR MASS: 178.2 G
LV LATERAL E/E' RATIO: 12.9 M/S
LV SEPTAL E/E' RATIO: 22.5 M/S
LVED V (TEICH): 79.97 ML
LVES V (TEICH): 41.48 ML
LVOT MG: 2.87 MMHG
LVOT MV: 0.8 CM/S
MV PEAK A VEL: 0.99 M/S
MV PEAK E VEL: 0.9 M/S
MV STENOSIS PRESSURE HALF TIME: 75.77 MS
MV VALVE AREA P 1/2 METHOD: 2.9 CM2
OHS CV RV/LV RATIO: 0.98 CM
OHS QRS DURATION: 96 MS
OHS QTC CALCULATION: 465 MS
PISA TR MAX VEL: 1.9 M/S
PULM VEIN S/D RATIO: 1.47
PV PEAK D VEL: 0.32 M/S
PV PEAK GRADIENT: 6 MMHG
PV PEAK S VEL: 0.47 M/S
PV PEAK VELOCITY: 1.26 M/S
RA MAJOR: 3.86 CM
RA PRESSURE ESTIMATED: 3 MMHG
RA WIDTH: 3.5 CM
RIGHT VENTRICLE DIASTOLIC BASEL DIMENSION: 4.1 CM
RIGHT VENTRICULAR END-DIASTOLIC DIMENSION: 4.08 CM
RV TB RVSP: 5 MMHG
RV TISSUE DOPPLER FREE WALL SYSTOLIC VELOCITY 1 (APICAL 4 CHAMBER VIEW): 12.31 CM/S
SINUS: 2.97 CM
STJ: 2.02 CM
TDI LATERAL: 0.07 M/S
TDI SEPTAL: 0.04 M/S
TDI: 0.06 M/S
TR MAX PG: 14 MMHG
TRICUSPID ANNULAR PLANE SYSTOLIC EXCURSION: 2.14 CM
TV PEAK GRADIENT: 2 MMHG
TV REST PULMONARY ARTERY PRESSURE: 17 MMHG
Z-SCORE OF LEFT VENTRICULAR DIMENSION IN END DIASTOLE: -0.91
Z-SCORE OF LEFT VENTRICULAR DIMENSION IN END SYSTOLE: 0.91

## 2025-02-13 PROCEDURE — 93005 ELECTROCARDIOGRAM TRACING: CPT

## 2025-02-13 PROCEDURE — 93306 TTE W/DOPPLER COMPLETE: CPT | Mod: 26,,, | Performed by: INTERNAL MEDICINE

## 2025-02-13 PROCEDURE — 93306 TTE W/DOPPLER COMPLETE: CPT

## 2025-02-13 PROCEDURE — 93010 ELECTROCARDIOGRAM REPORT: CPT | Mod: ,,, | Performed by: INTERNAL MEDICINE

## 2025-02-21 ENCOUNTER — HOSPITAL ENCOUNTER (OUTPATIENT)
Dept: RADIOLOGY | Facility: HOSPITAL | Age: 75
Discharge: HOME | End: 2025-02-21
Attending: INTERNAL MEDICINE
Payer: MEDICARE

## 2025-02-21 DIAGNOSIS — R91.1 SOLITARY PULMONARY NODULE: ICD-10-CM

## 2025-02-21 PROCEDURE — 71250 CT THORAX DX C-: CPT | Mod: 26,,, | Performed by: RADIOLOGY

## 2025-02-21 PROCEDURE — 71250 CT THORAX DX C-: CPT | Mod: TC

## 2025-02-27 ENCOUNTER — ANESTHESIA EVENT (OUTPATIENT)
Dept: SURGERY | Facility: HOSPITAL | Age: 75
End: 2025-02-27
Payer: MEDICARE

## 2025-02-27 ENCOUNTER — TELEPHONE (OUTPATIENT)
Dept: PULMONOLOGY | Facility: CLINIC | Age: 75
End: 2025-02-27
Payer: MEDICARE

## 2025-02-27 NOTE — TELEPHONE ENCOUNTER
I spoke with patient to let her know arrival time to [facility] for 5:30am tomorrow for robotic bronchoscopy with Dr Ramires. NPO after midnight. I answered all questions. Patient confirmed and verbalized understanding.

## 2025-02-27 NOTE — ANESTHESIA PREPROCEDURE EVALUATION
[facility]  Anesthesia Pre-Operative Evaluation         Patient Name: Sarina Genao  YOB: 1950  MRN: [id number]    SUBJECTIVE:     Pre-operative evaluation for Procedure(s) (LRB):  ROBOTIC BRONCHOSCOPY (N/A)     02/27/2025    Sarina Genao is a 74 y.o. 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    Patient now presents for the above procedure(s).    Echo 2/13/25    Left Ventricle: The left ventricle is normal in size. Mildly increased wall thickness. There is mild concentric hypertrophy. There is normal systolic function with a visually estimated ejection fraction of 60 - 65%. Grade II diastolic dysfunction. Elevated left ventricular filling pressure.    Right Ventricle: Mild right ventricular enlargement. Systolic function is normal.    Left Atrium: Left atrium is severely dilated.    Right Atrium: Right atrium is mildly dilated.    Aortic Valve: There is moderate aortic valve sclerosis.    Pulmonary Artery: The estimated pulmonary artery systolic pressure is 17 mmHg.    IVC/SVC: Normal venous pressure at 3 mmHg.      LDA: None documented.    Prev airway:     Intubation     Date/Time: 12/27/2022 1:49 PM  Performed by: Rome Mast DO  Authorized by: Neha Lui MD      Intubation:     Induction:  Intravenous    Intubated:  Postinduction    Mask Ventilation:  Easy with oral airway    Attempts:  1    Attempted By:  Resident anesthesiologist    Method of Intubation:  Video laryngoscopy and fiberoptic    Blade:  Eugene 3    Laryngeal View Grade: Grade I - full view of cords      Difficult Airway Encountered?: No      Complications:  None    Airway Device:  Double lumen tube left    Airway Device Size:  35F    Style/Cuff Inflation:   Cuffed (inflated to minimal occlusive pressure)    Placement Verified By:  Capnometry and Fiber optic visualization    Complicating Factors:  None    Findings Post-Intubation:  BS equal bilateral and atraumatic/condition of teeth unchanged          Drips: None documented.    Problem List[1]    Review of patient's allergies indicates:   Allergen Reactions    Pantoprazole Other (See Comments)     Elevated Blood Sugars    Metformin Diarrhea       Current Outpatient Medications:  Current Medications[2]    Past Surgical History:   Procedure Laterality Date    bladder lift  2011    done at [facility]    BLADDER STONE REMOVAL  2011    before bladder lift    CATARACT EXTRACTION W/  INTRAOCULAR LENS IMPLANT Left 06/07/2016    Dr. Vásquez    CATARACT EXTRACTION W/  INTRAOCULAR LENS IMPLANT Right 06/21/2016    Dr. Vásquez    COLONOSCOPY N/A 4/26/2018    Procedure: COLONOSCOPY;  Surgeon: Benjamin Zamora MD;  Location: [facility] ENDO;  Service: Endoscopy;  Laterality: N/A;  confirmed ss    COLONOSCOPY N/A 12/21/2023    Procedure: COLONOSCOPY;  Surgeon: Jamel Luis MD;  Location: [facility] ENDO;  Service: Endoscopy;  Laterality: N/A;  9/7 ref Venancio Mayer MD, Suprep, instr. mailed, [facility]  12/14- instr reviewed, pt reminded to hold ozempic, pre call complete.  DBM    CYSTOSCOPY      INJECTION OF ANESTHETIC AGENT AROUND MULTIPLE INTERCOSTAL NERVES  12/27/2022    Procedure: BLOCK, NERVE, INTERCOSTAL, 2 OR MORE;  Surgeon: Paxton Cannon MD;  Location: [facility] OR 2ND FLR;  Service: Thoracic;;    LYSIS OF ADHESIONS  12/27/2022    Procedure: LYSIS, ADHESIONS;  Surgeon: Paxton Cannon MD;  Location: [facility] OR 2ND FLR;  Service: Thoracic;;    NAVIGATIONAL BRONCHOSCOPY N/A 12/11/2018    Procedure: BRONCHOSCOPY, NAVIGATIONAL;  Surgeon: Ashtyn Ramires MD;  Location: [facility] OR 2ND FLR;  Service: Pulmonary;  Laterality: N/A;    NEPHRECTOMY      partial right    ROBOTIC BRONCHOSCOPY N/A 10/25/2022    Procedure: ROBOTIC BRONCHOSCOPY;   Surgeon: Ashtyn Ramires MD;  Location: [facility] OR [address] [place] [address];  Service: Pulmonary;  Laterality: N/A;    VIDEO-ASSISTED THORACOSCOPIC SURGERY (VATS)  12/27/2022    Procedure: VATS (VIDEO-ASSISTED THORACOSCOPIC SURGERY);  Surgeon: Paxton Cannon MD;  Location: [facility] OR [address] [place] [address];  Service: Thoracic;;       Social History[3]    OBJECTIVE:     Vital Signs Range (Last 24H):         Significant Labs:  Lab Results   Component Value Date    WBC 11.91 02/03/2025    HGB 14.4 02/03/2025    HCT 43.9 02/03/2025     02/03/2025    CHOL 139 02/13/2021    TRIG 159 (H) 02/13/2021    HDL 41 02/13/2021    ALT 5 (L) 02/03/2025    AST 10 02/03/2025     02/03/2025    K 3.9 02/03/2025     02/03/2025    CREATININE 1.1 02/03/2025    BUN 14 02/03/2025    CO2 26 02/03/2025    TSH 1.899 02/03/2025    HGBA1C 7.2 (H) 09/13/2024       Diagnostic Studies: No relevant studies.    EKG:   Results for orders placed or performed during the hospital encounter of 02/13/25   EKG 12-lead    Collection Time: 02/13/25  1:24 PM   Result Value Ref Range    QRS Duration 96 ms    OHS QTC Calculation 465 ms    Narrative    Test Reason : R60.0,    Vent. Rate :  71 BPM     Atrial Rate :  71 BPM     P-R Int : 160 ms          QRS Dur :  96 ms      QT Int : 428 ms       P-R-T Axes :  78  78  79 degrees    QTcB Int : 465 ms    Normal sinus rhythm  Incomplete right bundle branch block  Nonspecific T wave abnormality  Abnormal ECG  When compared with ECG of 19-Dec-2022 08:18,  Significant changes have occurred  Confirmed by Mary Wesley ([id number]) on 2/13/2025 5:39:36 PM    Referred By: ASHTYN RAMIRES           Confirmed By: Mary Wesley       2D ECHO:  TTE:  No results found for this or any previous visit.    VALERIA:  No results found for this or any previous visit.    ASSESSMENT/PLAN:           Pre-op Assessment    I have reviewed the Patient Summary Reports.     I have reviewed the Nursing Notes. I have reviewed the NPO Status.   I have  reviewed the Medications.     Review of Systems  Anesthesia Hx:  No problems with previous Anesthesia   History of prior surgery of interest to airway management or planning:            Denies Personal Hx of Anesthesia complications.                    Social:  Smoker       Hematology/Oncology:  Hematology Normal                       --  Cancer in past history:                     EENT/Dental:  EENT/Dental Normal           Cardiovascular:     Hypertension, poorly controlled       Denies Angina. CHF    hyperlipidemia  Denies BLISS.  ECG has been reviewed.                            Pulmonary:   COPD    Denies Shortness of breath.                  Renal/:  Chronic Renal Disease, CKD                Hepatic/GI:     GERD, well controlled   Schatzki's ring             Musculoskeletal:  Arthritis               Neurological:  Neurology Normal                                      Endocrine:  Diabetes, poorly controlled, type 2           Dermatological:  Skin Normal    Psych:  Psychiatric Normal                    Physical Exam  General: Well nourished, Cooperative, Alert and Oriented    Airway:  Mallampati: III / II  Mouth Opening: Normal  TM Distance: Normal  Tongue: Normal  Neck ROM: Normal ROM    Dental:  Dentures, Edentulous    Chest/Lungs:  Normal Respiratory Rate    Heart:  Rate: Normal        Anesthesia Plan  Type of Anesthesia, risks & benefits discussed:    Anesthesia Type: Gen ETT  Intra-op Monitoring Plan: Standard ASA Monitors  Post Op Pain Control Plan: multimodal analgesia and IV/PO Opioids PRN  Induction:  IV  Airway Plan: Direct, Post-Induction  Informed Consent: Informed consent signed with the Patient and all parties understand the risks and agree with anesthesia plan.  All questions answered.   ASA Score: 3  Day of Surgery Review of History & Physical: H&P Update referred to the surgeon/provider.    Ready For Surgery From Anesthesia Perspective.     .           [1]   Patient Active Problem List  Diagnosis     Combined hyperlipidemia associated with type 2 diabetes mellitus    Hypertension, benign    Urinary incontinence, urge    Renal cell carcinoma    Family history of stomach cancer    GERD (gastroesophageal reflux disease)    Osteoporosis, post-menopausal    Cigarette nicotine dependence with withdrawal    Centrilobular emphysema    CKD (chronic kidney disease) stage 3, GFR 30-59 ml/min    Nuclear sclerosis of both eyes    DM type 2 without retinopathy    Essential hypertension    Refractive error    Senile nuclear sclerosis    Post-operative state    Nuclear sclerosis of right eye    Diabetes mellitus with renal manifestations, uncontrolled    History of colonic polyps    Schatzki's ring    Pseudophakia    PVD (peripheral vascular disease) with claudication    PVD (peripheral vascular disease)    Bilateral carotid artery stenosis    PCO (posterior capsular opacification), right    Pulmonary vascular congestion    Type 2 diabetes mellitus with chronic kidney disease, without long-term current use of insulin    Chest pain on breathing    Right lower lobe pulmonary nodule    Carotid artery stenosis    Aortic atherosclerosis    Smoker    Osteoarthritis of thumb    De Quervain's tenosynovitis, bilateral    Unspecified inflammatory spondylopathy, multiple sites in spine    Heart failure with preserved ejection fraction    Squamous Cell Carcinoma of right lower lobe of lung    Opioid dependence, uncomplicated    Personal history of lung cancer    Hypercalcemia    Hyperparathyroidism    Stage 3b chronic kidney disease    Type 2 diabetes mellitus with hyperglycemia, without long-term current use of insulin   [2] No current facility-administered medications for this encounter.    Current Outpatient Medications:     amLODIPine (NORVASC) 10 MG tablet, TAKE ONE TABLET BY MOUTH ONCE A DAY, Disp: 90 tablet, Rfl: 0    aspirin (ECOTRIN) 81 MG EC tablet, Take 81 mg by mouth once daily., Disp: , Rfl:     blood sugar diagnostic Strp,  "Dispense: Test strip to check glucose 2 times a day, ICD-10: E11.65, compatible with insurance/glucometer, dispense enough for 90 day supply, Disp: 300 each, Rfl: 3    blood-glucose meter kit, Dispense: 1 glucometer, use to check glucose 2 times a day, ICD-10: E11.65,  Dispense machine covered by insurance, Disp: 1 each, Rfl: 0    cyanocobalamin, vitamin B-12, 1,000 mcg TbSR, Take by mouth once daily., Disp: , Rfl:     famotidine (PEPCID) 40 MG tablet, TAKE ONE TABLET BY MOUTH ONCE A DAY, Disp: 90 tablet, Rfl: 0    furosemide (LASIX) 20 MG tablet, TAKE ONE TABLET BY MOUTH ONCE A DAY AS NEEDED, Disp: 30 tablet, Rfl: 3    irbesartan (AVAPRO) 300 MG tablet, Take 1 tablet (300 mg total) by mouth once daily., Disp: 90 tablet, Rfl: 3    lancets Misc, Dispense: Lancets use to check glucose 2 times a day, ICD-10: E11.65, dispense enough for 90 day supply, Disp: 300 each, Rfl: 3    lancing device Misc, One device, used to check blood glucose, ICD-10: E11.65, Disp: 1 each, Rfl: 0    omeprazole (PRILOSEC) 40 MG capsule, Take 1 capsule (40 mg total) by mouth once daily., Disp: 90 capsule, Rfl: 4    oxybutynin (DITROPAN-XL) 5 MG TR24, TAKE ONE TABLET BY MOUTH ONCE A DAY, Disp: 90 tablet, Rfl: 3    OZEMPIC 0.25 mg or 0.5 mg (2 mg/3 mL) pen injector, INJECT .5 MILLIGRAM UNDER THE SKIN ONCE EVERY 7 DAYS, Disp: 3 mL, Rfl: 5    pen needle, diabetic (BD INSULIN PEN NEEDLE UF SHORT) 31 gauge x 5/16" Ndle, USE AS DIRECTED, Disp: 100 each, Rfl: 12    rosuvastatin (CRESTOR) 20 MG tablet, TAKE ONE TABLET BY MOUTH ONCE A DAY, Disp: 90 tablet, Rfl: 12    traMADoL (ULTRAM) 50 mg tablet, Take 1 tablet (50 mg total) by mouth every 6 (six) hours as needed for Pain., Disp: 120 tablet, Rfl: 5    vitamin D (VITAMIN D3) 1000 units Tab, Take 1,000 Units by mouth once daily., Disp: , Rfl:   [3]   Social History  Socioeconomic History    Marital status:    Occupational History    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   Tobacco Use    Smoking status: " Every Day     Current packs/day: 0.25     Average packs/day: 0.3 packs/day for 30.0 years (7.5 ttl pk-yrs)     Types: Cigarettes    Smokeless tobacco: Never   Substance and Sexual Activity    Alcohol use: No     Alcohol/week: 0.0 standard drinks of alcohol    Drug use: No   Social History Narrative    Lives on [facility]    Here with sister Kate [phone]         Social Drivers of Health     Financial Resource Strain: Patient Unable To Answer (11/19/2024)    Overall Financial Resource Strain (CARDIA)     Difficulty of Paying Living Expenses: Patient unable to answer   Food Insecurity: Patient Unable To Answer (11/19/2024)    Hunger Vital Sign     Worried About Running Out of Food in the Last Year: Patient unable to answer     Ran Out of Food in the Last Year: Patient unable to answer   Transportation Needs: Unknown (11/19/2024)    PRAPARE - Transportation     Lack of Transportation (Medical): No     Lack of Transportation (Non-Medical): Patient unable to answer   Physical Activity: Patient Unable To Answer (11/19/2024)    Exercise Vital Sign     Days of Exercise per Week: Patient unable to answer     Minutes of Exercise per Session: Patient unable to answer   Stress: Patient Unable To Answer (11/19/2024)    [employer] - Occupational Stress Questionnaire     Feeling of Stress : Patient unable to answer   Housing Stability: Unknown (11/19/2024)    Housing Stability Vital Sign     Unable to Pay for Housing in the Last Year: No     Homeless in the Last Year: No

## 2025-02-28 ENCOUNTER — HOSPITAL ENCOUNTER (OUTPATIENT)
Facility: HOSPITAL | Age: 75
Discharge: HOME | End: 2025-02-28
Attending: INTERNAL MEDICINE | Admitting: INTERNAL MEDICINE
Payer: MEDICARE

## 2025-02-28 ENCOUNTER — ANESTHESIA (OUTPATIENT)
Dept: SURGERY | Facility: HOSPITAL | Age: 75
End: 2025-02-28
Payer: MEDICARE

## 2025-02-28 VITALS
HEART RATE: 76 BPM | SYSTOLIC BLOOD PRESSURE: 139 MMHG | TEMPERATURE: 98 F | DIASTOLIC BLOOD PRESSURE: 63 MMHG | OXYGEN SATURATION: 96 % | RESPIRATION RATE: 24 BRPM

## 2025-02-28 DIAGNOSIS — R60.0 LOWER EXTREMITY EDEMA: ICD-10-CM

## 2025-02-28 DIAGNOSIS — C64.9 RENAL CELL CARCINOMA, UNSPECIFIED LATERALITY: ICD-10-CM

## 2025-02-28 DIAGNOSIS — R91.1 SOLITARY PULMONARY NODULE: ICD-10-CM

## 2025-02-28 LAB
POCT GLUCOSE: 183 MG/DL (ref 70–110)
POCT GLUCOSE: 196 MG/DL (ref 70–110)

## 2025-02-28 PROCEDURE — 27201423 OPTIME MED/SURG SUP & DEVICES STERILE SUPPLY: Performed by: INTERNAL MEDICINE

## 2025-02-28 PROCEDURE — 88112 CYTOPATH CELL ENHANCE TECH: CPT | Performed by: PATHOLOGY

## 2025-02-28 PROCEDURE — 37000008 HC ANESTHESIA 1ST 15 MINUTES: Performed by: INTERNAL MEDICINE

## 2025-02-28 PROCEDURE — 71000015 HC POSTOP RECOV 1ST HR: Performed by: INTERNAL MEDICINE

## 2025-02-28 PROCEDURE — 36000708 HC OR TIME LEV III 1ST 15 MIN: Performed by: INTERNAL MEDICINE

## 2025-02-28 PROCEDURE — 63600175 PHARM REV CODE 636 W HCPCS

## 2025-02-28 PROCEDURE — 25000003 PHARM REV CODE 250

## 2025-02-28 PROCEDURE — 31627 NAVIGATIONAL BRONCHOSCOPY: CPT | Mod: ,,, | Performed by: INTERNAL MEDICINE

## 2025-02-28 PROCEDURE — 88173 CYTOPATH EVAL FNA REPORT: CPT | Performed by: PATHOLOGY

## 2025-02-28 PROCEDURE — 88177 CYTP FNA EVAL EA ADDL: CPT | Mod: 59 | Performed by: PATHOLOGY

## 2025-02-28 PROCEDURE — 88172 CYTP DX EVAL FNA 1ST EA SITE: CPT | Mod: 59 | Performed by: PATHOLOGY

## 2025-02-28 PROCEDURE — 31652 BRONCH EBUS SAMPLNG 1/2 NODE: CPT | Mod: ,,, | Performed by: INTERNAL MEDICINE

## 2025-02-28 PROCEDURE — 31654 BRONCH EBUS IVNTJ PERPH LES: CPT | Mod: ,,, | Performed by: INTERNAL MEDICINE

## 2025-02-28 PROCEDURE — 71000044 HC DOSC ROUTINE RECOVERY FIRST HOUR: Performed by: INTERNAL MEDICINE

## 2025-02-28 PROCEDURE — 88305 TISSUE EXAM BY PATHOLOGIST: CPT | Mod: 59 | Performed by: PATHOLOGY

## 2025-02-28 PROCEDURE — 82962 GLUCOSE BLOOD TEST: CPT | Performed by: INTERNAL MEDICINE

## 2025-02-28 PROCEDURE — 31629 BRONCHOSCOPY/NEEDLE BX EACH: CPT | Mod: 51,,, | Performed by: INTERNAL MEDICINE

## 2025-02-28 PROCEDURE — 37000009 HC ANESTHESIA EA ADD 15 MINS: Performed by: INTERNAL MEDICINE

## 2025-02-28 PROCEDURE — 31628 BRONCHOSCOPY/LUNG BX EACH: CPT | Mod: 51,,, | Performed by: INTERNAL MEDICINE

## 2025-02-28 PROCEDURE — 36000709 HC OR TIME LEV III EA ADD 15 MIN: Performed by: INTERNAL MEDICINE

## 2025-02-28 RX ORDER — SODIUM CHLORIDE 0.9 % (FLUSH) 0.9 %
10 SYRINGE (ML) INJECTION
Status: DISCONTINUED | OUTPATIENT
Start: 2025-02-28 | End: 2025-02-28 | Stop reason: HOSPADM

## 2025-02-28 RX ORDER — PROPOFOL 10 MG/ML
VIAL (ML) INTRAVENOUS
Status: DISCONTINUED | OUTPATIENT
Start: 2025-02-28 | End: 2025-02-28

## 2025-02-28 RX ORDER — ONDANSETRON HYDROCHLORIDE 2 MG/ML
INJECTION, SOLUTION INTRAVENOUS
Status: DISCONTINUED | OUTPATIENT
Start: 2025-02-28 | End: 2025-02-28

## 2025-02-28 RX ORDER — GLUCAGON 1 MG
1 KIT INJECTION
Status: DISCONTINUED | OUTPATIENT
Start: 2025-02-28 | End: 2025-02-28 | Stop reason: HOSPADM

## 2025-02-28 RX ORDER — HALOPERIDOL 5 MG/ML
0.5 INJECTION INTRAMUSCULAR EVERY 10 MIN PRN
Status: DISCONTINUED | OUTPATIENT
Start: 2025-02-28 | End: 2025-02-28 | Stop reason: HOSPADM

## 2025-02-28 RX ORDER — DEXAMETHASONE SODIUM PHOSPHATE 4 MG/ML
INJECTION, SOLUTION INTRA-ARTICULAR; INTRALESIONAL; INTRAMUSCULAR; INTRAVENOUS; SOFT TISSUE
Status: DISCONTINUED | OUTPATIENT
Start: 2025-02-28 | End: 2025-02-28

## 2025-02-28 RX ORDER — EPHEDRINE SULFATE 50 MG/ML
INJECTION, SOLUTION INTRAVENOUS
Status: DISCONTINUED | OUTPATIENT
Start: 2025-02-28 | End: 2025-02-28

## 2025-02-28 RX ORDER — ROCURONIUM BROMIDE 10 MG/ML
INJECTION, SOLUTION INTRAVENOUS
Status: DISCONTINUED | OUTPATIENT
Start: 2025-02-28 | End: 2025-02-28

## 2025-02-28 RX ORDER — FENTANYL CITRATE 50 UG/ML
INJECTION, SOLUTION INTRAMUSCULAR; INTRAVENOUS
Status: DISCONTINUED | OUTPATIENT
Start: 2025-02-28 | End: 2025-02-28

## 2025-02-28 RX ORDER — LIDOCAINE HYDROCHLORIDE 20 MG/ML
INJECTION, SOLUTION EPIDURAL; INFILTRATION; INTRACAUDAL; PERINEURAL
Status: DISCONTINUED | OUTPATIENT
Start: 2025-02-28 | End: 2025-02-28

## 2025-02-28 RX ADMIN — EPHEDRINE SULFATE 10 MG: 50 INJECTION INTRAVENOUS at 07:02

## 2025-02-28 RX ADMIN — SUGAMMADEX 200 MG: 100 INJECTION, SOLUTION INTRAVENOUS at 08:02

## 2025-02-28 RX ADMIN — PHENYLEPHRINE HYDROCHLORIDE 0.5 MCG/KG/MIN: 10 INJECTION INTRAVENOUS at 07:02

## 2025-02-28 RX ADMIN — FENTANYL CITRATE 50 MCG: 50 INJECTION INTRAMUSCULAR; INTRAVENOUS at 07:02

## 2025-02-28 RX ADMIN — DEXAMETHASONE SODIUM PHOSPHATE 4 MG: 4 INJECTION INTRA-ARTICULAR; INTRALESIONAL; INTRAMUSCULAR; INTRAVENOUS; SOFT TISSUE at 07:02

## 2025-02-28 RX ADMIN — LIDOCAINE HYDROCHLORIDE 80 MG: 20 INJECTION, SOLUTION EPIDURAL; INFILTRATION; INTRACAUDAL at 07:02

## 2025-02-28 RX ADMIN — SODIUM CHLORIDE: 0.9 INJECTION, SOLUTION INTRAVENOUS at 07:02

## 2025-02-28 RX ADMIN — PROPOFOL 150 MCG/KG/MIN: 10 INJECTION, EMULSION INTRAVENOUS at 07:02

## 2025-02-28 RX ADMIN — ONDANSETRON 4 MG: 2 INJECTION INTRAMUSCULAR; INTRAVENOUS at 08:02

## 2025-02-28 RX ADMIN — PROPOFOL 120 MG: 10 INJECTION, EMULSION INTRAVENOUS at 07:02

## 2025-02-28 RX ADMIN — EPHEDRINE SULFATE 5 MG: 50 INJECTION INTRAVENOUS at 07:02

## 2025-02-28 RX ADMIN — ROCURONIUM BROMIDE 50 MG: 10 INJECTION INTRAVENOUS at 07:02

## 2025-02-28 NOTE — PROGRESS NOTES
The patient has been examined and the H&P has been reviewed:     I concur with the findings and no changes have occurred since H&P was written.     Anesthesia/Surgery risks, benefits and alternative options discussed and understood by patient/family.              I have explained the risks, benefits and alternatives of the procedure in detail.  The patient voices understanding and all questions have been answered.  The patient agrees to proceed as planned.            Jani Pierre DO  Pulmonary Critical Care Fellow

## 2025-02-28 NOTE — INTERVAL H&P NOTE
The patient has been examined and the H&P has been reviewed:    I concur with the findings and no changes have occurred since H&P was written.    Anesthesia/Surgery risks, benefits and alternative options discussed and understood by patient/family.        I have explained the risks, benefits and alternatives of the procedure in detail.  The patient voices understanding and all questions have been answered.  The patient agrees to proceed as planned.

## 2025-02-28 NOTE — ANESTHESIA PROCEDURE NOTES
Intubation    Date/Time: 2/28/2025 7:14 AM    Performed by: Lisa Canchola MD  Authorized by: Sohan Mejia MD    Intubation:     Induction:  Intravenous    Intubated:  Postinduction    Mask Ventilation:  2 handed mask ventilation with 2 providers    Attempts:  1    Attempted By:  Resident anesthesiologist    Method of Intubation:  Video laryngoscopy    Blade:  Eugene 3    Laryngeal View Grade: Grade I - full view of cords      Difficult Airway Encountered?: No      Complications:  None    Airway Device:  Oral endotracheal tube    Airway Device Size:  8.0    Style/Cuff Inflation:  Cuffed    Inflation Amount (mL):  8    Tube secured:  23    Secured at:  The lips    Placement Verified By:  Capnometry    Complicating Factors:  None    Findings Post-Intubation:  BS equal bilateral and atraumatic/condition of teeth unchanged

## 2025-02-28 NOTE — TRANSFER OF CARE
Anesthesia Transfer of Care Note    Patient: Sarina Genao    Procedure(s) Performed: Procedure(s) (LRB):  ROBOTIC BRONCHOSCOPY (N/A)    Patient location: [facility]    Anesthesia Type: general    Transport from OR: Transported from OR on 6-10 L/min O2 by face mask with adequate spontaneous ventilation    Post pain: adequate analgesia    Post assessment: no apparent anesthetic complications    Post vital signs: stable    Level of consciousness: awake and alert    Nausea/Vomiting: no nausea/vomiting    Complications: none    Transfer of care protocol was followed      Last vitals: Visit Vitals  BP (!) 161/72 (BP Location: Left arm, Patient Position: Lying)   Pulse 70   Temp 36.6 °C (97.9 °F) (Temporal)   Resp 20   SpO2 97%   Breastfeeding No

## 2025-02-28 NOTE — DISCHARGE SUMMARY
[facility] - [facility] (2nd Fl)  Discharge Note  Short Stay    Procedure(s) (LRB):  ROBOTIC BRONCHOSCOPY (N/A)  Endobronchial Ultrasound    Robotic  RLL lung Nodule - FNA + TbbX     EBUS Station 11L and 11R    OUTCOME: Patient tolerated treatment/procedure well without complication and is now ready for discharge.    DISPOSITION: Home or Self Care    FINAL DIAGNOSIS:  Pulmonary nodule    FOLLOWUP: Will call with results.     DISCHARGE INSTRUCTIONS:  Written and verbal discharge instructions provided.     TIME SPENT ON DISCHARGE: 10 minutes    Jani Pierre DO  [employer] [facility] Fellow

## 2025-03-03 NOTE — ANESTHESIA POSTPROCEDURE EVALUATION
Anesthesia Post Evaluation    Patient: Sarina Genao    Procedure(s) Performed: Procedure(s) (LRB):  ROBOTIC BRONCHOSCOPY (N/A)    Final Anesthesia Type: general      Patient location during evaluation: PACU  Patient participation: Yes- Able to Participate  Level of consciousness: awake and alert  Post-procedure vital signs: reviewed and stable  Pain management: adequate    PONV status at discharge: No PONV  Anesthetic complications: no      Cardiovascular status: blood pressure returned to baseline  Respiratory status: unassisted  Hydration status: euvolemic  Follow-up not needed.              Vitals Value Taken Time   /63 02/28/25 09:15   Temp 36.4 °C (97.5 °F) 02/28/25 08:35   Pulse 76 02/28/25 09:15   Resp 24 02/28/25 09:15   SpO2 96 % 02/28/25 09:15         No case tracking events are documented in the log.      Pain/Graham Score: No data recorded

## 2025-03-05 LAB
ADEQUACY: NORMAL
FINAL PATHOLOGIC DIAGNOSIS: NORMAL
Lab: NORMAL

## 2025-03-10 ENCOUNTER — RESULTS FOLLOW-UP (OUTPATIENT)
Dept: HEMATOLOGY/ONCOLOGY | Facility: CLINIC | Age: 75
End: 2025-03-10

## 2025-03-16 NOTE — TELEPHONE ENCOUNTER
No care due was identified.  [facility] Embedded Care Due Messages. Reference number: [id number].   3/16/2025 10:42:24 AM CDT

## 2025-03-17 RX ORDER — AMLODIPINE BESYLATE 10 MG/1
10 TABLET ORAL
Qty: 90 TABLET | Refills: 3 | Status: SHIPPED | OUTPATIENT
Start: 2025-03-17

## 2025-03-17 NOTE — TELEPHONE ENCOUNTER
Refill Decision Note   Sarina Genao  is requesting a refill authorization.  Brief Assessment and Rationale for Refill:  Approve     Medication Therapy Plan:         Comments:     Note composed:6:55 AM 03/17/2025             Jh Allen

## 2025-03-20 ENCOUNTER — OFFICE VISIT (OUTPATIENT)
Dept: ENDOCRINOLOGY | Facility: CLINIC | Age: 75
End: 2025-03-20
Payer: MEDICARE

## 2025-03-20 ENCOUNTER — LAB VISIT (OUTPATIENT)
Dept: LAB | Facility: HOSPITAL | Age: 75
End: 2025-03-20
Attending: HOSPITALIST
Payer: MEDICARE

## 2025-03-20 ENCOUNTER — OFFICE VISIT (OUTPATIENT)
Dept: HEMATOLOGY/ONCOLOGY | Facility: CLINIC | Age: 75
End: 2025-03-20
Payer: MEDICARE

## 2025-03-20 VITALS
WEIGHT: 125 LBS | HEART RATE: 67 BPM | HEIGHT: 63 IN | RESPIRATION RATE: 18 BRPM | TEMPERATURE: 98 F | SYSTOLIC BLOOD PRESSURE: 138 MMHG | OXYGEN SATURATION: 96 % | BODY MASS INDEX: 22.15 KG/M2 | DIASTOLIC BLOOD PRESSURE: 63 MMHG

## 2025-03-20 VITALS
BODY MASS INDEX: 22.64 KG/M2 | HEART RATE: 61 BPM | SYSTOLIC BLOOD PRESSURE: 126 MMHG | WEIGHT: 127.81 LBS | DIASTOLIC BLOOD PRESSURE: 60 MMHG

## 2025-03-20 DIAGNOSIS — Z72.0 TOBACCO ABUSE: ICD-10-CM

## 2025-03-20 DIAGNOSIS — M81.0 OSTEOPOROSIS, POST-MENOPAUSAL: ICD-10-CM

## 2025-03-20 DIAGNOSIS — E11.69 COMBINED HYPERLIPIDEMIA ASSOCIATED WITH TYPE 2 DIABETES MELLITUS: ICD-10-CM

## 2025-03-20 DIAGNOSIS — N18.32 STAGE 3B CHRONIC KIDNEY DISEASE: ICD-10-CM

## 2025-03-20 DIAGNOSIS — E11.65 TYPE 2 DIABETES MELLITUS WITH HYPERGLYCEMIA, WITHOUT LONG-TERM CURRENT USE OF INSULIN: Primary | ICD-10-CM

## 2025-03-20 DIAGNOSIS — C64.1 RENAL CELL CARCINOMA OF RIGHT KIDNEY: ICD-10-CM

## 2025-03-20 DIAGNOSIS — E55.9 VITAMIN D DEFICIENCY: ICD-10-CM

## 2025-03-20 DIAGNOSIS — I10 HYPERTENSION, BENIGN: ICD-10-CM

## 2025-03-20 DIAGNOSIS — E83.52 HYPERCALCEMIA: ICD-10-CM

## 2025-03-20 DIAGNOSIS — E78.2 COMBINED HYPERLIPIDEMIA ASSOCIATED WITH TYPE 2 DIABETES MELLITUS: ICD-10-CM

## 2025-03-20 DIAGNOSIS — E03.9 HYPOTHYROIDISM: ICD-10-CM

## 2025-03-20 DIAGNOSIS — N18.32 TYPE 2 DIABETES MELLITUS WITH STAGE 3B CHRONIC KIDNEY DISEASE, WITHOUT LONG-TERM CURRENT USE OF INSULIN: ICD-10-CM

## 2025-03-20 DIAGNOSIS — E11.22 TYPE 2 DIABETES MELLITUS WITH STAGE 3B CHRONIC KIDNEY DISEASE, WITHOUT LONG-TERM CURRENT USE OF INSULIN: ICD-10-CM

## 2025-03-20 DIAGNOSIS — C64.9 RENAL CELL CARCINOMA, UNSPECIFIED LATERALITY: Primary | ICD-10-CM

## 2025-03-20 DIAGNOSIS — C64.9 RENAL CELL CARCINOMA, UNSPECIFIED LATERALITY: ICD-10-CM

## 2025-03-20 DIAGNOSIS — E11.65 TYPE 2 DIABETES MELLITUS WITH HYPERGLYCEMIA, WITHOUT LONG-TERM CURRENT USE OF INSULIN: ICD-10-CM

## 2025-03-20 LAB
25(OH)D3+25(OH)D2 SERPL-MCNC: 32 NG/ML (ref 30–96)
ALBUMIN SERPL BCP-MCNC: 3.3 G/DL (ref 3.5–5.2)
ALP SERPL-CCNC: 101 U/L (ref 40–150)
ALT SERPL W/O P-5'-P-CCNC: 5 U/L (ref 10–44)
ANION GAP SERPL CALC-SCNC: 12 MMOL/L (ref 8–16)
AST SERPL-CCNC: 11 U/L (ref 10–40)
BASOPHILS # BLD AUTO: 0.02 K/UL (ref 0–0.2)
BASOPHILS NFR BLD: 0.2 % (ref 0–1.9)
BILIRUB SERPL-MCNC: 0.7 MG/DL (ref 0.1–1)
BUN SERPL-MCNC: 19 MG/DL (ref 8–23)
CALCIUM SERPL-MCNC: 10.1 MG/DL (ref 8.7–10.5)
CHLORIDE SERPL-SCNC: 101 MMOL/L (ref 95–110)
CO2 SERPL-SCNC: 27 MMOL/L (ref 23–29)
CREAT SERPL-MCNC: 1.4 MG/DL (ref 0.5–1.4)
DIFFERENTIAL METHOD BLD: ABNORMAL
EOSINOPHIL # BLD AUTO: 0.1 K/UL (ref 0–0.5)
EOSINOPHIL NFR BLD: 0.5 % (ref 0–8)
ERYTHROCYTE [DISTWIDTH] IN BLOOD BY AUTOMATED COUNT: 14.3 % (ref 11.5–14.5)
EST. GFR  (NO RACE VARIABLE): 39 ML/MIN/1.73 M^2
ESTIMATED AVG GLUCOSE: 160 MG/DL (ref 68–131)
GLUCOSE SERPL-MCNC: 93 MG/DL (ref 70–110)
HBA1C MFR BLD: 7.2 % (ref 4–5.6)
HCT VFR BLD AUTO: 45.7 % (ref 37–48.5)
HGB BLD-MCNC: 14.6 G/DL (ref 12–16)
IMM GRANULOCYTES # BLD AUTO: 0.13 K/UL (ref 0–0.04)
IMM GRANULOCYTES NFR BLD AUTO: 1 % (ref 0–0.5)
LYMPHOCYTES # BLD AUTO: 1.3 K/UL (ref 1–4.8)
LYMPHOCYTES NFR BLD: 9.9 % (ref 18–48)
MCH RBC QN AUTO: 27 PG (ref 27–31)
MCHC RBC AUTO-ENTMCNC: 31.9 G/DL (ref 32–36)
MCV RBC AUTO: 85 FL (ref 82–98)
MONOCYTES # BLD AUTO: 1 K/UL (ref 0.3–1)
MONOCYTES NFR BLD: 7.4 % (ref 4–15)
NEUTROPHILS # BLD AUTO: 10.8 K/UL (ref 1.8–7.7)
NEUTROPHILS NFR BLD: 81 % (ref 38–73)
NRBC BLD-RTO: 0 /100 WBC
PLATELET # BLD AUTO: 418 K/UL (ref 150–450)
PMV BLD AUTO: 8.9 FL (ref 9.2–12.9)
POTASSIUM SERPL-SCNC: 3.1 MMOL/L (ref 3.5–5.1)
PROT SERPL-MCNC: 7.3 G/DL (ref 6–8.4)
RBC # BLD AUTO: 5.4 M/UL (ref 4–5.4)
SODIUM SERPL-SCNC: 140 MMOL/L (ref 136–145)
WBC # BLD AUTO: 13.28 K/UL (ref 3.9–12.7)

## 2025-03-20 PROCEDURE — 1126F AMNT PAIN NOTED NONE PRSNT: CPT | Mod: CPTII,S$GLB,, | Performed by: HOSPITALIST

## 2025-03-20 PROCEDURE — 3078F DIAST BP <80 MM HG: CPT | Mod: CPTII,S$GLB,, | Performed by: HOSPITALIST

## 2025-03-20 PROCEDURE — 1101F PT FALLS ASSESS-DOCD LE1/YR: CPT | Mod: CPTII,S$GLB,, | Performed by: HOSPITALIST

## 2025-03-20 PROCEDURE — 1159F MED LIST DOCD IN RCRD: CPT | Mod: CPTII,S$GLB,, | Performed by: HOSPITALIST

## 2025-03-20 PROCEDURE — 1160F RVW MEDS BY RX/DR IN RCRD: CPT | Mod: CPTII,S$GLB,, | Performed by: HOSPITALIST

## 2025-03-20 PROCEDURE — 83036 HEMOGLOBIN GLYCOSYLATED A1C: CPT | Performed by: HOSPITALIST

## 2025-03-20 PROCEDURE — 85025 COMPLETE CBC W/AUTO DIFF WBC: CPT | Performed by: HOSPITALIST

## 2025-03-20 PROCEDURE — G2211 COMPLEX E/M VISIT ADD ON: HCPCS | Mod: S$GLB,,, | Performed by: HOSPITALIST

## 2025-03-20 PROCEDURE — 3074F SYST BP LT 130 MM HG: CPT | Mod: CPTII,S$GLB,, | Performed by: HOSPITALIST

## 2025-03-20 PROCEDURE — 3288F FALL RISK ASSESSMENT DOCD: CPT | Mod: CPTII,S$GLB,, | Performed by: HOSPITALIST

## 2025-03-20 PROCEDURE — 36415 COLL VENOUS BLD VENIPUNCTURE: CPT | Performed by: HOSPITALIST

## 2025-03-20 PROCEDURE — 80053 COMPREHEN METABOLIC PANEL: CPT | Performed by: HOSPITALIST

## 2025-03-20 PROCEDURE — 82306 VITAMIN D 25 HYDROXY: CPT | Performed by: HOSPITALIST

## 2025-03-20 PROCEDURE — 3075F SYST BP GE 130 - 139MM HG: CPT | Mod: CPTII,S$GLB,, | Performed by: HOSPITALIST

## 2025-03-20 PROCEDURE — 99999 PR PBB SHADOW E&M-EST. PATIENT-LVL V: CPT | Mod: PBBFAC,,, | Performed by: HOSPITALIST

## 2025-03-20 PROCEDURE — 99215 OFFICE O/P EST HI 40 MIN: CPT | Mod: S$GLB,,, | Performed by: HOSPITALIST

## 2025-03-20 PROCEDURE — 99999 PR PBB SHADOW E&M-EST. PATIENT-LVL IV: CPT | Mod: PBBFAC,,, | Performed by: HOSPITALIST

## 2025-03-20 PROCEDURE — 99214 OFFICE O/P EST MOD 30 MIN: CPT | Mod: S$GLB,,, | Performed by: HOSPITALIST

## 2025-03-20 RX ORDER — DM/P-EPHED/ACETAMINOPH/DOXYLAM 30-7.5/3
2 LIQUID (ML) ORAL
Qty: 60 LOZENGE | Refills: 5 | Status: SHIPPED | OUTPATIENT
Start: 2025-03-20

## 2025-03-20 NOTE — PATIENT INSTRUCTIONS
Discussed treatment options for your renal cell carcinoma. Will plan on radiation therapy to the lung nodule. Discussed possible surgery to the abdominal wall tumor. However given size and current smoking there is concern healing would be a signficant issue. Recommend starting radiation the lung. Will also start medical treatment for the kidney cancer with cabozantinib + nivolumab. This will hopefully shrink the abdominal tumor and prevent further spread. Then could reconsider surgery in a few months if you stop smoking.       - FU in 3-4 weeks to start cabozantinib + nivolumab  - Repeat CT scan prior to treatment

## 2025-03-20 NOTE — ASSESSMENT & PLAN NOTE
- Diabetes is at goal, given current A1c, goal A1C for patient is 7%  - Diabetic supplies/medications: refilled this visit  - Continue reinforcement dietary modification, portion size control, decreasing carbohydrates intake  - will stop Ozempic given poor appetite and ongoing weight  - start patient on low-dose Januvia 25 mg daily, renally dose  - routine follow up with endocrine for diabetes monitor

## 2025-03-20 NOTE — PROGRESS NOTES
MEDICAL ONCOLOGY - NEW PATIENT VISIT    Best Contact Phone Number(s): [phone] (home)      Cancer/Stage/TNM:    Cancer Staging   Squamous Cell Carcinoma of right lower lobe of lung  Staging form: Lung, AJCC 8th Edition  - Clinical stage from 1/11/2023: Stage IA2 (cT1b, cN0, cM0) - Signed by Xavier Deng MD on 1/11/2023       Reason for visit: Metachronous recurrence of       Treatment:  08/2013    Partial nephrectomy      HPI: Sarina Genao is a 74 y.o. 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 She has remote history of grade 2 ccRCC sp partial nephrectomy 08/2013.  Subsquently underwent radiation therapy to an early stage squamous cell lung cancer 01/2023. Around this time, was noted to have abdominal wall subcutaneous nodule. . FDG PET 11/2024 showed progressive 3.9cm anterior abdominal wall mass; biopsy 01/2025 consistetn with metatsatic ccRCC. There was also a 2.3 cm left pulmonary nodule of concern and an additional RITA nodule. She presents to medical oncology clinic for further treatment planning.    History has been obtained by chart review and discussion with the patient.    Interval Events:    Plan for RT to right lung nodule despite negative biopsy - felt to be clinically consistent with metastatic disease. Surgery to abdominal mass will entail signficant abdominal wall defect requiring repair and potential for poor wound healing given her ongoing smoking.     Had flu a few weeks ago. Has since resolved. Otherwise in her typical state of health. Denies pain from the abdominal wall nodule.    Plan:  Patient to undergo SBRT to presumed right lung metastatic nodule. Have also considered resection of oligomet to the abdominal wall to attempt to render M0, however she is high risk for surgical complication. Currently favor initiating treatment with cabo/nivo to obtain disease  control and potential downsize tumor. May continue optimization for a potential conolidative surgical resection in 3-6 months if she improves her modifiable risk factors.    -- SBRT as planned  -- Repeat CT torso for staging  -- Trial cabo/nivo; FU 3-4 weeks for education and consent and treatment start  -- FU with me 1 month after starting cabo  -- Reconsider surgical consolidation in 3-6 months        Follow up:   Route Chart for Scheduling    Med Onc Chart Routing      Follow up with physician 4 weeks. for scan review and C1D1 nivo   Follow up with [facility] 3 weeks. cabo/nivo consent and education   Infusion scheduling note New or changed treatment   Nivo 4 weeks   Injection scheduling note    Labs CBC, CMP, TSH and other   Scheduling:  Preferred lab:  Lab interval:  urine protein   Imaging    Pharmacy appointment    Other referrals                  Treatment Plan Information   NIVOLUMAB 480MG Q4W + CABOZANTINIB 40MG QD Paxton Santiago MD   Associated diagnosis: Renal cell carcinoma   noted on 8/12/2013   Line of treatment: First Line  Treatment Goal: Palliative     Upcoming Treatment Dates - NIVOLUMAB 480MG Q4W + CABOZANTINIB 40MG QD    4/10/2025       Take Home Chemotherapy       cabozantinib (CABOMETYX) 40 mg Tab  4/11/2025       Chemotherapy       nivolumab 480 mg in 0.9% NaCl 98 mL infusion  5/9/2025       Chemotherapy       nivolumab 480 mg in 0.9% NaCl 98 mL infusion  6/6/2025       Chemotherapy       nivolumab 480 mg in 0.9% NaCl 98 mL infusion    Therapy Plan Information  DENOSUMAB (PROLIA) Q6M for Osteoporosis, post-menopausal, noted on 3/17/2014  Medications  denosumab (PROLIA) injection 60 mg  60 mg, Subcutaneous, Every 26 weeks      No therapy plan of the specified type found.    No therapy plan of the specified type found.      The above information has been reviewed with the patient and all questions have been answered to their apparent satisfaction.  They understand that they can call the clinic with  any questions.    Paxton Santiago MD  Hematology/Oncology  [facility]

## 2025-03-20 NOTE — PROGRESS NOTES
Subjective:      Patient ID: Sarina Genao is a 74 y.o. female presented to [facility] on 3/20/2025.  Chief Complaint:  Osteoporosis    History of Present Illness: Sarina Genao is a 74 y.o. female here for management of Multiple endocrine issues  Other significant past medical history:  Type 2 diabetes, osteoporosis, hypercalcemia Hypertension, hyperlipidemia, totab use    Recent diagnosed:  Metastatic ccRCC 01/2025 in the setting of incidental finding of an anterior abdominal wall mass during surveillance of prior lung cancer. She has remote history of grade 2 ccRCC sp partial nephrectomy 08/2013. Subsquently underwent radiation therapy to an early stage squamous cell lung cancer 01/2023. Around this time, was noted to have abdominal wall subcutaneous nodule. FDG PET 11/2024 showed progressive 3.9cm anterior abdominal wall mass; biopsy 01/2025 consistent  with metatsatic ccRCC.   Plan for Chemo in 4/2025      Interval hx: Here for follow up for diabetes and osteoporosis  Chronic osteoporosis, on [place] prolia every 6 months, doing well, next injection 5/2025  Plan to get chemotherapy for cancer treatment as above  Currently no recent falls or fracture.  On calcium and vitamin-D  Recent falls? No, Recent fractures? No     Type 2 diabetes: A1C is 7.2%,  not using Ozempic due to chronic poor appetite and weight loss  Not checking glucose regularly  Current in clinic weight: 127 lb, previous in clinic weight: 135, 132, 139, 138      1) Osteoporosis  - Diagnosis on DXA in 2018  - Prolia:  Initially started on 9/17/2018 - until 4/29/2020 (4 injections),>> not in our system  - Currently PROLIA injection >>> started 5/14/2021- every 6 months- to current (last injection 5/13/2024)> 12 injections  - Bone density is improving 2/2018> compare to 6/5/2020>> 6/5/2022>> 6/10/2024   - DXA: 6/10/2024 bone density review, when compared to 2022, 2% improvement in total hip, overall continue improvement  while on Prolia  - DISCUSS WITH PATIENT 9/20/2024: SHE PREFERS TO CONTINUE PROLIA  - Newly diagnosed of renal cell carcinoma 01/2025    - Current diarrhea or h/o malabsorption? no  - Height loss (>2 inches)? no  - Family hx of Osteoporosis: mother, possibly sister  - History of Cancer? Kindey cancer, nephrectomy, no issue  - Malignancy involving bone (active or history)? no  - Prior radiation treatment? no  - Post-menopausal? Age?: 50s  - No hysterectomy  - Tobacco use ?  Yes, just restarted due to covid stress  - EtOH use? no (<2 drinks a day in female, <3 drinks a day for male)  - Weight bearing exercise?   Yes, walking 30 mins, 3x  - Fall Precautions? yes, get out the chair without using their arms  - Dental work planned? No, dentures    Bone density:  Review  6/18/2024  Lumbar spine (L1-L4): T-score is -1.6, and Z-score is +0.1.  Compared with previous DXA, BMD at the lumbar spine has remained stable.  Femoral neck: T-score is -2.2, and Z-score is -0.8.  Total hip: T-score is -2.2, and Z-score is -1.0.  Compared with previous DXA, BMD at the total hip has remained stable.  Distal 1/3 radius: T-score is -2.0, and Z-score is +0.6.  Compared with previous DXA, BMD at the distal 1/3 radius has remained stable.     Fracture Risk (FRAX)  6.8% risk of a major osteoporotic fracture in the next 10 years.  2.8% risk of hip fracture in the next 10 years.    Impression:  *Osteoporosis on treatment with Prolia  *Tobacco smoking is associated with bone loss and increased risk of fracture.    6/9/2022  Lumbar spine (L1-L4): BMD is 0.876 g/cm2, T-score is -1.6, and Z-score is 0.1.  Total hip: BMD is 0.661 g/cm2, T-score is -2.3, and Z-score is -1.1.  Femoral neck: BMD is 0.635 g/cm2, T-score is -1.9, and Z-score is -0.7.  Distal 1/3 radius: BMD is 0.552 g/cm2, T-score is -2.4, and Z-score is 0.1.     FRAX:  6% risk of a major osteoporotic fracture in the next 10 years.  2.1% risk of hip fracture in the next 10  years.    Impression:  *Osteoporosis on treatment with denosumab.  Bone density in osteopenic range approaching osteoporosis at the distal 1/3 forearm with FRAX not suggesting treatment.  *Compared with previous DXA, BMD at the distal forearm has decreased by -3.8%,  BMD at the lumbar spine has remained stable, and the BMD at the total hip has increased by 7.9%.    Lab work reviewed  Lab Results   Component Value Date    PTH 53.9 10/31/2023    .5 (H) 06/20/2023    PTH 35.6 04/06/2023    PTH 35.6 04/06/2023    PTH 35.6 04/06/2023    DGUGCBND30KB 32 03/20/2025    ZNOPEMVB20XA 33 09/13/2024    KIXJTKDP52UL 48 04/06/2023    DMWQIYNM71PC 48 04/06/2023    CALCIUM 10.1 03/20/2025    CALCIUM 9.5 02/03/2025    CALCIUM 9.8 01/15/2025    PHOS 3.0 09/13/2024    PHOS 3.6 05/13/2024    PHOS 3.1 10/31/2023    ALKPHOS 101 03/20/2025    ALKPHOS 123 02/03/2025    ALKPHOS 112 01/15/2025    EGFRNORACEVR 39.0 (A) 03/20/2025    ALBUMIN 3.3 (L) 03/20/2025     Lab Results   Component Value Date    TSH 1.899 02/03/2025    LABCALC 6.9 06/24/2016    NJQFMDV02WKU 3 (L) 06/24/2016        2) Diabetes Mellitus Type 2  - Known diabetic complications: nephropathy  - Cardiovascular risk factors: advanced age (older than 55 for men, 65 for women), diabetes mellitus, dyslipidemia, sedentary lifestyle and smoking/ tobacco exposure  - Diagnosed w/ DM: in 2007  - Need to see Dr De La Torre for toe nail trim soon  - Current weight: 132 previous weight 139<138<145  - Attempted to get Freestyle Jan 11/2023, unable due to cost and lack of insurance coverage as patient is not on insulin  - Patient did not like weight loss, has been skipping Ozempic injections regularly 9/20/2024 and currently 3/20/2025    Currently:    Ozempic 0.5 mg Qweekly  Previous meds:              Was on MDI insulin   Glipizide   Home glucose checks: checks 1x a day, Logs reviewed/Unavailable: oral recall: 112  Weight trend: stable  Diabetes Education:  no  Diabetes Related  "Hospitalization: no  Hx of pancreatitis, hx of thyroid cancer: no  Family history of diabetes: yes       Eye exam current (within one year): yes, DR: no  Reports cuts or ulcers on feet: no, has podiatrist  Statin: Taking, ACE/ARB: Taking    Diabetes lab work  Lab Results   Component Value Date    HGBA1C 7.2 (H) 03/20/2025    HGBA1C 7.2 (H) 09/13/2024    HGBA1C 6.8 (H) 05/13/2024    HGBA1C 6.1 (H) 10/31/2023     No results found for: "CPEPTIDE", "GLUTAMICACID", "ISLETCELLANT"   No results found for: "FRUCTOSAMINE"  Lab Results   Component Value Date    MICALBCREAT 381.0 (H) 05/13/2024     No results found for: "JDTQFMFC45"    Diabetes Management Status: Reviewed this office visit  Screening or Prevention Patient's value Goal Complete/Controlled?   Lipid profile Most Recent Lipid Panel Health Maintenance Topic Completion: Not Found Annually No     Dilated retinal exam : 10/31/2023 Annually No     Foot exam   Most Recent Foot Exam Date: Not Found Annually No        Reviewed past surgical, medical, family, social history and updated as appropriate.  Review of Systems: see HPI above    Objective:   /60   Pulse 61   Wt 58 kg (127 lb 12.8 oz)   BMI 22.64 kg/m²     Body mass index is 22.64 kg/m².  Vital signs reviewed    Physical Exam  Vitals and nursing note reviewed.   Constitutional:       General: She is not in acute distress.     Appearance: Normal appearance. She is well-developed.   Neck:      Thyroid: No thyromegaly.   Cardiovascular:      Rate and Rhythm: Normal rate.      Heart sounds: Normal heart sounds.   Pulmonary:      Effort: Pulmonary effort is normal. No respiratory distress.   Abdominal:      Tenderness: There is no abdominal tenderness.   Musculoskeletal:         General: Normal range of motion.      Cervical back: Neck supple.   Skin:     General: Skin is warm.      Findings: No erythema.   Neurological:      Mental Status: She is alert and oriented to person, place, and time.     Lab Reviewed: "  See results in subjective  Lab Results   Component Value Date    HGBA1C 7.2 (H) 03/20/2025     Lab Results   Component Value Date    CHOL 139 02/13/2021    HDL 41 02/13/2021    LDLCALC 66.2 02/13/2021    TRIG 159 (H) 02/13/2021    CHOLHDL 29.5 02/13/2021     Lab Results   Component Value Date     03/20/2025    K 3.1 (L) 03/20/2025     03/20/2025    CO2 27 03/20/2025    GLU 93 03/20/2025    BUN 19 03/20/2025    CREATININE 1.4 03/20/2025    CALCIUM 10.1 03/20/2025    PHOS 3.0 09/13/2024    PROT 7.3 03/20/2025    ALBUMIN 3.3 (L) 03/20/2025    BILITOT 0.7 03/20/2025    ALKPHOS 101 03/20/2025    AST 11 03/20/2025    ALT 5 (L) 03/20/2025    ANIONGAP 12 03/20/2025    EGFRNORACEVR 39.0 (A) 03/20/2025    TSH 1.899 02/03/2025    PTH 53.9 10/31/2023    DNVMBKIE31XB 32 03/20/2025     Assessment     1. Type 2 diabetes mellitus with hyperglycemia, without long-term current use of insulin  SITagliptin phosphate (JANUVIA) 25 MG Tab    Hemoglobin A1C    RENAL FUNCTION PANEL      2. Osteoporosis, post-menopausal  RENAL FUNCTION PANEL      3. Type 2 diabetes mellitus with stage 3b chronic kidney disease, without long-term current use of insulin        4. Stage 3b chronic kidney disease        5. Combined hyperlipidemia associated with type 2 diabetes mellitus        6. Hypercalcemia        7. Hypertension, benign        8. Renal cell carcinoma of right kidney          Plan     Osteoporosis, post-menopausal  - Management of long-term osteoporosis, has been on chronic Prolia possibly since 2018 to 2020, restarted 2021 to current  - Bone density is improving 2/2018> compare to 6/5/2020>> 6/5/2022>> 6/10/2024   - Bone density:  Review 6/18/2024 with improvement in bone density in femoral neck 2%  - Does have history of falls, no history of fracture  - CKD stage IIIB  - Recent diagnosed:  Metastatic ccRCC 01/2025 Plan for Chemo in 4/2025    Plan  - DISCUSS WITH PATIENT 9/20/2024: SHE PREFERS TO CONTINUE PROLIA, did offered to  switch to IV Reclast  - Continue Prolia injection every 6 months>12 injections so far  - Recommend the patient take vitamin D3 OTC, monitor for hypercalcemia  - Check routine lab work: check Renal Panel, vitamin-D regularly   - Fall precautions/Exercise regimen: advised, (weight bearing exercises recommended)  - Routine dental health screening advised, dental cleaning every 6 months.   - Next DXA: 2 years from most current, 6/2026  - Routine follow-up with me every 6 months    Type 2 diabetes mellitus with chronic kidney disease, without long-term current use of insulin  - start patient on low-dose Januvia 25 mg daily, renally dose  - monitor renal function    Type 2 diabetes mellitus with hyperglycemia, without long-term current use of insulin  - Diabetes is at goal, given current A1c, goal A1C for patient is 7%  - Diabetic supplies/medications: refilled this visit  - Continue reinforcement dietary modification, portion size control, decreasing carbohydrates intake  - will stop Ozempic given poor appetite and ongoing weight  - start patient on low-dose Januvia 25 mg daily, renally dose  - routine follow up with endocrine for diabetes monitor    CKD (chronic kidney disease) stage 3, GFR 30-59 ml/min  - Renal function reviewed on lab work today, stable   - continue routine monitoring    Combined hyperlipidemia associated with type 2 diabetes mellitus  - ASCVD Risk below: Statin: Taking  The ASCVD Risk score (Razia DK, et al., 2019) failed to calculate for the following reasons:    Cannot find a previous HDL lab    Cannot find a previous total cholesterol lab     Hypercalcemia  - patient also has history of hypercalcemia, due to hydrochlorothiazide use, this was stopped 07/2024  - since cessation of hydrochlorothiazide, calcium now within normal range   - avoid the use of hydrochlorothiazide/Chlorthalidone as this can lead to hypercalcemia  - will continue monitoring situation    Hypertension, benign  - avoid the use  of hydrochlorothiazide/Chlorthalidone    Renal cell carcinoma  - follow up with Heme-Onc  - possible chemotherapy    Advised patient to follow up with PCP for routine health maintenance care.   RTC in 5-6 months    Visit today included increased complexity associated with the care of the episodic problem addressed and managing the longitudinal care of the patient due to the serious and/or complex managed problem(s).   Including: Type 2 diabetes, osteoporosis, CKD stage IIIB, hypercalcemia, hypertension, cancer   hypertension, hyperlipidemia.      Naveed Patel M.D  Endocrinology  [facility]  3/20/2025      Disclaimer: This note has been generated using voice-recognition software. There may be typographical errors that have been missed during proof-reading.

## 2025-03-20 NOTE — ASSESSMENT & PLAN NOTE
- Management of long-term osteoporosis, has been on chronic Prolia possibly since 2018 to 2020, restarted 2021 to current  - Bone density is improving 2/2018> compare to 6/5/2020>> 6/5/2022>> 6/10/2024   - Bone density:  Review 6/18/2024 with improvement in bone density in femoral neck 2%  - Does have history of falls, no history of fracture  - CKD stage IIIB  - Recent diagnosed:  Metastatic ccRCC 01/2025 Plan for Chemo in 4/2025    Plan  - DISCUSS WITH PATIENT 9/20/2024: SHE PREFERS TO CONTINUE PROLIA, did offered to switch to IV Reclast  - Continue Prolia injection every 6 months>12 injections so far  - Recommend the patient take vitamin D3 OTC, monitor for hypercalcemia  - Check routine lab work: check Renal Panel, vitamin-D regularly   - Fall precautions/Exercise regimen: advised, (weight bearing exercises recommended)  - Routine dental health screening advised, dental cleaning every 6 months.   - Next DXA: 2 years from most current, 6/2026  - Routine follow-up with me every 6 months

## 2025-03-20 NOTE — ASSESSMENT & PLAN NOTE
Plan for RT to right lung nodule despite negative biopsy - felt to be clinically consistent with metastatic disease. Surgery to abdominal mass will entail signficant abdominal wall defect requiring repair and potential for poor wound healing given her ongoing smoking.  - Considering cabo  - Follow up with RT next week  - Follow up with surgical oncology  - Encouraged smoking cessation  - f/u

## 2025-03-20 NOTE — ASSESSMENT & PLAN NOTE
- patient also has history of hypercalcemia, due to hydrochlorothiazide use, this was stopped 07/2024  - since cessation of hydrochlorothiazide, calcium now within normal range   - avoid the use of hydrochlorothiazide/Chlorthalidone as this can lead to hypercalcemia  - will continue monitoring situation

## 2025-03-20 NOTE — ASSESSMENT & PLAN NOTE
- ASCVD Risk below: Statin: Taking  The ASCVD Risk score (Razia MAGUIRE, et al., 2019) failed to calculate for the following reasons:    Cannot find a previous HDL lab    Cannot find a previous total cholesterol lab

## 2025-03-27 ENCOUNTER — HOSPITAL ENCOUNTER (OUTPATIENT)
Dept: RADIATION THERAPY | Facility: HOSPITAL | Age: 75
Discharge: HOME | End: 2025-03-27
Attending: INTERNAL MEDICINE
Payer: MEDICARE

## 2025-03-27 ENCOUNTER — OFFICE VISIT (OUTPATIENT)
Dept: RADIATION ONCOLOGY | Facility: CLINIC | Age: 75
End: 2025-03-27
Payer: MEDICARE

## 2025-03-27 VITALS
HEART RATE: 63 BPM | SYSTOLIC BLOOD PRESSURE: 196 MMHG | TEMPERATURE: 98 F | BODY MASS INDEX: 23.38 KG/M2 | WEIGHT: 131.94 LBS | HEIGHT: 63 IN | DIASTOLIC BLOOD PRESSURE: 82 MMHG | OXYGEN SATURATION: 97 %

## 2025-03-27 DIAGNOSIS — C79.89 SECONDARY MALIGNANT NEOPLASM OF SOFT TISSUES OF ABDOMEN: ICD-10-CM

## 2025-03-27 DIAGNOSIS — C78.01 SECONDARY MALIGNANT NEOPLASM OF RIGHT LUNG: Primary | ICD-10-CM

## 2025-03-27 PROCEDURE — 99999 PR PBB SHADOW E&M-EST. PATIENT-LVL IV: CPT | Mod: PBBFAC,,, | Performed by: RADIOLOGY

## 2025-03-27 NOTE — PROGRESS NOTES
3/27/2025    Radiation Oncology Follow-Up Visit      Prior Radiation History:    Site  Technique  Energy  Dose/Fx (Gy)  #Fx  Total Dose (Gy)  Start Date  End Date  Elapsed Days    RLL Lung  SBRT  6X  12.5  4 / 4  50  1/26/2023  2/3/2023  8        Is the patient female between ages 15-55:  No    Does the patient have a CIED:  No      Assessment   This is a 74 y.o. female with h/o Stage IA2 (cT1b cN0 M0) RLL NSCLC (squam) diagnosed in robotic bronch w/ EBUS 10/25/22. She had a known 2.3 cm LLL nodule since 2018 that was biopsied in 12/2018 and 10/2022 with results negative for malignancy. RLL primary was planned for surgical resection but aborted due to dense adhesions and poor pulmonary tolerance during procedure. This was treated with definitive SBRT 50 Gy in 4 fx completed 2/3/23.   History also significant for early stage RCC s/p partial nephrectomy in 8/2013. Biopsy of growing 4 cm anterior abdominal wall mass 1/6/25 revealed metastatic RCC. She has a separate slowly growing 1.9 cm RLL nodule that is c/w metastasis. She returns for consideration of treatment options.     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 I have discussed her case with Dr. Santiago in Medical Oncology and Dr. Mann in surgical oncology. I recommend addressing the lung metastasis with SBRT since it carries a low risk of morbidity. She will then started systemic therapy with goal to decrease size of abdominal wall metastasis to reduce risk of potential morbidity following eventual resection.     I recommend treating the RLL metastasis 54 Gy in 3 fx with SBRT. Potential side effects of treatment including pneumonitis and fatigue were reviewed. At the end of our discussion, she was in agreement with proceeding with the recommended treatment.            Plan   1) CT Simulation today for SBRT lung treatment planning            CHIEF COMPLAINT: F/U after lung SBRT    HPI/Focused ROS: Since her last  visit with me, she underwent PET/CT 11/22/24 that demonstrated a 1.3 cm RLL nodule (not previously treated) and a 3.9 cm Right anterior abdominal wall mass (SUV 2.7) that was previously 2.3 cm. This was biopsied 1/6/25 and revealed metastatic RCC. Bone Scan 1/31/25 without evidence of osteoblastic disease. She underwent bronch w/ EBUS w/ Dr. Ramires 2/28/25 with biopsy of RLL nodule non-diagnostic and stations 11R/L negative for malignancy. She returns for consideration of presumed oligometastatic RCC to lung/abdominal wall.     Today she reports feeling well and denies dyspnea or chest pain.     Past Medical History:   Diagnosis Date    Anticoagulant long-term use     Cancer     Kidney (Right)    Cataracts, bilateral     CKD (chronic kidney disease) stage 3, GFR 30-59 ml/min 12/15/2014    Colon polyps     Combined hyperlipidemia associated with type 2 diabetes mellitus 6/7/2013    COPD (chronic obstructive pulmonary disease) 12/15/2014    Diabetes mellitus type II     NIDDM, a.m. glucose-120s-130s-last A1c-7.1-6/7/13    Diabetes mellitus with renal manifestations, uncontrolled 2/1/2017    Diabetic retinopathy     Family history of stomach cancer 12/5/2013    Former smoker     H/O renal cell carcinoma 12/15/2015    History of colonic polyps 2/1/2017    3 polyps 7/13 ---5 yrs    History of gastroesophageal reflux (GERD)     History of urinary incontinence     s/p bladder lift-october, 2011 (at [facility])    Hyperlipidemia     Hypertension     120s/70s-80s    Nephrolithiasis     Osteoarthritis of thumb 12/20/2019    Osteoporosis, post-menopausal 3/17/2014    Primary hyperparathyroidism 10/20/2016    Schatzki's ring 2/1/2017    Dilated 2014       Past Surgical History:   Procedure Laterality Date    bladder lift  2011    done at [facility]    BLADDER STONE REMOVAL  2011    before bladder lift    CATARACT EXTRACTION W/  INTRAOCULAR LENS IMPLANT Left 06/07/2016    Dr. Vásquez    CATARACT EXTRACTION W/  INTRAOCULAR LENS IMPLANT  Right 06/21/2016    Dr. Vásquez    COLONOSCOPY N/A 4/26/2018    Procedure: COLONOSCOPY;  Surgeon: Benjamin Zamora MD;  Location: [facility] ENDO;  Service: Endoscopy;  Laterality: N/A;  confirmed ss    COLONOSCOPY N/A 12/21/2023    Procedure: COLONOSCOPY;  Surgeon: Jamel Luis MD;  Location: [facility] ENDO;  Service: Endoscopy;  Laterality: N/A;  9/7 ref Venancio Mayer MD, Suprep, instr. mailed, [facility]  12/14- instr reviewed, pt reminded to hold ozempic, pre call complete.  DBM    CYSTOSCOPY      INJECTION OF ANESTHETIC AGENT AROUND MULTIPLE INTERCOSTAL NERVES  12/27/2022    Procedure: BLOCK, NERVE, INTERCOSTAL, 2 OR MORE;  Surgeon: Paxton Cannon MD;  Location: [facility] OR 2ND FLR;  Service: Thoracic;;    LYSIS OF ADHESIONS  12/27/2022    Procedure: LYSIS, ADHESIONS;  Surgeon: Paxton Cannon MD;  Location: [facility] OR 2ND FLR;  Service: Thoracic;;    NAVIGATIONAL BRONCHOSCOPY N/A 12/11/2018    Procedure: BRONCHOSCOPY, NAVIGATIONAL;  Surgeon: Ashtyn Ramires MD;  Location: [facility] OR 2ND FLR;  Service: Pulmonary;  Laterality: N/A;    NEPHRECTOMY      partial right    ROBOTIC BRONCHOSCOPY N/A 10/25/2022    Procedure: ROBOTIC BRONCHOSCOPY;  Surgeon: Ashtyn Ramires MD;  Location: [facility] OR 2ND FLR;  Service: Pulmonary;  Laterality: N/A;    ROBOTIC BRONCHOSCOPY N/A 2/28/2025    Procedure: ROBOTIC BRONCHOSCOPY;  Surgeon: Ashtyn Ramires MD;  Location: [facility] OR 2ND FLR;  Service: Pulmonary;  Laterality: N/A;    VIDEO-ASSISTED THORACOSCOPIC SURGERY (VATS)  12/27/2022    Procedure: VATS (VIDEO-ASSISTED THORACOSCOPIC SURGERY);  Surgeon: Paxton Cannon MD;  Location: [facility] OR 2ND FLR;  Service: Thoracic;;       Social History     Tobacco Use    Smoking status: Every Day     Current packs/day: 0.25     Average packs/day: 0.3 packs/day for 30.0 years (7.5 ttl pk-yrs)     Types: Cigarettes    Smokeless tobacco: Never   Substance Use Topics    Alcohol use: No     Alcohol/week: 0.0 standard drinks of alcohol    Drug use: No        Cancer-related family history is negative for Kidney cancer and Cancer.    Current Outpatient Medications on File Prior to Visit   Medication Sig Dispense Refill    amLODIPine (NORVASC) 10 MG tablet TAKE ONE TABLET BY MOUTH ONCE A DAY 90 tablet 3    aspirin (ECOTRIN) 81 MG EC tablet Take 81 mg by mouth once daily.      blood sugar diagnostic Strp Dispense: Test strip to check glucose 2 times a day, ICD-10: E11.65, compatible with insurance/glucometer, dispense enough for 90 day supply 300 each 3    blood-glucose meter kit Dispense: 1 glucometer, use to check glucose 2 times a day, ICD-10: E11.65,  Dispense machine covered by insurance 1 each 0    [START ON 4/10/2025] cabozantinib (CABOMETYX) 40 mg Tab Take 40 mg by mouth once daily Take on an empty stomach.  Fasting is required from 2 hrs before through 1 hr after each dose.  Do not crush or chew. Avoid grapefruit or grapefruit juice which may increase exposure of cabozantinib. Avoid Сергей's wort which may decrease exposure of cabozantinib. 30 tablet 5    cyanocobalamin, vitamin B-12, 1,000 mcg TbSR Take by mouth once daily.      famotidine (PEPCID) 40 MG tablet TAKE ONE TABLET BY MOUTH ONCE A DAY 90 tablet 0    furosemide (LASIX) 20 MG tablet TAKE ONE TABLET BY MOUTH ONCE A DAY AS NEEDED 30 tablet 3    irbesartan (AVAPRO) 300 MG tablet Take 1 tablet (300 mg total) by mouth once daily. 90 tablet 3    lancets Misc Dispense: Lancets use to check glucose 2 times a day, ICD-10: E11.65, dispense enough for 90 day supply 300 each 3    lancing device Misc One device, used to check blood glucose, ICD-10: E11.65 1 each 0    nicotine polacrilex 2 MG Lozg Take 1 lozenge (2 mg total) by mouth as needed (smoking cessation). 60 lozenge 5    omeprazole (PRILOSEC) 40 MG capsule Take 1 capsule (40 mg total) by mouth once daily. 90 capsule 4    oxybutynin (DITROPAN-XL) 5 MG TR24 TAKE ONE TABLET BY MOUTH ONCE A DAY 90 tablet 3    pen needle, diabetic (BD INSULIN PEN NEEDLE UF  "SHORT) 31 gauge x 5/16" Ndle USE AS DIRECTED 100 each 12    rosuvastatin (CRESTOR) 20 MG tablet TAKE ONE TABLET BY MOUTH ONCE A DAY 90 tablet 12    SITagliptin phosphate (JANUVIA) 25 MG Tab Take 1 tablet (25 mg total) by mouth once daily. 90 tablet 3    traMADoL (ULTRAM) 50 mg tablet Take 1 tablet (50 mg total) by mouth every 6 (six) hours as needed for Pain. 120 tablet 5    vitamin D (VITAMIN D3) 1000 units Tab Take 1,000 Units by mouth once daily.       No current facility-administered medications on file prior to visit.       Review of patient's allergies indicates:   Allergen Reactions    Pantoprazole Other (See Comments)     Elevated Blood Sugars    Metformin Diarrhea         Vital Signs: BP (!) 196/82 (Patient Position: Sitting)   Pulse 63   Temp 98 °F (36.7 °C)   Ht 5' 3" (1.6 m)   Wt 59.8 kg (131 lb 15.1 oz)   SpO2 97%   BMI 23.37 kg/m²     ECOG Performance Status: 1 - Ambulates, capable of light work    Physical Exam  Vitals reviewed.   Constitutional:       Appearance: Normal appearance.   HENT:      Head: Normocephalic and atraumatic.   Eyes:      General: No scleral icterus.     Extraocular Movements: Extraocular movements intact.   Pulmonary:      Effort: No accessory muscle usage or respiratory distress.   Abdominal:      General: There is no distension.   Musculoskeletal:         General: Normal range of motion.      Cervical back: Normal range of motion and neck supple.   Lymphadenopathy:      Cervical: No cervical adenopathy.   Skin:     General: Skin is dry.      Coloration: Skin is not jaundiced.   Neurological:      Mental Status: She is alert.      Cranial Nerves: No cranial nerve deficit.   Psychiatric:         Mood and Affect: Mood and affect normal.         Judgment: Judgment normal.          Labs:    Imaging: I have personally reviewed the patient's available images and reports and summarized pertinent findings above in HPI.     Pathology: I have personally reviewed the patient's " available pathology reports and summarized pertinent findings above.

## 2025-03-31 DIAGNOSIS — C64.9 RENAL CELL CARCINOMA, UNSPECIFIED LATERALITY: Primary | ICD-10-CM

## 2025-04-01 ENCOUNTER — HOSPITAL ENCOUNTER (OUTPATIENT)
Dept: RADIATION THERAPY | Facility: HOSPITAL | Age: 75
Discharge: HOME | End: 2025-04-01
Attending: RADIOLOGY
Payer: MEDICARE

## 2025-04-05 DIAGNOSIS — R52 PAIN: ICD-10-CM

## 2025-04-05 NOTE — TELEPHONE ENCOUNTER
No care due was identified.  [facility] Embedded Care Due Messages. Reference number: [id number].   4/05/2025 9:24:24 AM CDT

## 2025-04-07 ENCOUNTER — CLINICAL SUPPORT (OUTPATIENT)
Dept: HEMATOLOGY/ONCOLOGY | Facility: CLINIC | Age: 75
End: 2025-04-07
Payer: MEDICARE

## 2025-04-07 ENCOUNTER — PATIENT MESSAGE (OUTPATIENT)
Dept: HEMATOLOGY/ONCOLOGY | Facility: CLINIC | Age: 75
End: 2025-04-07

## 2025-04-07 VITALS
SYSTOLIC BLOOD PRESSURE: 163 MMHG | TEMPERATURE: 98 F | HEART RATE: 76 BPM | HEIGHT: 63 IN | OXYGEN SATURATION: 95 % | WEIGHT: 136.25 LBS | BODY MASS INDEX: 24.14 KG/M2 | RESPIRATION RATE: 18 BRPM | DIASTOLIC BLOOD PRESSURE: 74 MMHG

## 2025-04-07 DIAGNOSIS — Z79.899 LONG TERM CURRENT USE OF THERAPEUTIC DRUG: ICD-10-CM

## 2025-04-07 DIAGNOSIS — C64.9 RENAL CELL CARCINOMA, UNSPECIFIED LATERALITY: Primary | ICD-10-CM

## 2025-04-07 PROCEDURE — 99999 PR PBB SHADOW E&M-EST. PATIENT-LVL V: CPT | Mod: PBBFAC,,, | Performed by: NURSE PRACTITIONER

## 2025-04-07 PROCEDURE — 77301 RADIOTHERAPY DOSE PLAN IMRT: CPT | Mod: 26,,, | Performed by: RADIOLOGY

## 2025-04-07 PROCEDURE — 77293 RESPIRATOR MOTION MGMT SIMUL: CPT | Mod: 26,,, | Performed by: RADIOLOGY

## 2025-04-07 PROCEDURE — 77301 RADIOTHERAPY DOSE PLAN IMRT: CPT | Mod: TC | Performed by: RADIOLOGY

## 2025-04-07 PROCEDURE — 99215 OFFICE O/P EST HI 40 MIN: CPT | Mod: S$GLB,,, | Performed by: NURSE PRACTITIONER

## 2025-04-07 PROCEDURE — 77293 RESPIRATOR MOTION MGMT SIMUL: CPT | Mod: TC | Performed by: RADIOLOGY

## 2025-04-07 PROCEDURE — G2211 COMPLEX E/M VISIT ADD ON: HCPCS | Mod: S$GLB,,, | Performed by: NURSE PRACTITIONER

## 2025-04-07 NOTE — PROGRESS NOTES
MEDICAL ONCOLOGY - NEW PATIENT VISIT    Best Contact Phone Number(s): [phone] (home)      Cancer/Stage/TNM:    Cancer Staging   Squamous Cell Carcinoma of right lower lobe of lung  Staging form: Lung, AJCC 8th Edition  - Clinical stage from 1/11/2023: Stage IA2 (cT1b, cN0, cM0) - Signed by Xavier Deng MD on 1/11/2023       Reason for visit: Metachronous recurrence of       Treatment:  08/2013    Partial nephrectomy      HPI: Sarina Genao is a 74 y.o. 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 She has remote history of grade 2 ccRCC sp partial nephrectomy 08/2013.  Subsquently underwent radiation therapy to an early stage squamous cell lung cancer 01/2023. Around this time, was noted to have abdominal wall subcutaneous nodule. . FDG PET 11/2024 showed progressive 3.9cm anterior abdominal wall mass; biopsy 01/2025 consistetn with metatsatic ccRCC. There was also a 2.3 cm left pulmonary nodule of concern and an additional RITA nodule. She presents to medical oncology clinic for further treatment planning.    History has been obtained by chart review and discussion with the patient.    Interval Events:  Presents to clinic today with sister for chemo/immunotherapy education to treat RCC.      Patient current feels the same without issue. Has some modest discomfort about the right abdominal mass. No other symptoms.         Oncology History   Renal cell carcinoma   8/6/2013 Surgery    08/06/2013: Partial right nephrectomy.   Procedure: Partial nephrectomy. Specimen Laterality: Right Tumor Size: 4 cm in greatest dimension. Tumor Focality: Unifocal. Macroscopic Extent Of Tumor: Tumor limited to the kidney. Histologic Type: Clear cell renal cell carcinoma. Sarcomatoid features: Not identified. Histologic Grade (Heron nuclear grade): G2. Microscopic Tumor Extension: Tumor limited to the  kidney Margins: Uninvolved by invasive carcinoma. Pathologic Staging: Primary tumor: pT1a Regional lymph node: pNx Distant metastasis: Not applicable.      11/25/2022 Imaging Significant Findings    11/25/22 PET CT  Impression:  - Hypermetabolic 1.5 cm nodule in the right lower lobe of the lung concerning for primary neoplasm.  No hypermetabolic lymphadenopathy.  - Round soft tissue structure within the anterior abdominal wall measuring up to 2.3 cm with mild radiotracer uptake.  Recommend further assessment with dedicated ultrasound and/or percutaneous sampling as clinically warranted.  - Additional pulmonary nodules without significant FDG avidity as above.  Findings include a new 3 mm nodule in the left upper lobe.  Of note, subcentimeter nodules are likely too small to characterize with PET.  Recommend continued CT surveillance.     11/22/2024 Imaging Significant Findings    11/22/24 FDG PET  Impression:  - Patient with history of right lower lobe non-small cell lung cancer status post radiation therapy.  No evidence of tracer avid disease or metastasis.  - Interval enlargement of a 3.9 cm anterior abdominal wall mass with mild increased tracer uptake.  Recommend further evaluation with ultrasound and tissue sampling is warranted.     1/6/2025 Biopsy    01/06/25 Biopsy    RIGHT ABDOMINAL WALL, CT-GUIDED BIOPSY WITH PATHOLOGIST ASSISTED ADEQUACY (CYTOLOGY AND CELL BLOCK):  Positive for malignancy, consistent with renal cell carcinoma.  See comment.        1/31/2025 Imaging Significant Findings    1/31/25 Tc99m Bone scan  Impression:  - There is no scintigraphic evidence of osteoblastic metastatic disease.    1/31/25 CT torso  Impression:  - Decreased size of a right anterior abdominal wall mass.  - Stable pulmonary nodules.     4/10/2025 - 4/10/2025 Chemotherapy    Treatment Summary   Plan Name: NIVOLUMAB 480MG Q4W + CABOZANTINIB 40MG QD  Treatment Goal: Palliative  Status: Inactive  Start Date:   End Date:    Provider: Paxton Santiago MD  Chemotherapy: cabozantinib (CABOMETYX) 40 mg Tab, 40 mg, Oral, Daily, 0 of 1 cycle, Start date: 4/10/2025, End date: 5/8/2025     4/10/2025 -  Chemotherapy    Treatment Summary   Plan Name: OP NIVOLUMAB 480MG Q4W + CABOZANTINIB 40MG QD  Treatment Goal: Palliative  Status: Active  Start Date: 4/10/2025 (Planned)  End Date: 3/12/2026 (Planned)  Provider: Paxton Santiago MD  Chemotherapy: [No matching medication found in this treatment plan]     Squamous Cell Carcinoma of right lower lobe of lung   10/25/2022 Biopsy    10/25/22 Lung biopsy  1. Lung, right lower lobe, endobronchial brushing, cytologic evaluation with  cell block:  Atypical cells present.  Epithelial atypia.  Findings suggestive of squamous cell carcinoma  2. Lung, right lower lobe, endoscopic fine needle aspiration, cytologic  evaluation with cell block:  Positive for malignancy. Lung squamous cell carcinoma.  3. Lung, right lower lobe, transbronchial biopsy, cytologic evaluation with  cell block:  Positive for malignancy, Lung squamous cell carcinoma  - PD-L1 and Lung NGS Panel have been ordered and results will be issued in a  supplemental report.  4. Lung, right lower lobe, bronchoalveolar lavage, cytologic evaluation with  cell block:  Negative for malignant cells.  Reactive bronchial cells.  Macrophages.  5. Lung, left lower lobe, endoscopic fine needle aspiration, cytologic  evaluation with cell block:  Negative for malignant cells.  Reactive bronchial cells.  Macrophages.  6. Lymph node, Station 7, endoscopic fine needle aspiration, cytologic  evalu ation with cell block:  Negative for malignant cells. Reactive bronchial cells.  Lymphocytes present.      11/25/2022 Imaging Significant Findings    11/25/22 PET CT  Impression:  - Hypermetabolic 1.5 cm nodule in the right lower lobe of the lung concerning for primary neoplasm.  No hypermetabolic lymphadenopathy.  - Round soft tissue structure within the anterior  abdominal wall measuring up to 2.3 cm with mild radiotracer uptake.  Recommend further assessment with dedicated ultrasound and/or percutaneous sampling as clinically warranted.  - Additional pulmonary nodules without significant FDG avidity as above.  Findings include a new 3 mm nodule in the left upper lobe.  Of note, subcentimeter nodules are likely too small to characterize with PET.  Recommend continued CT surveillance.     11/30/2022 Initial Diagnosis    Squamous Cell Carcinoma of right lower lobe of lung     1/11/2023 Cancer Staged    Staging form: Lung, AJCC 8th Edition  - Clinical stage from 1/11/2023: Stage IA2 (cT1b, cN0, cM0)     1/17/2023 Imaging Significant Findings    01/17/23 CT Chest  Impression:  1. Interval enlargement of a right lower lobe nodule, now 2.3 cm maximally (previously 1.5 cm).  This was noted hypermetabolic on the prior PET-CT 09/29/2022.  2. There is a newly seen peripheral, right lower lobe irregular nodule up to 1.3 cm. v this was a site of thoracotomy and may be postsurgical in nature.  3. There other pulmonary nodules as described above which do not appear changed.  There is no new lymphadenopathy detected.  4. There is a new small right pleural effusion and peripheral airspace opacities in the right lung.  This may reflect infectious/inflammatory etiology.  Alternatively, this may reflect involving appearance of postsurgical change.     1/26/2023 - 2/3/2023 Radiation Therapy    Treating physician: Xavier Deng    Site Technique Energy Dose/Fx (Gy) #Fx Total Dose (Gy)   RLL Lung SBRT 6X 12.5 4 / 4 50         Biopsy       4/26/2023 Imaging Significant Findings    04/26/23 CT Chest  Impression:  1. Interval decrease in size of a right lower lobe irregular nodule with adjacent architectural distortion.  Consistent with post treatment changes.  2. Other findings as above without significant interval change.     5/10/2024 Imaging Significant Findings    5/10/24 CT  Chest  Impression:  1. Stable bilateral pulmonary nodules, including right lower lobe spiculated nodule.  No new or enlarging nodules.  2. Additional findings as above.     11/13/2024 Imaging Significant Findings    11/13/24 CT Chest  Impression:  1. Stable bilateral pulmonary nodules, including right lower lobe spiculated nodule.  No new or enlarging nodules.  2. Additional findings as above.     11/22/2024 Imaging Significant Findings    11/22/24 FDG PET  Impression:  - Patient with history of right lower lobe non-small cell lung cancer status post radiation therapy.  No evidence of tracer avid disease or metastasis.  - Interval enlargement of a 3.9 cm anterior abdominal wall mass with mild increased tracer uptake.  Recommend further evaluation with ultrasound and tissue sampling is warranted.     4/10/2025 -  Chemotherapy    Treatment Summary   Plan Name: OP NIVOLUMAB 480MG Q4W + CABOZANTINIB 40MG QD  Treatment Goal: Palliative  Status: Active  Start Date: 4/10/2025 (Planned)  End Date: 3/12/2026 (Planned)  Provider: Paxton Santiago MD  Chemotherapy: [No matching medication found in this treatment plan]     Secondary malignant neoplasm of right lung   3/27/2025 Initial Diagnosis    Secondary malignant neoplasm of right lung     4/10/2025 -  Chemotherapy    Treatment Summary   Plan Name: OP NIVOLUMAB 480MG Q4W + CABOZANTINIB 40MG QD  Treatment Goal: Palliative  Status: Active  Start Date: 4/10/2025 (Planned)  End Date: 3/12/2026 (Planned)  Provider: Paxton Santiago MD  Chemotherapy: [No matching medication found in this treatment plan]     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   3/27/2025 Initial Diagnosis    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     4/10/2025 -  Chemotherapy    Treatment Summary   Plan Name: OP NIVOLUMAB 480MG Q4W + CABOZANTINIB 40MG QD  Treatment Goal: Palliative  Status: Active  Start Date: 4/10/2025 (Planned)  End Date: 3/12/2026 (Planned)  Provider:  Paxton Santiago MD  Chemotherapy: [No matching medication found in this treatment plan]          Past Medical History:   Diagnosis Date    Anticoagulant long-term use     Cancer     Kidney (Right)    Cataracts, bilateral     CKD (chronic kidney disease) stage 3, GFR 30-59 ml/min 12/15/2014    Colon polyps     Combined hyperlipidemia associated with type 2 diabetes mellitus 6/7/2013    COPD (chronic obstructive pulmonary disease) 12/15/2014    Diabetes mellitus type II     NIDDM, a.m. glucose-120s-130s-last A1c-7.1-6/7/13    Diabetes mellitus with renal manifestations, uncontrolled 2/1/2017    Diabetic retinopathy     Family history of stomach cancer 12/5/2013    Former smoker     H/O renal cell carcinoma 12/15/2015    History of colonic polyps 2/1/2017    3 polyps 7/13 ---5 yrs    History of gastroesophageal reflux (GERD)     History of urinary incontinence     s/p bladder lift-october, 2011 (at [facility])    Hyperlipidemia     Hypertension     120s/70s-80s    Nephrolithiasis     Osteoarthritis of thumb 12/20/2019    Osteoporosis, post-menopausal 3/17/2014    Primary hyperparathyroidism 10/20/2016    Schatzki's ring 2/1/2017    Dilated 2014        Past Surgical History:   Procedure Laterality Date    bladder lift  2011    done at [facility]    BLADDER STONE REMOVAL  2011    before bladder lift    CATARACT EXTRACTION W/  INTRAOCULAR LENS IMPLANT Left 06/07/2016    Dr. Vásquez    CATARACT EXTRACTION W/  INTRAOCULAR LENS IMPLANT Right 06/21/2016    Dr. Vásquez    COLONOSCOPY N/A 4/26/2018    Procedure: COLONOSCOPY;  Surgeon: Benjamin Zamora MD;  Location: [facility] ENDO;  Service: Endoscopy;  Laterality: N/A;  confirmed ss    COLONOSCOPY N/A 12/21/2023    Procedure: COLONOSCOPY;  Surgeon: Jamel Luis MD;  Location: [facility] ENDO;  Service: Endoscopy;  Laterality: N/A;  9/7 ref Venancio Mayer MD, Suprep, instr. mailed, [facility]  12/14- instr reviewed, pt reminded to hold ozempic, pre call complete.  DBM    CYSTOSCOPY       INJECTION OF ANESTHETIC AGENT AROUND MULTIPLE INTERCOSTAL NERVES  12/27/2022    Procedure: BLOCK, NERVE, INTERCOSTAL, 2 OR MORE;  Surgeon: Paxton Cannon MD;  Location: [facility] OR 2ND FLR;  Service: Thoracic;;    LYSIS OF ADHESIONS  12/27/2022    Procedure: LYSIS, ADHESIONS;  Surgeon: Paxton Cannon MD;  Location: [facility] OR 2ND FLR;  Service: Thoracic;;    NAVIGATIONAL BRONCHOSCOPY N/A 12/11/2018    Procedure: BRONCHOSCOPY, NAVIGATIONAL;  Surgeon: Ashtyn Ramires MD;  Location: [facility] OR 2ND FLR;  Service: Pulmonary;  Laterality: N/A;    NEPHRECTOMY      partial right    ROBOTIC BRONCHOSCOPY N/A 10/25/2022    Procedure: ROBOTIC BRONCHOSCOPY;  Surgeon: Ashtyn Ramires MD;  Location: [facility] OR 2ND FLR;  Service: Pulmonary;  Laterality: N/A;    ROBOTIC BRONCHOSCOPY N/A 2/28/2025    Procedure: ROBOTIC BRONCHOSCOPY;  Surgeon: Ashtyn Ramires MD;  Location: [facility] OR 2ND FLR;  Service: Pulmonary;  Laterality: N/A;    VIDEO-ASSISTED THORACOSCOPIC SURGERY (VATS)  12/27/2022    Procedure: VATS (VIDEO-ASSISTED THORACOSCOPIC SURGERY);  Surgeon: Paxton Cannon MD;  Location: [facility] OR 2ND FLR;  Service: Thoracic;;        Family History   Problem Relation Name Age of Onset    Glaucoma Mother      Cataracts Mother      No Known Problems Father      Colon cancer Brother      Nephrolithiasis Sister      No Known Problems Maternal Aunt      No Known Problems Maternal Uncle      No Known Problems Paternal Aunt      No Known Problems Paternal Uncle      No Known Problems Maternal Grandmother      No Known Problems Maternal Grandfather      No Known Problems Paternal Grandmother      No Known Problems Paternal Grandfather      Anesthesia problems Neg Hx      Kidney cancer Neg Hx      Amblyopia Neg Hx      Blindness Neg Hx      Cancer Neg Hx      Diabetes Neg Hx      Hypertension Neg Hx      Macular degeneration Neg Hx      Retinal detachment Neg Hx      Strabismus Neg Hx      Stroke Neg Hx      Thyroid disease Neg Hx          Social  History     Tobacco Use    Smoking status: Every Day     Current packs/day: 0.25     Average packs/day: 0.3 packs/day for 30.0 years (7.5 ttl pk-yrs)     Types: Cigarettes    Smokeless tobacco: Never   Substance Use Topics    Alcohol use: No     Alcohol/week: 0.0 standard drinks of alcohol        I have reviewed and updated the patient's past medical, surgical, family and social histories.    Review of patient's allergies indicates:   Allergen Reactions    Pantoprazole Other (See Comments)     Elevated Blood Sugars    Metformin Diarrhea        Current Outpatient Medications   Medication Sig Dispense Refill    amLODIPine (NORVASC) 10 MG tablet TAKE ONE TABLET BY MOUTH ONCE A DAY 90 tablet 3    aspirin (ECOTRIN) 81 MG EC tablet Take 81 mg by mouth once daily.      blood sugar diagnostic Strp Dispense: Test strip to check glucose 2 times a day, ICD-10: E11.65, compatible with insurance/glucometer, dispense enough for 90 day supply 300 each 3    blood-glucose meter kit Dispense: 1 glucometer, use to check glucose 2 times a day, ICD-10: E11.65,  Dispense machine covered by insurance 1 each 0    [START ON 4/10/2025] cabozantinib (CABOMETYX) 40 mg Tab Take 40 mg by mouth once daily Take on an empty stomach.  Fasting is required from 2 hrs before through 1 hr after each dose.  Do not crush or chew. Avoid grapefruit or grapefruit juice which may increase exposure of cabozantinib. Avoid Odell's wort which may decrease exposure of cabozantinib. 30 tablet 5    cyanocobalamin, vitamin B-12, 1,000 mcg TbSR Take by mouth once daily.      famotidine (PEPCID) 40 MG tablet TAKE ONE TABLET BY MOUTH ONCE A DAY 90 tablet 0    furosemide (LASIX) 20 MG tablet TAKE ONE TABLET BY MOUTH ONCE A DAY AS NEEDED 30 tablet 3    irbesartan (AVAPRO) 300 MG tablet Take 1 tablet (300 mg total) by mouth once daily. 90 tablet 3    lancets Misc Dispense: Lancets use to check glucose 2 times a day, ICD-10: E11.65, dispense enough for 90 day supply 300  "each 3    lancing device Misc One device, used to check blood glucose, ICD-10: E11.65 1 each 0    nicotine polacrilex 2 MG Lozg Take 1 lozenge (2 mg total) by mouth as needed (smoking cessation). 60 lozenge 5    oxybutynin (DITROPAN-XL) 5 MG TR24 TAKE ONE TABLET BY MOUTH ONCE A DAY 90 tablet 3    pen needle, diabetic (BD INSULIN PEN NEEDLE UF SHORT) 31 gauge x 5/16" Ndle USE AS DIRECTED 100 each 12    rosuvastatin (CRESTOR) 20 MG tablet TAKE ONE TABLET BY MOUTH ONCE A DAY 90 tablet 12    SITagliptin phosphate (JANUVIA) 25 MG Tab Take 1 tablet (25 mg total) by mouth once daily. 90 tablet 3    traMADoL (ULTRAM) 50 mg tablet Take 1 tablet (50 mg total) by mouth every 6 (six) hours as needed for Pain. 120 tablet 5    vitamin D (VITAMIN D3) 1000 units Tab Take 1,000 Units by mouth once daily.      omeprazole (PRILOSEC) 40 MG capsule Take 1 capsule (40 mg total) by mouth once daily. 90 capsule 4     No current facility-administered medications for this visit.     Review of Systems   Constitutional:  Positive for malaise/fatigue. Negative for chills, fever and weight loss.   Respiratory:  Positive for cough and sputum production. Negative for shortness of breath.    Cardiovascular:  Negative for chest pain and palpitations.        Chest fullness - with januvia   Gastrointestinal:  Negative for constipation, diarrhea, nausea and vomiting.   Genitourinary:  Negative for dysuria, flank pain, frequency, hematuria and urgency.   Musculoskeletal:  Negative for back pain, joint pain and neck pain.   Neurological:  Negative for dizziness, weakness and headaches.         Physical Exam:   BP (!) 163/74 (BP Location: Right arm, Patient Position: Sitting)   Pulse 76   Temp 97.8 °F (36.6 °C) (Oral)   Resp 18   Ht 5' 3" (1.6 m)   Wt 61.8 kg (136 lb 3.9 oz)   SpO2 95%   BMI 24.13 kg/m²      ECOG Performance status: 1            Physical Exam  Constitutional:       General: She is not in acute distress.     Appearance: She is " normal weight.   HENT:      Head: Normocephalic.   Eyes:      General: No scleral icterus.     Conjunctiva/sclera: Conjunctivae normal.   Cardiovascular:      Rate and Rhythm: Normal rate.   Pulmonary:      Effort: Pulmonary effort is normal. No respiratory distress.   Abdominal:      General: There is no distension.      Palpations: Abdomen is soft.   Musculoskeletal:         General: Normal range of motion.      Cervical back: Normal range of motion and neck supple.   Skin:     General: Skin is warm.      Coloration: Skin is not jaundiced.   Neurological:      General: No focal deficit present.      Mental Status: She is alert and oriented to person, place, and time.   Psychiatric:         Mood and Affect: Mood normal.         Behavior: Behavior normal.         Thought Content: Thought content normal.           Labs:   No results found for this or any previous visit (from the past 48 hours).         Imaging:         I have personally reviewed the above imaging    Path:   Reviewed pathology as documented in oncology history      Assessment and Plan:   1. Renal cell carcinoma, unspecified laterality  Overview:  Metachronous recurrence of ccRCC. Initial diagnosis in 2013 sp partial nephrectomy. Has biopsy proven recurrence to abdominal wall 01/2025.     Lengthy discussion with patient to reconsider surgical resection.    Orders:  -     Care Companion Enrollment Chemotherapy; [facility]    2. Long term current use of therapeutic drug       Sarina Genao was consented for chemotherapy/immunotherapy to treat RCC. Sarina Genao was consented to receive cabozantinib and nivolumab.   The consent was discussed and reviewed with patient and sister, Toshia.     Patient was education on what to expect when receiving chemotherapy including: checking in, receiving an ID band, 1 guest allowed in the infusion suite during infusion, can alternate people as well, pole going with you once you are hooked up, warm blankets  are available, you may bring lunch and snacks, minimal snacks are available, take medications as regularly unless told otherwise, warm blankets, will be available. Education about what to expect during their chemo cycle and how often their regimen is given.     An extensive discussion was had which included a thorough discussion of the risk and benefits of treatment and alternatives.  Risks, including but not limited to, possible hair loss, bone marrow damage (anemia, thrombocytopenia, immune suppression, neutropenia), damage to body organs (brain, heart, liver, kidney, lungs, nervous system, skin, and others), allergic reactions, sterility, nausea/vomiting, constipation/diarrhea, sores in the mouth, secondary cancers, local damage at possible injection sites, and rarely death were all discussed. Specific side effects pertaining to their chemotherapy/immunotherapy medications were discussed as well.The patient agrees with the plan, and all questions and their support system's questions have been answered to their satisfaction. Contraindications and potential side effects discussed as listed in micromedx.     Patient was given binder which includes: contact information for the [facility], an immunotherapy side effect guide (if applicable to patient), resources including, but not limited to: women's wellness, acupuncture, physical therapy, , urgent care within oncology and financial assistance.     Premedications were prescribed and patient was education on appropriate premedications.     Patient was tested for pharmacogenomics if receiving fluorouracil or capecitabine. Patient has a UPT ordered if still of childrearing age and still has ovaries and uterus.     Genetics testing was done, if appropriate on this patient. Not all patients may qualify for genetic testing.     Patient was educated on when to call (and given the numbers to call and knows to message via [facility] if possible) or notify  the provider including, but not limited to:   Persistent Nausea and/or Vomiting  Dehydration  Persistent Diarrhea  Fever of 100.4 > 1 hour in duration or any isolated fever > 101   Rash (while on active chemotherapy or immunotherapy)   Severe pain or new onset pain not controlled by current medication regimen  Or any other symptom you feel is related to your current hematology or oncology treatment    Patient was provided with additional resources that [facility] offers including, but not limited to: financial counseling, , psychologist, palliative care, support groups, transportation, dietician, rehab services, women's wellness and urgent care visits within the oncology department.     Patient was offered and signed up for chemo care companion. Educated on daily vital signs and daily questionnaire.     Patient has an established PCP.      Patient consented for immunotherapy 04/07/2025 . An extensive discussion was had which included a thorough discussion of the risk and benefits of treatment and alternatives.  Risks, including but not limited to, fatigue, diarrhea, nausea/vomiting, loss of appetite, skin reactions and rashes, flu-like symptoms, fever, infusion-related reactions including allergic reactions, inflammation of stomach or intestines, inflammation of liver, inflammation of brain or nervous system, inflammation of lungs, inflammation of pancreas, inflammation of kidneys, inflammation of the eyes, inflammation of hormone producing glands, inflammation of the joints including activation of arthritis, impaired kidney function, impaired liver function, problems with sleep, unstable blood sugars, blood pressure changes, infertility, bone marrow damage (anemia, thrombocytopenia, immune suppression, neutropenia), sterility, local damage at possible injection sites, and rarely death were all discussed.  The patient agrees with the plan, and all questions have been answered to their satisfaction.   Consent was signed the patient, provider, and a third party witness.        Follow up:   Route Chart for Scheduling    Med Onc Chart Routing      Follow up with physician . 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   noted on 3/27/2025  Associated diagnosis: Primary malignant neoplasm of right lower lobe of lung Stage IA2 cT1b, cN0, cM0 noted on 11/30/2022   Line of treatment: First Line  Treatment Goal: Palliative     Upcoming Treatment Dates - OP NIVOLUMAB 480MG Q4W + CABOZANTINIB 40MG QD    4/10/2025       Chemotherapy       nivolumab 480 mg in 0.9% NaCl 98 mL infusion  5/8/2025       Chemotherapy       nivolumab 480 mg in 0.9% NaCl 98 mL infusion  6/5/2025       Chemotherapy       nivolumab 480 mg in 0.9% NaCl 98 mL infusion  7/3/2025       Chemotherapy       nivolumab 480 mg in 0.9% NaCl 98 mL infusion    Therapy Plan Information  DENOSUMAB (PROLIA) Q6M for Osteoporosis, post-menopausal, noted on 3/17/2014  Medications  denosumab (PROLIA) injection 60 mg  60 mg, Subcutaneous, Every 26 weeks      No therapy plan of the specified type found.    No therapy plan of the specified type found.      The above information has been reviewed with the patient and all questions have been answered to their apparent satisfaction.  They understand that they can call the clinic with any questions.       Melissa Voltz, APRN [facility]  [facility] [place] [address]  Phone: (o)  [phone]         code applied: patient requires or will require a continuous, longitudinal, and active collaborative plan of care related to this patient's health condition, prostate cancer --the management of which requires the direction of a practitioner with specialized clinical knowledge, skill, and expertise.

## 2025-04-08 DIAGNOSIS — R52 PAIN: ICD-10-CM

## 2025-04-08 PROCEDURE — 77370 RADIATION PHYSICS CONSULT: CPT | Performed by: RADIOLOGY

## 2025-04-08 PROCEDURE — 77338 DESIGN MLC DEVICE FOR IMRT: CPT | Mod: 26,,, | Performed by: RADIOLOGY

## 2025-04-08 PROCEDURE — 77014 PR  CT GUIDANCE PLACEMENT RAD THERAPY FIELDS: CPT | Mod: 26,,, | Performed by: RADIOLOGY

## 2025-04-08 PROCEDURE — 77300 RADIATION THERAPY DOSE PLAN: CPT | Mod: 26,,, | Performed by: RADIOLOGY

## 2025-04-08 PROCEDURE — 77338 DESIGN MLC DEVICE FOR IMRT: CPT | Mod: TC | Performed by: RADIOLOGY

## 2025-04-08 PROCEDURE — 77373 STRTCTC BDY RAD THER TX DLVR: CPT | Performed by: RADIOLOGY

## 2025-04-08 PROCEDURE — 77300 RADIATION THERAPY DOSE PLAN: CPT | Mod: TC | Performed by: RADIOLOGY

## 2025-04-08 RX ORDER — TRAMADOL HYDROCHLORIDE 50 MG/1
50 TABLET ORAL EVERY 6 HOURS PRN
Qty: 120 TABLET | Refills: 5 | OUTPATIENT
Start: 2025-04-08

## 2025-04-08 RX ORDER — TRAMADOL HYDROCHLORIDE 50 MG/1
50 TABLET ORAL EVERY 6 HOURS PRN
Qty: 120 TABLET | Refills: 1 | Status: SHIPPED | OUTPATIENT
Start: 2025-04-08

## 2025-04-08 NOTE — TELEPHONE ENCOUNTER
No care due was identified.  [facility] Embedded Care Due Messages. Reference number: [id number].   4/08/2025 1:26:41 PM CDT

## 2025-04-08 NOTE — TELEPHONE ENCOUNTER
----- Message from Adriana sent at 4/8/2025  3:47 PM CDT -----  Who called: self  What is the request in detail: pt says she urgently needs her tramadol HCl 50 mg Can the clinic reply by [facility]? No  Would the patient rather a call back or a response via [facility]? Call back  Best call back number: .[phone] Additional Information:

## 2025-04-09 PROCEDURE — 77470 SPECIAL RADIATION TREATMENT: CPT | Mod: 59,TC | Performed by: RADIOLOGY

## 2025-04-09 PROCEDURE — 77470 SPECIAL RADIATION TREATMENT: CPT | Mod: 26,59,, | Performed by: RADIOLOGY

## 2025-04-14 ENCOUNTER — HOSPITAL ENCOUNTER (OUTPATIENT)
Dept: RADIOLOGY | Facility: HOSPITAL | Age: 75
Discharge: HOME | End: 2025-04-14
Attending: INTERNAL MEDICINE
Payer: MEDICARE

## 2025-04-14 DIAGNOSIS — C64.9 RENAL CELL CARCINOMA, UNSPECIFIED LATERALITY: ICD-10-CM

## 2025-04-14 PROCEDURE — 74160 CT ABDOMEN W/CONTRAST: CPT | Mod: TC

## 2025-04-14 PROCEDURE — 74160 CT ABDOMEN W/CONTRAST: CPT | Mod: 26,,, | Performed by: RADIOLOGY

## 2025-04-14 PROCEDURE — 25500020 PHARM REV CODE 255: Performed by: INTERNAL MEDICINE

## 2025-04-14 RX ADMIN — IOHEXOL 75 ML: 350 INJECTION, SOLUTION INTRAVENOUS at 10:04

## 2025-04-17 ENCOUNTER — LAB VISIT (OUTPATIENT)
Dept: LAB | Facility: HOSPITAL | Age: 75
End: 2025-04-17
Attending: INTERNAL MEDICINE
Payer: MEDICARE

## 2025-04-17 DIAGNOSIS — E03.9 HYPOTHYROIDISM: ICD-10-CM

## 2025-04-17 DIAGNOSIS — C64.9 RENAL CELL CARCINOMA, UNSPECIFIED LATERALITY: ICD-10-CM

## 2025-04-21 ENCOUNTER — OFFICE VISIT (OUTPATIENT)
Dept: HEMATOLOGY/ONCOLOGY | Facility: CLINIC | Age: 75
End: 2025-04-21
Payer: MEDICARE

## 2025-04-21 VITALS
BODY MASS INDEX: 23.94 KG/M2 | OXYGEN SATURATION: 99 % | SYSTOLIC BLOOD PRESSURE: 141 MMHG | HEART RATE: 78 BPM | WEIGHT: 135.13 LBS | DIASTOLIC BLOOD PRESSURE: 72 MMHG | HEIGHT: 63 IN

## 2025-04-21 DIAGNOSIS — Z72.0 TOBACCO ABUSE: ICD-10-CM

## 2025-04-21 DIAGNOSIS — C78.01 SECONDARY MALIGNANT NEOPLASM OF RIGHT LUNG: ICD-10-CM

## 2025-04-21 DIAGNOSIS — C79.89 SECONDARY MALIGNANT NEOPLASM OF SOFT TISSUES OF ABDOMEN: ICD-10-CM

## 2025-04-21 DIAGNOSIS — I70.0 AORTIC ATHEROSCLEROSIS: ICD-10-CM

## 2025-04-21 DIAGNOSIS — I50.30 HEART FAILURE WITH PRESERVED EJECTION FRACTION, UNSPECIFIED HF CHRONICITY: ICD-10-CM

## 2025-04-21 DIAGNOSIS — I10 ESSENTIAL HYPERTENSION: ICD-10-CM

## 2025-04-21 DIAGNOSIS — E11.9 DM TYPE 2 WITHOUT RETINOPATHY: ICD-10-CM

## 2025-04-21 DIAGNOSIS — C64.1 RENAL CELL CARCINOMA OF RIGHT KIDNEY: Primary | ICD-10-CM

## 2025-04-21 PROCEDURE — 1159F MED LIST DOCD IN RCRD: CPT | Mod: CPTII,S$GLB,, | Performed by: HOSPITALIST

## 2025-04-21 PROCEDURE — 3288F FALL RISK ASSESSMENT DOCD: CPT | Mod: CPTII,S$GLB,, | Performed by: HOSPITALIST

## 2025-04-21 PROCEDURE — 3078F DIAST BP <80 MM HG: CPT | Mod: CPTII,S$GLB,, | Performed by: HOSPITALIST

## 2025-04-21 PROCEDURE — 99999 PR PBB SHADOW E&M-EST. PATIENT-LVL IV: CPT | Mod: PBBFAC,,, | Performed by: HOSPITALIST

## 2025-04-21 PROCEDURE — 1101F PT FALLS ASSESS-DOCD LE1/YR: CPT | Mod: CPTII,S$GLB,, | Performed by: HOSPITALIST

## 2025-04-21 PROCEDURE — G2211 COMPLEX E/M VISIT ADD ON: HCPCS | Mod: S$GLB,,, | Performed by: HOSPITALIST

## 2025-04-21 PROCEDURE — 99215 OFFICE O/P EST HI 40 MIN: CPT | Mod: S$GLB,,, | Performed by: HOSPITALIST

## 2025-04-21 PROCEDURE — 1126F AMNT PAIN NOTED NONE PRSNT: CPT | Mod: CPTII,S$GLB,, | Performed by: HOSPITALIST

## 2025-04-21 PROCEDURE — 1160F RVW MEDS BY RX/DR IN RCRD: CPT | Mod: CPTII,S$GLB,, | Performed by: HOSPITALIST

## 2025-04-21 PROCEDURE — 3077F SYST BP >= 140 MM HG: CPT | Mod: CPTII,S$GLB,, | Performed by: HOSPITALIST

## 2025-04-21 RX ORDER — PROCHLORPERAZINE EDISYLATE 5 MG/ML
5 INJECTION INTRAMUSCULAR; INTRAVENOUS ONCE AS NEEDED
Status: CANCELLED
Start: 2025-04-22

## 2025-04-21 RX ORDER — DM/P-EPHED/ACETAMINOPH/DOXYLAM 30-7.5/3
2 LIQUID (ML) ORAL
Qty: 60 LOZENGE | Refills: 5 | Status: SHIPPED | OUTPATIENT
Start: 2025-04-21

## 2025-04-21 RX ORDER — SODIUM CHLORIDE 0.9 % (FLUSH) 0.9 %
10 SYRINGE (ML) INJECTION
Status: CANCELLED | OUTPATIENT
Start: 2025-04-22

## 2025-04-21 RX ORDER — EPINEPHRINE 0.3 MG/.3ML
0.3 INJECTION SUBCUTANEOUS ONCE AS NEEDED
Status: CANCELLED | OUTPATIENT
Start: 2025-04-22

## 2025-04-21 RX ORDER — HEPARIN 100 UNIT/ML
500 SYRINGE INTRAVENOUS
Status: CANCELLED | OUTPATIENT
Start: 2025-04-22

## 2025-04-21 RX ORDER — DIPHENHYDRAMINE HYDROCHLORIDE 50 MG/ML
50 INJECTION, SOLUTION INTRAMUSCULAR; INTRAVENOUS ONCE AS NEEDED
Status: CANCELLED | OUTPATIENT
Start: 2025-04-22

## 2025-04-21 NOTE — PROGRESS NOTES
MEDICAL ONCOLOGY - ESTABLISHED PATIENT VISIT    Best Contact Phone Number(s): [phone] (home)      Cancer/Stage/TNM:    Cancer Staging   Squamous Cell Carcinoma of right lower lobe of lung  Staging form: Lung, AJCC 8th Edition  - Clinical stage from 1/11/2023: Stage IA2 (cT1b, cN0, cM0) - Signed by Xavier Deng MD on 1/11/2023       Reason for visit: Metachronous recurrence of RCC      Treatment:  08/2013    Partial nephrectomy  04/2025    EBRT to RLL nodule      HPI: Sarina Genao is a 74 y.o. 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 She has remote history of grade 2 ccRCC sp partial nephrectomy 08/2013.  Subsquently underwent radiation therapy to an early stage squamous cell lung cancer 01/2023. Around this time, was noted to have abdominal wall subcutaneous nodule.  FDG PET 11/2024 showed progressive 3.9cm anterior abdominal wall mass; biopsy 01/2025 consistetn with metatsatic ccRCC. There was additional pulmonary nodules of concern. She underwent EBRT to the RLL nodule 04/2025. She presents to medical oncology clinic prior to starting cabozantinib + nivolumab.      History has been obtained by chart review and discussion with the patient.    Interval Events:     Overall patient feels well. Completed EBRT without issue. Energy is good. No CP, SOB or cough. Reports excellent appetite. Not really symptomatic from her abdominal wall met. Planning on starting cabo + nivo tomorrow.         Oncology History   Renal cell carcinoma   8/6/2013 Surgery    08/06/2013: Partial right nephrectomy.   Procedure: Partial nephrectomy. Specimen Laterality: Right Tumor Size: 4 cm in greatest dimension. Tumor Focality: Unifocal. Macroscopic Extent Of Tumor: Tumor limited to the kidney. Histologic Type: Clear cell renal cell carcinoma. Sarcomatoid features: Not identified. Histologic Grade (Heron  nuclear grade): G2. Microscopic Tumor Extension: Tumor limited to the kidney Margins: Uninvolved by invasive carcinoma. Pathologic Staging: Primary tumor: pT1a Regional lymph node: pNx Distant metastasis: Not applicable.      11/25/2022 Imaging Significant Findings    11/25/22 PET CT  Impression:  - Hypermetabolic 1.5 cm nodule in the right lower lobe of the lung concerning for primary neoplasm.  No hypermetabolic lymphadenopathy.  - Round soft tissue structure within the anterior abdominal wall measuring up to 2.3 cm with mild radiotracer uptake.  Recommend further assessment with dedicated ultrasound and/or percutaneous sampling as clinically warranted.  - Additional pulmonary nodules without significant FDG avidity as above.  Findings include a new 3 mm nodule in the left upper lobe.  Of note, subcentimeter nodules are likely too small to characterize with PET.  Recommend continued CT surveillance.     11/22/2024 Imaging Significant Findings    11/22/24 FDG PET  Impression:  - Patient with history of right lower lobe non-small cell lung cancer status post radiation therapy.  No evidence of tracer avid disease or metastasis.  - Interval enlargement of a 3.9 cm anterior abdominal wall mass with mild increased tracer uptake.  Recommend further evaluation with ultrasound and tissue sampling is warranted.     1/6/2025 Biopsy    01/06/25 Biopsy    RIGHT ABDOMINAL WALL, CT-GUIDED BIOPSY WITH PATHOLOGIST ASSISTED ADEQUACY (CYTOLOGY AND CELL BLOCK):  Positive for malignancy, consistent with renal cell carcinoma.  See comment.        1/31/2025 Imaging Significant Findings    1/31/25 Tc99m Bone scan  Impression:  - There is no scintigraphic evidence of osteoblastic metastatic disease.    1/31/25 CT torso  Impression:  - Decreased size of a right anterior abdominal wall mass.  - Stable pulmonary nodules.     4/8/2025 - 4/14/2025 Radiation Therapy    Treating physician: Xavier Deng    Site Technique Energy Dose/Fx (Gy)  #Fx Total Dose (Gy)   RLL Lung SBRT 6X 18 3 / 3 54        4/10/2025 - 4/10/2025 Chemotherapy    Treatment Summary   Plan Name: NIVOLUMAB 480MG Q4W + CABOZANTINIB 40MG QD  Treatment Goal: Palliative  Status: Inactive  Start Date:   End Date:   Provider: Paxton Santiago MD  Chemotherapy: cabozantinib (CABOMETYX) 40 mg Tab, 40 mg, Oral, Daily, 0 of 1 cycle, Start date: 4/10/2025, End date: 5/8/2025 4/22/2025 -  Chemotherapy    Treatment Summary   Plan Name: OP NIVOLUMAB 480MG Q4W + CABOZANTINIB 40MG QD  Treatment Goal: Palliative  Status: Active  Start Date: 4/22/2025 (Planned)  End Date: 3/24/2026 (Planned)  Provider: Paxton Santiago MD  Chemotherapy: [No matching medication found in this treatment plan]     Squamous Cell Carcinoma of right lower lobe of lung   10/25/2022 Biopsy    10/25/22 Lung biopsy  1. Lung, right lower lobe, endobronchial brushing, cytologic evaluation with  cell block:  Atypical cells present.  Epithelial atypia.  Findings suggestive of squamous cell carcinoma  2. Lung, right lower lobe, endoscopic fine needle aspiration, cytologic  evaluation with cell block:  Positive for malignancy. Lung squamous cell carcinoma.  3. Lung, right lower lobe, transbronchial biopsy, cytologic evaluation with  cell block:  Positive for malignancy, Lung squamous cell carcinoma  - PD-L1 and Lung NGS Panel have been ordered and results will be issued in a  supplemental report.  4. Lung, right lower lobe, bronchoalveolar lavage, cytologic evaluation with  cell block:  Negative for malignant cells.  Reactive bronchial cells.  Macrophages.  5. Lung, left lower lobe, endoscopic fine needle aspiration, cytologic  evaluation with cell block:  Negative for malignant cells.  Reactive bronchial cells.  Macrophages.  6. Lymph node, Station 7, endoscopic fine needle aspiration, cytologic  evalu ation with cell block:  Negative for malignant cells. Reactive bronchial cells.  Lymphocytes present.      11/25/2022 Imaging  Significant Findings    11/25/22 PET CT  Impression:  - Hypermetabolic 1.5 cm nodule in the right lower lobe of the lung concerning for primary neoplasm.  No hypermetabolic lymphadenopathy.  - Round soft tissue structure within the anterior abdominal wall measuring up to 2.3 cm with mild radiotracer uptake.  Recommend further assessment with dedicated ultrasound and/or percutaneous sampling as clinically warranted.  - Additional pulmonary nodules without significant FDG avidity as above.  Findings include a new 3 mm nodule in the left upper lobe.  Of note, subcentimeter nodules are likely too small to characterize with PET.  Recommend continued CT surveillance.     11/30/2022 Initial Diagnosis    Squamous Cell Carcinoma of right lower lobe of lung     1/11/2023 Cancer Staged    Staging form: Lung, AJCC 8th Edition  - Clinical stage from 1/11/2023: Stage IA2 (cT1b, cN0, cM0)     1/17/2023 Imaging Significant Findings    01/17/23 CT Chest  Impression:  1. Interval enlargement of a right lower lobe nodule, now 2.3 cm maximally (previously 1.5 cm).  This was noted hypermetabolic on the prior PET-CT 09/29/2022.  2. There is a newly seen peripheral, right lower lobe irregular nodule up to 1.3 cm. v this was a site of thoracotomy and may be postsurgical in nature.  3. There other pulmonary nodules as described above which do not appear changed.  There is no new lymphadenopathy detected.  4. There is a new small right pleural effusion and peripheral airspace opacities in the right lung.  This may reflect infectious/inflammatory etiology.  Alternatively, this may reflect involving appearance of postsurgical change.     1/26/2023 - 2/3/2023 Radiation Therapy    Treating physician: Xavier Deng    Site Technique Energy Dose/Fx (Gy) #Fx Total Dose (Gy)   RLL Lung SBRT 6X 12.5 4 / 4 50         Biopsy       4/26/2023 Imaging Significant Findings    04/26/23 CT Chest  Impression:  1. Interval decrease in size of a right lower  lobe irregular nodule with adjacent architectural distortion.  Consistent with post treatment changes.  2. Other findings as above without significant interval change.     5/10/2024 Imaging Significant Findings    5/10/24 CT Chest  Impression:  1. Stable bilateral pulmonary nodules, including right lower lobe spiculated nodule.  No new or enlarging nodules.  2. Additional findings as above.     11/13/2024 Imaging Significant Findings    11/13/24 CT Chest  Impression:  1. Stable bilateral pulmonary nodules, including right lower lobe spiculated nodule.  No new or enlarging nodules.  2. Additional findings as above.     11/22/2024 Imaging Significant Findings    11/22/24 FDG PET  Impression:  - Patient with history of right lower lobe non-small cell lung cancer status post radiation therapy.  No evidence of tracer avid disease or metastasis.  - Interval enlargement of a 3.9 cm anterior abdominal wall mass with mild increased tracer uptake.  Recommend further evaluation with ultrasound and tissue sampling is warranted.     4/22/2025 -  Chemotherapy    Treatment Summary   Plan Name: OP NIVOLUMAB 480MG Q4W + CABOZANTINIB 40MG QD  Treatment Goal: Palliative  Status: Active  Start Date: 4/22/2025 (Planned)  End Date: 3/24/2026 (Planned)  Provider: Paxton Santiago MD  Chemotherapy: [No matching medication found in this treatment plan]     Secondary malignant neoplasm of right lung   3/27/2025 Initial Diagnosis    Secondary malignant neoplasm of right lung     4/22/2025 -  Chemotherapy    Treatment Summary   Plan Name: OP NIVOLUMAB 480MG Q4W + CABOZANTINIB 40MG QD  Treatment Goal: Palliative  Status: Active  Start Date: 4/22/2025 (Planned)  End Date: 3/24/2026 (Planned)  Provider: Paxton Santiago MD  Chemotherapy: [No matching medication found in this treatment plan]     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   3/27/2025 Initial Diagnosis    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      4/22/2025 -  Chemotherapy    Treatment Summary   Plan Name: OP NIVOLUMAB 480MG Q4W + CABOZANTINIB 40MG QD  Treatment Goal: Palliative  Status: Active  Start Date: 4/22/2025 (Planned)  End Date: 3/24/2026 (Planned)  Provider: Paxton Santiago MD  Chemotherapy: [No matching medication found in this treatment plan]          Past Medical History:   Diagnosis Date    Anticoagulant long-term use     Cancer     Kidney (Right)    Cataracts, bilateral     CKD (chronic kidney disease) stage 3, GFR 30-59 ml/min 12/15/2014    Colon polyps     Combined hyperlipidemia associated with type 2 diabetes mellitus 6/7/2013    COPD (chronic obstructive pulmonary disease) 12/15/2014    Diabetes mellitus type II     NIDDM, a.m. glucose-120s-130s-last A1c-7.1-6/7/13    Diabetes mellitus with renal manifestations, uncontrolled 2/1/2017    Diabetic retinopathy     Family history of stomach cancer 12/5/2013    Former smoker     H/O renal cell carcinoma 12/15/2015    History of colonic polyps 2/1/2017    3 polyps 7/13 ---5 yrs    History of gastroesophageal reflux (GERD)     History of urinary incontinence     s/p bladder lift-october, 2011 (at [facility])    Hyperlipidemia     Hypertension     120s/70s-80s    Nephrolithiasis     Osteoarthritis of thumb 12/20/2019    Osteoporosis, post-menopausal 3/17/2014    Primary hyperparathyroidism 10/20/2016    Schatzki's ring 2/1/2017    Dilated 2014        Past Surgical History:   Procedure Laterality Date    bladder lift  2011    done at [facility]    BLADDER STONE REMOVAL  2011    before bladder lift    CATARACT EXTRACTION W/  INTRAOCULAR LENS IMPLANT Left 06/07/2016    Dr. Vásquez    CATARACT EXTRACTION W/  INTRAOCULAR LENS IMPLANT Right 06/21/2016    Dr. Vásquez    COLONOSCOPY N/A 4/26/2018    Procedure: COLONOSCOPY;  Surgeon: Benjamin Zamora MD;  Location: [facility];  Service: Endoscopy;  Laterality: N/A;  confirmed ss    COLONOSCOPY N/A 12/21/2023    Procedure: COLONOSCOPY;  Surgeon: Toby  MD Jamel;  Location: [facility] ENDO;  Service: Endoscopy;  Laterality: N/A;  9/7 ref Venancio Mayer MD, Suprep, [facility] mailed, [facility]  12/14- instr reviewed, pt reminded to hold ozempic, pre call complete.  DBM    CYSTOSCOPY      INJECTION OF ANESTHETIC AGENT AROUND MULTIPLE INTERCOSTAL NERVES  12/27/2022    Procedure: BLOCK, NERVE, INTERCOSTAL, 2 OR MORE;  Surgeon: Paxton Cannon MD;  Location: [facility] OR 2ND FLR;  Service: Thoracic;;    LYSIS OF ADHESIONS  12/27/2022    Procedure: LYSIS, ADHESIONS;  Surgeon: Paxton Cannon MD;  [facility]: [facility];  Service: Thoracic;;    NAVIGATIONAL BRONCHOSCOPY N/A 12/11/2018    Procedure: BRONCHOSCOPY, NAVIGATIONAL;  Surgeon: Ashtyn Ramires MD;  [facility]: [facility];  Service: Pulmonary;  Laterality: N/A;    NEPHRECTOMY      partial right    ROBOTIC BRONCHOSCOPY N/A 10/25/2022    Procedure: ROBOTIC BRONCHOSCOPY;  Surgeon: Ashtyn Ramires MD;  [facility]: [facility];  Service: Pulmonary;  Laterality: N/A;    ROBOTIC BRONCHOSCOPY N/A 2/28/2025    Procedure: ROBOTIC BRONCHOSCOPY;  Surgeon: Ashtyn Ramires MD;  Location: [facility] OR 2ND FLR;  Service: Pulmonary;  Laterality: N/A;    VIDEO-ASSISTED THORACOSCOPIC SURGERY (VATS)  12/27/2022    Procedure: VATS (VIDEO-ASSISTED THORACOSCOPIC SURGERY);  Surgeon: Paxton Cannon MD;  Location: [facility] OR 2ND FLR;  Service: Thoracic;;        Family History   Problem Relation Name Age of Onset    Glaucoma Mother      Cataracts Mother      No Known Problems Father      Colon cancer Brother      Nephrolithiasis Sister      No Known Problems Maternal Aunt      No Known Problems Maternal Uncle      No Known Problems Paternal Aunt      No Known Problems Paternal Uncle      No Known Problems Maternal Grandmother      No Known Problems Maternal Grandfather      No Known Problems Paternal Grandmother      No Known Problems Paternal Grandfather      Anesthesia problems Neg Hx      Kidney cancer Neg Hx      Amblyopia Neg Hx       Blindness Neg Hx      Cancer Neg Hx      Diabetes Neg Hx      Hypertension Neg Hx      Macular degeneration Neg Hx      Retinal detachment Neg Hx      Strabismus Neg Hx      Stroke Neg Hx      Thyroid disease Neg Hx          Social History     Tobacco Use    Smoking status: Every Day     Current packs/day: 0.25     Average packs/day: 0.3 packs/day for 30.0 years (7.5 ttl pk-yrs)     Types: Cigarettes    Smokeless tobacco: Never   Substance Use Topics    Alcohol use: No     Alcohol/week: 0.0 standard drinks of alcohol        I have reviewed and updated the patient's past medical, surgical, family and social histories.    Review of patient's allergies indicates:   Allergen Reactions    Pantoprazole Other (See Comments)     Elevated Blood Sugars    Metformin Diarrhea        Current Outpatient Medications   Medication Sig Dispense Refill    amLODIPine (NORVASC) 10 MG tablet TAKE ONE TABLET BY MOUTH ONCE A DAY 90 tablet 3    ascorbic acid, vitamin C, (VITAMIN C) 250 mg Chew Take 1 tablet by mouth.      aspirin (ECOTRIN) 81 MG EC tablet Take 81 mg by mouth once daily.      blood sugar diagnostic Strp Dispense: Test strip to check glucose 2 times a day, ICD-10: E11.65, compatible with insurance/glucometer, dispense enough for 90 day supply 300 each 3    blood-glucose meter kit Dispense: 1 glucometer, use to check glucose 2 times a day, ICD-10: E11.65,  Dispense machine covered by insurance 1 each 0    cabozantinib (CABOMETYX) 40 mg Tab Take one tablet (40 mg) by mouth once daily on an empty stomach. Fasting is required from 2 hrs before through 1 hr after each dose.  Do not crush or chew. 30 tablet 5    cyanocobalamin, vitamin B-12, 1,000 mcg TbSR Take by mouth once daily.      famotidine (PEPCID) 40 MG tablet TAKE ONE TABLET BY MOUTH ONCE A DAY (Patient taking differently: PRN) 90 tablet 0    furosemide (LASIX) 20 MG tablet TAKE ONE TABLET BY MOUTH ONCE A DAY AS NEEDED 30 tablet 3    irbesartan (AVAPRO) 300 MG tablet Take  "1 tablet (300 mg total) by mouth once daily. 90 tablet 3    lancets Misc Dispense: Lancets use to check glucose 2 times a day, ICD-10: E11.65, dispense enough for 90 day supply 300 each 3    lancing device Misc One device, used to check blood glucose, ICD-10: E11.65 1 each 0    multivitamin (THERAGRAN) per tablet Take 1 tablet by mouth once daily.      oxybutynin (DITROPAN-XL) 5 MG TR24 TAKE ONE TABLET BY MOUTH ONCE A DAY 90 tablet 3    pen needle, diabetic (BD INSULIN PEN NEEDLE UF SHORT) 31 gauge x 5/16" Ndle USE AS DIRECTED 100 each 12    rosuvastatin (CRESTOR) 20 MG tablet TAKE ONE TABLET BY MOUTH ONCE A DAY 90 tablet 12    SITagliptin phosphate (JANUVIA) 25 MG Tab Take 1 tablet (25 mg total) by mouth once daily. 90 tablet 3    traMADoL (ULTRAM) 50 mg tablet Take 1 tablet (50 mg total) by mouth every 6 (six) hours as needed. 120 tablet 1    vitamin D (VITAMIN D3) 1000 units Tab Take 1,000 Units by mouth once daily.      nicotine polacrilex 2 MG Lozg Take 1 lozenge (2 mg total) by mouth as needed (smoking cessation). 60 lozenge 5    omeprazole (PRILOSEC) 40 MG capsule Take 1 capsule (40 mg total) by mouth once daily. 90 capsule 4     No current facility-administered medications for this visit.        Physical Exam:   BP (!) 141/72 (Patient Position: Sitting)   Pulse 78   Ht 5' 3" (1.6 m)   Wt 61.3 kg (135 lb 2.3 oz)   SpO2 99%   BMI 23.94 kg/m²      ECOG Performance status: 1            Physical Exam  Constitutional:       General: She is not in acute distress.     Appearance: She is normal weight.   HENT:      Head: Normocephalic.   Eyes:      General: No scleral icterus.     Conjunctiva/sclera: Conjunctivae normal.   Cardiovascular:      Rate and Rhythm: Normal rate.   Pulmonary:      Effort: Pulmonary effort is normal. No respiratory distress.   Abdominal:      General: There is no distension.      Palpations: Abdomen is soft.   Musculoskeletal:         General: Normal range of motion.      Cervical back: " Normal range of motion and neck supple.   Skin:     General: Skin is warm.      Coloration: Skin is not jaundiced.   Neurological:      General: No focal deficit present.      Mental Status: She is alert and oriented to person, place, and time.   Psychiatric:         Mood and Affect: Mood normal.         Behavior: Behavior normal.         Thought Content: Thought content normal.           Labs:   No results found for this or any previous visit (from the past 48 hours).           Imaging:       I have personally reviewed the above imaging    Path:   Reviewed pathology as documented in oncology history      Assessment and Plan:     1. Renal cell carcinoma of right kidney  Overview:  Metastatic ccRCC 01/2025 in the setting of incidental finding of an anterior abdominal wall mass during surveillance of prior lung cancer. She has remote history of grade 2 ccRCC sp partial nephrectomy 08/2013.  Subsquently underwent radiation therapy to an early stage squamous cell lung cancer 01/2023. Around this time, was noted to have abdominal wall subcutaneous nodule.  FDG PET 11/2024 showed progressive 3.9cm anterior abdominal wall mass; biopsy 01/2025 consistetn with metatsatic ccRCC. There was additional pulmonary nodules of concern. She underwent EBRT to the RLL nodule 04/2025.    Assessment & Plan:  Considered high risk of resection of the abdominal wall met. Will start with systemic therapy with cabo + nivo. Consider re-evaluation of surgical resection in a few months, particularly if good response and she stops smoking.  - Starting cabozantinib 40mg po daily + nivo 480mg IV q4 weeks tomorrow  - FU 4 weeks for next treatment  - Repeat imaging in 3 months      2. Essential hypertension  Overview:  Home mediations include amlodipine and irbesartan. Also uses lasix prn for edema.      3. Aortic atherosclerosis  Overview:  Home medications include aspirin and rosuvastatin.      4. Heart failure with preserved ejection fraction,  unspecified HF chronicity  Assessment & Plan:  - Uses lasix prn  - Currently euvoliemic      5. DM type 2 without retinopathy  Overview:  Home medications include Januvia      6. Tobacco abuse  -     nicotine polacrilex 2 MG Lozg; Take 1 lozenge (2 mg total) by mouth as needed (smoking cessation).  Dispense: 60 lozenge; Refill: 5    7. Secondary malignant neoplasm of right lung  Assessment & Plan:  - SP EBRT to lung nodule  - CTM      8. 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   noted on 3/27/2025  Associated diagnosis: Primary malignant neoplasm of right lower lobe of lung Stage IA2 cT1b, cN0, cM0 noted on 11/30/2022   Line of treatment: First Line  Treatment Goal: Palliative     Upcoming Treatment Dates - OP NIVOLUMAB 480MG Q4W + CABOZANTINIB 40MG QD    4/22/2025       Chemotherapy       nivolumab 480 mg in 0.9% NaCl 98 mL infusion  5/20/2025        Chemotherapy       nivolumab 480 mg in 0.9% NaCl 98 mL infusion  6/17/2025       Chemotherapy       nivolumab 480 mg in 0.9% NaCl 98 mL infusion  7/15/2025       Chemotherapy       nivolumab 480 mg in 0.9% NaCl 98 mL infusion    Therapy Plan Information  DENOSUMAB (PROLIA) Q6M for Osteoporosis, post-menopausal, noted on 3/17/2014  Medications  denosumab (PROLIA) injection 60 mg  60 mg, Subcutaneous, Every 26 weeks      No therapy plan of the specified type found.    No therapy plan of the specified type found.      The above information has been reviewed with the patient and all questions have been answered to their apparent satisfaction.  They understand that they can call the clinic with any questions.      Paxton Santiago MD MPH  Staff Physician     [facility]  [address] [place] [address]  Email: [email or web address]  Phone: o) [phone] (c) [phone]

## 2025-04-21 NOTE — ASSESSMENT & PLAN NOTE
Considered high risk of resection of the abdominal wall met. Will start with systemic therapy with cabo + nivo. Consider re-evaluation of surgical resection in a few months, particularly if good response and she stops smoking.  - Starting cabozantinib 40mg po daily + nivo 480mg IV q4 weeks tomorrow  - FU 4 weeks for next treatment  - Repeat imaging in 3 months

## 2025-04-22 ENCOUNTER — INFUSION (OUTPATIENT)
Dept: INFUSION THERAPY | Facility: HOSPITAL | Age: 75
End: 2025-04-22
Attending: HOSPITALIST
Payer: MEDICARE

## 2025-04-22 DIAGNOSIS — C34.31 PRIMARY MALIGNANT NEOPLASM OF RIGHT LOWER LOBE OF LUNG: ICD-10-CM

## 2025-04-22 DIAGNOSIS — C64.9 RENAL CELL CARCINOMA, UNSPECIFIED LATERALITY: Primary | ICD-10-CM

## 2025-04-22 DIAGNOSIS — C79.89 SECONDARY MALIGNANT NEOPLASM OF SOFT TISSUES OF ABDOMEN: ICD-10-CM

## 2025-04-22 DIAGNOSIS — C78.01 SECONDARY MALIGNANT NEOPLASM OF RIGHT LUNG: ICD-10-CM

## 2025-04-22 PROCEDURE — 25000003 PHARM REV CODE 250: Performed by: HOSPITALIST

## 2025-04-22 PROCEDURE — 63600175 PHARM REV CODE 636 W HCPCS: Mod: JZ,TB | Performed by: HOSPITALIST

## 2025-04-22 PROCEDURE — 96413 CHEMO IV INFUSION 1 HR: CPT

## 2025-04-22 RX ORDER — PROCHLORPERAZINE EDISYLATE 5 MG/ML
5 INJECTION INTRAMUSCULAR; INTRAVENOUS ONCE AS NEEDED
Status: DISCONTINUED | OUTPATIENT
Start: 2025-04-22 | End: 2025-04-22 | Stop reason: HOSPADM

## 2025-04-22 RX ORDER — DIPHENHYDRAMINE HYDROCHLORIDE 50 MG/ML
50 INJECTION, SOLUTION INTRAMUSCULAR; INTRAVENOUS ONCE AS NEEDED
Status: DISCONTINUED | OUTPATIENT
Start: 2025-04-22 | End: 2025-04-22 | Stop reason: HOSPADM

## 2025-04-22 RX ORDER — EPINEPHRINE 0.3 MG/.3ML
0.3 INJECTION SUBCUTANEOUS ONCE AS NEEDED
Status: DISCONTINUED | OUTPATIENT
Start: 2025-04-22 | End: 2025-04-22 | Stop reason: HOSPADM

## 2025-04-22 RX ADMIN — SODIUM CHLORIDE 480 MG: 9 INJECTION, SOLUTION INTRAVENOUS at 02:04

## 2025-04-26 VITALS
OXYGEN SATURATION: 95 % | HEART RATE: 77 BPM | SYSTOLIC BLOOD PRESSURE: 144 MMHG | TEMPERATURE: 99 F | WEIGHT: 135.56 LBS | BODY MASS INDEX: 24.02 KG/M2 | RESPIRATION RATE: 18 BRPM | DIASTOLIC BLOOD PRESSURE: 66 MMHG

## 2025-04-26 NOTE — PLAN OF CARE
Pt arrived with sister to Infusion Clinic with sister at side. AA&Ox4. Pt has generalized weakness, otherwise denies c/o. Today is pt's 1st dose Nivolumab. Dx Squamous cell carcinoma R lung. Provided written and verbal education on mechanism of action of Nivolumab medication, including possible side effects and adverse rx's. Pt verbalized understanding, and in agreement to receive medication. Tolerated Nivolumab infusion well with no side effects or adverse rx's noted. Pt remained on unit post infusion for monitoring with no ill effects noted. Provided copt of appt schedule.

## 2025-05-09 DIAGNOSIS — R52 PAIN: ICD-10-CM

## 2025-05-09 NOTE — TELEPHONE ENCOUNTER
Care Due:                  Date            Visit Type   Department     Provider  --------------------------------------------------------------------------------                                             [facility]     / INTERNAL [facility] Venancio Barnett  Last Visit: 01-      FOLLOW UP    / PEDS         [place]  Next Visit: None Scheduled  None         None Found                                                            Last  Test          Frequency    Reason                     Performed    Due Date  --------------------------------------------------------------------------------    Lipid Panel.  12 months..  rosuvastatin.............  02- 02-    Health [facility] Embedded Care Due Messages. Reference number: [id number].   5/09/2025 9:31:39 AM CDT

## 2025-05-12 RX ORDER — TRAMADOL HYDROCHLORIDE 50 MG/1
50 TABLET, FILM COATED ORAL EVERY 6 HOURS PRN
Qty: 120 TABLET | Refills: 1 | Status: SHIPPED | OUTPATIENT
Start: 2025-05-12

## 2025-05-15 ENCOUNTER — PATIENT OUTREACH (OUTPATIENT)
Dept: ADMINISTRATIVE | Facility: HOSPITAL | Age: 75
End: 2025-05-15
Payer: MEDICARE

## 2025-05-15 ENCOUNTER — LAB VISIT (OUTPATIENT)
Dept: LAB | Facility: HOSPITAL | Age: 75
End: 2025-05-15
Attending: HOSPITALIST
Payer: MEDICARE

## 2025-05-15 DIAGNOSIS — E11.9 TYPE 2 DIABETES MELLITUS WITHOUT COMPLICATION, WITHOUT LONG-TERM CURRENT USE OF INSULIN: Primary | ICD-10-CM

## 2025-05-15 DIAGNOSIS — M81.0 OSTEOPOROSIS, POST-MENOPAUSAL: ICD-10-CM

## 2025-05-15 DIAGNOSIS — Z79.899 LONG TERM CURRENT USE OF THERAPEUTIC DRUG: ICD-10-CM

## 2025-05-15 DIAGNOSIS — C64.9 RENAL CELL CARCINOMA, UNSPECIFIED LATERALITY: ICD-10-CM

## 2025-05-15 DIAGNOSIS — E11.9 TYPE 2 DIABETES MELLITUS WITHOUT COMPLICATION, WITHOUT LONG-TERM CURRENT USE OF INSULIN: ICD-10-CM

## 2025-05-15 LAB
ALBUMIN SERPL BCP-MCNC: 3.3 G/DL (ref 3.5–5.2)
ALBUMIN SERPL BCP-MCNC: 3.6 G/DL (ref 3.5–5.2)
ALBUMIN/CREAT UR: 63.4 UG/MG
ALP SERPL-CCNC: 114 UNIT/L (ref 40–150)
ALT SERPL W/O P-5'-P-CCNC: 5 UNIT/L (ref 10–44)
ANION GAP (OHS): 11 MMOL/L (ref 8–16)
ANION GAP (OHS): 8 MMOL/L (ref 8–16)
AST SERPL-CCNC: 8 UNIT/L (ref 11–45)
BILIRUB SERPL-MCNC: 0.4 MG/DL (ref 0.1–1)
BUN SERPL-MCNC: 24 MG/DL (ref 8–23)
BUN SERPL-MCNC: 24 MG/DL (ref 8–23)
CALCIUM SERPL-MCNC: 10.1 MG/DL (ref 8.7–10.5)
CALCIUM SERPL-MCNC: 10.7 MG/DL (ref 8.7–10.5)
CHLORIDE SERPL-SCNC: 104 MMOL/L (ref 95–110)
CHLORIDE SERPL-SCNC: 104 MMOL/L (ref 95–110)
CO2 SERPL-SCNC: 26 MMOL/L (ref 23–29)
CO2 SERPL-SCNC: 29 MMOL/L (ref 23–29)
CREAT SERPL-MCNC: 1.3 MG/DL (ref 0.5–1.4)
CREAT SERPL-MCNC: 1.3 MG/DL (ref 0.5–1.4)
CREAT UR-MCNC: 123 MG/DL (ref 15–325)
GFR SERPLBLD CREATININE-BSD FMLA CKD-EPI: 43 ML/MIN/1.73/M2
GFR SERPLBLD CREATININE-BSD FMLA CKD-EPI: 43 ML/MIN/1.73/M2
GLUCOSE SERPL-MCNC: 100 MG/DL (ref 70–110)
GLUCOSE SERPL-MCNC: 103 MG/DL (ref 70–110)
MICROALBUMIN UR-MCNC: 78 UG/ML (ref ?–5000)
PHOSPHATE SERPL-MCNC: 3.5 MG/DL (ref 2.7–4.5)
POTASSIUM SERPL-SCNC: 4.3 MMOL/L (ref 3.5–5.1)
POTASSIUM SERPL-SCNC: 4.3 MMOL/L (ref 3.5–5.1)
PROT SERPL-MCNC: 8.4 GM/DL (ref 6–8.4)
SODIUM SERPL-SCNC: 141 MMOL/L (ref 136–145)
SODIUM SERPL-SCNC: 141 MMOL/L (ref 136–145)
TSH SERPL-ACNC: 0.74 UIU/ML (ref 0.4–4)

## 2025-05-15 PROCEDURE — 84443 ASSAY THYROID STIM HORMONE: CPT

## 2025-05-15 PROCEDURE — 36415 COLL VENOUS BLD VENIPUNCTURE: CPT | Mod: PO

## 2025-05-15 PROCEDURE — 80069 RENAL FUNCTION PANEL: CPT

## 2025-05-15 PROCEDURE — 82570 ASSAY OF URINE CREATININE: CPT

## 2025-05-17 DIAGNOSIS — N39.41 URINARY INCONTINENCE, URGE: ICD-10-CM

## 2025-05-17 NOTE — TELEPHONE ENCOUNTER
No care due was identified.  [facility] Embedded Care Due Messages. Reference number: [id number].   5/17/2025 10:01:06 AM CDT

## 2025-05-18 RX ORDER — OXYBUTYNIN CHLORIDE 5 MG/1
5 TABLET, EXTENDED RELEASE ORAL
Qty: 90 TABLET | Refills: 3 | Status: SHIPPED | OUTPATIENT
Start: 2025-05-18

## 2025-05-18 NOTE — TELEPHONE ENCOUNTER
Refill Decision Note   Sarina Genao  is requesting a refill authorization.  Brief Assessment and Rationale for Refill:  Approve     Medication Therapy Plan:         Comments:     Note composed:12:23 PM 05/18/2025             Appointments     Last Visit   1/16/2025 Venancio Mayer MD   Next Visit   Visit date not found Venancio Mayer MD

## 2025-05-19 ENCOUNTER — OFFICE VISIT (OUTPATIENT)
Dept: HEMATOLOGY/ONCOLOGY | Facility: CLINIC | Age: 75
End: 2025-05-19
Payer: MEDICARE

## 2025-05-19 VITALS
HEART RATE: 67 BPM | SYSTOLIC BLOOD PRESSURE: 127 MMHG | DIASTOLIC BLOOD PRESSURE: 60 MMHG | RESPIRATION RATE: 18 BRPM | BODY MASS INDEX: 23.04 KG/M2 | TEMPERATURE: 97 F | WEIGHT: 130 LBS | HEIGHT: 63 IN | OXYGEN SATURATION: 98 %

## 2025-05-19 DIAGNOSIS — M81.0 OSTEOPOROSIS, POST-MENOPAUSAL: ICD-10-CM

## 2025-05-19 DIAGNOSIS — C64.1 RENAL CELL CARCINOMA OF RIGHT KIDNEY: Primary | ICD-10-CM

## 2025-05-19 DIAGNOSIS — C78.01 SECONDARY MALIGNANT NEOPLASM OF RIGHT LUNG: ICD-10-CM

## 2025-05-19 DIAGNOSIS — C79.89 SECONDARY MALIGNANT NEOPLASM OF SOFT TISSUES OF ABDOMEN: ICD-10-CM

## 2025-05-19 PROCEDURE — G2211 COMPLEX E/M VISIT ADD ON: HCPCS | Mod: S$GLB,,, | Performed by: HOSPITALIST

## 2025-05-19 PROCEDURE — 3288F FALL RISK ASSESSMENT DOCD: CPT | Mod: CPTII,S$GLB,, | Performed by: HOSPITALIST

## 2025-05-19 PROCEDURE — 99999 PR PBB SHADOW E&M-EST. PATIENT-LVL IV: CPT | Mod: PBBFAC,,, | Performed by: HOSPITALIST

## 2025-05-19 PROCEDURE — 1159F MED LIST DOCD IN RCRD: CPT | Mod: CPTII,S$GLB,, | Performed by: HOSPITALIST

## 2025-05-19 PROCEDURE — 3078F DIAST BP <80 MM HG: CPT | Mod: CPTII,S$GLB,, | Performed by: HOSPITALIST

## 2025-05-19 PROCEDURE — 1125F AMNT PAIN NOTED PAIN PRSNT: CPT | Mod: CPTII,S$GLB,, | Performed by: HOSPITALIST

## 2025-05-19 PROCEDURE — 3074F SYST BP LT 130 MM HG: CPT | Mod: CPTII,S$GLB,, | Performed by: HOSPITALIST

## 2025-05-19 PROCEDURE — 99215 OFFICE O/P EST HI 40 MIN: CPT | Mod: S$GLB,,, | Performed by: HOSPITALIST

## 2025-05-19 PROCEDURE — 1101F PT FALLS ASSESS-DOCD LE1/YR: CPT | Mod: CPTII,S$GLB,, | Performed by: HOSPITALIST

## 2025-05-19 RX ORDER — EPINEPHRINE 0.3 MG/.3ML
0.3 INJECTION SUBCUTANEOUS ONCE AS NEEDED
Status: CANCELLED | OUTPATIENT
Start: 2025-05-20

## 2025-05-19 RX ORDER — SODIUM CHLORIDE 0.9 % (FLUSH) 0.9 %
10 SYRINGE (ML) INJECTION
OUTPATIENT
Start: 2025-05-20

## 2025-05-19 RX ORDER — DIPHENHYDRAMINE HYDROCHLORIDE 50 MG/ML
50 INJECTION, SOLUTION INTRAMUSCULAR; INTRAVENOUS ONCE AS NEEDED
Status: CANCELLED | OUTPATIENT
Start: 2025-05-20

## 2025-05-19 RX ORDER — HEPARIN 100 UNIT/ML
500 SYRINGE INTRAVENOUS
OUTPATIENT
Start: 2025-05-20

## 2025-05-19 RX ORDER — PROCHLORPERAZINE EDISYLATE 5 MG/ML
5 INJECTION INTRAMUSCULAR; INTRAVENOUS ONCE AS NEEDED
Start: 2025-05-20

## 2025-05-19 NOTE — ASSESSMENT & PLAN NOTE
- Follow with endocrine; long term Proli since ~2018.  - Will receive Prolia shot tomorrow with her nivolumab

## 2025-05-19 NOTE — PROGRESS NOTES
MEDICAL ONCOLOGY - ESTABLISHED PATIENT VISIT    Best Contact Phone Number(s): [phone] (home)      Cancer/Stage/TNM:    Cancer Staging   Squamous Cell Carcinoma of right lower lobe of lung  Staging form: Lung, AJCC 8th Edition  - Clinical stage from 1/11/2023: Stage IA2 (cT1b, cN0, cM0) - Signed by Xavier Deng MD on 1/11/2023       Reason for visit: Metachronous recurrence of RCC      Treatment:  08/2013    Partial nephrectomy  04/2025    EBRT to RLL nodule  04/22/25 -     Nivolumab  05/12/25 -     Cabozantinib      HPI: Sarina Genao is a 74 y.o. 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 She has remote history of grade 2 ccRCC sp partial nephrectomy 08/2013.  Subsquently underwent radiation therapy to an early stage squamous cell lung cancer 01/2023. Around this time, was noted to have abdominal wall subcutaneous nodule.  FDG PET 11/2024 showed progressive 3.9cm anterior abdominal wall mass; biopsy 01/2025 consistetn with metatsatic ccRCC. There was additional pulmonary nodules of concern. She underwent EBRT to the RLL nodule 04/2025. She presents to medical oncology clinic prior to starting cabozantinib + nivolumab.      History has been obtained by chart review and discussion with the patient.    Interval Events:     Started nivolumab about month ago. Has developed moderate arthritis in her left shoudler with generalized myalgia about the left hemibody. Overall tolerable. Not using analgesics. No rashes.      Started cabozantinb 5/12/25. Appetite is good. Reports normal bowel movements. No signficant diarrhea. No N/V.     Otherwise feels well. No CP or SOB. Has a stable cough. Thinks her abdominal met may be a little smaller.      Due for Prolia shot.         Oncology History   Renal cell carcinoma   8/6/2013 Surgery    08/06/2013: Partial right nephrectomy.   Procedure: Partial  nephrectomy. Specimen Laterality: Right Tumor Size: 4 cm in greatest dimension. Tumor Focality: Unifocal. Macroscopic Extent Of Tumor: Tumor limited to the kidney. Histologic Type: Clear cell renal cell carcinoma. Sarcomatoid features: Not identified. Histologic Grade (Heron nuclear grade): G2. Microscopic Tumor Extension: Tumor limited to the kidney Margins: Uninvolved by invasive carcinoma. Pathologic Staging: Primary tumor: pT1a Regional lymph node: pNx Distant metastasis: Not applicable.      11/25/2022 Imaging Significant Findings    11/25/22 PET CT  Impression:  - Hypermetabolic 1.5 cm nodule in the right lower lobe of the lung concerning for primary neoplasm.  No hypermetabolic lymphadenopathy.  - Round soft tissue structure within the anterior abdominal wall measuring up to 2.3 cm with mild radiotracer uptake.  Recommend further assessment with dedicated ultrasound and/or percutaneous sampling as clinically warranted.  - Additional pulmonary nodules without significant FDG avidity as above.  Findings include a new 3 mm nodule in the left upper lobe.  Of note, subcentimeter nodules are likely too small to characterize with PET.  Recommend continued CT surveillance.     11/22/2024 Imaging Significant Findings    11/22/24 FDG PET  Impression:  - Patient with history of right lower lobe non-small cell lung cancer status post radiation therapy.  No evidence of tracer avid disease or metastasis.  - Interval enlargement of a 3.9 cm anterior abdominal wall mass with mild increased tracer uptake.  Recommend further evaluation with ultrasound and tissue sampling is warranted.     1/6/2025 Biopsy    01/06/25 Biopsy    RIGHT ABDOMINAL WALL, CT-GUIDED BIOPSY WITH PATHOLOGIST ASSISTED ADEQUACY (CYTOLOGY AND CELL BLOCK):  Positive for malignancy, consistent with renal cell carcinoma.  See comment.        1/31/2025 Imaging Significant Findings    1/31/25 Tc99m Bone scan  Impression:  - There is no scintigraphic evidence  of osteoblastic metastatic disease.    1/31/25 CT torso  Impression:  - Decreased size of a right anterior abdominal wall mass.  - Stable pulmonary nodules.     4/8/2025 - 4/14/2025 Radiation Therapy    Treating physician: Xavier Deng    Site Technique Energy Dose/Fx (Gy) #Fx Total Dose (Gy)   RLL Lung SBRT 6X 18 3 / 3 54        4/10/2025 - 4/10/2025 Chemotherapy    Treatment Summary   Plan Name: NIVOLUMAB 480MG Q4W + CABOZANTINIB 40MG QD  Treatment Goal: Palliative  Status: Inactive  Start Date:   End Date:   Provider: Paxton Santiago MD  Chemotherapy: cabozantinib (CABOMETYX) 40 mg Tab, 40 mg, Oral, Daily, 0 of 1 cycle, Start date: 4/10/2025, End date: 5/8/2025 4/22/2025 -  Chemotherapy    Treatment Summary   Plan Name: OP NIVOLUMAB 480MG Q4W + CABOZANTINIB 40MG QD  Treatment Goal: Palliative  Status: Active  Start Date: 4/22/2025  End Date: 3/24/2026 (Planned)  Provider: Paxton Santiago MD  Chemotherapy: [No matching medication found in this treatment plan]     Squamous Cell Carcinoma of right lower lobe of lung   10/25/2022 Biopsy    10/25/22 Lung biopsy  1. Lung, right lower lobe, endobronchial brushing, cytologic evaluation with  cell block:  Atypical cells present.  Epithelial atypia.  Findings suggestive of squamous cell carcinoma  2. Lung, right lower lobe, endoscopic fine needle aspiration, cytologic  evaluation with cell block:  Positive for malignancy. Lung squamous cell carcinoma.  3. Lung, right lower lobe, transbronchial biopsy, cytologic evaluation with  cell block:  Positive for malignancy, Lung squamous cell carcinoma  - PD-L1 and Lung NGS Panel have been ordered and results will be issued in a  supplemental report.  4. Lung, right lower lobe, bronchoalveolar lavage, cytologic evaluation with  cell block:  Negative for malignant cells.  Reactive bronchial cells.  Macrophages.  5. Lung, left lower lobe, endoscopic fine needle aspiration, cytologic  evaluation with cell block:  Negative  for malignant cells.  Reactive bronchial cells.  Macrophages.  6. Lymph node, Station 7, endoscopic fine needle aspiration, cytologic  evalu ation with cell block:  Negative for malignant cells. Reactive bronchial cells.  Lymphocytes present.      11/25/2022 Imaging Significant Findings    11/25/22 PET CT  Impression:  - Hypermetabolic 1.5 cm nodule in the right lower lobe of the lung concerning for primary neoplasm.  No hypermetabolic lymphadenopathy.  - Round soft tissue structure within the anterior abdominal wall measuring up to 2.3 cm with mild radiotracer uptake.  Recommend further assessment with dedicated ultrasound and/or percutaneous sampling as clinically warranted.  - Additional pulmonary nodules without significant FDG avidity as above.  Findings include a new 3 mm nodule in the left upper lobe.  Of note, subcentimeter nodules are likely too small to characterize with PET.  Recommend continued CT surveillance.     11/30/2022 Initial Diagnosis    Squamous Cell Carcinoma of right lower lobe of lung     1/11/2023 Cancer Staged    Staging form: Lung, AJCC 8th Edition  - Clinical stage from 1/11/2023: Stage IA2 (cT1b, cN0, cM0)     1/17/2023 Imaging Significant Findings    01/17/23 CT Chest  Impression:  1. Interval enlargement of a right lower lobe nodule, now 2.3 cm maximally (previously 1.5 cm).  This was noted hypermetabolic on the prior PET-CT 09/29/2022.  2. There is a newly seen peripheral, right lower lobe irregular nodule up to 1.3 cm. v this was a site of thoracotomy and may be postsurgical in nature.  3. There other pulmonary nodules as described above which do not appear changed.  There is no new lymphadenopathy detected.  4. There is a new small right pleural effusion and peripheral airspace opacities in the right lung.  This may reflect infectious/inflammatory etiology.  Alternatively, this may reflect involving appearance of postsurgical change.     1/26/2023 - 2/3/2023 Radiation Therapy     Treating physician: Xavier Deng    Site Technique Energy Dose/Fx (Gy) #Fx Total Dose (Gy)   RLL Lung SBRT 6X 12.5 4 / 4 50         Biopsy       4/26/2023 Imaging Significant Findings    04/26/23 CT Chest  Impression:  1. Interval decrease in size of a right lower lobe irregular nodule with adjacent architectural distortion.  Consistent with post treatment changes.  2. Other findings as above without significant interval change.     5/10/2024 Imaging Significant Findings    5/10/24 CT Chest  Impression:  1. Stable bilateral pulmonary nodules, including right lower lobe spiculated nodule.  No new or enlarging nodules.  2. Additional findings as above.     11/13/2024 Imaging Significant Findings    11/13/24 CT Chest  Impression:  1. Stable bilateral pulmonary nodules, including right lower lobe spiculated nodule.  No new or enlarging nodules.  2. Additional findings as above.     11/22/2024 Imaging Significant Findings    11/22/24 FDG PET  Impression:  - Patient with history of right lower lobe non-small cell lung cancer status post radiation therapy.  No evidence of tracer avid disease or metastasis.  - Interval enlargement of a 3.9 cm anterior abdominal wall mass with mild increased tracer uptake.  Recommend further evaluation with ultrasound and tissue sampling is warranted.     4/22/2025 -  Chemotherapy    Treatment Summary   Plan Name: OP NIVOLUMAB 480MG Q4W + CABOZANTINIB 40MG QD  Treatment Goal: Palliative  Status: Active  Start Date: 4/22/2025  End Date: 3/24/2026 (Planned)  Provider: Paxton Santiago MD  Chemotherapy: [No matching medication found in this treatment plan]     Secondary malignant neoplasm of right lung   3/27/2025 Initial Diagnosis    Secondary malignant neoplasm of right lung     4/22/2025 -  Chemotherapy    Treatment Summary   Plan Name: OP NIVOLUMAB 480MG Q4W + CABOZANTINIB 40MG QD  Treatment Goal: Palliative  Status: Active  Start Date: 4/22/2025  End Date: 3/24/2026  (Planned)  Provider: Paxton Santiago MD  Chemotherapy: [No matching medication found in this treatment plan]     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   3/27/2025 Initial Diagnosis    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     4/22/2025 -  Chemotherapy    Treatment Summary   Plan Name: OP NIVOLUMAB 480MG Q4W + CABOZANTINIB 40MG QD  Treatment Goal: Palliative  Status: Active  Start Date: 4/22/2025  End Date: 3/24/2026 (Planned)  Provider: Paxton Santiago MD  Chemotherapy: [No matching medication found in this treatment plan]          Past Medical History:   Diagnosis Date    Anticoagulant long-term use     Cancer     Kidney (Right)    Cataracts, bilateral     CKD (chronic kidney disease) stage 3, GFR 30-59 ml/min 12/15/2014    Colon polyps     Combined hyperlipidemia associated with type 2 diabetes mellitus 6/7/2013    COPD (chronic obstructive pulmonary disease) 12/15/2014    Diabetes mellitus type II     NIDDM, a.m. glucose-120s-130s-last A1c-7.1-6/7/13    Diabetes mellitus with renal manifestations, uncontrolled 2/1/2017    Diabetic retinopathy     Family history of stomach cancer 12/5/2013    Former smoker     H/O renal cell carcinoma 12/15/2015    History of colonic polyps 2/1/2017    3 polyps 7/13 ---5 yrs    History of gastroesophageal reflux (GERD)     History of urinary incontinence     s/p bladder lift-october, 2011 (at [facility])    Hyperlipidemia     Hypertension     120s/70s-80s    Nephrolithiasis     Osteoarthritis of thumb 12/20/2019    Osteoporosis, post-menopausal 3/17/2014    Primary hyperparathyroidism 10/20/2016    Schatzki's ring 2/1/2017    Dilated 2014        Past Surgical History:   Procedure Laterality Date    bladder lift  2011    done at [facility]    BLADDER STONE REMOVAL  2011    before bladder lift    CATARACT EXTRACTION W/  INTRAOCULAR LENS IMPLANT Left 06/07/2016    Dr. Vásquez    CATARACT EXTRACTION W/  INTRAOCULAR LENS IMPLANT Right 06/21/2016     Dr. Vásquez    COLONOSCOPY N/A 4/26/2018    Procedure: COLONOSCOPY;  Surgeon: Benjamin Zamora MD;  Location: [facility] ENDO;  Service: Endoscopy;  Laterality: N/A;  confirmed ss    COLONOSCOPY N/A 12/21/2023    Procedure: COLONOSCOPY;  Surgeon: Jamel Luis MD;  Location: [facility] ENDO;  Service: Endoscopy;  Laterality: N/A;  9/7 ref Venancio Mayer MD, Suprep, instr. mailed, [facility]  12/14- instr reviewed, pt reminded to hold ozempic, pre call complete.  DBM    CYSTOSCOPY      INJECTION OF ANESTHETIC AGENT AROUND MULTIPLE INTERCOSTAL NERVES  12/27/2022    Procedure: BLOCK, NERVE, INTERCOSTAL, 2 OR MORE;  Surgeon: Paxton Cannon MD;  Location: [facility] OR 2ND FLR;  Service: Thoracic;;    LYSIS OF ADHESIONS  12/27/2022    Procedure: LYSIS, ADHESIONS;  Surgeon: Paxton Cannon MD;  Location: [facility] OR 2ND FLR;  Service: Thoracic;;    NAVIGATIONAL BRONCHOSCOPY N/A 12/11/2018    Procedure: BRONCHOSCOPY, NAVIGATIONAL;  Surgeon: Ashtyn Ramires MD;  Location: [facility] OR 2ND FLR;  Service: Pulmonary;  Laterality: N/A;    NEPHRECTOMY      partial right    ROBOTIC BRONCHOSCOPY N/A 10/25/2022    Procedure: ROBOTIC BRONCHOSCOPY;  Surgeon: Ashtyn Ramires MD;  Location: [facility] OR 2ND FLR;  Service: Pulmonary;  Laterality: N/A;    ROBOTIC BRONCHOSCOPY N/A 2/28/2025    Procedure: ROBOTIC BRONCHOSCOPY;  Surgeon: Ashtyn Ramires MD;  Location: [facility] OR 2ND FLR;  Service: Pulmonary;  Laterality: N/A;    VIDEO-ASSISTED THORACOSCOPIC SURGERY (VATS)  12/27/2022    Procedure: VATS (VIDEO-ASSISTED THORACOSCOPIC SURGERY);  Surgeon: Paxton Cannon MD;  Location: [facility] OR 2ND FLR;  Service: Thoracic;;        Family History   Problem Relation Name Age of Onset    Glaucoma Mother      Cataracts Mother      No Known Problems Father      Colon cancer Brother      Nephrolithiasis Sister      No Known Problems Maternal Aunt      No Known Problems Maternal Uncle      No Known Problems Paternal Aunt      No Known Problems Paternal Uncle      No Known  Problems Maternal Grandmother      No Known Problems Maternal Grandfather      No Known Problems Paternal Grandmother      No Known Problems Paternal Grandfather      Anesthesia problems Neg Hx      Kidney cancer Neg Hx      Amblyopia Neg Hx      Blindness Neg Hx      Cancer Neg Hx      Diabetes Neg Hx      Hypertension Neg Hx      Macular degeneration Neg Hx      Retinal detachment Neg Hx      Strabismus Neg Hx      Stroke Neg Hx      Thyroid disease Neg Hx          Social History     Tobacco Use    Smoking status: Every Day     Current packs/day: 0.25     Average packs/day: 0.3 packs/day for 30.0 years (7.5 ttl pk-yrs)     Types: Cigarettes    Smokeless tobacco: Never   Substance Use Topics    Alcohol use: No     Alcohol/week: 0.0 standard drinks of alcohol        I have reviewed and updated the patient's past medical, surgical, family and social histories.    Review of patient's allergies indicates:   Allergen Reactions    Pantoprazole Other (See Comments)     Elevated Blood Sugars    Metformin Diarrhea        Current Outpatient Medications   Medication Sig Dispense Refill    amLODIPine (NORVASC) 10 MG tablet TAKE ONE TABLET BY MOUTH ONCE A DAY 90 tablet 3    ascorbic acid, vitamin C, (VITAMIN C) 250 mg Chew Take 1 tablet by mouth.      aspirin (ECOTRIN) 81 MG EC tablet Take 81 mg by mouth once daily.      blood sugar diagnostic Strp Dispense: Test strip to check glucose 2 times a day, ICD-10: E11.65, compatible with insurance/glucometer, dispense enough for 90 day supply 300 each 3    blood-glucose meter kit Dispense: 1 glucometer, use to check glucose 2 times a day, ICD-10: E11.65,  Dispense machine covered by insurance 1 each 0    cyanocobalamin, vitamin B-12, 1,000 mcg TbSR Take by mouth once daily.      famotidine (PEPCID) 40 MG tablet TAKE ONE TABLET BY MOUTH ONCE A DAY (Patient taking differently: PRN) 90 tablet 0    furosemide (LASIX) 20 MG tablet TAKE ONE TABLET BY MOUTH ONCE A DAY AS NEEDED 30 tablet 3  "   irbesartan (AVAPRO) 300 MG tablet Take 1 tablet (300 mg total) by mouth once daily. 90 tablet 3    lancets Misc Dispense: Lancets use to check glucose 2 times a day, ICD-10: E11.65, dispense enough for 90 day supply 300 each 3    lancing device Misc One device, used to check blood glucose, ICD-10: E11.65 1 each 0    multivitamin (THERAGRAN) per tablet Take 1 tablet by mouth once daily.      nicotine polacrilex 2 MG Lozg Take 1 lozenge (2 mg total) by mouth as needed (smoking cessation). 60 lozenge 5    omeprazole (PRILOSEC) 40 MG capsule Take 1 capsule (40 mg total) by mouth once daily. 90 capsule 4    oxybutynin (DITROPAN-XL) 5 MG TR24 TAKE ONE TABLET BY MOUTH ONCE A DAY 90 tablet 3    pen needle, diabetic (BD INSULIN PEN NEEDLE UF SHORT) 31 gauge x 5/16" Ndle USE AS DIRECTED 100 each 12    rosuvastatin (CRESTOR) 20 MG tablet TAKE ONE TABLET BY MOUTH ONCE A DAY 90 tablet 12    SITagliptin phosphate (JANUVIA) 25 MG Tab Take 1 tablet (25 mg total) by mouth once daily. 90 tablet 3    traMADoL (ULTRAM) 50 mg tablet Take 1 tablet (50 mg total) by mouth every 6 (six) hours as needed. 120 tablet 1    vitamin D (VITAMIN D3) 1000 units Tab Take 1,000 Units by mouth once daily.       No current facility-administered medications for this visit.        Physical Exam:   /60 (BP Location: Left arm, Patient Position: Sitting)   Pulse 67   Temp 97 °F (36.1 °C) (Temporal)   Resp 18   Ht 5' 3" (1.6 m)   Wt 59 kg (130 lb)   SpO2 98%   BMI 23.03 kg/m²      ECOG Performance status: 1            Physical Exam  Constitutional:       General: She is not in acute distress.     Appearance: She is normal weight.   HENT:      Head: Normocephalic.   Eyes:      General: No scleral icterus.     Conjunctiva/sclera: Conjunctivae normal.   Cardiovascular:      Rate and Rhythm: Normal rate.   Pulmonary:      Effort: Pulmonary effort is normal. No respiratory distress.   Abdominal:      General: There is no distension.      " Palpations: Abdomen is soft.   Musculoskeletal:         General: Normal range of motion.      Cervical back: Normal range of motion and neck supple.   Skin:     General: Skin is warm.      Coloration: Skin is not jaundiced.   Neurological:      General: No focal deficit present.      Mental Status: She is alert and oriented to person, place, and time.   Psychiatric:         Mood and Affect: Mood normal.         Behavior: Behavior normal.         Thought Content: Thought content normal.           Labs:   No results found for this or any previous visit (from the past 48 hours).           Imaging:       I have personally reviewed the above imaging    Path:   Reviewed pathology as documented in oncology history      Assessment and Plan:     1. Renal cell carcinoma of right kidney  Overview:  Metastatic ccRCC 01/2025 in the setting of incidental finding of an anterior abdominal wall mass during surveillance of prior lung cancer. She has remote history of grade 2 ccRCC sp partial nephrectomy 08/2013.  Subsquently underwent radiation therapy to an early stage squamous cell lung cancer 01/2023. Around this time, was noted to have abdominal wall subcutaneous nodule.  FDG PET 11/2024 showed progressive 3.9cm anterior abdominal wall mass; biopsy 01/2025 consistetn with metatsatic ccRCC. There was additional pulmonary nodules of concern. She underwent EBRT to the RLL nodule 04/2025. Considered high risk for surgical resection of abdominal met. Started Cabo/Nivo 05/2025.    Assessment & Plan:  Started Cabo nivo; tolerating well but does have left shouler arthritis. Encouraged tylenol. Otherwise will continue current treatment plan.  - Con't cabozantinib 40mg po daily + nivo 480mg IV q4 weeks tomorrow  - FU 4 weeks for next treatment  - Repeat imaging in 2 months; consider surgical re-evaluation at that time      2. Secondary malignant neoplasm of right lung    3. 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 Osteoporosis, post-menopausal  Assessment & Plan:  - Follow with endocrine; long term Proli since ~2018.  - Will receive Prolia shot tomorrow with her nivolumab      Other orders  -     nivolumab 480 mg in 0.9% NaCl 98 mL infusion  -     prochlorperazine injection Soln 5 mg  -     EPINEPHrine (EPIPEN) 0.3 mg/0.3 mL pen injection 0.3 mg  -     diphenhydrAMINE injection 50 mg  -     hydrocortisone sodium succinate injection 100 mg  -     0.9% NaCl 250 mL flush bag  -     sodium chloride 0.9% flush 10 mL  -     heparin, porcine (PF) 100 unit/mL injection flush 500 Units  -     alteplase injection 2 mg            Follow up:   Route Chart for Scheduling    Med Onc Chart Routing      Follow up with physician Tank Santiago 2 month   Follow up with HANG Pop 4 weeks   Infusion scheduling note    Injection scheduling note    Labs    Imaging CT chest abdomen pelvis   2 months   Pharmacy appointment    Other referrals                Treatment Plan Information   OP NIVOLUMAB 480MG Q4W + CABOZANTINIB 40MG QD Paxton Santiago MD   Associated diagnosis: Renal cell carcinoma   noted on 8/12/2013  Associated diagnosis: Secondary malignant neoplasm of right lung   noted on 3/27/2025  Associated diagnosis: 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   noted on 3/27/2025  Associated diagnosis: Primary malignant neoplasm of right lower lobe of lung Stage IA2 cT1b, cN0, cM0 noted on 11/30/2022   Line of treatment: First Line  Treatment Goal: Palliative     Upcoming Treatment Dates - OP NIVOLUMAB 480MG Q4W + CABOZANTINIB 40MG QD    5/20/2025       Chemotherapy       nivolumab 480 mg in 0.9% NaCl 98 mL infusion  6/17/2025       Chemotherapy       nivolumab 480 mg in 0.9% NaCl 98 mL infusion  7/15/2025       Chemotherapy       nivolumab 480 mg in 0.9% NaCl 98 mL infusion  8/12/2025       Chemotherapy       nivolumab 480 mg in 0.9% NaCl 98 mL infusion    Therapy Plan  Information  DENOSUMAB (PROLIA) Q6M for Osteoporosis, post-menopausal, noted on 3/17/2014  Medications  denosumab (PROLIA) injection 60 mg  60 mg, Subcutaneous, Every 26 weeks      No therapy plan of the specified type found.    No therapy plan of the specified type found.      The above information has been reviewed with the patient and all questions have been answered to their apparent satisfaction.  They understand that they can call the clinic with any questions.      Paxton Santiago MD MPH  Staff Physician     [facility] [place] [address]  Email: [email or web address]  Phone: o) [phone] (c) [phone]

## 2025-05-19 NOTE — ASSESSMENT & PLAN NOTE
Started [place] n[place]; tolerating well but does have left shouler arthritis. Encouraged tylenol. Otherwise will continue current treatment plan.  - Con't cabozantinib 40mg po daily + nivo 480mg IV q4 weeks tomorrow  - FU 4 weeks for next treatment  - Repeat imaging in 2 months; consider surgical re-evaluation at that time

## 2025-05-20 ENCOUNTER — INFUSION (OUTPATIENT)
Dept: INFUSION THERAPY | Facility: HOSPITAL | Age: 75
End: 2025-05-20
Payer: MEDICARE

## 2025-05-20 VITALS
WEIGHT: 132.5 LBS | HEART RATE: 78 BPM | TEMPERATURE: 98 F | SYSTOLIC BLOOD PRESSURE: 116 MMHG | BODY MASS INDEX: 23.47 KG/M2 | OXYGEN SATURATION: 96 % | RESPIRATION RATE: 18 BRPM | DIASTOLIC BLOOD PRESSURE: 67 MMHG

## 2025-05-20 DIAGNOSIS — M81.0 OSTEOPOROSIS, POST-MENOPAUSAL: Primary | ICD-10-CM

## 2025-05-20 DIAGNOSIS — C78.01 SECONDARY MALIGNANT NEOPLASM OF RIGHT LUNG: ICD-10-CM

## 2025-05-20 DIAGNOSIS — C64.9 RENAL CELL CARCINOMA, UNSPECIFIED LATERALITY: ICD-10-CM

## 2025-05-20 DIAGNOSIS — C34.31 PRIMARY MALIGNANT NEOPLASM OF RIGHT LOWER LOBE OF LUNG: ICD-10-CM

## 2025-05-20 DIAGNOSIS — C79.89 SECONDARY MALIGNANT NEOPLASM OF SOFT TISSUES OF ABDOMEN: ICD-10-CM

## 2025-05-20 PROCEDURE — 63600175 PHARM REV CODE 636 W HCPCS: Mod: JZ,TB | Performed by: HOSPITALIST

## 2025-05-20 PROCEDURE — 96413 CHEMO IV INFUSION 1 HR: CPT

## 2025-05-20 PROCEDURE — 96372 THER/PROPH/DIAG INJ SC/IM: CPT | Mod: 59

## 2025-05-20 PROCEDURE — 25000003 PHARM REV CODE 250: Performed by: HOSPITALIST

## 2025-05-20 RX ORDER — EPINEPHRINE 0.3 MG/.3ML
0.3 INJECTION SUBCUTANEOUS ONCE AS NEEDED
Status: DISCONTINUED | OUTPATIENT
Start: 2025-05-20 | End: 2025-05-20 | Stop reason: HOSPADM

## 2025-05-20 RX ORDER — DIPHENHYDRAMINE HYDROCHLORIDE 50 MG/ML
50 INJECTION, SOLUTION INTRAMUSCULAR; INTRAVENOUS ONCE AS NEEDED
Status: DISCONTINUED | OUTPATIENT
Start: 2025-05-20 | End: 2025-05-20 | Stop reason: HOSPADM

## 2025-05-20 RX ADMIN — SODIUM CHLORIDE 480 MG: 9 INJECTION, SOLUTION INTRAVENOUS at 02:05

## 2025-05-20 RX ADMIN — DENOSUMAB 60 MG: 60 INJECTION SUBCUTANEOUS at 01:05

## 2025-05-20 NOTE — PLAN OF CARE
Pt ambulatory to unit, AAOx4. Denies any new or worsening of symptoms since last visit. Pt received Nivolumab infusion via 24g R forearm. Dsg c/d/i; no s/s of infection or infiltration noted. PIV flushed and patent with blood return. Pt tolerated medication well. No S&S of an adverse reaction, VSS. Denies pain or discomfort. Care plan discussed with pt. Pt verbalized understanding. Pt aware of upcoming appointment via MyChart. Pt ambulatory upon discharge in NAD.

## 2025-06-03 DIAGNOSIS — R52 PAIN: ICD-10-CM

## 2025-06-05 ENCOUNTER — TELEPHONE (OUTPATIENT)
Dept: HEMATOLOGY/ONCOLOGY | Facility: CLINIC | Age: 75
End: 2025-06-05
Payer: MEDICARE

## 2025-06-05 DIAGNOSIS — C64.1 RENAL CELL CARCINOMA OF RIGHT KIDNEY: ICD-10-CM

## 2025-06-05 DIAGNOSIS — M79.10 MYALGIA: Primary | ICD-10-CM

## 2025-06-05 RX ORDER — TRAMADOL HYDROCHLORIDE 50 MG/1
50 TABLET, FILM COATED ORAL EVERY 6 HOURS PRN
Qty: 120 TABLET | Refills: 1 | Status: SHIPPED | OUTPATIENT
Start: 2025-06-05

## 2025-06-06 ENCOUNTER — TELEPHONE (OUTPATIENT)
Dept: HEMATOLOGY/ONCOLOGY | Facility: CLINIC | Age: 75
End: 2025-06-06
Payer: MEDICARE

## 2025-06-09 ENCOUNTER — E-CONSULT (OUTPATIENT)
Dept: RHEUMATOLOGY | Facility: CLINIC | Age: 75
End: 2025-06-09
Payer: MEDICARE

## 2025-06-09 ENCOUNTER — OFFICE VISIT (OUTPATIENT)
Dept: HEMATOLOGY/ONCOLOGY | Facility: CLINIC | Age: 75
End: 2025-06-09
Payer: MEDICARE

## 2025-06-09 ENCOUNTER — LAB VISIT (OUTPATIENT)
Dept: LAB | Facility: HOSPITAL | Age: 75
End: 2025-06-09
Payer: MEDICARE

## 2025-06-09 VITALS
SYSTOLIC BLOOD PRESSURE: 222 MMHG | WEIGHT: 135.81 LBS | HEART RATE: 76 BPM | RESPIRATION RATE: 18 BRPM | DIASTOLIC BLOOD PRESSURE: 94 MMHG | BODY MASS INDEX: 24.06 KG/M2 | OXYGEN SATURATION: 97 % | HEIGHT: 63 IN | TEMPERATURE: 98 F

## 2025-06-09 DIAGNOSIS — C79.89 SECONDARY MALIGNANT NEOPLASM OF SOFT TISSUES OF ABDOMEN: ICD-10-CM

## 2025-06-09 DIAGNOSIS — M79.10 MYALGIA: ICD-10-CM

## 2025-06-09 DIAGNOSIS — M05.9 SEROPOSITIVE RHEUMATOID ARTHRITIS: Primary | ICD-10-CM

## 2025-06-09 DIAGNOSIS — M79.10 MYALGIA: Primary | ICD-10-CM

## 2025-06-09 DIAGNOSIS — C64.1 RENAL CELL CARCINOMA OF RIGHT KIDNEY: ICD-10-CM

## 2025-06-09 DIAGNOSIS — C78.01 SECONDARY MALIGNANT NEOPLASM OF RIGHT LUNG: ICD-10-CM

## 2025-06-09 DIAGNOSIS — C34.31 PRIMARY MALIGNANT NEOPLASM OF RIGHT LOWER LOBE OF LUNG: ICD-10-CM

## 2025-06-09 DIAGNOSIS — I10 ESSENTIAL HYPERTENSION: ICD-10-CM

## 2025-06-09 LAB
ABSOLUTE EOSINOPHIL (OHS): 0.17 K/UL
ABSOLUTE MONOCYTE (OHS): 0.8 K/UL (ref 0.3–1)
ABSOLUTE NEUTROPHIL COUNT (OHS): 8.43 K/UL (ref 1.8–7.7)
ALBUMIN SERPL BCP-MCNC: 3.3 G/DL (ref 3.5–5.2)
ALP SERPL-CCNC: 136 UNIT/L (ref 40–150)
ALT SERPL W/O P-5'-P-CCNC: <5 UNIT/L (ref 10–44)
ANION GAP (OHS): 12 MMOL/L (ref 8–16)
AST SERPL-CCNC: 7 UNIT/L (ref 11–45)
BASOPHILS # BLD AUTO: 0.03 K/UL
BASOPHILS NFR BLD AUTO: 0.3 %
BILIRUB SERPL-MCNC: 0.6 MG/DL (ref 0.1–1)
BUN SERPL-MCNC: 13 MG/DL (ref 8–23)
CALCIUM SERPL-MCNC: 8.7 MG/DL (ref 8.7–10.5)
CHLORIDE SERPL-SCNC: 105 MMOL/L (ref 95–110)
CO2 SERPL-SCNC: 22 MMOL/L (ref 23–29)
CREAT SERPL-MCNC: 1 MG/DL (ref 0.5–1.4)
CRP SERPL-MCNC: 15.3 MG/L
CYCLIC CITRULLINATED PEPTIDE (CCP) (OHS): 64.5 U/ML
ERYTHROCYTE [DISTWIDTH] IN BLOOD BY AUTOMATED COUNT: 14.9 % (ref 11.5–14.5)
ERYTHROCYTE [SEDIMENTATION RATE] IN BLOOD BY PHOTOMETRIC METHOD: 55 MM/HR
GFR SERPLBLD CREATININE-BSD FMLA CKD-EPI: 59 ML/MIN/1.73/M2
GLUCOSE SERPL-MCNC: 282 MG/DL (ref 70–110)
HCT VFR BLD AUTO: 40.4 % (ref 37–48.5)
HGB BLD-MCNC: 13.3 GM/DL (ref 12–16)
IMM GRANULOCYTES # BLD AUTO: 0.06 K/UL (ref 0–0.04)
IMM GRANULOCYTES NFR BLD AUTO: 0.6 % (ref 0–0.5)
LYMPHOCYTES # BLD AUTO: 0.85 K/UL (ref 1–4.8)
MCH RBC QN AUTO: 27.8 PG (ref 27–31)
MCHC RBC AUTO-ENTMCNC: 32.9 G/DL (ref 32–36)
MCV RBC AUTO: 85 FL (ref 82–98)
NUCLEATED RBC (/100WBC) (OHS): 0 /100 WBC
PLATELET # BLD AUTO: 242 K/UL (ref 150–450)
PMV BLD AUTO: 8.2 FL (ref 9.2–12.9)
POTASSIUM SERPL-SCNC: 3.9 MMOL/L (ref 3.5–5.1)
PROT SERPL-MCNC: 7.7 GM/DL (ref 6–8.4)
RBC # BLD AUTO: 4.78 M/UL (ref 4–5.4)
RELATIVE EOSINOPHIL (OHS): 1.6 %
RELATIVE LYMPHOCYTE (OHS): 8.2 % (ref 18–48)
RELATIVE MONOCYTE (OHS): 7.7 % (ref 4–15)
RELATIVE NEUTROPHIL (OHS): 81.6 % (ref 38–73)
RHEUMATOID FACT SERPL-ACNC: 695 IU/ML
SODIUM SERPL-SCNC: 139 MMOL/L (ref 136–145)
TSH SERPL-ACNC: 1.17 UIU/ML (ref 0.4–4)
WBC # BLD AUTO: 10.34 K/UL (ref 3.9–12.7)

## 2025-06-09 PROCEDURE — G2211 COMPLEX E/M VISIT ADD ON: HCPCS | Mod: S$GLB,,, | Performed by: NURSE PRACTITIONER

## 2025-06-09 PROCEDURE — 1126F AMNT PAIN NOTED NONE PRSNT: CPT | Mod: CPTII,S$GLB,, | Performed by: NURSE PRACTITIONER

## 2025-06-09 PROCEDURE — 99999 PR PBB SHADOW E&M-EST. PATIENT-LVL IV: CPT | Mod: PBBFAC,,, | Performed by: NURSE PRACTITIONER

## 2025-06-09 PROCEDURE — 3077F SYST BP >= 140 MM HG: CPT | Mod: CPTII,S$GLB,, | Performed by: NURSE PRACTITIONER

## 2025-06-09 PROCEDURE — 1101F PT FALLS ASSESS-DOCD LE1/YR: CPT | Mod: CPTII,S$GLB,, | Performed by: NURSE PRACTITIONER

## 2025-06-09 PROCEDURE — 3080F DIAST BP >= 90 MM HG: CPT | Mod: CPTII,S$GLB,, | Performed by: NURSE PRACTITIONER

## 2025-06-09 PROCEDURE — 82040 ASSAY OF SERUM ALBUMIN: CPT

## 2025-06-09 PROCEDURE — 84443 ASSAY THYROID STIM HORMONE: CPT

## 2025-06-09 PROCEDURE — 36415 COLL VENOUS BLD VENIPUNCTURE: CPT

## 2025-06-09 PROCEDURE — 3288F FALL RISK ASSESSMENT DOCD: CPT | Mod: CPTII,S$GLB,, | Performed by: NURSE PRACTITIONER

## 2025-06-09 PROCEDURE — 86140 C-REACTIVE PROTEIN: CPT

## 2025-06-09 PROCEDURE — 1159F MED LIST DOCD IN RCRD: CPT | Mod: CPTII,S$GLB,, | Performed by: NURSE PRACTITIONER

## 2025-06-09 PROCEDURE — 85652 RBC SED RATE AUTOMATED: CPT

## 2025-06-09 PROCEDURE — 86200 CCP ANTIBODY: CPT

## 2025-06-09 PROCEDURE — 86431 RHEUMATOID FACTOR QUANT: CPT

## 2025-06-09 PROCEDURE — 99215 OFFICE O/P EST HI 40 MIN: CPT | Mod: S$GLB,,, | Performed by: NURSE PRACTITIONER

## 2025-06-09 PROCEDURE — 85025 COMPLETE CBC W/AUTO DIFF WBC: CPT

## 2025-06-09 RX ORDER — OXYCODONE AND ACETAMINOPHEN 5; 325 MG/1; MG/1
1 TABLET ORAL 3 TIMES DAILY PRN
Qty: 30 TABLET | Refills: 0 | Status: SHIPPED | OUTPATIENT
Start: 2025-06-09

## 2025-06-09 RX ORDER — PREDNISONE 10 MG/1
TABLET ORAL
Qty: 50 TABLET | Refills: 0 | Status: SHIPPED | OUTPATIENT
Start: 2025-06-09 | End: 2025-06-29

## 2025-06-09 NOTE — CONSULTS
[facility] Rheumatology  Response for E-Consult     Patient Name: Sarina Genao  MRN: [id number]  Primary Care Provider: Venancio Mayer MD   Requesting Provider: Kiesha Landa NP  Consults  Medical Condition: Symmetric Inflammatory Arthritis   What is your clinical question? 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  she is now having severe joint pain, CCP and rheumatoid factor elevated.  we are holding immunotherapy and giving short course of oral steroids.  is there anything else that should be done?       Recommendation:  Immunotherapy related adverse event -new onset seropositive rheumatoid.  Mildly elevated ESR and CRP.    If arthritis resolves with steroid taper, no further action would be required at this time.    If arthritis improves but fails to resolve consider continuation of low-dose prednisone 10 mg daily while continuing immunotherapy.   If arthritis fails to improve on present prednisone taper, she might need disease modifying agents with methotrexate/rituximab.  Consider referral to Rheumatology Clinic then.    Total time of Consultation: 5 minute    I did not speak to the requesting provider verbally about this.     *This eConsult is based on the clinical data available to me and is furnished without benefit of a physical examination. The eConsult will need to be interpreted in light of any clinical issues or changes in patient status not available to me at the time of filing this eConsults. Significant changes in patient condition or level of acuity should result in immediate formal consultation and reevaluation. Please alert me if you have further questions.    Thank you for this eConsult referral.     Fahad Miramontes MD  [facility] Rheumatology

## 2025-06-09 NOTE — PROGRESS NOTES
"MEDICAL ONCOLOGY - ESTABLISHED PATIENT VISIT    Best Contact Phone Number(s): [phone] (home)      Cancer/Stage/TNM:    Cancer Staging   Squamous Cell Carcinoma of right lower lobe of lung  Staging form: Lung, AJCC 8th Edition  - Clinical stage from 1/11/2023: Stage IA2 (cT1b, cN0, cM0) - Signed by Xavier Deng MD on 1/11/2023       Reason for visit: Metachronous recurrence of RCC      Treatment:  08/2013    Partial nephrectomy  04/2025    EBRT to RLL nodule  04/22/25 -     Nivolumab  05/12/25 -     Cabozantinib      HPI: Sarina Genao is a 74 y.o. 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 She has remote history of grade 2 ccRCC sp partial nephrectomy 08/2013.  Subsquently underwent radiation therapy to an early stage squamous cell lung cancer 01/2023. Around this time, was noted to have abdominal wall subcutaneous nodule.  FDG PET 11/2024 showed progressive 3.9cm anterior abdominal wall mass; biopsy 01/2025 consistetn with metatsatic ccRCC. There was additional pulmonary nodules of concern. She underwent EBRT to the RLL nodule 04/2025. She presents to medical oncology clinic prior to starting cabozantinib + nivolumab.      History has been obtained by chart review and discussion with the patient.    Interval Events:  Presents as urgent visit with sister due to severe pain in joints.  Has history of arthritis that recently has been unmanageable.  Reports unable to "get up from toilet due to knee pain."  Has taken tylenol with little relief.  Unable to ambulate comfortably due to bilateral knee pain.  Labs sent preemptively - elevated rheumatoid factor, ccp antibody, and crp.  E consult placed to rheumatology:    Recommendation:  Immunotherapy related adverse event -new onset seropositive rheumatoid.  Mildly elevated ESR and CRP.    If arthritis resolves with steroid taper, no further " action would be required at this time.    If arthritis improves but fails to resolve consider continuation of low-dose prednisone 10 mg daily while continuing immunotherapy.   If arthritis fails to improve on present prednisone taper, she might need disease modifying agents with methotrexate/rituximab.  Consider referral to Rheumatology Clinic then.    Pain medication and oral steroid taper sent to pharmacy.  Will hold nivolumab for possible IO adverse effect.  Plan to reassess symptoms in 1 week.     Started cabozantinb 5/12/25. Will continue cabo for now.    Prolia due 11/2025.        Oncology History   Renal cell carcinoma   8/6/2013 Surgery    08/06/2013: Partial right nephrectomy.   Procedure: Partial nephrectomy. Specimen Laterality: Right Tumor Size: 4 cm in greatest dimension. Tumor Focality: Unifocal. Macroscopic Extent Of Tumor: Tumor limited to the kidney. Histologic Type: Clear cell renal cell carcinoma. Sarcomatoid features: Not identified. Histologic Grade (Heron nuclear grade): G2. Microscopic Tumor Extension: Tumor limited to the kidney Margins: Uninvolved by invasive carcinoma. Pathologic Staging: Primary tumor: pT1a Regional lymph node: pNx Distant metastasis: Not applicable.      11/25/2022 Imaging Significant Findings    11/25/22 PET CT  Impression:  - Hypermetabolic 1.5 cm nodule in the right lower lobe of the lung concerning for primary neoplasm.  No hypermetabolic lymphadenopathy.  - Round soft tissue structure within the anterior abdominal wall measuring up to 2.3 cm with mild radiotracer uptake.  Recommend further assessment with dedicated ultrasound and/or percutaneous sampling as clinically warranted.  - Additional pulmonary nodules without significant FDG avidity as above.  Findings include a new 3 mm nodule in the left upper lobe.  Of note, subcentimeter nodules are likely too small to characterize with PET.  Recommend continued CT surveillance.     11/22/2024 Imaging Significant  Findings    11/22/24 FDG PET  Impression:  - Patient with history of right lower lobe non-small cell lung cancer status post radiation therapy.  No evidence of tracer avid disease or metastasis.  - Interval enlargement of a 3.9 cm anterior abdominal wall mass with mild increased tracer uptake.  Recommend further evaluation with ultrasound and tissue sampling is warranted.     1/6/2025 Biopsy    01/06/25 Biopsy    RIGHT ABDOMINAL WALL, CT-GUIDED BIOPSY WITH PATHOLOGIST ASSISTED ADEQUACY (CYTOLOGY AND CELL BLOCK):  Positive for malignancy, consistent with renal cell carcinoma.  See comment.        1/31/2025 Imaging Significant Findings    1/31/25 Tc99m Bone scan  Impression:  - There is no scintigraphic evidence of osteoblastic metastatic disease.    1/31/25 CT torso  Impression:  - Decreased size of a right anterior abdominal wall mass.  - Stable pulmonary nodules.     4/8/2025 - 4/14/2025 Radiation Therapy    Treating physician: Xavier Deng    Site Technique Energy Dose/Fx (Gy) #Fx Total Dose (Gy)   RLL Lung SBRT 6X 18 3 / 3 54        4/10/2025 - 4/10/2025 Chemotherapy    Treatment Summary   Plan Name: NIVOLUMAB 480MG Q4W + CABOZANTINIB 40MG QD  Treatment Goal: Palliative  Status: Inactive  Start Date:   End Date:   Provider: Paxton Santiago MD  Chemotherapy: cabozantinib (CABOMETYX) 40 mg Tab, 40 mg, Oral, Daily, 0 of 1 cycle, Start date: 4/10/2025, End date: 5/8/2025 4/22/2025 -  Chemotherapy    Treatment Summary   Plan Name: OP NIVOLUMAB 480MG Q4W + CABOZANTINIB 40MG QD  Treatment Goal: Palliative  Status: Active  Start Date: 4/22/2025  End Date: 3/24/2026 (Planned)  Provider: Paxton Santiago MD  Chemotherapy: [No matching medication found in this treatment plan]     Squamous Cell Carcinoma of right lower lobe of lung   10/25/2022 Biopsy    10/25/22 Lung biopsy  1. Lung, right lower lobe, endobronchial brushing, cytologic evaluation with  cell block:  Atypical cells present.  Epithelial  atypia.  Findings suggestive of squamous cell carcinoma  2. Lung, right lower lobe, endoscopic fine needle aspiration, cytologic  evaluation with cell block:  Positive for malignancy. Lung squamous cell carcinoma.  3. Lung, right lower lobe, transbronchial biopsy, cytologic evaluation with  cell block:  Positive for malignancy, Lung squamous cell carcinoma  - PD-L1 and Lung NGS Panel have been ordered and results will be issued in a  supplemental report.  4. Lung, right lower lobe, bronchoalveolar lavage, cytologic evaluation with  cell block:  Negative for malignant cells.  Reactive bronchial cells.  Macrophages.  5. Lung, left lower lobe, endoscopic fine needle aspiration, cytologic  evaluation with cell block:  Negative for malignant cells.  Reactive bronchial cells.  Macrophages.  6. Lymph node, Station 7, endoscopic fine needle aspiration, cytologic  evalu ation with cell block:  Negative for malignant cells. Reactive bronchial cells.  Lymphocytes present.      11/25/2022 Imaging Significant Findings    11/25/22 PET CT  Impression:  - Hypermetabolic 1.5 cm nodule in the right lower lobe of the lung concerning for primary neoplasm.  No hypermetabolic lymphadenopathy.  - Round soft tissue structure within the anterior abdominal wall measuring up to 2.3 cm with mild radiotracer uptake.  Recommend further assessment with dedicated ultrasound and/or percutaneous sampling as clinically warranted.  - Additional pulmonary nodules without significant FDG avidity as above.  Findings include a new 3 mm nodule in the left upper lobe.  Of note, subcentimeter nodules are likely too small to characterize with PET.  Recommend continued CT surveillance.     11/30/2022 Initial Diagnosis    Squamous Cell Carcinoma of right lower lobe of lung     1/11/2023 Cancer Staged    Staging form: Lung, AJCC 8th Edition  - Clinical stage from 1/11/2023: Stage IA2 (cT1b, cN0, cM0)     1/17/2023 Imaging Significant Findings    01/17/23 CT  Chest  Impression:  1. Interval enlargement of a right lower lobe nodule, now 2.3 cm maximally (previously 1.5 cm).  This was noted hypermetabolic on the prior PET-CT 09/29/2022.  2. There is a newly seen peripheral, right lower lobe irregular nodule up to 1.3 cm. v this was a site of thoracotomy and may be postsurgical in nature.  3. There other pulmonary nodules as described above which do not appear changed.  There is no new lymphadenopathy detected.  4. There is a new small right pleural effusion and peripheral airspace opacities in the right lung.  This may reflect infectious/inflammatory etiology.  Alternatively, this may reflect involving appearance of postsurgical change.     1/26/2023 - 2/3/2023 Radiation Therapy    Treating physician: Xavier Leyva Technique Energy Dose/Fx (Gy) #Fx Total Dose (Gy)   RLL Lung SBRT 6X 12.5 4 / 4 50         Biopsy       4/26/2023 Imaging Significant Findings    04/26/23 CT Chest  Impression:  1. Interval decrease in size of a right lower lobe irregular nodule with adjacent architectural distortion.  Consistent with post treatment changes.  2. Other findings as above without significant interval change.     5/10/2024 Imaging Significant Findings    5/10/24 CT Chest  Impression:  1. Stable bilateral pulmonary nodules, including right lower lobe spiculated nodule.  No new or enlarging nodules.  2. Additional findings as above.     11/13/2024 Imaging Significant Findings    11/13/24 CT Chest  Impression:  1. Stable bilateral pulmonary nodules, including right lower lobe spiculated nodule.  No new or enlarging nodules.  2. Additional findings as above.     11/22/2024 Imaging Significant Findings    11/22/24 FDG PET  Impression:  - Patient with history of right lower lobe non-small cell lung cancer status post radiation therapy.  No evidence of tracer avid disease or metastasis.  - Interval enlargement of a 3.9 cm anterior abdominal wall mass with mild increased tracer  uptake.  Recommend further evaluation with ultrasound and tissue sampling is warranted.     4/22/2025 -  Chemotherapy    Treatment Summary   Plan Name: OP NIVOLUMAB 480MG Q4W + CABOZANTINIB 40MG QD  Treatment Goal: Palliative  Status: Active  Start Date: 4/22/2025  End Date: 3/24/2026 (Planned)  Provider: Paxton Santiago MD  Chemotherapy: [No matching medication found in this treatment plan]     Secondary malignant neoplasm of right lung   3/27/2025 Initial Diagnosis    Secondary malignant neoplasm of right lung     4/22/2025 -  Chemotherapy    Treatment Summary   Plan Name: OP NIVOLUMAB 480MG Q4W + CABOZANTINIB 40MG QD  Treatment Goal: Palliative  Status: Active  Start Date: 4/22/2025  End Date: 3/24/2026 (Planned)  Provider: Paxton Santiago MD  Chemotherapy: [No matching medication found in this treatment plan]     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   3/27/2025 Initial Diagnosis    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     4/22/2025 -  Chemotherapy    Treatment Summary   Plan Name: OP NIVOLUMAB 480MG Q4W + CABOZANTINIB 40MG QD  Treatment Goal: Palliative  Status: Active  Start Date: 4/22/2025  End Date: 3/24/2026 (Planned)  Provider: Paxton Santiago MD  Chemotherapy: [No matching medication found in this treatment plan]          Past Medical History:   Diagnosis Date    Anticoagulant long-term use     Cancer     Kidney (Right)    Cataracts, bilateral     CKD (chronic kidney disease) stage 3, GFR 30-59 ml/min 12/15/2014    Colon polyps     Combined hyperlipidemia associated with type 2 diabetes mellitus 6/7/2013    COPD (chronic obstructive pulmonary disease) 12/15/2014    Diabetes mellitus type II     NIDDM, a.m. glucose-120s-130s-last A1c-7.1-6/7/13    Diabetes mellitus with renal manifestations, uncontrolled 2/1/2017    Diabetic retinopathy     Family history of stomach cancer 12/5/2013    Former smoker     H/O renal cell carcinoma 12/15/2015    History of colonic  polyps 2/1/2017    3 polyps 7/13 ---5 yrs    History of gastroesophageal reflux (GERD)     History of urinary incontinence     s/p bladder lift-october, 2011 (at [facility])    Hyperlipidemia     Hypertension     120s/70s-80s    Nephrolithiasis     Osteoarthritis of thumb 12/20/2019    Osteoporosis, post-menopausal 3/17/2014    Primary hyperparathyroidism 10/20/2016    Schatzki's ring 2/1/2017    Dilated 2014        Past Surgical History:   Procedure Laterality Date    bladder lift  2011    done at [facility]    BLADDER STONE REMOVAL  2011    before bladder lift    CATARACT EXTRACTION W/  INTRAOCULAR LENS IMPLANT Left 06/07/2016    Dr. Vásquez    CATARACT EXTRACTION W/  INTRAOCULAR LENS IMPLANT Right 06/21/2016    Dr. Vásquez    COLONOSCOPY N/A 4/26/2018    Procedure: COLONOSCOPY;  Surgeon: Benjamin Zamora MD;  Location: [facility] ENDO;  Service: Endoscopy;  Laterality: N/A;  confirmed ss    COLONOSCOPY N/A 12/21/2023    Procedure: COLONOSCOPY;  Surgeon: Jamel Luis MD;  Location: [facility] ENDO;  Service: Endoscopy;  Laterality: N/A;  9/7 ref Venancio Mayer MD, Suprep, instr. mailed, [facility]  12/14- instr reviewed, pt reminded to hold ozempic, pre call complete.  DBM    CYSTOSCOPY      INJECTION OF ANESTHETIC AGENT AROUND MULTIPLE INTERCOSTAL NERVES  12/27/2022    Procedure: BLOCK, NERVE, INTERCOSTAL, 2 OR MORE;  Surgeon: Paxton Cannon MD;  Location: [facility] OR 2ND FLR;  Service: Thoracic;;    LYSIS OF ADHESIONS  12/27/2022    Procedure: LYSIS, ADHESIONS;  Surgeon: Paxton Cannon MD;  Location: [facility] OR 2ND FLR;  Service: Thoracic;;    NAVIGATIONAL BRONCHOSCOPY N/A 12/11/2018    Procedure: BRONCHOSCOPY, NAVIGATIONAL;  Surgeon: Ashtyn Ramires MD;  Location: [facility] OR 2ND FLR;  Service: Pulmonary;  Laterality: N/A;    NEPHRECTOMY      partial right    ROBOTIC BRONCHOSCOPY N/A 10/25/2022    Procedure: ROBOTIC BRONCHOSCOPY;  Surgeon: Ashtyn Ramires MD;  Location: [facility] OR 2ND FLR;  Service: Pulmonary;  Laterality: N/A;     ROBOTIC BRONCHOSCOPY N/A 2/28/2025    Procedure: ROBOTIC BRONCHOSCOPY;  Surgeon: Ashtyn Ramires MD;  Location: [facility] OR [address] [place] [address];  Service: Pulmonary;  Laterality: N/A;    VIDEO-ASSISTED THORACOSCOPIC SURGERY (VATS)  12/27/2022    Procedure: VATS (VIDEO-ASSISTED THORACOSCOPIC SURGERY);  Surgeon: Paxton Cannon MD;  Location: [facility] OR [address] [place] [address];  Service: Thoracic;;        Family History   Problem Relation Name Age of Onset    Glaucoma Mother      Cataracts Mother      No Known Problems Father      Colon cancer Brother      Nephrolithiasis Sister      No Known Problems Maternal Aunt      No Known Problems Maternal Uncle      No Known Problems Paternal Aunt      No Known Problems Paternal Uncle      No Known Problems Maternal Grandmother      No Known Problems Maternal Grandfather      No Known Problems Paternal Grandmother      No Known Problems Paternal Grandfather      Anesthesia problems Neg Hx      Kidney cancer Neg Hx      Amblyopia Neg Hx      Blindness Neg Hx      Cancer Neg Hx      Diabetes Neg Hx      Hypertension Neg Hx      Macular degeneration Neg Hx      Retinal detachment Neg Hx      Strabismus Neg Hx      Stroke Neg Hx      Thyroid disease Neg Hx          Social History     Tobacco Use    Smoking status: Every Day     Current packs/day: 0.25     Average packs/day: 0.3 packs/day for 30.0 years (7.5 ttl pk-yrs)     Types: Cigarettes    Smokeless tobacco: Never   Substance Use Topics    Alcohol use: No     Alcohol/week: 0.0 standard drinks of alcohol        I have reviewed and updated the patient's past medical, surgical, family and social histories.    Review of patient's allergies indicates:   Allergen Reactions    Pantoprazole Other (See Comments)     Elevated Blood Sugars    Metformin Diarrhea        Current Outpatient Medications   Medication Sig Dispense Refill    amLODIPine (NORVASC) 10 MG tablet TAKE ONE TABLET BY MOUTH ONCE A DAY 90 tablet 3    ascorbic acid, vitamin C, (VITAMIN C) 250 mg  Chew Take 1 tablet by mouth.      aspirin (ECOTRIN) 81 MG EC tablet Take 81 mg by mouth once daily.      blood sugar diagnostic Strp Dispense: Test strip to check glucose 2 times a day, ICD-10: E11.65, compatible with insurance/glucometer, dispense enough for 90 day supply 300 each 3    blood-glucose meter kit Dispense: 1 glucometer, use to check glucose 2 times a day, ICD-10: E11.65,  Dispense machine covered by insurance 1 each 0    cabozantinib (CABOMETYX) 40 mg Tab Take one tablet (40 mg) by mouth once daily on an empty stomach. Fasting is required from 2 hrs before through 1 hr after each dose.  Do not crush or chew. 30 tablet 5    cyanocobalamin, vitamin B-12, 1,000 mcg TbSR Take by mouth once daily.      famotidine (PEPCID) 40 MG tablet TAKE ONE TABLET BY MOUTH ONCE A DAY (Patient taking differently: PRN) 90 tablet 0    furosemide (LASIX) 20 MG tablet TAKE ONE TABLET BY MOUTH ONCE A DAY AS NEEDED 30 tablet 3    irbesartan (AVAPRO) 300 MG tablet Take 1 tablet (300 mg total) by mouth once daily. 90 tablet 3    lancets Misc Dispense: Lancets use to check glucose 2 times a day, ICD-10: E11.65, dispense enough for 90 day supply 300 each 3    lancing device Misc One device, used to check blood glucose, ICD-10: E11.65 1 each 0    multivitamin (THERAGRAN) per tablet Take 1 tablet by mouth once daily.      mupirocin (BACTROBAN) 2 % ointment Apply topically 3 (three) times daily as needed. Apply to skin abrasions on left leg 15 g 1    nicotine polacrilex 2 MG Lozg Take 1 lozenge (2 mg total) by mouth as needed (smoking cessation). 60 lozenge 5    omeprazole (PRILOSEC) 40 MG capsule Take 1 capsule (40 mg total) by mouth once daily. 90 capsule 4    oxybutynin (DITROPAN-XL) 5 MG TR24 TAKE ONE TABLET BY MOUTH ONCE A DAY 90 tablet 3    oxyCODONE-acetaminophen (PERCOCET) 5-325 mg per tablet Take 1 tablet by mouth 3 (three) times daily as needed for Pain. 30 tablet 0    pen needle, diabetic (BD INSULIN PEN NEEDLE UF SHORT) 31  "gauge x 5/16" Ndle USE AS DIRECTED 100 each 12    predniSONE (DELTASONE) 10 MG tablet Take 4 tablets (40 mg total) by mouth once daily for 5 days, THEN 3 tablets (30 mg total) once daily for 5 days, THEN 2 tablets (20 mg total) once daily for 5 days, THEN 1 tablet (10 mg total) once daily for 5 days. 50 tablet 0    rosuvastatin (CRESTOR) 20 MG tablet TAKE ONE TABLET BY MOUTH ONCE A DAY 90 tablet 12    SITagliptin phosphate (JANUVIA) 25 MG Tab Take 1 tablet (25 mg total) by mouth once daily. 90 tablet 3    traMADoL (ULTRAM) 50 mg tablet Take 1 tablet (50 mg total) by mouth every 6 (six) hours as needed. 120 tablet 1    vitamin D (VITAMIN D3) 1000 units Tab Take 1,000 Units by mouth once daily.       No current facility-administered medications for this visit.      Review of Systems   Constitutional:  Negative for chills, fever and weight loss.   HENT:  Positive for congestion.    Respiratory:  Positive for cough, sputum production and shortness of breath. Negative for hemoptysis.    Cardiovascular:  Negative for chest pain, palpitations and leg swelling.   Gastrointestinal:  Negative for abdominal pain, constipation, diarrhea, nausea and vomiting.   Genitourinary:  Negative for dysuria, flank pain, frequency, hematuria and urgency.   Musculoskeletal:  Positive for joint pain and myalgias.   Neurological:  Positive for weakness. Negative for dizziness and headaches.         Physical Exam:   BP (!) 222/94   Pulse 76   Temp 98 °F (36.7 °C) (Oral)   Resp 18   Ht 5' 3" (1.6 m)   Wt 61.6 kg (135 lb 12.9 oz)   SpO2 97%   BMI 24.06 kg/m²      ECOG Performance status: 1            Physical Exam  Constitutional:       General: She is not in acute distress.     Appearance: Normal appearance. She is normal weight.   HENT:      Head: Normocephalic.   Eyes:      General: No scleral icterus.     Conjunctiva/sclera: Conjunctivae normal.   Cardiovascular:      Rate and Rhythm: Normal rate.      Pulses: Normal pulses.      " Heart sounds: Normal heart sounds.   Pulmonary:      Effort: Pulmonary effort is normal. No respiratory distress.   Abdominal:      General: There is no distension.      Palpations: Abdomen is soft.   Musculoskeletal:         General: Normal range of motion.      Cervical back: Normal range of motion and neck supple.   Skin:     General: Skin is warm.      Coloration: Skin is not jaundiced.   Neurological:      General: No focal deficit present.      Mental Status: She is alert and oriented to person, place, and time.   Psychiatric:         Mood and Affect: Mood normal.         Behavior: Behavior normal.         Thought Content: Thought content normal.           Labs:   Recent Results (from the past 48 hours)   CMP    Collection Time: 06/09/25 11:12 AM   Result Value Ref Range    Sodium 139 136 - 145 mmol/L    Potassium 3.9 3.5 - 5.1 mmol/L    Chloride 105 95 - 110 mmol/L    CO2 22 (L) 23 - 29 mmol/L    Glucose 282 (H) 70 - 110 mg/dL    BUN 13 8 - 23 mg/dL    Creatinine 1.0 0.5 - 1.4 mg/dL    Calcium 8.7 8.7 - 10.5 mg/dL    Protein Total 7.7 6.0 - 8.4 gm/dL    Albumin 3.3 (L) 3.5 - 5.2 g/dL    Bilirubin Total 0.6 0.1 - 1.0 mg/dL     40 - 150 unit/L    AST 7 (L) 11 - 45 unit/L    ALT <5 (L) 10 - 44 unit/L    Anion Gap 12 8 - 16 mmol/L    eGFR 59 (L) >60 mL/min/1.73/m2   TSH    Collection Time: 06/09/25 11:12 AM   Result Value Ref Range    TSH 1.171 0.400 - 4.000 uIU/mL   C-Reactive Protein    Collection Time: 06/09/25 11:12 AM   Result Value Ref Range    CRP 15.3 (H) <=8.2 mg/L   Sedimentation rate    Collection Time: 06/09/25 11:12 AM   Result Value Ref Range    Sed Rate 55 (H) <=36 mm/hr   Rheumatoid Factor    Collection Time: 06/09/25 11:12 AM   Result Value Ref Range    Rheumatoid Factor 695.0 (H) <=15.0 IU/mL   Cyclic Citrullinated Peptide Antibody, IgG    Collection Time: 06/09/25 11:12 AM   Result Value Ref Range    CCP Quant 64.5 (H) <=4.9 U/mL   CBC with Differential    Collection Time: 06/09/25 11:12  AM   Result Value Ref Range    WBC 10.34 3.90 - 12.70 K/uL    RBC 4.78 4.00 - 5.40 M/uL    HGB 13.3 12.0 - 16.0 gm/dL    HCT 40.4 37.0 - 48.5 %    MCV 85 82 - 98 fL    MCH 27.8 27.0 - 31.0 pg    MCHC 32.9 32.0 - 36.0 g/dL    RDW 14.9 (H) 11.5 - 14.5 %    Platelet Count 242 150 - 450 K/uL    MPV 8.2 (L) 9.2 - 12.9 fL    Nucleated RBC 0 <=0 /100 WBC    Neut % 81.6 (H) 38 - 73 %    Lymph % 8.2 (L) 18 - 48 %    Mono % 7.7 4 - 15 %    Eos % 1.6 <=8 %    Basophil % 0.3 <=1.9 %    Imm Grans % 0.6 (H) 0.0 - 0.5 %    Neut # 8.43 (H) 1.8 - 7.7 K/uL    Lymph # 0.85 (L) 1 - 4.8 K/uL    Mono # 0.80 0.3 - 1 K/uL    Eos # 0.17 <=0.5 K/uL    Baso # 0.03 <=0.2 K/uL    Imm Grans # 0.06 (H) 0.00 - 0.04 K/uL         Imaging:       I have personally reviewed the above imaging    Path:   Reviewed pathology as documented in oncology history      Assessment and Plan:     1. Myalgia  -     E-Consult to Rheumatology  -     predniSONE (DELTASONE) 10 MG tablet; Take 4 tablets (40 mg total) by mouth once daily for 5 days, THEN 3 tablets (30 mg total) once daily for 5 days, THEN 2 tablets (20 mg total) once daily for 5 days, THEN 1 tablet (10 mg total) once daily for 5 days.  Dispense: 50 tablet; Refill: 0  -     oxyCODONE-acetaminophen (PERCOCET) 5-325 mg per tablet; Take 1 tablet by mouth 3 (three) times daily as needed for Pain.  Dispense: 30 tablet; Refill: 0    2. Renal cell carcinoma of right kidney  Overview:  Metastatic ccRCC 01/2025 in the setting of incidental finding of an anterior abdominal wall mass during surveillance of prior lung cancer. She has remote history of grade 2 ccRCC sp partial nephrectomy 08/2013.  Subsquently underwent radiation therapy to an early stage squamous cell lung cancer 01/2023. Around this time, was noted to have abdominal wall subcutaneous nodule.  FDG PET 11/2024 showed progressive 3.9cm anterior abdominal wall mass; biopsy 01/2025 consistetn with metatsatic ccRCC. There was additional pulmonary nodules  of concern. She underwent EBRT to the RLL nodule 04/2025. Considered high risk for surgical resection of abdominal met. Started [place] 05/2025.    Assessment & Plan:  Started [place]  - Con't cabozantinib 40mg po daily   - holding nivolumab for now due to severe arthritis, treating with course of oral steroids and pain medication  - reassess in one week with Dr. Santiago    Orders:  -     E-Consult to Rheumatology    3. Secondary malignant neoplasm of right lung  Assessment & Plan:  - SP EBRT to lung nodule  - CTM      4. 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 Squamous Cell Carcinoma of right lower lobe of lung  Overview:  Status post SBRT    Enlarging RLL spiculated nodule could represent additional lung primary or RCC metastasis.  Robotic bronchoscopy scheduled.    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  Has remained stable.  Do not believe patient would benefit from SBRT to this region.       6. Essential hypertension  Overview:  Home mediations include amlodipine and irbesartan. Also uses lasix prn for edema.    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   noted on 3/27/2025  Associated diagnosis: Primary malignant neoplasm of right lower lobe of lung Stage IA2 cT1b, cN0, cM0 noted on 11/30/2022   Line of treatment: First Line  Treatment Goal: Palliative     Upcoming Treatment Dates - OP NIVOLUMAB 480MG Q4W + CABOZANTINIB 40MG QD    6/17/2025        Chemotherapy       nivolumab 480 mg in 0.9% NaCl 98 mL infusion  7/15/2025       Chemotherapy       nivolumab 480 mg in 0.9% NaCl 98 mL infusion  8/12/2025       Chemotherapy       nivolumab 480 mg in 0.9% NaCl 98 mL infusion  9/9/2025       Chemotherapy       nivolumab 480 mg in 0.9% NaCl 98 mL infusion    Therapy Plan Information  DENOSUMAB (PROLIA) Q6M for Osteoporosis, post-menopausal, noted on 3/17/2014  Medications  denosumab (PROLIA) injection 60 mg  60 mg, Subcutaneous, Every 26 weeks      No therapy plan of the specified type found.    No therapy plan of the specified type found.      The above information has been reviewed with the patient and all questions have been answered to their apparent satisfaction.  They understand that they can call the clinic with any questions.         Melissa Voltz, APRN [facility]  [address] [place] [id number]  Phone: (o) [phone] [id number] code applied: patient requires or will require a continuous, longitudinal, and active collaborative plan of care related to this patient's health condition, prostate cancer --the management of which requires the direction of a practitioner with specialized clinical knowledge, skill, and expertise.

## 2025-06-10 DIAGNOSIS — S80.812A ABRASION OF LEFT LOWER EXTREMITY, INITIAL ENCOUNTER: Primary | ICD-10-CM

## 2025-06-10 RX ORDER — MUPIROCIN 20 MG/G
OINTMENT TOPICAL 3 TIMES DAILY PRN
Qty: 15 G | Refills: 1 | Status: SHIPPED | OUTPATIENT
Start: 2025-06-10

## 2025-06-11 NOTE — ASSESSMENT & PLAN NOTE
Started Cabo nivo  - Con't cabozantinib 40mg po daily   - holding nivolumab for now due to severe arthritis, treating with course of oral steroids and pain medication  - reassess in one week with Dr. Santiago

## 2025-06-14 NOTE — TELEPHONE ENCOUNTER
Refill Routing Note   Medication(s) are not appropriate for processing by [facility] for the following reason(s):        Outside of protocol    [facility] action(s):  Route      Medication Therapy Plan: Prn tx      Appointments  past 12m or future 3m with PCP    Date Provider   Last Visit   1/16/2025 Venancio Mayer MD   Next Visit   Visit date not found Venancio Mayer MD   ED visits in past 90 days: 0        Note composed:5:38 PM 06/14/2025

## 2025-06-14 NOTE — TELEPHONE ENCOUNTER
No care due was identified.  [facility] Embedded Care Due Messages. Reference number: [id number].   6/14/2025 8:01:09 AM CDT

## 2025-06-16 ENCOUNTER — OFFICE VISIT (OUTPATIENT)
Dept: HEMATOLOGY/ONCOLOGY | Facility: CLINIC | Age: 75
End: 2025-06-16
Payer: MEDICARE

## 2025-06-16 ENCOUNTER — LAB VISIT (OUTPATIENT)
Dept: LAB | Facility: HOSPITAL | Age: 75
End: 2025-06-16
Payer: MEDICARE

## 2025-06-16 VITALS
DIASTOLIC BLOOD PRESSURE: 63 MMHG | WEIGHT: 137.56 LBS | SYSTOLIC BLOOD PRESSURE: 131 MMHG | BODY MASS INDEX: 24.38 KG/M2 | RESPIRATION RATE: 18 BRPM | HEART RATE: 62 BPM | OXYGEN SATURATION: 96 % | HEIGHT: 63 IN

## 2025-06-16 DIAGNOSIS — C64.9 RENAL CELL CARCINOMA, UNSPECIFIED LATERALITY: ICD-10-CM

## 2025-06-16 DIAGNOSIS — E11.9 DM TYPE 2 WITHOUT RETINOPATHY: ICD-10-CM

## 2025-06-16 DIAGNOSIS — E03.9 HYPOTHYROIDISM: ICD-10-CM

## 2025-06-16 DIAGNOSIS — C64.1 RENAL CELL CARCINOMA OF RIGHT KIDNEY: Primary | ICD-10-CM

## 2025-06-16 LAB
ABSOLUTE EOSINOPHIL (OHS): 0.01 K/UL
ABSOLUTE MONOCYTE (OHS): 1.52 K/UL (ref 0.3–1)
ABSOLUTE NEUTROPHIL COUNT (OHS): 14.42 K/UL (ref 1.8–7.7)
ALBUMIN SERPL BCP-MCNC: 3.9 G/DL (ref 3.5–5.2)
ALP SERPL-CCNC: 107 UNIT/L (ref 40–150)
ALT SERPL W/O P-5'-P-CCNC: 5 UNIT/L (ref 10–44)
ANION GAP (OHS): 13 MMOL/L (ref 8–16)
AST SERPL-CCNC: 6 UNIT/L (ref 11–45)
BASOPHILS # BLD AUTO: 0.03 K/UL
BASOPHILS NFR BLD AUTO: 0.2 %
BILIRUB SERPL-MCNC: 0.6 MG/DL (ref 0.1–1)
BUN SERPL-MCNC: 25 MG/DL (ref 8–23)
CALCIUM SERPL-MCNC: 10 MG/DL (ref 8.7–10.5)
CHLORIDE SERPL-SCNC: 97 MMOL/L (ref 95–110)
CO2 SERPL-SCNC: 27 MMOL/L (ref 23–29)
CREAT SERPL-MCNC: 1.5 MG/DL (ref 0.5–1.4)
ERYTHROCYTE [DISTWIDTH] IN BLOOD BY AUTOMATED COUNT: 15.8 % (ref 11.5–14.5)
GFR SERPLBLD CREATININE-BSD FMLA CKD-EPI: 36 ML/MIN/1.73/M2
GLUCOSE SERPL-MCNC: 141 MG/DL (ref 70–110)
HCT VFR BLD AUTO: 40.5 % (ref 37–48.5)
HGB BLD-MCNC: 13.4 GM/DL (ref 12–16)
IMM GRANULOCYTES # BLD AUTO: 0.22 K/UL (ref 0–0.04)
IMM GRANULOCYTES NFR BLD AUTO: 1.2 % (ref 0–0.5)
LYMPHOCYTES # BLD AUTO: 1.47 K/UL (ref 1–4.8)
MCH RBC QN AUTO: 28.1 PG (ref 27–31)
MCHC RBC AUTO-ENTMCNC: 33.1 G/DL (ref 32–36)
MCV RBC AUTO: 85 FL (ref 82–98)
NUCLEATED RBC (/100WBC) (OHS): 0 /100 WBC
PLATELET # BLD AUTO: 252 K/UL (ref 150–450)
PMV BLD AUTO: 8.4 FL (ref 9.2–12.9)
POTASSIUM SERPL-SCNC: 3.4 MMOL/L (ref 3.5–5.1)
PROT SERPL-MCNC: 7.7 GM/DL (ref 6–8.4)
RBC # BLD AUTO: 4.77 M/UL (ref 4–5.4)
RELATIVE EOSINOPHIL (OHS): 0.1 %
RELATIVE LYMPHOCYTE (OHS): 8.3 % (ref 18–48)
RELATIVE MONOCYTE (OHS): 8.6 % (ref 4–15)
RELATIVE NEUTROPHIL (OHS): 81.6 % (ref 38–73)
SODIUM SERPL-SCNC: 137 MMOL/L (ref 136–145)
TSH SERPL-ACNC: 1.11 UIU/ML (ref 0.4–4)
WBC # BLD AUTO: 17.67 K/UL (ref 3.9–12.7)

## 2025-06-16 PROCEDURE — 1101F PT FALLS ASSESS-DOCD LE1/YR: CPT | Mod: CPTII,S$GLB,, | Performed by: HOSPITALIST

## 2025-06-16 PROCEDURE — 3078F DIAST BP <80 MM HG: CPT | Mod: CPTII,S$GLB,, | Performed by: HOSPITALIST

## 2025-06-16 PROCEDURE — 1159F MED LIST DOCD IN RCRD: CPT | Mod: CPTII,S$GLB,, | Performed by: HOSPITALIST

## 2025-06-16 PROCEDURE — 82435 ASSAY OF BLOOD CHLORIDE: CPT

## 2025-06-16 PROCEDURE — 85025 COMPLETE CBC W/AUTO DIFF WBC: CPT

## 2025-06-16 PROCEDURE — 99999 PR PBB SHADOW E&M-EST. PATIENT-LVL IV: CPT | Mod: PBBFAC,,, | Performed by: HOSPITALIST

## 2025-06-16 PROCEDURE — 99215 OFFICE O/P EST HI 40 MIN: CPT | Mod: S$GLB,,, | Performed by: HOSPITALIST

## 2025-06-16 PROCEDURE — 84443 ASSAY THYROID STIM HORMONE: CPT

## 2025-06-16 PROCEDURE — 36415 COLL VENOUS BLD VENIPUNCTURE: CPT

## 2025-06-16 PROCEDURE — G2211 COMPLEX E/M VISIT ADD ON: HCPCS | Mod: S$GLB,,, | Performed by: HOSPITALIST

## 2025-06-16 PROCEDURE — 3075F SYST BP GE 130 - 139MM HG: CPT | Mod: CPTII,S$GLB,, | Performed by: HOSPITALIST

## 2025-06-16 PROCEDURE — 1126F AMNT PAIN NOTED NONE PRSNT: CPT | Mod: CPTII,S$GLB,, | Performed by: HOSPITALIST

## 2025-06-16 PROCEDURE — 3288F FALL RISK ASSESSMENT DOCD: CPT | Mod: CPTII,S$GLB,, | Performed by: HOSPITALIST

## 2025-06-16 RX ORDER — FUROSEMIDE 20 MG/1
TABLET ORAL
Qty: 30 TABLET | Refills: 3 | Status: SHIPPED | OUTPATIENT
Start: 2025-06-16

## 2025-06-16 NOTE — ASSESSMENT & PLAN NOTE
- Needs close monitoring while on prednisone taper   Pamela Appiah updated on pt's Orthostatic HOTN and urine ouput. Bladder scan, CBC ordered.

## 2025-06-16 NOTE — ASSESSMENT & PLAN NOTE
Has had near resolution of her arthritis on oral prednisone. Will continue current taper. Will finish in about 2-3 weeks. Meanwhile tolerating cabozantinib 40mg well  - Con't cabo  - Con't steroid taper  - Symptom check 2 weeks  - I'll see her in 4 weeks with repeat scans and decision regaridng resuming nivo

## 2025-06-16 NOTE — PROGRESS NOTES
MEDICAL ONCOLOGY - ESTABLISHED PATIENT VISIT    Best Contact Phone Number(s): [phone] (home)      Cancer/Stage/TNM:    Cancer Staging   Squamous Cell Carcinoma of right lower lobe of lung  Staging form: Lung, AJCC 8th Edition  - Clinical stage from 1/11/2023: Stage IA2 (cT1b, cN0, cM0) - Signed by Xavier Deng MD on 1/11/2023       Reason for visit: Metachronous recurrence of RCC      Treatment:  08/2013    Partial nephrectomy  04/2025    EBRT to RLL nodule  04/22/25 -     Nivolumab  05/12/25 -     Cabozantinib      HPI: Sarina Genao is a 75 y.o. 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 She has remote history of grade 2 ccRCC sp partial nephrectomy 08/2013.  Subsquently underwent radiation therapy to an early stage squamous cell lung cancer 01/2023. Around this time, was noted to have abdominal wall subcutaneous nodule.  FDG PET 11/2024 showed progressive 3.9cm anterior abdominal wall mass; biopsy 01/2025 consistetn with metatsatic ccRCC. There was additional pulmonary nodules of concern. She underwent EBRT to the RLL nodule 04/2025. She presents to medical oncology clinic prior to starting cabozantinib + nivolumab.      History has been obtained by chart review and discussion with the patient.    Interval Events:    C2 nivo was 05/20/25. Seen urgently last week for new onset severe joint pain. Started moderate intensity pred taper at 40mg daily. Also with oxycodone-APAP for additoinal pain.    Started prednisone last week with near complete resolution of her arthritis. Continues on cabozantinib. Appetite is good and no nausea. Reports normal bowel movements. Somehwat looser. No abdominal pain. Had some modest dysurai this morning - thinks it feels like a yeast infection. No fevers or chills. No longer using oxycodone-APAP.      Oncology History   Renal cell carcinoma   8/6/2013  Surgery    08/06/2013: Partial right nephrectomy.   Procedure: Partial nephrectomy. Specimen Laterality: Right Tumor Size: 4 cm in greatest dimension. Tumor Focality: Unifocal. Macroscopic Extent Of Tumor: Tumor limited to the kidney. Histologic Type: Clear cell renal cell carcinoma. Sarcomatoid features: Not identified. Histologic Grade (Heron nuclear grade): G2. Microscopic Tumor Extension: Tumor limited to the kidney Margins: Uninvolved by invasive carcinoma. Pathologic Staging: Primary tumor: pT1a Regional lymph node: pNx Distant metastasis: Not applicable.      11/25/2022 Imaging Significant Findings    11/25/22 PET CT  Impression:  - Hypermetabolic 1.5 cm nodule in the right lower lobe of the lung concerning for primary neoplasm.  No hypermetabolic lymphadenopathy.  - Round soft tissue structure within the anterior abdominal wall measuring up to 2.3 cm with mild radiotracer uptake.  Recommend further assessment with dedicated ultrasound and/or percutaneous sampling as clinically warranted.  - Additional pulmonary nodules without significant FDG avidity as above.  Findings include a new 3 mm nodule in the left upper lobe.  Of note, subcentimeter nodules are likely too small to characterize with PET.  Recommend continued CT surveillance.     11/22/2024 Imaging Significant Findings    11/22/24 FDG PET  Impression:  - Patient with history of right lower lobe non-small cell lung cancer status post radiation therapy.  No evidence of tracer avid disease or metastasis.  - Interval enlargement of a 3.9 cm anterior abdominal wall mass with mild increased tracer uptake.  Recommend further evaluation with ultrasound and tissue sampling is warranted.     1/6/2025 Biopsy    01/06/25 Biopsy    RIGHT ABDOMINAL WALL, CT-GUIDED BIOPSY WITH PATHOLOGIST ASSISTED ADEQUACY (CYTOLOGY AND CELL BLOCK):  Positive for malignancy, consistent with renal cell carcinoma.  See comment.        1/31/2025 Imaging Significant Findings     1/31/25 Tc99m Bone scan  Impression:  - There is no scintigraphic evidence of osteoblastic metastatic disease.    1/31/25 CT torso  Impression:  - Decreased size of a right anterior abdominal wall mass.  - Stable pulmonary nodules.     4/8/2025 - 4/14/2025 Radiation Therapy    Treating physician: Xavier Deng    Site Technique Energy Dose/Fx (Gy) #Fx Total Dose (Gy)   RLL Lung SBRT 6X 18 3 / 3 54        4/10/2025 - 4/10/2025 Chemotherapy    Treatment Summary   Plan Name: NIVOLUMAB 480MG Q4W + CABOZANTINIB 40MG QD  Treatment Goal: Palliative  Status: Inactive  Start Date:   End Date:   Provider: Paxton Santiago MD  Chemotherapy: cabozantinib (CABOMETYX) 40 mg Tab, 40 mg, Oral, Daily, 0 of 1 cycle, Start date: 4/10/2025, End date: 5/8/2025 4/22/2025 -  Chemotherapy    Treatment Summary   Plan Name: OP NIVOLUMAB 480MG Q4W + CABOZANTINIB 40MG QD  Treatment Goal: Palliative  Status: Active  Start Date: 4/22/2025  End Date: 3/24/2026 (Planned)  Provider: Paxton Santiago MD  Chemotherapy: [No matching medication found in this treatment plan]     Squamous Cell Carcinoma of right lower lobe of lung   10/25/2022 Biopsy    10/25/22 Lung biopsy  1. Lung, right lower lobe, endobronchial brushing, cytologic evaluation with  cell block:  Atypical cells present.  Epithelial atypia.  Findings suggestive of squamous cell carcinoma  2. Lung, right lower lobe, endoscopic fine needle aspiration, cytologic  evaluation with cell block:  Positive for malignancy. Lung squamous cell carcinoma.  3. Lung, right lower lobe, transbronchial biopsy, cytologic evaluation with  cell block:  Positive for malignancy, Lung squamous cell carcinoma  - PD-L1 and Lung NGS Panel have been ordered and results will be issued in a  supplemental report.  4. Lung, right lower lobe, bronchoalveolar lavage, cytologic evaluation with  cell block:  Negative for malignant cells.  Reactive bronchial cells.  Macrophages.  5. Lung, left lower lobe, endoscopic  fine needle aspiration, cytologic  evaluation with cell block:  Negative for malignant cells.  Reactive bronchial cells.  Macrophages.  6. Lymph node, Station 7, endoscopic fine needle aspiration, cytologic  evalu ation with cell block:  Negative for malignant cells. Reactive bronchial cells.  Lymphocytes present.      11/25/2022 Imaging Significant Findings    11/25/22 PET CT  Impression:  - Hypermetabolic 1.5 cm nodule in the right lower lobe of the lung concerning for primary neoplasm.  No hypermetabolic lymphadenopathy.  - Round soft tissue structure within the anterior abdominal wall measuring up to 2.3 cm with mild radiotracer uptake.  Recommend further assessment with dedicated ultrasound and/or percutaneous sampling as clinically warranted.  - Additional pulmonary nodules without significant FDG avidity as above.  Findings include a new 3 mm nodule in the left upper lobe.  Of note, subcentimeter nodules are likely too small to characterize with PET.  Recommend continued CT surveillance.     11/30/2022 Initial Diagnosis    Squamous Cell Carcinoma of right lower lobe of lung     1/11/2023 Cancer Staged    Staging form: Lung, AJCC 8th Edition  - Clinical stage from 1/11/2023: Stage IA2 (cT1b, cN0, cM0)     1/17/2023 Imaging Significant Findings    01/17/23 CT Chest  Impression:  1. Interval enlargement of a right lower lobe nodule, now 2.3 cm maximally (previously 1.5 cm).  This was noted hypermetabolic on the prior PET-CT 09/29/2022.  2. There is a newly seen peripheral, right lower lobe irregular nodule up to 1.3 cm. v this was a site of thoracotomy and may be postsurgical in nature.  3. There other pulmonary nodules as described above which do not appear changed.  There is no new lymphadenopathy detected.  4. There is a new small right pleural effusion and peripheral airspace opacities in the right lung.  This may reflect infectious/inflammatory etiology.  Alternatively, this may reflect involving appearance  of postsurgical change.     1/26/2023 - 2/3/2023 Radiation Therapy    Treating physician: Xavier Deng    Site Technique Energy Dose/Fx (Gy) #Fx Total Dose (Gy)   RLL Lung SBRT 6X 12.5 4 / 4 50         Biopsy       4/26/2023 Imaging Significant Findings    04/26/23 CT Chest  Impression:  1. Interval decrease in size of a right lower lobe irregular nodule with adjacent architectural distortion.  Consistent with post treatment changes.  2. Other findings as above without significant interval change.     5/10/2024 Imaging Significant Findings    5/10/24 CT Chest  Impression:  1. Stable bilateral pulmonary nodules, including right lower lobe spiculated nodule.  No new or enlarging nodules.  2. Additional findings as above.     11/13/2024 Imaging Significant Findings    11/13/24 CT Chest  Impression:  1. Stable bilateral pulmonary nodules, including right lower lobe spiculated nodule.  No new or enlarging nodules.  2. Additional findings as above.     11/22/2024 Imaging Significant Findings    11/22/24 FDG PET  Impression:  - Patient with history of right lower lobe non-small cell lung cancer status post radiation therapy.  No evidence of tracer avid disease or metastasis.  - Interval enlargement of a 3.9 cm anterior abdominal wall mass with mild increased tracer uptake.  Recommend further evaluation with ultrasound and tissue sampling is warranted.     4/22/2025 -  Chemotherapy    Treatment Summary   Plan Name: OP NIVOLUMAB 480MG Q4W + CABOZANTINIB 40MG QD  Treatment Goal: Palliative  Status: Active  Start Date: 4/22/2025  End Date: 3/24/2026 (Planned)  Provider: Paxton Santiago MD  Chemotherapy: [No matching medication found in this treatment plan]     Secondary malignant neoplasm of right lung   3/27/2025 Initial Diagnosis    Secondary malignant neoplasm of right lung     4/22/2025 -  Chemotherapy    Treatment Summary   Plan Name: OP NIVOLUMAB 480MG Q4W + CABOZANTINIB 40MG QD  Treatment Goal: Palliative  Status:  Active  Start Date: 4/22/2025  End Date: 3/24/2026 (Planned)  Provider: Paxton Santiago MD  Chemotherapy: [No matching medication found in this treatment plan]     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   3/27/2025 Initial Diagnosis    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     4/22/2025 -  Chemotherapy    Treatment Summary   Plan Name: OP NIVOLUMAB 480MG Q4W + CABOZANTINIB 40MG QD  Treatment Goal: Palliative  Status: Active  Start Date: 4/22/2025  End Date: 3/24/2026 (Planned)  Provider: Paxton Santiago MD  Chemotherapy: [No matching medication found in this treatment plan]          Past Medical History:   Diagnosis Date    Anticoagulant long-term use     Cancer     Kidney (Right)    Cataracts, bilateral     CKD (chronic kidney disease) stage 3, GFR 30-59 ml/min 12/15/2014    Colon polyps     Combined hyperlipidemia associated with type 2 diabetes mellitus 6/7/2013    COPD (chronic obstructive pulmonary disease) 12/15/2014    Diabetes mellitus type II     NIDDM, a.m. glucose-120s-130s-last A1c-7.1-6/7/13    Diabetes mellitus with renal manifestations, uncontrolled 2/1/2017    Diabetic retinopathy     Family history of stomach cancer 12/5/2013    Former smoker     H/O renal cell carcinoma 12/15/2015    History of colonic polyps 2/1/2017    3 polyps 7/13 ---5 yrs    History of gastroesophageal reflux (GERD)     History of urinary incontinence     s/p bladder lift-october, 2011 (at [facility])    Hyperlipidemia     Hypertension     120s/70s-80s    Nephrolithiasis     Osteoarthritis of thumb 12/20/2019    Osteoporosis, post-menopausal 3/17/2014    Primary hyperparathyroidism 10/20/2016    Schatzki's ring 2/1/2017    Dilated 2014        Past Surgical History:   Procedure Laterality Date    bladder lift  2011    done at [facility]    BLADDER STONE REMOVAL  2011    before bladder lift    CATARACT EXTRACTION W/  INTRAOCULAR LENS IMPLANT Left 06/07/2016    Dr. Vásquez    CATARACT EXTRACTION  W/  INTRAOCULAR LENS IMPLANT Right 06/21/2016    Dr. Vásquez    COLONOSCOPY N/A 4/26/2018    Procedure: COLONOSCOPY;  Surgeon: Benjamin Zamora MD;  Location: [facility] ENDO;  Service: Endoscopy;  Laterality: N/A;  confirmed ss    COLONOSCOPY N/A 12/21/2023    Procedure: COLONOSCOPY;  Surgeon: Jamel Luis MD;  Location: [facility] ENDO;  Service: Endoscopy;  Laterality: N/A;  9/7 ref Venancio Mayer MD, Suprep, instr. mailed, [facility]  12/14- instr reviewed, pt reminded to hold ozempic, pre call complete.  DBM    CYSTOSCOPY      INJECTION OF ANESTHETIC AGENT AROUND MULTIPLE INTERCOSTAL NERVES  12/27/2022    Procedure: BLOCK, NERVE, INTERCOSTAL, 2 OR MORE;  Surgeon: Paxton Cannon MD;  Location: [facility] OR 2ND FLR;  Service: Thoracic;;    LYSIS OF ADHESIONS  12/27/2022    Procedure: LYSIS, ADHESIONS;  Surgeon: Paxton Cannon MD;  Location: [facility] OR 2ND FLR;  Service: Thoracic;;    NAVIGATIONAL BRONCHOSCOPY N/A 12/11/2018    Procedure: BRONCHOSCOPY, NAVIGATIONAL;  Surgeon: Ashtyn Ramires MD;  Location: [facility] OR 2ND FLR;  Service: Pulmonary;  Laterality: N/A;    NEPHRECTOMY      partial right    ROBOTIC BRONCHOSCOPY N/A 10/25/2022    Procedure: ROBOTIC BRONCHOSCOPY;  Surgeon: Ashtyn Ramires MD;  Location: [facility] OR 2ND FLR;  Service: Pulmonary;  Laterality: N/A;    ROBOTIC BRONCHOSCOPY N/A 2/28/2025    Procedure: ROBOTIC BRONCHOSCOPY;  Surgeon: Ashtyn Ramires MD;  Location: [facility] OR 2ND FLR;  Service: Pulmonary;  Laterality: N/A;    VIDEO-ASSISTED THORACOSCOPIC SURGERY (VATS)  12/27/2022    Procedure: VATS (VIDEO-ASSISTED THORACOSCOPIC SURGERY);  Surgeon: Paxton Cannon MD;  Location: [facility] OR 2ND FLR;  Service: Thoracic;;        Family History   Problem Relation Name Age of Onset    Glaucoma Mother      Cataracts Mother      No Known Problems Father      Colon cancer Brother      Nephrolithiasis Sister      No Known Problems Maternal Aunt      No Known Problems Maternal Uncle      No Known Problems Paternal Aunt       No Known Problems Paternal Uncle      No Known Problems Maternal Grandmother      No Known Problems Maternal Grandfather      No Known Problems Paternal Grandmother      No Known Problems Paternal Grandfather      Anesthesia problems Neg Hx      Kidney cancer Neg Hx      Amblyopia Neg Hx      Blindness Neg Hx      Cancer Neg Hx      Diabetes Neg Hx      Hypertension Neg Hx      Macular degeneration Neg Hx      Retinal detachment Neg Hx      Strabismus Neg Hx      Stroke Neg Hx      Thyroid disease Neg Hx          Social History     Tobacco Use    Smoking status: Every Day     Current packs/day: 0.25     Average packs/day: 0.3 packs/day for 30.0 years (7.5 ttl pk-yrs)     Types: Cigarettes    Smokeless tobacco: Never   Substance Use Topics    Alcohol use: No     Alcohol/week: 0.0 standard drinks of alcohol        I have reviewed and updated the patient's past medical, surgical, family and social histories.    Review of patient's allergies indicates:   Allergen Reactions    Pantoprazole Other (See Comments)     Elevated Blood Sugars    Metformin Diarrhea        Current Outpatient Medications   Medication Sig Dispense Refill    amLODIPine (NORVASC) 10 MG tablet TAKE ONE TABLET BY MOUTH ONCE A DAY 90 tablet 3    ascorbic acid, vitamin C, (VITAMIN C) 250 mg Chew Take 1 tablet by mouth.      aspirin (ECOTRIN) 81 MG EC tablet Take 81 mg by mouth once daily.      blood sugar diagnostic Strp Dispense: Test strip to check glucose 2 times a day, ICD-10: E11.65, compatible with insurance/glucometer, dispense enough for 90 day supply 300 each 3    blood-glucose meter kit Dispense: 1 glucometer, use to check glucose 2 times a day, ICD-10: E11.65,  Dispense machine covered by insurance 1 each 0    cabozantinib (CABOMETYX) 40 mg Tab Take one tablet (40 mg) by mouth once daily on an empty stomach. Fasting is required from 2 hrs before through 1 hr after each dose.  Do not crush or chew. 30 tablet 5    cyanocobalamin, vitamin  "B-12, 1,000 mcg TbSR Take by mouth once daily.      famotidine (PEPCID) 40 MG tablet TAKE ONE TABLET BY MOUTH ONCE A DAY (Patient taking differently: PRN) 90 tablet 0    furosemide (LASIX) 20 MG tablet TAKE ONE TABLET BY MOUTH ONCE A DAY AS NEEDED 30 tablet 3    irbesartan (AVAPRO) 300 MG tablet Take 1 tablet (300 mg total) by mouth once daily. 90 tablet 3    lancets Misc Dispense: Lancets use to check glucose 2 times a day, ICD-10: E11.65, dispense enough for 90 day supply 300 each 3    lancing device Misc One device, used to check blood glucose, ICD-10: E11.65 1 each 0    multivitamin (THERAGRAN) per tablet Take 1 tablet by mouth once daily.      mupirocin (BACTROBAN) 2 % ointment Apply topically 3 (three) times daily as needed. Apply to skin abrasions on left leg 15 g 1    nicotine polacrilex 2 MG Lozg Take 1 lozenge (2 mg total) by mouth as needed (smoking cessation). 60 lozenge 5    oxybutynin (DITROPAN-XL) 5 MG TR24 TAKE ONE TABLET BY MOUTH ONCE A DAY 90 tablet 3    oxyCODONE-acetaminophen (PERCOCET) 5-325 mg per tablet Take 1 tablet by mouth 3 (three) times daily as needed for Pain. 30 tablet 0    pen needle, diabetic (BD INSULIN PEN NEEDLE UF SHORT) 31 gauge x 5/16" Ndle USE AS DIRECTED 100 each 12    predniSONE (DELTASONE) 10 MG tablet Take 4 tablets (40 mg total) by mouth once daily for 5 days, THEN 3 tablets (30 mg total) once daily for 5 days, THEN 2 tablets (20 mg total) once daily for 5 days, THEN 1 tablet (10 mg total) once daily for 5 days. 50 tablet 0    rosuvastatin (CRESTOR) 20 MG tablet TAKE ONE TABLET BY MOUTH ONCE A DAY 90 tablet 12    SITagliptin phosphate (JANUVIA) 25 MG Tab Take 1 tablet (25 mg total) by mouth once daily. 90 tablet 3    vitamin D (VITAMIN D3) 1000 units Tab Take 1,000 Units by mouth once daily.      omeprazole (PRILOSEC) 40 MG capsule Take 1 capsule (40 mg total) by mouth once daily. 90 capsule 4     No current facility-administered medications for this visit.    " "      Physical Exam:   /63 (BP Location: Left arm, Patient Position: Sitting)   Pulse 62   Resp 18   Ht 5' 3" (1.6 m)   Wt 62.4 kg (137 lb 9.1 oz)   SpO2 96%   BMI 24.37 kg/m²      ECOG Performance status: 1            Physical Exam  Constitutional:       General: She is not in acute distress.     Appearance: Normal appearance. She is normal weight.   HENT:      Head: Normocephalic.   Eyes:      General: No scleral icterus.     Conjunctiva/sclera: Conjunctivae normal.   Cardiovascular:      Rate and Rhythm: Normal rate.      Pulses: Normal pulses.      Heart sounds: Normal heart sounds.   Pulmonary:      Effort: Pulmonary effort is normal. No respiratory distress.   Abdominal:      General: There is no distension.      Palpations: Abdomen is soft.   Musculoskeletal:         General: Normal range of motion.      Cervical back: Normal range of motion and neck supple.   Skin:     General: Skin is warm.      Coloration: Skin is not jaundiced.   Neurological:      General: No focal deficit present.      Mental Status: She is alert and oriented to person, place, and time.   Psychiatric:         Mood and Affect: Mood normal.         Behavior: Behavior normal.         Thought Content: Thought content normal.           Labs:   Recent Results (from the past 48 hours)   CMP    Collection Time: 06/16/25  8:22 AM   Result Value Ref Range    Sodium 137 136 - 145 mmol/L    Potassium 3.4 (L) 3.5 - 5.1 mmol/L    Chloride 97 95 - 110 mmol/L    CO2 27 23 - 29 mmol/L    Glucose 141 (H) 70 - 110 mg/dL    BUN 25 (H) 8 - 23 mg/dL    Creatinine 1.5 (H) 0.5 - 1.4 mg/dL    Calcium 10.0 8.7 - 10.5 mg/dL    Protein Total 7.7 6.0 - 8.4 gm/dL    Albumin 3.9 3.5 - 5.2 g/dL    Bilirubin Total 0.6 0.1 - 1.0 mg/dL     40 - 150 unit/L    AST 6 (L) 11 - 45 unit/L    ALT 5 (L) 10 - 44 unit/L    Anion Gap 13 8 - 16 mmol/L    eGFR 36 (L) >60 mL/min/1.73/m2   TSH    Collection Time: 06/16/25  8:22 AM   Result Value Ref Range    TSH " 1.112 0.400 - 4.000 uIU/mL   CBC with Differential    Collection Time: 06/16/25  8:22 AM   Result Value Ref Range    WBC 17.67 (H) 3.90 - 12.70 K/uL    RBC 4.77 4.00 - 5.40 M/uL    HGB 13.4 12.0 - 16.0 gm/dL    HCT 40.5 37.0 - 48.5 %    MCV 85 82 - 98 fL    MCH 28.1 27.0 - 31.0 pg    MCHC 33.1 32.0 - 36.0 g/dL    RDW 15.8 (H) 11.5 - 14.5 %    Platelet Count 252 150 - 450 K/uL    MPV 8.4 (L) 9.2 - 12.9 fL    Nucleated RBC 0 <=0 /100 WBC    Neut % 81.6 (H) 38 - 73 %    Lymph % 8.3 (L) 18 - 48 %    Mono % 8.6 4 - 15 %    Eos % 0.1 <=8 %    Basophil % 0.2 <=1.9 %    Imm Grans % 1.2 (H) 0.0 - 0.5 %    Neut # 14.42 (H) 1.8 - 7.7 K/uL    Lymph # 1.47 1 - 4.8 K/uL    Mono # 1.52 (H) 0.3 - 1 K/uL    Eos # 0.01 <=0.5 K/uL    Baso # 0.03 <=0.2 K/uL    Imm Grans # 0.22 (H) 0.00 - 0.04 K/uL           Imaging:       I have personally reviewed the above imaging    Path:   Reviewed pathology as documented in oncology history      Assessment and Plan:     1. DM type 2 without retinopathy  Overview:  Home medications include Januvia    Assessment & Plan:  - Needs close monitoring while on prednisone taper      2. Renal cell carcinoma of right kidney  Overview:  Metastatic ccRCC 01/2025 in the setting of incidental finding of an anterior abdominal wall mass during surveillance of prior lung cancer. She has remote history of grade 2 ccRCC sp partial nephrectomy 08/2013.  Subsquently underwent radiation therapy to an early stage squamous cell lung cancer 01/2023. Around this time, was noted to have abdominal wall subcutaneous nodule.  FDG PET 11/2024 showed progressive 3.9cm anterior abdominal wall mass; biopsy 01/2025 consistetn with metatsatic ccRCC. There was additional pulmonary nodules of concern. She underwent EBRT to the RLL nodule 04/2025. Considered high risk for surgical resection of abdominal met. Started Cabo/Nivo 05/2025 complicated by severe arthritis.    Assessment & Plan:  Has had near resolution of her arthritis on oral  prednisone. Will continue current taper. Will finish in about 2-3 weeks. Meanwhile tolerating cabozantinib 40mg well  - Con't cabo  - Con't steroid taper  - Symptom check 2 weeks  - I'll see her in 4 weeks with repeat scans and decision regaridng resuming nivo                Follow up:   Route Chart for Scheduling    Med Onc Chart Routing      Follow up with physician . As scheduled   Follow up with HANG 2 weeks. 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   noted on 3/27/2025  Associated diagnosis: Primary malignant neoplasm of right lower lobe of lung Stage IA2 cT1b, cN0, cM0 noted on 11/30/2022   Line of treatment: First Line  Treatment Goal: Palliative     Upcoming Treatment Dates - OP NIVOLUMAB 480MG Q4W + CABOZANTINIB 40MG QD    6/17/2025       Chemotherapy       nivolumab 480 mg in 0.9% NaCl 98 mL infusion  7/15/2025       Chemotherapy       nivolumab 480 mg in 0.9% NaCl 98 mL infusion  8/12/2025       Chemotherapy       nivolumab 480 mg in 0.9% NaCl 98 mL infusion  9/9/2025       Chemotherapy       nivolumab 480 mg in 0.9% NaCl 98 mL infusion    Therapy Plan Information  DENOSUMAB (PROLIA) Q6M for Osteoporosis, post-menopausal, noted on 3/17/2014  Medications  denosumab (PROLIA) injection 60 mg  60 mg, Subcutaneous, Every 26 weeks      No therapy plan of the specified type found.    No therapy plan of the specified type found.      The above information has been reviewed with the patient and all questions  have been answered to their apparent satisfaction.  They understand that they can call the clinic with any questions.      Paxton Santiago MD MPH  Staff Physician     [facility] [address] [address]  Email: [email or web address]  Phone: (o) [phone] (c) [phone]              code applied: patient requires or will require a continuous, longitudinal, and active collaborative plan of care related to this patient's health condition, prostate cancer --the management of which requires the direction of a practitioner with specialized clinical knowledge, skill, and expertise.

## 2025-06-25 ENCOUNTER — TELEPHONE (OUTPATIENT)
Dept: HEMATOLOGY/ONCOLOGY | Facility: CLINIC | Age: 75
End: 2025-06-25
Payer: MEDICARE

## 2025-06-25 NOTE — TELEPHONE ENCOUNTER
I spoke to patient. She has recurrent vaginal yeast infection and wants Dr. Santiago to send her something in. I asked if she was taking antibiotics and she said no. I told her to reach out to her gynecologist or PCP. We got disconnected and when I tried to call her back, it went to voicemail. Very detailed message left reiterating calling her GYN or PCP. Left our phone number if she needs to call back.

## 2025-06-25 NOTE — TELEPHONE ENCOUNTER
Copied from CRM #[id number]. Topic: Medications - New Medication Request  >> Jun 25, 2025 11:52 AM Liv wrote:  Name of Pharmacy:     [facility] - [place] [id number]  [place] [address]  Phone: [phone] Fax: [phone]       Name Of caller:    [phone]     Name of Medication:   something for yeast infection     Comments/advisory:  Requesting new medication

## 2025-06-30 ENCOUNTER — LAB VISIT (OUTPATIENT)
Dept: LAB | Facility: HOSPITAL | Age: 75
End: 2025-06-30
Attending: HOSPITALIST
Payer: MEDICARE

## 2025-06-30 ENCOUNTER — OFFICE VISIT (OUTPATIENT)
Dept: HEMATOLOGY/ONCOLOGY | Facility: CLINIC | Age: 75
End: 2025-06-30
Payer: MEDICARE

## 2025-06-30 VITALS
SYSTOLIC BLOOD PRESSURE: 158 MMHG | TEMPERATURE: 98 F | WEIGHT: 137 LBS | RESPIRATION RATE: 18 BRPM | HEIGHT: 63 IN | BODY MASS INDEX: 24.27 KG/M2 | DIASTOLIC BLOOD PRESSURE: 67 MMHG | HEART RATE: 71 BPM | OXYGEN SATURATION: 95 %

## 2025-06-30 DIAGNOSIS — B37.31 VAGINAL YEAST INFECTION: ICD-10-CM

## 2025-06-30 DIAGNOSIS — Z79.899 LONG TERM CURRENT USE OF THERAPEUTIC DRUG: ICD-10-CM

## 2025-06-30 DIAGNOSIS — E78.2 COMBINED HYPERLIPIDEMIA ASSOCIATED WITH TYPE 2 DIABETES MELLITUS: ICD-10-CM

## 2025-06-30 DIAGNOSIS — C64.1 RENAL CELL CARCINOMA OF RIGHT KIDNEY: Primary | ICD-10-CM

## 2025-06-30 DIAGNOSIS — C78.01 SECONDARY MALIGNANT NEOPLASM OF RIGHT LUNG: ICD-10-CM

## 2025-06-30 DIAGNOSIS — I10 HYPERTENSION, BENIGN: ICD-10-CM

## 2025-06-30 DIAGNOSIS — E11.69 COMBINED HYPERLIPIDEMIA ASSOCIATED WITH TYPE 2 DIABETES MELLITUS: ICD-10-CM

## 2025-06-30 DIAGNOSIS — C64.9 RENAL CELL CARCINOMA, UNSPECIFIED LATERALITY: ICD-10-CM

## 2025-06-30 DIAGNOSIS — C79.89 SECONDARY MALIGNANT NEOPLASM OF SOFT TISSUES OF ABDOMEN: ICD-10-CM

## 2025-06-30 PROBLEM — R07.1 CHEST PAIN ON BREATHING: Status: RESOLVED | Noted: 2018-10-13 | Resolved: 2025-06-30

## 2025-06-30 LAB
ABSOLUTE EOSINOPHIL (OHS): 0.12 K/UL
ABSOLUTE MONOCYTE (OHS): 1.29 K/UL (ref 0.3–1)
ABSOLUTE NEUTROPHIL COUNT (OHS): 12.56 K/UL (ref 1.8–7.7)
ALBUMIN SERPL BCP-MCNC: 3.3 G/DL (ref 3.5–5.2)
ALP SERPL-CCNC: 107 UNIT/L (ref 40–150)
ALT SERPL W/O P-5'-P-CCNC: 5 UNIT/L (ref 10–44)
ANION GAP (OHS): 11 MMOL/L (ref 8–16)
AST SERPL-CCNC: 6 UNIT/L (ref 11–45)
BASOPHILS # BLD AUTO: 0.04 K/UL
BASOPHILS NFR BLD AUTO: 0.3 %
BILIRUB SERPL-MCNC: 0.7 MG/DL (ref 0.1–1)
BUN SERPL-MCNC: 12 MG/DL (ref 8–23)
CALCIUM SERPL-MCNC: 8.8 MG/DL (ref 8.7–10.5)
CHLORIDE SERPL-SCNC: 105 MMOL/L (ref 95–110)
CO2 SERPL-SCNC: 23 MMOL/L (ref 23–29)
CREAT SERPL-MCNC: 1.1 MG/DL (ref 0.5–1.4)
ERYTHROCYTE [DISTWIDTH] IN BLOOD BY AUTOMATED COUNT: 15.3 % (ref 11.5–14.5)
GFR SERPLBLD CREATININE-BSD FMLA CKD-EPI: 52 ML/MIN/1.73/M2
GLUCOSE SERPL-MCNC: 191 MG/DL (ref 70–110)
HCT VFR BLD AUTO: 41 % (ref 37–48.5)
HGB BLD-MCNC: 13.5 GM/DL (ref 12–16)
IMM GRANULOCYTES # BLD AUTO: 0.15 K/UL (ref 0–0.04)
IMM GRANULOCYTES NFR BLD AUTO: 1 % (ref 0–0.5)
LYMPHOCYTES # BLD AUTO: 0.94 K/UL (ref 1–4.8)
MCH RBC QN AUTO: 28.2 PG (ref 27–31)
MCHC RBC AUTO-ENTMCNC: 32.9 G/DL (ref 32–36)
MCV RBC AUTO: 86 FL (ref 82–98)
NUCLEATED RBC (/100WBC) (OHS): 0 /100 WBC
PLATELET # BLD AUTO: 176 K/UL (ref 150–450)
PMV BLD AUTO: 9 FL (ref 9.2–12.9)
POTASSIUM SERPL-SCNC: 3.8 MMOL/L (ref 3.5–5.1)
PROT SERPL-MCNC: 6.7 GM/DL (ref 6–8.4)
RBC # BLD AUTO: 4.78 M/UL (ref 4–5.4)
RELATIVE EOSINOPHIL (OHS): 0.8 %
RELATIVE LYMPHOCYTE (OHS): 6.2 % (ref 18–48)
RELATIVE MONOCYTE (OHS): 8.5 % (ref 4–15)
RELATIVE NEUTROPHIL (OHS): 83.2 % (ref 38–73)
SODIUM SERPL-SCNC: 139 MMOL/L (ref 136–145)
TSH SERPL-ACNC: 1.04 UIU/ML (ref 0.4–4)
WBC # BLD AUTO: 15.1 K/UL (ref 3.9–12.7)

## 2025-06-30 PROCEDURE — 1159F MED LIST DOCD IN RCRD: CPT | Mod: CPTII,S$GLB,, | Performed by: NURSE PRACTITIONER

## 2025-06-30 PROCEDURE — 80053 COMPREHEN METABOLIC PANEL: CPT

## 2025-06-30 PROCEDURE — 36415 COLL VENOUS BLD VENIPUNCTURE: CPT

## 2025-06-30 PROCEDURE — 1101F PT FALLS ASSESS-DOCD LE1/YR: CPT | Mod: CPTII,S$GLB,, | Performed by: NURSE PRACTITIONER

## 2025-06-30 PROCEDURE — 84443 ASSAY THYROID STIM HORMONE: CPT

## 2025-06-30 PROCEDURE — 85025 COMPLETE CBC W/AUTO DIFF WBC: CPT

## 2025-06-30 PROCEDURE — 99999 PR PBB SHADOW E&M-EST. PATIENT-LVL IV: CPT | Mod: PBBFAC,,, | Performed by: NURSE PRACTITIONER

## 2025-06-30 PROCEDURE — 3078F DIAST BP <80 MM HG: CPT | Mod: CPTII,S$GLB,, | Performed by: NURSE PRACTITIONER

## 2025-06-30 PROCEDURE — 99215 OFFICE O/P EST HI 40 MIN: CPT | Mod: S$GLB,,, | Performed by: NURSE PRACTITIONER

## 2025-06-30 PROCEDURE — 1126F AMNT PAIN NOTED NONE PRSNT: CPT | Mod: CPTII,S$GLB,, | Performed by: NURSE PRACTITIONER

## 2025-06-30 PROCEDURE — G2211 COMPLEX E/M VISIT ADD ON: HCPCS | Mod: S$GLB,,, | Performed by: NURSE PRACTITIONER

## 2025-06-30 PROCEDURE — 3288F FALL RISK ASSESSMENT DOCD: CPT | Mod: CPTII,S$GLB,, | Performed by: NURSE PRACTITIONER

## 2025-06-30 PROCEDURE — 3077F SYST BP >= 140 MM HG: CPT | Mod: CPTII,S$GLB,, | Performed by: NURSE PRACTITIONER

## 2025-06-30 RX ORDER — FLUCONAZOLE 150 MG/1
150 TABLET ORAL
Qty: 3 TABLET | Refills: 0 | Status: SHIPPED | OUTPATIENT
Start: 2025-06-30

## 2025-06-30 NOTE — ASSESSMENT & PLAN NOTE
Has had resolution of her arthritis with steroid prednisone. Nivolumab held.  Continued cabozantinib 40mg   - resume nivolumab  - f/u with Dr. Santiago in 4 weeks with repeat scans

## 2025-06-30 NOTE — PROGRESS NOTES
MEDICAL ONCOLOGY - ESTABLISHED PATIENT VISIT    Best Contact Phone Number(s): [phone] (home)      Cancer/Stage/TNM:    Cancer Staging   Squamous Cell Carcinoma of right lower lobe of lung  Staging form: Lung, AJCC 8th Edition  - Clinical stage from 1/11/2023: Stage IA2 (cT1b, cN0, cM0) - Signed by Xavier Deng MD on 1/11/2023       Reason for visit: Metachronous recurrence of RCC      Treatment:  08/2013    Partial nephrectomy  04/2025    EBRT to RLL nodule  04/22/25 -     Nivolumab  05/12/25 -     Cabozantinib      HPI: Sarina Genao is a 75 y.o. 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 She has remote history of grade 2 ccRCC sp partial nephrectomy 08/2013.  Subsquently underwent radiation therapy to an early stage squamous cell lung cancer 01/2023. Around this time, was noted to have abdominal wall subcutaneous nodule.  FDG PET 11/2024 showed progressive 3.9cm anterior abdominal wall mass; biopsy 01/2025 consistetn with metatsatic ccRCC. There was additional pulmonary nodules of concern. She underwent EBRT to the RLL nodule 04/2025. She presents to medical oncology clinic prior to starting cabozantinib + nivolumab.      History has been obtained by chart review and discussion with the patient.    Interval Events:  Presents today with sister as follow up.  Joint pain has resolved with course of moderate intensity pred taper at 40mg daily.  However does report having symptoms of vaginal yeast infection.  Will plan to treat with oral diflucan and topical miconazole.      Continues on cabozantinib.  Will resume nivolumab infusion on 7/14/25.      Oncology History   Renal cell carcinoma   8/6/2013 Surgery    08/06/2013: Partial right nephrectomy.   Procedure: Partial nephrectomy. Specimen Laterality: Right Tumor Size: 4 cm in greatest dimension. Tumor Focality: Unifocal. Macroscopic  Extent Of Tumor: Tumor limited to the kidney. Histologic Type: Clear cell renal cell carcinoma. Sarcomatoid features: Not identified. Histologic Grade (Heron nuclear grade): G2. Microscopic Tumor Extension: Tumor limited to the kidney Margins: Uninvolved by invasive carcinoma. Pathologic Staging: Primary tumor: pT1a Regional lymph node: pNx Distant metastasis: Not applicable.      11/25/2022 Imaging Significant Findings    11/25/22 PET CT  Impression:  - Hypermetabolic 1.5 cm nodule in the right lower lobe of the lung concerning for primary neoplasm.  No hypermetabolic lymphadenopathy.  - Round soft tissue structure within the anterior abdominal wall measuring up to 2.3 cm with mild radiotracer uptake.  Recommend further assessment with dedicated ultrasound and/or percutaneous sampling as clinically warranted.  - Additional pulmonary nodules without significant FDG avidity as above.  Findings include a new 3 mm nodule in the left upper lobe.  Of note, subcentimeter nodules are likely too small to characterize with PET.  Recommend continued CT surveillance.     11/22/2024 Imaging Significant Findings    11/22/24 FDG PET  Impression:  - Patient with history of right lower lobe non-small cell lung cancer status post radiation therapy.  No evidence of tracer avid disease or metastasis.  - Interval enlargement of a 3.9 cm anterior abdominal wall mass with mild increased tracer uptake.  Recommend further evaluation with ultrasound and tissue sampling is warranted.     1/6/2025 Biopsy    01/06/25 Biopsy    RIGHT ABDOMINAL WALL, CT-GUIDED BIOPSY WITH PATHOLOGIST ASSISTED ADEQUACY (CYTOLOGY AND CELL BLOCK):  Positive for malignancy, consistent with renal cell carcinoma.  See comment.        1/31/2025 Imaging Significant Findings    1/31/25 Tc99m Bone scan  Impression:  - There is no scintigraphic evidence of osteoblastic metastatic disease.    1/31/25 CT torso  Impression:  - Decreased size of a right anterior abdominal  wall mass.  - Stable pulmonary nodules.     4/8/2025 - 4/14/2025 Radiation Therapy    Treating physician: Xavier Deng    Site Technique Energy Dose/Fx (Gy) #Fx Total Dose (Gy)   RLL Lung SBRT 6X 18 3 / 3 54        4/10/2025 - 4/10/2025 Chemotherapy    Treatment Summary   Plan Name: NIVOLUMAB 480MG Q4W + CABOZANTINIB 40MG QD  Treatment Goal: Palliative  Status: Inactive  Start Date:   End Date:   Provider: Paxton Santiago MD  Chemotherapy: cabozantinib (CABOMETYX) 40 mg Tab, 40 mg, Oral, Daily, 0 of 1 cycle, Start date: 4/10/2025, End date: 5/8/2025 4/22/2025 -  Chemotherapy    Treatment Summary   Plan Name: OP NIVOLUMAB 480MG Q4W + CABOZANTINIB 40MG QD  Treatment Goal: Palliative  Status: Active  Start Date: 4/22/2025  End Date: 3/24/2026 (Planned)  Provider: Paxton Santiago MD  Chemotherapy: [No matching medication found in this treatment plan]     Squamous Cell Carcinoma of right lower lobe of lung   10/25/2022 Biopsy    10/25/22 Lung biopsy  1. Lung, right lower lobe, endobronchial brushing, cytologic evaluation with  cell block:  Atypical cells present.  Epithelial atypia.  Findings suggestive of squamous cell carcinoma  2. Lung, right lower lobe, endoscopic fine needle aspiration, cytologic  evaluation with cell block:  Positive for malignancy. Lung squamous cell carcinoma.  3. Lung, right lower lobe, transbronchial biopsy, cytologic evaluation with  cell block:  Positive for malignancy, Lung squamous cell carcinoma  - PD-L1 and Lung NGS Panel have been ordered and results will be issued in a  supplemental report.  4. Lung, right lower lobe, bronchoalveolar lavage, cytologic evaluation with  cell block:  Negative for malignant cells.  Reactive bronchial cells.  Macrophages.  5. Lung, left lower lobe, endoscopic fine needle aspiration, cytologic  evaluation with cell block:  Negative for malignant cells.  Reactive bronchial cells.  Macrophages.  6. Lymph node, Station 7, endoscopic fine needle  aspiration, cytologic  evalu ation with cell block:  Negative for malignant cells. Reactive bronchial cells.  Lymphocytes present.      11/25/2022 Imaging Significant Findings    11/25/22 PET CT  Impression:  - Hypermetabolic 1.5 cm nodule in the right lower lobe of the lung concerning for primary neoplasm.  No hypermetabolic lymphadenopathy.  - Round soft tissue structure within the anterior abdominal wall measuring up to 2.3 cm with mild radiotracer uptake.  Recommend further assessment with dedicated ultrasound and/or percutaneous sampling as clinically warranted.  - Additional pulmonary nodules without significant FDG avidity as above.  Findings include a new 3 mm nodule in the left upper lobe.  Of note, subcentimeter nodules are likely too small to characterize with PET.  Recommend continued CT surveillance.     11/30/2022 Initial Diagnosis    Squamous Cell Carcinoma of right lower lobe of lung     1/11/2023 Cancer Staged    Staging form: Lung, AJCC 8th Edition  - Clinical stage from 1/11/2023: Stage IA2 (cT1b, cN0, cM0)     1/17/2023 Imaging Significant Findings    01/17/23 CT Chest  Impression:  1. Interval enlargement of a right lower lobe nodule, now 2.3 cm maximally (previously 1.5 cm).  This was noted hypermetabolic on the prior PET-CT 09/29/2022.  2. There is a newly seen peripheral, right lower lobe irregular nodule up to 1.3 cm. v this was a site of thoracotomy and may be postsurgical in nature.  3. There other pulmonary nodules as described above which do not appear changed.  There is no new lymphadenopathy detected.  4. There is a new small right pleural effusion and peripheral airspace opacities in the right lung.  This may reflect infectious/inflammatory etiology.  Alternatively, this may reflect involving appearance of postsurgical change.     1/26/2023 - 2/3/2023 Radiation Therapy    Treating physician: Xavier Deng    Site Technique Energy Dose/Fx (Gy) #Fx Total Dose (Gy)   RLL Lung SBRT 6X  12.5 4 / 4 50         Biopsy       4/26/2023 Imaging Significant Findings    04/26/23 CT Chest  Impression:  1. Interval decrease in size of a right lower lobe irregular nodule with adjacent architectural distortion.  Consistent with post treatment changes.  2. Other findings as above without significant interval change.     5/10/2024 Imaging Significant Findings    5/10/24 CT Chest  Impression:  1. Stable bilateral pulmonary nodules, including right lower lobe spiculated nodule.  No new or enlarging nodules.  2. Additional findings as above.     11/13/2024 Imaging Significant Findings    11/13/24 CT Chest  Impression:  1. Stable bilateral pulmonary nodules, including right lower lobe spiculated nodule.  No new or enlarging nodules.  2. Additional findings as above.     11/22/2024 Imaging Significant Findings    11/22/24 FDG PET  Impression:  - Patient with history of right lower lobe non-small cell lung cancer status post radiation therapy.  No evidence of tracer avid disease or metastasis.  - Interval enlargement of a 3.9 cm anterior abdominal wall mass with mild increased tracer uptake.  Recommend further evaluation with ultrasound and tissue sampling is warranted.     4/22/2025 -  Chemotherapy    Treatment Summary   Plan Name: OP NIVOLUMAB 480MG Q4W + CABOZANTINIB 40MG QD  Treatment Goal: Palliative  Status: Active  Start Date: 4/22/2025  End Date: 3/24/2026 (Planned)  Provider: Paxton Santiago MD  Chemotherapy: [No matching medication found in this treatment plan]     Secondary malignant neoplasm of right lung   3/27/2025 Initial Diagnosis    Secondary malignant neoplasm of right lung     4/22/2025 -  Chemotherapy    Treatment Summary   Plan Name: OP NIVOLUMAB 480MG Q4W + CABOZANTINIB 40MG QD  Treatment Goal: Palliative  Status: Active  Start Date: 4/22/2025  End Date: 3/24/2026 (Planned)  Provider: Paxton Santiago MD  Chemotherapy: [No matching medication found in this treatment plan]     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   3/27/2025 Initial Diagnosis    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     4/22/2025 -  Chemotherapy    Treatment Summary   Plan Name: OP NIVOLUMAB 480MG Q4W + CABOZANTINIB 40MG QD  Treatment Goal: Palliative  Status: Active  Start Date: 4/22/2025  End Date: 3/24/2026 (Planned)  Provider: Paxton Santiago MD  Chemotherapy: [No matching medication found in this treatment plan]          Past Medical History:   Diagnosis Date    Anticoagulant long-term use     Cancer     Kidney (Right)    Cataracts, bilateral     CKD (chronic kidney disease) stage 3, GFR 30-59 ml/min 12/15/2014    Colon polyps     Combined hyperlipidemia associated with type 2 diabetes mellitus 6/7/2013    COPD (chronic obstructive pulmonary disease) 12/15/2014    Diabetes mellitus type II     NIDDM, a.m. glucose-120s-130s-last A1c-7.1-6/7/13    Diabetes mellitus with renal manifestations, uncontrolled 2/1/2017    Diabetic retinopathy     Family history of stomach cancer 12/5/2013    Former smoker     H/O renal cell carcinoma 12/15/2015    History of colonic polyps 2/1/2017    3 polyps 7/13 ---5 yrs    History of gastroesophageal reflux (GERD)     History of urinary incontinence     s/p bladder lift-october, 2011 (at [facility])    Hyperlipidemia     Hypertension     120s/70s-80s    Nephrolithiasis     Osteoarthritis of thumb 12/20/2019    Osteoporosis, post-menopausal 3/17/2014    Primary hyperparathyroidism 10/20/2016    Schatzki's ring 2/1/2017    Dilated 2014        Past Surgical History:   Procedure Laterality Date    bladder lift  2011    done at [facility]    BLADDER STONE REMOVAL  2011    before bladder lift    CATARACT EXTRACTION W/  INTRAOCULAR LENS IMPLANT Left 06/07/2016    Dr. Vásquez    CATARACT EXTRACTION W/  INTRAOCULAR LENS IMPLANT Right 06/21/2016    Dr. Vásquez    COLONOSCOPY N/A 4/26/2018    Procedure: COLONOSCOPY;  Surgeon: Benjamin Zamora MD;  Location: [facility];   Service: Endoscopy;  Laterality: N/A;  confirmed ss    COLONOSCOPY N/A 12/21/2023    Procedure: COLONOSCOPY;  Surgeon: Jamel Luis MD;  Location: [facility] ENDO;  Service: Endoscopy;  Laterality: N/A;  9/7 ref Venancio Mayer MD, Suprep, instr. mailed, [facility]  12/14- instr reviewed, pt reminded to hold ozempic, pre call complete.  DBM    CYSTOSCOPY      INJECTION OF ANESTHETIC AGENT AROUND MULTIPLE INTERCOSTAL NERVES  12/27/2022    Procedure: BLOCK, NERVE, INTERCOSTAL, 2 OR MORE;  Surgeon: Paxton Cannon MD;  Location: [facility] OR 2ND FLR;  Service: Thoracic;;    LYSIS OF ADHESIONS  12/27/2022    Procedure: LYSIS, ADHESIONS;  Surgeon: Paxton Cannon MD;  Location: [facility] OR 2ND FLR;  Service: Thoracic;;    NAVIGATIONAL BRONCHOSCOPY N/A 12/11/2018    Procedure: BRONCHOSCOPY, NAVIGATIONAL;  Surgeon: Ashtyn Ramires MD;  Location: [facility] OR 2ND FLR;  Service: Pulmonary;  Laterality: N/A;    NEPHRECTOMY      partial right    ROBOTIC BRONCHOSCOPY N/A 10/25/2022    Procedure: ROBOTIC BRONCHOSCOPY;  Surgeon: Ashtyn Ramires MD;  Location: [facility] OR 2ND FLR;  Service: Pulmonary;  Laterality: N/A;    ROBOTIC BRONCHOSCOPY N/A 2/28/2025    Procedure: ROBOTIC BRONCHOSCOPY;  Surgeon: Ashtyn Ramires MD;  Location: [facility] OR 2ND FLR;  Service: Pulmonary;  Laterality: N/A;    VIDEO-ASSISTED THORACOSCOPIC SURGERY (VATS)  12/27/2022    Procedure: VATS (VIDEO-ASSISTED THORACOSCOPIC SURGERY);  Surgeon: Paxton Cannon MD;  Location: [facility] OR 2ND FLR;  Service: Thoracic;;        Family History   Problem Relation Name Age of Onset    Glaucoma Mother      Cataracts Mother      No Known Problems Father      Colon cancer Brother      Nephrolithiasis Sister      No Known Problems Maternal Aunt      No Known Problems Maternal Uncle      No Known Problems Paternal Aunt      No Known Problems Paternal Uncle      No Known Problems Maternal Grandmother      No Known Problems Maternal Grandfather      No Known Problems Paternal Grandmother      No  Known Problems Paternal Grandfather      Anesthesia problems Neg Hx      Kidney cancer Neg Hx      Amblyopia Neg Hx      Blindness Neg Hx      Cancer Neg Hx      Diabetes Neg Hx      Hypertension Neg Hx      Macular degeneration Neg Hx      Retinal detachment Neg Hx      Strabismus Neg Hx      Stroke Neg Hx      Thyroid disease Neg Hx          Social History     Tobacco Use    Smoking status: Every Day     Current packs/day: 0.25     Average packs/day: 0.3 packs/day for 30.0 years (7.5 ttl pk-yrs)     Types: Cigarettes    Smokeless tobacco: Never   Substance Use Topics    Alcohol use: No     Alcohol/week: 0.0 standard drinks of alcohol        I have reviewed and updated the patient's past medical, surgical, family and social histories.    Review of patient's allergies indicates:   Allergen Reactions    Pantoprazole Other (See Comments)     Elevated Blood Sugars    Metformin Diarrhea        Current Outpatient Medications   Medication Sig Dispense Refill    amLODIPine (NORVASC) 10 MG tablet TAKE ONE TABLET BY MOUTH ONCE A DAY 90 tablet 3    ascorbic acid, vitamin C, (VITAMIN C) 250 mg Chew Take 1 tablet by mouth.      aspirin (ECOTRIN) 81 MG EC tablet Take 81 mg by mouth once daily.      blood sugar diagnostic Strp Dispense: Test strip to check glucose 2 times a day, ICD-10: E11.65, compatible with insurance/glucometer, dispense enough for 90 day supply 300 each 3    blood-glucose meter kit Dispense: 1 glucometer, use to check glucose 2 times a day, ICD-10: E11.65,  Dispense machine covered by insurance 1 each 0    cabozantinib (CABOMETYX) 40 mg Tab Take one tablet (40 mg) by mouth once daily on an empty stomach. Fasting is required from 2 hrs before through 1 hr after each dose.  Do not crush or chew. 30 tablet 5    cyanocobalamin, vitamin B-12, 1,000 mcg TbSR Take by mouth once daily.      famotidine (PEPCID) 40 MG tablet TAKE ONE TABLET BY MOUTH ONCE A DAY (Patient taking differently: PRN) 90 tablet 0    furosemide  "(LASIX) 20 MG tablet TAKE ONE TABLET BY MOUTH ONCE A DAY AS NEEDED 30 tablet 3    irbesartan (AVAPRO) 300 MG tablet Take 1 tablet (300 mg total) by mouth once daily. 90 tablet 3    lancets Misc Dispense: Lancets use to check glucose 2 times a day, ICD-10: E11.65, dispense enough for 90 day supply 300 each 3    lancing device Misc One device, used to check blood glucose, ICD-10: E11.65 1 each 0    multivitamin (THERAGRAN) per tablet Take 1 tablet by mouth once daily.      mupirocin (BACTROBAN) 2 % ointment Apply topically 3 (three) times daily as needed. Apply to skin abrasions on left leg 15 g 1    nicotine polacrilex 2 MG Lozg Take 1 lozenge (2 mg total) by mouth as needed (smoking cessation). 60 lozenge 5    oxybutynin (DITROPAN-XL) 5 MG TR24 TAKE ONE TABLET BY MOUTH ONCE A DAY 90 tablet 3    oxyCODONE-acetaminophen (PERCOCET) 5-325 mg per tablet Take 1 tablet by mouth 3 (three) times daily as needed for Pain. 30 tablet 0    pen needle, diabetic (BD INSULIN PEN NEEDLE UF SHORT) 31 gauge x 5/16" Ndle USE AS DIRECTED 100 each 12    rosuvastatin (CRESTOR) 20 MG tablet TAKE ONE TABLET BY MOUTH ONCE A DAY 90 tablet 12    SITagliptin phosphate (JANUVIA) 25 MG Tab Take 1 tablet (25 mg total) by mouth once daily. 90 tablet 3    vitamin D (VITAMIN D3) 1000 units Tab Take 1,000 Units by mouth once daily.      fluconazole (DIFLUCAN) 150 MG Tab Take 1 tablet (150 mg total) by mouth every 72 hours. 3 tablet 0    omeprazole (PRILOSEC) 40 MG capsule Take 1 capsule (40 mg total) by mouth once daily. 90 capsule 4     No current facility-administered medications for this visit.      Review of Systems   Constitutional:  Negative for chills, fever and malaise/fatigue.   Respiratory:  Negative for cough and shortness of breath.    Cardiovascular:  Positive for leg swelling. Negative for chest pain and palpitations.   Gastrointestinal:  Negative for abdominal pain, constipation and diarrhea.   Genitourinary:  Negative for dysuria, " "flank pain, frequency, hematuria and urgency.   Musculoskeletal:  Negative for back pain, joint pain and myalgias.   Neurological:  Negative for dizziness and weakness.         Physical Exam:   BP (!) 158/67 (BP Location: Right arm, Patient Position: Sitting)   Pulse 71   Temp 98 °F (36.7 °C) (Oral)   Resp 18   Ht 5' 3" (1.6 m)   Wt 62.1 kg (137 lb 0.3 oz)   SpO2 95%   BMI 24.27 kg/m²      ECOG Performance status: 1            Physical Exam  Constitutional:       General: She is not in acute distress.     Appearance: Normal appearance. She is normal weight.   HENT:      Head: Normocephalic.   Eyes:      General: No scleral icterus.     Conjunctiva/sclera: Conjunctivae normal.   Cardiovascular:      Rate and Rhythm: Normal rate and regular rhythm.      Pulses: Normal pulses.      Heart sounds: Normal heart sounds.   Pulmonary:      Effort: Pulmonary effort is normal. No respiratory distress.   Abdominal:      General: There is no distension.      Palpations: Abdomen is soft.   Musculoskeletal:         General: Normal range of motion.      Cervical back: Normal range of motion and neck supple.      Right lower leg: Edema present.      Left lower leg: Edema present.   Skin:     General: Skin is warm.      Coloration: Skin is not jaundiced.   Neurological:      General: No focal deficit present.      Mental Status: She is alert and oriented to person, place, and time.   Psychiatric:         Mood and Affect: Mood normal.         Behavior: Behavior normal.         Thought Content: Thought content normal.           Labs:   Recent Results (from the past 48 hours)   Comprehensive Metabolic Panel    Collection Time: 06/30/25  9:35 AM   Result Value Ref Range    Sodium 139 136 - 145 mmol/L    Potassium 3.8 3.5 - 5.1 mmol/L    Chloride 105 95 - 110 mmol/L    CO2 23 23 - 29 mmol/L    Glucose 191 (H) 70 - 110 mg/dL    BUN 12 8 - 23 mg/dL    Creatinine 1.1 0.5 - 1.4 mg/dL    Calcium 8.8 8.7 - 10.5 mg/dL    Protein Total 6.7 " 6.0 - 8.4 gm/dL    Albumin 3.3 (L) 3.5 - 5.2 g/dL    Bilirubin Total 0.7 0.1 - 1.0 mg/dL     40 - 150 unit/L    AST 6 (L) 11 - 45 unit/L    ALT 5 (L) 10 - 44 unit/L    Anion Gap 11 8 - 16 mmol/L    eGFR 52 (L) >60 mL/min/1.73/m2   TSH    Collection Time: 06/30/25  9:35 AM   Result Value Ref Range    TSH 1.038 0.400 - 4.000 uIU/mL   CBC with Differential    Collection Time: 06/30/25  9:35 AM   Result Value Ref Range    WBC 15.10 (H) 3.90 - 12.70 K/uL    RBC 4.78 4.00 - 5.40 M/uL    HGB 13.5 12.0 - 16.0 gm/dL    HCT 41.0 37.0 - 48.5 %    MCV 86 82 - 98 fL    MCH 28.2 27.0 - 31.0 pg    MCHC 32.9 32.0 - 36.0 g/dL    RDW 15.3 (H) 11.5 - 14.5 %    Platelet Count 176 150 - 450 K/uL    MPV 9.0 (L) 9.2 - 12.9 fL    Nucleated RBC 0 <=0 /100 WBC    Neut % 83.2 (H) 38 - 73 %    Lymph % 6.2 (L) 18 - 48 %    Mono % 8.5 4 - 15 %    Eos % 0.8 <=8 %    Basophil % 0.3 <=1.9 %    Imm Grans % 1.0 (H) 0.0 - 0.5 %    Neut # 12.56 (H) 1.8 - 7.7 K/uL    Lymph # 0.94 (L) 1 - 4.8 K/uL    Mono # 1.29 (H) 0.3 - 1 K/uL    Eos # 0.12 <=0.5 K/uL    Baso # 0.04 <=0.2 K/uL    Imm Grans # 0.15 (H) 0.00 - 0.04 K/uL           Imaging:       I have personally reviewed the above imaging    Path:   Reviewed pathology as documented in oncology history      Assessment and Plan:     1. Renal cell carcinoma of right kidney  Overview:  Metastatic ccRCC 01/2025 in the setting of incidental finding of an anterior abdominal wall mass during surveillance of prior lung cancer. She has remote history of grade 2 ccRCC sp partial nephrectomy 08/2013.  Subsquently underwent radiation therapy to an early stage squamous cell lung cancer 01/2023. Around this time, was noted to have abdominal wall subcutaneous nodule.  FDG PET 11/2024 showed progressive 3.9cm anterior abdominal wall mass; biopsy 01/2025 consistetn with metatsatic ccRCC. There was additional pulmonary nodules of concern. She underwent EBRT to the RLL nodule 04/2025. Considered high risk for surgical  resection of abdominal met. Started [place] 05/2025 complicated by severe arthritis.    Assessment & Plan:  Has had resolution of her arthritis with steroid prednisone. Nivolumab held.  Continued cabozantinib 40mg   - resume nivolumab  - f/u with Dr. Santiago in 4 weeks with repeat scans     Orders:  -     CBC Auto Differential; Standing  -     CMP; Standing  -     TSH; Standing    2. Long term current use of therapeutic drug  -     TSH; Standing    3. Secondary malignant neoplasm of right lung    4. 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 Vaginal yeast infection  -     fluconazole (DIFLUCAN) 150 MG Tab; Take 1 tablet (150 mg total) by mouth every 72 hours.  Dispense: 3 tablet; Refill: 0    6. Combined hyperlipidemia associated with type 2 diabetes mellitus  Assessment & Plan:  - continue rosuvastatin      7. Hypertension, benign  Assessment & Plan:  Continue amlodipine, furosemide, and irbesartan            Follow up:   Route Chart for Scheduling    Med Onc Chart Routing      Follow up with physician . As scheduled   Follow up with HANG 2 months. 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   noted on 3/27/2025  Associated diagnosis: Primary malignant neoplasm of right lower lobe of lung Stage IA2 cT1b, cN0, cM0 noted on 11/30/2022   Line of treatment: First Line  Treatment  Goal: Palliative     Upcoming Treatment Dates - OP NIVOLUMAB 480MG Q4W + CABOZANTINIB 40MG QD    6/17/2025       Chemotherapy       nivolumab 480 mg in 0.9% NaCl 98 mL infusion  7/15/2025       Chemotherapy       nivolumab 480 mg in 0.9% NaCl 98 mL infusion  8/12/2025       Chemotherapy       nivolumab 480 mg in 0.9% NaCl 98 mL infusion  9/9/2025       Chemotherapy       nivolumab 480 mg in 0.9% NaCl 98 mL infusion    Therapy Plan Information  DENOSUMAB (PROLIA) Q6M for Osteoporosis, post-menopausal, noted on 3/17/2014  Medications  denosumab (PROLIA) injection 60 mg  60 mg, Subcutaneous, Every 26 weeks      No therapy plan of the specified type found.    No therapy plan of the specified type found.      The above information has been reviewed with the patient and all questions have been answered to their apparent satisfaction.  They understand that they can call the clinic with any questions.       Melissa Voltz, APRN [facility] [address] [address]  Phone: (o) [phone] [id number] code applied: patient requires or will require a continuous, longitudinal, and active collaborative plan of care related to this patient's health condition, prostate cancer --the management of which requires the direction of a practitioner with specialized clinical knowledge, skill, and expertise.

## 2025-06-30 NOTE — ASSESSMENT & PLAN NOTE
- continue rosuvastatin   Minoxidil Counseling: Minoxidil is a topical medication which can increase blood flow where it is applied. It is uncertain how this medication increases hair growth. Side effects are uncommon and include stinging and allergic reactions.

## 2025-07-11 ENCOUNTER — HOSPITAL ENCOUNTER (OUTPATIENT)
Dept: RADIOLOGY | Facility: HOSPITAL | Age: 75
Discharge: HOME | End: 2025-07-11
Attending: HOSPITALIST
Payer: MEDICARE

## 2025-07-11 DIAGNOSIS — C64.1 RENAL CELL CARCINOMA OF RIGHT KIDNEY: ICD-10-CM

## 2025-07-11 PROCEDURE — 71260 CT THORAX DX C+: CPT | Mod: 26,,, | Performed by: RADIOLOGY

## 2025-07-11 PROCEDURE — 74177 CT ABD & PELVIS W/CONTRAST: CPT | Mod: 26,,, | Performed by: RADIOLOGY

## 2025-07-11 PROCEDURE — 71260 CT THORAX DX C+: CPT | Mod: TC

## 2025-07-11 PROCEDURE — 25500020 PHARM REV CODE 255: Performed by: HOSPITALIST

## 2025-07-11 RX ADMIN — IOHEXOL 75 ML: 350 INJECTION, SOLUTION INTRAVENOUS at 11:07

## 2025-07-11 RX ADMIN — IOHEXOL 15 ML: 300 INJECTION, SOLUTION INTRAVENOUS at 11:07

## 2025-07-14 ENCOUNTER — OFFICE VISIT (OUTPATIENT)
Dept: HEMATOLOGY/ONCOLOGY | Facility: CLINIC | Age: 75
End: 2025-07-14
Payer: MEDICARE

## 2025-07-14 VITALS
TEMPERATURE: 97 F | WEIGHT: 134.69 LBS | SYSTOLIC BLOOD PRESSURE: 196 MMHG | OXYGEN SATURATION: 95 % | HEART RATE: 94 BPM | HEIGHT: 63 IN | BODY MASS INDEX: 23.86 KG/M2 | RESPIRATION RATE: 17 BRPM | DIASTOLIC BLOOD PRESSURE: 96 MMHG

## 2025-07-14 DIAGNOSIS — C64.1 RENAL CELL CARCINOMA OF RIGHT KIDNEY: Primary | ICD-10-CM

## 2025-07-14 DIAGNOSIS — M81.0 OSTEOPOROSIS, POST-MENOPAUSAL: ICD-10-CM

## 2025-07-14 DIAGNOSIS — I10 ESSENTIAL HYPERTENSION: ICD-10-CM

## 2025-07-14 DIAGNOSIS — E11.22 TYPE 2 DIABETES MELLITUS WITH STAGE 3B CHRONIC KIDNEY DISEASE, WITHOUT LONG-TERM CURRENT USE OF INSULIN: ICD-10-CM

## 2025-07-14 DIAGNOSIS — C34.31 PRIMARY MALIGNANT NEOPLASM OF RIGHT LOWER LOBE OF LUNG: ICD-10-CM

## 2025-07-14 DIAGNOSIS — R05.9 COUGH, UNSPECIFIED TYPE: ICD-10-CM

## 2025-07-14 DIAGNOSIS — N18.32 TYPE 2 DIABETES MELLITUS WITH STAGE 3B CHRONIC KIDNEY DISEASE, WITHOUT LONG-TERM CURRENT USE OF INSULIN: ICD-10-CM

## 2025-07-14 PROCEDURE — G2211 COMPLEX E/M VISIT ADD ON: HCPCS | Mod: S$GLB,,, | Performed by: HOSPITALIST

## 2025-07-14 PROCEDURE — 99215 OFFICE O/P EST HI 40 MIN: CPT | Mod: S$GLB,,, | Performed by: HOSPITALIST

## 2025-07-14 PROCEDURE — 3077F SYST BP >= 140 MM HG: CPT | Mod: CPTII,S$GLB,, | Performed by: HOSPITALIST

## 2025-07-14 PROCEDURE — 3080F DIAST BP >= 90 MM HG: CPT | Mod: CPTII,S$GLB,, | Performed by: HOSPITALIST

## 2025-07-14 PROCEDURE — 1101F PT FALLS ASSESS-DOCD LE1/YR: CPT | Mod: CPTII,S$GLB,, | Performed by: HOSPITALIST

## 2025-07-14 PROCEDURE — 99999 PR PBB SHADOW E&M-EST. PATIENT-LVL IV: CPT | Mod: PBBFAC,,, | Performed by: HOSPITALIST

## 2025-07-14 PROCEDURE — 1159F MED LIST DOCD IN RCRD: CPT | Mod: CPTII,S$GLB,, | Performed by: HOSPITALIST

## 2025-07-14 PROCEDURE — 3288F FALL RISK ASSESSMENT DOCD: CPT | Mod: CPTII,S$GLB,, | Performed by: HOSPITALIST

## 2025-07-14 RX ORDER — SODIUM CHLORIDE 0.9 % (FLUSH) 0.9 %
10 SYRINGE (ML) INJECTION
Status: CANCELLED | OUTPATIENT
Start: 2025-07-15

## 2025-07-14 RX ORDER — DIPHENHYDRAMINE HYDROCHLORIDE 50 MG/ML
50 INJECTION, SOLUTION INTRAMUSCULAR; INTRAVENOUS ONCE AS NEEDED
Status: CANCELLED | OUTPATIENT
Start: 2025-07-15

## 2025-07-14 RX ORDER — EPINEPHRINE 0.3 MG/.3ML
0.3 INJECTION SUBCUTANEOUS ONCE AS NEEDED
Status: CANCELLED | OUTPATIENT
Start: 2025-07-15

## 2025-07-14 RX ORDER — GUAIFENESIN 600 MG/1
600 TABLET, EXTENDED RELEASE ORAL 2 TIMES DAILY
Qty: 60 TABLET | Refills: 2 | Status: SHIPPED | OUTPATIENT
Start: 2025-07-14 | End: 2026-07-14

## 2025-07-14 RX ORDER — PROCHLORPERAZINE EDISYLATE 5 MG/ML
5 INJECTION INTRAMUSCULAR; INTRAVENOUS ONCE AS NEEDED
Status: CANCELLED
Start: 2025-07-15

## 2025-07-14 RX ORDER — HEPARIN 100 UNIT/ML
500 SYRINGE INTRAVENOUS
Status: CANCELLED | OUTPATIENT
Start: 2025-07-15

## 2025-07-14 NOTE — PROGRESS NOTES
MEDICAL ONCOLOGY - ESTABLISHED PATIENT VISIT    Best Contact Phone Number(s): [phone] (home)      Cancer/Stage/TNM:    Cancer Staging   Squamous Cell Carcinoma of right lower lobe of lung  Staging form: Lung, AJCC 8th Edition  - Clinical stage from 1/11/2023: Stage IA2 (cT1b, cN0, cM0) - Signed by Xavier Deng MD on 1/11/2023       Reason for visit: Metachronous recurrence of RCC      Treatment:  08/2013    Partial nephrectomy  04/2025    EBRT to RLL nodule  04/22/25 -     Nivolumab  05/12/25 -     Cabozantinib      HPI: Sarina Genao is a 75 y.o. 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 She has remote history of grade 2 ccRCC sp partial nephrectomy 08/2013.  Subsquently underwent radiation therapy to an early stage squamous cell lung cancer 01/2023. Around this time, was noted to have abdominal wall subcutaneous nodule.  FDG PET 11/2024 showed progressive 3.9cm anterior abdominal wall mass; biopsy 01/2025 consistetn with metatsatic ccRCC. There was additional pulmonary nodules of concern. She underwent EBRT to the RLL nodule 04/2025. She started nivo + cabo 05/2025; course notable for signficant arthritis requiring brief prednisone taper. She  presents to medical oncology clinic for ongoing cabozantinib + nivolumab.     History has been obtained by chart review and discussion with the patient.    Interval Events:    CT torso pending but reviewed with patient. Seems to show significant improvement in her abodminal wall nodule and RLL nodule, although sigfnicant inflammation about the right lower lobe. She has a chronic cough, particularly when lying down. No CP or SOB. Appeite is good. No N/V. No abdominal pain. Arthritis has resolved; now off steroids.    BP is grossly elevated today; did not take blood pressure mediations this morning.    Oncology History   Renal cell carcinoma    8/6/2013 Surgery    08/06/2013: Partial right nephrectomy.   Procedure: Partial nephrectomy. Specimen Laterality: Right Tumor Size: 4 cm in greatest dimension. Tumor Focality: Unifocal. Macroscopic Extent Of Tumor: Tumor limited to the kidney. Histologic Type: Clear cell renal cell carcinoma. Sarcomatoid features: Not identified. Histologic Grade (Heron nuclear grade): G2. Microscopic Tumor Extension: Tumor limited to the kidney Margins: Uninvolved by invasive carcinoma. Pathologic Staging: Primary tumor: pT1a Regional lymph node: pNx Distant metastasis: Not applicable.      11/25/2022 Imaging Significant Findings    11/25/22 PET CT  Impression:  - Hypermetabolic 1.5 cm nodule in the right lower lobe of the lung concerning for primary neoplasm.  No hypermetabolic lymphadenopathy.  - Round soft tissue structure within the anterior abdominal wall measuring up to 2.3 cm with mild radiotracer uptake.  Recommend further assessment with dedicated ultrasound and/or percutaneous sampling as clinically warranted.  - Additional pulmonary nodules without significant FDG avidity as above.  Findings include a new 3 mm nodule in the left upper lobe.  Of note, subcentimeter nodules are likely too small to characterize with PET.  Recommend continued CT surveillance.     11/22/2024 Imaging Significant Findings    11/22/24 FDG PET  Impression:  - Patient with history of right lower lobe non-small cell lung cancer status post radiation therapy.  No evidence of tracer avid disease or metastasis.  - Interval enlargement of a 3.9 cm anterior abdominal wall mass with mild increased tracer uptake.  Recommend further evaluation with ultrasound and tissue sampling is warranted.     1/6/2025 Biopsy    01/06/25 Biopsy    RIGHT ABDOMINAL WALL, CT-GUIDED BIOPSY WITH PATHOLOGIST ASSISTED ADEQUACY (CYTOLOGY AND CELL BLOCK):  Positive for malignancy, consistent with renal cell carcinoma.  See comment.        1/31/2025 Imaging Significant  Findings    1/31/25 Tc99m Bone scan  Impression:  - There is no scintigraphic evidence of osteoblastic metastatic disease.    1/31/25 CT torso  Impression:  - Decreased size of a right anterior abdominal wall mass.  - Stable pulmonary nodules.     4/8/2025 - 4/14/2025 Radiation Therapy    Treating physician: Xavier Deng    Site Technique Energy Dose/Fx (Gy) #Fx Total Dose (Gy)   RLL Lung SBRT 6X 18 3 / 3 54        4/10/2025 - 4/10/2025 Chemotherapy    Treatment Summary   Plan Name: NIVOLUMAB 480MG Q4W + CABOZANTINIB 40MG QD  Treatment Goal: Palliative  Status: Inactive  Start Date:   End Date:   Provider: Paxton Santiago MD  Chemotherapy: cabozantinib (CABOMETYX) 40 mg Tab, 40 mg, Oral, Daily, 0 of 1 cycle, Start date: 4/10/2025, End date: 5/8/2025 4/22/2025 -  Chemotherapy    Treatment Summary   Plan Name: OP NIVOLUMAB 480MG Q4W + CABOZANTINIB 40MG QD  Treatment Goal: Palliative  Status: Active  Start Date: 4/22/2025  End Date: 4/20/2026 (Planned)  Provider: Paxton Santiago MD  Chemotherapy: [No matching medication found in this treatment plan]     Squamous Cell Carcinoma of right lower lobe of lung   10/25/2022 Biopsy    10/25/22 Lung biopsy  1. Lung, right lower lobe, endobronchial brushing, cytologic evaluation with  cell block:  Atypical cells present.  Epithelial atypia.  Findings suggestive of squamous cell carcinoma  2. Lung, right lower lobe, endoscopic fine needle aspiration, cytologic  evaluation with cell block:  Positive for malignancy. Lung squamous cell carcinoma.  3. Lung, right lower lobe, transbronchial biopsy, cytologic evaluation with  cell block:  Positive for malignancy, Lung squamous cell carcinoma  - PD-L1 and Lung NGS Panel have been ordered and results will be issued in a  supplemental report.  4. Lung, right lower lobe, bronchoalveolar lavage, cytologic evaluation with  cell block:  Negative for malignant cells.  Reactive bronchial cells.  Macrophages.  5. Lung, left lower  lobe, endoscopic fine needle aspiration, cytologic  evaluation with cell block:  Negative for malignant cells.  Reactive bronchial cells.  Macrophages.  6. Lymph node, Station 7, endoscopic fine needle aspiration, cytologic  evalu ation with cell block:  Negative for malignant cells. Reactive bronchial cells.  Lymphocytes present.      11/25/2022 Imaging Significant Findings    11/25/22 PET CT  Impression:  - Hypermetabolic 1.5 cm nodule in the right lower lobe of the lung concerning for primary neoplasm.  No hypermetabolic lymphadenopathy.  - Round soft tissue structure within the anterior abdominal wall measuring up to 2.3 cm with mild radiotracer uptake.  Recommend further assessment with dedicated ultrasound and/or percutaneous sampling as clinically warranted.  - Additional pulmonary nodules without significant FDG avidity as above.  Findings include a new 3 mm nodule in the left upper lobe.  Of note, subcentimeter nodules are likely too small to characterize with PET.  Recommend continued CT surveillance.     11/30/2022 Initial Diagnosis    Squamous Cell Carcinoma of right lower lobe of lung     1/11/2023 Cancer Staged    Staging form: Lung, AJCC 8th Edition  - Clinical stage from 1/11/2023: Stage IA2 (cT1b, cN0, cM0)     1/17/2023 Imaging Significant Findings    01/17/23 CT Chest  Impression:  1. Interval enlargement of a right lower lobe nodule, now 2.3 cm maximally (previously 1.5 cm).  This was noted hypermetabolic on the prior PET-CT 09/29/2022.  2. There is a newly seen peripheral, right lower lobe irregular nodule up to 1.3 cm. v this was a site of thoracotomy and may be postsurgical in nature.  3. There other pulmonary nodules as described above which do not appear changed.  There is no new lymphadenopathy detected.  4. There is a new small right pleural effusion and peripheral airspace opacities in the right lung.  This may reflect infectious/inflammatory etiology.  Alternatively, this may reflect  involving appearance of postsurgical change.     1/26/2023 - 2/3/2023 Radiation Therapy    Treating physician: Xavier Deng    Site Technique Energy Dose/Fx (Gy) #Fx Total Dose (Gy)   RLL Lung SBRT 6X 12.5 4 / 4 50         Biopsy       4/26/2023 Imaging Significant Findings    04/26/23 CT Chest  Impression:  1. Interval decrease in size of a right lower lobe irregular nodule with adjacent architectural distortion.  Consistent with post treatment changes.  2. Other findings as above without significant interval change.     5/10/2024 Imaging Significant Findings    5/10/24 CT Chest  Impression:  1. Stable bilateral pulmonary nodules, including right lower lobe spiculated nodule.  No new or enlarging nodules.  2. Additional findings as above.     11/13/2024 Imaging Significant Findings    11/13/24 CT Chest  Impression:  1. Stable bilateral pulmonary nodules, including right lower lobe spiculated nodule.  No new or enlarging nodules.  2. Additional findings as above.     11/22/2024 Imaging Significant Findings    11/22/24 FDG PET  Impression:  - Patient with history of right lower lobe non-small cell lung cancer status post radiation therapy.  No evidence of tracer avid disease or metastasis.  - Interval enlargement of a 3.9 cm anterior abdominal wall mass with mild increased tracer uptake.  Recommend further evaluation with ultrasound and tissue sampling is warranted.     4/22/2025 -  Chemotherapy    Treatment Summary   Plan Name: OP NIVOLUMAB 480MG Q4W + CABOZANTINIB 40MG QD  Treatment Goal: Palliative  Status: Active  Start Date: 4/22/2025  End Date: 4/20/2026 (Planned)  Provider: Paxton Santiago MD  Chemotherapy: [No matching medication found in this treatment plan]     Secondary malignant neoplasm of right lung   3/27/2025 Initial Diagnosis    Secondary malignant neoplasm of right lung     4/22/2025 -  Chemotherapy    Treatment Summary   Plan Name: OP NIVOLUMAB 480MG Q4W + CABOZANTINIB 40MG QD  Treatment Goal:  Palliative  Status: Active  Start Date: 4/22/2025  End Date: 4/20/2026 (Planned)  Provider: Paxton Santiago MD  Chemotherapy: [No matching medication found in this treatment plan]     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   3/27/2025 Initial Diagnosis    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     4/22/2025 -  Chemotherapy    Treatment Summary   Plan Name: OP NIVOLUMAB 480MG Q4W + CABOZANTINIB 40MG QD  Treatment Goal: Palliative  Status: Active  Start Date: 4/22/2025  End Date: 4/20/2026 (Planned)  Provider: Paxton Santiago MD  Chemotherapy: [No matching medication found in this treatment plan]          Past Medical History:   Diagnosis Date    Anticoagulant long-term use     Cancer     Kidney (Right)    Cataracts, bilateral     CKD (chronic kidney disease) stage 3, GFR 30-59 ml/min 12/15/2014    Colon polyps     Combined hyperlipidemia associated with type 2 diabetes mellitus 6/7/2013    COPD (chronic obstructive pulmonary disease) 12/15/2014    Diabetes mellitus type II     NIDDM, a.m. glucose-120s-130s-last A1c-7.1-6/7/13    Diabetes mellitus with renal manifestations, uncontrolled 2/1/2017    Diabetic retinopathy     Family history of stomach cancer 12/5/2013    Former smoker     H/O renal cell carcinoma 12/15/2015    History of colonic polyps 2/1/2017    3 polyps 7/13 ---5 yrs    History of gastroesophageal reflux (GERD)     History of urinary incontinence     s/p bladder lift-october, 2011 (at [facility])    Hyperlipidemia     Hypertension     120s/70s-80s    Nephrolithiasis     Osteoarthritis of thumb 12/20/2019    Osteoporosis, post-menopausal 3/17/2014    Primary hyperparathyroidism 10/20/2016    Schatzki's ring 2/1/2017    Dilated 2014        Past Surgical History:   Procedure Laterality Date    bladder lift  2011    done at [facility]    BLADDER STONE REMOVAL  2011    before bladder lift    CATARACT EXTRACTION W/  INTRAOCULAR LENS IMPLANT Left 06/07/2016    Dr. Vásquez     CATARACT EXTRACTION W/  INTRAOCULAR LENS IMPLANT Right 06/21/2016    Dr. Vásquez    COLONOSCOPY N/A 4/26/2018    Procedure: COLONOSCOPY;  Surgeon: Benjamin Zamora MD;  Location: [facility] ENDO;  Service: Endoscopy;  Laterality: N/A;  confirmed ss    COLONOSCOPY N/A 12/21/2023    Procedure: COLONOSCOPY;  Surgeon: Jamel Luis MD;  Location: [facility] ENDO;  Service: Endoscopy;  Laterality: N/A;  9/7 ref Venancio Mayer MD, Suprep, instr. mailed, [facility]  12/14- instr reviewed, pt reminded to hold ozempic, pre call complete.  DBM    CYSTOSCOPY      INJECTION OF ANESTHETIC AGENT AROUND MULTIPLE INTERCOSTAL NERVES  12/27/2022    Procedure: BLOCK, NERVE, INTERCOSTAL, 2 OR MORE;  Surgeon: Paxton Cannon MD;  Location: [facility] OR 2ND FLR;  Service: Thoracic;;    LYSIS OF ADHESIONS  12/27/2022    Procedure: LYSIS, ADHESIONS;  Surgeon: Paxton Cannon MD;  Location: [facility] OR 2ND FLR;  Service: Thoracic;;    NAVIGATIONAL BRONCHOSCOPY N/A 12/11/2018    Procedure: BRONCHOSCOPY, NAVIGATIONAL;  Surgeon: Ashtyn Ramires MD;  Location: [facility] OR 2ND FLR;  Service: Pulmonary;  Laterality: N/A;    NEPHRECTOMY      partial right    ROBOTIC BRONCHOSCOPY N/A 10/25/2022    Procedure: ROBOTIC BRONCHOSCOPY;  Surgeon: Ashtyn Ramires MD;  Location: [facility] OR 2ND FLR;  Service: Pulmonary;  Laterality: N/A;    ROBOTIC BRONCHOSCOPY N/A 2/28/2025    Procedure: ROBOTIC BRONCHOSCOPY;  Surgeon: Ashtyn Ramires MD;  Location: [facility] OR 2ND FLR;  Service: Pulmonary;  Laterality: N/A;    VIDEO-ASSISTED THORACOSCOPIC SURGERY (VATS)  12/27/2022    Procedure: VATS (VIDEO-ASSISTED THORACOSCOPIC SURGERY);  Surgeon: Paxton Cannon MD;  Location: [facility] OR 2ND FLR;  Service: Thoracic;;        Family History   Problem Relation Name Age of Onset    Glaucoma Mother      Cataracts Mother      No Known Problems Father      Colon cancer Brother      Nephrolithiasis Sister      No Known Problems Maternal Aunt      No Known Problems Maternal Uncle      No Known  Problems Paternal Aunt      No Known Problems Paternal Uncle      No Known Problems Maternal Grandmother      No Known Problems Maternal Grandfather      No Known Problems Paternal Grandmother      No Known Problems Paternal Grandfather      Anesthesia problems Neg Hx      Kidney cancer Neg Hx      Amblyopia Neg Hx      Blindness Neg Hx      Cancer Neg Hx      Diabetes Neg Hx      Hypertension Neg Hx      Macular degeneration Neg Hx      Retinal detachment Neg Hx      Strabismus Neg Hx      Stroke Neg Hx      Thyroid disease Neg Hx          Social History     Tobacco Use    Smoking status: Every Day     Current packs/day: 0.25     Average packs/day: 0.3 packs/day for 30.0 years (7.5 ttl pk-yrs)     Types: Cigarettes    Smokeless tobacco: Never   Substance Use Topics    Alcohol use: No     Alcohol/week: 0.0 standard drinks of alcohol        I have reviewed and updated the patient's past medical, surgical, family and social histories.    Review of patient's allergies indicates:   Allergen Reactions    Pantoprazole Other (See Comments)     Elevated Blood Sugars    Metformin Diarrhea        Current Outpatient Medications   Medication Sig Dispense Refill    amLODIPine (NORVASC) 10 MG tablet TAKE ONE TABLET BY MOUTH ONCE A DAY 90 tablet 3    ascorbic acid, vitamin C, (VITAMIN C) 250 mg Chew Take 1 tablet by mouth.      aspirin (ECOTRIN) 81 MG EC tablet Take 81 mg by mouth once daily.      blood sugar diagnostic Strp Dispense: Test strip to check glucose 2 times a day, ICD-10: E11.65, compatible with insurance/glucometer, dispense enough for 90 day supply 300 each 3    blood-glucose meter kit Dispense: 1 glucometer, use to check glucose 2 times a day, ICD-10: E11.65,  Dispense machine covered by insurance 1 each 0    cabozantinib (CABOMETYX) 40 mg Tab Take one tablet (40 mg) by mouth once daily on an empty stomach. Fasting is required from 2 hrs before through 1 hr after each dose.  Do not crush or chew. 30 tablet 5     "cyanocobalamin, vitamin B-12, 1,000 mcg TbSR Take by mouth once daily.      famotidine (PEPCID) 40 MG tablet TAKE ONE TABLET BY MOUTH ONCE A DAY (Patient taking differently: PRN) 90 tablet 0    fluconazole (DIFLUCAN) 150 MG Tab Take 1 tablet (150 mg total) by mouth every 72 hours. 3 tablet 0    furosemide (LASIX) 20 MG tablet TAKE ONE TABLET BY MOUTH ONCE A DAY AS NEEDED 30 tablet 3    irbesartan (AVAPRO) 300 MG tablet Take 1 tablet (300 mg total) by mouth once daily. 90 tablet 3    lancets Misc Dispense: Lancets use to check glucose 2 times a day, ICD-10: E11.65, dispense enough for 90 day supply 300 each 3    lancing device Misc One device, used to check blood glucose, ICD-10: E11.65 1 each 0    multivitamin (THERAGRAN) per tablet Take 1 tablet by mouth once daily.      mupirocin (BACTROBAN) 2 % ointment Apply topically 3 (three) times daily as needed. Apply to skin abrasions on left leg 15 g 1    nicotine polacrilex 2 MG Lozg Take 1 lozenge (2 mg total) by mouth as needed (smoking cessation). 60 lozenge 5    oxybutynin (DITROPAN-XL) 5 MG TR24 TAKE ONE TABLET BY MOUTH ONCE A DAY 90 tablet 3    pen needle, diabetic (BD INSULIN PEN NEEDLE UF SHORT) 31 gauge x 5/16" Ndle USE AS DIRECTED 100 each 12    rosuvastatin (CRESTOR) 20 MG tablet TAKE ONE TABLET BY MOUTH ONCE A DAY 90 tablet 12    SITagliptin phosphate (JANUVIA) 25 MG Tab Take 1 tablet (25 mg total) by mouth once daily. 90 tablet 3    vitamin D (VITAMIN D3) 1000 units Tab Take 1,000 Units by mouth once daily.      guaiFENesin (MUCINEX) 600 mg 12 hr tablet Take 1 tablet (600 mg total) by mouth 2 (two) times daily. 60 tablet 2    omeprazole (PRILOSEC) 40 MG capsule Take 1 capsule (40 mg total) by mouth once daily. 90 capsule 4     No current facility-administered medications for this visit.          Physical Exam:   BP (!) 196/96   Pulse 94   Temp 97 °F (36.1 °C) (Oral)   Resp 17   Ht 5' 3" (1.6 m)   Wt 61.1 kg (134 lb 11.2 oz)   SpO2 95%   BMI 23.86 " kg/m²      ECOG Performance status: 1            Physical Exam  Constitutional:       General: She is not in acute distress.     Appearance: Normal appearance. She is normal weight.   HENT:      Head: Normocephalic.   Eyes:      General: No scleral icterus.     Conjunctiva/sclera: Conjunctivae normal.   Cardiovascular:      Rate and Rhythm: Normal rate and regular rhythm.      Pulses: Normal pulses.      Heart sounds: Normal heart sounds.   Pulmonary:      Effort: Pulmonary effort is normal. No respiratory distress.   Abdominal:      General: There is no distension.      Palpations: Abdomen is soft.   Musculoskeletal:         General: Normal range of motion.      Cervical back: Normal range of motion and neck supple.      Right lower leg: Edema present.      Left lower leg: Edema present.   Skin:     General: Skin is warm.      Coloration: Skin is not jaundiced.   Neurological:      General: No focal deficit present.      Mental Status: She is alert and oriented to person, place, and time.   Psychiatric:         Mood and Affect: Mood normal.         Behavior: Behavior normal.         Thought Content: Thought content normal.           Labs:   No results found for this or any previous visit (from the past 48 hours).      Imaging:       I have personally reviewed the above imaging    Path:   Reviewed pathology as documented in oncology history      Assessment and Plan:     1. Renal cell carcinoma of right kidney  Overview:  Metastatic ccRCC 01/2025 in the setting of incidental finding of an anterior abdominal wall mass during surveillance of prior lung cancer. She has remote history of grade 2 ccRCC sp partial nephrectomy 08/2013.  Subsquently underwent radiation therapy to an early stage squamous cell lung cancer 01/2023. Around this time, was noted to have abdominal wall subcutaneous nodule.  FDG PET 11/2024 showed progressive 3.9cm anterior abdominal wall mass; biopsy 01/2025 consistetn with metatsatic ccRCC. There  was additional pulmonary nodules of concern. She underwent EBRT to the RLL nodule 04/2025. Considered high risk for surgical resection of abdominal met. Started Cabo/Nivo 05/2025 complicated by severe arthritis.    Assessment & Plan:  Doing well with good PA on imaging (awaiting official review). Will resume nivo tomorrow and condintue cabozantinib. If recurrent grade II checkpoint arthritis can restart quick prednisone taper and would hold further immunotherapy.  - Con't cabozantinib 40mg po daily  - Resume immunotherapy with nivo 480 tomorrow  - Patient does not want surgical consolidation  - FU 4 weeks next nivo      2. Cough, unspecified type  -     guaiFENesin (MUCINEX) 600 mg 12 hr tablet; Take 1 tablet (600 mg total) by mouth 2 (two) times daily.  Dispense: 60 tablet; Refill: 2    3. Essential hypertension  Overview:  Home mediations include amlodipine and irbesartan. Also uses lasix prn for edema.      4. Type 2 diabetes mellitus with stage 3b chronic kidney disease, without long-term current use of insulin  Overview:  Home medications include Januvia    Assessment & Plan:  Hemoglobin A1C   Date Value Ref Range Status   03/20/2025 7.2 (H) 4.0 - 5.6 % Final     Comment:     ADA Screening Guidelines:  5.7-6.4%  Consistent with prediabetes  >or=6.5%  Consistent with diabetes    High levels of fetal hemoglobin interfere with the HbA1C  assay. Heterozygous hemoglobin variants (HbS, HgC, etc)do  not significantly interfere with this assay.   However, presence of multiple variants may affect accuracy.     09/13/2024 7.2 (H) 4.0 - 5.6 % Final     Comment:     ADA Screening Guidelines:  5.7-6.4%  Consistent with prediabetes  >or=6.5%  Consistent with diabetes    High levels of fetal hemoglobin interfere with the HbA1C  assay. Heterozygous hemoglobin variants (HbS, HgC, etc)do  not significantly interfere with this assay.   However, presence of multiple variants may affect accuracy.     05/13/2024 6.8 (H) 4.0 - 5.6 %  Final     Comment:     ADA Screening Guidelines:  5.7-6.4%  Consistent with prediabetes  >or=6.5%  Consistent with diabetes    High levels of fetal hemoglobin interfere with the HbA1C  assay. Heterozygous hemoglobin variants (HbS, HgC, etc)do  not significantly interfere with this assay.   However, presence of multiple variants may affect accuracy.     02/05/2020 6.0 (H) 4.0 - 5.6 % Final     Comment:     [facility]           5. Osteoporosis, post-menopausal  Overview:  Follow with endocrine; long term Proli since ~2018.     Assessment & Plan:  - Next Prolia 11/2025      6. Squamous Cell Carcinoma of right lower lobe of lung  Overview:  Status post SBRT    Enlarging RLL spiculated nodule could represent additional lung primary or RCC metastasis.    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  Has remained stable.  Do not believe patient would benefit from SBRT to this region.       Other orders  -     nivolumab 480 mg in 0.9% NaCl 98 mL infusion  -     prochlorperazine injection Soln 5 mg  -     EPINEPHrine (EPIPEN) 0.3 mg/0.3 mL pen injection 0.3 mg  -     diphenhydrAMINE injection 50 mg  -     hydrocortisone sodium succinate injection 100 mg  -     0.9% NaCl 250 mL flush bag  -     sodium chloride 0.9% flush 10 mL  -     heparin, porcine (PF) 100 unit/mL injection flush 500 Units  -     alteplase injection 2 mg          Follow up:   Route Chart for Scheduling    Med Onc Chart Routing      Follow up with physician 2 weeks.   Follow up with [facility] 4 weeks.   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   noted on 3/27/2025  Associated diagnosis: Primary malignant neoplasm of right lower lobe of lung Stage IA2 cT1b, cN0, cM0 noted on 11/30/2022   Line of treatment: First Line  Treatment Goal: Palliative     Upcoming Treatment Dates - OP NIVOLUMAB 480MG Q4W + CABOZANTINIB 40MG QD    7/14/2025       Chemotherapy       nivolumab 480 mg in 0.9% NaCl 98 mL infusion  8/11/2025       Chemotherapy       nivolumab 480 mg in 0.9% NaCl 98 mL infusion  9/8/2025       Chemotherapy       nivolumab 480 mg in 0.9% NaCl 98 mL infusion  10/6/2025       Chemotherapy       nivolumab 480 mg in 0.9% NaCl 98 mL infusion    Therapy Plan Information  DENOSUMAB (PROLIA) Q6M for Osteoporosis, post-menopausal, noted on 3/17/2014  Medications  denosumab (PROLIA) injection 60 mg  60 mg, Subcutaneous, Every 26 weeks      No therapy plan of the specified type found.    No therapy plan of the specified type found.      The above information has been reviewed with the patient and all questions have been answered to their apparent satisfaction.  They understand that they can call the clinic with any questions.    Paxton Santiago MD MPH  Staff Physician     [facility] [place] [address]  Email: [email or web address]  Phone: (o) [phone] (c) [phone]                code applied: patient requires or will require a continuous, longitudinal, and active collaborative plan of care related to this patient's health condition, prostate cancer --the management of which requires the direction of a practitioner with specialized clinical knowledge, skill, and expertise.

## 2025-07-14 NOTE — ASSESSMENT & PLAN NOTE
Hemoglobin A1C   Date Value Ref Range Status   03/20/2025 7.2 (H) 4.0 - 5.6 % Final     Comment:     ADA Screening Guidelines:  5.7-6.4%  Consistent with prediabetes  >or=6.5%  Consistent with diabetes    High levels of fetal hemoglobin interfere with the HbA1C  assay. Heterozygous hemoglobin variants (HbS, HgC, etc)do  not significantly interfere with this assay.   However, presence of multiple variants may affect accuracy.     09/13/2024 7.2 (H) 4.0 - 5.6 % Final     Comment:     ADA Screening Guidelines:  5.7-6.4%  Consistent with prediabetes  >or=6.5%  Consistent with diabetes    High levels of fetal hemoglobin interfere with the HbA1C  assay. Heterozygous hemoglobin variants (HbS, HgC, etc)do  not significantly interfere with this assay.   However, presence of multiple variants may affect accuracy.     05/13/2024 6.8 (H) 4.0 - 5.6 % Final     Comment:     ADA Screening Guidelines:  5.7-6.4%  Consistent with prediabetes  >or=6.5%  Consistent with diabetes    High levels of fetal hemoglobin interfere with the HbA1C  assay. Heterozygous hemoglobin variants (HbS, HgC, etc)do  not significantly interfere with this assay.   However, presence of multiple variants may affect accuracy.     02/05/2020 6.0 (H) 4.0 - 5.6 % Final     Comment:     [facility]

## 2025-07-14 NOTE — ASSESSMENT & PLAN NOTE
Doing well with good ME on imaging (awaiting official review). Will resume nivo tomorrow and condintue cabozantinib. If recurrent grade II checkpoint arthritis can restart quick prednisone taper and would hold further immunotherapy.  - Con't cabozantinib 40mg po daily  - Resume immunotherapy with nivo 480 tomorrow  - Patient does not want surgical consolidation  - FU 4 weeks next nivo

## 2025-07-15 ENCOUNTER — INFUSION (OUTPATIENT)
Dept: INFUSION THERAPY | Facility: HOSPITAL | Age: 75
End: 2025-07-15
Payer: MEDICARE

## 2025-07-15 VITALS
SYSTOLIC BLOOD PRESSURE: 127 MMHG | OXYGEN SATURATION: 96 % | BODY MASS INDEX: 24.02 KG/M2 | WEIGHT: 135.56 LBS | TEMPERATURE: 98 F | RESPIRATION RATE: 16 BRPM | HEART RATE: 89 BPM | DIASTOLIC BLOOD PRESSURE: 59 MMHG

## 2025-07-15 DIAGNOSIS — C34.31 PRIMARY MALIGNANT NEOPLASM OF RIGHT LOWER LOBE OF LUNG: ICD-10-CM

## 2025-07-15 DIAGNOSIS — C79.89 SECONDARY MALIGNANT NEOPLASM OF SOFT TISSUES OF ABDOMEN: ICD-10-CM

## 2025-07-15 DIAGNOSIS — C78.01 SECONDARY MALIGNANT NEOPLASM OF RIGHT LUNG: ICD-10-CM

## 2025-07-15 DIAGNOSIS — C64.9 RENAL CELL CARCINOMA, UNSPECIFIED LATERALITY: Primary | ICD-10-CM

## 2025-07-15 PROCEDURE — 63600175 PHARM REV CODE 636 W HCPCS: Mod: JZ,TB | Performed by: HOSPITALIST

## 2025-07-15 PROCEDURE — 25000003 PHARM REV CODE 250: Performed by: HOSPITALIST

## 2025-07-15 PROCEDURE — 96413 CHEMO IV INFUSION 1 HR: CPT

## 2025-07-15 RX ORDER — DIPHENHYDRAMINE HYDROCHLORIDE 50 MG/ML
50 INJECTION, SOLUTION INTRAMUSCULAR; INTRAVENOUS ONCE AS NEEDED
Status: DISCONTINUED | OUTPATIENT
Start: 2025-07-15 | End: 2025-07-15 | Stop reason: HOSPADM

## 2025-07-15 RX ORDER — EPINEPHRINE 0.3 MG/.3ML
0.3 INJECTION SUBCUTANEOUS ONCE AS NEEDED
Status: DISCONTINUED | OUTPATIENT
Start: 2025-07-15 | End: 2025-07-15 | Stop reason: HOSPADM

## 2025-07-15 RX ADMIN — SODIUM CHLORIDE 480 MG: 9 INJECTION, SOLUTION INTRAVENOUS at 02:07

## 2025-07-15 NOTE — PLAN OF CARE
Pt ambulatory to unit, AAOx4. Denies any new or worsening of symptoms since last visit. Pt received Nivolumab infusion via 24g L AC. Dsg c/d/i; no s/s of infection or infiltration noted. PIV flushed and patent with blood return. Pt tolerated medication well. No S&S of an adverse reaction, VSS. Denies pain or discomfort. Care plan discussed with pt. Pt verbalized understanding. Pt aware of upcoming appointment via MyChart. Pt ambulatory upon discharge in NAD.

## 2025-07-31 ENCOUNTER — OFFICE VISIT (OUTPATIENT)
Dept: RADIATION ONCOLOGY | Facility: CLINIC | Age: 75
End: 2025-07-31
Payer: MEDICARE

## 2025-07-31 ENCOUNTER — LAB VISIT (OUTPATIENT)
Dept: LAB | Facility: HOSPITAL | Age: 75
End: 2025-07-31
Payer: MEDICARE

## 2025-07-31 VITALS
TEMPERATURE: 98 F | DIASTOLIC BLOOD PRESSURE: 69 MMHG | WEIGHT: 139.31 LBS | SYSTOLIC BLOOD PRESSURE: 153 MMHG | BODY MASS INDEX: 24.68 KG/M2 | HEART RATE: 75 BPM | OXYGEN SATURATION: 97 %

## 2025-07-31 DIAGNOSIS — C78.01 SECONDARY MALIGNANT NEOPLASM OF RIGHT LUNG: Primary | ICD-10-CM

## 2025-07-31 DIAGNOSIS — C64.9 RENAL CELL CARCINOMA, UNSPECIFIED LATERALITY: ICD-10-CM

## 2025-07-31 DIAGNOSIS — E03.9 HYPOTHYROIDISM: ICD-10-CM

## 2025-07-31 LAB
ABSOLUTE EOSINOPHIL (OHS): 0.07 K/UL
ABSOLUTE MONOCYTE (OHS): 0.95 K/UL (ref 0.3–1)
ABSOLUTE NEUTROPHIL COUNT (OHS): 8.95 K/UL (ref 1.8–7.7)
ALBUMIN SERPL BCP-MCNC: 3.5 G/DL (ref 3.5–5.2)
ALP SERPL-CCNC: 97 UNIT/L (ref 40–150)
ALT SERPL W/O P-5'-P-CCNC: <8 UNIT/L (ref 0–55)
ANION GAP (OHS): 9 MMOL/L (ref 8–16)
AST SERPL-CCNC: 10 UNIT/L (ref 0–50)
BASOPHILS # BLD AUTO: 0.03 K/UL
BASOPHILS NFR BLD AUTO: 0.3 %
BILIRUB SERPL-MCNC: 0.5 MG/DL (ref 0.1–1)
BUN SERPL-MCNC: 24 MG/DL (ref 8–23)
CALCIUM SERPL-MCNC: 10.1 MG/DL (ref 8.7–10.5)
CHLORIDE SERPL-SCNC: 106 MMOL/L (ref 95–110)
CO2 SERPL-SCNC: 25 MMOL/L (ref 23–29)
CREAT SERPL-MCNC: 1.7 MG/DL (ref 0.5–1.4)
ERYTHROCYTE [DISTWIDTH] IN BLOOD BY AUTOMATED COUNT: 14.6 % (ref 11.5–14.5)
GFR SERPLBLD CREATININE-BSD FMLA CKD-EPI: 31 ML/MIN/1.73/M2
GLUCOSE SERPL-MCNC: 225 MG/DL (ref 70–110)
HCT VFR BLD AUTO: 42.1 % (ref 37–48.5)
HGB BLD-MCNC: 13.9 GM/DL (ref 12–16)
IMM GRANULOCYTES # BLD AUTO: 0.07 K/UL (ref 0–0.04)
IMM GRANULOCYTES NFR BLD AUTO: 0.6 % (ref 0–0.5)
LYMPHOCYTES # BLD AUTO: 0.83 K/UL (ref 1–4.8)
MCH RBC QN AUTO: 28.5 PG (ref 27–31)
MCHC RBC AUTO-ENTMCNC: 33 G/DL (ref 32–36)
MCV RBC AUTO: 86 FL (ref 82–98)
NUCLEATED RBC (/100WBC) (OHS): 0 /100 WBC
PLATELET # BLD AUTO: 256 K/UL (ref 150–450)
PMV BLD AUTO: 9.2 FL (ref 9.2–12.9)
POTASSIUM SERPL-SCNC: 4.1 MMOL/L (ref 3.5–5.1)
PROT SERPL-MCNC: 7.2 GM/DL (ref 6–8.4)
RBC # BLD AUTO: 4.88 M/UL (ref 4–5.4)
RELATIVE EOSINOPHIL (OHS): 0.6 %
RELATIVE LYMPHOCYTE (OHS): 7.6 % (ref 18–48)
RELATIVE MONOCYTE (OHS): 8.7 % (ref 4–15)
RELATIVE NEUTROPHIL (OHS): 82.2 % (ref 38–73)
SODIUM SERPL-SCNC: 140 MMOL/L (ref 136–145)
TSH SERPL-ACNC: 1.02 UIU/ML (ref 0.4–4)
WBC # BLD AUTO: 10.9 K/UL (ref 3.9–12.7)

## 2025-07-31 PROCEDURE — 36415 COLL VENOUS BLD VENIPUNCTURE: CPT

## 2025-07-31 PROCEDURE — 85025 COMPLETE CBC W/AUTO DIFF WBC: CPT

## 2025-07-31 PROCEDURE — 99999 PR PBB SHADOW E&M-EST. PATIENT-LVL IV: CPT | Mod: PBBFAC,,, | Performed by: RADIOLOGY

## 2025-07-31 PROCEDURE — 84450 TRANSFERASE (AST) (SGOT): CPT

## 2025-07-31 PROCEDURE — 84443 ASSAY THYROID STIM HORMONE: CPT

## 2025-07-31 NOTE — PROGRESS NOTES
7/31/2025    Radiation Oncology Follow-Up Visit      Prior Radiation History:    Course 1:   Site  Technique  Energy  Dose/Fx (Gy)  #Fx  Total Dose (Gy)  Start Date  End Date  Elapsed Days    RLL Lung  SBRT  6X  12.5  4 / 4  50  1/26/2023  2/3/2023  8      Course 2:    Site  Technique  Energy  Dose/Fx (Gy)  #Fx  Total Dose (Gy)  Start Date  End Date  Elapsed Days    RLL Lung  SBRT  6X  18  3 / 3  54  4/8/2025 4/14/2025  6      Is the patient female between ages 15-55:  No    Does the patient have a CIED:  No      Assessment   This is a 75 y.o. female with h/o Stage IA2 (cT1b cN0 M0) RLL NSCLC (squam) diagnosed in robotic bronch w/ EBUS 10/25/22. She had a known 2.3 cm LLL nodule since 2018 that was biopsied in 12/2018 and 10/2022 with results negative for malignancy. RLL primary was planned for surgical resection but aborted due to dense adhesions and poor pulmonary tolerance during procedure. This was treated with definitive SBRT 50 Gy in 4 fx completed 2/3/23.       History also significant for early stage RCC s/p partial nephrectomy in 8/2013. Biopsy of growing 4 cm anterior abdominal wall mass 1/6/25 revealed metastatic RCC. She has a separate slowly growing 1.9 cm RLL nodule that is c/w metastasis. This was treated 54 Gy in 3 fx 4/14/25. She returns for routine follow up.      Most recent re-staging scans with decrease in size of treated RLL metastasis as well as abdominal wall metastasis. We discussed continuing w/ systemic therapy as long as she is tolerating it well. The abdominal wall metastasis may be difficult to treat with radiation due to adjacent bowel. Possibly amenable to percutaneous ablation in the future. Since it is not causing her pain and previously was growing slowly, she also expressed that she may not want any additional interventions on it.          Plan   1) I will see her back in 6 months.   2) Follow up with Dr. Santiago as scheduled for continued systemic management and re-staging  scans.            CHIEF COMPLAINT: F/U after lung SBRT    HPI/Focused ROS: Since end of radiation, she started Nivo+Cabo with Dr. Santiago in 5/2025. Re-staging CT C/A/P 7/11/25 demonstrated interval decrease in size of the RLL lung metastasis that was treated in April 2025; also decrease in size of the abdominal wall metastasis. Overall she is feeling well other than she did have significant arthritis from I/O that was relieved w/ prednisone. Denies dyspnea or chest pain.       Past Medical History:   Diagnosis Date    Anticoagulant long-term use     Cancer     Kidney (Right)    Cataracts, bilateral     CKD (chronic kidney disease) stage 3, GFR 30-59 ml/min 12/15/2014    Colon polyps     Combined hyperlipidemia associated with type 2 diabetes mellitus 6/7/2013    COPD (chronic obstructive pulmonary disease) 12/15/2014    Diabetes mellitus type II     NIDDM, a.m. glucose-120s-130s-last A1c-7.1-6/7/13    Diabetes mellitus with renal manifestations, uncontrolled 2/1/2017    Diabetic retinopathy     Family history of stomach cancer 12/5/2013    Former smoker     H/O renal cell carcinoma 12/15/2015    History of colonic polyps 2/1/2017    3 polyps 7/13 ---5 yrs    History of gastroesophageal reflux (GERD)     History of urinary incontinence     s/p bladder lift-october, [place] (at [place])    Hyperlipidemia     Hypertension     120s/70s-80s    Nephrolithiasis     Osteoarthritis of thumb 12/20/2019    Osteoporosis, post-menopausal 3/17/2014    Primary hyperparathyroidism 10/20/2016    Schatzki's ring 2/1/2017    Dilated 2014       Past Surgical History:   Procedure Laterality Date    bladder lift  2011    done at [place]    BLADDER STONE REMOVAL  2011    before bladder lift    CATARACT EXTRACTION W/  INTRAOCULAR LENS IMPLANT Left 06/07/2016    Dr. Vásquez    CATARACT EXTRACTION W/  INTRAOCULAR LENS IMPLANT Right 06/21/2016    Dr. Vásquez    COLONOSCOPY N/A 4/26/2018    Procedure: COLONOSCOPY;  Surgeon: Benjamin Zamora,  MD;  Location: [facility] ENDO;  Service: Endoscopy;  Laterality: N/A;  confirmed ss    COLONOSCOPY N/A 12/21/2023    Procedure: COLONOSCOPY;  Surgeon: Jamel Luis MD;  Location: [facility] ENDO;  Service: Endoscopy;  Laterality: N/A;  9/7 ref Venancio Mayer MD, Suprep, instr. mailed, [facility]  12/14- instr reviewed, pt reminded to hold ozempic, pre call complete.  DBM    CYSTOSCOPY      INJECTION OF ANESTHETIC AGENT AROUND MULTIPLE INTERCOSTAL NERVES  12/27/2022    Procedure: BLOCK, NERVE, INTERCOSTAL, 2 OR MORE;  Surgeon: Paxton Cannon MD;  Location: [facility] OR 2ND FLR;  Service: Thoracic;;    LYSIS OF ADHESIONS  12/27/2022    Procedure: LYSIS, ADHESIONS;  Surgeon: Paxton Cannon MD;  Location: [facility] OR 2ND FLR;  Service: Thoracic;;    NAVIGATIONAL BRONCHOSCOPY N/A 12/11/2018    Procedure: BRONCHOSCOPY, NAVIGATIONAL;  Surgeon: Ashtyn Ramires MD;  Location: [facility] OR 2ND FLR;  Service: Pulmonary;  Laterality: N/A;    NEPHRECTOMY      partial right    ROBOTIC BRONCHOSCOPY N/A 10/25/2022    Procedure: ROBOTIC BRONCHOSCOPY;  Surgeon: Ashtyn Ramires MD;  Location: [facility] OR 2ND FLR;  Service: Pulmonary;  Laterality: N/A;    ROBOTIC BRONCHOSCOPY N/A 2/28/2025    Procedure: ROBOTIC BRONCHOSCOPY;  Surgeon: Ashtyn Ramires MD;  Location: [facility] OR 2ND FLR;  Service: Pulmonary;  Laterality: N/A;    VIDEO-ASSISTED THORACOSCOPIC SURGERY (VATS)  12/27/2022    Procedure: VATS (VIDEO-ASSISTED THORACOSCOPIC SURGERY);  Surgeon: Paxton Cannon MD;  Location: [facility] OR 2ND FLR;  Service: Thoracic;;       Social History     Tobacco Use    Smoking status: Every Day     Current packs/day: 0.25     Average packs/day: 0.3 packs/day for 30.0 years (7.5 ttl pk-yrs)     Types: Cigarettes    Smokeless tobacco: Never   Substance Use Topics    Alcohol use: No     Alcohol/week: 0.0 standard drinks of alcohol    Drug use: No       Cancer-related family history is negative for Kidney cancer and Cancer.    Current Outpatient Medications on File Prior  to Visit   Medication Sig Dispense Refill    amLODIPine (NORVASC) 10 MG tablet TAKE ONE TABLET BY MOUTH ONCE A DAY 90 tablet 3    ascorbic acid, vitamin C, (VITAMIN C) 250 mg Chew Take 1 tablet by mouth.      aspirin (ECOTRIN) 81 MG EC tablet Take 81 mg by mouth once daily.      blood sugar diagnostic Strp Dispense: Test strip to check glucose 2 times a day, ICD-10: E11.65, compatible with insurance/glucometer, dispense enough for 90 day supply 300 each 3    blood-glucose meter kit Dispense: 1 glucometer, use to check glucose 2 times a day, ICD-10: E11.65,  Dispense machine covered by insurance 1 each 0    cabozantinib (CABOMETYX) 40 mg Tab Take one tablet (40 mg) by mouth once daily on an empty stomach. Fasting is required from 2 hrs before through 1 hr after each dose.  Do not crush or chew. 30 tablet 5    cyanocobalamin, vitamin B-12, 1,000 mcg TbSR Take by mouth once daily.      famotidine (PEPCID) 40 MG tablet TAKE ONE TABLET BY MOUTH ONCE A DAY (Patient taking differently: PRN) 90 tablet 0    fluconazole (DIFLUCAN) 150 MG Tab Take 1 tablet (150 mg total) by mouth every 72 hours. 3 tablet 0    furosemide (LASIX) 20 MG tablet TAKE ONE TABLET BY MOUTH ONCE A DAY AS NEEDED 30 tablet 3    guaiFENesin (MUCINEX) 600 mg 12 hr tablet Take 1 tablet (600 mg total) by mouth 2 (two) times daily. 60 tablet 2    irbesartan (AVAPRO) 300 MG tablet Take 1 tablet (300 mg total) by mouth once daily. 90 tablet 3    lancets Misc Dispense: Lancets use to check glucose 2 times a day, ICD-10: E11.65, dispense enough for 90 day supply 300 each 3    lancing device Misc One device, used to check blood glucose, ICD-10: E11.65 1 each 0    multivitamin (THERAGRAN) per tablet Take 1 tablet by mouth once daily.      mupirocin (BACTROBAN) 2 % ointment Apply topically 3 (three) times daily as needed. Apply to skin abrasions on left leg 15 g 1    nicotine polacrilex 2 MG Lozg Take 1 lozenge (2 mg total) by mouth as needed (smoking cessation). 60  "lozenge 5    omeprazole (PRILOSEC) 40 MG capsule Take 1 capsule (40 mg total) by mouth once daily. 90 capsule 4    oxybutynin (DITROPAN-XL) 5 MG TR24 TAKE ONE TABLET BY MOUTH ONCE A DAY 90 tablet 3    pen needle, diabetic (BD INSULIN PEN NEEDLE UF SHORT) 31 gauge x 5/16" Ndle USE AS DIRECTED 100 each 12    rosuvastatin (CRESTOR) 20 MG tablet TAKE ONE TABLET BY MOUTH ONCE A DAY 90 tablet 12    SITagliptin phosphate (JANUVIA) 25 MG Tab Take 1 tablet (25 mg total) by mouth once daily. 90 tablet 3    vitamin D (VITAMIN D3) 1000 units Tab Take 1,000 Units by mouth once daily.       No current facility-administered medications on file prior to visit.       Review of patient's allergies indicates:   Allergen Reactions    Pantoprazole Other (See Comments)     Elevated Blood Sugars    Metformin Diarrhea         Vital Signs: BP (!) 153/69   Pulse 75   Temp 98.4 °F (36.9 °C) (Oral)   Wt 63.2 kg (139 lb 5.3 oz)   SpO2 97%   BMI 24.68 kg/m²     ECOG Performance Status: 1 - Ambulates, capable of light work    Physical Exam  Constitutional:       Appearance: Normal appearance.   HENT:      Head: Normocephalic and atraumatic.   Eyes:      General: No scleral icterus.     Extraocular Movements: Extraocular movements intact.   Pulmonary:      Effort: No accessory muscle usage or respiratory distress.   Musculoskeletal:      Cervical back: Normal range of motion.   Neurological:      Mental Status: She is alert and oriented to person, place, and time.   Psychiatric:         Mood and Affect: Mood and affect normal.         Judgment: Judgment normal.          Labs:    Imaging: I have personally reviewed the patient's available images and reports and summarized pertinent findings above in HPI.     Pathology: No new path              "

## 2025-08-01 ENCOUNTER — TELEPHONE (OUTPATIENT)
Dept: HEMATOLOGY/ONCOLOGY | Facility: CLINIC | Age: 75
End: 2025-08-01
Payer: MEDICARE

## 2025-08-01 NOTE — TELEPHONE ENCOUNTER
"I spoke to patient and someone else who was at the home (she didn't identify herself). Patient said she needed something for her arthritis as it's really bothering her and she's having a lot of pain. She said last time it was like this, they put her on some steroids. I told her she should probably reach out to her PCP if it was arthritis because we take care of her cancer. She began to yell that she was "in pain, and before the office closes, somebody better send her something in". That's when the other person got on the phone. She said Dr. Santiago told the patient that the joint pain/arthritis was from her cancer. I told her I could message Dr. Santiago to see if he would send something in. She said last time it was Willie that helped her. I told her willie was gone for the day, but I would message Dr Santiago. I asked if he did send something in, which pharmacy should it go to, she said [facility] pharmacy in [place].   "

## 2025-08-01 NOTE — TELEPHONE ENCOUNTER
Copied from CRM #[id number]. Topic: General Inquiry - Patient Advice  >> Aug 1, 2025  1:41 PM Saloni wrote:     Consult/Advisory     Name Of Caller:Carolyn rosa        Contact Preference:[phone]     Nature of call:Sister is calling to speak to Kiesha about patient not feeling welll and in a lot pain.

## 2025-08-04 ENCOUNTER — TELEPHONE (OUTPATIENT)
Dept: RADIATION ONCOLOGY | Facility: CLINIC | Age: 75
End: 2025-08-04
Payer: MEDICARE

## 2025-08-04 ENCOUNTER — TELEPHONE (OUTPATIENT)
Dept: HEMATOLOGY/ONCOLOGY | Facility: CLINIC | Age: 75
End: 2025-08-04
Payer: MEDICARE

## 2025-08-04 DIAGNOSIS — M79.10 MYALGIA: Primary | ICD-10-CM

## 2025-08-04 RX ORDER — PREDNISONE 10 MG/1
TABLET ORAL
Qty: 50 TABLET | Refills: 0 | Status: SHIPPED | OUTPATIENT
Start: 2025-08-04 | End: 2025-08-24

## 2025-08-04 NOTE — TELEPHONE ENCOUNTER
"Copied from CRM #[id number]. Topic: General Inquiry - Patient Advice  >> Aug 4, 2025 10:09 AM Sushant wrote:  Consult/Advisory    Name Of Caller: Self    Contact Preference?: [phone]    Provider Name: Jack / Syeda    Does patient feel the need to be seen today? No    What is the nature of the call?: Calling to discuss current pain (level 10 out of 10) symptoms. Stating she's been experiencing this since after recent Infusion. Inquiring about receiving a prescription for remedy    Additional Notes:  "Thank you for all that you do for our patients"  "

## 2025-08-04 NOTE — TELEPHONE ENCOUNTER
I spoke to patient. Told her I saw Dr. Deng had sent in her prednisone. Asked her if she wanted me to ask Dr. Santiago to send her in some pain medicine. She said no because all it does is puts her to sleep. I told her if the prednisone doesn't help, call us to let us know. She verbalized understanding.

## 2025-08-11 ENCOUNTER — OFFICE VISIT (OUTPATIENT)
Dept: HEMATOLOGY/ONCOLOGY | Facility: CLINIC | Age: 75
End: 2025-08-11
Payer: MEDICARE

## 2025-08-11 ENCOUNTER — LAB VISIT (OUTPATIENT)
Dept: LAB | Facility: HOSPITAL | Age: 75
End: 2025-08-11
Payer: MEDICARE

## 2025-08-11 VITALS
DIASTOLIC BLOOD PRESSURE: 76 MMHG | OXYGEN SATURATION: 95 % | HEIGHT: 63 IN | SYSTOLIC BLOOD PRESSURE: 160 MMHG | HEART RATE: 73 BPM | BODY MASS INDEX: 25.04 KG/M2 | WEIGHT: 141.31 LBS

## 2025-08-11 DIAGNOSIS — B37.31 VAGINAL YEAST INFECTION: ICD-10-CM

## 2025-08-11 DIAGNOSIS — C79.89 SECONDARY MALIGNANT NEOPLASM OF SOFT TISSUES OF ABDOMEN: ICD-10-CM

## 2025-08-11 DIAGNOSIS — Z79.899 LONG TERM CURRENT USE OF THERAPEUTIC DRUG: ICD-10-CM

## 2025-08-11 DIAGNOSIS — C64.1 RENAL CELL CARCINOMA OF RIGHT KIDNEY: ICD-10-CM

## 2025-08-11 DIAGNOSIS — N39.41 URINARY INCONTINENCE, URGE: ICD-10-CM

## 2025-08-11 DIAGNOSIS — C64.1 RENAL CELL CARCINOMA OF RIGHT KIDNEY: Primary | ICD-10-CM

## 2025-08-11 DIAGNOSIS — I10 HYPERTENSION, BENIGN: ICD-10-CM

## 2025-08-11 DIAGNOSIS — C34.31 PRIMARY MALIGNANT NEOPLASM OF RIGHT LOWER LOBE OF LUNG: ICD-10-CM

## 2025-08-11 DIAGNOSIS — F17.213 CIGARETTE NICOTINE DEPENDENCE WITH WITHDRAWAL: ICD-10-CM

## 2025-08-11 DIAGNOSIS — C78.01 SECONDARY MALIGNANT NEOPLASM OF RIGHT LUNG: ICD-10-CM

## 2025-08-11 LAB
ABSOLUTE EOSINOPHIL (OHS): 0.14 K/UL
ABSOLUTE MONOCYTE (OHS): 1.15 K/UL (ref 0.3–1)
ABSOLUTE NEUTROPHIL COUNT (OHS): 9.51 K/UL (ref 1.8–7.7)
ALBUMIN SERPL BCP-MCNC: 3.4 G/DL (ref 3.5–5.2)
ALP SERPL-CCNC: 96 UNIT/L (ref 40–150)
ALT SERPL W/O P-5'-P-CCNC: <8 UNIT/L (ref 0–55)
ANION GAP (OHS): 8 MMOL/L (ref 8–16)
AST SERPL-CCNC: 14 UNIT/L (ref 0–50)
BASOPHILS # BLD AUTO: 0.04 K/UL
BASOPHILS NFR BLD AUTO: 0.3 %
BILIRUB SERPL-MCNC: 0.9 MG/DL (ref 0.1–1)
BUN SERPL-MCNC: 17 MG/DL (ref 8–23)
CALCIUM SERPL-MCNC: 8.6 MG/DL (ref 8.7–10.5)
CHLORIDE SERPL-SCNC: 105 MMOL/L (ref 95–110)
CO2 SERPL-SCNC: 28 MMOL/L (ref 23–29)
CREAT SERPL-MCNC: 1.2 MG/DL (ref 0.5–1.4)
ERYTHROCYTE [DISTWIDTH] IN BLOOD BY AUTOMATED COUNT: 14.6 % (ref 11.5–14.5)
GFR SERPLBLD CREATININE-BSD FMLA CKD-EPI: 47 ML/MIN/1.73/M2
GLUCOSE SERPL-MCNC: 156 MG/DL (ref 70–110)
HCT VFR BLD AUTO: 40.5 % (ref 37–48.5)
HGB BLD-MCNC: 13.4 GM/DL (ref 12–16)
IMM GRANULOCYTES # BLD AUTO: 0.1 K/UL (ref 0–0.04)
IMM GRANULOCYTES NFR BLD AUTO: 0.8 % (ref 0–0.5)
LYMPHOCYTES # BLD AUTO: 0.91 K/UL (ref 1–4.8)
MCH RBC QN AUTO: 28.1 PG (ref 27–31)
MCHC RBC AUTO-ENTMCNC: 33.1 G/DL (ref 32–36)
MCV RBC AUTO: 85 FL (ref 82–98)
NUCLEATED RBC (/100WBC) (OHS): 0 /100 WBC
PLATELET # BLD AUTO: 184 K/UL (ref 150–450)
PMV BLD AUTO: 8.8 FL (ref 9.2–12.9)
POTASSIUM SERPL-SCNC: 4.1 MMOL/L (ref 3.5–5.1)
PROT SERPL-MCNC: 6.8 GM/DL (ref 6–8.4)
RBC # BLD AUTO: 4.77 M/UL (ref 4–5.4)
RELATIVE EOSINOPHIL (OHS): 1.2 %
RELATIVE LYMPHOCYTE (OHS): 7.7 % (ref 18–48)
RELATIVE MONOCYTE (OHS): 9.7 % (ref 4–15)
RELATIVE NEUTROPHIL (OHS): 80.3 % (ref 38–73)
SODIUM SERPL-SCNC: 141 MMOL/L (ref 136–145)
TSH SERPL-ACNC: 1.08 UIU/ML (ref 0.4–4)
WBC # BLD AUTO: 11.85 K/UL (ref 3.9–12.7)

## 2025-08-11 PROCEDURE — 1159F MED LIST DOCD IN RCRD: CPT | Mod: CPTII,S$GLB,, | Performed by: NURSE PRACTITIONER

## 2025-08-11 PROCEDURE — 3288F FALL RISK ASSESSMENT DOCD: CPT | Mod: CPTII,S$GLB,, | Performed by: NURSE PRACTITIONER

## 2025-08-11 PROCEDURE — G2211 COMPLEX E/M VISIT ADD ON: HCPCS | Mod: S$GLB,,, | Performed by: NURSE PRACTITIONER

## 2025-08-11 PROCEDURE — 99215 OFFICE O/P EST HI 40 MIN: CPT | Mod: S$GLB,,, | Performed by: NURSE PRACTITIONER

## 2025-08-11 PROCEDURE — 82040 ASSAY OF SERUM ALBUMIN: CPT

## 2025-08-11 PROCEDURE — 36415 COLL VENOUS BLD VENIPUNCTURE: CPT

## 2025-08-11 PROCEDURE — 85025 COMPLETE CBC W/AUTO DIFF WBC: CPT

## 2025-08-11 PROCEDURE — 99999 PR PBB SHADOW E&M-EST. PATIENT-LVL IV: CPT | Mod: PBBFAC,,, | Performed by: NURSE PRACTITIONER

## 2025-08-11 PROCEDURE — 3077F SYST BP >= 140 MM HG: CPT | Mod: CPTII,S$GLB,, | Performed by: NURSE PRACTITIONER

## 2025-08-11 PROCEDURE — 1126F AMNT PAIN NOTED NONE PRSNT: CPT | Mod: CPTII,S$GLB,, | Performed by: NURSE PRACTITIONER

## 2025-08-11 PROCEDURE — 3078F DIAST BP <80 MM HG: CPT | Mod: CPTII,S$GLB,, | Performed by: NURSE PRACTITIONER

## 2025-08-11 PROCEDURE — 84443 ASSAY THYROID STIM HORMONE: CPT

## 2025-08-11 PROCEDURE — 1101F PT FALLS ASSESS-DOCD LE1/YR: CPT | Mod: CPTII,S$GLB,, | Performed by: NURSE PRACTITIONER

## 2025-08-11 RX ORDER — FLUCONAZOLE 150 MG/1
150 TABLET ORAL
Qty: 3 TABLET | Refills: 0 | Status: SHIPPED | OUTPATIENT
Start: 2025-08-11

## 2025-08-12 ENCOUNTER — TELEPHONE (OUTPATIENT)
Dept: PALLIATIVE MEDICINE | Facility: CLINIC | Age: 75
End: 2025-08-12
Payer: MEDICARE

## 2025-08-13 ENCOUNTER — TELEPHONE (OUTPATIENT)
Dept: FAMILY MEDICINE | Facility: CLINIC | Age: 75
End: 2025-08-13
Payer: MEDICARE

## 2025-08-13 DIAGNOSIS — R52 PAIN: ICD-10-CM

## 2025-08-13 RX ORDER — TRAMADOL HYDROCHLORIDE 50 MG/1
50 TABLET, FILM COATED ORAL EVERY 6 HOURS PRN
Qty: 120 TABLET | Refills: 1 | Status: SHIPPED | OUTPATIENT
Start: 2025-08-13

## 2025-08-14 ENCOUNTER — LAB VISIT (OUTPATIENT)
Dept: LAB | Facility: HOSPITAL | Age: 75
End: 2025-08-14
Attending: HOSPITALIST
Payer: MEDICARE

## 2025-08-14 DIAGNOSIS — E11.65 TYPE 2 DIABETES MELLITUS WITH HYPERGLYCEMIA, WITHOUT LONG-TERM CURRENT USE OF INSULIN: ICD-10-CM

## 2025-08-14 LAB
ALBUMIN SERPL BCP-MCNC: 3.3 G/DL (ref 3.5–5.2)
ANION GAP (OHS): 10 MMOL/L (ref 8–16)
BUN SERPL-MCNC: 17 MG/DL (ref 8–23)
CALCIUM SERPL-MCNC: 9.2 MG/DL (ref 8.7–10.5)
CHLORIDE SERPL-SCNC: 107 MMOL/L (ref 95–110)
CO2 SERPL-SCNC: 26 MMOL/L (ref 23–29)
CREAT SERPL-MCNC: 1.1 MG/DL (ref 0.5–1.4)
GFR SERPLBLD CREATININE-BSD FMLA CKD-EPI: 52 ML/MIN/1.73/M2
GLUCOSE SERPL-MCNC: 181 MG/DL (ref 70–110)
PHOSPHATE SERPL-MCNC: 3 MG/DL (ref 2.7–4.5)
POTASSIUM SERPL-SCNC: 4.2 MMOL/L (ref 3.5–5.1)
SODIUM SERPL-SCNC: 143 MMOL/L (ref 136–145)

## 2025-08-14 PROCEDURE — 83036 HEMOGLOBIN GLYCOSYLATED A1C: CPT

## 2025-08-14 PROCEDURE — 36415 COLL VENOUS BLD VENIPUNCTURE: CPT

## 2025-08-14 PROCEDURE — 82040 ASSAY OF SERUM ALBUMIN: CPT

## 2025-08-15 LAB
EAG (OHS): 171 MG/DL (ref 68–131)
HBA1C MFR BLD: 7.6 % (ref 4–5.6)

## 2025-08-21 ENCOUNTER — OFFICE VISIT (OUTPATIENT)
Dept: ENDOCRINOLOGY | Facility: CLINIC | Age: 75
End: 2025-08-21
Payer: MEDICARE

## 2025-08-21 VITALS
HEART RATE: 80 BPM | WEIGHT: 140.38 LBS | SYSTOLIC BLOOD PRESSURE: 172 MMHG | DIASTOLIC BLOOD PRESSURE: 76 MMHG | BODY MASS INDEX: 24.87 KG/M2

## 2025-08-21 DIAGNOSIS — C64.1 RENAL CELL CARCINOMA OF RIGHT KIDNEY: ICD-10-CM

## 2025-08-21 DIAGNOSIS — B37.9 YEAST INFECTION: ICD-10-CM

## 2025-08-21 DIAGNOSIS — N18.31 STAGE 3A CHRONIC KIDNEY DISEASE: ICD-10-CM

## 2025-08-21 DIAGNOSIS — C78.01 SECONDARY MALIGNANT NEOPLASM OF RIGHT LUNG: ICD-10-CM

## 2025-08-21 DIAGNOSIS — M81.0 OSTEOPOROSIS, POST-MENOPAUSAL: ICD-10-CM

## 2025-08-21 DIAGNOSIS — B37.31 VAGINAL YEAST INFECTION: ICD-10-CM

## 2025-08-21 DIAGNOSIS — E11.65 TYPE 2 DIABETES MELLITUS WITH HYPERGLYCEMIA, WITHOUT LONG-TERM CURRENT USE OF INSULIN: Primary | ICD-10-CM

## 2025-08-21 DIAGNOSIS — E55.9 VITAMIN D DEFICIENCY: ICD-10-CM

## 2025-08-21 PROCEDURE — 1159F MED LIST DOCD IN RCRD: CPT | Mod: CPTII,S$GLB,, | Performed by: HOSPITALIST

## 2025-08-21 PROCEDURE — 99999 PR PBB SHADOW E&M-EST. PATIENT-LVL IV: CPT | Mod: PBBFAC,,, | Performed by: HOSPITALIST

## 2025-08-21 PROCEDURE — 3078F DIAST BP <80 MM HG: CPT | Mod: CPTII,S$GLB,, | Performed by: HOSPITALIST

## 2025-08-21 PROCEDURE — 1101F PT FALLS ASSESS-DOCD LE1/YR: CPT | Mod: CPTII,S$GLB,, | Performed by: HOSPITALIST

## 2025-08-21 PROCEDURE — 3288F FALL RISK ASSESSMENT DOCD: CPT | Mod: CPTII,S$GLB,, | Performed by: HOSPITALIST

## 2025-08-21 PROCEDURE — 99215 OFFICE O/P EST HI 40 MIN: CPT | Mod: S$GLB,,, | Performed by: HOSPITALIST

## 2025-08-21 PROCEDURE — 1160F RVW MEDS BY RX/DR IN RCRD: CPT | Mod: CPTII,S$GLB,, | Performed by: HOSPITALIST

## 2025-08-21 PROCEDURE — 3077F SYST BP >= 140 MM HG: CPT | Mod: CPTII,S$GLB,, | Performed by: HOSPITALIST

## 2025-08-21 RX ORDER — CLOTRIMAZOLE 1 %
CREAM (GRAM) TOPICAL 2 TIMES DAILY
Qty: 14 G | Refills: 0 | Status: SHIPPED | OUTPATIENT
Start: 2025-08-21 | End: 2025-08-28

## 2025-08-21 RX ORDER — PIOGLITAZONE 30 MG/1
30 TABLET ORAL DAILY
Qty: 90 TABLET | Refills: 3 | Status: SHIPPED | OUTPATIENT
Start: 2025-08-21 | End: 2026-08-21

## 2025-09-05 ENCOUNTER — TELEPHONE (OUTPATIENT)
Dept: HEMATOLOGY/ONCOLOGY | Facility: CLINIC | Age: 75
End: 2025-09-05
Payer: MEDICARE

## (undated) DEVICE — SOL WATER STRL IRR 1000ML

## (undated) DEVICE — SUT MCRYL PLUS 4-0 PS2 27IN

## (undated) DEVICE — CONTAINER SPECIMEN STRL 4OZ

## (undated) DEVICE — NDL VIZISHOT 2 FLEX 19G

## (undated) DEVICE — PORT AIRSEAL 12/120MM LPI

## (undated) DEVICE — DRESSING TELFA N ADH 3X8

## (undated) DEVICE — SYR ONLY LUER LOCK 20CC

## (undated) DEVICE — SYS LABEL CORRECT MED

## (undated) DEVICE — DRAPE ARM DAVINCI XI

## (undated) DEVICE — DRAPE ABDOMINAL TIBURON 14X11

## (undated) DEVICE — LUBRICANT SURGILUBE 2 OZ

## (undated) DEVICE — NDL SPINAL 18GX3.5 SPINOCAN

## (undated) DEVICE — ADAPTER VISION PROBE & SUCTION

## (undated) DEVICE — CONTAINER SPECIMEN OR STER 4OZ

## (undated) DEVICE — PACK SET UP CONVERTORS

## (undated) DEVICE — NDL FLEXISION BIOPSY 21G

## (undated) DEVICE — ALCOHOL BLEND 95%

## (undated) DEVICE — FORCEP SERR TIP TRACKED DISP

## (undated) DEVICE — SEE MEDLINE ITEM 157131

## (undated) DEVICE — GOWN POLY REINF BRTH SLV XL

## (undated) DEVICE — BAG ION VISION PROBE

## (undated) DEVICE — NDL ASPIRATING VIZISHOT 20-40M

## (undated) DEVICE — KIT ANTIFOG W/SPONG & FLUID

## (undated) DEVICE — GOWN SMART IMP BREATHABLE XXLG

## (undated) DEVICE — PACK ECLIPSE SET-UP W/O DRAPE

## (undated) DEVICE — DRESSING SURGICAL 1X3

## (undated) DEVICE — SEAL UNIVERSAL 5MM-8MM XI

## (undated) DEVICE — SYR SLIP TIP 20CC

## (undated) DEVICE — PENCIL GOLF STERILE

## (undated) DEVICE — TRACKER PATIENT VPAD

## (undated) DEVICE — DRESSING TRANS 6X8 TEGADERM

## (undated) DEVICE — COVER LIGHT HANDLE

## (undated) DEVICE — GAUZE SPONGE 4X4 12PLY

## (undated) DEVICE — SYR 10CC LUER LOCK

## (undated) DEVICE — SET TRI-LUMEN FILTERED TUBE

## (undated) DEVICE — DRAIN CHAN RND HUBLS 8MM 24FR

## (undated) DEVICE — SEE MEDLINE ITEM 146313

## (undated) DEVICE — TRAY MINOR GEN SURG OMC

## (undated) DEVICE — ADHESIVE MASTISOL VIAL 48/BX

## (undated) DEVICE — ELECTRODE REM PLYHSV RETURN 9

## (undated) DEVICE — CYTOSPIN COLLECTION FLUID BLT

## (undated) DEVICE — DRESSING TRANS 2X2 TEGADERM

## (undated) DEVICE — TUBING SUC UNIV W/CONN 12FT

## (undated) DEVICE — ADAPTER SWIVEL

## (undated) DEVICE — CANNULA SEAL 12MM

## (undated) DEVICE — GLOVE BIOGEL SKINSENSE PI 7.5

## (undated) DEVICE — CLOSURE SKIN STERI STRIP 1/2X4

## (undated) DEVICE — Device

## (undated) DEVICE — DRAIN CHEST DRY SUCTION

## (undated) DEVICE — CATH BRONCHOSCOPE F/BF

## (undated) DEVICE — TAPE SILK 3IN

## (undated) DEVICE — SEE MEDLINE ITEM 152487

## (undated) DEVICE — NDL HYPO REG 25G X 1 1/2

## (undated) DEVICE — DRAPE COLUMN DAVINCI XI

## (undated) DEVICE — DRAPE INCISE IOBAN 2 23X17IN

## (undated) DEVICE — DRAPE THREE-QTR REINF 53X77IN

## (undated) DEVICE — DRESSING TRANS 4X4 TEGADERM

## (undated) DEVICE — DRAPE SCOPE PILLOW WARMER

## (undated) DEVICE — NDL CYTOLOGY TIP TRACK 21GA

## (undated) DEVICE — SPONGE IV DRAIN 4X4 STERILE

## (undated) DEVICE — FORCEP JAW RADIAL PULM 2X100CM

## (undated) DEVICE — SEE MEDLINE ITEM 157117

## (undated) DEVICE — CANNULA REDUCER 12-8MM

## (undated) DEVICE — ELECTRODE BLADE INSULATED 1 IN

## (undated) DEVICE — SPONGE COTTON TRAY 4X4IN

## (undated) DEVICE — SUT SILK 0 STRANDS 30IN BLK

## (undated) DEVICE — CONNECTOR SWIVEL

## (undated) DEVICE — LOOP VESSEL BLUE MAXI

## (undated) DEVICE — NDL VIZISHOT 2 FLEX 22G

## (undated) DEVICE — NDL FLTR 5MCRN BLNT TIP 18GX1

## (undated) DEVICE — BOWL STERILE LARGE 32OZ

## (undated) DEVICE — HEMOSTAT SURGICEL NUKNIT 3X4IN

## (undated) DEVICE — SUT SILK 0 BLK BR FSL 18 IN

## (undated) DEVICE — SUT VICRYL 3-0 27 SH